# Patient Record
Sex: FEMALE | Race: BLACK OR AFRICAN AMERICAN | NOT HISPANIC OR LATINO | Employment: OTHER | URBAN - METROPOLITAN AREA
[De-identification: names, ages, dates, MRNs, and addresses within clinical notes are randomized per-mention and may not be internally consistent; named-entity substitution may affect disease eponyms.]

---

## 2017-02-17 ENCOUNTER — TRANSCRIBE ORDERS (OUTPATIENT)
Dept: ADMINISTRATIVE | Age: 77
End: 2017-02-17

## 2017-02-17 ENCOUNTER — LAB (OUTPATIENT)
Dept: LAB | Age: 77
End: 2017-02-17
Payer: MEDICARE

## 2017-02-17 DIAGNOSIS — K75.4 CHRONIC AGGRESSIVE HEPATITIS (HCC): Primary | ICD-10-CM

## 2017-02-17 DIAGNOSIS — K75.4 CHRONIC AGGRESSIVE HEPATITIS (HCC): ICD-10-CM

## 2017-02-17 LAB
ALBUMIN SERPL BCP-MCNC: 3.5 G/DL (ref 3.5–5)
ALP SERPL-CCNC: 47 U/L (ref 46–116)
ALT SERPL W P-5'-P-CCNC: 40 U/L (ref 12–78)
ANION GAP SERPL CALCULATED.3IONS-SCNC: 5 MMOL/L (ref 4–13)
AST SERPL W P-5'-P-CCNC: 27 U/L (ref 5–45)
BILIRUB SERPL-MCNC: 0.27 MG/DL (ref 0.2–1)
BUN SERPL-MCNC: 16 MG/DL (ref 5–25)
CALCIUM SERPL-MCNC: 9.5 MG/DL (ref 8.3–10.1)
CHLORIDE SERPL-SCNC: 106 MMOL/L (ref 100–108)
CO2 SERPL-SCNC: 31 MMOL/L (ref 21–32)
CREAT SERPL-MCNC: 0.81 MG/DL (ref 0.6–1.3)
GFR SERPL CREATININE-BSD FRML MDRD: >60 ML/MIN/1.73SQ M
GLUCOSE SERPL-MCNC: 95 MG/DL (ref 65–140)
POTASSIUM SERPL-SCNC: 3.6 MMOL/L (ref 3.5–5.3)
PROT SERPL-MCNC: 7.4 G/DL (ref 6.4–8.2)
SODIUM SERPL-SCNC: 142 MMOL/L (ref 136–145)

## 2017-02-17 PROCEDURE — 36415 COLL VENOUS BLD VENIPUNCTURE: CPT

## 2017-02-17 PROCEDURE — 80053 COMPREHEN METABOLIC PANEL: CPT

## 2017-06-30 ENCOUNTER — TRANSCRIBE ORDERS (OUTPATIENT)
Dept: ADMINISTRATIVE | Age: 77
End: 2017-06-30

## 2017-06-30 ENCOUNTER — LAB (OUTPATIENT)
Dept: LAB | Age: 77
End: 2017-06-30
Payer: MEDICARE

## 2017-06-30 DIAGNOSIS — K75.4 CHRONIC AGGRESSIVE HEPATITIS (HCC): ICD-10-CM

## 2017-06-30 DIAGNOSIS — K75.4 CHRONIC AGGRESSIVE HEPATITIS (HCC): Primary | ICD-10-CM

## 2017-06-30 LAB
ALBUMIN SERPL BCP-MCNC: 3.7 G/DL (ref 3.5–5)
ALP SERPL-CCNC: 54 U/L (ref 46–116)
ALT SERPL W P-5'-P-CCNC: 33 U/L (ref 12–78)
ANION GAP SERPL CALCULATED.3IONS-SCNC: 4 MMOL/L (ref 4–13)
AST SERPL W P-5'-P-CCNC: 26 U/L (ref 5–45)
BILIRUB SERPL-MCNC: 0.52 MG/DL (ref 0.2–1)
BUN SERPL-MCNC: 10 MG/DL (ref 5–25)
CALCIUM SERPL-MCNC: 9.3 MG/DL (ref 8.3–10.1)
CHLORIDE SERPL-SCNC: 103 MMOL/L (ref 100–108)
CO2 SERPL-SCNC: 30 MMOL/L (ref 21–32)
CREAT SERPL-MCNC: 0.71 MG/DL (ref 0.6–1.3)
GFR SERPL CREATININE-BSD FRML MDRD: >60 ML/MIN/1.73SQ M
GLUCOSE SERPL-MCNC: 98 MG/DL (ref 65–140)
POTASSIUM SERPL-SCNC: 4 MMOL/L (ref 3.5–5.3)
PROT SERPL-MCNC: 7.5 G/DL (ref 6.4–8.2)
SODIUM SERPL-SCNC: 137 MMOL/L (ref 136–145)

## 2017-06-30 PROCEDURE — 36415 COLL VENOUS BLD VENIPUNCTURE: CPT

## 2017-06-30 PROCEDURE — 80053 COMPREHEN METABOLIC PANEL: CPT

## 2017-07-14 ENCOUNTER — LAB (OUTPATIENT)
Dept: LAB | Age: 77
End: 2017-07-14
Attending: FAMILY MEDICINE
Payer: MEDICARE

## 2017-07-14 ENCOUNTER — TRANSCRIBE ORDERS (OUTPATIENT)
Dept: ADMINISTRATIVE | Age: 77
End: 2017-07-14

## 2017-07-14 DIAGNOSIS — I10 ESSENTIAL HYPERTENSION, MALIGNANT: ICD-10-CM

## 2017-07-14 DIAGNOSIS — I10 ESSENTIAL HYPERTENSION, MALIGNANT: Primary | ICD-10-CM

## 2017-07-14 LAB
25(OH)D3 SERPL-MCNC: 37.7 NG/ML (ref 30–100)
ANION GAP SERPL CALCULATED.3IONS-SCNC: 5 MMOL/L (ref 4–13)
BASOPHILS # BLD AUTO: 0.02 THOUSANDS/ΜL (ref 0–0.1)
BASOPHILS NFR BLD AUTO: 1 % (ref 0–1)
BUN SERPL-MCNC: 18 MG/DL (ref 5–25)
CALCIUM SERPL-MCNC: 9.5 MG/DL (ref 8.3–10.1)
CHLORIDE SERPL-SCNC: 105 MMOL/L (ref 100–108)
CO2 SERPL-SCNC: 29 MMOL/L (ref 21–32)
CREAT SERPL-MCNC: 1.08 MG/DL (ref 0.6–1.3)
EOSINOPHIL # BLD AUTO: 0.02 THOUSAND/ΜL (ref 0–0.61)
EOSINOPHIL NFR BLD AUTO: 1 % (ref 0–6)
ERYTHROCYTE [DISTWIDTH] IN BLOOD BY AUTOMATED COUNT: 13.7 % (ref 11.6–15.1)
GFR SERPL CREATININE-BSD FRML MDRD: 59.3 ML/MIN/1.73SQ M
GLUCOSE SERPL-MCNC: 84 MG/DL (ref 65–140)
HCT VFR BLD AUTO: 35.5 % (ref 34.8–46.1)
HGB BLD-MCNC: 12.2 G/DL (ref 11.5–15.4)
LYMPHOCYTES # BLD AUTO: 1.37 THOUSANDS/ΜL (ref 0.6–4.47)
LYMPHOCYTES NFR BLD AUTO: 48 % (ref 14–44)
MCH RBC QN AUTO: 30.6 PG (ref 26.8–34.3)
MCHC RBC AUTO-ENTMCNC: 34.4 G/DL (ref 31.4–37.4)
MCV RBC AUTO: 89 FL (ref 82–98)
MONOCYTES # BLD AUTO: 0.24 THOUSAND/ΜL (ref 0.17–1.22)
MONOCYTES NFR BLD AUTO: 9 % (ref 4–12)
NEUTROPHILS # BLD AUTO: 1.15 THOUSANDS/ΜL (ref 1.85–7.62)
NEUTS SEG NFR BLD AUTO: 41 % (ref 43–75)
NRBC BLD AUTO-RTO: 0 /100 WBCS
PLATELET # BLD AUTO: 86 THOUSANDS/UL (ref 149–390)
PMV BLD AUTO: 12.4 FL (ref 8.9–12.7)
POTASSIUM SERPL-SCNC: 3.8 MMOL/L (ref 3.5–5.3)
RBC # BLD AUTO: 3.99 MILLION/UL (ref 3.81–5.12)
SODIUM SERPL-SCNC: 139 MMOL/L (ref 136–145)
WBC # BLD AUTO: 2.81 THOUSAND/UL (ref 4.31–10.16)

## 2017-07-14 PROCEDURE — 85025 COMPLETE CBC W/AUTO DIFF WBC: CPT

## 2017-07-14 PROCEDURE — 80048 BASIC METABOLIC PNL TOTAL CA: CPT

## 2017-07-14 PROCEDURE — 36415 COLL VENOUS BLD VENIPUNCTURE: CPT

## 2017-07-14 PROCEDURE — 82306 VITAMIN D 25 HYDROXY: CPT

## 2017-08-10 ENCOUNTER — GENERIC CONVERSION - ENCOUNTER (OUTPATIENT)
Dept: OTHER | Facility: OTHER | Age: 77
End: 2017-08-10

## 2017-08-18 ENCOUNTER — APPOINTMENT (EMERGENCY)
Dept: RADIOLOGY | Facility: HOSPITAL | Age: 77
End: 2017-08-18
Payer: MEDICARE

## 2017-08-18 ENCOUNTER — HOSPITAL ENCOUNTER (EMERGENCY)
Facility: HOSPITAL | Age: 77
Discharge: HOME | End: 2017-08-18
Attending: EMERGENCY MEDICINE | Admitting: EMERGENCY MEDICINE
Payer: MEDICARE

## 2017-08-18 ENCOUNTER — APPOINTMENT (EMERGENCY)
Dept: ULTRASOUND IMAGING | Facility: HOSPITAL | Age: 77
End: 2017-08-18
Payer: MEDICARE

## 2017-08-18 VITALS
OXYGEN SATURATION: 98 % | TEMPERATURE: 98.2 F | HEART RATE: 84 BPM | WEIGHT: 130.3 LBS | DIASTOLIC BLOOD PRESSURE: 69 MMHG | HEIGHT: 64 IN | SYSTOLIC BLOOD PRESSURE: 137 MMHG | RESPIRATION RATE: 16 BRPM | BODY MASS INDEX: 22.24 KG/M2

## 2017-08-18 DIAGNOSIS — M76.62 ACHILLES TENDINITIS, LEFT LEG: Primary | ICD-10-CM

## 2017-08-18 PROCEDURE — 73630 X-RAY EXAM OF FOOT: CPT

## 2017-08-18 PROCEDURE — 99284 EMERGENCY DEPT VISIT MOD MDM: CPT

## 2017-08-18 PROCEDURE — 93971 EXTREMITY STUDY: CPT

## 2017-08-18 PROCEDURE — 73610 X-RAY EXAM OF ANKLE: CPT

## 2017-09-18 ENCOUNTER — TRANSCRIBE ORDERS (OUTPATIENT)
Dept: LAB | Facility: CLINIC | Age: 77
End: 2017-09-18

## 2017-09-18 ENCOUNTER — OFFICE VISIT (OUTPATIENT)
Dept: LAB | Facility: CLINIC | Age: 77
End: 2017-09-18
Payer: MEDICARE

## 2017-09-18 DIAGNOSIS — Z01.818 PREOP EXAMINATION: ICD-10-CM

## 2017-09-18 DIAGNOSIS — Z01.818 PREOP EXAMINATION: Primary | ICD-10-CM

## 2017-09-18 DIAGNOSIS — Z01.812 PRE-OPERATIVE LABORATORY EXAMINATION: ICD-10-CM

## 2017-09-18 PROCEDURE — 93005 ELECTROCARDIOGRAM TRACING: CPT

## 2017-09-19 LAB
ATRIAL RATE: 74 BPM
P AXIS: 72 DEGREES
PR INTERVAL: 170 MS
QRS AXIS: -18 DEGREES
QRSD INTERVAL: 156 MS
QT INTERVAL: 460 MS
QTC INTERVAL: 510 MS
T WAVE AXIS: 207 DEGREES
VENTRICULAR RATE: 74 BPM

## 2017-11-12 ENCOUNTER — TRANSCRIBE ORDERS (OUTPATIENT)
Dept: ADMINISTRATIVE | Age: 77
End: 2017-11-12

## 2017-11-12 ENCOUNTER — LAB (OUTPATIENT)
Dept: LAB | Age: 77
End: 2017-11-12
Payer: MEDICARE

## 2017-11-12 DIAGNOSIS — K75.4 CHRONIC AGGRESSIVE HEPATITIS (HCC): Primary | ICD-10-CM

## 2017-11-12 DIAGNOSIS — K75.4 CHRONIC AGGRESSIVE HEPATITIS (HCC): ICD-10-CM

## 2017-11-12 LAB
ALBUMIN SERPL BCP-MCNC: 3.5 G/DL (ref 3.5–5)
ALP SERPL-CCNC: 85 U/L (ref 46–116)
ALT SERPL W P-5'-P-CCNC: 101 U/L (ref 12–78)
ANION GAP SERPL CALCULATED.3IONS-SCNC: 4 MMOL/L (ref 4–13)
AST SERPL W P-5'-P-CCNC: 99 U/L (ref 5–45)
BILIRUB SERPL-MCNC: 0.64 MG/DL (ref 0.2–1)
BUN SERPL-MCNC: 11 MG/DL (ref 5–25)
CALCIUM SERPL-MCNC: 9 MG/DL (ref 8.3–10.1)
CHLORIDE SERPL-SCNC: 104 MMOL/L (ref 100–108)
CO2 SERPL-SCNC: 31 MMOL/L (ref 21–32)
CREAT SERPL-MCNC: 0.7 MG/DL (ref 0.6–1.3)
GFR SERPL CREATININE-BSD FRML MDRD: 95 ML/MIN/1.73SQ M
GLUCOSE SERPL-MCNC: 97 MG/DL (ref 65–140)
POTASSIUM SERPL-SCNC: 3.4 MMOL/L (ref 3.5–5.3)
PROT SERPL-MCNC: 8.1 G/DL (ref 6.4–8.2)
SODIUM SERPL-SCNC: 139 MMOL/L (ref 136–145)

## 2017-11-12 PROCEDURE — 80053 COMPREHEN METABOLIC PANEL: CPT

## 2017-11-12 PROCEDURE — 36415 COLL VENOUS BLD VENIPUNCTURE: CPT

## 2017-12-27 ENCOUNTER — LAB (OUTPATIENT)
Dept: LAB | Age: 77
End: 2017-12-27
Payer: MEDICARE

## 2017-12-27 ENCOUNTER — TRANSCRIBE ORDERS (OUTPATIENT)
Dept: ADMINISTRATIVE | Age: 77
End: 2017-12-27

## 2017-12-27 DIAGNOSIS — K75.4 CHRONIC AGGRESSIVE HEPATITIS (HCC): ICD-10-CM

## 2017-12-27 DIAGNOSIS — E55.9 AVITAMINOSIS D: Primary | ICD-10-CM

## 2017-12-27 DIAGNOSIS — E55.9 AVITAMINOSIS D: ICD-10-CM

## 2017-12-27 LAB
ALBUMIN SERPL BCP-MCNC: 3.2 G/DL (ref 3.5–5)
ALP SERPL-CCNC: 106 U/L (ref 46–116)
ALT SERPL W P-5'-P-CCNC: 436 U/L (ref 12–78)
ANION GAP SERPL CALCULATED.3IONS-SCNC: 5 MMOL/L (ref 4–13)
AST SERPL W P-5'-P-CCNC: 372 U/L (ref 5–45)
BILIRUB SERPL-MCNC: 0.65 MG/DL (ref 0.2–1)
BUN SERPL-MCNC: 11 MG/DL (ref 5–25)
CALCIUM SERPL-MCNC: 9 MG/DL (ref 8.3–10.1)
CHLORIDE SERPL-SCNC: 103 MMOL/L (ref 100–108)
CO2 SERPL-SCNC: 30 MMOL/L (ref 21–32)
CREAT SERPL-MCNC: 0.66 MG/DL (ref 0.6–1.3)
GFR SERPL CREATININE-BSD FRML MDRD: 97 ML/MIN/1.73SQ M
GLUCOSE P FAST SERPL-MCNC: 109 MG/DL (ref 65–99)
POTASSIUM SERPL-SCNC: 3.6 MMOL/L (ref 3.5–5.3)
PROT SERPL-MCNC: 8.6 G/DL (ref 6.4–8.2)
SODIUM SERPL-SCNC: 138 MMOL/L (ref 136–145)

## 2017-12-27 PROCEDURE — 82306 VITAMIN D 25 HYDROXY: CPT

## 2017-12-27 PROCEDURE — 81401 MOPATH PROCEDURE LEVEL 2: CPT

## 2017-12-27 PROCEDURE — 36415 COLL VENOUS BLD VENIPUNCTURE: CPT

## 2017-12-27 PROCEDURE — 80053 COMPREHEN METABOLIC PANEL: CPT

## 2017-12-28 LAB — 25(OH)D3 SERPL-MCNC: 36.8 NG/ML (ref 30–100)

## 2018-01-05 LAB
DIAGNOSTIC IMP SPEC-IMP: NORMAL
LABORATORY COMMENT REPORT: NORMAL
TPMT GENE MUT ANL BLD/T: NORMAL
TPMT GENE MUT ANL BLD/T: NORMAL

## 2018-01-10 ENCOUNTER — TRANSCRIBE ORDERS (OUTPATIENT)
Dept: ADMINISTRATIVE | Age: 78
End: 2018-01-10

## 2018-01-10 ENCOUNTER — LAB (OUTPATIENT)
Dept: LAB | Age: 78
End: 2018-01-10
Payer: MEDICARE

## 2018-01-10 DIAGNOSIS — R74.9 NONSPECIFIC ABNORMAL SERUM ENZYME LEVELS: Primary | ICD-10-CM

## 2018-01-10 DIAGNOSIS — R74.9 NONSPECIFIC ABNORMAL SERUM ENZYME LEVELS: ICD-10-CM

## 2018-01-10 LAB
ALBUMIN SERPL BCP-MCNC: 3.4 G/DL (ref 3.5–5)
ALP SERPL-CCNC: 89 U/L (ref 46–116)
ALT SERPL W P-5'-P-CCNC: 279 U/L (ref 12–78)
ANION GAP SERPL CALCULATED.3IONS-SCNC: 5 MMOL/L (ref 4–13)
AST SERPL W P-5'-P-CCNC: 216 U/L (ref 5–45)
BILIRUB SERPL-MCNC: 0.76 MG/DL (ref 0.2–1)
BUN SERPL-MCNC: 11 MG/DL (ref 5–25)
CALCIUM SERPL-MCNC: 9.3 MG/DL (ref 8.3–10.1)
CHLORIDE SERPL-SCNC: 102 MMOL/L (ref 100–108)
CO2 SERPL-SCNC: 29 MMOL/L (ref 21–32)
CREAT SERPL-MCNC: 0.67 MG/DL (ref 0.6–1.3)
GFR SERPL CREATININE-BSD FRML MDRD: 97 ML/MIN/1.73SQ M
GLUCOSE SERPL-MCNC: 90 MG/DL (ref 65–140)
POTASSIUM SERPL-SCNC: 3.7 MMOL/L (ref 3.5–5.3)
PROT SERPL-MCNC: 8.7 G/DL (ref 6.4–8.2)
SODIUM SERPL-SCNC: 136 MMOL/L (ref 136–145)

## 2018-01-10 PROCEDURE — 80053 COMPREHEN METABOLIC PANEL: CPT

## 2018-01-10 PROCEDURE — 36415 COLL VENOUS BLD VENIPUNCTURE: CPT

## 2018-01-21 ENCOUNTER — LAB (OUTPATIENT)
Dept: LAB | Age: 78
End: 2018-01-21
Attending: FAMILY MEDICINE
Payer: MEDICARE

## 2018-01-21 ENCOUNTER — TRANSCRIBE ORDERS (OUTPATIENT)
Dept: ADMINISTRATIVE | Age: 78
End: 2018-01-21

## 2018-01-21 DIAGNOSIS — K75.4 HEPATITIS, AUTOIMMUNE (HCC): Primary | ICD-10-CM

## 2018-01-21 DIAGNOSIS — K75.4 HEPATITIS, AUTOIMMUNE (HCC): ICD-10-CM

## 2018-01-21 LAB
25(OH)D3 SERPL-MCNC: 40.6 NG/ML (ref 30–100)
ALBUMIN SERPL BCP-MCNC: 3.4 G/DL (ref 3.5–5)
ALP SERPL-CCNC: 76 U/L (ref 46–116)
ALT SERPL W P-5'-P-CCNC: 216 U/L (ref 12–78)
ANION GAP SERPL CALCULATED.3IONS-SCNC: 4 MMOL/L (ref 4–13)
AST SERPL W P-5'-P-CCNC: 155 U/L (ref 5–45)
BILIRUB SERPL-MCNC: 0.84 MG/DL (ref 0.2–1)
BUN SERPL-MCNC: 11 MG/DL (ref 5–25)
CALCIUM SERPL-MCNC: 8.9 MG/DL (ref 8.3–10.1)
CHLORIDE SERPL-SCNC: 102 MMOL/L (ref 100–108)
CO2 SERPL-SCNC: 31 MMOL/L (ref 21–32)
CREAT SERPL-MCNC: 0.7 MG/DL (ref 0.6–1.3)
GFR SERPL CREATININE-BSD FRML MDRD: 95 ML/MIN/1.73SQ M
GLUCOSE SERPL-MCNC: 85 MG/DL (ref 65–140)
POTASSIUM SERPL-SCNC: 3.6 MMOL/L (ref 3.5–5.3)
PROT SERPL-MCNC: 8.7 G/DL (ref 6.4–8.2)
SODIUM SERPL-SCNC: 137 MMOL/L (ref 136–145)

## 2018-01-21 PROCEDURE — 80053 COMPREHEN METABOLIC PANEL: CPT

## 2018-01-21 PROCEDURE — 82627 DEHYDROEPIANDROSTERONE: CPT

## 2018-01-21 PROCEDURE — 36415 COLL VENOUS BLD VENIPUNCTURE: CPT

## 2018-01-21 PROCEDURE — 82306 VITAMIN D 25 HYDROXY: CPT

## 2018-01-22 LAB — DHEA-S SERPL-MCNC: 6.1 UG/DL (ref 13.9–142.8)

## 2018-01-25 ENCOUNTER — LAB (OUTPATIENT)
Dept: LAB | Age: 78
End: 2018-01-25
Payer: MEDICARE

## 2018-01-25 ENCOUNTER — TRANSCRIBE ORDERS (OUTPATIENT)
Dept: ADMINISTRATIVE | Facility: HOSPITAL | Age: 78
End: 2018-01-25

## 2018-01-25 DIAGNOSIS — R74.9 NONSPECIFIC ABNORMAL SERUM ENZYME LEVELS: Primary | ICD-10-CM

## 2018-01-25 DIAGNOSIS — R74.9 NONSPECIFIC ABNORMAL SERUM ENZYME LEVELS: ICD-10-CM

## 2018-01-25 LAB
ALBUMIN SERPL BCP-MCNC: 3.5 G/DL (ref 3.5–5)
ALP SERPL-CCNC: 68 U/L (ref 46–116)
ALT SERPL W P-5'-P-CCNC: 190 U/L (ref 12–78)
ANION GAP SERPL CALCULATED.3IONS-SCNC: 2 MMOL/L (ref 4–13)
AST SERPL W P-5'-P-CCNC: 130 U/L (ref 5–45)
BILIRUB SERPL-MCNC: 0.84 MG/DL (ref 0.2–1)
BUN SERPL-MCNC: 12 MG/DL (ref 5–25)
CALCIUM SERPL-MCNC: 9.1 MG/DL (ref 8.3–10.1)
CHLORIDE SERPL-SCNC: 104 MMOL/L (ref 100–108)
CO2 SERPL-SCNC: 33 MMOL/L (ref 21–32)
CREAT SERPL-MCNC: 0.73 MG/DL (ref 0.6–1.3)
GFR SERPL CREATININE-BSD FRML MDRD: 91 ML/MIN/1.73SQ M
GLUCOSE SERPL-MCNC: 81 MG/DL (ref 65–140)
POTASSIUM SERPL-SCNC: 3.6 MMOL/L (ref 3.5–5.3)
PROT SERPL-MCNC: 8.7 G/DL (ref 6.4–8.2)
SODIUM SERPL-SCNC: 139 MMOL/L (ref 136–145)

## 2018-01-25 PROCEDURE — 36415 COLL VENOUS BLD VENIPUNCTURE: CPT

## 2018-01-25 PROCEDURE — 80053 COMPREHEN METABOLIC PANEL: CPT

## 2018-02-14 ENCOUNTER — LAB (OUTPATIENT)
Dept: LAB | Age: 78
End: 2018-02-14
Payer: MEDICARE

## 2018-02-14 DIAGNOSIS — R74.9 NONSPECIFIC ABNORMAL SERUM ENZYME LEVELS: ICD-10-CM

## 2018-02-14 LAB
ALBUMIN SERPL BCP-MCNC: 3.4 G/DL (ref 3.5–5)
ALP SERPL-CCNC: 55 U/L (ref 46–116)
ALT SERPL W P-5'-P-CCNC: 123 U/L (ref 12–78)
ANION GAP SERPL CALCULATED.3IONS-SCNC: 6 MMOL/L (ref 4–13)
AST SERPL W P-5'-P-CCNC: 89 U/L (ref 5–45)
BILIRUB SERPL-MCNC: 0.69 MG/DL (ref 0.2–1)
BUN SERPL-MCNC: 12 MG/DL (ref 5–25)
CALCIUM SERPL-MCNC: 8.9 MG/DL (ref 8.3–10.1)
CHLORIDE SERPL-SCNC: 102 MMOL/L (ref 100–108)
CO2 SERPL-SCNC: 29 MMOL/L (ref 21–32)
CREAT SERPL-MCNC: 0.71 MG/DL (ref 0.6–1.3)
GFR SERPL CREATININE-BSD FRML MDRD: 94 ML/MIN/1.73SQ M
GLUCOSE SERPL-MCNC: 87 MG/DL (ref 65–140)
POTASSIUM SERPL-SCNC: 3.6 MMOL/L (ref 3.5–5.3)
PROT SERPL-MCNC: 8.2 G/DL (ref 6.4–8.2)
SODIUM SERPL-SCNC: 137 MMOL/L (ref 136–145)

## 2018-02-14 PROCEDURE — 80053 COMPREHEN METABOLIC PANEL: CPT

## 2018-02-14 PROCEDURE — 36415 COLL VENOUS BLD VENIPUNCTURE: CPT

## 2018-04-04 ENCOUNTER — LAB (OUTPATIENT)
Dept: LAB | Age: 78
End: 2018-04-04
Payer: MEDICARE

## 2018-04-04 ENCOUNTER — TRANSCRIBE ORDERS (OUTPATIENT)
Dept: ADMINISTRATIVE | Age: 78
End: 2018-04-04

## 2018-04-04 DIAGNOSIS — K75.4 CHRONIC AGGRESSIVE HEPATITIS (HCC): ICD-10-CM

## 2018-04-04 DIAGNOSIS — K75.4 CHRONIC AGGRESSIVE HEPATITIS (HCC): Primary | ICD-10-CM

## 2018-04-04 LAB
ALBUMIN SERPL BCP-MCNC: 3.3 G/DL (ref 3.5–5)
ALP SERPL-CCNC: 50 U/L (ref 46–116)
ALT SERPL W P-5'-P-CCNC: 48 U/L (ref 12–78)
ANION GAP SERPL CALCULATED.3IONS-SCNC: 4 MMOL/L (ref 4–13)
AST SERPL W P-5'-P-CCNC: 37 U/L (ref 5–45)
BILIRUB SERPL-MCNC: 0.69 MG/DL (ref 0.2–1)
BUN SERPL-MCNC: 11 MG/DL (ref 5–25)
CALCIUM SERPL-MCNC: 9.1 MG/DL (ref 8.3–10.1)
CHLORIDE SERPL-SCNC: 103 MMOL/L (ref 100–108)
CO2 SERPL-SCNC: 31 MMOL/L (ref 21–32)
CREAT SERPL-MCNC: 0.68 MG/DL (ref 0.6–1.3)
ERYTHROCYTE [DISTWIDTH] IN BLOOD BY AUTOMATED COUNT: 14 % (ref 11.6–15.1)
GFR SERPL CREATININE-BSD FRML MDRD: 96 ML/MIN/1.73SQ M
GLUCOSE SERPL-MCNC: 96 MG/DL (ref 65–140)
HCT VFR BLD AUTO: 36.6 % (ref 34.8–46.1)
HGB BLD-MCNC: 12.2 G/DL (ref 11.5–15.4)
MCH RBC QN AUTO: 30.1 PG (ref 26.8–34.3)
MCHC RBC AUTO-ENTMCNC: 33.3 G/DL (ref 31.4–37.4)
MCV RBC AUTO: 90 FL (ref 82–98)
PLATELET # BLD AUTO: 106 THOUSANDS/UL (ref 149–390)
PMV BLD AUTO: 12.3 FL (ref 8.9–12.7)
POTASSIUM SERPL-SCNC: 3.4 MMOL/L (ref 3.5–5.3)
PROT SERPL-MCNC: 7.9 G/DL (ref 6.4–8.2)
RBC # BLD AUTO: 4.05 MILLION/UL (ref 3.81–5.12)
SODIUM SERPL-SCNC: 138 MMOL/L (ref 136–145)
WBC # BLD AUTO: 3.06 THOUSAND/UL (ref 4.31–10.16)

## 2018-04-04 PROCEDURE — 36415 COLL VENOUS BLD VENIPUNCTURE: CPT

## 2018-04-04 PROCEDURE — 80053 COMPREHEN METABOLIC PANEL: CPT

## 2018-04-04 PROCEDURE — 85027 COMPLETE CBC AUTOMATED: CPT

## 2018-06-05 ENCOUNTER — TRANSCRIBE ORDERS (OUTPATIENT)
Dept: LAB | Facility: CLINIC | Age: 78
End: 2018-06-05

## 2018-06-10 ENCOUNTER — LAB (OUTPATIENT)
Dept: LAB | Age: 78
End: 2018-06-10
Payer: MEDICARE

## 2018-06-10 ENCOUNTER — TRANSCRIBE ORDERS (OUTPATIENT)
Dept: ADMINISTRATIVE | Age: 78
End: 2018-06-10

## 2018-06-10 DIAGNOSIS — K75.4 CHRONIC AGGRESSIVE HEPATITIS (HCC): ICD-10-CM

## 2018-06-10 DIAGNOSIS — K75.4 CHRONIC AGGRESSIVE HEPATITIS (HCC): Primary | ICD-10-CM

## 2018-06-10 LAB
ALBUMIN SERPL BCP-MCNC: 3.4 G/DL (ref 3.5–5)
ALP SERPL-CCNC: 42 U/L (ref 46–116)
ALT SERPL W P-5'-P-CCNC: 34 U/L (ref 12–78)
ANION GAP SERPL CALCULATED.3IONS-SCNC: 3 MMOL/L (ref 4–13)
AST SERPL W P-5'-P-CCNC: 29 U/L (ref 5–45)
BILIRUB SERPL-MCNC: 0.43 MG/DL (ref 0.2–1)
BUN SERPL-MCNC: 13 MG/DL (ref 5–25)
CALCIUM SERPL-MCNC: 8.9 MG/DL (ref 8.3–10.1)
CHLORIDE SERPL-SCNC: 104 MMOL/L (ref 100–108)
CO2 SERPL-SCNC: 32 MMOL/L (ref 21–32)
CREAT SERPL-MCNC: 0.74 MG/DL (ref 0.6–1.3)
GFR SERPL CREATININE-BSD FRML MDRD: 88 ML/MIN/1.73SQ M
GLUCOSE SERPL-MCNC: 76 MG/DL (ref 65–140)
POTASSIUM SERPL-SCNC: 3.4 MMOL/L (ref 3.5–5.3)
PROT SERPL-MCNC: 7.6 G/DL (ref 6.4–8.2)
SODIUM SERPL-SCNC: 139 MMOL/L (ref 136–145)

## 2018-06-10 PROCEDURE — 36415 COLL VENOUS BLD VENIPUNCTURE: CPT

## 2018-06-10 PROCEDURE — 80053 COMPREHEN METABOLIC PANEL: CPT

## 2018-07-20 ENCOUNTER — LAB (OUTPATIENT)
Dept: LAB | Age: 78
End: 2018-07-20
Attending: FAMILY MEDICINE
Payer: MEDICARE

## 2018-07-20 ENCOUNTER — TRANSCRIBE ORDERS (OUTPATIENT)
Dept: ADMINISTRATIVE | Age: 78
End: 2018-07-20

## 2018-07-20 DIAGNOSIS — D69.6 THROMBOCYTOPENIC (HCC): ICD-10-CM

## 2018-07-20 DIAGNOSIS — D69.6 THROMBOCYTOPENIC (HCC): Primary | ICD-10-CM

## 2018-07-20 LAB
BASOPHILS # BLD AUTO: 0.03 THOUSANDS/ΜL (ref 0–0.1)
BASOPHILS NFR BLD AUTO: 1 % (ref 0–1)
EOSINOPHIL # BLD AUTO: 0.01 THOUSAND/ΜL (ref 0–0.61)
EOSINOPHIL NFR BLD AUTO: 0 % (ref 0–6)
ERYTHROCYTE [DISTWIDTH] IN BLOOD BY AUTOMATED COUNT: 15.5 % (ref 11.6–15.1)
HCT VFR BLD AUTO: 36.4 % (ref 34.8–46.1)
HGB BLD-MCNC: 11 G/DL (ref 11.5–15.4)
IMM GRANULOCYTES # BLD AUTO: 0.01 THOUSAND/UL (ref 0–0.2)
IMM GRANULOCYTES NFR BLD AUTO: 0 % (ref 0–2)
LYMPHOCYTES # BLD AUTO: 0.89 THOUSANDS/ΜL (ref 0.6–4.47)
LYMPHOCYTES NFR BLD AUTO: 28 % (ref 14–44)
MCH RBC QN AUTO: 27.5 PG (ref 26.8–34.3)
MCHC RBC AUTO-ENTMCNC: 30.2 G/DL (ref 31.4–37.4)
MCV RBC AUTO: 91 FL (ref 82–98)
MONOCYTES # BLD AUTO: 0.35 THOUSAND/ΜL (ref 0.17–1.22)
MONOCYTES NFR BLD AUTO: 11 % (ref 4–12)
NEUTROPHILS # BLD AUTO: 1.86 THOUSANDS/ΜL (ref 1.85–7.62)
NEUTS SEG NFR BLD AUTO: 60 % (ref 43–75)
NRBC BLD AUTO-RTO: 0 /100 WBCS
PLATELET # BLD AUTO: 77 THOUSANDS/UL (ref 149–390)
PMV BLD AUTO: 13.6 FL (ref 8.9–12.7)
RBC # BLD AUTO: 4 MILLION/UL (ref 3.81–5.12)
WBC # BLD AUTO: 3.15 THOUSAND/UL (ref 4.31–10.16)

## 2018-07-20 PROCEDURE — 85025 COMPLETE CBC W/AUTO DIFF WBC: CPT

## 2018-07-20 PROCEDURE — 36415 COLL VENOUS BLD VENIPUNCTURE: CPT

## 2018-08-01 ENCOUNTER — APPOINTMENT (EMERGENCY)
Dept: RADIOLOGY | Facility: HOSPITAL | Age: 78
DRG: 286 | End: 2018-08-01
Payer: MEDICARE

## 2018-08-01 ENCOUNTER — HOSPITAL ENCOUNTER (INPATIENT)
Facility: HOSPITAL | Age: 78
LOS: 5 days | Discharge: HOME HEALTH | DRG: 286 | End: 2018-08-06
Attending: EMERGENCY MEDICINE | Admitting: INTERNAL MEDICINE
Payer: MEDICARE

## 2018-08-01 DIAGNOSIS — I50.21 ACUTE SYSTOLIC HEART FAILURE (HCC): ICD-10-CM

## 2018-08-01 DIAGNOSIS — I50.9 CONGESTIVE HEART FAILURE (CHF) (HCC): Primary | ICD-10-CM

## 2018-08-01 DIAGNOSIS — I10 HYPERTENSION: ICD-10-CM

## 2018-08-01 DIAGNOSIS — Z91.89 AT RISK FOR CARDIAC ARRHYTHMIA: ICD-10-CM

## 2018-08-01 DIAGNOSIS — J90 PLEURAL EFFUSION: ICD-10-CM

## 2018-08-01 PROBLEM — K75.4 AUTOIMMUNE HEPATITIS TREATED WITH STEROIDS (HCC): Status: ACTIVE | Noted: 2018-08-01

## 2018-08-01 PROBLEM — H40.89 OTHER SPECIFIED GLAUCOMA: Status: ACTIVE | Noted: 2018-08-01

## 2018-08-01 LAB
ALBUMIN SERPL BCP-MCNC: 3.3 G/DL (ref 3.5–5)
ALP SERPL-CCNC: 56 U/L (ref 46–116)
ALT SERPL W P-5'-P-CCNC: 85 U/L (ref 12–78)
ANION GAP SERPL CALCULATED.3IONS-SCNC: 8 MMOL/L (ref 4–13)
AST SERPL W P-5'-P-CCNC: 89 U/L (ref 5–45)
ATRIAL RATE: 100 BPM
BASOPHILS # BLD AUTO: 0.02 THOUSANDS/ΜL (ref 0–0.1)
BASOPHILS NFR BLD AUTO: 1 % (ref 0–1)
BILIRUB SERPL-MCNC: 0.73 MG/DL (ref 0.2–1)
BUN SERPL-MCNC: 16 MG/DL (ref 5–25)
CALCIUM SERPL-MCNC: 9.1 MG/DL (ref 8.3–10.1)
CHLORIDE SERPL-SCNC: 106 MMOL/L (ref 100–108)
CO2 SERPL-SCNC: 24 MMOL/L (ref 21–32)
CREAT SERPL-MCNC: 0.85 MG/DL (ref 0.6–1.3)
EOSINOPHIL # BLD AUTO: 0.01 THOUSAND/ΜL (ref 0–0.61)
EOSINOPHIL NFR BLD AUTO: 0 % (ref 0–6)
ERYTHROCYTE [DISTWIDTH] IN BLOOD BY AUTOMATED COUNT: 15.9 % (ref 11.6–15.1)
GFR SERPL CREATININE-BSD FRML MDRD: 75 ML/MIN/1.73SQ M
GLUCOSE SERPL-MCNC: 89 MG/DL (ref 65–140)
HCT VFR BLD AUTO: 34.8 % (ref 34.8–46.1)
HGB BLD-MCNC: 11 G/DL (ref 11.5–15.4)
IMM GRANULOCYTES # BLD AUTO: 0.01 THOUSAND/UL (ref 0–0.2)
IMM GRANULOCYTES NFR BLD AUTO: 0 % (ref 0–2)
LYMPHOCYTES # BLD AUTO: 0.89 THOUSANDS/ΜL (ref 0.6–4.47)
LYMPHOCYTES NFR BLD AUTO: 33 % (ref 14–44)
MCH RBC QN AUTO: 27.8 PG (ref 26.8–34.3)
MCHC RBC AUTO-ENTMCNC: 31.6 G/DL (ref 31.4–37.4)
MCV RBC AUTO: 88 FL (ref 82–98)
MONOCYTES # BLD AUTO: 0.29 THOUSAND/ΜL (ref 0.17–1.22)
MONOCYTES NFR BLD AUTO: 11 % (ref 4–12)
NEUTROPHILS # BLD AUTO: 1.46 THOUSANDS/ΜL (ref 1.85–7.62)
NEUTS SEG NFR BLD AUTO: 55 % (ref 43–75)
NRBC BLD AUTO-RTO: 0 /100 WBCS
NT-PROBNP SERPL-MCNC: ABNORMAL PG/ML
P AXIS: 81 DEGREES
PLATELET # BLD AUTO: 89 THOUSANDS/UL (ref 149–390)
PMV BLD AUTO: 12.3 FL (ref 8.9–12.7)
POTASSIUM SERPL-SCNC: 3.8 MMOL/L (ref 3.5–5.3)
PR INTERVAL: 158 MS
PROT SERPL-MCNC: 8.1 G/DL (ref 6.4–8.2)
QRS AXIS: -44 DEGREES
QRSD INTERVAL: 158 MS
QT INTERVAL: 402 MS
QTC INTERVAL: 518 MS
RBC # BLD AUTO: 3.96 MILLION/UL (ref 3.81–5.12)
SODIUM SERPL-SCNC: 138 MMOL/L (ref 136–145)
T WAVE AXIS: 94 DEGREES
TROPONIN I SERPL-MCNC: 0.03 NG/ML
TROPONIN I SERPL-MCNC: 0.04 NG/ML
VENTRICULAR RATE: 100 BPM
WBC # BLD AUTO: 2.68 THOUSAND/UL (ref 4.31–10.16)

## 2018-08-01 PROCEDURE — 80053 COMPREHEN METABOLIC PANEL: CPT | Performed by: EMERGENCY MEDICINE

## 2018-08-01 PROCEDURE — 84134 ASSAY OF PREALBUMIN: CPT | Performed by: INTERNAL MEDICINE

## 2018-08-01 PROCEDURE — 85025 COMPLETE CBC W/AUTO DIFF WBC: CPT | Performed by: EMERGENCY MEDICINE

## 2018-08-01 PROCEDURE — 99223 1ST HOSP IP/OBS HIGH 75: CPT | Performed by: INTERNAL MEDICINE

## 2018-08-01 PROCEDURE — 83735 ASSAY OF MAGNESIUM: CPT | Performed by: INTERNAL MEDICINE

## 2018-08-01 PROCEDURE — 96374 THER/PROPH/DIAG INJ IV PUSH: CPT

## 2018-08-01 PROCEDURE — 71046 X-RAY EXAM CHEST 2 VIEWS: CPT

## 2018-08-01 PROCEDURE — 80053 COMPREHEN METABOLIC PANEL: CPT | Performed by: INTERNAL MEDICINE

## 2018-08-01 PROCEDURE — 71260 CT THORAX DX C+: CPT

## 2018-08-01 PROCEDURE — 93005 ELECTROCARDIOGRAM TRACING: CPT

## 2018-08-01 PROCEDURE — 84484 ASSAY OF TROPONIN QUANT: CPT | Performed by: EMERGENCY MEDICINE

## 2018-08-01 PROCEDURE — 84484 ASSAY OF TROPONIN QUANT: CPT | Performed by: INTERNAL MEDICINE

## 2018-08-01 PROCEDURE — 36415 COLL VENOUS BLD VENIPUNCTURE: CPT

## 2018-08-01 PROCEDURE — 96375 TX/PRO/DX INJ NEW DRUG ADDON: CPT

## 2018-08-01 PROCEDURE — 93010 ELECTROCARDIOGRAM REPORT: CPT | Performed by: INTERNAL MEDICINE

## 2018-08-01 PROCEDURE — 83880 ASSAY OF NATRIURETIC PEPTIDE: CPT | Performed by: EMERGENCY MEDICINE

## 2018-08-01 PROCEDURE — 99285 EMERGENCY DEPT VISIT HI MDM: CPT

## 2018-08-01 RX ORDER — FUROSEMIDE 10 MG/ML
20 INJECTION INTRAMUSCULAR; INTRAVENOUS DAILY
Status: DISCONTINUED | OUTPATIENT
Start: 2018-08-02 | End: 2018-08-03

## 2018-08-01 RX ORDER — FUROSEMIDE 10 MG/ML
20 INJECTION INTRAMUSCULAR; INTRAVENOUS ONCE
Status: COMPLETED | OUTPATIENT
Start: 2018-08-01 | End: 2018-08-01

## 2018-08-01 RX ORDER — KETOROLAC TROMETHAMINE 30 MG/ML
15 INJECTION, SOLUTION INTRAMUSCULAR; INTRAVENOUS ONCE
Status: COMPLETED | OUTPATIENT
Start: 2018-08-01 | End: 2018-08-01

## 2018-08-01 RX ADMIN — KETOROLAC TROMETHAMINE 15 MG: 30 INJECTION, SOLUTION INTRAMUSCULAR at 17:36

## 2018-08-01 RX ADMIN — IOHEXOL 70 ML: 350 INJECTION, SOLUTION INTRAVENOUS at 18:39

## 2018-08-01 RX ADMIN — FUROSEMIDE 20 MG: 10 INJECTION, SOLUTION INTRAMUSCULAR; INTRAVENOUS at 19:43

## 2018-08-01 NOTE — ED ATTENDING ATTESTATION
Lyndsay Oglesby MD, saw and evaluated the patient  I have discussed the patient with the resident/non-physician practitioner and agree with the resident's/non-physician practitioner's findings, Plan of Care, and MDM as documented in the resident's/non-physician practitioner's note, except where noted  All available labs and Radiology studies were reviewed  At this point I agree with the current assessment done in the Emergency Department  I have conducted an independent evaluation of this patient a history and physical is as follows:      Critical Care Time  CritCare Time    Procedures     [de-identified] yo female with two weeks of chest tightness started after carrying laundry basket  No fever, no chills, no diaphoresis  Pt pain worse with exertion  Pt with hx of htn  No hx of this kind of pain  No abdominal pain  No n/v/d  Vss, afebrile, lungs cta, rrr, abdomen soft nontender, no pedal edema, no calf tenderness  Cardiac workup, low risk for pe, heart score 3   Pain meds, likely noncardiac

## 2018-08-01 NOTE — ED PROVIDER NOTES
History  Chief Complaint   Patient presents with    Chest Pain     intermittent chest pressure in mid-sternal area x 3 weeks  denies shortness of breath, denies dizziness, denies syncope  HPI     [de-identified]year old female with history of hypertension presenting with chief complaint of chest pain which began for the last couple of weeks  Patient states that she was carrying a laundry basket up the stairs when it began  She admits to central sternal chest tightness  Currently the pain is a 2/10  Patient tried Tylenol and this helped  It is made worse with walking up the stairs  Better with rest   At its worse is a 4/10  Patient states the pain does not radiate  Patient has had a stress test many years ago  Patient has not had history of ACS or any cardiac intervention  Patient denies diaphoresis nausea vomiting  Patient denies history of thromboembolism  Patient denies respiratory symptoms no shortness of breath cough hemoptysis  Patient admits to cough in the morning which clears up today  Patient denies fever chills rigors headache lightheadedness dizziness palpitations shortness of breath  pleurisy hemoptysis abdominal pain nausea vomiting diarrhea constipation urinary symptoms motor weakness numbness and tingling  Patient does have a history of, "bowel problems and treated with oral steroids  Prior to Admission Medications   Prescriptions Last Dose Informant Patient Reported? Taking? BUDESONIDE ER PO 8/1/2018 at Unknown time  Yes Yes   Sig: Take 3 mg by mouth daily      Facility-Administered Medications: None       Past Medical History:   Diagnosis Date    Hypertension        History reviewed  No pertinent surgical history  History reviewed  No pertinent family history  I have reviewed and agree with the history as documented      Social History   Substance Use Topics    Smoking status: Never Smoker    Smokeless tobacco: Not on file    Alcohol use No        Review of Systems Constitutional: Positive for fatigue  Negative for chills and diaphoresis  Respiratory: Positive for cough and chest tightness  Negative for shortness of breath  Cardiovascular: Positive for chest pain  Negative for palpitations and leg swelling  All other systems reviewed and are negative  Physical Exam  ED Triage Vitals   Temperature Pulse Respirations Blood Pressure SpO2   08/01/18 2159 08/01/18 1539 08/01/18 1539 08/01/18 1539 08/01/18 1700   97 7 °F (36 5 °C) 101 18 140/79 99 %      Temp Source Heart Rate Source Patient Position - Orthostatic VS BP Location FiO2 (%)   08/01/18 2159 08/01/18 1539 08/01/18 1539 08/01/18 1539 --   Tympanic Monitor Sitting Right arm       Pain Score       08/01/18 1539       6           Orthostatic Vital Signs  Vitals:    08/01/18 1905 08/01/18 2119 08/01/18 2159 08/01/18 2312   BP: 135/73 124/71 126/63 113/57   Pulse: 90 80 88 83   Patient Position - Orthostatic VS: Lying Lying Lying Lying       Physical Exam   Constitutional: She is oriented to person, place, and time  She appears well-developed and well-nourished  No distress  HENT:   Head: Normocephalic and atraumatic  Right Ear: External ear normal    Left Ear: External ear normal    Nose: Nose normal    Mouth/Throat: Oropharynx is clear and moist  No oropharyngeal exudate  Eyes: Conjunctivae and EOM are normal  Pupils are equal, round, and reactive to light  Right eye exhibits no discharge  Left eye exhibits no discharge  No scleral icterus  Neck: Normal range of motion  Neck supple  No JVD present  No tracheal deviation present  No thyromegaly present  Cardiovascular: Normal rate, regular rhythm, normal heart sounds and intact distal pulses  No murmur heard  Pulmonary/Chest: Effort normal and breath sounds normal  No stridor  No respiratory distress  She has no wheezes  Abdominal: Soft  Bowel sounds are normal  She exhibits no distension and no mass  There is no tenderness   There is no rebound and no guarding  No hernia  Musculoskeletal: Normal range of motion  She exhibits no edema, tenderness or deformity  Lymphadenopathy:     She has no cervical adenopathy  Neurological: She is alert and oriented to person, place, and time  She displays normal reflexes  No cranial nerve deficit  She exhibits normal muscle tone  Skin: Skin is warm and dry  No rash noted  She is not diaphoretic  No erythema  Psychiatric: She has a normal mood and affect  Her behavior is normal  Judgment and thought content normal    Nursing note and vitals reviewed        ED Medications  Medications   NON FORMULARY (not administered)   enoxaparin (LOVENOX) subcutaneous injection 40 mg (not administered)   furosemide (LASIX) injection 20 mg (not administered)   ketorolac (TORADOL) injection 15 mg (15 mg Intravenous Given 8/1/18 1736)   iohexol (OMNIPAQUE) 350 MG/ML injection (MULTI-DOSE) 70 mL (70 mL Intravenous Given 8/1/18 1839)   furosemide (LASIX) injection 20 mg (20 mg Intravenous Given 8/1/18 1943)       Diagnostic Studies  Results Reviewed     Procedure Component Value Units Date/Time    Troponin I [[id number]]  (Normal) Collected:  08/01/18 1734    Lab Status:  Final result Specimen:  Blood from Arm, Left Updated:  08/01/18 1823     Troponin I 0 04 ng/mL     Comprehensive metabolic panel [[id number]]  (Abnormal) Collected:  08/01/18 1543    Lab Status:  Final result Specimen:  Blood from Arm, Right Updated:  08/01/18 1635     Sodium 138 mmol/L      Potassium 3 8 mmol/L      Chloride 106 mmol/L      CO2 24 mmol/L      Anion Gap 8 mmol/L      BUN 16 mg/dL      Creatinine 0 85 mg/dL      Glucose 89 mg/dL      Calcium 9 1 mg/dL      AST 89 (H) U/L      ALT 85 (H) U/L      Alkaline Phosphatase 56 U/L      Total Protein 8 1 g/dL      Albumin 3 3 (L) g/dL      Total Bilirubin 0 73 mg/dL      eGFR 75 ml/min/1 73sq m     Narrative:         National Kidney Disease Education Program recommendations are as follows:  GFR calculation is accurate only with a steady state creatinine  Chronic Kidney disease less than 60 ml/min/1 73 sq  meters  Kidney failure less than 15 ml/min/1 73 sq  meters  B-type natriuretic peptide [[id number]]  (Abnormal) Collected:  08/01/18 1543    Lab Status:  Final result Specimen:  Blood from Arm, Right Updated:  08/01/18 1635     NT-proBNP 10,125 (H) pg/mL     Troponin I [[id number]]  (Normal) Collected:  08/01/18 1543    Lab Status:  Final result Specimen:  Blood from Arm, Right Updated:  08/01/18 1619     Troponin I 0 03 ng/mL     CBC and differential [[id number]]  (Abnormal) Collected:  08/01/18 1543    Lab Status:  Final result Specimen:  Blood from Arm, Right Updated:  08/01/18 1606     WBC 2 68 (L) Thousand/uL      RBC 3 96 Million/uL      Hemoglobin 11 0 (L) g/dL      Hematocrit 34 8 %      MCV 88 fL      MCH 27 8 pg      MCHC 31 6 g/dL      RDW 15 9 (H) %      MPV 12 3 fL      Platelets 89 (L) Thousands/uL      nRBC 0 /100 WBCs      Neutrophils Relative 55 %      Immat GRANS % 0 %      Lymphocytes Relative 33 %      Monocytes Relative 11 %      Eosinophils Relative 0 %      Basophils Relative 1 %      Neutrophils Absolute 1 46 (L) Thousands/µL      Immature Grans Absolute 0 01 Thousand/uL      Lymphocytes Absolute 0 89 Thousands/µL      Monocytes Absolute 0 29 Thousand/µL      Eosinophils Absolute 0 01 Thousand/µL      Basophils Absolute 0 02 Thousands/µL                  CT chest with contrast   Final Result by Annmarie Reid MD (08/01 1856)      Cardiomegaly with bilateral pleural effusions  Bibasilar compressive atelectasis  Workstation performed: [id number]         X-ray chest 2 views   Final Result by Annmarie Reid MD (08/01 1817)      Patchy bibasilar consolidation concerning for pneumonia  Follow-up radiographs recommended in 1 month after treatment to ensure complete resolution  The study was marked in [facility] for immediate notification              Workstation performed: [id number] Procedures  ECG 12 Lead Documentation  Date/Time: 8/1/2018 5:00 PM  Performed by: Isidra Hernandez by: Carlie Amaya     Indications / Diagnosis:  Chest pain  ECG reviewed by me, the ED Provider: yes    Previous ECG:     Previous ECG:  Compared to current    Similarity:  No change  Interpretation:     Interpretation: abnormal    Rate:     ECG rate:  100  Rhythm:     Rhythm: sinus rhythm    Ectopy:     Ectopy: none    QRS:     QRS axis:  Left    QRS intervals: Wide  Conduction:     Conduction: abnormal      Abnormal conduction: complete LBBB    ST segments:     ST segments:  Non-specific  T waves:     T waves: non-specific            Phone Consults  ED Phone Contact    ED Course  ED Course as of Aug 02 0000   Wed Aug 01, 2018   1833  Patient updated on care    1907 Cardiomegaly with bilateral pleural effusions  Bibasilar compressive atelectasis  [facility] paged                                MDM  Number of Diagnoses or Management Options  At risk for cardiac arrhythmia:   Congestive heart failure (CHF) ([facility] [address] ): Hypertension:   Pleural effusion:   Diagnosis management comments: 78-year-old female presenting with chest tightness and fatigue for the last 2 weeks patient admits to central sternal chest pain  Vital signs show mild tachycardia otherwise normal vital signs  No acute distress arm unremarkable physical exam   Differential diagnosis includes but not limited to ACS and STEMI, NSTEMI, pulmonary embolism, CHF GERD  Musculoskeletal chest pain as well  EKG chronic left bundle-branch block with negative troponin doubt ACS at this time  Chest x-ray shows infiltration versus effusion  CT chest with contrast ordered to delineate chest x-ray findings possible mass  CT chest with contrast shows bilateral pleural effusions with compressive atelectasis  Patient's pro BMP elevated suspect systolic CHF  Patient will be admitted for acute CHF which is a new diagnosis  Patient admitted to [facility]  Patient updated on care plan  Patient admitted on telemetry monitoring with serial troponin  Patient's chest pain did feel better after Toradol  CritCare Time    Disposition  Final diagnoses:   Congestive heart failure (CHF) (HCC)   Pleural effusion   Hypertension   At risk for cardiac arrhythmia     Time reflects when diagnosis was documented in both MDM as applicable and the Disposition within this note     Time User Action Codes Description Comment    8/1/2018  8:00 PM Katelin Lopez Add [I50 9] Congestive heart failure (CHF) ([address] )     8/1/2018  8:00 PM Katelin Lopez Add [J90] Pleural effusion     8/1/2018  8:00 PM Katelin Lopez Add [I10] Hypertension     8/1/2018  8:00 PM Gianni Merino [Z91 89] At risk for cardiac arrhythmia       ED Disposition     ED Disposition Condition Comment    Admit  Patient admitted to [facility] Internal Medicine for new onset congestive heart failure with telemetry monitoring  Follow-up Information    None         Current Discharge Medication List      CONTINUE these medications which have NOT CHANGED    Details   BUDESONIDE ER PO Take 3 mg by mouth daily           No discharge procedures on file  ED Provider  Attending physically available and evaluated Luis Armando Dominguez I managed the patient along with the ED Attending      Electronically Signed by         Lidia Bethea DO  08/02/18 0000

## 2018-08-01 NOTE — Clinical Note
Deandra Lane discharge to home/self care  Condition at discharge: Good    Return precautions were discussed with patient  Patient understands when to return to  Emergency department  Patient agrees to discharge plan and follow up care

## 2018-08-02 ENCOUNTER — APPOINTMENT (INPATIENT)
Dept: NON INVASIVE DIAGNOSTICS | Facility: HOSPITAL | Age: 78
DRG: 286 | End: 2018-08-02
Payer: MEDICARE

## 2018-08-02 LAB
ALBUMIN SERPL BCP-MCNC: 3 G/DL (ref 3.5–5)
ALP SERPL-CCNC: 49 U/L (ref 46–116)
ALT SERPL W P-5'-P-CCNC: 74 U/L (ref 12–78)
ANION GAP SERPL CALCULATED.3IONS-SCNC: 6 MMOL/L (ref 4–13)
AST SERPL W P-5'-P-CCNC: 78 U/L (ref 5–45)
ATRIAL RATE: 82 BPM
BASOPHILS # BLD AUTO: 0.02 THOUSANDS/ΜL (ref 0–0.1)
BASOPHILS NFR BLD AUTO: 1 % (ref 0–1)
BILIRUB SERPL-MCNC: 0.72 MG/DL (ref 0.2–1)
BUN SERPL-MCNC: 16 MG/DL (ref 5–25)
CALCIUM SERPL-MCNC: 8.7 MG/DL (ref 8.3–10.1)
CHLORIDE SERPL-SCNC: 108 MMOL/L (ref 100–108)
CHOLEST SERPL-MCNC: 108 MG/DL (ref 50–200)
CO2 SERPL-SCNC: 27 MMOL/L (ref 21–32)
CREAT SERPL-MCNC: 0.89 MG/DL (ref 0.6–1.3)
EOSINOPHIL # BLD AUTO: 0.02 THOUSAND/ΜL (ref 0–0.61)
EOSINOPHIL NFR BLD AUTO: 1 % (ref 0–6)
ERYTHROCYTE [DISTWIDTH] IN BLOOD BY AUTOMATED COUNT: 15.9 % (ref 11.6–15.1)
FERRITIN SERPL-MCNC: 26 NG/ML (ref 8–388)
GFR SERPL CREATININE-BSD FRML MDRD: 71 ML/MIN/1.73SQ M
GLUCOSE SERPL-MCNC: 100 MG/DL (ref 65–140)
HCT VFR BLD AUTO: 33.1 % (ref 34.8–46.1)
HDLC SERPL-MCNC: 41 MG/DL (ref 40–60)
HGB BLD-MCNC: 10.5 G/DL (ref 11.5–15.4)
IMM GRANULOCYTES # BLD AUTO: 0.01 THOUSAND/UL (ref 0–0.2)
IMM GRANULOCYTES NFR BLD AUTO: 1 % (ref 0–2)
IRON SATN MFR SERPL: 8 %
IRON SERPL-MCNC: 35 UG/DL (ref 50–170)
LDLC SERPL CALC-MCNC: 52 MG/DL (ref 0–100)
LYMPHOCYTES # BLD AUTO: 0.86 THOUSANDS/ΜL (ref 0.6–4.47)
LYMPHOCYTES NFR BLD AUTO: 39 % (ref 14–44)
MAGNESIUM SERPL-MCNC: 1.8 MG/DL (ref 1.6–2.6)
MCH RBC QN AUTO: 27.6 PG (ref 26.8–34.3)
MCHC RBC AUTO-ENTMCNC: 31.7 G/DL (ref 31.4–37.4)
MCV RBC AUTO: 87 FL (ref 82–98)
MONOCYTES # BLD AUTO: 0.29 THOUSAND/ΜL (ref 0.17–1.22)
MONOCYTES NFR BLD AUTO: 13 % (ref 4–12)
NEUTROPHILS # BLD AUTO: 1.01 THOUSANDS/ΜL (ref 1.85–7.62)
NEUTS SEG NFR BLD AUTO: 45 % (ref 43–75)
NRBC BLD AUTO-RTO: 0 /100 WBCS
P AXIS: 67 DEGREES
PLATELET # BLD AUTO: 79 THOUSANDS/UL (ref 149–390)
PMV BLD AUTO: 12.3 FL (ref 8.9–12.7)
POTASSIUM SERPL-SCNC: 3.7 MMOL/L (ref 3.5–5.3)
PR INTERVAL: 164 MS
PREALB SERPL-MCNC: 11.1 MG/DL (ref 18–40)
PROT SERPL-MCNC: 7.1 G/DL (ref 6.4–8.2)
QRS AXIS: 46 DEGREES
QRSD INTERVAL: 164 MS
QT INTERVAL: 466 MS
QTC INTERVAL: 544 MS
RBC # BLD AUTO: 3.8 MILLION/UL (ref 3.81–5.12)
SODIUM SERPL-SCNC: 141 MMOL/L (ref 136–145)
T WAVE AXIS: 234 DEGREES
TIBC SERPL-MCNC: 425 UG/DL (ref 250–450)
TRIGL SERPL-MCNC: 75 MG/DL
TROPONIN I SERPL-MCNC: 0.04 NG/ML
TROPONIN I SERPL-MCNC: 0.05 NG/ML
TSH SERPL DL<=0.05 MIU/L-ACNC: 4.51 UIU/ML (ref 0.36–3.74)
VENTRICULAR RATE: 82 BPM
WBC # BLD AUTO: 2.21 THOUSAND/UL (ref 4.31–10.16)

## 2018-08-02 PROCEDURE — 84165 PROTEIN E-PHORESIS SERUM: CPT | Performed by: PATHOLOGY

## 2018-08-02 PROCEDURE — 93010 ELECTROCARDIOGRAM REPORT: CPT | Performed by: INTERNAL MEDICINE

## 2018-08-02 PROCEDURE — 82728 ASSAY OF FERRITIN: CPT | Performed by: STUDENT IN AN ORGANIZED HEALTH CARE EDUCATION/TRAINING PROGRAM

## 2018-08-02 PROCEDURE — 80061 LIPID PANEL: CPT | Performed by: INTERNAL MEDICINE

## 2018-08-02 PROCEDURE — 86039 ANTINUCLEAR ANTIBODIES (ANA): CPT | Performed by: STUDENT IN AN ORGANIZED HEALTH CARE EDUCATION/TRAINING PROGRAM

## 2018-08-02 PROCEDURE — 84165 PROTEIN E-PHORESIS SERUM: CPT | Performed by: STUDENT IN AN ORGANIZED HEALTH CARE EDUCATION/TRAINING PROGRAM

## 2018-08-02 PROCEDURE — 86430 RHEUMATOID FACTOR TEST QUAL: CPT | Performed by: STUDENT IN AN ORGANIZED HEALTH CARE EDUCATION/TRAINING PROGRAM

## 2018-08-02 PROCEDURE — 84443 ASSAY THYROID STIM HORMONE: CPT | Performed by: INTERNAL MEDICINE

## 2018-08-02 PROCEDURE — 80074 ACUTE HEPATITIS PANEL: CPT | Performed by: STUDENT IN AN ORGANIZED HEALTH CARE EDUCATION/TRAINING PROGRAM

## 2018-08-02 PROCEDURE — 83540 ASSAY OF IRON: CPT | Performed by: STUDENT IN AN ORGANIZED HEALTH CARE EDUCATION/TRAINING PROGRAM

## 2018-08-02 PROCEDURE — 86038 ANTINUCLEAR ANTIBODIES: CPT | Performed by: STUDENT IN AN ORGANIZED HEALTH CARE EDUCATION/TRAINING PROGRAM

## 2018-08-02 PROCEDURE — 99222 1ST HOSP IP/OBS MODERATE 55: CPT | Performed by: INTERNAL MEDICINE

## 2018-08-02 PROCEDURE — 85025 COMPLETE CBC W/AUTO DIFF WBC: CPT | Performed by: INTERNAL MEDICINE

## 2018-08-02 PROCEDURE — 87389 HIV-1 AG W/HIV-1&-2 AB AG IA: CPT | Performed by: STUDENT IN AN ORGANIZED HEALTH CARE EDUCATION/TRAINING PROGRAM

## 2018-08-02 PROCEDURE — 84484 ASSAY OF TROPONIN QUANT: CPT | Performed by: INTERNAL MEDICINE

## 2018-08-02 PROCEDURE — 93306 TTE W/DOPPLER COMPLETE: CPT

## 2018-08-02 PROCEDURE — 83550 IRON BINDING TEST: CPT | Performed by: STUDENT IN AN ORGANIZED HEALTH CARE EDUCATION/TRAINING PROGRAM

## 2018-08-02 PROCEDURE — 93005 ELECTROCARDIOGRAM TRACING: CPT

## 2018-08-02 PROCEDURE — 93306 TTE W/DOPPLER COMPLETE: CPT | Performed by: INTERNAL MEDICINE

## 2018-08-02 RX ORDER — METOPROLOL SUCCINATE 25 MG/1
12.5 TABLET, EXTENDED RELEASE ORAL DAILY
Status: DISCONTINUED | OUTPATIENT
Start: 2018-08-02 | End: 2018-08-06

## 2018-08-02 RX ADMIN — SACUBITRIL AND VALSARTAN 1 TABLET: 24; 26 TABLET, FILM COATED ORAL at 17:16

## 2018-08-02 RX ADMIN — METOPROLOL SUCCINATE 12.5 MG: 25 TABLET, EXTENDED RELEASE ORAL at 14:42

## 2018-08-02 RX ADMIN — ENOXAPARIN SODIUM 40 MG: 40 INJECTION SUBCUTANEOUS at 10:38

## 2018-08-02 RX ADMIN — FUROSEMIDE 20 MG: 10 INJECTION, SOLUTION INTRAMUSCULAR; INTRAVENOUS at 10:38

## 2018-08-02 NOTE — ASSESSMENT & PLAN NOTE
Troponin trending due to chest pain  Cardiology consult  Lipid profile, TSH for tomorrow  Echocardiogram   Lasix 20 mg daily  Will also check telemetry  Inputs and outputs and daily weights

## 2018-08-02 NOTE — PROGRESS NOTES
Progress Note - Nena Pederson 1940, [de-identified] y o  female MRN: [id number]    Unit/Bed#: [facility] 404-01 Encounter: [id number]    Primary Care Provider: Nery Brito MD   Date and time admitted to hospital: 2018  4:20 PM        * Acute systolic heart failure [facility])   Assessment & Plan    Troponin trending due to chest pain  Cardiology consult  Lipid profile,   follow up on TSH  Echocardiogram     Lasix 20 mg intravenous daily  Monitor on telemetry   Inputs and outputs and daily weights  Autoimmune hepatitis treated with steroids [facility])   Assessment & Plan    Patient currently on budesonide 3 mg daily  Will continue  Other specified glaucoma   Assessment & Plan    Patient denies using eyedrops  Hypertension, essential   Assessment & Plan    Patient denies taking verapamil  Will monitor blood pressure and treat accordingly  VTE Pharmacologic Prophylaxis:   Pharmacologic: Enoxaparin (Lovenox)  Mechanical VTE Prophylaxis in Place: No    Patient Centered Rounds: I have performed bedside rounds with nursing staff today  Time Spent for Care: 15 minutes  More than 50% of total time spent on counseling and coordination of care as described above  Current Length of Stay: 1 day(s)    Current Patient Status: Inpatient     Code Status: Level 1 - Full Code      Subjective:   Patient comfortable    Objective:     Vitals:   Temp (24hrs), Av °F (36 7 °C), Min:97 4 °F (36 3 °C), Max:98 6 °F (37 °C)    HR:  [] 86  Resp:  [16-25] 18  BP: (113-140)/(57-79) 122/66  SpO2:  [94 %-99 %] 98 %  Body mass index is 20 46 kg/m²  Input and Output Summary (last 24 hours): Intake/Output Summary (Last 24 hours) at 18 0837  Last data filed at 18 0541   Gross per 24 hour   Intake              440 ml   Output              800 ml   Net             -360 ml       Physical Exam:     Physical Exam   Constitutional: She is oriented to person, place, and time     HENT: Head: Normocephalic and atraumatic  Eyes: Conjunctivae are normal  Pupils are equal, round, and reactive to light  Neck: Normal range of motion  Neck supple  Cardiovascular: Normal rate and regular rhythm  No murmur heard  Pulmonary/Chest: Effort normal and breath sounds normal    Abdominal: Soft  Bowel sounds are normal  She exhibits no distension  There is no tenderness  Musculoskeletal: Normal range of motion  She exhibits no edema  Neurological: She is alert and oriented to person, place, and time  No cranial nerve deficit  Skin: Skin is warm and dry  No erythema  Psychiatric: She has a normal mood and affect  Additional Data:     Labs:      Results from last 7 days  Lab Units 08/02/18  0435   WBC Thousand/uL 2 21*   HEMOGLOBIN g/dL 10 5*   HEMATOCRIT % 33 1*   PLATELETS Thousands/uL 79*   NEUTROS PCT % 45   LYMPHS PCT % 39   MONOS PCT % 13*   EOS PCT % 1       Results from last 7 days  Lab Units 08/01/18  2357   SODIUM mmol/L 141   POTASSIUM mmol/L 3 7   CHLORIDE mmol/L 108   CO2 mmol/L 27   BUN mg/dL 16   CREATININE mg/dL 0 89   CALCIUM mg/dL 8 7   TOTAL PROTEIN g/dL 7 1   BILIRUBIN TOTAL mg/dL 0 72   ALK PHOS U/L 49   ALT U/L 74   AST U/L 78*   GLUCOSE RANDOM mg/dL 100           * I Have Reviewed All Lab Data Listed Above  * Additional Pertinent Lab Tests Reviewed: All Labs Within Last 24 Hours Reviewed      Recent Cultures (last 7 days):           Last 24 Hours Medication List:     Current Facility-Administered Medications:  enoxaparin 40 mg Subcutaneous Daily Aneta Hernandez MD   furosemide 20 mg Intravenous Daily Aneta Hernandez MD   NON FORMULARY 3 mg Oral Daily Aneta Hernandez MD        Today, Patient Was Seen By: Zeina Holguin DO    ** Please Note: Dictation voice to text software may have been used in the creation of this document   **

## 2018-08-02 NOTE — MEDICAL STUDENT
MEDICAL STUDENT  Inpatient Progress Note for TRAINING ONLY  Not Part of Legal Medical Record     Consultation - Cardiology   Renetta Chambers [de-identified] y o  female MRN: [id number]  Unit/Bed#: [facility] 404-01 Encounter: [id number]    Assessment/Plan     Assessment:    Left-sided systolic heart failure secondary to cardiomyopathy  Hypertension    Plan:    1) Left-sided systolic heart failure secondary to cardiomyopathy: new onset, the patient has no history of prior known ischemic events  She had a very mild elevation of troponins in the hospital to 0 05 which has since resolved, so acute ischemic events are unlikely  -echo demonstrating left ventricular dilation with diffuse hypokinesis, mitral regurgitation, and left atrial enlargement   -will undergo cardiac catheterization tomorrow to evaluate for ischemic heart disease   -labs to evaluate for other causes of cardiomyopathy (LEA, RF, iron panel, HIV)   -patient has bilateral pleural effusions, but has no signs of systemic volume overload  Should continue low dose IV lasix and potassium   supplementation   -initiate therapy with Entresto   -initiate therapy with metoprolol succinate 12 5 mg qd   -prn sublingual nitroglycine for chest tightness   -recommend salt and fluid restriction    2) Hypertension: has previously been diagnosed  Patient is currently on an unknown antihypertensive agent    -initiate therapy as above   -d/c Verapamil    History of Present Illness   Physician Requesting Consult: Katiana Sher MD  Reason for Consult / Principal Problem: congestive heart failure  HPI: Renetta Chambers is a [de-identified]y o  year old female with history of hypertension who presents with several weeks of chest tightness made worse with exertional activities such as walking up or down flights of stairs  The chest tightness is associated with shortness of breath  She says that the tightness is present even at rest, but is less severe than when she is exerting herself   She has taken acetaminophen which alleviated the tightness  After speaking with her PCP, she was advised to report to the emergency department yesterday for further evaluation  She says that the issue has been ongoing for the past two weeks and she has noticed that at night she has needed to use two pillows when sleeping, whereas she normally does not need to use pillows  She has not had any swelling  She otherwise has no past history of cardiac issues, but is on verapamil for hypertension  Consults    Review of Systems   Constitutional: Negative  HENT: Negative  Eyes: Negative  Respiratory: Positive for shortness of breath (associated with chest tightness)  Cardiovascular: Positive for chest pain  Negative for palpitations and leg swelling  Gastrointestinal: Negative for constipation, diarrhea, nausea and vomiting  Endocrine: Negative  Genitourinary: Negative  Musculoskeletal: Negative  Skin: Negative  Neurological: Negative  Psychiatric/Behavioral: Negative  Historical Information   Past Medical History:   Diagnosis Date    Hypertension      History reviewed  No pertinent surgical history  History   Alcohol Use No     History   Drug Use No     History   Smoking Status    Never Smoker   Smokeless Tobacco    Not on file     Family History: non-contributory    Meds/Allergies   current meds:   Current Facility-Administered Medications   Medication Dose Route Frequency    enoxaparin (LOVENOX) subcutaneous injection 40 mg  40 mg Subcutaneous Daily    furosemide (LASIX) injection 20 mg  20 mg Intravenous Daily    NON FORMULARY  3 mg Oral Daily     Allergies   Allergen Reactions    Ambien [Zolpidem]        Objective   Vitals: Blood pressure 122/66, pulse 86, temperature (!) 97 4 °F (36 3 °C), temperature source Oral, resp  rate 18, height 5' 6" (1 676 m), weight 57 5 kg (126 lb 12 2 oz), SpO2 98 %    Orthostatic Blood Pressures      Most Recent Value   Blood Pressure  122/66 [Map 88] filed at 08/02/2018 6788   Patient Position - Orthostatic VS  Sitting filed at 08/02/2018 0748            Intake/Output Summary (Last 24 hours) at 08/02/18 0806  Last data filed at 08/02/18 6738   Gross per 24 hour   Intake              560 ml   Output              800 ml   Net             -240 ml       Invasive Devices     Peripheral Intravenous Line            Peripheral IV 08/01/18 Left Antecubital less than 1 day                Physical Exam   Constitutional: She is oriented to person, place, and time  She appears well-developed and well-nourished  No distress  HENT:   Head: Normocephalic and atraumatic  Neck: No JVD present  Cardiovascular: Normal rate, regular rhythm, normal heart sounds and intact distal pulses  No murmur heard  Pulmonary/Chest: Effort normal  No respiratory distress  She has no rales  Decreased lung sounds at bilateral bases   Abdominal: Soft  There is no tenderness  Musculoskeletal: She exhibits no edema  Neurological: She is alert and oriented to person, place, and time  Skin: Skin is warm and dry  She is not diaphoretic  Psychiatric: She has a normal mood and affect   Her behavior is normal        Lab Results:   CBC with diff:   Results from last 7 days  Lab Units 08/02/18  0435   WBC Thousand/uL 2 21*   RBC Million/uL 3 80*   HEMOGLOBIN g/dL 10 5*   HEMATOCRIT % 33 1*   MCV fL 87   MCH pg 27 6   MCHC g/dL 31 7   RDW % 15 9*   MPV fL 12 3   PLATELETS Thousands/uL 79*     CMP:   Results from last 7 days  Lab Units 08/01/18  2357   SODIUM mmol/L 141   POTASSIUM mmol/L 3 7   CHLORIDE mmol/L 108   CO2 mmol/L 27   ANION GAP mmol/L 6   BUN mg/dL 16   CREATININE mg/dL 0 89   GLUCOSE RANDOM mg/dL 100   CALCIUM mg/dL 8 7   AST U/L 78*   ALT U/L 74   ALK PHOS U/L 49   TOTAL PROTEIN g/dL 7 1   BILIRUBIN TOTAL mg/dL 0 72   EGFR ml/min/1 73sq m 71     Troponin:   0  Lab Value Date/Time   TROPONINI 0 04 08/02/2018 0359   TROPONINI 0 05 (H) 08/01/2018 2357   TROPONINI 0 04 08/01/2018 1734   TROPONINI 0 03 08/01/2018 1543     TSH:   Results from last 7 days  Lab Units 08/02/18  0445   TSH 3RD GENERATON uIU/mL 4 510*     Lipid Profile:   Results from last 7 days  Lab Units 08/02/18  0445   HDL mg/dL 41   CHOLESTEROL mg/dL 108   LDL CALC mg/dL 52   TRIGLYCERIDES mg/dL 75     Imaging: I have personally reviewed pertinent reports      EKG: Left axis deviation, possible left atrial enlargement, LBBB, previous T wave inversions in inferior leads no longer present  VTE Prophylaxis: Enoxaparin (Lovenox)    Code Status: Level 1 - Full Code  Advance Directive and Living Will:      Power of :    POLST:

## 2018-08-02 NOTE — PLAN OF CARE
CARDIOVASCULAR - ADULT     Maintains optimal cardiac output and hemodynamic stability Progressing     Absence of cardiac dysrhythmias or at baseline rhythm Progressing        DISCHARGE PLANNING     Discharge to home or other facility with appropriate resources Progressing        HEMATOLOGIC - ADULT     Maintains hematologic stability Progressing        Knowledge Deficit     Patient/family/caregiver demonstrates understanding of disease process, treatment plan, medications, and discharge instructions Progressing        METABOLIC, FLUID AND ELECTROLYTES - ADULT     Electrolytes maintained within normal limits Progressing     Fluid balance maintained Progressing     Glucose maintained within target range Progressing        PAIN - ADULT     Verbalizes/displays adequate comfort level or baseline comfort level Progressing        Potential for Falls     Patient will remain free of falls Progressing        SAFETY ADULT     Maintain or return to baseline ADL function Progressing     Maintain or return mobility status to optimal level Progressing

## 2018-08-02 NOTE — ASSESSMENT & PLAN NOTE
Troponin trending due to chest pain  Cardiology consult  Lipid profile,   follow up on TSH  Echocardiogram     Lasix 20 mg intravenous daily  Monitor on telemetry   Inputs and outputs and daily weights

## 2018-08-02 NOTE — CASE MANAGEMENT
Initial Clinical Review    Admission: Date/Time/Statement: INPT 8/1/18 @ 1959     Orders Placed This Encounter   Procedures    Inpatient Admission (expected length of stay for this patient is greater than two midnights)     Standing Status:   Standing     Number of Occurrences:   1     Order Specific Question:   Admitting Physician     Answer:   Bart Low     Order Specific Question:   Level of Care     Answer:   Med Surg [16]     Order Specific Question:   Estimated length of stay     Answer:   More than 2 Midnights     Order Specific Question:   Certification     Answer:   I certify that inpatient services are medically necessary for this patient for a duration of greater than two midnights  See H&P and MD Progress Notes for additional information about the patient's course of treatment  ED: Date/Time/Mode of Arrival:   ED Arrival Information     Expected Arrival Acuity Means of Arrival Escorted By Service Admission Type    - 8/1/2018 15:25 Emergent Walk-In Self Emergency Medicine Emergency    Arrival Complaint    chest pain          Chief Complaint:   Chief Complaint   Patient presents with    Chest Pain     intermittent chest pressure in mid-sternal area x 3 weeks  denies shortness of breath, denies dizziness, denies syncope  History of Illness: Luis Armando Dominguez is a [de-identified] y o  female who has significant past medical history for autoimmune hepatitis which she is taking budesonide 4  Likewise, the patient denies any history of hypertension or diabetes or hypercholesterolemia  However according to an out patient document from the [facility] [address]; I was able to see that the patient has a past history of hypertension and was on verapamil  She denies taking any verapamil  She also has glaucoma  However for the past 2 weeks the patient has been experiencing on and off chest discomfort which she cannot see the time preponderance    However she does recall that 1 instance when she was climbing up the stairs she then felt short of breath with noted chest discomfort described as a tightness  The patient is not able to quantify how long or the qualify the intensity of the chest pain  Currently she is more comfortable  However within the past week also she noted that she is more comfortable whenever she uses 2 pillows to normally she uses 1       ED Vital Signs:   ED Triage Vitals   Temperature Pulse Respirations Blood Pressure SpO2   08/01/18 2159 08/01/18 1539 08/01/18 1539 08/01/18 1539 08/01/18 1700   97 7 °F (36 5 °C) 101 18 140/79 99 %      Temp Source Heart Rate Source Patient Position - Orthostatic VS BP Location FiO2 (%)   08/01/18 2159 08/01/18 1539 08/01/18 1539 08/01/18 1539 --   Tympanic Monitor Sitting Right arm       Pain Score       08/01/18 1539       6        Wt Readings from Last 1 Encounters:   08/02/18 57 5 kg (126 lb 12 2 oz)       Vital Signs (abnormal):   08/01/18 2159  97 7 °F (36 5 °C)  88  18  126/63  88  96 %  None (Room air)  Lying   08/01/18 2119  --  80  20  124/71  --  97 %  None (Room air)  Lying   08/01/18 1905  --  90   25  135/73  --  97 %  None (Room air)  Lying   08/01/18 1700  --  90  16  132/79  --  99 %  None (Room air)  Lying   08/01/18 1539  --  101  18  140/79  --  --  --  Sitting         Abnormal Labs/Diagnostic Test Results:   Lab Units 08/01/18  1543   WBC Thousand/uL 2 68*   HEMOGLOBIN g/dL 11 0*   HEMATOCRIT % 34 8   PLATELETS Thousands/uL 89*   NEUTROS PCT % 55   LYMPHS PCT % 33   MONOS PCT % 11   EOS PCT % 0         Results from last 7 days  Lab Units 08/01/18  1543   SODIUM mmol/L 138   POTASSIUM mmol/L 3 8   CHLORIDE mmol/L 106   CO2 mmol/L 24   BUN mg/dL 16   CREATININE mg/dL 0 85   CALCIUM mg/dL 9 1   TOTAL PROTEIN g/dL 8 1   BILIRUBIN TOTAL mg/dL 0 73   ALK PHOS U/L 56   ALT U/L 85*   AST U/L 89*   GLUCOSE RANDOM mg/dL 89             EKG: Normal sinus rhythm  Possible Left atrial enlargement  Left axis deviation  Left bundle branch block  Abnormal ECG  When compared with ECG of 18-SEP-2017 15:06,  T wave inversion no longer evident in Inferior leads  Confirmed by [facility] ([id number]) on 8/1/2018 4:18:53 PM     Imaging: I have personally reviewed pertinent reports        X-ray Chest 2 Views     Result Date: 8/1/2018     Impression: Patchy bibasilar consolidation concerning for pneumonia  Follow-up radiographs recommended in 1 month after treatment to ensure complete resolution  The study was marked in [facility] for immediate notification  Workstation performed: [id number]      Ct Chest With Contrast     Result Date: 8/1/2018     Impression: Cardiomegaly with bilateral pleural effusions  Bibasilar compressive atelectasis  Workstation performed: [id number]       ECHO: PENDING    ED Treatment:   Medication Administration from 08/01/2018 1525 to 08/01/2018 2149       Date/Time Order Dose Route Action Action by Comments     08/01/2018 1736 ketorolac (TORADOL) injection 15 mg 15 mg Intravenous Given Juan Alberto Preciado, RN      08/01/2018 1839 iohexol (OMNIPAQUE) 350 MG/ML injection (MULTI-DOSE) 70 mL 70 mL Intravenous Given Sparkle Lundy      08/01/2018 1943 furosemide (LASIX) injection 20 mg 20 mg Intravenous Given Michael Cuba RN           Past Medical/Surgical History: Active Ambulatory Problems     Diagnosis Date Noted    No Active Ambulatory Problems     Resolved Ambulatory Problems     Diagnosis Date Noted    No Resolved Ambulatory Problems     Past Medical History:   Diagnosis Date    Hypertension        Admitting Diagnosis: Chest pain [R07 9]  Pleural effusion [J90]  Hypertension [I10]  Congestive heart failure (CHF) (HCC) [I50 9]  At risk for cardiac arrhythmia [Z91 89]    Age/Sex: [de-identified] y o  female    Assessment/Plan:   * Acute systolic heart failure (HCC)   Assessment & Plan     Troponin trending due to chest pain  Cardiology consult  Lipid profile, TSH for tomorrow  Echocardiogram   Lasix 20 mg daily    Will also check telemetry  Inputs and outputs and daily weights           Autoimmune hepatitis treated with steroids [facility])   Assessment & Plan     Patient currently on budesonide 3 mg daily  Will continue           Hypertension, essential   Assessment & Plan     Patient has a history of essential hypertension and according to outside records was on verapamil however she said that she is not taking this  Will continue to trend blood pressure  Echocardiogram   If needed, may need to place patient on antihypertensive           Other specified glaucoma   Assessment & Plan     Patient is status post surgery and was on eyedrops however I ask her about this and she denies the use of any eyedrops                       VTE Prophylaxis: Enoxaparin (Lovenox)  / sequential compression device   Code Status: Level 1 - Full Code as discussed with patient and family  POLST: There is no POLST form on file for this patient (pre-hospital)     Anticipated Length of Stay:  Patient will be admitted on an Inpatient basis with an anticipated length of stay of  greater than 2 midnights     Justification for Hospital Stay: Please see detailed plans noted above        Admission Orders:  INPT  TELE  CARDIAC DIET  SEQ COMP DEVICE  CONSULT CARDIOLOGY  DAILY WEIGHTS  OOB AS ALEXX    Scheduled Meds:   Current Facility-Administered Medications:  enoxaparin 40 mg Subcutaneous Daily Dariela Lewis MD   furosemide 20 mg Intravenous Daily Dariela Lewis MD   NON FORMULARY 3 mg Oral Daily Dariela Lewis MD     Continuous Infusions:    PRN Meds:

## 2018-08-02 NOTE — ASSESSMENT & PLAN NOTE
Patient has a history of essential hypertension and according to outside records was on verapamil however she said that she is not taking this  Will continue to trend blood pressure  Echocardiogram   If needed, may need to place patient on antihypertensive

## 2018-08-02 NOTE — SOCIAL WORK
[de-identified] female admitted with acute CHF  She is well known to staff as her  is an LVAD pt  She is alert and oriented, independent ADLS, retired, drives  She is caregiver for her  who is here visiting her  Son is also caregiver and will transport  Son is Lynn Hernandez at [phone]  They reside in [place] in  [address] [place] [address] with 3 JULIAN  She has never had HHC, DME or inpt rehab  She has no hx mental illness or D&A  She has no POA but does have 5 Wishes, copy requested  Discussed  CHF PI Project  And pt is willing to participate  She normally uses [facility]  For Rxs  Son will transport when ready  CHF Bundle letter given   VNA referral sent  CM reviewed d/c planning process including the following: identifying help at home, patient preference for d/c planning needs, Discharge Lounge, [facility] Meds to Bed program, availability of treatment team to discuss questions or concerns patient and/or family may have regarding understanding medications and recognizing signs and symptoms once discharged  CM also encouraged patient to follow up with all recommended appointments after discharge  Patient advised of importance for patient and family to participate in managing patients medical well being  Patient/caregiver received discharge checklist  Content reviewed  Patient/caregiver encouraged to participate in discharge plan of care prior to discharge home  OPCC requested

## 2018-08-02 NOTE — PHYSICIAN ADVISOR
Current patient class: Inpatient  The patient is currently on Hospital Day: 2 at [facility]      The patient was admitted to the hospital at [address] on 8/1/18 for the following diagnosis:  Chest pain [R07 9]  Pleural effusion [J90]  Hypertension [I10]  Congestive heart failure (CHF) ([facility] [address] ) [I50 9]  At risk for cardiac arrhythmia [Z91 89]       There is documentation in the medical record of an expected length of stay of at least 2 midnights  The patient is therefore expected to satisfy the 2 midnight benchmark and given the 2 midnight presumption is appropriate for INPATIENT ADMISSION  Given this expectation of a satisfying stay, CMS instructs us that the patient is most often appropriate for inpatient admission under part A provided medical necessity is documented in the chart  After review of the relevant documentation, labs, vital signs and test results, the patient is appropriate for INPATIENT ADMISSION  Admission to the hospital as an inpatient is a complex decision making process which requires the practitioner to consider the patients presenting complaint, history and physical examination and all relevant testing  With this in mind, in this case, the patient was deemed appropriate for INPATIENT ADMISSION  After review of the documentation and testing available at the time of the admission I concur with this clinical determination of medical necessity  Rationale is as follows: The patient is an 56-year-old female who was admitted to the hospital yesterday August 1, 2018 with new onset heart failure, symptomatic with shortness breath and orthopnea  CT showed bilateral pleural effusions and her pro BNP was significantly elevated  Per the note today, she appears clinically hypervolemic  She will continue with intravenous Lasix and undergo echocardiogram   Inpatient level care remains appropriate      The patients vitals on arrival were ED Triage Vitals Temperature Pulse Respirations Blood Pressure SpO2   08/01/18 2159 08/01/18 1539 08/01/18 1539 08/01/18 1539 08/01/18 1700   97 7 °F (36 5 °C) 101 18 140/79 99 %      Temp Source Heart Rate Source Patient Position - Orthostatic VS BP Location FiO2 (%)   08/01/18 2159 08/01/18 1539 08/01/18 1539 08/01/18 1539 --   Tympanic Monitor Sitting Right arm       Pain Score       08/01/18 1539       6           Past Medical History:   Diagnosis Date    Hypertension      History reviewed  No pertinent surgical history  Consults have been placed to:   IP CONSULT TO CARDIOLOGY    Vitals:    08/01/18 2312 08/02/18 0323 08/02/18 0600 08/02/18 0748   BP: 113/57 113/59  122/66   BP Location: Right arm Right arm  Right arm   Pulse: 83 81  86   Resp: 18 18  18   Temp: 98 1 °F (36 7 °C) 98 6 °F (37 °C)  (!) 97 4 °F (36 3 °C)   TempSrc: Oral Oral  Oral   SpO2: 95% 94%  98%   Weight:   57 5 kg (126 lb 12 2 oz)    Height:           Most recent labs:    Recent Labs      08/01/18   1543   08/01/18   2357  08/02/18   0359  08/02/18   0435   WBC  2 68*   --    --    --   2 21*   HGB  11 0*   --    --    --   10 5*   HCT  34 8   --    --    --   33 1*   PLT  89*   --    --    --   79*   K  3 8   --   3 7   --    --    NA  138   --   141   --    --    CALCIUM  9 1   --   8 7   --    --    BUN  16   --   16   --    --    CREATININE  0 85   --   0 89   --    --    TROPONINI  0 03   < >  0 05*  0 04   --    AST  89*   --   78*   --    --    ALT  85*   --   74   --    --    ALKPHOS  56   --   49   --    --    BILITOT  0 73   --   0 72   --    --     < > = values in this interval not displayed  Scheduled Meds:  Current Facility-Administered Medications:  enoxaparin 40 mg Subcutaneous Daily Jose G Fernandez MD   furosemide 20 mg Intravenous Daily Jose G Fernandez MD   NON FORMULARY 3 mg Oral Daily Jose G Fernandez MD     Continuous Infusions:   PRN Meds:      Surgical procedures (if appropriate):

## 2018-08-02 NOTE — H&P
H&P- Edwina Antunez 1940, [de-identified] y o  female MRN: [id number]    Unit/Bed#: [facility] 404-01 Encounter: [id number]    Primary Care Provider: Rishi Morel MD   Date and time admitted to hospital: 8/1/2018  4:20 PM        * Acute systolic heart failure [facility])   Assessment & Plan    Troponin trending due to chest pain  Cardiology consult  Lipid profile, TSH for tomorrow  Echocardiogram   Lasix 20 mg daily  Will also check telemetry  Inputs and outputs and daily weights  Autoimmune hepatitis treated with steroids [facility])   Assessment & Plan    Patient currently on budesonide 3 mg daily  Will continue  Hypertension, essential   Assessment & Plan    Patient has a history of essential hypertension and according to outside records was on verapamil however she said that she is not taking this  Will continue to trend blood pressure  Echocardiogram   If needed, may need to place patient on antihypertensive  Other specified glaucoma   Assessment & Plan    Patient is status post surgery and was on eyedrops however I ask her about this and she denies the use of any eyedrops  VTE Prophylaxis: Enoxaparin (Lovenox)  / sequential compression device   Code Status: Level 1 - Full Code as discussed with patient and family  POLST: There is no POLST form on file for this patient (pre-hospital)    Anticipated Length of Stay:  Patient will be admitted on an Inpatient basis with an anticipated length of stay of  greater than 2 midnights  Justification for Hospital Stay: Please see detailed plans noted above  Chief Complaint:     Increasing fatigue as well as chest discomfort  History of Present Illness:  Edwina Antunez is a [de-identified] y o  female who has significant past medical history for autoimmune hepatitis which she is taking budesonide 4  Likewise, the patient denies any history of hypertension or diabetes or hypercholesterolemia    However according to an out patient document from the [facility]; I was able to see that the patient has a past history of hypertension and was on verapamil  She denies taking any verapamil  She also has glaucoma  However for the past 2 weeks the patient has been experiencing on and off chest discomfort which she cannot see the time preponderance  However she does recall that 1 instance when she was climbing up the stairs she then felt short of breath with noted chest discomfort described as a tightness  The patient is not able to quantify how long or the qualify the intensity of the chest pain  Currently she is more comfortable  However within the past week also she noted that she is more comfortable whenever she uses 2 pillows to normally she uses 1  She denies any weight gain  Denies any cough or cold or fever  No one is sick in the house  She lives with her  who has multiple medical problems  A son however takes good care of their welfare  However the son does not live with them  Review of Systems:    Constitutional:  Denies fever or chills   Eyes:  Denies change in visual acuity   HENT:  Denies nasal congestion or sore throat   Respiratory:  Denies cough or shortness of breath   Cardiovascular:  Chest discomfort but no edema   GI:  Denies abdominal pain, nausea, vomiting, bloody stools or diarrhea   :  Denies dysuria   Musculoskeletal:  Denies back pain or joint pain   Integument:  Denies rash   Neurologic:  Denies headache, focal weakness or sensory changes   Endocrine:  Denies polyuria or polydipsia   Lymphatic:  Denies swollen glands   Psychiatric:  Denies depression or anxiety     Past Medical and Surgical History:   Past Medical History:   Diagnosis Date    Hypertension      History reviewed  No pertinent surgical history  Meds/Allergies:  Prescriptions Prior to Admission   Medication    BUDESONIDE ER PO       Allergies:    Allergies   Allergen Reactions    Ambien [Zolpidem]      History:  Marital Status: /Civil Union   Occupation:  Used to work in a back desk job  Patient Pre-hospital Living Situation:  Lives at home  Patient Pre-hospital Level of Mobility:  Ambulatory  Patient Pre-hospital Diet Restrictions:  Regular diet  Substance Use History:   History   Alcohol Use No     History   Smoking Status    Never Smoker   Smokeless Tobacco    Not on file     History   Drug Use No       Family History:  History reviewed  No pertinent family history  Physical Exam:     Vitals:   Blood Pressure: 126/63 (08/01/18 2159)  Pulse: 88 (08/01/18 2159)  Temperature: 97 7 °F (36 5 °C) (08/01/18 2159)  Temp Source: Tympanic (08/01/18 2159)  Respirations: 18 (08/01/18 2159)  Height: 5' 6" (167 6 cm) (08/01/18 2159)  Weight - Scale: 58 1 kg (128 lb 1 4 oz) (08/01/18 2159)  SpO2: 96 % (08/01/18 2159)    Constitutional:  Well developed, well nourished, no acute distress, non-toxic appearance and currently uses a pillow with the head rest elevated approximately 15°  Essentially, the patient is able to converse with no dyspnea  Eyes:  PERRL, conjunctiva normal   HENT:  Atraumatic, external ears normal, nose normal, oropharynx moist, no pharyngeal exudates  Neck- normal range of motion, no tenderness, supple   Respiratory:  No respiratory distress, normal breath sounds, no rales, no wheezing   Cardiovascular:  Normal rate, normal rhythm, no murmurs, no gallops, no rubs   GI:  Soft, nondistended, normal bowel sounds, nontender, no organomegaly, no mass, no rebound, no guarding   :  No costovertebral angle tenderness   Musculoskeletal:  No edema, no tenderness, no deformities  Back- no tenderness  Integument:  Well hydrated, no rash   Lymphatic:  No lymphadenopathy noted   Neurologic:  Alert &awake, communicative, CN 2-12 normal, normal motor function, normal sensory function, no focal deficits noted   Psychiatric:  Speech and behavior appropriate       Lab Results: I have personally reviewed pertinent reports  Results from last 7 days  Lab Units 08/01/18  1543   WBC Thousand/uL 2 68*   HEMOGLOBIN g/dL 11 0*   HEMATOCRIT % 34 8   PLATELETS Thousands/uL 89*   NEUTROS PCT % 55   LYMPHS PCT % 33   MONOS PCT % 11   EOS PCT % 0       Results from last 7 days  Lab Units 08/01/18  1543   SODIUM mmol/L 138   POTASSIUM mmol/L 3 8   CHLORIDE mmol/L 106   CO2 mmol/L 24   BUN mg/dL 16   CREATININE mg/dL 0 85   CALCIUM mg/dL 9 1   TOTAL PROTEIN g/dL 8 1   BILIRUBIN TOTAL mg/dL 0 73   ALK PHOS U/L 56   ALT U/L 85*   AST U/L 89*   GLUCOSE RANDOM mg/dL 89           EKG: Normal sinus rhythm  Possible Left atrial enlargement  Left axis deviation  Left bundle branch block  Abnormal ECG  When compared with ECG of 18-SEP-2017 15:06,  T wave inversion no longer evident in Inferior leads  Confirmed by Oli 37 ([id number]) on 8/1/2018 4:18:53 PM    Imaging: I have personally reviewed pertinent reports  X-ray Chest 2 Views    Result Date: 8/1/2018  Narrative: CHEST INDICATION:   Chest pain for 2 weeks  COMPARISON:  5/9/2012 EXAM PERFORMED/VIEWS:  XR CHEST PA & LATERAL FINDINGS: Cardiomediastinal silhouette has increased in size during the interval  Patchy bibasilar consolidation concerning for pneumonia  Jerryl Rm No pneumothorax or pleural effusion  Osseous structures appear within normal limits for patient age  Impression: Patchy bibasilar consolidation concerning for pneumonia  Follow-up radiographs recommended in 1 month after treatment to ensure complete resolution  The study was marked in [facility] for immediate notification  Workstation performed: [id number]     Ct Chest With Contrast    Result Date: 8/1/2018  Narrative: CT CHEST WITH IV CONTRAST INDICATION:   Dyspnea, cardiac origin suspected  COMPARISON:  CT scan 5/24/2013  TECHNIQUE: CT examination of the chest was performed  Axial, sagittal, and coronal 2D reformatted images were created from the source data and submitted for interpretation    Radiation dose length product (DLP) for this visit:  174 96 mGy-cm   This examination, like all CT scans performed in the [facility], was performed utilizing techniques to minimize radiation dose exposure, including the use of iterative  reconstruction and automated exposure control  IV Contrast:  70 mL of iohexol (OMNIPAQUE) FINDINGS: LUNGS:  Bibasilar compressive atelectasis  No endotracheal or endobronchial abnormality  PLEURA:  Moderate to large bilateral pleural effusions right greater than left  HEART/GREAT VESSELS:  Cardiomegaly  No aortic aneurysm  MEDIASTINUM AND SRIRAM:  Unremarkable  CHEST WALL AND LOWER NECK:   Unremarkable  VISUALIZED STRUCTURES IN THE UPPER ABDOMEN:  Unremarkable  OSSEOUS STRUCTURES:  No acute fracture or destructive osseous lesion  Impression: Cardiomegaly with bilateral pleural effusions  Bibasilar compressive atelectasis  Workstation performed: [id number]         ** Please Note: Dragon 360 Dictation voice to text software was used in the creation of this document   **

## 2018-08-02 NOTE — CONSULTS
Consultation - Cardiology   Loren Michel [de-identified] y o  female MRN: [id number]  Unit/Bed#: [facility] 404-01 Encounter: [id number]      Assessment:  Principal Problem:    Acute systolic heart failure ([facility] 75 )  Active Problems:    Hypertension, essential    Other specified glaucoma    Autoimmune hepatitis treated with steroids ([facility] )      Plan:  1  Shortness of breath- mostly new onset systolic heart failure  - Patient with worsening symptoms of shortness of breath with orthopnea, CT chest reviewed- dilated left ventricle, bilateral pleural effusions, [place] 16[address]   - clinically appears hypervolemic and slightly cold feet, labs with no evidence of end-organ hypoperfusion   - continue Lasix 20 mg IV daily  - will wait on echo to evaluate EF and regional wall motion abnormalities  - if she does have systolic heart failure, will need further workup with cardiac catheterization and blood work for etiology (HIV, LEA, SPEP/UPEP, hepatitis panel, iron panel)  - further medical therapy depending on echo findings  2  Hypertension    3  Normocytic anemia/pancytopenia  4  Hypothyroidism  - elevated TSH, check free T4, T3  5  Autoimmune hepatitis  - on budesonide  History of Present Illness   Physician Requesting Consult: Rosa Montoya MD  Reason for Consult / Principal Problem:  Shortness of breath  HPI: Loren Michel is a [de-identified]y o  year old female with past medical history significant for hypertension, autoimmune hepatitis presented to the ED yesterday with chief complaints of shortness of breath  The patient reports progressively worsening shortness of breath for the past 2 weeks with symptoms occurring now with minimal exertion  Associated with central chest pressure which is on and off also for the past 2 weeks worse with exertion  Associated 2 pillow orthopnea  No PND  No swelling of the lower extremities  No palpitations  No dizziness or lightheadedness    No prior cardiac history of heart failure, arrhythmias or MI  No smoking or alcohol or drug use  Worked as a banker, lives with  and is active at baseline  No family history of heart disease that the patient knows of  No recent change in medications, no recent viral illness  No history of thyroid problems  Reports being diagnosed with autoimmune hepatitis 4 years ago and has been on budesonide since then  Initial workup in the ED showed a [place] proBNP of 704[address], troponin of 0 03  Patient was admitted to the hospital for further evaluation  EKG-sinus tachycardia  Left atrial enlargement  Left bundle branch block  Telemetry-sinus rhythm  Consults    Review of Systems:  Review of Systems   Constitutional: Positive for appetite change, diaphoresis and fatigue  Negative for activity change, chills and fever  HENT: Negative for congestion  Respiratory: Positive for chest tightness and shortness of breath  Negative for cough and wheezing  Cardiovascular: Positive for chest pain  Negative for palpitations and leg swelling  Gastrointestinal: Negative for abdominal pain, diarrhea, nausea and vomiting  Genitourinary: Negative for difficulty urinating and dysuria  Musculoskeletal: Negative for back pain  Skin: Negative for color change and rash  Neurological: Negative for dizziness, syncope, facial asymmetry, weakness, light-headedness, numbness and headaches  Psychiatric/Behavioral: Negative for confusion  14 systems reviewed and negative with the exception of the above and the following    Historical Information   Past Medical History:   Diagnosis Date    Hypertension      History reviewed  No pertinent surgical history    History   Alcohol Use No     History   Drug Use No     History   Smoking Status    Never Smoker   Smokeless Tobacco    Not on file     Family History: non-contributory    Meds/Allergies   all current active meds have been reviewed  Allergies   Allergen Reactions    Ambien [Zolpidem]        Objective   Vitals: Blood pressure 122/66, pulse 86, temperature (!) 97 4 °F (36 3 °C), temperature source Oral, resp  rate 18, height 5' 6" (1 676 m), weight 57 5 kg (126 lb 12 2 oz), SpO2 98 %  , Body mass index is 20 46 kg/m² , Orthostatic Blood Pressures      Most Recent Value   Blood Pressure  122/66 [Map 88] filed at 08/02/2018 3375   Patient Position - Orthostatic VS  Sitting filed at 08/02/2018 0748            Intake/Output Summary (Last 24 hours) at 08/02/18 1028  Last data filed at 08/02/18 0000   Gross per 24 hour   Intake              560 ml   Output              800 ml   Net             -240 ml       Invasive Devices     Peripheral Intravenous Line            Peripheral IV 08/01/18 Left Antecubital less than 1 day                    Physical Exam:  Physical Exam   Constitutional: She is oriented to person, place, and time  No distress  Thin built  HENT:   Head: Normocephalic and atraumatic  Eyes: Conjunctivae are normal  No scleral icterus  Neck: Neck supple  JVD present  Cardiovascular: Normal rate, regular rhythm and intact distal pulses  Exam reveals gallop  Exam reveals no friction rub  No murmur heard  Pulmonary/Chest: Effort normal  She has no wheezes  She has no rales  Diminished at bilateral bases   Abdominal: Soft  Bowel sounds are normal  There is no tenderness  Musculoskeletal: Normal range of motion  She exhibits no edema  Neurological: She is alert and oriented to person, place, and time  Skin: Skin is dry  Slightly cold feet   Psychiatric: She has a normal mood and affect             Lab Results:     Lab Results   Component Value Date    TROPONINI 0 04 08/02/2018    TROPONINI 0 05 (H) 08/01/2018    TROPONINI 0 04 08/01/2018       Lab Results   Component Value Date    GLUCOSE 100 08/01/2018    CALCIUM 8 7 08/01/2018     08/01/2018    K 3 7 08/01/2018    CO2 27 08/01/2018     08/01/2018    BUN 16 08/01/2018    CREATININE 0 89 08/01/2018       Lab Results   Component Value Date WBC 2 21 (L) 08/02/2018    HGB 10 5 (L) 08/02/2018    HCT 33 1 (L) 08/02/2018    MCV 87 08/02/2018    PLT 79 (L) 08/02/2018       Lab Results   Component Value Date    CHOL 108 08/02/2018    CHOL 182 10/23/2015     Lab Results   Component Value Date    HDL 41 08/02/2018    HDL 76 10/23/2015     Lab Results   Component Value Date    LDLCALC 52 08/02/2018    LDLCALC 89 10/23/2015     Lab Results   Component Value Date    TRIG 75 08/02/2018    TRIG 87 10/23/2015       Lab Results   Component Value Date    ALT 74 08/01/2018    AST 78 (H) 08/01/2018             Cardiac testing:   No results found for this or any previous visit  No results found for this or any previous visit  No procedure found  No results found for this or any previous visit  Imaging: I have personally reviewed pertinent reports  X-ray Chest 2 Views    Result Date: 8/1/2018  Narrative: CHEST INDICATION:   Chest pain for 2 weeks  COMPARISON:  5/9/2012 EXAM PERFORMED/VIEWS:  XR CHEST PA & LATERAL FINDINGS: Cardiomediastinal silhouette has increased in size during the interval  Patchy bibasilar consolidation concerning for pneumonia  Boyd Spruce No pneumothorax or pleural effusion  Osseous structures appear within normal limits for patient age  Impression: Patchy bibasilar consolidation concerning for pneumonia  Follow-up radiographs recommended in 1 month after treatment to ensure complete resolution  The study was marked in [facility] for immediate notification  Workstation performed: [id number]     Ct Chest With Contrast    Result Date: 8/1/2018  Narrative: CT CHEST WITH IV CONTRAST INDICATION:   Dyspnea, cardiac origin suspected  COMPARISON:  CT scan 5/24/2013  TECHNIQUE: CT examination of the chest was performed  Axial, sagittal, and coronal 2D reformatted images were created from the source data and submitted for interpretation  Radiation dose length product (DLP) for this visit:  174 96 mGy-cm     This examination, like all CT scans performed in the [facility], was performed utilizing techniques to minimize radiation dose exposure, including the use of iterative  reconstruction and automated exposure control  IV Contrast:  70 mL of iohexol (OMNIPAQUE) FINDINGS: LUNGS:  Bibasilar compressive atelectasis  No endotracheal or endobronchial abnormality  PLEURA:  Moderate to large bilateral pleural effusions right greater than left  HEART/GREAT VESSELS:  Cardiomegaly  No aortic aneurysm  MEDIASTINUM AND SRIRAM:  Unremarkable  CHEST WALL AND LOWER NECK:   Unremarkable  VISUALIZED STRUCTURES IN THE UPPER ABDOMEN:  Unremarkable  OSSEOUS STRUCTURES:  No acute fracture or destructive osseous lesion  Impression: Cardiomegaly with bilateral pleural effusions  Bibasilar compressive atelectasis   Workstation performed: [id number]

## 2018-08-02 NOTE — ASSESSMENT & PLAN NOTE
Patient is status post surgery and was on eyedrops however I ask her about this and she denies the use of any eyedrops

## 2018-08-03 ENCOUNTER — APPOINTMENT (INPATIENT)
Dept: NON INVASIVE DIAGNOSTICS | Facility: HOSPITAL | Age: 78
DRG: 286 | End: 2018-08-03
Payer: MEDICARE

## 2018-08-03 PROBLEM — D69.6 THROMBOCYTOPENIA (HCC): Status: ACTIVE | Noted: 2018-08-03

## 2018-08-03 LAB
ALBUMIN SERPL ELPH-MCNC: 3.67 G/DL (ref 3.5–5)
ALBUMIN SERPL ELPH-MCNC: 48.3 % (ref 52–65)
ALPHA1 GLOB SERPL ELPH-MCNC: 0.3 G/DL (ref 0.1–0.4)
ALPHA1 GLOB SERPL ELPH-MCNC: 3.9 % (ref 2.5–5)
ALPHA2 GLOB SERPL ELPH-MCNC: 0.87 G/DL (ref 0.4–1.2)
ALPHA2 GLOB SERPL ELPH-MCNC: 11.5 % (ref 7–13)
ANA HOMOGEN SER QL IF: NORMAL
ANA HOMOGEN TITR SER: NORMAL {TITER}
BETA GLOB ABNORMAL SERPL ELPH-MCNC: 0.49 G/DL (ref 0.4–0.8)
BETA1 GLOB SERPL ELPH-MCNC: 6.5 % (ref 5–13)
BETA2 GLOB SERPL ELPH-MCNC: 5.6 % (ref 2–8)
BETA2+GAMMA GLOB SERPL ELPH-MCNC: 0.43 G/DL (ref 0.2–0.5)
GAMMA GLOB ABNORMAL SERPL ELPH-MCNC: 1.84 G/DL (ref 0.5–1.6)
GAMMA GLOB SERPL ELPH-MCNC: 24.2 % (ref 12–22)
HAV IGM SER QL: NORMAL
HBV CORE IGM SER QL: NORMAL
HBV SURFACE AG SER QL: NORMAL
HCV AB SER QL: NORMAL
HIV 1+2 AB+HIV1 P24 AG SERPL QL IA: NORMAL
IGG/ALB SER: 0.93 {RATIO} (ref 1.1–1.8)
PROT PATTERN SERPL ELPH-IMP: ABNORMAL
PROT SERPL-MCNC: 7.6 G/DL (ref 6.4–8.2)
RHEUMATOID FACT SER QL LA: NEGATIVE
RYE IGE QN: POSITIVE

## 2018-08-03 PROCEDURE — 93456 R HRT CORONARY ARTERY ANGIO: CPT | Performed by: INTERNAL MEDICINE

## 2018-08-03 PROCEDURE — B2111ZZ FLUOROSCOPY OF MULTIPLE CORONARY ARTERIES USING LOW OSMOLAR CONTRAST: ICD-10-PCS | Performed by: INTERNAL MEDICINE

## 2018-08-03 PROCEDURE — 4A023N6 MEASUREMENT OF CARDIAC SAMPLING AND PRESSURE, RIGHT HEART, PERCUTANEOUS APPROACH: ICD-10-PCS | Performed by: INTERNAL MEDICINE

## 2018-08-03 PROCEDURE — 99153 MOD SED SAME PHYS/QHP EA: CPT | Performed by: STUDENT IN AN ORGANIZED HEALTH CARE EDUCATION/TRAINING PROGRAM

## 2018-08-03 PROCEDURE — 99222 1ST HOSP IP/OBS MODERATE 55: CPT | Performed by: INTERNAL MEDICINE

## 2018-08-03 PROCEDURE — 99232 SBSQ HOSP IP/OBS MODERATE 35: CPT | Performed by: INTERNAL MEDICINE

## 2018-08-03 PROCEDURE — 99152 MOD SED SAME PHYS/QHP 5/>YRS: CPT | Performed by: INTERNAL MEDICINE

## 2018-08-03 PROCEDURE — 93460 R&L HRT ART/VENTRICLE ANGIO: CPT | Performed by: STUDENT IN AN ORGANIZED HEALTH CARE EDUCATION/TRAINING PROGRAM

## 2018-08-03 PROCEDURE — C1894 INTRO/SHEATH, NON-LASER: HCPCS | Performed by: STUDENT IN AN ORGANIZED HEALTH CARE EDUCATION/TRAINING PROGRAM

## 2018-08-03 PROCEDURE — 99152 MOD SED SAME PHYS/QHP 5/>YRS: CPT | Performed by: STUDENT IN AN ORGANIZED HEALTH CARE EDUCATION/TRAINING PROGRAM

## 2018-08-03 PROCEDURE — C1760 CLOSURE DEV, VASC: HCPCS | Performed by: STUDENT IN AN ORGANIZED HEALTH CARE EDUCATION/TRAINING PROGRAM

## 2018-08-03 PROCEDURE — C1769 GUIDE WIRE: HCPCS | Performed by: STUDENT IN AN ORGANIZED HEALTH CARE EDUCATION/TRAINING PROGRAM

## 2018-08-03 PROCEDURE — 82810 BLOOD GASES O2 SAT ONLY: CPT | Performed by: STUDENT IN AN ORGANIZED HEALTH CARE EDUCATION/TRAINING PROGRAM

## 2018-08-03 RX ORDER — FENTANYL CITRATE 50 UG/ML
INJECTION, SOLUTION INTRAMUSCULAR; INTRAVENOUS CODE/TRAUMA/SEDATION MEDICATION
Status: COMPLETED | OUTPATIENT
Start: 2018-08-03 | End: 2018-08-03

## 2018-08-03 RX ORDER — SODIUM CHLORIDE 9 MG/ML
75 INJECTION, SOLUTION INTRAVENOUS CONTINUOUS
Status: DISPENSED | OUTPATIENT
Start: 2018-08-03 | End: 2018-08-03

## 2018-08-03 RX ORDER — HYDRALAZINE HYDROCHLORIDE 10 MG/1
5 TABLET, FILM COATED ORAL EVERY 8 HOURS SCHEDULED
Status: DISCONTINUED | OUTPATIENT
Start: 2018-08-03 | End: 2018-08-04

## 2018-08-03 RX ORDER — ISOSORBIDE DINITRATE 5 MG/1
5 TABLET ORAL
Status: DISCONTINUED | OUTPATIENT
Start: 2018-08-03 | End: 2018-08-04

## 2018-08-03 RX ORDER — MIDAZOLAM HYDROCHLORIDE 1 MG/ML
INJECTION INTRAMUSCULAR; INTRAVENOUS CODE/TRAUMA/SEDATION MEDICATION
Status: COMPLETED | OUTPATIENT
Start: 2018-08-03 | End: 2018-08-03

## 2018-08-03 RX ORDER — ACETAMINOPHEN 325 MG/1
650 TABLET ORAL EVERY 6 HOURS PRN
Status: DISCONTINUED | OUTPATIENT
Start: 2018-08-03 | End: 2018-08-06 | Stop reason: HOSPADM

## 2018-08-03 RX ORDER — LIDOCAINE HYDROCHLORIDE 10 MG/ML
INJECTION, SOLUTION INFILTRATION; PERINEURAL CODE/TRAUMA/SEDATION MEDICATION
Status: COMPLETED | OUTPATIENT
Start: 2018-08-03 | End: 2018-08-03

## 2018-08-03 RX ORDER — NITROGLYCERIN 0.4 MG/1
0.4 TABLET SUBLINGUAL
Status: DISCONTINUED | OUTPATIENT
Start: 2018-08-03 | End: 2018-08-03

## 2018-08-03 RX ORDER — ONDANSETRON 2 MG/ML
4 INJECTION INTRAMUSCULAR; INTRAVENOUS EVERY 6 HOURS PRN
Status: DISCONTINUED | OUTPATIENT
Start: 2018-08-03 | End: 2018-08-06 | Stop reason: HOSPADM

## 2018-08-03 RX ORDER — TORSEMIDE 20 MG/1
10 TABLET ORAL DAILY
Status: DISCONTINUED | OUTPATIENT
Start: 2018-08-04 | End: 2018-08-06 | Stop reason: HOSPADM

## 2018-08-03 RX ADMIN — SACUBITRIL AND VALSARTAN 1 TABLET: 49; 51 TABLET, FILM COATED ORAL at 20:23

## 2018-08-03 RX ADMIN — FENTANYL CITRATE 25 MCG: 50 INJECTION, SOLUTION INTRAMUSCULAR; INTRAVENOUS at 13:45

## 2018-08-03 RX ADMIN — LIDOCAINE HYDROCHLORIDE 7 ML: 10 INJECTION, SOLUTION INFILTRATION; PERINEURAL at 13:34

## 2018-08-03 RX ADMIN — METOPROLOL SUCCINATE 12.5 MG: 25 TABLET, EXTENDED RELEASE ORAL at 08:32

## 2018-08-03 RX ADMIN — HYDRALAZINE HYDROCHLORIDE 5 MG: 10 TABLET, FILM COATED ORAL at 21:37

## 2018-08-03 RX ADMIN — FUROSEMIDE 20 MG: 10 INJECTION, SOLUTION INTRAMUSCULAR; INTRAVENOUS at 08:33

## 2018-08-03 RX ADMIN — HYDRALAZINE HYDROCHLORIDE 5 MG: 10 TABLET, FILM COATED ORAL at 17:32

## 2018-08-03 RX ADMIN — MIDAZOLAM 0.5 MG: 1 INJECTION INTRAMUSCULAR; INTRAVENOUS at 13:31

## 2018-08-03 RX ADMIN — MIDAZOLAM 0.5 MG: 1 INJECTION INTRAMUSCULAR; INTRAVENOUS at 13:45

## 2018-08-03 RX ADMIN — ENOXAPARIN SODIUM 40 MG: 40 INJECTION SUBCUTANEOUS at 08:32

## 2018-08-03 RX ADMIN — IOHEXOL 70 ML: 350 INJECTION, SOLUTION INTRAVENOUS at 14:02

## 2018-08-03 RX ADMIN — FENTANYL CITRATE 25 MCG: 50 INJECTION, SOLUTION INTRAMUSCULAR; INTRAVENOUS at 13:58

## 2018-08-03 RX ADMIN — SACUBITRIL AND VALSARTAN 1 TABLET: 24; 26 TABLET, FILM COATED ORAL at 08:35

## 2018-08-03 RX ADMIN — FENTANYL CITRATE 25 MCG: 50 INJECTION, SOLUTION INTRAMUSCULAR; INTRAVENOUS at 13:31

## 2018-08-03 RX ADMIN — SODIUM CHLORIDE 75 ML/HR: 0.9 INJECTION, SOLUTION INTRAVENOUS at 15:19

## 2018-08-03 RX ADMIN — ISOSORBIDE DINITRATE 5 MG: 5 TABLET ORAL at 17:34

## 2018-08-03 NOTE — CONSULTS
Heart Failure Consult Note - Jonathan Lowe [de-identified] y o  female MRN: [id number]    @ Encounter: [id number]      Assessment/Plan:    Patient Active Problem List    Diagnosis Date Noted    Acute systolic heart failure ([facility] ) 08/01/2018     Priority: Low    Hypertension, essential 08/01/2018     Priority: Low    Other specified glaucoma 08/01/2018     Priority: Low    Autoimmune hepatitis treated with steroids ([facility] ) 08/01/2018     Priority: Low       # Newly diagnosed BiV systolic HF, NIDCM, LVEF ~78%, LVIDd 5 9 cm, borderline reduced RVSF, NYHA III, ACC/AHA Stage D: Unclear etiology  Most likely ischemic (hx of HTN and chronic steroids) +/- HTN (normotensive on admission) +/- valvular (less likely, central MR likely functional) +/- myocarditis +/- stress CM +/- LBBB +/- other  Cool extremities with fatigue concerning for low output  Borderline RV dysfunction and pulmonary vascular changes likely from L sided disease (elevated filling pressures and MR)  Reduction in LVEF likely not acute given TTE  Review of TTE also c/w borderline to low output  No clear evidence of end organ hypoperfusion at this time  As below:  Diag:   --TTE 8/2/18: LVEF ~25%, mild RV dilation w/ borderline RVSF, ROBERTO, sev central MR, mod TR  RVOT: Abnormal AcT with late systolic notching concerning for pulmonary vascular changes  Borderline to low cardiac output based on noninvasive assessment  --Check HIV, SPEP/UPEP, LEA, hepatitis panel negative, ferritin ok  --R+LHC 8/3/18: shows normal coronaries  RA 3  RV 32/6  PA 32/11/20  PCWP 6  AO sat 89%  MvO2 56 5%  TD CO/CI: 2 3/1 4  Isacc CO/CI: 2 6/1 6  TPG 14  DPG 5  PVR 5 4 (Isacc); 6 1 (TD)  SVR 2070  PVR:SVR <0 25    The patient is cold and dry       To do:  --cMRI to assess for alternative causes of NICM    Therapeutic:  --2g sodium diet  --2000 ml fluid restriction  --If does not tolerate SVR reduction, may need to initiate inotropes for further optimization    Neurohormonal Blockade:  --Beta-Blocker: Continue metoprolol succinate 12 5 mg PO daily; uptitrate further once SVR reduced further  --ACEi, ARB or ARNi: Increase Entresto to 49-51 mg PO BID (also for SVR reduction)  --Vasodilators: Start iso 5/hydral 5 mg PO q8hrs for SVR reduction  --Aldosterone Receptor Blocker: Currently not on MRA  Will start once above meds @ max tolerated dose  --Diuretic: Stop IV lasix; start torsemide 10 PO daily starting tomorrow    Sudden Cardiac Death Risk Reduction:  --ICD: LifeVest on D/C as bridge to ICD vs recovery  Electrical Resynchronization:  --Candidacy for BiV device: Has LBBB, if LVEF does not recover, then would be potential candidate for BiV device  Advanced Therapies: Will continue to monitor  If does not recover, age precludes OHT  Possible LVAD candidate, but social situation will be complicated as  has LVAD w/ complications and son works in [place]  Other son is in [place]  Will monitor closely  # HTN  # Normocytic anemia  # Hypothyroidism  # Autoimmune hepatitis: Continue budesonide    HPI: Very pleasant [de-identified] y/o woman w/ PMHx of HTN, autoimmune hepatitis p/t ER w/ increasing SOB x 2 weeks with exertional central chest pressure  Upon further questioning this AM, she states that she has slowed down a bit, but unclear for how long  She does state that she has been more fatigued as well  She lives with her  who has a HMII LVAD  Denies EtOH or drug use  Denies any recent viral illness  She has been on budesonide for autoimmune hepatitis x 4 years  On admission she had TTE which showed LVEF ~25%, NT pro BNP 21266 and troponin of 0 03  She was seen by gen cards with plan for [facility] today  She feels slightly improved this AM, but did endorse CP in the AM      Past Medical History:   Diagnosis Date    Hypertension      Review of Systems - 12 point ROS was done and is negative, except as noted above       Allergies   Allergen Reactions    Ambien [Zolpidem]        Current Facility-Administered Medications:     budesonide (ENTOCORT EC) 24 hr capsule 3 mg, 3 mg, Oral, Daily, Bhavesh Gan MD, 3 mg at 08/03/18 0835    enoxaparin (LOVENOX) subcutaneous injection 40 mg, 40 mg, Subcutaneous, Daily, Bhavesh Gan MD, 40 mg at 08/03/18 6290    furosemide (LASIX) injection 20 mg, 20 mg, Intravenous, Daily, Bhavesh Gan MD, 20 mg at 08/03/18 6348    metoprolol succinate (TOPROL-XL) 24 hr tablet 12 5 mg, 12 5 mg, Oral, Daily, Ailyn Chin MD, 12 5 mg at 08/03/18 0237    sacubitril-valsartan (ENTRESTO) 24-26 MG per tablet 1 tablet, 1 tablet, Oral, BID, Ailyn Chin MD, 1 tablet at 08/03/18 0475    Social History     Social History    Marital status: /Civil Union     Spouse name: N/A    Number of children: N/A    Years of education: N/A     Occupational History    Not on file  Social History Main Topics    Smoking status: Never Smoker    Smokeless tobacco: Not on file    Alcohol use No    Drug use: No    Sexual activity: Not on file     Other Topics Concern    Not on file     Social History Narrative    No narrative on file       History reviewed  No pertinent family history  Physical Exam:    Vitals: Blood pressure 118/66, pulse 76, temperature 98 °F (36 7 °C), temperature source Oral, resp  rate 18, height 5' 6" (1 676 m), weight 56 7 kg (125 lb), SpO2 97 %  , Body mass index is 20 18 kg/m² ,   Wt Readings from Last 3 Encounters:   08/03/18 56 7 kg (125 lb)   08/18/17 59 1 kg (130 lb 4 8 oz)       Vitals:    08/03/18 0333 08/03/18 0600 08/03/18 0722 08/03/18 0915   BP: 125/61  118/66    BP Location: Left arm  Left arm    Pulse: 73  76    Resp: 17  18    Temp: 98 5 °F (36 9 °C)  98 °F (36 7 °C)    TempSrc: Oral  Oral    SpO2: 94%  97% 97%   Weight:  56 7 kg (125 lb)     Height:           GEN: Donald Flaherty appears well, alert and oriented x 3, pleasant and cooperative   HEENT: pupils equal, round, and reactive to light; extraocular muscles intact  NECK: supple, no carotid bruits   HEART: regular rhythm, normal S1 and S2, no murmurs, clicks, gallops or rubs, JVD w/ +HJR  LUNGS: clear to auscultation bilaterally; no wheezes, rales, or rhonchi   ABDOMEN: normal bowel sounds, soft, no tenderness, no distention  EXTREMITIES: peripheral pulses normal; no clubbing, cyanosis, or edema; cool extremities  NEURO: no focal findings   SKIN: normal without suspicious lesions on exposed skin    Labs & Results:  Lab Results   Component Value Date    WBC 2 21 (L) 08/02/2018    HGB 10 5 (L) 08/02/2018    HCT 33 1 (L) 08/02/2018    MCV 87 08/02/2018    PLT 79 (L) 08/02/2018     Lab Results   Component Value Date     08/01/2018    K 3 7 08/01/2018     08/01/2018    CO2 27 08/01/2018    ANIONGAP 6 08/01/2018    BUN 16 08/01/2018    CREATININE 0 89 08/01/2018    GLUCOSE 100 08/01/2018    GLUF 109 (H) 12/27/2017    CALCIUM 8 7 08/01/2018    AST 78 (H) 08/01/2018    ALT 74 08/01/2018    ALKPHOS 49 08/01/2018    PROT 7 1 08/01/2018    BILITOT 0 72 08/01/2018    EGFR 71 08/01/2018       EKG personally reviewed by Delos Buerger, MD      Counseling / Coordination of Care  Total floor / unit time spent today 40 minutes  Greater than 50% of total time was spent with the patient and / or family counseling and / or coordination of care  A description of the counseling / coordination of care: 25 min  Thank you for the opportunity to participate in the care of this patient      Delos Buerger MD, PhD   Cherylene Hakim

## 2018-08-03 NOTE — PROGRESS NOTES
Progress Note - Renetta Chambers 1940, [de-identified] y o  female MRN: [id number]    Unit/Bed#: [facility] 404-01 Encounter: [id number]    Primary Care Provider: Cesia Leavitt MD   Date and time admitted to hospital: 2018  4:20 PM        * Acute systolic heart failure [facility])   Assessment & Plan    Troponin trending due to chest pain  Cardiology consult  Lipid profile,   follow up on TSH  Echocardiogram     Lasix 20 mg intravenous daily  Monitor on telemetry   Inputs and outputs and daily weights  Autoimmune hepatitis treated with steroids [facility])   Assessment & Plan    Patient currently on budesonide 3 mg daily  Will continue  Other specified glaucoma   Assessment & Plan    Patient denies using eyedrops  Hypertension, essential   Assessment & Plan    Patient denies taking verapamil  Will monitor blood pressure and treat accordingly  VTE Pharmacologic Prophylaxis:   Pharmacologic: Enoxaparin (Lovenox)  Mechanical VTE Prophylaxis in Place: No    Patient Centered Rounds: I have performed bedside rounds with nursing staff today  Time Spent for Care: 15 minutes  More than 50% of total time spent on counseling and coordination of care as described above  Current Length of Stay: 2 day(s)    Current Patient Status: Inpatient   Certification Statement: The patient will continue to require additional inpatient hospital stay due to Patient comfortable  Discharge Plan: Will discuss with heart failure    Code Status: Level 1 - Full Code      Subjective:   No acute distress    Objective:     Vitals:   Temp (24hrs), Av 4 °F (36 9 °C), Min:98 °F (36 7 °C), Max:98 7 °F (37 1 °C)    HR:  [73-96] 76  Resp:  [16-18] 18  BP: (111-138)/(58-79) 118/66  SpO2:  [94 %-97 %] 97 %  Body mass index is 20 18 kg/m²  Input and Output Summary (last 24 hours):        Intake/Output Summary (Last 24 hours) at 18 1000  Last data filed at 18 0600   Gross per 24 hour   Intake 1805 ml   Output             2050 ml   Net             -245 ml       Physical Exam:     Physical Exam   Constitutional: She is oriented to person, place, and time  She appears well-developed and well-nourished  HENT:   Head: Normocephalic and atraumatic  Eyes: Conjunctivae are normal  Pupils are equal, round, and reactive to light  Cardiovascular: Normal rate and regular rhythm  Pulmonary/Chest: She has no wheezes  Abdominal: Soft  Bowel sounds are normal  She exhibits no distension  Neurological: She is alert and oriented to person, place, and time  Skin: Skin is warm and dry  No erythema  Psychiatric: She has a normal mood and affect  Additional Data:     Labs:      Results from last 7 days  Lab Units 08/02/18  0435   WBC Thousand/uL 2 21*   HEMOGLOBIN g/dL 10 5*   HEMATOCRIT % 33 1*   PLATELETS Thousands/uL 79*   NEUTROS PCT % 45   LYMPHS PCT % 39   MONOS PCT % 13*   EOS PCT % 1       Results from last 7 days  Lab Units 08/01/18  2357   SODIUM mmol/L 141   POTASSIUM mmol/L 3 7   CHLORIDE mmol/L 108   CO2 mmol/L 27   BUN mg/dL 16   CREATININE mg/dL 0 89   CALCIUM mg/dL 8 7   TOTAL PROTEIN g/dL 7 1   BILIRUBIN TOTAL mg/dL 0 72   ALK PHOS U/L 49   ALT U/L 74   AST U/L 78*   GLUCOSE RANDOM mg/dL 100           * I Have Reviewed All Lab Data Listed Above  * Additional Pertinent Lab Tests Reviewed:  All Labs Within Last 24 Hours Reviewed    Recent Cultures (last 7 days):           Last 24 Hours Medication List:     Current Facility-Administered Medications:  budesonide 3 mg Oral Daily Rene Lizarraga MD   enoxaparin 40 mg Subcutaneous Daily Rene Lizarraga MD   furosemide 20 mg Intravenous Daily Rene Lizarraga MD   metoprolol succinate 12 5 mg Oral Daily Valerie Rea MD   sacubitril-valsartan 1 tablet Oral BID Valerie Rea MD        Today, Patient Was Seen By: Ena Garcia DO    ** Please Note: Dictation voice to text software may have been used in the creation of this document   **

## 2018-08-03 NOTE — PROGRESS NOTES
Pt complaining of 7/10 chest pain, now 3/10  Vital signs stable  EKG order placed per order  Notified [employer] with [employer]  Will be up to see pt and look at EKG, will monitor

## 2018-08-03 NOTE — PROGRESS NOTES
Patient returned to unit from cath lab  RT groin puncture site assessed, no bleeding or  hematoma noted

## 2018-08-04 LAB
ANION GAP SERPL CALCULATED.3IONS-SCNC: 5 MMOL/L (ref 4–13)
BUN SERPL-MCNC: 18 MG/DL (ref 5–25)
CALCIUM SERPL-MCNC: 8.4 MG/DL (ref 8.3–10.1)
CHLORIDE SERPL-SCNC: 105 MMOL/L (ref 100–108)
CO2 SERPL-SCNC: 30 MMOL/L (ref 21–32)
CREAT SERPL-MCNC: 0.92 MG/DL (ref 0.6–1.3)
ERYTHROCYTE [DISTWIDTH] IN BLOOD BY AUTOMATED COUNT: 15.9 % (ref 11.6–15.1)
GFR SERPL CREATININE-BSD FRML MDRD: 68 ML/MIN/1.73SQ M
GLUCOSE SERPL-MCNC: 93 MG/DL (ref 65–140)
HCT VFR BLD AUTO: 36.5 % (ref 34.8–46.1)
HGB BLD-MCNC: 11.6 G/DL (ref 11.5–15.4)
MAGNESIUM SERPL-MCNC: 1.8 MG/DL (ref 1.6–2.6)
MCH RBC QN AUTO: 27.6 PG (ref 26.8–34.3)
MCHC RBC AUTO-ENTMCNC: 31.8 G/DL (ref 31.4–37.4)
MCV RBC AUTO: 87 FL (ref 82–98)
PLATELET # BLD AUTO: 106 THOUSANDS/UL (ref 149–390)
PMV BLD AUTO: 11.8 FL (ref 8.9–12.7)
POTASSIUM SERPL-SCNC: 4 MMOL/L (ref 3.5–5.3)
RBC # BLD AUTO: 4.2 MILLION/UL (ref 3.81–5.12)
SODIUM SERPL-SCNC: 140 MMOL/L (ref 136–145)
WBC # BLD AUTO: 2.79 THOUSAND/UL (ref 4.31–10.16)

## 2018-08-04 PROCEDURE — 85027 COMPLETE CBC AUTOMATED: CPT | Performed by: INTERNAL MEDICINE

## 2018-08-04 PROCEDURE — 80048 BASIC METABOLIC PNL TOTAL CA: CPT | Performed by: INTERNAL MEDICINE

## 2018-08-04 PROCEDURE — 99232 SBSQ HOSP IP/OBS MODERATE 35: CPT | Performed by: INTERNAL MEDICINE

## 2018-08-04 PROCEDURE — 83735 ASSAY OF MAGNESIUM: CPT | Performed by: INTERNAL MEDICINE

## 2018-08-04 RX ADMIN — SACUBITRIL AND VALSARTAN 1 TABLET: 49; 51 TABLET, FILM COATED ORAL at 12:57

## 2018-08-04 RX ADMIN — ENOXAPARIN SODIUM 40 MG: 40 INJECTION SUBCUTANEOUS at 08:51

## 2018-08-04 RX ADMIN — ISOSORBIDE DINITRATE 5 MG: 5 TABLET ORAL at 08:44

## 2018-08-04 RX ADMIN — ISOSORBIDE DINITRATE 5 MG: 5 TABLET ORAL at 12:28

## 2018-08-04 RX ADMIN — METOPROLOL SUCCINATE 12.5 MG: 25 TABLET, EXTENDED RELEASE ORAL at 08:39

## 2018-08-04 RX ADMIN — HYDRALAZINE HYDROCHLORIDE 5 MG: 10 TABLET, FILM COATED ORAL at 06:52

## 2018-08-04 RX ADMIN — SACUBITRIL AND VALSARTAN 1 TABLET: 49; 51 TABLET, FILM COATED ORAL at 23:13

## 2018-08-04 RX ADMIN — TORSEMIDE 10 MG: 20 TABLET ORAL at 08:41

## 2018-08-04 NOTE — PROGRESS NOTES
Cardiology Progress Note - Sue Hinojosa [de-identified] y o  female MRN: [id number]    Unit/Bed#: [facility] 404-01 Encounter: [id number]        Subjective:    No significant events overnight  Does feel lightheaded  Review of Systems   Cardiovascular: Negative for chest pain, leg swelling and palpitations  Respiratory: Negative for shortness of breath  Objective:   Vitals: Blood pressure 120/65, pulse 84, temperature 97 6 °F (36 4 °C), temperature source Oral, resp  rate 18, height 5' 6" (1 676 m), weight 57 4 kg (126 lb 8 7 oz), SpO2 99 %, not currently breastfeeding , Body mass index is 20 42 kg/m² , Orthostatic Blood Pressures      Most Recent Value   Blood Pressure  120/65 filed at 08/04/2018 0853   Patient Position - Orthostatic VS  Sitting filed at 08/04/2018 1102         Systolic (30TRA), OZZ:622 , Min:101 , KOP:122     Diastolic (24HVO), OBL:43, Min:56, Max:78      Intake/Output Summary (Last 24 hours) at 08/04/18 1212  Last data filed at 08/04/18 1157   Gross per 24 hour   Intake             1070 ml   Output             1000 ml   Net               70 ml     Weight (last 2 days)     Date/Time   Weight    08/04/18 0600  57 4 (126 54)    08/03/18 0600  56 7 (125)    08/02/18 0600  57 5 (126 76)              Telemetry Review:     Physical Exam   Cardiovascular: Normal rate, regular rhythm and normal heart sounds  Exam reveals no gallop and no friction rub  No murmur heard  Pulmonary/Chest: Breath sounds normal  She has no wheezes  She has no rales  Musculoskeletal: She exhibits no edema           Laboratory Results:    Results from last 7 days  Lab Units 08/02/18  0359 08/01/18  2357 08/01/18  1734   TROPONIN I ng/mL 0 04 0 05* 0 04       CBC with diff:   Results from last 7 days  Lab Units 08/04/18  0503 08/02/18  0435 08/01/18  1543   WBC Thousand/uL 2 79* 2 21* 2 68*   HEMOGLOBIN g/dL 11 6 10 5* 11 0*   HEMATOCRIT % 36 5 33 1* 34 8   MCV fL 87 87 88   PLATELETS Thousands/uL 106* 79* 89*   MCH pg 27 6 27 6 27 8   MCHC g/dL 31 8 31 7 31 6   RDW % 15 9* 15 9* 15 9*   MPV fL 11 8 12 3 12 3   NRBC AUTO /100 WBCs  --  0 0         CMP:  Results from last 7 days  Lab Units 18  0503 18  1451 18  2357 18  1543   SODIUM mmol/L 140  --  141 138   POTASSIUM mmol/L 4 0  --  3 7 3 8   CHLORIDE mmol/L 105  --  108 106   CO2 mmol/L 30  --  27 24   ANION GAP mmol/L 5  --  6 8   BUN mg/dL 18  --  16 16   CREATININE mg/dL 0 92  --  0 89 0 85   GLUCOSE RANDOM mg/dL 93  --  100 89   CALCIUM mg/dL 8 4  --  8 7 9 1   AST U/L  --   --  78* 89*   ALT U/L  --   --  74 85*   ALK PHOS U/L  --   --  49 56   TOTAL PROTEIN g/dL  --  7 6 7 1 8 1   BILIRUBIN TOTAL mg/dL  --   --  0 72 0 73   EGFR ml/min/1 73sq m 68  --  71 75         BMP:  Results from last 7 days  Lab Units 18  0503 18  2357 18  1543   SODIUM mmol/L 140 141 138   POTASSIUM mmol/L 4 0 3 7 3 8   CHLORIDE mmol/L 105 108 106   CO2 mmol/L 30 27 24   BUN mg/dL 18 16 16   CREATININE mg/dL 0 92 0 89 0 85   GLUCOSE RANDOM mg/dL 93 100 89   CALCIUM mg/dL 8 4 8 7 9 1     Magnesium:   Results from last 7 days  Lab Units 18  0503 18  2357   MAGNESIUM mg/dL 1 8 1 8     Lipid Profile:     Results from last 7 days  Lab Units 18  0445   CHOLESTEROL mg/dL 108   TRIGLYCERIDES mg/dL 75   HDL mg/dL 41           Cardiac testing:   Results for orders placed during the hospital encounter of 18   Echo complete with contrast if indicated    Narrative [address]  [facility] [place], [address] [place] [address]  [phone]    Transthoracic Echocardiogram  2D, M-mode, Doppler, and Color Doppler    Study date:  02-Aug-2018    Patient: Emanuel Joe  MR number: [id number]  Account number: [de-identified]  : 1940  Age: [de-identified] years  Gender: Female  Status: Inpatient  Location: Bedside  Height: 66 in  Weight: 125 8 lb  BP: 122/ 66 mmHg    Indications: Heart failure    Diagnoses: I50 9 - Heart failure, unspecified    Sonographer:  LILY Good  Primary Physician:  Vasu Beckett MD  Referring Physician:  Bolivar Mulligan MD  Group:  [facility]  Interpreting Physician:  Nilesh Luna MD    SUMMARY    LEFT VENTRICLE:  The ventricle was mildly to moderately dilated  Systolic function was severely reduced  Ejection fraction was estimated to be 25 %  There was severe diffuse hypokinesis with regional variations  Wall thickness was at the upper limits of normal   Doppler parameters were consistent with elevated ventricular end-diastolic filling pressure  VENTRICULAR SEPTUM:  There was moderate dyssynergic motion  These changes are consistent with LBBB  RIGHT VENTRICLE:  The ventricle was mildly dilated  Systolic function was low normal     LEFT ATRIUM:  The atrium was mildly to moderately dilated  RIGHT ATRIUM:  The atrium was mildly dilated  MITRAL VALVE:  There was moderate to severe regurgitation  AORTIC VALVE:  Transaortic velocity was decreased due to decreased transaortic flow (decreased stroke volume)  There was trace regurgitation  TRICUSPID VALVE:  There was moderate regurgitation  Pulmonary artery systolic pressure was moderately increased  Estimated peak PA pressure was 50 mmHg  PERICARDIUM:  There was a left pleural effusion  HISTORY: PRIOR HISTORY: Heart failure, hypertension    PROCEDURE: The procedure was performed at the bedside  This was a routine study  The transthoracic approach was used  The study included complete 2D imaging, M-mode, complete spectral Doppler, and color Doppler  The heart rate was 93 bpm,  at the start of the study  Images were obtained from the parasternal, apical, subcostal, and suprasternal notch acoustic windows  Image quality was adequate  LEFT VENTRICLE: The ventricle was mildly to moderately dilated  Systolic function was severely reduced  Ejection fraction was estimated to be 25 %   There was severe diffuse hypokinesis with regional variations  Wall thickness was at the  upper limits of normal  DOPPLER: Left ventricular diastolic function parameters were abnormal  Doppler parameters were consistent with elevated ventricular end-diastolic filling pressure  VENTRICULAR SEPTUM: There was moderate dyssynergic motion  These changes are consistent with LBBB  RIGHT VENTRICLE: The ventricle was mildly dilated  Systolic function was low normal  Wall thickness was normal     LEFT ATRIUM: The atrium was mildly to moderately dilated  RIGHT ATRIUM: The atrium was mildly dilated  MITRAL VALVE: Valve structure was normal  There was normal leaflet separation  DOPPLER: The transmitral velocity was within the normal range  There was no evidence for stenosis  There was moderate to severe regurgitation  AORTIC VALVE: The valve was trileaflet  Leaflets exhibited normal thickness, normal cuspal separation, and sclerosis  DOPPLER: Transaortic velocity was decreased due to decreased transaortic flow (decreased stroke volume)  There was no  evidence for stenosis  There was trace regurgitation  TRICUSPID VALVE: The valve structure was normal  There was normal leaflet separation  DOPPLER: The transtricuspid velocity was within the normal range  There was no evidence for stenosis  There was moderate regurgitation  Pulmonary artery  systolic pressure was moderately increased  Estimated peak PA pressure was 50 mmHg  PULMONIC VALVE: Leaflets exhibited normal thickness, no calcification, and normal cuspal separation  DOPPLER: The transpulmonic velocity was within the normal range  There was no significant regurgitation  PERICARDIUM: A trace pericardial effusion was identified  There was a left pleural effusion  The pericardium was normal in appearance  AORTA: The root exhibited normal size  SYSTEMIC VEINS: IVC: The inferior vena cava was mildly dilated      PULMONARY VEINS: DOPPLER: Doppler signals were not recordable in the pulmonary vein(s)  SYSTEM MEASUREMENT TABLES    2D  %FS: 5 04 %  Ao Diam: 2 5 cm  EDV(Teich): 170 18 ml  EF(Cube): 14 38 %  EF(Teich): 11 2 %  ESV(Cube): 171 83 ml  ESV(Teich): 151 12 ml  IVSd: 1 1 cm  LA Area: 19 63 cm2  LVEDV MOD A4C: 181 44 ml  LVEF MOD A4C: 14 74 %  LVESV MOD A4C: 154 7 ml  LVIDd: 5 85 cm  LVIDs: 5 56 cm  LVLd A4C: 8 19 cm  LVLs A4C: 7 54 cm  LVPWd: 1 31 cm  RA Area: 15 68 cm2  SI(Cube): 17 49 ml/m2  SI(Teich): 11 55 ml/m2  SV MOD A4C: 26 74 ml  SV(Cube): 28 85 ml  SV(Teich): 19 06 ml    CF  MR Als  Oseas: 0 63 m/s  MR Flow: 195 3 ml/s  MR Rad: 0 7 cm    CW  TR Vmax: 3 26 m/s  TR maxP 53 mmHg    MM  TAPSE: 1 36 cm    PW  AVC: 408 39 ms  E': 0 03 m/s  E/E': 26 61  MV A Oseas: 0 89 m/s  MV Dec [place]: 5 71 m/s2  MV DecT: 125 43 ms  MV E Oseas: 0 72 m/s  MV E/A Ratio: 0 8    [facility] Accredited Echocardiography Laboratory    Prepared and electronically signed by    Duyen Bella MD  Signed 02-Aug-2018 15:04:13         Meds/Allergies     Current Facility-Administered Medications:  acetaminophen 650 mg Oral Q6H PRN Valente Rabago MD   budesonide 3 mg Oral Daily Mera Lopez MD   enoxaparin 40 mg Subcutaneous Daily Mera Lopez MD   hydrALAZINE 5 mg Oral Q8H [address] Francy Owens MD   isosorbide dinitrate 5 mg Oral TID after meals Francy Owens MD   metoprolol succinate 12 5 mg Oral Daily Amol Johnson MD   ondansetron 4 mg Intravenous Q6H PRN Valente Rabago MD   sacubitril-valsartan 1 tablet Oral Q12H Francy Owens MD   torsemide 10 mg Oral Daily Francy Owens MD        Prescriptions Prior to Admission   Medication    BUDESONIDE ER PO       Assessment:  Principal Problem:    Acute systolic heart failure ([address] )  Active Problems:    Hypertension, essential    Other specified glaucoma    Autoimmune hepatitis treated with steroids (HCC)    Thrombocytopenia ([address] )      Impression:  1  New onset cardiomyopathy - non-ischemic      Recommendations:  1  Discontinue Hydralazine/isordil  2  Continue remainder of medications  3  Plan on cardiac MRI for evaluation

## 2018-08-04 NOTE — PROGRESS NOTES
Progress Note - Ashley Carmona 1940, [de-identified] y o  female MRN: [id number]    Unit/Bed#: [facility] 404-01 Encounter: [id number]    Primary Care Provider: Vasu Beckett MD   Date and time admitted to hospital: 2018  4:20 PM        * Acute systolic heart failure [facility])   Assessment & Plan    Discussed with Heart failure Service  Patient is status post cardiac catheterization with no evidence of coronary disease  Etiology of nonischemic cardiomyopathy in progress  Await MRI  Patient to have entresto  dosing increased  · Continue with isodoril  · entresto  · toresemide  · Metoprolol            Thrombocytopenia (HCC)   Assessment & Plan    Improving  Hypertension, essential   Assessment & Plan    Patient denies taking verapamil  Will monitor blood pressure and treat accordingly  Continue with metoprolol, isosorbide, hydralazine, torsemide and entresto therapy          VTE Pharmacologic Prophylaxis:   Pharmacologic: Enoxaparin (Lovenox)  Mechanical VTE Prophylaxis in Place: No    Patient Centered Rounds: I have performed bedside rounds with nursing staff today  Time Spent for Care: 15 minutes  More than 50% of total time spent on counseling and coordination of care as described above  Current Length of Stay: 3 day(s)    Current Patient Status: Inpatient   Certification Statement: The patient will continue to require additional inpatient hospital stay due to Need to monitor heart failure  Workup in progress  Discussed with Cardiology yesterday  Discharge Plan:  Not ready for discharge at  Await further workup regarding nonischemic cardiomyopathy including MRI imaging  Code Status: Level 1 - Full Code      Subjective:   No acute distress    Objective:     Vitals:   Temp (24hrs), Av 9 °F (36 6 °C), Min:97 3 °F (36 3 °C), Max:98 7 °F (37 1 °C)    HR:  [67-96] 84  Resp:  [18-20] 18  BP: (101-125)/(56-78) 120/65  SpO2:  [94 %-99 %] 99 %  Body mass index is 20 42 kg/m²       Input and Output Summary (last 24 hours): Intake/Output Summary (Last 24 hours) at 08/04/18 1108  Last data filed at 08/04/18 0800   Gross per 24 hour   Intake              830 ml   Output             1000 ml   Net             -170 ml       Physical Exam:     Physical Exam   Constitutional: She is oriented to person, place, and time  HENT:   Head: Normocephalic and atraumatic  Eyes: Conjunctivae are normal  Pupils are equal, round, and reactive to light  Neck: Normal range of motion  Neck supple  No JVD present  No tracheal deviation present  No thyromegaly present  Cardiovascular: Normal rate and regular rhythm  No murmur heard  Pulmonary/Chest: Effort normal and breath sounds normal  No respiratory distress  Abdominal: Soft  Bowel sounds are normal  She exhibits no distension  There is no tenderness  Musculoskeletal: Normal range of motion  She exhibits no edema  Neurological: She is alert and oriented to person, place, and time  Skin: Skin is warm and dry  No erythema  Psychiatric: She has a normal mood and affect  Additional Data:     Labs:      Results from last 7 days  Lab Units 08/04/18  0503 08/02/18  0435   WBC Thousand/uL 2 79* 2 21*   HEMOGLOBIN g/dL 11 6 10 5*   HEMATOCRIT % 36 5 33 1*   PLATELETS Thousands/uL 106* 79*   NEUTROS PCT %  --  45   LYMPHS PCT %  --  39   MONOS PCT %  --  13*   EOS PCT %  --  1       Results from last 7 days  Lab Units 08/04/18  0503 08/02/18  1451 08/01/18  2357   SODIUM mmol/L 140  --  141   POTASSIUM mmol/L 4 0  --  3 7   CHLORIDE mmol/L 105  --  108   CO2 mmol/L 30  --  27   BUN mg/dL 18  --  16   CREATININE mg/dL 0 92  --  0 89   CALCIUM mg/dL 8 4  --  8 7   TOTAL PROTEIN g/dL  --  7 6 7 1   BILIRUBIN TOTAL mg/dL  --   --  0 72   ALK PHOS U/L  --   --  49   ALT U/L  --   --  74   AST U/L  --   --  78*   GLUCOSE RANDOM mg/dL 93  --  100           * I Have Reviewed All Lab Data Listed Above  * Additional Pertinent Lab Tests Reviewed:  All Labs Within Last 24 Hours Reviewed      Recent Cultures (last 7 days):           Last 24 Hours Medication List:     Current Facility-Administered Medications:  acetaminophen 650 mg Oral Q6H PRN America Joshi MD   budesonide 3 mg Oral Daily Alejandro Schulz MD   enoxaparin 40 mg Subcutaneous Daily Alejandro Schulz MD   hydrALAZINE 5 mg Oral [facility] Marcie Callahan MD   isosorbide dinitrate 5 mg Oral TID after meals Marcie Callahan MD   metoprolol succinate 12 5 mg Oral Daily Gareth Nicole MD   ondansetron 4 mg Intravenous Q6H PRN America Joshi MD   sacubitril-valsartan 1 tablet Oral Q12H Marcie Callahan MD   torsemide 10 mg Oral Daily Marcie Callahan MD        Today, Patient Was Seen By: Soha Francis DO    ** Please Note: Dictation voice to text software may have been used in the creation of this document   **

## 2018-08-04 NOTE — ASSESSMENT & PLAN NOTE
Patient denies taking verapamil  Will monitor blood pressure and treat accordingly    Continue with metoprolol, isosorbide, hydralazine, torsemide and entresto therapy

## 2018-08-04 NOTE — ASSESSMENT & PLAN NOTE
Discussed with Heart failure Service  Patient is status post cardiac catheterization with no evidence of coronary disease  Etiology of nonischemic cardiomyopathy in progress  Await MRI  Patient to have entresto  dosing increased    · Continue with isodoril  · entresto  · toresemide  · Metoprolol

## 2018-08-05 PROCEDURE — 99232 SBSQ HOSP IP/OBS MODERATE 35: CPT | Performed by: INTERNAL MEDICINE

## 2018-08-05 RX ADMIN — METOPROLOL SUCCINATE 12.5 MG: 25 TABLET, EXTENDED RELEASE ORAL at 08:04

## 2018-08-05 RX ADMIN — TORSEMIDE 10 MG: 20 TABLET ORAL at 08:05

## 2018-08-05 RX ADMIN — SACUBITRIL AND VALSARTAN 1 TABLET: 49; 51 TABLET, FILM COATED ORAL at 20:49

## 2018-08-05 RX ADMIN — SACUBITRIL AND VALSARTAN 1 TABLET: 49; 51 TABLET, FILM COATED ORAL at 08:05

## 2018-08-05 RX ADMIN — ENOXAPARIN SODIUM 40 MG: 40 INJECTION SUBCUTANEOUS at 08:03

## 2018-08-05 NOTE — PROGRESS NOTES
Progress Note - Renetta Chambers 1940, [de-identified] y o  female MRN: [id number]    Unit/Bed#: [facility] 404-01 Encounter: [id number]    Primary Care Provider: Cesia Leavitt MD   Date and time admitted to hospital: 2018  4:20 PM        * Acute systolic heart failure [facility])   Assessment & Plan    Discussed with Heart failure Service  Patient is status post cardiac catheterization with no evidence of coronary disease  Etiology of nonischemic cardiomyopathy in progress  Await MRI  Patient to have entresto  dosing increased  · Continue with isodoril  · entresto  · toresemide  · Metoprolol            Thrombocytopenia (HCC)   Assessment & Plan    Improving  Autoimmune hepatitis treated with steroids [facility])   Assessment & Plan    Patient currently on budesonide 3 mg daily  Will continue  Other specified glaucoma   Assessment & Plan    Patient denies using eyedrops  Hypertension, essential   Assessment & Plan    Patient denies taking verapamil  Will monitor blood pressure and treat accordingly  Continue with metoprolol, isosorbide, hydralazine, torsemide and entresto therapy            VTE Pharmacologic Prophylaxis:   Pharmacologic: Enoxaparin (Lovenox)  Mechanical VTE Prophylaxis in Place: No    Patient Centered Rounds: I have performed bedside rounds with nursing staff today  Time Spent for Care: 15 minutes  More than 50% of total time spent on counseling and coordination of care as described above  Current Length of Stay: 4 day(s)    Current Patient Status: Inpatient   Certification Statement: The patient will continue to require additional inpatient hospital stay due to Need to monitor symptoms          Code Status: Level 1 - Full Code      Subjective:   Patient comfortable    Objective:     Vitals:   Temp (24hrs), Av 5 °F (36 9 °C), Min:98 1 °F (36 7 °C), Max:98 8 °F (37 1 °C)    HR:  [77-85] 80  Resp:  [18] 18  BP: (103-136)/(55-68) 122/58  SpO2:  [96 %-99 %] 99 %  Body mass index is 20 25 kg/m²  Input and Output Summary (last 24 hours): Intake/Output Summary (Last 24 hours) at 08/05/18 0835  Last data filed at 08/05/18 2755   Gross per 24 hour   Intake              920 ml   Output             2100 ml   Net            -1180 ml       Physical Exam:     Physical Exam   Constitutional: She is oriented to person, place, and time  She appears well-developed and well-nourished  HENT:   Head: Normocephalic and atraumatic  Eyes: Conjunctivae are normal  Pupils are equal, round, and reactive to light  Neck: Normal range of motion  Neck supple  No tracheal deviation present  No thyromegaly present  Cardiovascular: Normal rate and regular rhythm  Pulmonary/Chest: Effort normal and breath sounds normal    Abdominal: Soft  Bowel sounds are normal  She exhibits no distension  Musculoskeletal: She exhibits no edema  Neurological: She is alert and oriented to person, place, and time  No cranial nerve deficit  Skin: Skin is warm and dry  Additional Data:     Labs:      Results from last 7 days  Lab Units 08/04/18  0503 08/02/18  0435   WBC Thousand/uL 2 79* 2 21*   HEMOGLOBIN g/dL 11 6 10 5*   HEMATOCRIT % 36 5 33 1*   PLATELETS Thousands/uL 106* 79*   NEUTROS PCT %  --  45   LYMPHS PCT %  --  39   MONOS PCT %  --  13*   EOS PCT %  --  1       Results from last 7 days  Lab Units 08/04/18  0503 08/02/18  1451 08/01/18  2357   SODIUM mmol/L 140  --  141   POTASSIUM mmol/L 4 0  --  3 7   CHLORIDE mmol/L 105  --  108   CO2 mmol/L 30  --  27   BUN mg/dL 18  --  16   CREATININE mg/dL 0 92  --  0 89   CALCIUM mg/dL 8 4  --  8 7   TOTAL PROTEIN g/dL  --  7 6 7 1   BILIRUBIN TOTAL mg/dL  --   --  0 72   ALK PHOS U/L  --   --  49   ALT U/L  --   --  74   AST U/L  --   --  78*   GLUCOSE RANDOM mg/dL 93  --  100           * I Have Reviewed All Lab Data Listed Above  * Additional Pertinent Lab Tests Reviewed:  All Labs Within Last 24 Hours Reviewed        Recent Cultures (last 7 days): Last 24 Hours Medication List:     Current Facility-Administered Medications:  acetaminophen 650 mg Oral Q6H PRN Boni Stephenson MD   budesonide 3 mg Oral Daily Aidan Barraza MD   enoxaparin 40 mg Subcutaneous Daily Aidan Barraza MD   metoprolol succinate 12 5 mg Oral Daily Jessica Saleh MD   ondansetron 4 mg Intravenous Q6H PRN Boni Stephenson MD   sacubitril-valsartan 1 tablet Oral Q12H Lesa Velázquez MD   torsemide 10 mg Oral Daily Lesa Velázquez MD        Today, Patient Was Seen By: Macy Chery DO    ** Please Note: Dictation voice to text software may have been used in the creation of this document   **

## 2018-08-05 NOTE — PROGRESS NOTES
Cardiology Progress Note - Hilaria Pedroza [de-identified] y o  female MRN: [id number]    Unit/Bed#: [facility] 404-01 Encounter: [id number]        Subjective:    No significant events overnight  Did have a 4b run of NSVT    Review of Systems   Cardiovascular: Negative for chest pain, leg swelling and palpitations  Respiratory: Negative for shortness of breath  Objective:   Vitals: Blood pressure 122/58, pulse 80, temperature 98 7 °F (37 1 °C), temperature source Oral, resp  rate 18, height 5' 6" (1 676 m), weight 56 9 kg (125 lb 7 1 oz), SpO2 99 %, not currently breastfeeding , Body mass index is 20 25 kg/m² ,   Orthostatic Blood Pressures      Most Recent Value   Blood Pressure  122/58 [Map 83] filed at 08/05/2018 0703   Patient Position - Orthostatic VS  Lying filed at 08/05/2018 6006         Systolic (98GNL), NPV:238 , Min:103 , ZVE:277     Diastolic (28DWA), HPB:26, Min:55, Max:68      Intake/Output Summary (Last 24 hours) at 08/05/18 0828  Last data filed at 08/05/18 0601   Gross per 24 hour   Intake              740 ml   Output             2100 ml   Net            -1360 ml     Weight (last 2 days)     Date/Time   Weight    08/05/18 0600  56 9 (125 44)    08/04/18 0600  57 4 (126 54)    08/03/18 0600  56 7 (125)              Telemetry Review: NSR    Physical Exam   Cardiovascular: Normal rate, regular rhythm and normal heart sounds  Exam reveals no gallop and no friction rub  No murmur heard  Pulmonary/Chest: Breath sounds normal  She has no wheezes  She has no rales  Musculoskeletal: She exhibits no edema           Laboratory Results:    Results from last 7 days  Lab Units 08/02/18  0359 08/01/18  2357 08/01/18  1734   TROPONIN I ng/mL 0 04 0 05* 0 04       CBC with diff:     Results from last 7 days  Lab Units 08/04/18  0503 08/02/18  0435 08/01/18  1543   WBC Thousand/uL 2 79* 2 21* 2 68*   HEMOGLOBIN g/dL 11 6 10 5* 11 0*   HEMATOCRIT % 36 5 33 1* 34 8   MCV fL 87 87 88   PLATELETS Thousands/uL 106* 79* 89* MCH pg 27 6 27 6 27 8   MCHC g/dL 31 8 31 7 31 6   RDW % 15 9* 15 9* 15 9*   MPV fL 11 8 12 3 12 3   NRBC AUTO /100 WBCs  --  0 0         CMP:    Results from last 7 days  Lab Units 18  0503 18  1451 18  2357 18  1543   SODIUM mmol/L 140  --  141 138   POTASSIUM mmol/L 4 0  --  3 7 3 8   CHLORIDE mmol/L 105  --  108 106   CO2 mmol/L 30  --  27 24   ANION GAP mmol/L 5  --  6 8   BUN mg/dL 18  --  16 16   CREATININE mg/dL 0 92  --  0 89 0 85   GLUCOSE RANDOM mg/dL 93  --  100 89   CALCIUM mg/dL 8 4  --  8 7 9 1   AST U/L  --   --  78* 89*   ALT U/L  --   --  74 85*   ALK PHOS U/L  --   --  49 56   TOTAL PROTEIN g/dL  --  7 6 7 1 8 1   BILIRUBIN TOTAL mg/dL  --   --  0 72 0 73   EGFR ml/min/1 73sq m 68  --  71 75         BMP:    Results from last 7 days  Lab Units 18  0503 18  2357 18  1543   SODIUM mmol/L 140 141 138   POTASSIUM mmol/L 4 0 3 7 3 8   CHLORIDE mmol/L 105 108 106   CO2 mmol/L 30 27 24   BUN mg/dL 18 16 16   CREATININE mg/dL 0 92 0 89 0 85   GLUCOSE RANDOM mg/dL 93 100 89   CALCIUM mg/dL 8 4 8 7 9 1     Magnesium:     Results from last 7 days  Lab Units 18  0503 18  2357   MAGNESIUM mg/dL 1 8 1 8     Lipid Profile:     Results from last 7 days  Lab Units 18  0445   CHOLESTEROL mg/dL 108   TRIGLYCERIDES mg/dL 75   HDL mg/dL 41           Cardiac testing:   Results for orders placed during the hospital encounter of 18   Echo complete with contrast if indicated    Narrative [address]  [facility] [place], [address] [place] [address]  [phone]    Transthoracic Echocardiogram  2D, M-mode, Doppler, and Color Doppler    Study date:  02-Aug-2018    Patient: Cristina Cruz  MR number: [id number]  Account number: [de-identified]  : 1940  Age: [de-identified] years  Gender: Female  Status: Inpatient  Location: Bedside  Height: 66 in  Weight: 125 8 lb  BP: 122/ 66 mmHg    Indications: Heart failure    Diagnoses: I50 9 - Heart failure, unspecified    Sonographer:  LILY Richardson  Primary Physician:  Festus Neri MD  Referring Physician:  Aidan Barraza MD  Group:  [id number]  Interpreting Physician:  Allison Moore MD    SUMMARY    LEFT VENTRICLE:  The ventricle was mildly to moderately dilated  Systolic function was severely reduced  Ejection fraction was estimated to be 25 %  There was severe diffuse hypokinesis with regional variations  Wall thickness was at the upper limits of normal   Doppler parameters were consistent with elevated ventricular end-diastolic filling pressure  VENTRICULAR SEPTUM:  There was moderate dyssynergic motion  These changes are consistent with LBBB  RIGHT VENTRICLE:  The ventricle was mildly dilated  Systolic function was low normal     LEFT ATRIUM:  The atrium was mildly to moderately dilated  RIGHT ATRIUM:  The atrium was mildly dilated  MITRAL VALVE:  There was moderate to severe regurgitation  AORTIC VALVE:  Transaortic velocity was decreased due to decreased transaortic flow (decreased stroke volume)  There was trace regurgitation  TRICUSPID VALVE:  There was moderate regurgitation  Pulmonary artery systolic pressure was moderately increased  Estimated peak PA pressure was 50 mmHg  PERICARDIUM:  There was a left pleural effusion  HISTORY: PRIOR HISTORY: Heart failure, hypertension    PROCEDURE: The procedure was performed at the bedside  This was a routine study  The transthoracic approach was used  The study included complete 2D imaging, M-mode, complete spectral Doppler, and color Doppler  The heart rate was 93 bpm,  at the start of the study  Images were obtained from the parasternal, apical, subcostal, and suprasternal notch acoustic windows  Image quality was adequate  LEFT VENTRICLE: The ventricle was mildly to moderately dilated  Systolic function was severely reduced  Ejection fraction was estimated to be 25 %   There was severe diffuse hypokinesis with regional variations  Wall thickness was at the  upper limits of normal  DOPPLER: Left ventricular diastolic function parameters were abnormal  Doppler parameters were consistent with elevated ventricular end-diastolic filling pressure  VENTRICULAR SEPTUM: There was moderate dyssynergic motion  These changes are consistent with LBBB  RIGHT VENTRICLE: The ventricle was mildly dilated  Systolic function was low normal  Wall thickness was normal     LEFT ATRIUM: The atrium was mildly to moderately dilated  RIGHT ATRIUM: The atrium was mildly dilated  MITRAL VALVE: Valve structure was normal  There was normal leaflet separation  DOPPLER: The transmitral velocity was within the normal range  There was no evidence for stenosis  There was moderate to severe regurgitation  AORTIC VALVE: The valve was trileaflet  Leaflets exhibited normal thickness, normal cuspal separation, and sclerosis  DOPPLER: Transaortic velocity was decreased due to decreased transaortic flow (decreased stroke volume)  There was no  evidence for stenosis  There was trace regurgitation  TRICUSPID VALVE: The valve structure was normal  There was normal leaflet separation  DOPPLER: The transtricuspid velocity was within the normal range  There was no evidence for stenosis  There was moderate regurgitation  Pulmonary artery  systolic pressure was moderately increased  Estimated peak PA pressure was 50 mmHg  PULMONIC VALVE: Leaflets exhibited normal thickness, no calcification, and normal cuspal separation  DOPPLER: The transpulmonic velocity was within the normal range  There was no significant regurgitation  PERICARDIUM: A trace pericardial effusion was identified  There was a left pleural effusion  The pericardium was normal in appearance  AORTA: The root exhibited normal size  SYSTEMIC VEINS: IVC: The inferior vena cava was mildly dilated      PULMONARY VEINS: DOPPLER: Doppler signals were not recordable in the pulmonary vein(s)  SYSTEM MEASUREMENT TABLES    2D  %FS: 5 04 %  Ao Diam: 2 5 cm  EDV(Teich): 170 18 ml  EF(Cube): 14 38 %  EF(Teich): 11 2 %  ESV(Cube): 171 83 ml  ESV(Teich): 151 12 ml  IVSd: 1 1 cm  LA Area: 19 63 cm2  LVEDV MOD A4C: 181 44 ml  LVEF MOD A4C: 14 74 %  LVESV MOD A4C: 154 7 ml  LVIDd: 5 85 cm  LVIDs: 5 56 cm  LVLd A4C: 8 19 cm  LVLs A4C: 7 54 cm  LVPWd: 1 31 cm  RA Area: 15 68 cm2  SI(Cube): 17 49 ml/m2  SI(Teich): 11 55 ml/m2  SV MOD A4C: 26 74 ml  SV(Cube): 28 85 ml  SV(Teich): 19 06 ml    CF  MR Als  Oseas: 0 63 m/s  MR Flow: 195 3 ml/s  MR Rad: 0 7 cm    CW  TR Vmax: 3 26 m/s  TR maxP 53 mmHg    MM  TAPSE: 1 36 cm    PW  AVC: 408 39 ms  E': 0 03 m/s  E/E': 26 61  MV A Oseas: 0 89 m/s  MV Dec Cedar: 5 71 m/s2  MV DecT: 125 43 ms  MV E Oseas: 0 72 m/s  MV E/A Ratio: 0 8    [facility] Accredited Echocardi[facility] Laboratory    Prepared and electronically signed by    Mercy Cox MD  Signed 02-Aug-2018 15:04:13         Meds/Allergies     Current Facility-Administered Medications:  acetaminophen 650 mg Oral Q6H PRN Aki Jewell MD   budesonide 3 mg Oral Daily Jolanta Francis MD   enoxaparin 40 mg Subcutaneous Daily Jolanta Francis MD   metoprolol succinate 12 5 mg Oral Daily Baylee Cueto MD   ondansetron 4 mg Intravenous Q6H PRN Aki Jewell MD   sacubitril-valsartan 1 tablet Oral Q12H Alvaro Gant MD   torsemide 10 mg Oral Daily Alvaro Gant MD        Prescriptions Prior to Admission   Medication    BUDESONIDE ER PO       Assessment:  Principal Problem:    Acute systolic heart failure ([facility] )  Active Problems:    Hypertension, essential    Other specified glaucoma    Autoimmune hepatitis treated with steroids (HCC)    Thrombocytopenia ([address] )      Impression:  1  New onset cardiomyopathy - non-ischemic  Compensated  2  NSVT - given EF 25%, will require lifevest until LV function improves      Recommendations:  1   Continue current medications  2  Plan on lifevest   3  Cardiac MRI - can be done as outpatient  4  Likely D/C tomorrow

## 2018-08-06 ENCOUNTER — APPOINTMENT (INPATIENT)
Dept: RADIOLOGY | Facility: HOSPITAL | Age: 78
DRG: 286 | End: 2018-08-06
Payer: MEDICARE

## 2018-08-06 VITALS
TEMPERATURE: 97.9 F | SYSTOLIC BLOOD PRESSURE: 116 MMHG | HEART RATE: 87 BPM | DIASTOLIC BLOOD PRESSURE: 58 MMHG | BODY MASS INDEX: 20.23 KG/M2 | HEIGHT: 66 IN | WEIGHT: 125.88 LBS | OXYGEN SATURATION: 98 % | RESPIRATION RATE: 18 BRPM

## 2018-08-06 PROCEDURE — 99232 SBSQ HOSP IP/OBS MODERATE 35: CPT | Performed by: INTERNAL MEDICINE

## 2018-08-06 PROCEDURE — 75561 CARDIAC MRI FOR MORPH W/DYE: CPT

## 2018-08-06 PROCEDURE — A9585 GADOBUTROL INJECTION: HCPCS | Performed by: INTERNAL MEDICINE

## 2018-08-06 PROCEDURE — 99238 HOSP IP/OBS DSCHRG MGMT 30/<: CPT | Performed by: INTERNAL MEDICINE

## 2018-08-06 RX ORDER — METOPROLOL SUCCINATE 25 MG/1
25 TABLET, EXTENDED RELEASE ORAL DAILY
Qty: 30 TABLET | Refills: 0 | Status: SHIPPED | OUTPATIENT
Start: 2018-08-07 | End: 2018-08-09 | Stop reason: SDUPTHER

## 2018-08-06 RX ORDER — TORSEMIDE 10 MG/1
10 TABLET ORAL DAILY
Qty: 30 TABLET | Refills: 0 | Status: SHIPPED | OUTPATIENT
Start: 2018-08-07 | End: 2018-08-09 | Stop reason: SDUPTHER

## 2018-08-06 RX ORDER — METOPROLOL SUCCINATE 25 MG/1
25 TABLET, EXTENDED RELEASE ORAL DAILY
Status: DISCONTINUED | OUTPATIENT
Start: 2018-08-06 | End: 2018-08-06 | Stop reason: HOSPADM

## 2018-08-06 RX ADMIN — TORSEMIDE 10 MG: 20 TABLET ORAL at 08:34

## 2018-08-06 RX ADMIN — METOPROLOL SUCCINATE 25 MG: 25 TABLET, EXTENDED RELEASE ORAL at 08:35

## 2018-08-06 RX ADMIN — SACUBITRIL AND VALSARTAN 1 TABLET: 49; 51 TABLET, FILM COATED ORAL at 08:34

## 2018-08-06 RX ADMIN — GADOBUTROL 11 ML: 604.72 INJECTION INTRAVENOUS at 11:26

## 2018-08-06 RX ADMIN — ENOXAPARIN SODIUM 40 MG: 40 INJECTION SUBCUTANEOUS at 08:36

## 2018-08-06 NOTE — ASSESSMENT & PLAN NOTE
Discussed with Heart failure Service  Patient is status post cardiac catheterization with no evidence of coronary disease  Etiology of nonischemic cardiomyopathy in progress  Await MRI  Patient to have entresto  dosing increased  · Continue with isodoril  · entresto  · toresemide  · Metoprolol  · Discharge pending life Vest   Will need to coordinate with Cardiology

## 2018-08-06 NOTE — SOCIAL WORK
Pt to discharge home today with son transporting  She has accepted the new CHF PI project and it has begun  She will also have a new Life Vest today  Cardiac med Rx, including Entresto, sent to [facility] for project  Pt  And family aware of above  IMM signed  Outpt CC requested again  Informed today that no pharmacist is available to instruct pt  RN will go over medications with pt  Prior to discharge today

## 2018-08-06 NOTE — ASSESSMENT & PLAN NOTE
Discussed with Heart failure Service  Patient is status post cardiac catheterization with no evidence of coronary disease  Etiology of nonischemic cardiomyopathy in progress  Await MRI  Patient to have entresto  dosing increased  · Continue with isodoril  · entresto  · toresemide  · Metoprolol  Discharge pending life Vest   Will need to coordinate with Cardiology  Life Vest scheduled for 130 today    Will follow up on cost of entresto

## 2018-08-06 NOTE — CASE MANAGEMENT
Continued Stay Review    Date: 2018    Vital Signs:   Temp (24hrs), Av 5 °F (36 9 °C), Min:98 1 °F (36 7 °C), Max:98 8 °F (37 1 °C)     HR:  [77-85] 80  Resp:  [18] 18  BP: (103-136)/(55-68) 122/58  SpO2:  [96 %-99 %] 99 %  Body mass index is 20 25 kg/m²  Medications:   acetaminophen 650 mg Oral Q6H PRN Ana Anthony MD   budesonide 3 mg Oral Daily Jaylen Garcia MD   enoxaparin 40 mg Subcutaneous Daily Jaylen Garcia MD   metoprolol succinate 12 5 mg Oral Daily Davi Alexander MD   ondansetron 4 mg Intravenous Q6H PRN Ana Anthony MD   sacubitril-valsartan 1 tablet Oral Q12H Jacqueline Altman MD   torsemide 10 mg Oral Daily Jacqueline Altman MD        Abnormal Labs/Diagnostic Results:     Age/Sex: [de-identified] y o  female     Assessment/Plan:  * Acute systolic heart failure [facility])   Assessment & Plan     Discussed with Heart failure Service  Patient is status post cardiac catheterization with no evidence of coronary disease  Etiology of nonischemic cardiomyopathy in progress  Await MRI  Patient to have entresto  dosing increased  · Continue with isodoril  · entresto  · toresemide  · Metoprolol                Thrombocytopenia (HCC)   Assessment & Plan     Improving           Autoimmune hepatitis treated with steroids [facility])   Assessment & Plan     Patient currently on budesonide 3 mg daily  Will continue           Other specified glaucoma   Assessment & Plan     Patient denies using eyedrops           Hypertension, essential   Assessment & Plan     Patient denies taking verapamil  Will monitor blood pressure and treat accordingly  Continue with metoprolol, isosorbide, hydralazine, torsemide and entresto therapy                VTE Pharmacologic Prophylaxis:   Pharmacologic: Enoxaparin (Lovenox)  Mechanical VTE Prophylaxis in Place: No      Time Spent for Care: 15 minutes    More than 50% of total time spent on counseling and coordination of care as described above      Current Length of Stay: 4 day(s)     Current Patient Status: Inpatient   Certification Statement: The patient will continue to require additional inpatient hospital stay due to Need to monitor symptoms

## 2018-08-06 NOTE — PROGRESS NOTES
Progress Note - Luis Armando Learn 1940, [de-identified] y o  female MRN: [id number]    Unit/Bed#: [facility] 404-01 Encounter: [id number]    Primary Care Provider: Familia Andre MD   Date and time admitted to hospital: 8/1/2018  4:20 PM        * Acute systolic heart failure [facility])   Assessment & Plan    Discussed with Heart failure Service  Patient is status post cardiac catheterization with no evidence of coronary disease  Etiology of nonischemic cardiomyopathy in progress  Await MRI  Patient to have entresto  dosing increased  · Continue with isodoril  · entresto  · toresemide  · Metoprolol  · Discharge pending life Vest   Will need to coordinate with Cardiology  Thrombocytopenia ([facility] [address] )   Assessment & Plan    Improving  Autoimmune hepatitis treated with steroids [facility])   Assessment & Plan    Patient currently on budesonide 3 mg daily  Will continue  Other specified glaucoma   Assessment & Plan    Patient denies using eyedrops  Hypertension, essential   Assessment & Plan    Patient denies taking verapamil  Will monitor blood pressure and treat accordingly  Continue with metoprolol, isosorbide, hydralazine, torsemide and entresto therapy            VTE Pharmacologic Prophylaxis:   Pharmacologic: Enoxaparin (Lovenox)  Mechanical VTE Prophylaxis in Place: No    Patient Centered Rounds: I have performed bedside rounds with nursing staff today  Time Spent for Care: 15 minutes  More than 50% of total time spent on counseling and coordination of care as described above  Current Length of Stay: 5 day(s)    Current Patient Status: Inpatient   Certification Statement: The patient will continue to require additional inpatient hospital stay due to Needs availability of life Vest prior to discharge  Discharge Plan:  Await life Vest   Probable discharge in the next 24 hr if life Vest available      Code Status: Level 1 - Full Code      Subjective:   Patient comfortable    Objective: Vitals:   Temp (24hrs), Av 4 °F (36 9 °C), Min:97 9 °F (36 6 °C), Max:98 8 °F (37 1 °C)    HR:  [74-90] 87  Resp:  [18] 18  BP: ()/(50-61) 116/58  SpO2:  [97 %-99 %] 98 %  Body mass index is 20 32 kg/m²  Input and Output Summary (last 24 hours): Intake/Output Summary (Last 24 hours) at 18 0900  Last data filed at 18 0826   Gross per 24 hour   Intake             1160 ml   Output             2100 ml   Net             -940 ml       Physical Exam:     Physical Exam    Additional Data:     Labs:      Results from last 7 days  Lab Units 18  0503 18  0435   WBC Thousand/uL 2 79* 2 21*   HEMOGLOBIN g/dL 11 6 10 5*   HEMATOCRIT % 36 5 33 1*   PLATELETS Thousands/uL 106* 79*   NEUTROS PCT %  --  45   LYMPHS PCT %  --  39   MONOS PCT %  --  13*   EOS PCT %  --  1       Results from last 7 days  Lab Units 18  0503 18  1451 18  2357   SODIUM mmol/L 140  --  141   POTASSIUM mmol/L 4 0  --  3 7   CHLORIDE mmol/L 105  --  108   CO2 mmol/L 30  --  27   BUN mg/dL 18  --  16   CREATININE mg/dL 0 92  --  0 89   CALCIUM mg/dL 8 4  --  8 7   TOTAL PROTEIN g/dL  --  7 6 7 1   BILIRUBIN TOTAL mg/dL  --   --  0 72   ALK PHOS U/L  --   --  49   ALT U/L  --   --  74   AST U/L  --   --  78*   GLUCOSE RANDOM mg/dL 93  --  100           * I Have Reviewed All Lab Data Listed Above  * Additional Pertinent Lab Tests Reviewed:  All Labs Within Last 24 Hours Reviewed    Recent Cultures (last 7 days):           Last 24 Hours Medication List:     Current Facility-Administered Medications:  acetaminophen 650 mg Oral Q6H PRN Edmond Devi MD   budesonide 3 mg Oral Daily Gabriel Spence MD   enoxaparin 40 mg Subcutaneous Daily Gabriel Spence MD   metoprolol succinate 25 mg Oral Daily Gisselle Pierce DO   ondansetron 4 mg Intravenous Q6H PRN Edmnod Sers, MD   sacubitril-valsartan 1 tablet Oral Q12H Aiden Griffiths MD   torsemide 10 mg Oral Daily Aiden Griffiths MD        Today, Patient Was Seen By: Neftali Boggs DO    ** Please Note: Dictation voice to text software may have been used in the creation of this document   **

## 2018-08-06 NOTE — PROGRESS NOTES
Heart Failure Service Progress Note - Lindsey Ayala [de-identified] y o  female MRN: [id number]    Unit/Bed#: [facility] 404-01 Encounter: [id number]      Assessment:    Principal Problem:    Acute systolic [facility] failure ([facility] )  Active Problems:    Hypertension, essential    Other specified glaucoma    Autoimmune hepatitis treated with steroids (HCC)    Thrombocytopenia ([facility] )    # Acute Systolic CHF, new onset, Stage C- D, NYHA 3  Etiology: NICM  Labs: HIV negative, TSH 4 5, LEA positive- titer of 640 speckled pattern  Hepatitis panel negative, RF negative  NT proBNP 10,125 on 8/1/18    [facility] 8/3/19: normal coronaries  [facility] 8/3/18:  RA 3  RV 32/6  PA 32/11/20  PCWP 6  AO sat 89%  MvO2 56 5%  TD CO/CI: 2 3/1 4  Isacc CO/CI: 2 6/1 6  TPG 14  DPG 5  PVR 5 4 (Isacc); 6 1 (TD)  SVR 2070  PVR:SVR <0 25    Echocardiogram  LVEF: 25%  LVIDd: 5 9 cm  RV: mildly reduced RV systolic function  MR: severe functional (central) MR  PASP:  RVOT:   Other: moderate TR    Neurohormonal Blockade:  --Beta-Blocker: Toprol 12 5 mg daily  --ACEi, ARB or ARNi: Entresto 49/51 mg BID    (or SVR reduction)  --Aldosterone Receptor Blocker:  --Diuretic: Torsemide 10 mg daily    Sudden Cardiac Death Risk Reduction:  --ICD: LifeVest on discharge  Interrogation:     Electrical Resynchronization:  -- LBBB,  msec  Interrogation:     Advanced Therapies (If appropriate): --Inotrope:  --LVAD/Transplant Candidacy:  living with LVAD, socially this likely would not be feasible if needed  Will treat medically for now    # HTN- controlled on meds, SVR reduced  # Normocytic anemia  # Hypothyroidism  # Autoimmune hepatitis: Continue budesonide    Plan:  Increase Toprol to 25 mg daily  OK for discharge to home today  Follow up Appt  Dr Mika Stout Aug 13, 4:40 pm  I wrote for BMP and BNP Thursday so they come to us    Subjective:   Patient seen and examined  No significant events overnight  Objective:    Intake/ Output: -1800 (-4415)  Weight: 125 lbs  Tele: NSVT 4 [employer] (day, reason): Lindquist catheter (day, reason):    Vitals: Blood pressure 116/58, pulse 87, temperature 97 9 °F (36 6 °C), temperature source Oral, resp  rate 18, height 5' 6" (1 676 m), weight 57 1 kg (125 lb 14 1 oz), SpO2 98 %, not currently breastfeeding , Body mass index is 20 32 kg/m² , I/O last 3 completed shifts: In: 1300 [P O :1300]  Out: 3700 [Urine:3700]  No intake/output data recorded  Wt Readings from Last 3 Encounters:   08/06/18 57 1 kg (125 lb 14 1 oz)   08/03/18 56 7 kg (125 lb)   08/18/17 59 1 kg (130 lb 4 8 oz)       Intake/Output Summary (Last 24 hours) at 08/06/18 0816  Last data filed at 08/06/18 0600   Gross per 24 hour   Intake              800 ml   Output             2600 ml   Net            -1800 ml     I/O last 3 completed shifts: In: 1300 [P O :1300]  Out: 3700 [Urine:3700]    No significant arrhythmias seen on telemetry review         Physical Exam:  Vitals:    08/05/18 2318 08/06/18 0301 08/06/18 0600 08/06/18 0702   BP: 95/53 96/54  116/58   BP Location: Left arm Right arm  Right arm   Pulse: 78 74  87   Resp: 18 18  18   Temp: 98 8 °F (37 1 °C) 98 5 °F (36 9 °C)  97 9 °F (36 6 °C)   TempSrc: Oral Oral  Oral   SpO2: 99% 97%  98%   Weight:   57 1 kg (125 lb 14 1 oz)    Height:           GEN: Deandra Lane appears well, alert and oriented x 3, pleasant and cooperative   HEENT: pupils equal, round, and reactive to light; extraocular muscles intact  NECK: supple, no carotid bruits   HEART: regular rhythm, normal S1 and S2, no murmurs, clicks, gallops or rubs, JVP is    LUNGS: clear to auscultation bilaterally; no wheezes, rales, or rhonchi   ABDOMEN: normal bowel sounds, soft, no tenderness, no distention  EXTREMITIES: peripheral pulses normal; no clubbing, cyanosis, or edema  NEURO: no focal findings   SKIN: normal without suspicious lesions on exposed skin      Current Facility-Administered Medications:     acetaminophen (TYLENOL) tablet 650 mg, 650 mg, Oral, Q6H PRN, Renny Alexander MD    budesonide (ENTOCORT EC) 24 hr capsule 3 mg, 3 mg, Oral, Daily, Isha Adamson MD, 3 mg at 08/06/18 0625    enoxaparin (LOVENOX) subcutaneous injection 40 mg, 40 mg, Subcutaneous, Daily, Isha Adamson MD, 40 mg at 08/05/18 0803    metoprolol succinate (TOPROL-XL) 24 hr tablet 12 5 mg, 12 5 mg, Oral, Daily, Javy Peck MD, 12 5 mg at 08/05/18 0804    ondansetron (ZOFRAN) injection 4 mg, 4 mg, Intravenous, Q6H PRN, Renny Alexander MD    sacubitril-valsartan (ENTRESTO) 49-51 MG per tablet 1 tablet, 1 tablet, Oral, Q12H, Briseyda Jose MD, 1 tablet at 08/05/18 2049    torsemide (DEMADEX) tablet 10 mg, 10 mg, Oral, Daily, Briseyda Jose MD, 10 mg at 08/05/18 0805      Labs & Results:      Results from last 7 days  Lab Units 08/02/18  0359 08/01/18  2357 08/01/18  1734   TROPONIN I ng/mL 0 04 0 05* 0 04     Results from last 7 days  Lab Units 08/04/18  0503 08/02/18  0435 08/01/18  1543   WBC Thousand/uL 2 79* 2 21* 2 68*   HEMOGLOBIN g/dL 11 6 10 5* 11 0*   HEMATOCRIT % 36 5 33 1* 34 8   PLATELETS Thousands/uL 106* 79* 89*       Results from last 7 days  Lab Units 08/02/18  0445   CHOLESTEROL mg/dL 108       Results from last 7 days  Lab Units 08/04/18  0503 08/02/18  1451 08/01/18  2357 08/01/18  1543   SODIUM mmol/L 140  --  141 138   POTASSIUM mmol/L 4 0  --  3 7 3 8   CHLORIDE mmol/L 105  --  108 106   CO2 mmol/L 30  --  27 24   BUN mg/dL 18  --  16 16   CREATININE mg/dL 0 92  --  0 89 0 85   CALCIUM mg/dL 8 4  --  8 7 9 1   TOTAL PROTEIN g/dL  --  7 6 7 1 8 1   BILIRUBIN TOTAL mg/dL  --   --  0 72 0 73   ALK PHOS U/L  --   --  49 56   ALT U/L  --   --  74 85*   AST U/L  --   --  78* 89*   GLUCOSE RANDOM mg/dL 93  --  100 89         Counseling / Coordination of Care  Total floor / unit time spent today 25 minutes  Greater than 50% of total time was spent with the patient and / or family counseling and / or coordination of care    A description of the counseling / coordination of care: 15  Thank you for the opportunity to participate in the care of this patient  Rob GREER    Director Heart Failure/ Medical Director [address] [place] [address]

## 2018-08-06 NOTE — DISCHARGE SUMMARY
Discharge- Lindsey Ayala 1940, [de-identified] y o  female MRN: [id number]    Unit/Bed#: [facility] 404-01 Encounter: [id number]    Primary Care Provider: Abbey Kent MD   Date and time admitted to hospital: 8/1/2018  4:20 PM        * Acute systolic heart failure [facility])   Assessment & Plan    Discussed with Heart failure Service  Patient is status post cardiac catheterization with no evidence of coronary disease  Etiology of nonischemic cardiomyopathy in progress  Await MRI  Patient to have entresto  dosing increased  · Continue with isodoril  · entresto  · toresemide  · Metoprolol  Discharge pending life Vest   Will need to coordinate with Cardiology  Life Vest scheduled for 130 today  Will follow up on cost of entresto          Thrombocytopenia [facility])   Assessment & Plan    Improving  Hypertension, essential   Assessment & Plan    Patient denies taking verapamil  Will monitor blood pressure and treat accordingly  Continue with metoprolol, isosorbide, hydralazine, torsemide and entresto therapy                    Resolved Problems  Date Reviewed: 8/3/2018    None          Admission Date:   Admission Orders     Ordered        08/01/18 1959  Inpatient Admission (expected length of stay for this patient is greater than two midnights)  Once               Admitting Diagnosis: Chest pain [R07 9]  Pleural effusion [J90]  Hypertension [I10]  Congestive heart failure (CHF) ([address] ) [I50 9]  At risk for cardiac arrhythmia [Z91 89]      Procedures Performed:   Orders Placed This Encounter   Procedures    Cardiac catheterization    Cardiac rhc/lhc    ED ECG Documentation Only       Summary of Hospital Course: This is a 78-year-old female who presents the hospital with known history of autoimmune hepatitis who presents to the hospital with acute respiratory distress  She presents with a one-week history of 2 pillow orthopnea and presents with symptoms of I do not feel comfortable    She denied any weight gain on initial evaluation and presents to the hospital for further evaluation  She was admitted with acute systolic heart failure  She was followed by Cardiology and had her medications titrated accordingly  Of note she did have a cardiac MRI performed which she is to follow up with Cardiology for the results  She was recommended to have a life vest which will be arranged prior to discharge today  Will also have her on a new start entresto therapy as well  Condition at Discharge: fair         Discharge instructions/Information to patient and family:   See after visit summary for information provided to patient and family  Provisions for Follow-Up Care:  See after visit summary for information related to follow-up care and any pertinent home health orders  PCP: Ananya Hargrove MD    Disposition: Home    Planned Readmission: No    Discharge Statement   I spent 35 minutes discharging the patient  This time was spent on the day of discharge  I had direct contact with the patient on the day of discharge  Additional documentation is required if more than 30 minutes were spent on discharge  Discharge Medications:  See after visit summary for reconciled discharge medications provided to patient and family

## 2018-08-07 ENCOUNTER — TELEPHONE (OUTPATIENT)
Dept: CARDIOLOGY CLINIC | Facility: CLINIC | Age: 78
End: 2018-08-07

## 2018-08-07 NOTE — TELEPHONE ENCOUNTER
With Dr Tej Pelaez at hospitals, can you please advise? I "asssume" they are both to be done 9/6, but I need a doctor to confirm  Alvaro Mcneil called to say that she started home care on Frieda Owens, who has a Life Vest   She ordered a  to help with her   Frieda Owens was primary caretaker and can't do it by herself anymore  Alvaro Mcneil needs clarification on blood work orders  The BNP says expected date 9/6 but the BMP does not have a date  Are they both to be done at the same time?

## 2018-08-09 ENCOUNTER — OFFICE VISIT (OUTPATIENT)
Dept: CARDIOLOGY CLINIC | Facility: CLINIC | Age: 78
End: 2018-08-09
Payer: MEDICARE

## 2018-08-09 VITALS
SYSTOLIC BLOOD PRESSURE: 106 MMHG | OXYGEN SATURATION: 96 % | WEIGHT: 126 LBS | HEIGHT: 66 IN | BODY MASS INDEX: 20.25 KG/M2 | HEART RATE: 68 BPM | DIASTOLIC BLOOD PRESSURE: 58 MMHG

## 2018-08-09 DIAGNOSIS — I50.22 CHRONIC SYSTOLIC HEART FAILURE (HCC): Primary | ICD-10-CM

## 2018-08-09 DIAGNOSIS — I42.0 DILATED CARDIOMYOPATHY (HCC): ICD-10-CM

## 2018-08-09 DIAGNOSIS — K75.4 AUTOIMMUNE HEPATITIS (HCC): ICD-10-CM

## 2018-08-09 PROCEDURE — 99215 OFFICE O/P EST HI 40 MIN: CPT | Performed by: NURSE PRACTITIONER

## 2018-08-09 RX ORDER — METOPROLOL SUCCINATE 25 MG/1
25 TABLET, EXTENDED RELEASE ORAL DAILY
Qty: 90 TABLET | Refills: 3 | Status: SHIPPED | OUTPATIENT
Start: 2018-08-09 | End: 2018-09-04 | Stop reason: SDUPTHER

## 2018-08-09 RX ORDER — TORSEMIDE 10 MG/1
10 TABLET ORAL DAILY
Qty: 90 TABLET | Refills: 3 | Status: SHIPPED | OUTPATIENT
Start: 2018-08-09 | End: 2018-09-04 | Stop reason: SDUPTHER

## 2018-08-09 NOTE — PROGRESS NOTES
Heart Failure Office Visit   Dayana Juarez [de-identified] y o  female MRN: [id number]    [facility]  Ms Simone Head is an [de-identified]year old female with a known past medical history of  HTN, [place] palsy since 2014,  and autoimmune hepatitis  Ms Madison Rueda was recently admitted to [facility] on 8/01-8/06/18 with Acute systolic heart failure  She presented to the ER with progressive dyspnea with minimal exertion, 2 pillow orthopnea and exertional central chest pressure  Ms Madison Rueda  lives with her  who has a HMII LVAD  Denies EtOH or drug use  Denies any recent viral illness  She has been on budesonide for autoimmune hepatitis x 4 years  On admission EKG showed ST LBBB and LA enlargement  NT pro BNP 06592 and troponin of 0 03   8/02/18 TTE showed LV mildly to moderately dilated, LV systolic function severely reduced with an LVEF ~25% LVIDd 5 85cm, RV mod dilated systolic function low normal, LA mild to moderately dilated, RA mildly dilated, Mod to sever MR, Mod TR, peak PAP 50mmHg,RVOT: Abnormal AcT with late systolic notching concerning for pulmonary vascular changes  Borderline to low cardiac output based on noninvasive assessment  LH/RH  Cardiac catheterization done on 8/03/18 showed normal coronary arteries    RA 3  RV 32/6  PA 32/11/20  PCWP 6  AO sat 89%  MvO2 56 5%  TD CO/CI: 2 3/1 4  Isacc CO/CI: 2 6/1 6  TPG 14  DPG 5  PVR 5 4 (Isacc); 6 1 (TD)  SVR 2070  PVR:SVR <0 25     She was started on HF appropriate medications, Entresto 49/51mg BID, Isosorbide 5mg and Hydralazine 5mg Q8 hours started for SVR reduction  HIV negative, TSH 4 5, LEA positive titer of 640 speckled pattern, Hepatitis panel Negative, RF negative  Hydralazine and isosorbide was discontinued  She was discharged home wearing a Life Vest   Lab studies at discharge  BMP stable at discharge, WBC 2 79, plts 106, RDW 15 9  Cardiac MRI completed prior to discharge  Awaiting official reading  Today Ms Madison Rueda presents to our office for a recent hospitalization follow up visit  She is accompanied by her son and   Ms Campoverde See offers no complaints  She denies dyspnea with minimal or moderate exertion, CP, palpitations, lightheadedness or dizziness  Her Life Vest has not alarmed  She is abiding by a 2gm sodium diet 1500 cc fluid restriction  Lab studies ordered at discharge have not been completed  The son lives in [place]  He is requesting to see Dr Hermes Dela Cruz on Friday next week  I will refer them to the   Her weight in the office today is 126 pounds  Patient Active Problem List   Diagnosis    [facility] systolic [facility]    Hypertension, essential    Other specified glaucoma    Autoimmune hepatitis treated with steroids ([facility] )    [facility]    Dilated cardiomyopathy ([facility] )     Review of Systems   Respiratory: Negative  - negative       Objective:   Vitals: RUE sitting 110/50   Vitals:    08/09/18 1513 08/09/18 1515   BP: 108/58 106/58   BP Location: Right arm Right arm   Patient Position: Sitting Sitting   Cuff Size: Standard Standard   Pulse: 68 68   SpO2: 96% 96%   Weight: 57 2 kg (126 lb) 57 2 kg (126 lb)   Height: 5' 6" (1 676 m) 5' 6" (1 676 m)     Body mass index is 20 34 kg/m²      Wt Readings from Last 3 Encounters:   08/09/18 57 2 kg (126 lb)   08/06/18 57 1 kg (125 lb 14 1 oz)   08/03/18 56 7 kg (125 lb)         Physical Exam:  GEN: Tempie  appears well, alert and oriented x 3, pleasant and cooperative   HEENT: pupils equal, round, and reactive to light; extraocular muscles intact  NECK: supple, no carotid bruits   HEART: regular rhythm, normal S1 and S2, 2/6 Left apex murmur, clicks, gallops or rubs, JVP is flat   LUNGS: clear to auscultation bilaterally; no wheezes, rales, or rhonchi   ABDOMEN: normal bowel sounds, soft, no tenderness, no distention  EXTREMITIES: peripheral pulses normal; no clubbing, cyanosis, or edema  NEURO: Left facial droop   SKIN: normal without suspicious lesions on exposed skin      Current Outpatient Prescriptions:     BUDESONIDE ER PO, Take 3 mg by mouth daily, Disp: , Rfl:     metoprolol succinate (TOPROL-XL) 25 mg 24 hr tablet, Take 1 tablet (25 mg total) by mouth daily, Disp: 90 tablet, Rfl: 3    sacubitril-valsartan (ENTRESTO) 49-51 MG TABS, Take 1 tablet by mouth every 12 (twelve) hours, Disp: 120 tablet, Rfl: 3    torsemide (DEMADEX) 10 mg tablet, Take 1 tablet (10 mg total) by mouth daily, Disp: 90 tablet, Rfl: 3      Labs & Results:          Results from last 7 days  Lab Units 08/04/18  0503   WBC Thousand/uL 2 79*   HEMOGLOBIN g/dL 11 6   HEMATOCRIT % 36 5   PLATELETS Thousands/uL 106*           Results from last 7 days  Lab Units 08/04/18  0503   SODIUM mmol/L 140   POTASSIUM mmol/L 4 0   CHLORIDE mmol/L 105   CO2 mmol/L 30   BUN mg/dL 18   CREATININE mg/dL 0 92   CALCIUM mg/dL 8 4   GLUCOSE RANDOM mg/dL 93               Assessment/Plan:   1 Chronic systolic heart failure NYHA class III stage C- On PE eu volemic, weight 126 pounds,  BP and HR controlled  Continue on Entresto 49/51mg BID no room for up titration  Metoprolol succinate 25mg daily  Torsemide 10mg daily  Maintain a 2 gram daily sodium diet and 1500 ml daily fluid restriction  Check daily weights  If you gained 3 pounds in one day, 5 pounds in one week, or experience worsening shortness of breath or increasing lower leg swelling  Please call the heart failure office at [phone]  Please bring a  list of your current medications and daily weights to the office visit  BMP and NT pro BNP today   2  DCM LVEDF 25% LIVDd 5 9 cm- LBBB, wearing Life Vest, Cardiac MRI completed, official read pending  3  Autoimmune Hepatitis continue on Budesonide ER   Follow up with Dr Rossy Michel next week   4  Mod to severe Funtional ALONZO Dupree  8/10/2018  11:58 AM

## 2018-08-09 NOTE — PATIENT INSTRUCTIONS
Maintain a 2 gram daily sodium diet and 1500 ml daily fluid restriction  Check daily weights  If you gained 3 pounds in one day, 5 pounds in one week, or experience worsening shortness of breath or increasing lower leg swelling  Please call the heart failure office at [phone]    Please bring a  list of your current medications and daily weights to the office visit

## 2018-08-09 NOTE — TELEPHONE ENCOUNTER
Petar Santiago called to see status of b/w  Per Dr Elsa Bess last note inpt, he requested BMP & BNP for today  VNA going out to see Pt tomorrow  Will get b/w done tomorrow for o/v w/ Dr Kailee Varela on Monday

## 2018-08-13 ENCOUNTER — OFFICE VISIT (OUTPATIENT)
Dept: CARDIOLOGY CLINIC | Facility: CLINIC | Age: 78
End: 2018-08-13
Payer: MEDICARE

## 2018-08-13 ENCOUNTER — APPOINTMENT (OUTPATIENT)
Dept: LAB | Age: 78
End: 2018-08-13
Payer: MEDICARE

## 2018-08-13 VITALS
WEIGHT: 125 LBS | HEART RATE: 75 BPM | BODY MASS INDEX: 20.18 KG/M2 | DIASTOLIC BLOOD PRESSURE: 58 MMHG | SYSTOLIC BLOOD PRESSURE: 96 MMHG | OXYGEN SATURATION: 98 %

## 2018-08-13 DIAGNOSIS — I50.22 CHRONIC SYSTOLIC HEART FAILURE (HCC): ICD-10-CM

## 2018-08-13 DIAGNOSIS — I42.9 CARDIOMYOPATHY, UNSPECIFIED TYPE (HCC): Primary | ICD-10-CM

## 2018-08-13 LAB
ANION GAP SERPL CALCULATED.3IONS-SCNC: 6 MMOL/L (ref 4–13)
BUN SERPL-MCNC: 21 MG/DL (ref 5–25)
CALCIUM SERPL-MCNC: 9.3 MG/DL (ref 8.3–10.1)
CHLORIDE SERPL-SCNC: 103 MMOL/L (ref 100–108)
CO2 SERPL-SCNC: 32 MMOL/L (ref 21–32)
CREAT SERPL-MCNC: 1.08 MG/DL (ref 0.6–1.3)
GFR SERPL CREATININE-BSD FRML MDRD: 56 ML/MIN/1.73SQ M
GLUCOSE P FAST SERPL-MCNC: 92 MG/DL (ref 65–99)
MAGNESIUM SERPL-MCNC: 2 MG/DL (ref 1.6–2.6)
NT-PROBNP SERPL-MCNC: 8410 PG/ML
POTASSIUM SERPL-SCNC: 3.4 MMOL/L (ref 3.5–5.3)
SODIUM SERPL-SCNC: 141 MMOL/L (ref 136–145)

## 2018-08-13 PROCEDURE — 99214 OFFICE O/P EST MOD 30 MIN: CPT | Performed by: INTERNAL MEDICINE

## 2018-08-13 PROCEDURE — 36415 COLL VENOUS BLD VENIPUNCTURE: CPT

## 2018-08-13 PROCEDURE — 80048 BASIC METABOLIC PNL TOTAL CA: CPT

## 2018-08-13 PROCEDURE — 83735 ASSAY OF MAGNESIUM: CPT

## 2018-08-13 PROCEDURE — 83880 ASSAY OF NATRIURETIC PEPTIDE: CPT

## 2018-08-13 RX ORDER — POTASSIUM CHLORIDE 20 MEQ/1
20 TABLET, EXTENDED RELEASE ORAL DAILY
Qty: 90 TABLET | Refills: 3 | Status: SHIPPED | OUTPATIENT
Start: 2018-08-13 | End: 2019-08-26 | Stop reason: SDUPTHER

## 2018-08-13 NOTE — LETTER
August 13, 2018     MD Richard Maurer [address]  [address]  [address] [facility] [address]    Patient: Joann Rodgers   YOB: 1940   Date of Visit: 8/13/2018       Dear Dr Regina Desai: Thank you for referring Peter Zapien to me for evaluation  Below are my notes for this consultation  If you have questions, please do not hesitate to call me  I look forward to following your patient along with you  Sincerely,        Avi Goodson MD        CC: No Recipients  Avi Goodson MD  8/13/2018  5:33 PM  Sign at close encounter    Heart Failure Outpatient Progress Note - Joann Rodgers [de-identified] y o  female MRN: [id number]    @ Encounter: [id number]      Assessment/Plan:    Patient Active Problem List    Diagnosis Date Noted    Chronic systolic heart failure ([facility] ) 08/01/2018     Priority: Low    Hypertension, essential 08/01/2018     Priority: Low    Other specified glaucoma 08/01/2018     Priority: Low    Autoimmune hepatitis treated with steroids ([facility] ) 08/01/2018     Priority: Low    Dilated cardiomyopathy ([facility] ) 08/09/2018    Thrombocytopenia ([facility] ) 08/03/2018     # Chronic NIDCM, LVEF ~20%, LVIDd 7 cm (by cMRI), borderline reduced RVSF, NYHA III, ACC/AHA Stage D: Unclear etiology  HTN (normotensive on admission) +/- valvular (less likely, central MR likely functional) +/- myocarditis +/- stress CM +/- LBBB  Cool extremities with fatigue concerning for low output prior to [address]  Borderline RV dysfunction and pulmonary vascular changes likely from L sided disease (elevated filling pressures and MR)  Reduction in LVEF likely not acute given TTE  Review of TTE also c/w borderline to low output  No clear evidence of end organ hypoperfusion at this time  As below:  Diag:   --TTE 8/2/18: LVEF ~25%, mild RV dilation w/ borderline RVSF, ROBERTO, sev central MR, mod TR  RVOT: Abnormal AcT with late systolic notching concerning for pulmonary vascular changes   Borderline to low cardiac output based on noninvasive assessment    --HIV, SPEP/UPEP, LEA, hepatitis panel all negative, ferritin ok  --R+LHC 8/3/18: shows normal coronaries  RA 3  RV 32/6  PA 32/11/20  PCWP 6  AO sat 89%  MvO2 56 5%  TD CO/CI: 2 3/1 4  Isacc CO/CI: 2 6/1 6  TPG 14  DPG 5  PVR 5 4 (Isacc); 6 1 (TD)  SVR 2070  PVR:SVR <0 25     --cMRI 8/6/18: LVEF ~18%, LVIDd 7 cm, normal RV  There is a thin strip of intramyocardial hyperenhancement of the basal interventricular septum      Therapeutic:  --2g sodium diet  --2000 ml fluid restriction     Neurohormonal Blockade:  --Beta-Blocker: Continue metoprolol succinate 25 mg PO daily  --ACEi, ARB or ARNi: Continue Entresto to 49-51 mg PO BID (also for SVR reduction)  --Aldosterone Receptor Blocker: Currently not on MRA  Will start once above meds @ max tolerated dose  --Diuretic: Continue torsemide 10 PO daily     Sudden Cardiac Death Risk Reduction:  --ICD: Continue LifeVest as bridge to ICD vs recovery; repeat TTE in 3 months      Electrical Resynchronization:  --Candidacy for BiV device: Has LBBB, if LVEF does not recover, then would be potential candidate for BiV device       Advanced Therapies: Will continue to monitor  If does not recover, age precludes OHT  Possible LVAD candidate, but social situation will be complicated as  has LVAD w/ complications and son works in [place]  Other son is in [place]  Will monitor closely      # HTN  # Normocytic anemia  # Hypothyroidism  # Autoimmune hepatitis: Continue budesonide    RTC in 1 month    HPI: Very pleasant [de-identified] y/o woman w/ PMHx of HTN, autoimmune hepatitis p/t ER w/ increasing SOB x 2 weeks with exertional central chest pressure  Upon further questioning in the hospital, she states that she has slowed down a bit, but unclear for how long  She does state that she has been more fatigued as well  She lives with her  who has a HMII LVAD  Denies EtOH or drug use  Denies any recent viral illness   She has been on budesonide for autoimmune hepatitis x 4 years  On admission she had TTE which showed LVEF ~25%, NT pro BNP 14490 and troponin of 0 03  She was seen by gen cards with plan for R+LHC today  She was started on GDMT and SVR reduced as noted above  She was seen in f/u by Misty Sauceda, HF AP  She feels well  Walking around the home without difficulty  Denies SOB, palpitations, presyncope, or syncope  Denies orthopnea, PND, or LE edema  Past Medical History:   Diagnosis Date    Hypertension        Review of Systems - 12 point ROS was done and is negative, except as noted above  Allergies   Allergen Reactions    Ambien [Zolpidem]        Current Outpatient Prescriptions:     metoprolol succinate (TOPROL-XL) 25 mg 24 hr tablet, Take 1 tablet (25 mg total) by mouth daily, Disp: 90 tablet, Rfl: 3    sacubitril-valsartan (ENTRESTO) 49-51 MG TABS, Take 1 tablet by mouth every 12 (twelve) hours, Disp: 120 tablet, Rfl: 3    torsemide (DEMADEX) 10 mg tablet, Take 1 tablet (10 mg total) by mouth daily, Disp: 90 tablet, Rfl: 3    BUDESONIDE ER PO, Take 3 mg by mouth daily, Disp: , Rfl:     Social History     Social History    Marital status: /Civil Union     Spouse name: N/A    Number of children: N/A    Years of education: N/A     Occupational History    Not on file  Social History Main Topics    Smoking status: Never Smoker    Smokeless tobacco: Never Used    Alcohol use No    Drug use: No    Sexual activity: Not on file     Other Topics Concern    Not on file     Social History Narrative    No narrative on file       No family history on file      Physical Exam:    Vitals: Blood pressure 96/58, pulse 75, weight 56 7 kg (125 lb), SpO2 98 %, not currently breastfeeding , Body mass index is 20 18 kg/m² ,   Wt Readings from Last 3 Encounters:   08/13/18 56 7 kg (125 lb)   08/09/18 57 2 kg (126 lb)   08/06/18 57 1 kg (125 lb 14 1 oz)     Vitals:    08/13/18 1649   BP: 96/58   BP Location: Right arm   Patient Position: Sitting   Cuff Size: Standard   Pulse: 75   SpO2: 98%   Weight: 56 7 kg (125 lb)       GEN: Xander Cousin appears well, alert and oriented x 3, pleasant and cooperative   HEENT: pupils equal, round, and reactive to light; extraocular muscles intact  NECK: supple, no carotid bruits   HEART: regular rhythm, normal S1 and S2, no murmurs, clicks, gallops or rubs, JVD is flat  LUNGS: clear to auscultation bilaterally; no wheezes, rales, or rhonchi   ABDOMEN: normal bowel sounds, soft, no tenderness, no distention  EXTREMITIES: peripheral pulses normal; no clubbing, cyanosis, or edema  NEURO: no focal findings   SKIN: normal without suspicious lesions on exposed skin    Labs & Results:  Lab Results   Component Value Date    WBC 2 79 (L) 08/04/2018    HGB 11 6 08/04/2018    HCT 36 5 08/04/2018    MCV 87 08/04/2018     (L) 08/04/2018       Chemistry        Component Value Date/Time     08/13/2018 1246     12/04/2015 1526    K 3 4 (L) 08/13/2018 1246    K 3 6 12/04/2015 1526     08/13/2018 1246     12/04/2015 1526    CO2 32 08/13/2018 1246    CO2 30 12/04/2015 1526    BUN 21 08/13/2018 1246    BUN 12 12/04/2015 1526    CREATININE 1 08 08/13/2018 1246    CREATININE 0 77 12/04/2015 1526        Component Value Date/Time    CALCIUM 9 3 08/13/2018 1246    CALCIUM 8 7 12/04/2015 1526    ALKPHOS 49 08/01/2018 2357    ALKPHOS 52 12/04/2015 1526    AST 78 (H) 08/01/2018 2357    AST 32 12/04/2015 1526    ALT 74 08/01/2018 2357    ALT 61 12/04/2015 1526    BILITOT 0 72 08/01/2018 2357    BILITOT 0 48 12/04/2015 1526        EKG personally reviewed by Dalila Rollins MD      Counseling / Coordination of Care  Total floor / unit time spent today 40 minutes  Greater than 50% of total time was spent with the patient and / or family counseling and / or coordination of care  A description of the counseling / coordination of care: 20 min        Thank you for the opportunity to participate in the care of this patient      Margarita Domingo MD, PhD   [facility]

## 2018-08-13 NOTE — PROGRESS NOTES
Heart Failure Outpatient Progress Note - Aviva Cox [de-identified] y o  female MRN: [id number]    @ Encounter: [id number]      Assessment/Plan:    Patient Active Problem List    Diagnosis Date Noted    Chronic systolic heart failure ([place] [address] ) 08/01/2018     Priority: Low    Hypertension, essential 08/01/2018     Priority: Low    Other specified glaucoma 08/01/2018     Priority: Low    Autoimmune hepatitis treated with steroids ([place] [address] ) 08/01/2018     Priority: Low    Dilated cardiomyopathy ([place] [address] ) 08/09/2018    Thrombocytopenia ([place] [address] ) 08/03/2018     # Chronic NIDCM, LVEF ~20%, LVIDd 7 cm (by cMRI), borderline reduced RVSF, NYHA III, ACC/AHA Stage D: Unclear etiology  HTN (normotensive on admission) +/- valvular (less likely, central MR likely functional) +/- myocarditis +/- stress CM +/- LBBB  Cool extremities with fatigue concerning for low output prior to [address]  Borderline RV dysfunction and pulmonary vascular changes likely from L sided disease (elevated filling pressures and MR)  Reduction in LVEF likely not acute given TTE  Review of TTE also c/w borderline to low output  No clear evidence of end organ hypoperfusion at this time  As below:  Diag:   --TTE 8/2/18: LVEF ~25%, mild RV dilation w/ borderline RVSF, ROBERTO, sev central MR, mod TR  RVOT: Abnormal AcT with late systolic notching concerning for pulmonary vascular changes  Borderline to low cardiac output based on noninvasive assessment    --HIV, SPEP/UPEP, LEA, hepatitis panel all negative, ferritin ok  --R+LHC 8/3/18: shows normal coronaries  RA 3  RV 32/6  PA 32/11/20  PCWP 6  AO sat 89%  MvO2 56 5%  TD CO/CI: 2 3/1 4  Isacc CO/CI: 2 6/1 6  TPG 14  DPG 5  PVR 5 4 (Isacc); 6 1 (TD)  SVR 2070  PVR:SVR <0 25     --cMRI 8/6/18: LVEF ~18%, LVIDd 7 cm, normal RV   There is a thin strip of intramyocardial hyperenhancement of the basal interventricular septum      Therapeutic:  --2g sodium diet  --2000 ml fluid restriction     Neurohormonal Blockade:  --Beta-Blocker: Continue metoprolol succinate 25 mg PO daily  --ACEi, ARB or ARNi: Continue Entresto to 49-51 mg PO BID (also for SVR reduction)  --Aldosterone Receptor Blocker: Currently not on MRA  Will start once above meds @ max tolerated dose  --Diuretic: Continue torsemide 10 PO daily     Sudden Cardiac Death Risk Reduction:  --ICD: Continue LifeVest as bridge to ICD vs recovery; repeat TTE in 3 months      Electrical Resynchronization:  --Candidacy for BiV device: Has LBBB, if LVEF does not recover, then would be potential candidate for BiV device       Advanced Therapies: Will continue to monitor  If does not recover, age precludes OHT  Possible LVAD candidate, but social situation will be complicated as  has LVAD w/ complications and son works in [place]  Other son is in [place]  Will monitor closely      # HTN  # Normocytic anemia  # Hypothyroidism  # Autoimmune hepatitis: Continue budesonide    RTC in 1 month    HPI: Very pleasant [de-identified] y/o woman w/ PMHx of HTN, autoimmune hepatitis p/t ER w/ increasing SOB x 2 weeks with exertional central chest pressure  Upon further questioning in the hospital, she states that she has slowed down a bit, but unclear for how long  She does state that she has been more fatigued as well  She lives with her  who has a HMII LVAD  Denies EtOH or drug use  Denies any recent viral illness  She has been on budesonide for autoimmune hepatitis x 4 years  On admission she had TTE which showed LVEF ~25%, NT pro BNP 99252 and troponin of 0 03  She was seen by gen [facility] with plan for R+LHC today  She was started on GDMT and SVR reduced as noted above  She was seen in f/u by Jerson Pearson HF AP  She feels well  Walking around the home without difficulty  Denies SOB, palpitations, presyncope, or syncope  Denies orthopnea, PND, or LE edema  Past Medical History:   Diagnosis Date    Hypertension        Review of Systems - 12 point ROS was done and is negative, except as noted above  Allergies   Allergen Reactions    Ambien [Zolpidem]        Current Outpatient Prescriptions:     metoprolol succinate (TOPROL-XL) 25 mg 24 hr tablet, Take 1 tablet (25 mg total) by mouth daily, Disp: 90 tablet, Rfl: 3    sacubitril-valsartan (ENTRESTO) 49-51 MG TABS, Take 1 tablet by mouth every 12 (twelve) hours, Disp: 120 tablet, Rfl: 3    torsemide (DEMADEX) 10 mg tablet, Take 1 tablet (10 mg total) by mouth daily, Disp: 90 tablet, Rfl: 3    BUDESONIDE ER PO, Take 3 mg by mouth daily, Disp: , Rfl:     Social History     Social History    Marital status: /Civil Union     Spouse name: N/A    Number of children: N/A    Years of education: N/A     Occupational History    Not on file  Social History Main Topics    Smoking status: Never Smoker    Smokeless tobacco: Never Used    Alcohol use No    Drug use: No    Sexual activity: Not on file     Other Topics Concern    Not on file     Social History Narrative    No narrative on file       No family history on file      Physical Exam:    Vitals: Blood pressure 96/58, pulse 75, weight 56 7 kg (125 lb), SpO2 98 %, not currently breastfeeding , Body mass index is 20 18 kg/m² ,   Wt Readings from Last 3 Encounters:   08/13/18 56 7 kg (125 lb)   08/09/18 57 2 kg (126 lb)   08/06/18 57 1 kg (125 lb 14 1 oz)     Vitals:    08/13/18 1649   BP: 96/58   BP Location: Right arm   Patient Position: Sitting   Cuff Size: Standard   Pulse: 75   SpO2: 98%   Weight: 56 7 kg (125 lb)       GEN: Maritza Cardoso appears well, alert and oriented x 3, pleasant and cooperative   HEENT: pupils equal, round, and reactive to light; extraocular muscles intact  NECK: supple, no carotid bruits   HEART: regular rhythm, normal S1 and S2, no murmurs, clicks, gallops or rubs, JVD is flat  LUNGS: clear to auscultation bilaterally; no wheezes, rales, or rhonchi   ABDOMEN: normal bowel sounds, soft, no tenderness, no distention  EXTREMITIES: peripheral pulses normal; no clubbing, cyanosis, or edema  NEURO: no focal findings   SKIN: normal without suspicious lesions on exposed skin    Labs & Results:  Lab Results   Component Value Date    WBC 2 79 (L) 08/04/2018    HGB 11 6 08/04/2018    HCT 36 5 08/04/2018    MCV 87 08/04/2018     (L) 08/04/2018       Chemistry        Component Value Date/Time     08/13/2018 1246     12/04/2015 1526    K 3 4 (L) 08/13/2018 1246    K 3 6 12/04/2015 1526     08/13/2018 1246     12/04/2015 1526    CO2 32 08/13/2018 1246    CO2 30 12/04/2015 1526    BUN 21 08/13/2018 1246    BUN 12 12/04/2015 1526    CREATININE 1 08 08/13/2018 1246    CREATININE 0 77 12/04/2015 1526        Component Value Date/Time    CALCIUM 9 3 08/13/2018 1246    CALCIUM 8 7 12/04/2015 1526    ALKPHOS 49 08/01/2018 2357    ALKPHOS 52 12/04/2015 1526    AST 78 (H) 08/01/2018 2357    AST 32 12/04/2015 1526    ALT 74 08/01/2018 2357    ALT 61 12/04/2015 1526    BILITOT 0 72 08/01/2018 2357    BILITOT 0 48 12/04/2015 1526        EKG personally reviewed by Peewee Reyna MD      Counseling / Coordination of Care  Total floor / unit time spent today 40 minutes  Greater than 50% of total time was spent with the patient and / or family counseling and / or coordination of care  A description of the counseling / coordination of care: 20 min  Thank you for the opportunity to participate in the care of this patient      Peewee Reyna MD, PhD   [place]

## 2018-08-14 ENCOUNTER — TELEPHONE (OUTPATIENT)
Dept: CARDIOLOGY CLINIC | Facility: CLINIC | Age: 78
End: 2018-08-14

## 2018-08-14 NOTE — TELEPHONE ENCOUNTER
Yes I spoke to her at her visit yesterday about the labs and gave her instructions on how to proceed  Thanks!

## 2018-08-30 ENCOUNTER — TELEPHONE (OUTPATIENT)
Dept: CARDIOLOGY CLINIC | Facility: CLINIC | Age: 78
End: 2018-08-30

## 2018-08-30 NOTE — TELEPHONE ENCOUNTER
Received a call from Alexa Woodson, asking if safe for pt to drive since she is wearing a lifevest?  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

## 2018-09-04 DIAGNOSIS — K75.4 AUTOIMMUNE HEPATITIS (HCC): ICD-10-CM

## 2018-09-04 RX ORDER — TORSEMIDE 10 MG/1
10 TABLET ORAL DAILY
Qty: 90 TABLET | Refills: 3 | Status: SHIPPED | OUTPATIENT
Start: 2018-09-04 | End: 2020-12-23 | Stop reason: SDUPTHER

## 2018-09-04 RX ORDER — METOPROLOL SUCCINATE 25 MG/1
25 TABLET, EXTENDED RELEASE ORAL DAILY
Qty: 90 TABLET | Refills: 3 | Status: SHIPPED | OUTPATIENT
Start: 2018-09-04 | End: 2019-10-23 | Stop reason: SDUPTHER

## 2018-09-20 NOTE — PROGRESS NOTES
Heart Failure Outpatient Progress Note - Lisandro Mass [de-identified] y o  female MRN: [id number]    @ Encounter: [id number]  Assessment/Plan:    Patient Active Problem List    Diagnosis Date Noted    Chronic systolic heart failure ([place] [address] ) 08/01/2018     Priority: Low    Hypertension, essential 08/01/2018     Priority: Low    Other specified glaucoma 08/01/2018     Priority: Low    Autoimmune hepatitis treated with steroids ([place] [address] ) 08/01/2018     Priority: Low    Dilated cardiomyopathy ([place] [address] ) 08/09/2018    Thrombocytopenia ([place] [address] ) 08/03/2018     # Chronic NIDCM, LVEF ~20%, LVIDd 7 cm (by cMRI), borderline reduced RVSF, NYHA III, ACC/AHA Stage D: Unclear etiology  HTN (normotensive on admission) +/- valvular (less likely, central MR likely functional) +/- myocarditis +/- stress CM +/- LBBB  Cool extremities with fatigue concerning for low output prior to [address]  Borderline RV dysfunction and pulmonary vascular changes likely from L sided disease (elevated filling pressures and MR)  Reduction in LVEF likely not acute given TTE  Review of TTE also c/w borderline to low output  No clear evidence of end organ hypoperfusion at this time  Improving clinically  As below:  Diag:   --TTE 8/2/18: LVEF ~25%, mild RV dilation w/ borderline RVSF, ROBERTO, sev central MR, mod TR  RVOT: Abnormal AcT with late systolic notching concerning for pulmonary vascular changes  Borderline to low cardiac output based on noninvasive assessment    --HIV, SPEP/UPEP, LEA, hepatitis panel all negative, ferritin ok  --R+LHC 8/3/18: shows normal coronaries  RA 3  RV 32/6  PA 32/11/20  PCWP 6  AO sat 89%  MvO2 56 5%  TD CO/CI: 2 3/1 4  Isacc CO/CI: 2 6/1 6  TPG 14  DPG 5  PVR 5 4 (Isacc); 6 1 (TD)  SVR 2070  PVR:SVR <0 25     --cMRI 8/6/18: LVEF ~18%, LVIDd 7 cm, normal RV  There is a thin strip of intramyocardial hyperenhancement of the basal interventricular septum      To do:  --Repeat TTE 11/2018    Therapeutic:  --2g sodium diet  --2000 ml fluid restriction  --Increase activity as tolerated  --Cardiac rehab     Neurohormonal Blockade:  --Beta-Blocker: Continue metoprolol succinate 25 mg PO daily  --ACEi, ARB or ARNi: Continue Entresto to 49-51 mg PO BID (also for SVR reduction)  --Aldosterone Receptor Blocker: Currently not on MRA  Will start once above meds @ max tolerated dose  --Diuretic: Continue torsemide 10 PO daily     Sudden Cardiac Death Risk Reduction:  --ICD: Continue LifeVest as bridge to ICD vs recovery; repeat TTE in 3 months (11/2018)     Electrical Resynchronization:  --Candidacy for BiV device: Has LBBB, if LVEF does not recover, then would be potential candidate for BiV device       Advanced Therapies: Will continue to monitor  If does not recover, age precludes OHT  Possible LVAD candidate, but social situation will be complicated as  has LVAD w/ complications and son works in [place]  Other son is in [place]  Will monitor closely      # HTN  # Normocytic anemia  # Hypothyroidism  # Autoimmune hepatitis: Continue budesonide    RTC in 1 month    HPI: Very pleasant [de-identified] y/o woman w/ PMHx of HTN, autoimmune hepatitis p/t ER w/ increasing SOB x 2 weeks with exertional central chest pressure  Upon further questioning in the hospital, she states that she has slowed down a bit, but unclear for how long  She does state that she has been more fatigued as well  She lives with her  who has a HMII LVAD  Denies EtOH or drug use  Denies any recent viral illness  She has been on budesonide for autoimmune hepatitis x 4 years  On admission she had TTE which showed LVEF ~25%, NT pro BNP 96161 and troponin of 0 03  She was seen by gen adriana with plan for R+LHC today  She was started on GDMT and SVR reduced as noted above  She was seen in f/u by Elizabeth Sharp, HF AP  She feels well  Walking around the home without difficulty  Denies SOB, palpitations, presyncope, or syncope  Denies orthopnea, PND, or LE edema       Today 9/21/2018, she returns for f/u  Has more energy  Doing more around the home  Driving again  Past Medical History:   Diagnosis Date    Hypertension        Review of Systems - 12 point ROS was done and is negative, except as noted above  Allergies   Allergen Reactions    Ambien [Zolpidem]        Current Outpatient Prescriptions:     BUDESONIDE ER PO, Take 3 mg by mouth daily, Disp: , Rfl:     metoprolol succinate (TOPROL-XL) 25 mg 24 hr tablet, Take 1 tablet (25 mg total) by mouth daily, Disp: 90 tablet, Rfl: 3    potassium chloride (K-DUR,KLOR-CON) 20 mEq tablet, Take 1 tablet (20 mEq total) by mouth daily, Disp: 90 tablet, Rfl: 3    sacubitril-valsartan (ENTRESTO) 49-51 MG TABS, Take 1 tablet by mouth every 12 (twelve) hours, Disp: 180 tablet, Rfl: 3    torsemide (DEMADEX) 10 mg tablet, Take 1 tablet (10 mg total) by mouth daily, Disp: 90 tablet, Rfl: 3    Social History     Social History    Marital status: /Civil Union     Spouse name: N/A    Number of children: N/A    Years of education: N/A     Occupational History    Not on file  Social History Main Topics    Smoking status: Never Smoker    Smokeless tobacco: Never Used    Alcohol use No    Drug use: No    Sexual activity: Not on file     Other Topics Concern    Not on file     Social History Narrative    No narrative on file       No family history on file      Physical Exam:    Vitals: Blood pressure 110/50, pulse 72, weight 54 kg (119 lb), SpO2 99 %, not currently breastfeeding , Body mass index is 19 21 kg/m² ,   Wt Readings from Last 3 Encounters:   09/21/18 54 kg (119 lb)   08/13/18 56 7 kg (125 lb)   08/09/18 57 2 kg (126 lb)     Vitals:    09/21/18 1450   BP: 110/50   BP Location: Right arm   Patient Position: Sitting   Cuff Size: Standard   Pulse: 72   SpO2: 99%   Weight: 54 kg (119 lb)       GEN: Loren Michel appears well, alert and oriented x 3, pleasant and cooperative   HEENT: pupils equal, round, and reactive to light; extraocular muscles intact  NECK: supple, no carotid bruits   HEART: regular rhythm, normal S1 and S2, no murmurs, clicks, gallops or rubs, JVD is flat    LUNGS: clear to auscultation bilaterally; no wheezes, rales, or rhonchi   ABDOMEN: normal bowel sounds, soft, no tenderness, no distention  EXTREMITIES: peripheral pulses normal; no clubbing, cyanosis, or edema  NEURO: no focal findings   SKIN: normal without suspicious lesions on exposed skin    Labs & Results:  Lab Results   Component Value Date    WBC 2 79 (L) 08/04/2018    HGB 11 6 08/04/2018    HCT 36 5 08/04/2018    MCV 87 08/04/2018     (L) 08/04/2018       Chemistry        Component Value Date/Time     08/13/2018 1246     12/04/2015 1526    K 3 4 (L) 08/13/2018 1246    K 3 6 12/04/2015 1526     08/13/2018 1246     12/04/2015 1526    CO2 32 08/13/2018 1246    CO2 30 12/04/2015 1526    BUN 21 08/13/2018 1246    BUN 12 12/04/2015 1526    CREATININE 1 08 08/13/2018 1246    CREATININE 0 77 12/04/2015 1526        Component Value Date/Time    CALCIUM 9 3 08/13/2018 1246    CALCIUM 8 7 12/04/2015 1526    ALKPHOS 49 08/01/2018 2357    ALKPHOS 52 12/04/2015 1526    AST 78 (H) 08/01/2018 2357    AST 32 12/04/2015 1526    ALT 74 08/01/2018 2357    ALT 61 12/04/2015 1526    BILITOT 0 48 12/04/2015 1526        EKG personally reviewed by Nora Tom MD      Counseling / Coordination of Care  Total floor / unit time spent today 40 minutes  Greater than 50% of total time was spent with the patient and / or family counseling and / or coordination of care  A description of the counseling / coordination of care: 20 min  Thank you for the opportunity to participate in the care of this patient      Nora Tom MD, PhD   Ej Garcia

## 2018-09-21 ENCOUNTER — OFFICE VISIT (OUTPATIENT)
Dept: CARDIOLOGY CLINIC | Facility: CLINIC | Age: 78
End: 2018-09-21
Payer: MEDICARE

## 2018-09-21 VITALS
BODY MASS INDEX: 19.21 KG/M2 | DIASTOLIC BLOOD PRESSURE: 50 MMHG | SYSTOLIC BLOOD PRESSURE: 110 MMHG | HEART RATE: 72 BPM | OXYGEN SATURATION: 99 % | WEIGHT: 119 LBS

## 2018-09-21 DIAGNOSIS — I50.22 CHRONIC SYSTOLIC HEART FAILURE (HCC): Primary | ICD-10-CM

## 2018-09-21 PROCEDURE — 99214 OFFICE O/P EST MOD 30 MIN: CPT | Performed by: INTERNAL MEDICINE

## 2018-09-21 NOTE — LETTER
September 21, 2018     MD Richard [address]  [address]  [address] [place] [address]    Patient: Ashley Carmona   YOB: 1940   Date of Visit: 9/21/2018       Dear Dr Hernán Callahan: Thank you for referring Anastasia Esqueda to me for evaluation  Below are my notes for this consultation  If you have questions, please do not hesitate to call me  I look forward to following your patient along with you  Sincerely,        Nora Tom MD        CC: No Recipients  Nora Tom MD  9/21/2018  3:31 PM  Sign at close encounter  Heart Failure Outpatient Progress Note - Ashley Carmona [de-identified] y o  female MRN: [id number]    @ Encounter: [id number]  Assessment/Plan:    Patient Active Problem List    Diagnosis Date Noted    Chronic systolic heart failure ([facility] ) 08/01/2018     Priority: Low    Hypertension, essential 08/01/2018     Priority: Low    Other specified glaucoma 08/01/2018     Priority: Low    Autoimmune hepatitis treated with steroids ([facility] ) 08/01/2018     Priority: Low    Dilated cardiomyopathy ([facility] ) 08/09/2018    Thrombocytopenia ([place] [address] ) 08/03/2018     # Chronic NIDCM, LVEF ~20%, LVIDd 7 cm (by cMRI), borderline reduced RVSF, NYHA III, ACC/AHA Stage D: Unclear etiology  HTN (normotensive on admission) +/- valvular (less likely, central MR likely functional) +/- myocarditis +/- stress CM +/- LBBB  Cool extremities with fatigue concerning for low output prior to [address]  Borderline RV dysfunction and pulmonary vascular changes likely from L sided disease (elevated filling pressures and MR)  Reduction in LVEF likely not acute given TTE  Review of TTE also c/w borderline to low output  No clear evidence of end organ hypoperfusion at this time  Improving clinically  As below:  Diag:   --TTE 8/2/18: LVEF ~25%, mild RV dilation w/ borderline RVSF, ROBERTO, sev central MR, mod TR  RVOT: Abnormal AcT with late systolic notching concerning for pulmonary vascular changes   Borderline to low cardiac output based on noninvasive assessment    --HIV, SPEP/UPEP, LEA, hepatitis panel all negative, ferritin ok  --R+LHC 8/3/18: shows normal coronaries  RA 3  RV 32/6  PA 32/11/20  PCWP 6  AO sat 89%  MvO2 56 5%  TD CO/CI: 2 3/1 4  Isacc CO/CI: 2 6/1 6  TPG 14  DPG 5  PVR 5 4 (Isacc); 6 1 (TD)  SVR 2070  PVR:SVR <0 25     --cMRI 8/6/18: LVEF ~18%, LVIDd 7 cm, normal RV  There is a thin strip of intramyocardial hyperenhancement of the basal interventricular septum  To do:  --Repeat TTE 11/2018    Therapeutic:  --2g sodium diet  --2000 ml fluid restriction  --Increase activity as tolerated  --Cardiac rehab     Neurohormonal Blockade:  --Beta-Blocker: Continue metoprolol succinate 25 mg PO daily  --ACEi, ARB or ARNi: Continue Entresto to 49-51 mg PO BID (also for SVR reduction)  --Aldosterone Receptor Blocker: Currently not on MRA  Will start once above meds @ max tolerated dose  --Diuretic: Continue torsemide 10 PO daily     Sudden Cardiac Death Risk Reduction:  --ICD: Continue LifeVest as bridge to ICD vs recovery; repeat TTE in 3 months (11/2018)     Electrical Resynchronization:  --Candidacy for BiV device: Has LBBB, if LVEF does not recover, then would be potential candidate for BiV device       Advanced Therapies: Will continue to monitor  If does not recover, age precludes OHT  Possible LVAD candidate, but social situation will be complicated as  has LVAD w/ complications and son works in [place]  Other son is in [place]  Will monitor closely      # HTN  # Normocytic anemia  # Hypothyroidism  # Autoimmune hepatitis: Continue budesonide    RTC in 1 month    HPI: Very pleasant [de-identified] y/o woman w/ PMHx of HTN, autoimmune hepatitis p/t ER w/ increasing SOB x 2 weeks with exertional central chest pressure  Upon further questioning in the hospital, she states that she has slowed down a bit, but unclear for how long  She does state that she has been more fatigued as well  She lives with her  who has a HMII LVAD  Denies EtOH or drug use  Denies any recent viral illness  She has been on budesonide for autoimmune hepatitis x 4 years  On admission she had TTE which showed LVEF ~25%, NT pro BNP 39443 and troponin of 0 03  She was seen by gen cards with plan for R+LHC today  She was started on GDMT and SVR reduced as noted above  She was seen in f/u by Dee Dee Malone, HF AP  She feels well  Walking around the home without difficulty  Denies SOB, palpitations, presyncope, or syncope  Denies orthopnea, PND, or LE edema  Today 9/21/2018, she returns for f/u  Has more energy  Doing more around the home  Driving again  Past Medical History:   Diagnosis Date    Hypertension        Review of Systems - 12 point ROS was done and is negative, except as noted above  Allergies   Allergen Reactions    Ambien [Zolpidem]        Current Outpatient Prescriptions:     BUDESONIDE ER PO, Take 3 mg by mouth daily, Disp: , Rfl:     metoprolol succinate (TOPROL-XL) 25 mg 24 hr tablet, Take 1 tablet (25 mg total) by mouth daily, Disp: 90 tablet, Rfl: 3    potassium chloride (K-DUR,KLOR-CON) 20 mEq tablet, Take 1 tablet (20 mEq total) by mouth daily, Disp: 90 tablet, Rfl: 3    sacubitril-valsartan (ENTRESTO) 49-51 MG TABS, Take 1 tablet by mouth every 12 (twelve) hours, Disp: 180 tablet, Rfl: 3    torsemide (DEMADEX) 10 mg tablet, Take 1 tablet (10 mg total) by mouth daily, Disp: 90 tablet, Rfl: 3    Social History     Social History    Marital status: /Civil Union     Spouse name: N/A    Number of children: N/A    Years of education: N/A     Occupational History    Not on file  Social History Main Topics    Smoking status: Never Smoker    Smokeless tobacco: Never Used    Alcohol use No    Drug use: No    Sexual activity: Not on file     Other Topics Concern    Not on file     Social History Narrative    No narrative on file       No family history on file      Physical Exam:    Vitals: Blood pressure 110/50, pulse 72, weight 54 kg (119 lb), SpO2 99 %, not currently breastfeeding , Body mass index is 19 21 kg/m² ,   Wt Readings from Last 3 Encounters:   09/21/18 54 kg (119 lb)   08/13/18 56 7 kg (125 lb)   08/09/18 57 2 kg (126 lb)     Vitals:    09/21/18 1450   BP: 110/50   BP Location: Right arm   Patient Position: Sitting   Cuff Size: Standard   Pulse: 72   SpO2: 99%   Weight: 54 kg (119 lb)       GEN: Artur File appears well, alert and oriented x 3, pleasant and cooperative   HEENT: pupils equal, round, and reactive to light; extraocular muscles intact  NECK: supple, no carotid bruits   HEART: regular rhythm, normal S1 and S2, no murmurs, clicks, gallops or rubs, JVD is flat    LUNGS: clear to auscultation bilaterally; no wheezes, rales, or rhonchi   ABDOMEN: normal bowel sounds, soft, no tenderness, no distention  EXTREMITIES: peripheral pulses normal; no clubbing, cyanosis, or edema  NEURO: no focal findings   SKIN: normal without suspicious lesions on exposed skin    Labs & Results:  Lab Results   Component Value Date    WBC 2 79 (L) 08/04/2018    HGB 11 6 08/04/2018    HCT 36 5 08/04/2018    MCV 87 08/04/2018     (L) 08/04/2018       Chemistry        Component Value Date/Time     08/13/2018 1246     12/04/2015 1526    K 3 4 (L) 08/13/2018 1246    K 3 6 12/04/2015 1526     08/13/2018 1246     12/04/2015 1526    CO2 32 08/13/2018 1246    CO2 30 12/04/2015 1526    BUN 21 08/13/2018 1246    BUN 12 12/04/2015 1526    CREATININE 1 08 08/13/2018 1246    CREATININE 0 77 12/04/2015 1526        Component Value Date/Time    CALCIUM 9 3 08/13/2018 1246    CALCIUM 8 7 12/04/2015 1526    ALKPHOS 49 08/01/2018 2357    ALKPHOS 52 12/04/2015 1526    AST 78 (H) 08/01/2018 2357    AST 32 12/04/2015 1526    ALT 74 08/01/2018 2357    ALT 61 12/04/2015 1526    BILITOT 0 48 12/04/2015 1526        EKG personally reviewed by Randy Chambers MD      Counseling / Coordination of Care  Total floor / unit time spent today 40 minutes  Greater than 50% of total time was spent with the patient and / or family counseling and / or coordination of care  A description of the counseling / coordination of care: 20 min  Thank you for the opportunity to participate in the care of this patient      Angela López MD, PhD   Edwin Santiago

## 2018-09-21 NOTE — LETTER
September 21, 2018     MD Richard Layton [address]  [address]  [address] [place] [address]    Patient: Dennis Bey   YOB: 1940   Date of Visit: 9/21/2018       Dear Dr Brandy Henley: Thank you for referring Sara Vicente to me for evaluation  Below are my notes for this consultation  If you have questions, please do not hesitate to call me  I look forward to following your patient along with you  Sincerely,        Nuris Morton MD        CC: No Recipients  Nuris Morton MD  9/21/2018  3:31 PM  Sign at close encounter  Heart Failure Outpatient Progress Note - Dennis Bey [de-identified] y o  female MRN: [id number]    @ Encounter: [id number]  Assessment/Plan:    Patient Active Problem List    Diagnosis Date Noted    Chronic systolic heart failure ([facility] ) 08/01/2018     Priority: Low    Hypertension, essential 08/01/2018     Priority: Low    Other specified glaucoma 08/01/2018     Priority: Low    Autoimmune hepatitis treated with steroids ([facility] ) 08/01/2018     Priority: Low    Dilated cardiomyopathy ([facility] ) 08/09/2018    Thrombocytopenia ([facility] ) 08/03/2018     # Chronic NIDCM, LVEF ~20%, LVIDd 7 cm (by cMRI), borderline reduced RVSF, NYHA III, ACC/AHA Stage D: Unclear etiology  HTN (normotensive on admission) +/- valvular (less likely, central MR likely functional) +/- myocarditis +/- stress CM +/- LBBB  Cool extremities with fatigue concerning for low output prior to [address]  Borderline RV dysfunction and pulmonary vascular changes likely from L sided disease (elevated filling pressures and MR)  Reduction in LVEF likely not acute given TTE  Review of TTE also c/w borderline to low output  No clear evidence of end organ hypoperfusion at this time  Improving clinically  As below:  Diag:   --TTE 8/2/18: LVEF ~25%, mild RV dilation w/ borderline RVSF, ROBERTO, sev central MR, mod TR  RVOT: Abnormal AcT with late systolic notching concerning for pulmonary vascular changes   Borderline to low cardiac output based on noninvasive assessment    --HIV, SPEP/UPEP, LEA, hepatitis panel all negative, ferritin ok  --R+LHC 8/3/18: shows normal coronaries  RA 3  RV 32/6  PA 32/11/20  PCWP 6  AO sat 89%  MvO2 56 5%  TD CO/CI: 2 3/1 4  Isacc CO/CI: 2 6/1 6  TPG 14  DPG 5  PVR 5 4 (Isacc); 6 1 (TD)  SVR 2070  PVR:SVR <0 25     --cMRI 8/6/18: LVEF ~18%, LVIDd 7 cm, normal RV  There is a thin strip of intramyocardial hyperenhancement of the basal interventricular septum  To do:  --Repeat TTE 11/2018    Therapeutic:  --2g sodium diet  --2000 ml fluid restriction  --Increase activity as tolerated  --Cardiac rehab     Neurohormonal Blockade:  --Beta-Blocker: Continue metoprolol succinate 25 mg PO daily  --ACEi, ARB or ARNi: Continue Entresto to 49-51 mg PO BID (also for SVR reduction)  --Aldosterone Receptor Blocker: Currently not on MRA  Will start once above meds @ max tolerated dose  --Diuretic: Continue torsemide 10 PO daily     Sudden Cardiac Death Risk Reduction:  --ICD: Continue LifeVest as bridge to ICD vs recovery; repeat TTE in 3 months (11/2018)     Electrical Resynchronization:  --Candidacy for BiV device: Has LBBB, if LVEF does not recover, then would be potential candidate for BiV device       Advanced Therapies: Will continue to monitor  If does not recover, age precludes OHT  Possible LVAD candidate, but social situation will be complicated as  has LVAD w/ complications and son works in [place]  Other son is in [place]  Will monitor closely      # HTN  # Normocytic anemia  # Hypothyroidism  # Autoimmune hepatitis: Continue budesonide    RTC in 1 month    HPI: Very pleasant [de-identified] y/o woman w/ PMHx of HTN, autoimmune hepatitis p/t ER w/ increasing SOB x 2 weeks with exertional central chest pressure  Upon further questioning in the hospital, she states that she has slowed down a bit, but unclear for how long  She does state that she has been more fatigued as well  She lives with her  who has a HMII LVAD  Denies EtOH or drug use  Denies any recent viral illness  She has been on budesonide for autoimmune hepatitis x 4 years  On admission she had TTE which showed LVEF ~25%, NT pro BNP 00256 and troponin of 0 03  She was seen by gen cards with plan for R+LHC today  She was started on GDMT and SVR reduced as noted above  She was seen in f/u by Jerson Pearson HF AP  She feels well  Walking around the home without difficulty  Denies SOB, palpitations, presyncope, or syncope  Denies orthopnea, PND, or LE edema  Today 9/21/2018, she returns for f/u  Has more energy  Doing more around the home  Driving again  Past Medical History:   Diagnosis Date    Hypertension        Review of Systems - 12 point ROS was done and is negative, except as noted above  Allergies   Allergen Reactions    Ambien [Zolpidem]        Current Outpatient Prescriptions:     BUDESONIDE ER PO, Take 3 mg by mouth daily, Disp: , Rfl:     metoprolol succinate (TOPROL-XL) 25 mg 24 hr tablet, Take 1 tablet (25 mg total) by mouth daily, Disp: 90 tablet, Rfl: 3    potassium chloride (K-DUR,KLOR-CON) 20 mEq tablet, Take 1 tablet (20 mEq total) by mouth daily, Disp: 90 tablet, Rfl: 3    sacubitril-valsartan (ENTRESTO) 49-51 MG TABS, Take 1 tablet by mouth every 12 (twelve) hours, Disp: 180 tablet, Rfl: 3    torsemide (DEMADEX) 10 mg tablet, Take 1 tablet (10 mg total) by mouth daily, Disp: 90 tablet, Rfl: 3    Social History     Social History    Marital status: /Civil Union     Spouse name: N/A    Number of children: N/A    Years of education: N/A     Occupational History    Not on file  Social History Main Topics    Smoking status: Never Smoker    Smokeless tobacco: Never Used    Alcohol use No    Drug use: No    Sexual activity: Not on file     Other Topics Concern    Not on file     Social History Narrative    No narrative on file       No family history on file      Physical Exam:    Vitals: Blood pressure 110/50, pulse 72, weight 54 kg (119 lb), SpO2 99 %, not currently breastfeeding , Body mass index is 19 21 kg/m² ,   Wt Readings from Last 3 Encounters:   09/21/18 54 kg (119 lb)   08/13/18 56 7 kg (125 lb)   08/09/18 57 2 kg (126 lb)     Vitals:    09/21/18 1450   BP: 110/50   BP Location: Right arm   Patient Position: Sitting   Cuff Size: Standard   Pulse: 72   SpO2: 99%   Weight: 54 kg (119 lb)       GEN: Christopher Hairston appears well, alert and oriented x 3, pleasant and cooperative   HEENT: pupils equal, round, and reactive to light; extraocular muscles intact  NECK: supple, no carotid bruits   HEART: regular rhythm, normal S1 and S2, no murmurs, clicks, gallops or rubs, JVD is flat    LUNGS: clear to auscultation bilaterally; no wheezes, rales, or rhonchi   ABDOMEN: normal bowel sounds, soft, no tenderness, no distention  EXTREMITIES: peripheral pulses normal; no clubbing, cyanosis, or edema  NEURO: no focal findings   SKIN: normal without suspicious lesions on exposed skin    Labs & Results:  Lab Results   Component Value Date    WBC 2 79 (L) 08/04/2018    HGB 11 6 08/04/2018    HCT 36 5 08/04/2018    MCV 87 08/04/2018     (L) 08/04/2018       Chemistry        Component Value Date/Time     08/13/2018 1246     12/04/2015 1526    K 3 4 (L) 08/13/2018 1246    K 3 6 12/04/2015 1526     08/13/2018 1246     12/04/2015 1526    CO2 32 08/13/2018 1246    CO2 30 12/04/2015 1526    BUN 21 08/13/2018 1246    BUN 12 12/04/2015 1526    CREATININE 1 08 08/13/2018 1246    CREATININE 0 77 12/04/2015 1526        Component Value Date/Time    CALCIUM 9 3 08/13/2018 1246    CALCIUM 8 7 12/04/2015 1526    ALKPHOS 49 08/01/2018 2357    ALKPHOS 52 12/04/2015 1526    AST 78 (H) 08/01/2018 2357    AST 32 12/04/2015 1526    ALT 74 08/01/2018 2357    ALT 61 12/04/2015 1526    BILITOT 0 48 12/04/2015 1526        EKG personally reviewed by Sharon Mares MD      Counseling / Coordination of Care  Total floor / unit time spent today 40 minutes  Greater than 50% of total time was spent with the patient and / or family counseling and / or coordination of care  A description of the counseling / coordination of care: 20 min  Thank you for the opportunity to participate in the care of this patient      Briseyda Jose MD, PhD   Misty Romero

## 2018-09-25 ENCOUNTER — TRANSCRIBE ORDERS (OUTPATIENT)
Dept: ADMINISTRATIVE | Age: 78
End: 2018-09-25

## 2018-09-25 ENCOUNTER — APPOINTMENT (OUTPATIENT)
Dept: LAB | Age: 78
End: 2018-09-25
Payer: MEDICARE

## 2018-09-25 DIAGNOSIS — K73.2 CAH (CHRONIC ACTIVE HEPATITIS) (HCC): ICD-10-CM

## 2018-09-25 DIAGNOSIS — K73.2 CAH (CHRONIC ACTIVE HEPATITIS) (HCC): Primary | ICD-10-CM

## 2018-09-25 LAB
ALBUMIN SERPL BCP-MCNC: 3 G/DL (ref 3.5–5)
ALP SERPL-CCNC: 68 U/L (ref 46–116)
ALT SERPL W P-5'-P-CCNC: 254 U/L (ref 12–78)
ANION GAP SERPL CALCULATED.3IONS-SCNC: 2 MMOL/L (ref 4–13)
AST SERPL W P-5'-P-CCNC: 242 U/L (ref 5–45)
BILIRUB SERPL-MCNC: 0.9 MG/DL (ref 0.2–1)
BUN SERPL-MCNC: 22 MG/DL (ref 5–25)
CALCIUM SERPL-MCNC: 8.9 MG/DL (ref 8.3–10.1)
CHLORIDE SERPL-SCNC: 105 MMOL/L (ref 100–108)
CO2 SERPL-SCNC: 29 MMOL/L (ref 21–32)
CREAT SERPL-MCNC: 1.08 MG/DL (ref 0.6–1.3)
GFR SERPL CREATININE-BSD FRML MDRD: 56 ML/MIN/1.73SQ M
GLUCOSE SERPL-MCNC: 89 MG/DL (ref 65–140)
POTASSIUM SERPL-SCNC: 3.8 MMOL/L (ref 3.5–5.3)
PROT SERPL-MCNC: 8.2 G/DL (ref 6.4–8.2)
SODIUM SERPL-SCNC: 136 MMOL/L (ref 136–145)

## 2018-09-25 PROCEDURE — 80053 COMPREHEN METABOLIC PANEL: CPT

## 2018-09-25 PROCEDURE — 36415 COLL VENOUS BLD VENIPUNCTURE: CPT

## 2018-09-27 ENCOUNTER — CLINICAL SUPPORT (OUTPATIENT)
Dept: CARDIAC REHAB | Age: 78
End: 2018-09-27
Payer: MEDICARE

## 2018-09-27 VITALS — HEIGHT: 65 IN | BODY MASS INDEX: 20.06 KG/M2 | WEIGHT: 120.4 LBS

## 2018-09-27 DIAGNOSIS — I50.22 CHRONIC SYSTOLIC HEART FAILURE (HCC): ICD-10-CM

## 2018-09-27 PROCEDURE — 93797 PHYS/QHP OP CAR RHAB WO ECG: CPT

## 2018-09-27 NOTE — PROGRESS NOTES
Jonathan Lowe   1940     Risk: high     Pre Post % Change Goal   Date:   9/27/18      Physical       Sub Max ETT (mets)    10% increase   6MWT (feet) Total Distance (feet): 810 feet   10% increase   UCSD Dyspnea Score     5 pt decrease   Supplemental O2 use (L)        [facility] (est peak O2) Total Score: 20 7      Peak exercise CR/TN (mets)    40% increase   Emotional       PHQ9 (> 10 refer to MD) PHQ-9 Total Score: 1     4 pt decrease   [place] (lower score = improvement)       Total Total: 20   < 27   Feelings Feelings: Moderately   < 3   Physical Fitness Physical Fitness: Heavy   < 3   Social Support Social Support: Yes, some   < 3   Daily Activities Daily Activity: Some difficulty   < 3   Social Activities Social Activities: Not at all   < 3   Pain Pain: None   < 3   Overall Health Overall Health: Good   < 3   Quality of Life Quality of Life: Pretty good   < 3   Change in Health Change in Health: A little better   < 3   Dietary       Rate your plate     > 58   Measurements       Weight Weight - Scale: 54 6 kg (120 lb 6 4 oz)     2 5 - 5%   BMI 20 3    19 - 25   Waist Circ       < 40 M / < 35 F   % Body fat     < 25 M / < 33 F   BP left arm               (systolic) 429   < 015   (diastolic) 52   < 90   Smoking #/day  (if applicable) never   0   Lipids/Glucose (Date) 8/2/18      Total cholesterol 108   50 - 200   Triglycerides 75   < 150   HDL 41   40 - 60   LDL 52   < 100   A1C    4 0 - 5 6%   Fasting BG 89

## 2018-09-27 NOTE — PROGRESS NOTES
Cardiac Rehabilitation Plan of Care   Care Plan       Today's date: 2018   Visits: Initial Evaluation  Patient name: Christopher Hairston      : 1940  Age: [de-identified] y o  MRN: [id number]  Referring Physician: Daisy Walsh MD  Provider: Sharona Parra  Clinician: Radha Gómez MS, CCRP    Dx:   Encounter Diagnosis   Name Primary?  Chronic systolic heart failure [facility])      Date of onset: 8/3/18    COMMENTS:  Vinita Judd denies fatigue or lack of energy but states she notices an improvement in her breathing since her diagnosis and treatment with the life vest   She is able to perform light ADLs and as per instructions by home health nurse does not take on moderate ADLs  She is balancing the needs of her  and the limitations she now has d/t her health  I had a long discussion with her to identify resources which she could utilize to assist her with her ADLs and care of Elvin Macias  This would take some stress away from her and allow her to manage her own health  She plans to reach out to [employer] Whe[employer] and to the [employer] within [place] [address]  She completed 810ft (2 17 METs) in the 6 MWT, no cardiac s/s  Resting /52 increasing to 130/62  SaO2 96-97%  She will begin CR on Oct 1 to exercise 3 days/wk      Medication compliance: Yes   Comments:   Fall Risk: Low   Comments:     EXERCISE/ACTIVITY    Cardiovascular:   Min: 30-40   METS: 2 0-3 0   HR:    RPE: 11-13   O2 sat:     Modalities: Treadmill, UBE, NuStep and Recumbent bike  Strength trainin-3 days / week, 12-15 repitations , 1-2 sets per modality  and Will be added following 2-3 weeks of monitored exercise sessions   Modalities: Leg Press, Lateral Raise, Arm Extension, Arm Curl, Seated Row and Wall push-ups  EKG changes: none observed  Home activity: light ADLs, caring for her   Goals: 10% improvement in functional capacity, improved DASI score by 10%, Increase in peak CR METs by 40%, Improved 6MWT distance by 10%, Resume ADLs with increased strength and Exercise 5 days/wk, >150mins/wk  Education: signs and sxs and RPE scale   Plan:home exercise 30+ mins 2 days opposite CR and Education class: Risk Factors for Heart Disease  Readiness to change: Preparation    NUTRITION    Weight control:    Starting weight:    Current weight: Weight - Scale: 54 6 kg (120 lb 6 4 oz)   Waist circumference:    Starting:    Current:    Diabetes: N/A  Lipid management: Last lipid profile 18  Chol 108  TRG 75  HDL 41  LDL 52  Goals:continue eating heart healthy  Education:low sodium diet  hydration  diet and lipid management  Plan: Education class: Reading Food Labels, Education Class: Heart Healthy Eating, Reduce added sugars <25g/day, eliminate processed meats and swtich to low fat dairy  Readiness to change: Action    PSYCHOSOCIAL    Emotional: PHQ-9 Total Score: 1  Self-reported stress level: 8   Social support: Good   Goals:  Feelings in [place] Score < 3, Social Support in [place] Score < 3, Daily Activity in [facility] Score < 3 and Overall Health in [facility] Score < 3  Education: benefits of positive support system, coping mechanisms and depression and CAD  Plan: Class: Stress and Your Health and Class: Relaxation  Readiness to change: Preparation    OTHER CORE COMPONENTS     Tobacco:   History   Smoking Status    Never Smoker   Smokeless Tobacco    Never Used     Blood pressure:    Restin/52   Exercise: 130/62  Goals: reduced dietary sodium <2300mg and consistent exercise >150 mins/wk  Education:  understanding HTN and CAD and low sodium diet and HTN  Plan: Class: Understanding Heart Disease and Class: Common Heart Medications  Readiness to change: Action    OUTCOMES     Jonathan Lowe   1940      Risk: high       Pre Post % Change Goal   Date:   18         Physical           Sub Max ETT (mets)       10% increase   6MWT (feet) Total Distance (feet): 810 feet     10% increase   UCSD Dyspnea Score      5 pt decrease   Supplemental O2 use (L)          [facility] (est peak O2) Total Score: 20 7         Peak exercise CR/SC (mets)       40% increase   Emotional           PHQ9 (> 10 refer to MD) PHQ-9 Total Score: 1        4 pt decrease   [facility] (lower score = improvement)           Total Total: 20     < 27   Feelings Feelings:  Moderately     < 3   Physical Fitness Physical Fitness: Heavy     < 3   Social Support Social Support: Yes, some     < 3   Daily Activities Daily Activity: Some difficulty     < 3   Social Activities Social Activities: Not at all     < 3   Pain Pain: None     < 3   Overall Health Overall Health: Good     < 3   Quality of Life Quality of Life: Pretty good     < 3   Change in Health Change in Health: A little better     < 3   Dietary           Rate your plate      > 58   Measurements           Weight Weight - Scale: 54 6 kg (120 lb 6 4 oz)       2 5 - 5%   BMI 20 3      19 - 25   Waist Circ       < 40 M / < 35 F   % Body fat      < 25 M / < 33 F   BP left arm               (systolic) 460     < 617   (diastolic) 52     < 90   Smoking #/day  (if applicable) never     0   Lipids/Glucose (Date) 8/2/18         Total cholesterol 108     50 - 200   Triglycerides 75     < 150   HDL 41     40 - 60   LDL 52     < 100   A1C       4 0 - 5 6%   Fasting BG 89

## 2018-09-27 NOTE — PROGRESS NOTES
CARDIAC REHAB ASSESSMENT    Today's date: 2018  Patient name: Brian Peralta     : 1940       MRN: [id number]  PCP: Kem Mares MD  Referring Physician: Chio Dewey MD  Cardiologist: Chio Dewey MD  Surgeon:   Dx:   Encounter Diagnosis   Name Primary?  Chronic systolic heart failure ([facility] )        Date of onset: 18  Cultural needs: none    Height:    Wt Readings from Last 1 Encounters:   18 54 6 kg (120 lb 6 4 oz)      Weight:   Ht Readings from Last 1 Encounters:   18 5' 4 5" (1 638 m)     Medical History:   Past Medical History:   Diagnosis Date    Hypertension          Physical Limitations: none    Risk Factors   Cholesterol: No  Smoking: No  HTN: No  DM: No  Obesity: No   Inactivity: Yes  Family History:No family history on file  Allergies: Ambien [zolpidem]  ETOH:   History   Alcohol Use No         Current Medications:   Current Outpatient Prescriptions   Medication Sig Dispense Refill    BUDESONIDE ER PO Take 3 mg by mouth daily      metoprolol succinate (TOPROL-XL) 25 mg 24 hr tablet Take 1 tablet (25 mg total) by mouth daily 90 tablet 3    potassium chloride (K-DUR,KLOR-CON) 20 mEq tablet Take 1 tablet (20 mEq total) by mouth daily 90 tablet 3    sacubitril-valsartan (ENTRESTO) 49-51 MG TABS Take 1 tablet by mouth every 12 (twelve) hours 180 tablet 3    torsemide (DEMADEX) 10 mg tablet Take 1 tablet (10 mg total) by mouth daily 90 tablet 3     No current facility-administered medications for this visit              Functional Status Prior to Diagnosis for Treatment   Occupation: retired  Recreation: sedentary  ADLs: No limitations  [place]: No limitations  Exercise: none - keeping busy with ADLs and errands  Other: stairs to bedroom and to basement    Current Functional Status  Occupation: n/a  Recreation: sedentary  ADLs:Capable of performing light ADLs only limited by low functioning heart and restrictions put on by MD/home health nurse  [place]: Capable of performing light ADLs only able to perform self-care  Exercise: none  Other: reduced SOB with climbing stairs since Life Vest    Short Term Program Goals: increased strength improved energy/stamina with ADLs exercise 120-150 mins/wk    Long Term Goals: Improved functional capacity  Improved Quality of Life - [facility] score reduced    Ability to reach goals/rehabilitation potential:  Very Good     Projected return to function: 12 weeks  Objective tests: 6 MWT      Nutritional   Reviewed details of Rate your Plate  Discussed key elements of heart healthy eating  Reviewed patient goals for dietary modifications and their clinical implications  Reviewed most recent lipid profile       Goals for dietary modification: increase fish intake  more meatless meals  more nuts/seeds      Emotional/Social  Patient reports he/she is coping well with good social support and denies depression or anxiety    SOCIAL SUPPORT NETWORK    Marital status:     Rate 1-5:    Marriage: 2 -  unable to support d/t health   Family: 2 - no close family member   Financial: 5   Relationships: 5   Spirituality: 5   Intellectual: 5    Perceived Stress: 7/7   Stressors:primary caregiver for  with LVAD and post CVA - no close relations for help   Goals for Stress Management: go to bed earlier, reach out to [employer] for household chores 3 days/wk, meals on wheels,  - increase self sufficiency of , reach out to [employer] and [facility]    Domestic Violence Screening: No    Comments: mid sternal chest pain and fatigue with ADLs for a couple weeks, had a cough since the spring thought it was allergies - ED - CATH - normal arteries

## 2018-10-01 ENCOUNTER — TELEPHONE (OUTPATIENT)
Dept: CARDIOLOGY CLINIC | Facility: CLINIC | Age: 78
End: 2018-10-01

## 2018-10-01 ENCOUNTER — CLINICAL SUPPORT (OUTPATIENT)
Dept: CARDIAC REHAB | Age: 78
End: 2018-10-01
Payer: MEDICARE

## 2018-10-01 DIAGNOSIS — I50.22 CHRONIC SYSTOLIC HEART FAILURE (HCC): ICD-10-CM

## 2018-10-01 PROCEDURE — 93798 PHYS/QHP OP CAR RHAB W/ECG: CPT

## 2018-10-03 ENCOUNTER — CLINICAL SUPPORT (OUTPATIENT)
Dept: CARDIAC REHAB | Age: 78
End: 2018-10-03
Payer: MEDICARE

## 2018-10-03 DIAGNOSIS — I50.22 CHRONIC SYSTOLIC HEART FAILURE (HCC): ICD-10-CM

## 2018-10-03 PROCEDURE — 93798 PHYS/QHP OP CAR RHAB W/ECG: CPT

## 2018-10-05 ENCOUNTER — CLINICAL SUPPORT (OUTPATIENT)
Dept: CARDIAC REHAB | Age: 78
End: 2018-10-05
Payer: MEDICARE

## 2018-10-05 DIAGNOSIS — I50.22 CHRONIC SYSTOLIC HEART FAILURE (HCC): ICD-10-CM

## 2018-10-05 PROCEDURE — 93798 PHYS/QHP OP CAR RHAB W/ECG: CPT

## 2018-10-08 ENCOUNTER — CLINICAL SUPPORT (OUTPATIENT)
Dept: CARDIAC REHAB | Age: 78
End: 2018-10-08
Payer: MEDICARE

## 2018-10-08 DIAGNOSIS — I50.22 CHRONIC SYSTOLIC HEART FAILURE (HCC): ICD-10-CM

## 2018-10-08 PROCEDURE — 93798 PHYS/QHP OP CAR RHAB W/ECG: CPT

## 2018-10-11 ENCOUNTER — CLINICAL SUPPORT (OUTPATIENT)
Dept: CARDIAC REHAB | Age: 78
End: 2018-10-11
Payer: MEDICARE

## 2018-10-11 DIAGNOSIS — I50.22 CHRONIC SYSTOLIC HEART FAILURE (HCC): ICD-10-CM

## 2018-10-11 PROCEDURE — 93798 PHYS/QHP OP CAR RHAB W/ECG: CPT

## 2018-10-15 ENCOUNTER — CLINICAL SUPPORT (OUTPATIENT)
Dept: CARDIAC REHAB | Age: 78
End: 2018-10-15
Payer: MEDICARE

## 2018-10-15 DIAGNOSIS — I50.22 CHRONIC SYSTOLIC HEART FAILURE (HCC): ICD-10-CM

## 2018-10-15 PROCEDURE — 93798 PHYS/QHP OP CAR RHAB W/ECG: CPT

## 2018-10-17 ENCOUNTER — CLINICAL SUPPORT (OUTPATIENT)
Dept: CARDIAC REHAB | Age: 78
End: 2018-10-17
Payer: MEDICARE

## 2018-10-17 DIAGNOSIS — I50.22 CHRONIC SYSTOLIC HEART FAILURE (HCC): ICD-10-CM

## 2018-10-17 PROCEDURE — 93798 PHYS/QHP OP CAR RHAB W/ECG: CPT

## 2018-10-19 ENCOUNTER — CLINICAL SUPPORT (OUTPATIENT)
Dept: CARDIAC REHAB | Age: 78
End: 2018-10-19
Payer: MEDICARE

## 2018-10-19 DIAGNOSIS — I50.22 CHRONIC SYSTOLIC HEART FAILURE (HCC): ICD-10-CM

## 2018-10-19 PROCEDURE — 93798 PHYS/QHP OP CAR RHAB W/ECG: CPT

## 2018-10-22 ENCOUNTER — CLINICAL SUPPORT (OUTPATIENT)
Dept: CARDIAC REHAB | Age: 78
End: 2018-10-22
Payer: MEDICARE

## 2018-10-22 DIAGNOSIS — I50.22 CHRONIC SYSTOLIC HEART FAILURE (HCC): ICD-10-CM

## 2018-10-22 PROCEDURE — 93798 PHYS/QHP OP CAR RHAB W/ECG: CPT

## 2018-10-24 ENCOUNTER — CLINICAL SUPPORT (OUTPATIENT)
Dept: CARDIAC REHAB | Age: 78
End: 2018-10-24
Payer: MEDICARE

## 2018-10-24 DIAGNOSIS — I50.22 CHRONIC SYSTOLIC HEART FAILURE (HCC): ICD-10-CM

## 2018-10-24 PROCEDURE — 93798 PHYS/QHP OP CAR RHAB W/ECG: CPT

## 2018-10-25 ENCOUNTER — OFFICE VISIT (OUTPATIENT)
Dept: CARDIOLOGY CLINIC | Facility: CLINIC | Age: 78
End: 2018-10-25
Payer: MEDICARE

## 2018-10-25 VITALS
WEIGHT: 125.5 LBS | OXYGEN SATURATION: 99 % | DIASTOLIC BLOOD PRESSURE: 50 MMHG | BODY MASS INDEX: 21.21 KG/M2 | HEART RATE: 81 BPM | SYSTOLIC BLOOD PRESSURE: 110 MMHG

## 2018-10-25 DIAGNOSIS — I42.9 CARDIOMYOPATHY, UNSPECIFIED TYPE (HCC): Primary | ICD-10-CM

## 2018-10-25 PROCEDURE — 99214 OFFICE O/P EST MOD 30 MIN: CPT | Performed by: INTERNAL MEDICINE

## 2018-10-25 NOTE — PROGRESS NOTES
Heart Failure Outpatient Progress Note - Judi Alvarado [de-identified] y o  female MRN: [id number]    @ Encounter: [id number]  Assessment/Plan:    Patient Active Problem List    Diagnosis Date Noted    Dilated cardiomyopathy ([place] [id number] ) 08/09/2018     Priority: Low    Thrombocytopenia ([place] [address] ) 08/03/2018     Priority: Low    Chronic systolic heart failure ([place] [address] ) 08/01/2018     Priority: Low    Hypertension, essential 08/01/2018     Priority: Low    Other specified glaucoma 08/01/2018     Priority: Low    Autoimmune hepatitis treated with steroids ([place] [address] ) 08/01/2018     Priority: Low     # Chronic NIDCM, LVEF ~20%, LVIDd 7 cm (by cMRI), borderline reduced RVSF, NYHA III, ACC/AHA Stage D: Unclear etiology  HTN (normotensive on admission) +/- valvular (less likely, central MR likely functional) +/- myocarditis +/- stress CM +/- LBBB  Cool extremities with fatigue concerning for low output prior to [address]  Borderline RV dysfunction and pulmonary vascular changes likely from L sided disease (elevated filling pressures and MR)  Reduction in LVEF likely not acute given TTE  Review of TTE also c/w borderline to low output  No clear evidence of end organ hypoperfusion at this time  Improving clinically  As below:  Diag:   --TTE 8/2/18: LVEF ~25%, mild RV dilation w/ borderline RVSF, ROBERTO, sev central MR, mod TR  RVOT: Abnormal AcT with late systolic notching concerning for pulmonary vascular changes  Borderline to low cardiac output based on noninvasive assessment    --HIV, SPEP/UPEP, LEA, hepatitis panel all negative, ferritin ok  --R+LHC 8/3/18: shows normal coronaries  RA 3  RV 32/6  PA 32/11/20  PCWP 6  AO sat 89%  MvO2 56 5%  TD CO/CI: 2 3/1 4  Isacc CO/CI: 2 6/1 6  TPG 14  DPG 5  PVR 5 4 (Isacc); 6 1 (TD)  SVR 2070  PVR:SVR <0 25     --cMRI 8/6/18: LVEF ~18%, LVIDd 7 cm, normal RV  There is a thin strip of intramyocardial hyperenhancement of the basal interventricular septum      To do:  --Repeat TTE now    Therapeutic:  --2g sodium diet  --2000 ml fluid restriction  --Increase activity as tolerated  --Continue Cardiac rehab     Neurohormonal Blockade:  --Beta-Blocker: Continue metoprolol succinate 25 mg PO daily  --ACEi, ARB or ARNi: Continue Entresto to 49-51 mg PO BID (also for SVR reduction)  --Aldosterone Receptor Blocker: Currently not on MRA  Will start once above meds @ max tolerated dose  --Diuretic: Continue torsemide 10 PO daily     Sudden Cardiac Death Risk Reduction:  --ICD: Continue LifeVest as bridge to ICD vs recovery; repeat TTE in 3 months (11/2018)     Electrical Resynchronization:  --Candidacy for BiV device: Has LBBB, if LVEF does not recover, then would be potential candidate for BiV device       Advanced Therapies: Will continue to monitor  If does not recover, age precludes OHT  Possible LVAD candidate, but social situation will be complicated as  has LVAD w/ complications and son works in [place]  Other son is in [place]  Will monitor closely      # HTN  # Normocytic anemia  # Hypothyroidism  # Autoimmune hepatitis: Continue budesonide    RTC in 1 month    HPI: Very pleasant [de-identified] y/o woman w/ PMHx of HTN, autoimmune hepatitis p/t ER w/ increasing SOB x 2 weeks with exertional central chest pressure  Upon further questioning in the hospital, she states that she has slowed down a bit, but unclear for how long  She does state that she has been more fatigued as well  She lives with her  who has a HMII LVAD  Denies EtOH or drug use  Denies any recent viral illness  She has been on budesonide for autoimmune hepatitis x 4 years  On admission she had TTE which showed LVEF ~25%, NT pro BNP 96617 and troponin of 0 03  She was seen by gen [facility] with plan for R+LHC today  She was started on GDMT and SVR reduced as noted above  She was seen in f/u by Ayanna Griffin, HF AP  She feels well  Walking around the home without difficulty  Denies SOB, palpitations, presyncope, or syncope  Denies orthopnea, PND, or LE edema  Today 9/21/2018, she returns for f/u  Has more energy  Doing more around the home  Driving again  Today, 10/25/18, she returns for f/u  She says that when she takes her AM meds, she gets burning in the chest  She takes her budesonide first thing, then takes her entresto  At night she takes her metoprolol  Past Medical History:   Diagnosis Date    Hypertension        Review of Systems - 12 point ROS was done and is negative, except as noted above  Allergies   Allergen Reactions    Ambien [Zolpidem]        Current Outpatient Prescriptions:     BUDESONIDE ER PO, Take 3 mg by mouth daily, Disp: , Rfl:     metoprolol succinate (TOPROL-XL) 25 mg 24 hr tablet, Take 1 tablet (25 mg total) by mouth daily, Disp: 90 tablet, Rfl: 3    potassium chloride (K-DUR,KLOR-CON) 20 mEq tablet, Take 1 tablet (20 mEq total) by mouth daily, Disp: 90 tablet, Rfl: 3    sacubitril-valsartan (ENTRESTO) 49-51 MG TABS, Take 1 tablet by mouth every 12 (twelve) hours, Disp: 180 tablet, Rfl: 3    torsemide (DEMADEX) 10 mg tablet, Take 1 tablet (10 mg total) by mouth daily, Disp: 90 tablet, Rfl: 3    Social History     Social History    Marital status: /Civil Union     Spouse name: N/A    Number of children: N/A    Years of education: N/A     Occupational History    Not on file  Social History Main Topics    Smoking status: Never Smoker    Smokeless tobacco: Never Used    Alcohol use No    Drug use: No    Sexual activity: Not on file     Other Topics Concern    Not on file     Social History Narrative    No narrative on file       No family history on file      Physical Exam:    Vitals: Blood pressure 110/50, pulse 81, weight 56 9 kg (125 lb 8 oz), SpO2 99 %, not currently breastfeeding , Body mass index is 21 21 kg/m² ,   Wt Readings from Last 3 Encounters:   10/25/18 56 9 kg (125 lb 8 oz)   09/27/18 54 6 kg (120 lb 6 4 oz)   09/21/18 54 kg (119 lb)     Vitals:    10/25/18 1516 10/25/18 1551   BP: (!) 112/20 110/50   BP Location: Right arm    Patient Position: Sitting    Cuff Size: Standard    Pulse: 81    SpO2: 99%    Weight: 56 9 kg (125 lb 8 oz)    Repeat /50 mmHg    GEN: William Chavarria appears well, alert and oriented x 3, pleasant and cooperative   HEENT: pupils equal, round, and reactive to light; extraocular muscles intact  NECK: supple, no carotid bruits   HEART: regular rhythm, normal S1 and S2, no murmurs, clicks, gallops or rubs, JVP is flat  LUNGS: clear to auscultation bilaterally; no wheezes, rales, or rhonchi   ABDOMEN: normal bowel sounds, soft, no tenderness, no distention  EXTREMITIES: peripheral pulses normal; no clubbing, cyanosis, or edema  NEURO: no focal findings   SKIN: normal without suspicious lesions on exposed skin    Labs & Results:  Lab Results   Component Value Date    WBC 2 79 (L) 08/04/2018    HGB 11 6 08/04/2018    HCT 36 5 08/04/2018    MCV 87 08/04/2018     (L) 08/04/2018       Chemistry        Component Value Date/Time     09/25/2018 1357     12/04/2015 1526    K 3 8 09/25/2018 1357    K 3 6 12/04/2015 1526     09/25/2018 1357     12/04/2015 1526    CO2 29 09/25/2018 1357    CO2 30 12/04/2015 1526    BUN 22 09/25/2018 1357    BUN 12 12/04/2015 1526    CREATININE 1 08 09/25/2018 1357    CREATININE 0 77 12/04/2015 1526        Component Value Date/Time    CALCIUM 8 9 09/25/2018 1357    CALCIUM 8 7 12/04/2015 1526    ALKPHOS 68 09/25/2018 1357    ALKPHOS 52 12/04/2015 1526     (H) 09/25/2018 1357    AST 32 12/04/2015 1526     (H) 09/25/2018 1357    ALT 61 12/04/2015 1526    BILITOT 0 48 12/04/2015 1526        EKG personally reviewed by Samson Roger MD      Counseling / Coordination of Care  Total floor / unit time spent today 40 minutes  Greater than 50% of total time was spent with the patient and / or family counseling and / or coordination of care  A description of the counseling / coordination of care: 20 min        Thank you for the opportunity to participate in the care of this patient      Evelio Sims MD, PhD   Trish Cerrato

## 2018-10-26 ENCOUNTER — CLINICAL SUPPORT (OUTPATIENT)
Dept: CARDIAC REHAB | Age: 78
End: 2018-10-26
Payer: MEDICARE

## 2018-10-26 DIAGNOSIS — I50.22 CHRONIC SYSTOLIC HEART FAILURE (HCC): ICD-10-CM

## 2018-10-26 PROCEDURE — 93798 PHYS/QHP OP CAR RHAB W/ECG: CPT

## 2018-10-26 NOTE — PROGRESS NOTES
Cardiac Rehabilitation Plan of Care   30 day       Today's date: 10/26/2018   Visits: 12  Patient name: Marybel Cee      : 1940  Age: [de-identified] y o  MRN: [id number]  Referring Physician: Janny Kapadia MD  Provider: Reagan Maldonado  Clinician: Bessie Quiroz MS, CCRP    Dx:   Encounter Diagnosis   Name Primary?  Chronic systolic heart failure [facility])      Date of onset: 8/3/18    COMMENTS:  Rodo Maldonado has progressed to 40 mins and completes 2 6-3 1 METs symptom free  Exercise HR reach   Resting BP well controlled and increases appropriately with exercise  NSR w/ BBB, Occ PVCs  She has resumed most ADLs in moderation and as tolerated  She is following low sodium and fluid restriction  No additional walking or exercise outside of CR  Had f/u appt with Dr Milagro Baca yesterday and reported to him chest pressure/burning after taking am meds  She reported that she will try to spread out her medication to see if that helps the sxs  I encouraged her to take more time at the store to walk the isles while she's shopping for additional exercise         Medication compliance: Yes   Comments:   Fall Risk: Low   Comments:     EXERCISE/ACTIVITY    Cardiovascular:   Min: 40-45   METS: 2 0-3 5   HR:    RPE: 11-13   O2 sat:     Modalities: Treadmill, UBE, NuStep and Recumbent bike  Strength trainin-3 days / week, 12-15 repitations , 1-2 sets per modality  and Will be added following 2-3 weeks of monitored exercise sessions   Modalities: Leg Press, Lateral Raise, Arm Extension, Arm Curl, Seated Row and Wall push-ups  EKG changes: none observed  Home activity: light to moderate ADLs, caring for her   Goals: 10% improvement in functional capacity, improved DASI score by 10%, Increase in peak CR METs by 40%, Improved 6MWT distance by 10%, Resume ADLs with increased strength and Exercise 5 days/wk, >150mins/wk  Education: signs and sxs and RPE scale   Plan:home exercise 30+ mins 2 days opposite CR and Education class: Risk Factors for Heart Disease  Readiness to change: Preparation    NUTRITION    Weight control:    Starting weight: 120   Current weight:   124  Waist circumference:    Starting:    Current:    Diabetes: N/A  Lipid management: Last lipid profile 18  Chol 108  TRG 75  HDL 41  LDL 52  Goals:continue eating heart healthy  Education:low sodium diet  hydration  diet and lipid management  Plan: Education class: Reading Food Labels, Education Class: Heart Healthy Eating, Reduce added sugars <25g/day, eliminate processed meats and swtich to low fat dairy  Readiness to change: Action    PSYCHOSOCIAL    Emotional:   Patient reports he/she is coping well with good social support and denies depression or anxiety  Self-reported stress level: 8   Social support: Good   Goals:  Feelings in [place] Score < 3, Social Support in [place] Score < 3, Daily Activity in [place] Score < 3 and Overall Health in [place] Score < 3  Education: benefits of positive support system, coping mechanisms and depression and CAD  Plan: Class: Stress and Your Health and Class: Relaxation  Readiness to change: Preparation    OTHER CORE COMPONENTS     Tobacco:   History   Smoking Status    Never Smoker   Smokeless Tobacco    Never Used     Blood pressure:    Restin/52   Exercise: 138/60  Goals: reduced dietary sodium <2300mg and consistent exercise >150 mins/wk  Education:  understanding HTN and CAD and low sodium diet and HTN  Plan: Class: Understanding Heart Disease and Class: Common Heart Medications  Readiness to change: Action

## 2018-10-29 ENCOUNTER — CLINICAL SUPPORT (OUTPATIENT)
Dept: CARDIAC REHAB | Age: 78
End: 2018-10-29
Payer: MEDICARE

## 2018-10-29 DIAGNOSIS — I50.22 CHRONIC SYSTOLIC HEART FAILURE (HCC): ICD-10-CM

## 2018-10-29 PROCEDURE — 93798 PHYS/QHP OP CAR RHAB W/ECG: CPT

## 2018-10-31 ENCOUNTER — CLINICAL SUPPORT (OUTPATIENT)
Dept: CARDIAC REHAB | Age: 78
End: 2018-10-31
Payer: MEDICARE

## 2018-10-31 DIAGNOSIS — I50.22 CHRONIC SYSTOLIC HEART FAILURE (HCC): ICD-10-CM

## 2018-10-31 PROCEDURE — 93798 PHYS/QHP OP CAR RHAB W/ECG: CPT

## 2018-11-02 ENCOUNTER — CLINICAL SUPPORT (OUTPATIENT)
Dept: CARDIAC REHAB | Age: 78
End: 2018-11-02
Payer: MEDICARE

## 2018-11-02 DIAGNOSIS — I50.22 CHRONIC SYSTOLIC HEART FAILURE (HCC): ICD-10-CM

## 2018-11-02 PROCEDURE — 93798 PHYS/QHP OP CAR RHAB W/ECG: CPT

## 2018-11-05 ENCOUNTER — CLINICAL SUPPORT (OUTPATIENT)
Dept: CARDIAC REHAB | Age: 78
End: 2018-11-05
Payer: MEDICARE

## 2018-11-05 DIAGNOSIS — I50.22 CHRONIC SYSTOLIC HEART FAILURE (HCC): ICD-10-CM

## 2018-11-05 PROCEDURE — 93798 PHYS/QHP OP CAR RHAB W/ECG: CPT

## 2018-11-07 ENCOUNTER — APPOINTMENT (OUTPATIENT)
Dept: CARDIAC REHAB | Age: 78
End: 2018-11-07
Payer: MEDICARE

## 2018-11-09 ENCOUNTER — CLINICAL SUPPORT (OUTPATIENT)
Dept: CARDIAC REHAB | Age: 78
End: 2018-11-09
Payer: MEDICARE

## 2018-11-09 DIAGNOSIS — I50.22 CHRONIC SYSTOLIC HEART FAILURE (HCC): ICD-10-CM

## 2018-11-09 PROCEDURE — 93798 PHYS/QHP OP CAR RHAB W/ECG: CPT

## 2018-11-12 ENCOUNTER — CLINICAL SUPPORT (OUTPATIENT)
Dept: CARDIAC REHAB | Age: 78
End: 2018-11-12
Payer: MEDICARE

## 2018-11-12 DIAGNOSIS — I50.22 CHRONIC SYSTOLIC HEART FAILURE (HCC): ICD-10-CM

## 2018-11-12 PROCEDURE — 93798 PHYS/QHP OP CAR RHAB W/ECG: CPT

## 2018-11-14 ENCOUNTER — TRANSCRIBE ORDERS (OUTPATIENT)
Dept: ADMINISTRATIVE | Age: 78
End: 2018-11-14

## 2018-11-14 ENCOUNTER — LAB (OUTPATIENT)
Dept: LAB | Age: 78
End: 2018-11-14
Payer: MEDICARE

## 2018-11-14 ENCOUNTER — CLINICAL SUPPORT (OUTPATIENT)
Dept: CARDIAC REHAB | Age: 78
End: 2018-11-14
Payer: MEDICARE

## 2018-11-14 DIAGNOSIS — K75.4 CHRONIC AGGRESSIVE HEPATITIS (HCC): Primary | ICD-10-CM

## 2018-11-14 DIAGNOSIS — K75.4 CHRONIC AGGRESSIVE HEPATITIS (HCC): ICD-10-CM

## 2018-11-14 DIAGNOSIS — I50.22 CHRONIC SYSTOLIC HEART FAILURE (HCC): ICD-10-CM

## 2018-11-14 LAB
ALBUMIN SERPL BCP-MCNC: 3.2 G/DL (ref 3.5–5)
ALP SERPL-CCNC: 48 U/L (ref 46–116)
ALT SERPL W P-5'-P-CCNC: 61 U/L (ref 12–78)
ANION GAP SERPL CALCULATED.3IONS-SCNC: 4 MMOL/L (ref 4–13)
AST SERPL W P-5'-P-CCNC: 50 U/L (ref 5–45)
BILIRUB SERPL-MCNC: 0.4 MG/DL (ref 0.2–1)
BUN SERPL-MCNC: 12 MG/DL (ref 5–25)
CALCIUM SERPL-MCNC: 8.6 MG/DL (ref 8.3–10.1)
CHLORIDE SERPL-SCNC: 103 MMOL/L (ref 100–108)
CO2 SERPL-SCNC: 28 MMOL/L (ref 21–32)
CREAT SERPL-MCNC: 0.77 MG/DL (ref 0.6–1.3)
GFR SERPL CREATININE-BSD FRML MDRD: 84 ML/MIN/1.73SQ M
GLUCOSE SERPL-MCNC: 89 MG/DL (ref 65–140)
POTASSIUM SERPL-SCNC: 3.3 MMOL/L (ref 3.5–5.3)
PROT SERPL-MCNC: 8 G/DL (ref 6.4–8.2)
SODIUM SERPL-SCNC: 135 MMOL/L (ref 136–145)

## 2018-11-14 PROCEDURE — 36415 COLL VENOUS BLD VENIPUNCTURE: CPT

## 2018-11-14 PROCEDURE — 80053 COMPREHEN METABOLIC PANEL: CPT

## 2018-11-14 PROCEDURE — 93798 PHYS/QHP OP CAR RHAB W/ECG: CPT

## 2018-11-16 ENCOUNTER — APPOINTMENT (OUTPATIENT)
Dept: CARDIAC REHAB | Age: 78
End: 2018-11-16
Payer: MEDICARE

## 2018-11-19 ENCOUNTER — CLINICAL SUPPORT (OUTPATIENT)
Dept: CARDIAC REHAB | Age: 78
End: 2018-11-19
Payer: MEDICARE

## 2018-11-19 DIAGNOSIS — I50.22 CHRONIC SYSTOLIC HEART FAILURE (HCC): ICD-10-CM

## 2018-11-19 PROCEDURE — 93798 PHYS/QHP OP CAR RHAB W/ECG: CPT

## 2018-11-21 ENCOUNTER — TELEPHONE (OUTPATIENT)
Dept: CARDIOLOGY CLINIC | Facility: CLINIC | Age: 78
End: 2018-11-21

## 2018-11-21 ENCOUNTER — CLINICAL SUPPORT (OUTPATIENT)
Dept: CARDIAC REHAB | Age: 78
End: 2018-11-21
Payer: MEDICARE

## 2018-11-21 DIAGNOSIS — I50.22 CHRONIC SYSTOLIC HEART FAILURE (HCC): ICD-10-CM

## 2018-11-21 PROCEDURE — 93798 PHYS/QHP OP CAR RHAB W/ECG: CPT

## 2018-11-21 NOTE — TELEPHONE ENCOUNTER
Vianca Crowell from cardiac rehab called stated pt came for rehab today and stated she was not feeling well  Pt told Vianca Socrates that she has increased chest pressure with climbing stairs and carry groceries etc  Rated 5/10    Did have short easier rehab today and had mild chest pressure during it  Vianca Crowell did tell pt to give us a call  I tried calling pt, no answer  Pt has ECHO followed by appt with Dr Deb Villasenor on 12/6        Thank You

## 2018-11-21 NOTE — TELEPHONE ENCOUNTER
Talked with pt and she feels it is the metoprolol 25mg that gives her the chest tightness and she wakes at 0300 and can not go back to sleep  Denies any other symptoms and states wt has been constant  Wants to know if she can take 1/2 the dose        Please advise

## 2018-11-23 ENCOUNTER — CLINICAL SUPPORT (OUTPATIENT)
Dept: CARDIAC REHAB | Age: 78
End: 2018-11-23
Payer: MEDICARE

## 2018-11-23 DIAGNOSIS — I50.22 CHRONIC SYSTOLIC HEART FAILURE (HCC): ICD-10-CM

## 2018-11-23 PROCEDURE — 93798 PHYS/QHP OP CAR RHAB W/ECG: CPT

## 2018-11-23 NOTE — PROGRESS NOTES
Cardiac Rehabilitation Plan of Care   60 day      Today's date: 2018   Visits: 22  Patient name: Les Liao      : 1940  Age: [de-identified] y o  MRN: [id number]  Referring Physician: Lori Parsons MD  Provider: [facility]  Clinician: Regine Warner MS, CCRP    Dx:   Encounter Diagnosis   Name Primary?  Chronic systolic heart failure [facility])      Date of onset: 8/3/18    COMMENTS:   As instructed by Dr Conrado Michel cut her Metoprolol in half beginning with her evening dose last night  Today in her exercise session she reported minimal chest tightness  No other symptoms coinciding with the chest tightness  Her wt has remained is stable at 126 - 128  States she is not sleeping well  She is following her sodium and fluid intake restrictions  She completes moderate ADLs as tolerated by rests when needed and carrying lighter loads  Symone completes 40 mins at 2 5-3 3 METs  Exercise HR   Resting /70 increasing to 146/72  NSR on telemetry with occ PVCs and PACs  I encouraged her to keep a log of her symptoms at home        Medication compliance: Yes   Comments:   Fall Risk: Low   Comments:     EXERCISE/ACTIVITY    Cardiovascular:   Min: 40-45   METS: 2 0-3 5   HR:    RPE: 4-6   O2 sat:     Modalities: Treadmill, UBE, NuStep and Recumbent bike  Strength trainin-3 days / week, 12-15 repitations , 1-2 sets per modality  and Will be added following 2-3 weeks of monitored exercise sessions   Modalities: Leg Press, Lateral Raise, Arm Extension, Arm Curl, Seated Row and Wall push-ups  EKG changes: occ PVCs, PACs  Home activity: light to moderate ADLs, caring for her   Goals: 10% improvement in functional capacity, improved DASI score by 10%, Increase in peak CR METs by 40%, Improved 6MWT distance by 10%, Resume ADLs with increased strength and Exercise 5 days/wk, >150mins/wk  Education: signs and sxs and RPE scale   Plan:home exercise 30+ mins 2 days opposite CR and Education class: Risk Factors for Heart Disease  Readiness to change: Preparation    NUTRITION    Weight control:    Starting weight: 120   Current weight:   124  Waist circumference:    Starting:    Current:    Diabetes: N/A  Lipid management: Last lipid profile 18  Chol 108  TRG 75  HDL 41  LDL 52  Goals:continue eating heart healthy  Education:low sodium diet  hydration  diet and lipid management  Plan: Education class: Reading Food Labels, Education Class: Heart Healthy Eating, Reduce added sugars <25g/day, eliminate processed meats and swtich to low fat dairy  Readiness to change: Action    PSYCHOSOCIAL    Emotional:   Patient reports he/she is coping well with good social support and denies depression or anxiety  Self-reported stress level: 8   Social support: Good   Goals:  Feelings in [place] Score < 3, Social Support in [place] Score < 3, Daily Activity in [place] Score < 3 and Overall Health in [place] Score < 3  Education: benefits of positive support system, coping mechanisms and depression and CAD  Plan: Class: Stress and Your Health and Class: Relaxation  Readiness to change: Preparation    OTHER CORE COMPONENTS     Tobacco:   History   Smoking Status    Never Smoker   Smokeless Tobacco    Never Used     Blood pressure:    Restin/70   Exercise: 146/72  Goals: reduced dietary sodium <2300mg and consistent exercise >150 mins/wk  Education:  understanding HTN and CAD and low sodium diet and HTN  Plan: Class: Understanding Heart Disease and Class: Common Heart Medications  Readiness to change: Action

## 2018-11-26 ENCOUNTER — LAB (OUTPATIENT)
Dept: LAB | Age: 78
End: 2018-11-26
Payer: MEDICARE

## 2018-11-26 ENCOUNTER — CLINICAL SUPPORT (OUTPATIENT)
Dept: CARDIAC REHAB | Age: 78
End: 2018-11-26
Payer: MEDICARE

## 2018-11-26 DIAGNOSIS — K75.4 CHRONIC AGGRESSIVE HEPATITIS (HCC): ICD-10-CM

## 2018-11-26 DIAGNOSIS — I50.22 CHRONIC SYSTOLIC HEART FAILURE (HCC): ICD-10-CM

## 2018-11-26 LAB
ALBUMIN SERPL BCP-MCNC: 3.2 G/DL (ref 3.5–5)
ALP SERPL-CCNC: 43 U/L (ref 46–116)
ALT SERPL W P-5'-P-CCNC: 46 U/L (ref 12–78)
ANION GAP SERPL CALCULATED.3IONS-SCNC: 5 MMOL/L (ref 4–13)
AST SERPL W P-5'-P-CCNC: 44 U/L (ref 5–45)
BILIRUB SERPL-MCNC: 0.45 MG/DL (ref 0.2–1)
BUN SERPL-MCNC: 13 MG/DL (ref 5–25)
CALCIUM SERPL-MCNC: 9.4 MG/DL (ref 8.3–10.1)
CHLORIDE SERPL-SCNC: 105 MMOL/L (ref 100–108)
CO2 SERPL-SCNC: 27 MMOL/L (ref 21–32)
CREAT SERPL-MCNC: 0.7 MG/DL (ref 0.6–1.3)
GFR SERPL CREATININE-BSD FRML MDRD: 95 ML/MIN/1.73SQ M
GLUCOSE SERPL-MCNC: 89 MG/DL (ref 65–140)
POTASSIUM SERPL-SCNC: 3.4 MMOL/L (ref 3.5–5.3)
PROT SERPL-MCNC: 7.5 G/DL (ref 6.4–8.2)
SODIUM SERPL-SCNC: 137 MMOL/L (ref 136–145)

## 2018-11-26 PROCEDURE — 36415 COLL VENOUS BLD VENIPUNCTURE: CPT

## 2018-11-26 PROCEDURE — 93798 PHYS/QHP OP CAR RHAB W/ECG: CPT

## 2018-11-26 PROCEDURE — 80053 COMPREHEN METABOLIC PANEL: CPT

## 2018-11-28 ENCOUNTER — CLINICAL SUPPORT (OUTPATIENT)
Dept: CARDIAC REHAB | Age: 78
End: 2018-11-28
Payer: MEDICARE

## 2018-11-28 DIAGNOSIS — I50.22 CHRONIC SYSTOLIC HEART FAILURE (HCC): ICD-10-CM

## 2018-11-28 PROCEDURE — 93798 PHYS/QHP OP CAR RHAB W/ECG: CPT

## 2018-11-30 ENCOUNTER — CLINICAL SUPPORT (OUTPATIENT)
Dept: CARDIAC REHAB | Age: 78
End: 2018-11-30
Payer: MEDICARE

## 2018-11-30 DIAGNOSIS — I50.22 CHRONIC SYSTOLIC HEART FAILURE (HCC): ICD-10-CM

## 2018-11-30 PROCEDURE — 93798 PHYS/QHP OP CAR RHAB W/ECG: CPT

## 2018-12-03 ENCOUNTER — CLINICAL SUPPORT (OUTPATIENT)
Dept: CARDIAC REHAB | Age: 78
End: 2018-12-03
Payer: MEDICARE

## 2018-12-03 DIAGNOSIS — I50.22 CHRONIC SYSTOLIC HEART FAILURE (HCC): ICD-10-CM

## 2018-12-03 PROCEDURE — 93798 PHYS/QHP OP CAR RHAB W/ECG: CPT

## 2018-12-05 ENCOUNTER — CLINICAL SUPPORT (OUTPATIENT)
Dept: CARDIAC REHAB | Age: 78
End: 2018-12-05
Payer: MEDICARE

## 2018-12-05 DIAGNOSIS — I50.22 CHRONIC SYSTOLIC HEART FAILURE (HCC): ICD-10-CM

## 2018-12-05 PROCEDURE — 93798 PHYS/QHP OP CAR RHAB W/ECG: CPT

## 2018-12-06 ENCOUNTER — HOSPITAL ENCOUNTER (OUTPATIENT)
Dept: NON INVASIVE DIAGNOSTICS | Facility: CLINIC | Age: 78
Discharge: HOME | End: 2018-12-06
Payer: MEDICARE

## 2018-12-06 ENCOUNTER — OFFICE VISIT (OUTPATIENT)
Dept: CARDIOLOGY CLINIC | Facility: CLINIC | Age: 78
End: 2018-12-06
Payer: MEDICARE

## 2018-12-06 VITALS
HEART RATE: 88 BPM | BODY MASS INDEX: 21.12 KG/M2 | WEIGHT: 125 LBS | DIASTOLIC BLOOD PRESSURE: 64 MMHG | SYSTOLIC BLOOD PRESSURE: 114 MMHG

## 2018-12-06 DIAGNOSIS — I50.22 CHRONIC SYSTOLIC CHF (CONGESTIVE HEART FAILURE), NYHA CLASS 3 (HCC): ICD-10-CM

## 2018-12-06 DIAGNOSIS — I10 HTN (HYPERTENSION), BENIGN: ICD-10-CM

## 2018-12-06 DIAGNOSIS — I42.0 DILATED CARDIOMYOPATHY (HCC): Primary | ICD-10-CM

## 2018-12-06 DIAGNOSIS — I42.9 CARDIOMYOPATHY, UNSPECIFIED TYPE (HCC): ICD-10-CM

## 2018-12-06 PROCEDURE — 93306 TTE W/DOPPLER COMPLETE: CPT | Performed by: INTERNAL MEDICINE

## 2018-12-06 PROCEDURE — 99214 OFFICE O/P EST MOD 30 MIN: CPT | Performed by: INTERNAL MEDICINE

## 2018-12-06 PROCEDURE — 93306 TTE W/DOPPLER COMPLETE: CPT

## 2018-12-06 RX ORDER — SPIRONOLACTONE 25 MG/1
12.5 TABLET ORAL DAILY
Qty: 30 TABLET | Refills: 3 | Status: SHIPPED | OUTPATIENT
Start: 2018-12-06 | End: 2019-03-06 | Stop reason: SDUPTHER

## 2018-12-06 NOTE — PROGRESS NOTES
Heart Failure Outpatient Progress Note - Sergio Martinez [de-identified] y o  female MRN: [id number]    @ Encounter: [id number]      Assessment/Plan:    Patient Active Problem List    Diagnosis Date Noted    Thrombocytopenia ([place] [address] ) 08/03/2018     Priority: Low    Chronic systolic heart failure ([place] [address] ) 08/01/2018     Priority: Low    Hypertension, essential 08/01/2018     Priority: Low    Other specified glaucoma 08/01/2018     Priority: Low    Autoimmune hepatitis treated with steroids ([place] [address] ) 08/01/2018     Priority: Low    Dilated cardiomyopathy ([place] [address] ) 08/09/2018     # ChronicSystolic CHF, Stage D, NYHA 3 with mildly reduced RV systolic function  Unclear etiology  HTN (normotensive on admission) +/- valvular (less likely, central MR likely functional) +/- myocarditis +/- stress CM +/- LBBB    Weight: 125 lbs    --HIV, SPEP/UPEP, LEA, hepatitis panel all negative, ferritin ok  --R+LHC 8/3/18: shows normal coronaries  RA 3  RV 32/6  PA 32/11/20  PCWP 6  AO sat 89%  MvO2 56 5%  TD CO/CI: 2 3/1 4  Isacc CO/CI: 2 6/1 6  TPG 14  DPG 5  PVR 5 4 (Isacc); 6 1 (TD)  SVR 2070  PVR:SVR <0 25     --cMRI 8/6/18: LVEF ~18%, LVIDd 7 cm, normal RV  There is a thin strip of intramyocardial hyperenhancement of the basal interventricular septum  Echo today: LVEF: 20%, LVEDd 6 4 cm- no improvement  Good RV function  Small IVC    Neurohormonal Blockade:  --Beta-Blocker: Continue metoprolol succinate 25 mg PO daily  --ACEi, ARB or ARNi: Continue Entresto to 49-51 mg PO BID (also for SVR reduction)  --Aldosterone Receptor Blocker: Currently not on MRA  Will start once above meds @ max tolerated dose  --Diuretic: Continue torsemide 10 PO daily  K 3 4 on last labs 11/26     Sudden Cardiac Death Risk Reduction:  --ICD: Continue LifeVest as bridge to ICD vs recovery; repeat TTE in 3 months (11/2018)     Electrical Resynchronization:  --Candidacy for BiV device: LBBB 164 msec  Recommend BiV ICD     Advanced Therapies: Will continue to monitor   If does not recover, age precludes OHT  Possible LVAD candidate, but social situation will be complicated as  has LVAD w/ complications and son works in [address] [place] [address]  Other son is in [facility] 65  Will monitor closely      # HTN  # Normocytic anemia  # Hypothyroidism  # Autoimmune hepatitis: Continue budesonide    TODAY'S PLAN:  Start spironolactone 12 5 mg daily (K consistently low)  BiV ICD given EF < 35%, LBBB 164 msec, NICM- patient is not ready to commit   --2g sodium diet  - Daily weights    HPI:     [de-identified] woman with hx of HTN, autoimmune hepatitis wo follows now for NICM, EF: 25% with workup as above  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 has LVAD so working diagnosis of variant of stress myopathy if plausible  Interval History:  Echo today: LVEF: 20%, LVEDd 6 4 cm- no improvement  Good RV function    Past Medical History:   Diagnosis Date    Hypertension        Review of Systems - Negative except no new symptoms    Allergies   Allergen Reactions    Ambien [Zolpidem]        Current Outpatient Prescriptions:     BUDESONIDE ER PO, Take 3 mg by mouth 2 (two) times a day  , Disp: , Rfl:     metoprolol succinate (TOPROL-XL) 25 mg 24 hr tablet, Take 1 tablet (25 mg total) by mouth daily (Patient taking differently: Take 12 5 mg by mouth daily  ), Disp: 90 tablet, Rfl: 3    potassium chloride (K-DUR,KLOR-CON) 20 mEq tablet, Take 1 tablet (20 mEq total) by mouth daily, Disp: 90 tablet, Rfl: 3    sacubitril-valsartan (ENTRESTO) 49-51 MG TABS, Take 1 tablet by mouth every 12 (twelve) hours, Disp: 180 tablet, Rfl: 3    torsemide (DEMADEX) 10 mg tablet, Take 1 tablet (10 mg total) by mouth daily, Disp: 90 tablet, Rfl: 3    Social History     Social History    Marital status: /Civil Union     Spouse name: N/A    Number of children: N/A    Years of education: N/A     Occupational History    Not on file       Social History Main Topics    Smoking status: Never Smoker    Smokeless tobacco: Never Used    Alcohol use No    Drug use: No    Sexual activity: Not on file     Other Topics Concern    Not on file     Social History Narrative    No narrative on file       No family history on file  Physical Exam:    Vitals:   Vitals:    12/06/18 1507   BP: 114/64   Pulse: 88     Wt Readings from Last 3 Encounters:   12/06/18 56 7 kg (125 lb)   10/25/18 56 9 kg (125 lb 8 oz)   09/27/18 54 6 kg (120 lb 6 4 oz)     Physical Exam:    GEN: Les Liao appears well, alert and oriented x 3, pleasant and cooperative   HEENT: pupils equal, round, and reactive to light; extraocular muscles intact  NECK: supple, no carotid bruits   HEART: regular rhythm, normal S1 and S2 positive S3, no murmurs, clicks, gallops or rubs, JVP is down    LUNGS: clear to auscultation bilaterally; no wheezes, rales, or rhonchi   ABDOMEN: normal bowel sounds, soft, no tenderness, no distention  EXTREMITIES: peripheral pulses normal; no clubbing, cyanosis, or edema  NEURO: no focal findings   SKIN: normal without suspicious lesions on exposed skin    Labs & Results:    Lab Results   Component Value Date    GLUCOSE 94 12/04/2015    CALCIUM 9 4 11/26/2018     12/04/2015    K 3 4 (L) 11/26/2018    CO2 27 11/26/2018     11/26/2018    BUN 13 11/26/2018    CREATININE 0 70 11/26/2018     Lab Results   Component Value Date    WBC 2 79 (L) 08/04/2018    HGB 11 6 08/04/2018    HCT 36 5 08/04/2018    MCV 87 08/04/2018     (L) 08/04/2018     Lab Results   Component Value Date    NTBNP 8,410 (H) 08/13/2018      Lab Results   Component Value Date    CHOL 182 10/23/2015     Lab Results   Component Value Date    HDL 41 08/02/2018    HDL 76 10/23/2015     Lab Results   Component Value Date    LDLCALC 52 08/02/2018    LDLCALC 89 10/23/2015     Lab Results   Component Value Date    TRIG 75 08/02/2018    TRIG 87 10/23/2015     No results found for: CHOLHDL    EKG personally reviewed by Diallo Bunn DO       Counseling / Coordination of Care  Total floor / unit time spent today 25 minutes  Greater than 50% of total time was spent with the patient and / or family counseling and / or coordination of care  A description of the counseling / coordination of care: 15      Thank you for the opportunity to participate in the care of this patient    [address]

## 2018-12-07 ENCOUNTER — APPOINTMENT (OUTPATIENT)
Dept: CARDIAC REHAB | Age: 78
End: 2018-12-07
Payer: MEDICARE

## 2018-12-07 ENCOUNTER — APPOINTMENT (EMERGENCY)
Dept: RADIOLOGY | Facility: HOSPITAL | Age: 78
End: 2018-12-07
Payer: MEDICARE

## 2018-12-07 ENCOUNTER — HOSPITAL ENCOUNTER (EMERGENCY)
Facility: HOSPITAL | Age: 78
Discharge: HOME | End: 2018-12-07
Attending: EMERGENCY MEDICINE | Admitting: EMERGENCY MEDICINE
Payer: MEDICARE

## 2018-12-07 VITALS
TEMPERATURE: 97.9 F | WEIGHT: 125 LBS | HEART RATE: 89 BPM | OXYGEN SATURATION: 98 % | BODY MASS INDEX: 21.12 KG/M2 | SYSTOLIC BLOOD PRESSURE: 142 MMHG | DIASTOLIC BLOOD PRESSURE: 92 MMHG | RESPIRATION RATE: 18 BRPM

## 2018-12-07 DIAGNOSIS — M46.1 SACROILIITIS (HCC): Primary | ICD-10-CM

## 2018-12-07 LAB
ANION GAP SERPL CALCULATED.3IONS-SCNC: 7 MMOL/L (ref 4–13)
BUN SERPL-MCNC: 15 MG/DL (ref 5–25)
CALCIUM SERPL-MCNC: 8.6 MG/DL (ref 8.3–10.1)
CHLORIDE SERPL-SCNC: 103 MMOL/L (ref 100–108)
CO2 SERPL-SCNC: 27 MMOL/L (ref 21–32)
CREAT SERPL-MCNC: 0.69 MG/DL (ref 0.6–1.3)
GFR SERPL CREATININE-BSD FRML MDRD: 95 ML/MIN/1.73SQ M
GLUCOSE SERPL-MCNC: 98 MG/DL (ref 65–140)
POTASSIUM SERPL-SCNC: 3.7 MMOL/L (ref 3.5–5.3)
SODIUM SERPL-SCNC: 137 MMOL/L (ref 136–145)

## 2018-12-07 PROCEDURE — 80048 BASIC METABOLIC PNL TOTAL CA: CPT | Performed by: EMERGENCY MEDICINE

## 2018-12-07 PROCEDURE — 36415 COLL VENOUS BLD VENIPUNCTURE: CPT | Performed by: EMERGENCY MEDICINE

## 2018-12-07 PROCEDURE — 99283 EMERGENCY DEPT VISIT LOW MDM: CPT

## 2018-12-07 PROCEDURE — 73502 X-RAY EXAM HIP UNI 2-3 VIEWS: CPT

## 2018-12-07 RX ORDER — ACETAMINOPHEN 325 MG/1
650 TABLET ORAL ONCE
Status: COMPLETED | OUTPATIENT
Start: 2018-12-07 | End: 2018-12-07

## 2018-12-07 RX ADMIN — ACETAMINOPHEN 650 MG: 325 TABLET, FILM COATED ORAL at 21:45

## 2018-12-08 NOTE — ED ATTENDING ATTESTATION
Ivan Amos DO, saw and evaluated the patient  I have discussed the patient with the resident/non-physician practitioner and agree with the resident's/non-physician practitioner's findings, Plan of Care, and MDM as documented in the resident's/non-physician practitioner's note, except where noted  All available labs and Radiology studies were reviewed  At this point I agree with the current assessment done in the Emergency Department  I have conducted an independent evaluation of this patient including a focused history and a physical exam     ED Note - Kavita Waggoner [de-identified] y o  female MRN: [id number]  Unit/Bed#: ED 01 Encounter: [id number]    History of Present Illness   HPI  Kavita Waggoner is a [de-identified] y o  female who presents for evaluation of pain localized to the right SI joint and that is exacerbated by movement  Tylenol with some relief  Pain is only noted with movement and is otherwise completely resolved  Patient currently in cardiac rehab and is using the treadmill, stationary bicycle and elliptical   No focal neurological deficits  No weakness  No fever or chills  No flank pain or urinary symptoms  No chest pain, shortness of breath, nausea vomiting, dizziness  No ripping or tearing type pain  REVIEW OF SYSTEMS  See HPI for further details  12 systems reviewed and otherwise negative except as noted  Historical Information     PAST MEDICAL HISTORY  Past Medical History:   Diagnosis Date    Bell's palsy     Cardiac disease     Hypertension        FAMILY HISTORY  History reviewed  No pertinent family history      SOCIAL HISTORY  Social History     Social History    Marital status: /Civil Union     Spouse name: N/A    Number of children: N/A    Years of education: N/A     Social History Main Topics    Smoking status: Never Smoker    Smokeless tobacco: Never Used    Alcohol use No    Drug use: No    Sexual activity: Not Asked     Other Topics Concern    None Social History Narrative    None       SURGICAL HISTORY  History reviewed  No pertinent surgical history  Meds/Allergies     CURRENT MEDICATIONS    Current Facility-Administered Medications:     acetaminophen (TYLENOL) tablet 650 mg, 650 mg, Oral, Once, Geremias Pena MD    Current Outpatient Prescriptions:     BUDESONIDE ER PO, Take 3 mg by mouth 2 (two) times a day  , Disp: , Rfl:     metoprolol succinate (TOPROL-XL) 25 mg 24 hr tablet, Take 1 tablet (25 mg total) by mouth daily (Patient taking differently: Take 12 5 mg by mouth daily  ), Disp: 90 tablet, Rfl: 3    potassium chloride (K-DUR,KLOR-CON) 20 mEq tablet, Take 1 tablet (20 mEq total) by mouth daily, Disp: 90 tablet, Rfl: 3    sacubitril-valsartan (ENTRESTO) 49-51 MG TABS, Take 1 tablet by mouth every 12 (twelve) hours, Disp: 180 tablet, Rfl: 3    spironolactone (ALDACTONE) 25 mg tablet, Take 0 5 tablets (12 5 mg total) by mouth daily, Disp: 30 tablet, Rfl: 3    torsemide (DEMADEX) 10 mg tablet, Take 1 tablet (10 mg total) by mouth daily, Disp: 90 tablet, Rfl: 3    (Not in a hospital admission)    ALLERGIES  Allergies   Allergen Reactions    Ambien [Zolpidem]      Objective     PHYSICAL EXAM    VITAL SIGNS: Blood pressure 142/92, pulse 89, temperature 97 9 °F (36 6 °C), temperature source Oral, resp  rate 18, weight 56 7 kg (125 lb), SpO2 98 %, not currently breastfeeding      Constitutional:  Appears well developed and well nourished, no acute distress, non-toxic appearance   Eyes:  PERRL, EOMI, conjunctivae pink, sclerae non-icteric    HENT:  Normocephalic/Atraumatic, no rhinorrhea, mucous membranes moist   Neck: normal range of motion, no tenderness, supple   Respiratory:  No respiratory distress, normal breath sounds  Cardiovascular:  Normal rate, normal rhythm,  Neurovascular intact in all extremities   GI:  Soft, non-tender, non-distended  :  No CVAT, no flank ecchymosis  Musculoskeletal:  No tenderness on exam, focal area of muscle spasm over right lower back/ SI joint region  Full ROM of all exts  Pain elicited in right SI joint with flexion of right hip  No swelling or edema, no tenderness, no deformities  Integument:  Pink, warm, dry, Well hydrated, no rash, no erythema, no bullae   Lymphatic:  No cervical/ tonsillar/ submandibular lymphadenopathy noted   Neurologic:  Awake, Alert & oriented x 3, CN 2-12 intact, no focal neurological deficits, motor function intact  Psychiatric:  Speech and behavior appropriate     Bedside US: abdominal aorta wnl and no obvious free flap or aneurysm    ED COURSE and MDM:    Assessment/Plan   Assessment:  Marcella De Santiago is a [de-identified] y o  female presents for evaluation of pain in right SI region  Plan:  XR, bedside abdominal US, symptom management  CRITICAL CARE TIME: 0 minutes      Portions of the record may have been created with voice recognition software  Occasional wrong word or "sound a like" substitutions may have occurred due to the inherent limitations of voice recognition software       ED Provider  Electronically Signed by

## 2018-12-08 NOTE — DISCHARGE INSTRUCTIONS
Sacroiliitis   WHAT YOU NEED TO KNOW:   Sacroiliitis is a painful swelling of one or both of your sacroiliac joints that lasts at least 3 months  The sacroiliac joint connects your pelvis to the base of your spine  DISCHARGE INSTRUCTIONS:   Medicines:  Ask for more information about these and other medicines you may need to treat sacroiliitis:  · Pain medicine: You may be given medicine to take away or decrease pain  Do not wait until the pain is severe before you take your medicine  You may be given the medicine as a pill to swallow or as a lotion that you put on the painful area  · NSAIDs  help decrease swelling and pain or fever  This medicine is available with or without a doctor's order  NSAIDs can cause stomach bleeding or kidney problems in certain people  If you take blood thinner medicine, always ask your healthcare provider if NSAIDs are safe for you  Always read the medicine label and follow directions  · Muscle relaxers  help decrease pain and muscle spasms  · Take your medicine as directed  Contact your healthcare provider if you think your medicine is not helping or if you have side effects  Tell him of her if you are allergic to any medicine  Keep a list of the medicines, vitamins, and herbs you take  Include the amounts, and when and why you take them  Bring the list or the pill bottles to follow-up visits  Carry your medicine list with you in case of an emergency  Physical therapy:  Your healthcare provider may suggest physical therapy  Your physical therapist may teach you exercises to improve posture (the way you stand and sit), flexibility, and strength in your lower back  He may also teach you how to remain safely active and avoid further injury  Follow the exercise plan given to you by your physical therapist   Rest:  Follow your healthcare provider's instructions about how much rest you should get  Avoid activity that worsens your pain    Ice or heat packs:  Use ice or heat packs on the sore area of your body to decrease the pain and swelling  Put ice in a plastic bag covered with a towel on your low back  Cover heated items with a towel to avoid burns  Use ice and heat as directed  Follow up with your healthcare provider or spine specialist within 1 to 2 weeks:  Write down your questions so you remember to ask them during your visits  Contact your healthcare provider or spine specialist if:   · Your pain makes it hard for you to do your daily activities, such as work or school  · Your pain does not go away after treatment  · You feel depressed or anxious  · You have questions about your condition or care  Return to the emergency department if:   · You have a fever  · Your pain is worse than before  · Your pain prevents you from sleeping  © 2017 [facility] Information is for End User's use only and may not be sold, redistributed or otherwise used for commercial purposes  All illustrations and images included in CareNotes® are the copyrighted property of A D A M , Inc  or Shadi Tolentino  The above information is an  only  It is not intended as medical advice for individual conditions or treatments  Talk to your doctor, nurse or pharmacist before following any medical regimen to see if it is safe and effective for you

## 2018-12-08 NOTE — ED PROVIDER NOTES
History  Chief Complaint   Patient presents with    Leg Pain     c/o right leg pain starting 12/2/18  Pt states she was advised to come to ER by family doctor  Pain starts in right hip, travels to right knee  49-year-old female with heart failure with reduced ejection fraction of 18% presenting emergency department for evaluation of right low back and hip pain present for the last week  Pain has been unchanged over the last week but comes and goes  She says it happens with activity but is unable to state any particular movements that exacerbated the pain  She has been taking Tylenol with mild improvement of her symptoms  She denies any traumas or injuries but does acknowledge that she has been going to cardiac rehab and last week she used the exercise bike for the 1st time she is also using a treadmill at rehab  She denies any recent infectious symptoms including fevers or chills or rashes  She denies any weight change  She does not normally have trouble ambulating but does say that she has to sit down when the pain comes on  She denies any swelling in the extremities or around the joint  Review of systems otherwise negative  Prior to Admission Medications   Prescriptions Last Dose Informant Patient Reported? Taking?    BUDESONIDE ER PO   Yes No   Sig: Take 3 mg by mouth 2 (two) times a day     metoprolol succinate (TOPROL-XL) 25 mg 24 hr tablet   No No   Sig: Take 1 tablet (25 mg total) by mouth daily   Patient taking differently: Take 12 5 mg by mouth daily     potassium chloride (K-DUR,KLOR-CON) 20 mEq tablet   No No   Sig: Take 1 tablet (20 mEq total) by mouth daily   sacubitril-valsartan (ENTRESTO) 49-51 MG TABS   No No   Sig: Take 1 tablet by mouth every 12 (twelve) hours   spironolactone (ALDACTONE) 25 mg tablet   No No   Sig: Take 0 5 tablets (12 5 mg total) by mouth daily   torsemide (DEMADEX) 10 mg tablet   No No   Sig: Take 1 tablet (10 mg total) by mouth daily Facility-Administered Medications: None       Past Medical History:   Diagnosis Date    Bell's palsy     Cardiac disease     Hypertension        History reviewed  No pertinent surgical history  History reviewed  No pertinent family history  I have reviewed and agree with the history as documented  Social History   Substance Use Topics    Smoking status: Never Smoker    Smokeless tobacco: Never Used    Alcohol use No        Review of Systems   Constitutional: Negative for chills and fever  HENT: Negative for congestion and sore throat  Eyes: Negative for visual disturbance  Respiratory: Negative for cough and shortness of breath  Cardiovascular: Negative for chest pain and palpitations  Gastrointestinal: Negative for abdominal pain, diarrhea, nausea and vomiting  Genitourinary: Negative for difficulty urinating and dysuria  Musculoskeletal: Positive for arthralgias and back pain  Negative for gait problem, joint swelling, myalgias, neck pain and neck stiffness  Skin: Negative for rash  Neurological: Negative for weakness, light-headedness, numbness and headaches  Physical Exam  ED Triage Vitals [12/07/18 1825]   Temperature Pulse Respirations Blood Pressure SpO2   97 9 °F (36 6 °C) 88 16 126/58 100 %      Temp Source Heart Rate Source Patient Position - Orthostatic VS BP Location FiO2 (%)   Oral Monitor Sitting Right arm --      Pain Score       --           Orthostatic Vital Signs  Vitals:    12/07/18 1825 12/07/18 2110   BP: 126/58 142/92   Pulse: 88 89   Patient Position - Orthostatic VS: Sitting        Physical Exam   Constitutional: She is oriented to person, place, and time  She appears well-developed and well-nourished  No distress  HENT:   Head: Normocephalic and atraumatic  Mouth/Throat: Oropharynx is clear and moist    Eyes: Pupils are equal, round, and reactive to light  EOM are normal    Neck: Normal range of motion  Neck supple     Cardiovascular: Normal rate, regular rhythm and normal heart sounds  Pulmonary/Chest: Effort normal and breath sounds normal  No respiratory distress  Abdominal: Soft  Bowel sounds are normal  There is no tenderness  Musculoskeletal: Normal range of motion  She exhibits tenderness (Mild tenderness to palpation over the SI joint  )  She exhibits no edema or deformity  Normal gait  Patient is able to squat to approximately 100°  Strength in the lower extremities bilaterally  Normal sensation bilaterally  No pitting edema in the lower extremities  Negative Homans sign without any calf swelling  Neurological: She is alert and oriented to person, place, and time  She displays normal reflexes  No sensory deficit  She exhibits normal muscle tone  Coordination normal    Skin: Skin is warm and dry  Capillary refill takes less than 2 seconds  No rash noted  Psychiatric: She has a normal mood and affect  Nursing note and vitals reviewed  ED Medications  Medications   acetaminophen (TYLENOL) tablet 650 mg (650 mg Oral Given 12/7/18 2145)       Diagnostic Studies  Results Reviewed     Procedure Component Value Units Date/Time    Basic metabolic panel [[id number]] Collected:  12/07/18 2145    Lab Status:  Final result Specimen:  Blood from Arm, Right Updated:  12/07/18 2215     Sodium 137 mmol/L      Potassium 3 7 mmol/L      Chloride 103 mmol/L      CO2 27 mmol/L      ANION GAP 7 mmol/L      BUN 15 mg/dL      Creatinine 0 69 mg/dL      Glucose 98 mg/dL      Calcium 8 6 mg/dL      eGFR 95 ml/min/1 73sq m     Narrative:         National Kidney Disease Education Program recommendations are as follows:  GFR calculation is accurate only with a steady state creatinine  Chronic Kidney disease less than 60 ml/min/1 73 sq  meters  Kidney failure less than 15 ml/min/1 73 sq  meters                   XR hip/pelv 2-3 vws right   ED Interpretation by Leigh Steve MD (12/07 2235)   No acute fracture      Final Result by Megan Mercado MD (12/08 4443)      No acute osseous abnormality  Workstation performed: [id number]               Procedures  Procedures      Phone Consults  ED Phone Contact    ED Course                               MDM  Number of Diagnoses or Management Options  [facility], [facility] Campusoiliitis [facility]):   Diagnosis management comments: 49-year-old female presenting to the emergency department for evaluation of right low back and hip pain that occasionally radiates down the leg  Physical exam is most consistent with sacroiliitis  She is given Tylenol in the emergency department with improvement of her symptoms  Symptomatic management was discussed and she was encouraged to follow up with her physical therapist who she is seeing for cardiac rehab to discuss altering her therapy regimen while she is symptomatic  CritCare Time    Disposition  Final diagnoses:   Sacroiliitis ([address] )     Time reflects when diagnosis was documented in both MDM as applicable and the Disposition within this note     Time User Action Codes Description Comment    12/7/2018 10:36 PM Ady Abel Add [M46 1] Sacroiliitis [facility])       ED Disposition     ED Disposition Condition Comment    Discharge  Carri Blanton discharge to home/self care      Condition at discharge: Good        Follow-up Information     Follow up With Specialties Details Why Contact Info    Shannon Luna MD Family Medicine In 1 week For re-evaluation of symptoms [address] [facility] [address] [address]  [phone]            Discharge Medication List as of 12/7/2018 10:38 PM      CONTINUE these medications which have NOT CHANGED    Details   BUDESONIDE ER PO Take 3 mg by mouth 2 (two) times a day  , Historical Med      metoprolol succinate (TOPROL-XL) 25 mg 24 hr tablet Take 1 tablet (25 mg total) by mouth daily, Starting Tue 9/4/2018, Normal      potassium chloride (K-DUR,KLOR-CON) 20 mEq tablet Take 1 tablet (20 mEq total) by mouth daily, Starting Mon 8/13/2018, Normal sacubitril-valsartan (ENTRESTO) 49-51 MG TABS Take 1 tablet by mouth every 12 (twelve) hours, Starting Tue 9/4/2018, Normal      spironolactone (ALDACTONE) 25 mg tablet Take 0 5 tablets (12 5 mg total) by mouth daily, Starting u 12/6/2018, Normal      torsemide (DEMADEX) 10 mg tablet Take 1 tablet (10 mg total) by mouth daily, Starting Tue 9/4/2018, Normal           No discharge procedures on file  ED Provider  Attending physically available and evaluated Lacey Ybarra  I managed the patient along with the ED Attending      Electronically Signed by         Cori Queen MD  12/08/18 8156

## 2018-12-10 ENCOUNTER — APPOINTMENT (OUTPATIENT)
Dept: CARDIAC REHAB | Age: 78
End: 2018-12-10
Payer: MEDICARE

## 2018-12-12 ENCOUNTER — APPOINTMENT (OUTPATIENT)
Dept: CARDIAC REHAB | Age: 78
End: 2018-12-12
Payer: MEDICARE

## 2018-12-13 ENCOUNTER — TELEPHONE (OUTPATIENT)
Dept: CARDIOLOGY CLINIC | Facility: CLINIC | Age: 78
End: 2018-12-13

## 2018-12-13 NOTE — TELEPHONE ENCOUNTER
P/C needed clarification on meds  Advised to take torsemide in am with potassium and the take aldactone around noon  Dr Troy Bhagat ordered both meds  Also to have BMP done 1 week after starting aldactone

## 2018-12-14 ENCOUNTER — DOCUMENTATION (OUTPATIENT)
Dept: CARDIOLOGY CLINIC | Facility: CLINIC | Age: 78
End: 2018-12-14

## 2018-12-14 ENCOUNTER — APPOINTMENT (OUTPATIENT)
Dept: CARDIAC REHAB | Age: 78
End: 2018-12-14
Payer: MEDICARE

## 2018-12-14 ENCOUNTER — TELEPHONE (OUTPATIENT)
Dept: CARDIOLOGY CLINIC | Facility: CLINIC | Age: 78
End: 2018-12-14

## 2018-12-14 NOTE — PROGRESS NOTES
Luther Peer came into office confused about taking 2 diuretics  Discussed need for both  Advised of BMP x1 week  Has not started Spironolactone  I wrote a blank letter writing out Torsemide, Potassium and Spironolactone and when to take them and provided to Pt

## 2018-12-17 ENCOUNTER — CLINICAL SUPPORT (OUTPATIENT)
Dept: CARDIAC REHAB | Age: 78
End: 2018-12-17
Payer: MEDICARE

## 2018-12-17 DIAGNOSIS — I50.22 CHRONIC SYSTOLIC HEART FAILURE (HCC): ICD-10-CM

## 2018-12-17 PROCEDURE — 93798 PHYS/QHP OP CAR RHAB W/ECG: CPT

## 2018-12-19 ENCOUNTER — CLINICAL SUPPORT (OUTPATIENT)
Dept: CARDIAC REHAB | Age: 78
End: 2018-12-19
Payer: MEDICARE

## 2018-12-19 DIAGNOSIS — I50.22 CHRONIC SYSTOLIC HEART FAILURE (HCC): ICD-10-CM

## 2018-12-19 PROCEDURE — 93798 PHYS/QHP OP CAR RHAB W/ECG: CPT

## 2018-12-19 NOTE — PROGRESS NOTES
Cardiac Rehabilitation Plan of Care   90 day       Today's date: 2018   Visits: 29  Patient name: Corrinne Pates      : 1940  Age: [de-identified] y o  MRN: [id number]  Referring Physician: Nancy Anaya MD  Provider: [facility]  Clinician: Maria Victoria Palma MS, CCRP    Dx:   Encounter Diagnosis   Name Primary?  Chronic systolic heart failure [facility])      Date of onset: 8/3/18    COMMENTS:   Bala Pacheco returned to exercise this week following a almost 2 week absence d/t leg pain  [place] in [place] which may have contributed to her leg pain  I encouraged her to wear more supportive shoes  She now is wearing sneakers  She completes 40 mins at 2 2 - 3 5 METs  Rare to occ PVCs  Exercise HR   She reports she has remained free of chest tightness since her medications were adjusted last month  She finds she has slightly more energy for ADLs, no SOB with climbing stairs able to carry small loads up and down without difficulty  She takes extra time at the store 2x/wk to walk  We will continue to progress as tolerated as she completes her final exercise sessions        Medication compliance: Yes   Comments:   Fall Risk: Low   Comments:     EXERCISE/ACTIVITY    Cardiovascular:   Min: 40-45   METS: 2 0-3 5   HR:    RPE: 4-6   O2 sat:     Modalities: Treadmill, UBE, NuStep and Recumbent bike  Strength trainin-3 days / week, 12-15 repitations , 1-2 sets per modality  and Will be added following 2-3 weeks of monitored exercise sessions   Modalities: Leg Press, Lateral Raise, Arm Extension, Arm Curl, Seated Row and Wall push-ups  EKG changes: occ PVCs, PACs  Home activity: light to moderate ADLs, caring for her   Goals: 10% improvement in functional capacity, improved DASI score by 10%, Increase in peak CR METs by 40%, Improved 6MWT distance by 10%, Resume ADLs with increased strength and Exercise 5 days/wk, >150mins/wk  Education: signs and sxs and RPE scale   Plan:home exercise 30+ mins 2 days opposite CR and Education class: Risk Factors for Heart Disease  Readiness to change: Preparation    NUTRITION    Weight control:    Starting weight: 120   Current weight:   124  Waist circumference:    Starting:    Current:    Diabetes: N/A  Lipid management: Last lipid profile 18  Chol 108  TRG 75  HDL 41  LDL 52  Goals:continue eating heart healthy  Education:low sodium diet  hydration  diet and lipid management  Plan: Education class: Reading Food Labels, Education Class: Heart Healthy Eating, Reduce added sugars <25g/day, eliminate processed meats and swtich to low fat dairy  Readiness to change: Action    PSYCHOSOCIAL    Emotional:   Patient reports he/she is coping well with good social support and denies depression or anxiety  Self-reported stress level: 8   Social support: Good   Goals:  Feelings in [place] Score < 3, Social Support in [place] Score < 3, Daily Activity in [place] Score < 3 and Overall Health in [place] Score < 3  Education: benefits of positive support system, coping mechanisms and depression and CAD  Plan: Class: Stress and Your Health and Class: Relaxation  Readiness to change: Preparation    OTHER CORE COMPONENTS     Tobacco:   History   Smoking Status    Never Smoker   Smokeless Tobacco    Never Used     Blood pressure:    Restin/70   Exercise: 146/72  Goals: reduced dietary sodium <2300mg and consistent exercise >150 mins/wk  Education:  understanding HTN and CAD and low sodium diet and HTN  Plan: Class: Understanding Heart Disease and Class: Common Heart Medications  Readiness to change: Action

## 2018-12-21 ENCOUNTER — CLINICAL SUPPORT (OUTPATIENT)
Dept: CARDIAC REHAB | Age: 78
End: 2018-12-21
Payer: MEDICARE

## 2018-12-21 DIAGNOSIS — I50.22 CHRONIC SYSTOLIC HEART FAILURE (HCC): ICD-10-CM

## 2018-12-21 PROCEDURE — 93798 PHYS/QHP OP CAR RHAB W/ECG: CPT

## 2018-12-24 ENCOUNTER — APPOINTMENT (OUTPATIENT)
Dept: CARDIAC REHAB | Age: 78
End: 2018-12-24
Payer: MEDICARE

## 2018-12-26 ENCOUNTER — CLINICAL SUPPORT (OUTPATIENT)
Dept: CARDIAC REHAB | Age: 78
End: 2018-12-26
Payer: MEDICARE

## 2018-12-26 DIAGNOSIS — I50.22 CHRONIC SYSTOLIC HEART FAILURE (HCC): ICD-10-CM

## 2018-12-26 PROCEDURE — 93798 PHYS/QHP OP CAR RHAB W/ECG: CPT

## 2018-12-28 ENCOUNTER — CLINICAL SUPPORT (OUTPATIENT)
Dept: CARDIAC REHAB | Age: 78
End: 2018-12-28
Payer: MEDICARE

## 2018-12-28 ENCOUNTER — TELEPHONE (OUTPATIENT)
Dept: CARDIOLOGY CLINIC | Facility: CLINIC | Age: 78
End: 2018-12-28

## 2018-12-28 ENCOUNTER — APPOINTMENT (OUTPATIENT)
Dept: LAB | Age: 78
End: 2018-12-28
Payer: MEDICARE

## 2018-12-28 DIAGNOSIS — I50.22 CHRONIC SYSTOLIC HEART FAILURE (HCC): ICD-10-CM

## 2018-12-28 DIAGNOSIS — I42.0 DILATED CARDIOMYOPATHY (HCC): ICD-10-CM

## 2018-12-28 DIAGNOSIS — I50.22 CHRONIC SYSTOLIC CHF (CONGESTIVE HEART FAILURE), NYHA CLASS 3 (HCC): ICD-10-CM

## 2018-12-28 LAB
ANION GAP SERPL CALCULATED.3IONS-SCNC: 5 MMOL/L (ref 4–13)
BUN SERPL-MCNC: 19 MG/DL (ref 5–25)
CALCIUM SERPL-MCNC: 8.9 MG/DL (ref 8.3–10.1)
CHLORIDE SERPL-SCNC: 103 MMOL/L (ref 100–108)
CO2 SERPL-SCNC: 27 MMOL/L (ref 21–32)
CREAT SERPL-MCNC: 0.88 MG/DL (ref 0.6–1.3)
GFR SERPL CREATININE-BSD FRML MDRD: 72 ML/MIN/1.73SQ M
GLUCOSE SERPL-MCNC: 83 MG/DL (ref 65–140)
POTASSIUM SERPL-SCNC: 4 MMOL/L (ref 3.5–5.3)
SODIUM SERPL-SCNC: 135 MMOL/L (ref 136–145)

## 2018-12-28 PROCEDURE — 80048 BASIC METABOLIC PNL TOTAL CA: CPT

## 2018-12-28 PROCEDURE — 93798 PHYS/QHP OP CAR RHAB W/ECG: CPT

## 2018-12-28 PROCEDURE — 36415 COLL VENOUS BLD VENIPUNCTURE: CPT

## 2018-12-28 NOTE — TELEPHONE ENCOUNTER
Rosanna Roger called asking if blood work needed to be done  Pt made aware that BMP needed to be drawn one week after starting spironolactone  Pt found script and said she will have it drawn today

## 2018-12-31 ENCOUNTER — CLINICAL SUPPORT (OUTPATIENT)
Dept: CARDIAC REHAB | Age: 78
End: 2018-12-31
Payer: MEDICARE

## 2018-12-31 DIAGNOSIS — I50.22 CHRONIC SYSTOLIC HEART FAILURE (HCC): ICD-10-CM

## 2018-12-31 PROCEDURE — 93798 PHYS/QHP OP CAR RHAB W/ECG: CPT

## 2019-01-02 ENCOUNTER — CLINICAL SUPPORT (OUTPATIENT)
Dept: CARDIAC REHAB | Age: 79
End: 2019-01-02
Payer: MEDICARE

## 2019-01-02 DIAGNOSIS — I50.22 CHRONIC SYSTOLIC HEART FAILURE (HCC): ICD-10-CM

## 2019-01-02 PROCEDURE — 93798 PHYS/QHP OP CAR RHAB W/ECG: CPT

## 2019-01-03 ENCOUNTER — TELEPHONE (OUTPATIENT)
Dept: CARDIOLOGY CLINIC | Facility: CLINIC | Age: 79
End: 2019-01-03

## 2019-01-04 ENCOUNTER — CLINICAL SUPPORT (OUTPATIENT)
Dept: CARDIAC REHAB | Age: 79
End: 2019-01-04
Payer: MEDICARE

## 2019-01-04 DIAGNOSIS — I50.22 CHRONIC SYSTOLIC HEART FAILURE (HCC): ICD-10-CM

## 2019-01-04 PROCEDURE — 93798 PHYS/QHP OP CAR RHAB W/ECG: CPT

## 2019-01-07 ENCOUNTER — CLINICAL SUPPORT (OUTPATIENT)
Dept: CARDIAC REHAB | Age: 79
End: 2019-01-07
Payer: MEDICARE

## 2019-01-07 DIAGNOSIS — I50.22 CHRONIC SYSTOLIC HEART FAILURE (HCC): ICD-10-CM

## 2019-01-07 PROCEDURE — 93798 PHYS/QHP OP CAR RHAB W/ECG: CPT

## 2019-01-07 NOTE — PROGRESS NOTES
Cardiac Rehabilitation Plan of Care   DISCHARGE      Today's date: 2019   Visits: 36  Patient name: Deny Ellis      : 1940  Age: [de-identified] y o  MRN: [id number]  Referring Physician: Fernando Leyva MD  Provider: Sharona Parra  Clinician: Jaxon Gonzalez MS, CCRP    Dx:   Encounter Diagnosis   Name Primary?  Chronic systolic heart failure [facility])      Date of onset: 8/3/18    COMMENTS:  This is a final note for Juan C Ca  She completed 36 monitored sessions  She had a 34% improvement in her functional capacity increasing the distance in the 6MW by 59%  She walked 1290ft  Her max METs in CR increased by 71%  She completes 40 mins at 2 6-3 6 METs plus wt training and has remained free of cardiac s/s including SOB  Isolated to Occ PVCs on telemetry  Exercise HR response has improved now reaching max of 108  Resting /60 - 124 60 increasing appropriately  with exercise to 144/60  Body wt has remained stable  She limits sodium intake  She has learned how to pace herself with ADLs and has asked her sister-in-law to help with the moderate ADLs which are difficulty for Juan C Ca to perform on her own  She plans to get started at [address] in [facility]  I provided contact information for Mignon Gonzales so that she can make an appointment        Medication compliance: Yes   Comments:   Fall Risk: Low   Comments:     EXERCISE/ACTIVITY    Cardiovascular:   Min: 40-45   METS: 2 6-3 6   HR:    RPE: 4-6   O2 sat:     Modalities: Treadmill, UBE, NuStep and Recumbent bike  Strength trainin-3 days / week, 12-15 repitations  and 1-2 sets per modality    Modalities: Leg Press, Lateral Raise, Arm Extension, Arm Curl, Seated Row and Wall push-ups  EKG changes: rare to occ PVCs  Home activity: light to moderate ADLs, caring for her   Goals: Resume ADLs with increased strength and Exercise 5 days/wk, >150mins/wk  Education: signs and sxs and RPE scale ,  Education class: Risk Factors for Heart Disease  Plan: continue at [address]  [facility] 3-5 days/wk  Readiness to change: Action    NUTRITION    Weight control:    Starting weight: 120   Current weight:   117 2  Waist circumference:    Starting:    Current:    Diabetes: N/A  Lipid management: Last lipid profile 18  Chol 108  TRG 75  HDL 41  LDL 52  Goals:continue eating heart healthy  Education:low sodium diet  hydration  diet and lipid management  Education class: Reading Food Labels, Education Class: Heart Healthy Eating,  Plan:  Reduce added sugars <25g/day, eliminate processed meats and swtich to low fat dairy  Readiness to change: Action    PSYCHOSOCIAL    Emotional:   PHQ-9 = 2   (mild depression)  Patient reports he/she is coping well with good social support and denies depression or anxiety  Self-reported stress level: 4   Social support: Good   Goals:  improved concentration and increased energy  Education: benefits of positive support system, coping mechanisms and depression and CAD, Class: Stress and Your Health and Class: Relaxation  Plan: accept aid from family for ADLs  Readiness to change: Action    OTHER CORE COMPONENTS     Tobacco:   History   Smoking Status    Never Smoker   Smokeless Tobacco    Never Used     Blood pressure:    Restin/60 - 124/60   Exercise: 144/60  Goals: reduced dietary sodium <2300mg and consistent exercise >150 mins/wk  Education:  understanding HTN and CAD and low sodium diet and HTN, Class: Understanding Heart Disease and Class: Common Heart Medications  Plan: avoid processed foods, regular exercise, medication compliance  Readiness to change: Action

## 2019-01-23 ENCOUNTER — LAB (OUTPATIENT)
Dept: LAB | Age: 79
End: 2019-01-23
Payer: MEDICARE

## 2019-01-23 ENCOUNTER — TRANSCRIBE ORDERS (OUTPATIENT)
Dept: ADMINISTRATIVE | Age: 79
End: 2019-01-23

## 2019-01-23 DIAGNOSIS — K75.4 CHRONIC AGGRESSIVE HEPATITIS (HCC): ICD-10-CM

## 2019-01-23 DIAGNOSIS — K75.4 CHRONIC AGGRESSIVE HEPATITIS (HCC): Primary | ICD-10-CM

## 2019-01-23 LAB
ALBUMIN SERPL BCP-MCNC: 3.5 G/DL (ref 3.5–5)
ALP SERPL-CCNC: 43 U/L (ref 46–116)
ALT SERPL W P-5'-P-CCNC: 28 U/L (ref 12–78)
ANION GAP SERPL CALCULATED.3IONS-SCNC: 2 MMOL/L (ref 4–13)
AST SERPL W P-5'-P-CCNC: 27 U/L (ref 5–45)
BILIRUB SERPL-MCNC: 0.89 MG/DL (ref 0.2–1)
BUN SERPL-MCNC: 21 MG/DL (ref 5–25)
CALCIUM SERPL-MCNC: 9.4 MG/DL (ref 8.3–10.1)
CHLORIDE SERPL-SCNC: 102 MMOL/L (ref 100–108)
CO2 SERPL-SCNC: 28 MMOL/L (ref 21–32)
CREAT SERPL-MCNC: 0.88 MG/DL (ref 0.6–1.3)
GFR SERPL CREATININE-BSD FRML MDRD: 72 ML/MIN/1.73SQ M
GLUCOSE SERPL-MCNC: 90 MG/DL (ref 65–140)
POTASSIUM SERPL-SCNC: 4.3 MMOL/L (ref 3.5–5.3)
PROT SERPL-MCNC: 8.1 G/DL (ref 6.4–8.2)
SODIUM SERPL-SCNC: 132 MMOL/L (ref 136–145)

## 2019-01-23 PROCEDURE — 36415 COLL VENOUS BLD VENIPUNCTURE: CPT

## 2019-01-23 PROCEDURE — 80053 COMPREHEN METABOLIC PANEL: CPT

## 2019-03-06 DIAGNOSIS — I50.22 CHRONIC SYSTOLIC CHF (CONGESTIVE HEART FAILURE), NYHA CLASS 3 (HCC): ICD-10-CM

## 2019-03-06 DIAGNOSIS — I42.0 DILATED CARDIOMYOPATHY (HCC): ICD-10-CM

## 2019-03-06 RX ORDER — SPIRONOLACTONE 25 MG/1
12.5 TABLET ORAL DAILY
Qty: 30 TABLET | Refills: 2 | Status: SHIPPED | OUTPATIENT
Start: 2019-03-06 | End: 2020-12-23 | Stop reason: SDUPTHER

## 2019-03-11 ENCOUNTER — APPOINTMENT (OUTPATIENT)
Dept: LAB | Age: 79
End: 2019-03-11
Attending: FAMILY MEDICINE
Payer: MEDICARE

## 2019-03-11 ENCOUNTER — TRANSCRIBE ORDERS (OUTPATIENT)
Dept: ADMINISTRATIVE | Age: 79
End: 2019-03-11

## 2019-03-11 ENCOUNTER — OFFICE VISIT (OUTPATIENT)
Dept: CARDIOLOGY CLINIC | Facility: CLINIC | Age: 79
End: 2019-03-11
Payer: MEDICARE

## 2019-03-11 VITALS
OXYGEN SATURATION: 98 % | HEART RATE: 77 BPM | WEIGHT: 121 LBS | HEIGHT: 66 IN | BODY MASS INDEX: 19.44 KG/M2 | SYSTOLIC BLOOD PRESSURE: 124 MMHG | DIASTOLIC BLOOD PRESSURE: 52 MMHG

## 2019-03-11 DIAGNOSIS — I10 HTN (HYPERTENSION), BENIGN: ICD-10-CM

## 2019-03-11 DIAGNOSIS — I50.22 CHRONIC SYSTOLIC CHF (CONGESTIVE HEART FAILURE), NYHA CLASS 2 (HCC): Primary | ICD-10-CM

## 2019-03-11 DIAGNOSIS — I50.9 CONGESTIVE HEART FAILURE, UNSPECIFIED HF CHRONICITY, UNSPECIFIED HEART FAILURE TYPE (HCC): Primary | ICD-10-CM

## 2019-03-11 DIAGNOSIS — I50.9 CONGESTIVE HEART FAILURE, UNSPECIFIED HF CHRONICITY, UNSPECIFIED HEART FAILURE TYPE (HCC): ICD-10-CM

## 2019-03-11 DIAGNOSIS — I42.8 NICM (NONISCHEMIC CARDIOMYOPATHY) (HCC): ICD-10-CM

## 2019-03-11 LAB
ANION GAP SERPL CALCULATED.3IONS-SCNC: 6 MMOL/L (ref 4–13)
BUN SERPL-MCNC: 18 MG/DL (ref 5–25)
CALCIUM SERPL-MCNC: 9.1 MG/DL (ref 8.3–10.1)
CHLORIDE SERPL-SCNC: 102 MMOL/L (ref 100–108)
CO2 SERPL-SCNC: 32 MMOL/L (ref 21–32)
CREAT SERPL-MCNC: 0.98 MG/DL (ref 0.6–1.3)
GFR SERPL CREATININE-BSD FRML MDRD: 63 ML/MIN/1.73SQ M
GLUCOSE SERPL-MCNC: 83 MG/DL (ref 65–140)
NT-PROBNP SERPL-MCNC: 9154 PG/ML
POTASSIUM SERPL-SCNC: 3.1 MMOL/L (ref 3.5–5.3)
SODIUM SERPL-SCNC: 140 MMOL/L (ref 136–145)

## 2019-03-11 PROCEDURE — 36415 COLL VENOUS BLD VENIPUNCTURE: CPT

## 2019-03-11 PROCEDURE — 99214 OFFICE O/P EST MOD 30 MIN: CPT | Performed by: INTERNAL MEDICINE

## 2019-03-11 PROCEDURE — 80048 BASIC METABOLIC PNL TOTAL CA: CPT

## 2019-03-11 PROCEDURE — 83880 ASSAY OF NATRIURETIC PEPTIDE: CPT

## 2019-03-11 NOTE — PROGRESS NOTES
Heart Failure Outpatient Progress Note - Judi Alvarado 78 y o  female MRN: [id number]    @ Encounter: [id number]      Assessment/Plan:    Patient Active Problem List    Diagnosis Date Noted    Thrombocytopenia ([place] [address] ) 08/03/2018     Priority: Low    Chronic systolic heart failure ([place] [address] ) 08/01/2018     Priority: Low    Hypertension, essential 08/01/2018     Priority: Low    Other specified glaucoma 08/01/2018     Priority: Low    Autoimmune hepatitis treated with steroids ([place] [address] ) 08/01/2018     Priority: Low    Dilated cardiomyopathy ([place] [address] ) 08/09/2018     # ChronicSystolic CHF, Stage D, NYHA 3 with mildly reduced RV systolic function  Unclear etiology  HTN (normotensive on admission) +/- valvular (less likely, central MR likely functional) +/- myocarditis +/- stress CM +/- LBBB    Weight: 125 lbs    --HIV, SPEP/UPEP, LEA, hepatitis panel all negative, ferritin ok  --R+LHC 8/3/18: shows normal coronaries  RA 3  RV 32/6  PA 32/11/20  PCWP 6  AO sat 89%  MvO2 56 5%  TD CO/CI: 2 3/1 4  Isacc CO/CI: 2 6/1 6  TPG 14  DPG 5  PVR 5 4 (Isacc); 6 1 (TD)  SVR 2070  PVR:SVR <0 25     --cMRI 8/6/18: LVEF ~18%, LVIDd 7 cm, normal RV  There is a thin strip of intramyocardial hyperenhancement of the basal interventricular septum  Echo 12/6/18: LVEF: 20%, LVEDd 6 4 cm- no improvement  Good RV function  Small IVC    Neurohormonal Blockade:  --Beta-Blocker: Continue metoprolol succinate 25 mg PO daily  --ACEi, ARB or ARNi: Continue Entresto to 49-51 mg PO BID (also for SVR reduction)  --Aldosterone Receptor Blocker: Currently not on MRA  Will start once above meds @ max tolerated dose  --Diuretic: Continue torsemide 10 PO daily  K 3 4 on last labs 11/26     Sudden Cardiac Death Risk Reduction:  --ICD: recommended     Electrical Resynchronization:  --Candidacy for BiV device: LBBB 164 msec  Recommend BiV ICD     Advanced Therapies: Will continue to monitor  If does not recover, age precludes OHT   Possible LVAD candidate, but social situation will be complicated as  has LVAD w/ complications and son works in [place]  Other son is in [place]  Will monitor closely      # HTN  # Normocytic anemia  # Hypothyroidism  # Autoimmune hepatitis: Continue budesonide    TODAY'S PLAN:  Optimize HF specific medication  Increase Toprol to 25 mg daily (taking 12 5 mg now)  --2g sodium diet  - Daily weights    HPI:     78 woman with hx of HTN, autoimmune hepatitis wo follows now for NICM, EF: 25% with workup as above  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 has LVAD so working diagnosis of variant of stress myopathy if plausible  Follow up echo done 12/6/18 showed EF: 20% with LVEDd 6 4 cm, I referred for BiV ICD    Interval History:  Breathing ok, no edema, no chest pain, no lightheadedness  Still wearing LiveVest    Past Medical History:   Diagnosis Date    Bell's palsy     Cardiac disease     Hypertension        Review of Systems - breathing ok    Allergies   Allergen Reactions    Ambien [Zolpidem]            Current Outpatient Medications:     BUDESONIDE ER PO, Take 3 mg by mouth 2 (two) times a day  , Disp: , Rfl:     metoprolol succinate (TOPROL-XL) 25 mg 24 hr tablet, Take 1 tablet (25 mg total) by mouth daily (Patient taking differently: Take 12 5 mg by mouth daily  ), Disp: 90 tablet, Rfl: 3    sacubitril-valsartan (ENTRESTO) 49-51 MG TABS, Take 1 tablet by mouth every 12 (twelve) hours, Disp: 180 tablet, Rfl: 3    spironolactone (ALDACTONE) 25 mg tablet, TAKE 0 5 TABLETS (12 5 MG TOTAL) BY MOUTH DAILY, Disp: 30 tablet, Rfl: 2    torsemide (DEMADEX) 10 mg tablet, Take 1 tablet (10 mg total) by mouth daily, Disp: 90 tablet, Rfl: 3    potassium chloride (K-DUR,KLOR-CON) 20 mEq tablet, Take 1 tablet (20 mEq total) by mouth daily, Disp: 90 tablet, Rfl: 3    Social History     Socioeconomic History    Marital status: /Civil Union     Spouse name: Not on file    Number of children: Not on file    Years of education: Not on file    Highest education level: Not on file   Occupational History    Not on file   Social Needs    Financial resource strain: Not on file    Food insecurity:     Worry: Not on file     Inability: Not on file    Transportation needs:     Medical: Not on file     Non-medical: Not on file   Tobacco Use    Smoking status: Never Smoker    Smokeless tobacco: Never Used   Substance and Sexual Activity    Alcohol use: No    Drug use: No    Sexual activity: Not on file   Lifestyle    Physical activity:     Days per week: Not on file     Minutes per session: Not on file    Stress: Not on file   Relationships    Social connections:     Talks on phone: Not on file     Gets together: Not on file     Attends Pent[employer]al service: Not on file     Active member of club or organization: Not on file     Attends meetings of clubs or organizations: Not on file     Relationship status: Not on file    Intimate partner violence:     Fear of current or ex partner: Not on file     Emotionally abused: Not on file     Physically abused: Not on file     Forced sexual activity: Not on file   Other Topics Concern    Not on file   Social History Narrative    Not on file       No family history on file      Physical Exam:    Vitals:   Vitals:    03/11/19 1348   BP: 124/52   Pulse: 77   SpO2: 98%     Wt Readings from Last 3 Encounters:   03/11/19 54 9 kg (121 lb)   12/07/18 56 7 kg (125 lb)   12/06/18 56 7 kg (125 lb)     Physical Exam:    GEN: William Chavarria appears well, alert and oriented x 3, pleasant and cooperative   HEENT: pupils equal, round, and reactive to light; extraocular muscles intact  NECK: supple, no carotid bruits   HEART: regular rhythm, normal S1 and S2 positive S3, no murmurs, clicks, gallops or rubs, JVP is down    LUNGS: clear to auscultation bilaterally; no wheezes, rales, or rhonchi   ABDOMEN: normal bowel sounds, soft, no tenderness, no distention  EXTREMITIES: peripheral pulses normal; no clubbing, cyanosis, or edema  NEURO: no focal findings   SKIN: normal without suspicious lesions on exposed skin    Labs & Results:    Lab Results   Component Value Date    GLUCOSE 94 12/04/2015    CALCIUM 9 4 01/23/2019     12/04/2015    K 4 3 01/23/2019    CO2 28 01/23/2019     01/23/2019    BUN 21 01/23/2019    CREATININE 0 88 01/23/2019     Lab Results   Component Value Date    WBC 2 79 (L) 08/04/2018    HGB 11 6 08/04/2018    HCT 36 5 08/04/2018    MCV 87 08/04/2018     (L) 08/04/2018     Lab Results   Component Value Date    NTBNP 8,410 (H) 08/13/2018      Lab Results   Component Value Date    CHOL 182 10/23/2015     Lab Results   Component Value Date    HDL 41 08/02/2018    HDL 76 10/23/2015     Lab Results   Component Value Date    LDLCALC 52 08/02/2018    LDLCALC 89 10/23/2015     Lab Results   Component Value Date    TRIG 75 08/02/2018    TRIG 87 10/23/2015     No results found for: CHOLHDL    EKG personally reviewed by Shelia Saint, DO  Counseling / Coordination of Care  Total floor / unit time spent today 25 minutes  Greater than 50% of total time was spent with the patient and / or family counseling and / or coordination of care  A description of the counseling / coordination of care: 15      Thank you for the opportunity to participate in the care of this patient    [address]

## 2019-03-11 NOTE — PATIENT INSTRUCTIONS
I think you are taking metoprolol succinate 12 5 mg daily (this is 1/2 pill)- I would like you to try taking Toprol 25 mg daily    I am going to check a limited echo in one month to try to decide on BiV ICD

## 2019-03-25 ENCOUNTER — TRANSCRIBE ORDERS (OUTPATIENT)
Dept: ADMINISTRATIVE | Age: 79
End: 2019-03-25

## 2019-03-25 ENCOUNTER — LAB (OUTPATIENT)
Dept: LAB | Age: 79
End: 2019-03-25
Payer: MEDICARE

## 2019-03-25 DIAGNOSIS — K75.4 CHRONIC AGGRESSIVE HEPATITIS (HCC): Primary | ICD-10-CM

## 2019-03-25 DIAGNOSIS — K75.4 CHRONIC AGGRESSIVE HEPATITIS (HCC): ICD-10-CM

## 2019-03-25 LAB
ALBUMIN SERPL BCP-MCNC: 3.1 G/DL (ref 3.5–5)
ALP SERPL-CCNC: 48 U/L (ref 46–116)
ALT SERPL W P-5'-P-CCNC: 21 U/L (ref 12–78)
ANION GAP SERPL CALCULATED.3IONS-SCNC: 3 MMOL/L (ref 4–13)
AST SERPL W P-5'-P-CCNC: 22 U/L (ref 5–45)
BILIRUB SERPL-MCNC: 0.28 MG/DL (ref 0.2–1)
BUN SERPL-MCNC: 19 MG/DL (ref 5–25)
CALCIUM SERPL-MCNC: 8.8 MG/DL (ref 8.3–10.1)
CHLORIDE SERPL-SCNC: 106 MMOL/L (ref 100–108)
CO2 SERPL-SCNC: 30 MMOL/L (ref 21–32)
CREAT SERPL-MCNC: 0.85 MG/DL (ref 0.6–1.3)
GFR SERPL CREATININE-BSD FRML MDRD: 74 ML/MIN/1.73SQ M
GLUCOSE SERPL-MCNC: 86 MG/DL (ref 65–140)
POTASSIUM SERPL-SCNC: 3.4 MMOL/L (ref 3.5–5.3)
PROT SERPL-MCNC: 7.4 G/DL (ref 6.4–8.2)
SODIUM SERPL-SCNC: 139 MMOL/L (ref 136–145)

## 2019-03-25 PROCEDURE — 80053 COMPREHEN METABOLIC PANEL: CPT

## 2019-03-25 PROCEDURE — 36415 COLL VENOUS BLD VENIPUNCTURE: CPT

## 2019-04-16 ENCOUNTER — HOSPITAL ENCOUNTER (OUTPATIENT)
Dept: NON INVASIVE DIAGNOSTICS | Facility: CLINIC | Age: 79
Discharge: HOME | End: 2019-04-16
Payer: MEDICARE

## 2019-04-16 DIAGNOSIS — I42.8 NICM (NONISCHEMIC CARDIOMYOPATHY) (HCC): ICD-10-CM

## 2019-04-16 DIAGNOSIS — I50.22 CHRONIC SYSTOLIC CHF (CONGESTIVE HEART FAILURE), NYHA CLASS 2 (HCC): ICD-10-CM

## 2019-04-16 PROCEDURE — 93306 TTE W/DOPPLER COMPLETE: CPT | Performed by: INTERNAL MEDICINE

## 2019-04-16 PROCEDURE — 93306 TTE W/DOPPLER COMPLETE: CPT

## 2019-05-22 ENCOUNTER — LAB (OUTPATIENT)
Dept: LAB | Age: 79
End: 2019-05-22
Payer: MEDICARE

## 2019-05-22 ENCOUNTER — TRANSCRIBE ORDERS (OUTPATIENT)
Dept: ADMINISTRATIVE | Age: 79
End: 2019-05-22

## 2019-05-22 DIAGNOSIS — K75.4 CHRONIC AGGRESSIVE HEPATITIS (HCC): ICD-10-CM

## 2019-05-22 DIAGNOSIS — K75.4 CHRONIC AGGRESSIVE HEPATITIS (HCC): Primary | ICD-10-CM

## 2019-05-22 LAB
ALBUMIN SERPL BCP-MCNC: 3.3 G/DL (ref 3.5–5)
ALP SERPL-CCNC: 87 U/L (ref 46–116)
ALT SERPL W P-5'-P-CCNC: 117 U/L (ref 12–78)
ANION GAP SERPL CALCULATED.3IONS-SCNC: 6 MMOL/L (ref 4–13)
AST SERPL W P-5'-P-CCNC: 124 U/L (ref 5–45)
BILIRUB SERPL-MCNC: 0.35 MG/DL (ref 0.2–1)
BUN SERPL-MCNC: 20 MG/DL (ref 5–25)
CALCIUM SERPL-MCNC: 8.7 MG/DL (ref 8.3–10.1)
CHLORIDE SERPL-SCNC: 105 MMOL/L (ref 100–108)
CO2 SERPL-SCNC: 29 MMOL/L (ref 21–32)
CREAT SERPL-MCNC: 0.94 MG/DL (ref 0.6–1.3)
GFR SERPL CREATININE-BSD FRML MDRD: 66 ML/MIN/1.73SQ M
GLUCOSE P FAST SERPL-MCNC: 92 MG/DL (ref 65–99)
POTASSIUM SERPL-SCNC: 4 MMOL/L (ref 3.5–5.3)
PROT SERPL-MCNC: 8.2 G/DL (ref 6.4–8.2)
SODIUM SERPL-SCNC: 140 MMOL/L (ref 136–145)

## 2019-05-22 PROCEDURE — 36415 COLL VENOUS BLD VENIPUNCTURE: CPT

## 2019-05-22 PROCEDURE — 80053 COMPREHEN METABOLIC PANEL: CPT

## 2019-06-11 ENCOUNTER — OFFICE VISIT (OUTPATIENT)
Dept: CARDIOLOGY CLINIC | Facility: CLINIC | Age: 79
End: 2019-06-11
Payer: MEDICARE

## 2019-06-11 VITALS
SYSTOLIC BLOOD PRESSURE: 112 MMHG | DIASTOLIC BLOOD PRESSURE: 62 MMHG | BODY MASS INDEX: 18.96 KG/M2 | HEIGHT: 66 IN | HEART RATE: 68 BPM | OXYGEN SATURATION: 99 % | WEIGHT: 118 LBS

## 2019-06-11 DIAGNOSIS — I10 HTN (HYPERTENSION), BENIGN: ICD-10-CM

## 2019-06-11 DIAGNOSIS — I42.8 NICM (NONISCHEMIC CARDIOMYOPATHY) (HCC): ICD-10-CM

## 2019-06-11 DIAGNOSIS — I50.22 CHRONIC SYSTOLIC CHF (CONGESTIVE HEART FAILURE), NYHA CLASS 3 (HCC): Primary | ICD-10-CM

## 2019-06-11 PROCEDURE — 99214 OFFICE O/P EST MOD 30 MIN: CPT | Performed by: INTERNAL MEDICINE

## 2019-07-17 ENCOUNTER — TRANSCRIBE ORDERS (OUTPATIENT)
Dept: ADMINISTRATIVE | Age: 79
End: 2019-07-17

## 2019-07-17 ENCOUNTER — LAB (OUTPATIENT)
Dept: LAB | Age: 79
End: 2019-07-17
Payer: MEDICARE

## 2019-07-17 DIAGNOSIS — K73.2 CAH (CHRONIC ACTIVE HEPATITIS) (HCC): ICD-10-CM

## 2019-07-17 DIAGNOSIS — K73.2 CAH (CHRONIC ACTIVE HEPATITIS) (HCC): Primary | ICD-10-CM

## 2019-07-17 LAB
ALBUMIN SERPL BCP-MCNC: 3.3 G/DL (ref 3.5–5)
ALP SERPL-CCNC: 71 U/L (ref 46–116)
ALT SERPL W P-5'-P-CCNC: 97 U/L (ref 12–78)
ANION GAP SERPL CALCULATED.3IONS-SCNC: 5 MMOL/L (ref 4–13)
AST SERPL W P-5'-P-CCNC: 82 U/L (ref 5–45)
BILIRUB SERPL-MCNC: 0.49 MG/DL (ref 0.2–1)
BUN SERPL-MCNC: 10 MG/DL (ref 5–25)
CALCIUM SERPL-MCNC: 9.1 MG/DL (ref 8.3–10.1)
CHLORIDE SERPL-SCNC: 107 MMOL/L (ref 100–108)
CO2 SERPL-SCNC: 29 MMOL/L (ref 21–32)
CREAT SERPL-MCNC: 0.77 MG/DL (ref 0.6–1.3)
GFR SERPL CREATININE-BSD FRML MDRD: 84 ML/MIN/1.73SQ M
GLUCOSE SERPL-MCNC: 100 MG/DL (ref 65–140)
POTASSIUM SERPL-SCNC: 3.7 MMOL/L (ref 3.5–5.3)
PROT SERPL-MCNC: 7.8 G/DL (ref 6.4–8.2)
SODIUM SERPL-SCNC: 141 MMOL/L (ref 136–145)

## 2019-07-17 PROCEDURE — 80053 COMPREHEN METABOLIC PANEL: CPT

## 2019-07-17 PROCEDURE — 36415 COLL VENOUS BLD VENIPUNCTURE: CPT

## 2019-07-22 ENCOUNTER — OFFICE VISIT (OUTPATIENT)
Dept: CARDIOLOGY CLINIC | Facility: CLINIC | Age: 79
End: 2019-07-22
Payer: MEDICARE

## 2019-07-22 VITALS
SYSTOLIC BLOOD PRESSURE: 128 MMHG | HEIGHT: 66 IN | WEIGHT: 118 LBS | HEART RATE: 74 BPM | BODY MASS INDEX: 18.96 KG/M2 | DIASTOLIC BLOOD PRESSURE: 66 MMHG

## 2019-07-22 DIAGNOSIS — I10 HTN (HYPERTENSION), BENIGN: Primary | ICD-10-CM

## 2019-07-22 DIAGNOSIS — I10 HYPERTENSION, ESSENTIAL: ICD-10-CM

## 2019-07-22 DIAGNOSIS — I42.0 DILATED CARDIOMYOPATHY (HCC): ICD-10-CM

## 2019-07-22 DIAGNOSIS — I50.22 CHRONIC SYSTOLIC HEART FAILURE (HCC): ICD-10-CM

## 2019-07-22 PROCEDURE — 93000 ELECTROCARDIOGRAM COMPLETE: CPT | Performed by: INTERNAL MEDICINE

## 2019-07-22 PROCEDURE — 99214 OFFICE O/P EST MOD 30 MIN: CPT | Performed by: INTERNAL MEDICINE

## 2019-07-22 NOTE — PROGRESS NOTES
Electrophysiology Consultation  Heart & Vascular [facility]  [address] [place] [address], [place], [address]    Name: Darin Lin  : 1940  MRN: [id number]    Assessment/Plan     1  HTN (hypertension), benign  POCT ECG   2  Dilated cardiomyopathy ([address] )     3  Chronic systolic heart failure ([address] )     4  Hypertension, essential         ASSESSMENT:  1  Dilated cardiomyopathy  - EF of 20% per echo 2019  - on goal-directed medical therapy of Toprol-XL 12 5 mg twice daily daily and Entresto  - currently wearing life vest since 2018  2  Chronic systolic heart failure, stage D, NYHA 3  - diuretic regimen of torsemide 10 mg daily  3  Left bundle branch block at baseline  - wide QRS complex of 168 milliseconds  4  Moderate to severe mitral regurgitation  5  Essential hypertension  6  Thrombocytopenia   7  Hypothyroidism    DISCUSSION/SUMMARY:  1  Unimproved cardiomyopathy - Patient has been on goal-directed medical therapy of metoprolol and Entresto with no improvement in her ejection fraction  Given her baseline left bundle branch block as well, my recommendation at this time was for biventricular defibrillator implantation  We discussed this at length given the fact that patient does have a  at home that has a defibrillator and LVAD for which she cares for  Patient was agreeable to undergo this in 2 months time after she makes the appropriate arrangements to have her  care for during the time that she has to recuperate from this surgery  Patient does feel safe with her life vest in the interim  She will call our schedulers after discussing with her son, who I also spoke on the phone with during our visit, to figure out a good time with the family  When she is ready, she can undergo left-sided biventricular defibrillator implantation    She has had no prior surgeries or radiation to her upper chest   She has no allergies to penicillins or contrast dye     History of Present Illness     HPI/INTERVAL HISTORY: Nayeli Valenzuela is a 78 y o  female with a history of dilated cardiomyopathy with EF of 20%, maintenance of goal-directed medical therapy, left bundle-branch block, chronic systolic congestive heart failure, non acidic anemia, hypothyroidism, and hepatitis  She follows with Dr Aylin Cordero as an outpatient, last seen him 06/11/2019  At that point, he felt that patient needed to be evaluated for CRT D therapy given the fact that her EF had not improved from 20% and she is on goal-directed medical therapy  Patient states that she has felt well - no cardiac complaints his chest pain, shortness of breath, palpitations, lightheadedness, or dizziness  We discussed at length the procedure itself, the hospital stay involved  The patient's  was also present at the office visit  He has a defibrillator as well as an LVAD  She did state how she normally takes care of him and that that is her main focus  She has been wearing a life vest as she should  Review of Systems  ROS as noted above, otherwise 12 point review of systems was performed and is negative       Historical Information   Social History     Socioeconomic History    Marital status: /Civil Union     Spouse name: Not on file    Number of children: Not on file    Years of education: Not on file    Highest education level: Not on file   Occupational History    Not on file   Social Needs    Financial resource strain: Not on file    Food insecurity:     Worry: Not on file     Inability: Not on file    Transportation needs:     Medical: Not on file     Non-medical: Not on file   Tobacco Use    Smoking status: Never Smoker    Smokeless tobacco: Never Used   Substance and Sexual Activity    Alcohol use: No    Drug use: No    Sexual activity: Not on file   Lifestyle    Physical activity:     Days per week: Not on file     Minutes per session: Not on file    Stress: Not on file Relationships    Social connections:     Talks on phone: Not on file     Gets together: Not on file     Attends [employer] service: Not on file     Active member of club or organization: Not on file     Attends meetings of clubs or organizations: Not on file     Relationship status: Not on file    Intimate partner violence:     Fear of current or ex partner: Not on file     Emotionally abused: Not on file     Physically abused: Not on file     Forced sexual activity: Not on file   Other Topics Concern    Not on file   Social History Narrative    Not on file     Past Medical History:   Diagnosis Date    Bell's palsy     Cardiac disease     Hypertension      No past surgical history on file  Social History     Substance and Sexual Activity   Alcohol Use No     Social History     Substance and Sexual Activity   Drug Use No     Social History     Tobacco Use   Smoking Status Never Smoker   Smokeless Tobacco Never Used     No family history on file      Meds/Allergies       Current Outpatient Medications:     BUDESONIDE ER PO, Take 3 mg by mouth 2 (two) times a day  , Disp: , Rfl:     metoprolol succinate (TOPROL-XL) 25 mg 24 hr tablet, Take 1 tablet (25 mg total) by mouth daily (Patient taking differently: Take 12 5 mg by mouth 2 (two) times a day ), Disp: 90 tablet, Rfl: 3    potassium chloride (K-DUR,KLOR-CON) 20 mEq tablet, Take 1 tablet (20 mEq total) by mouth daily, Disp: 90 tablet, Rfl: 3    sacubitril-valsartan (ENTRESTO) 49-51 MG TABS, Take 1 tablet by mouth every 12 (twelve) hours, Disp: 180 tablet, Rfl: 3    spironolactone (ALDACTONE) 25 mg tablet, TAKE 0 5 TABLETS (12 5 MG TOTAL) BY MOUTH DAILY, Disp: 30 tablet, Rfl: 2    torsemide (DEMADEX) 10 mg tablet, Take 1 tablet (10 mg total) by mouth daily, Disp: 90 tablet, Rfl: 3    Allergies   Allergen Reactions    Ambien [Zolpidem]        Objective   Vitals: Blood pressure 128/66, pulse 74, height 5' 6" (1 676 m), weight 53 5 kg (118 lb), not currently breastfeeding  Physical Exam  GEN: NAD, alert and oriented, well appearing  SKIN: dry without significant lesions or rashes  HEENT: NCAT, PERRL, EOMs intact  NECK: No JVD appreciated  CARDIOVASCULAR: RRR, normal S1, S2 with murmur noted  LUNGS: Clear to auscultation bilaterally without wheezes, rhonchi, or rales  ABDOMEN: Soft, nontender, nondistended  EXTREMITIES/VASCULAR: perfused without clubbing, cyanosis, or edema b/l  PSYCH: Normal mood and affect  NEURO: CN ll-Xll grossly intact        Labs:  Appointment on 2019   Component Date Value    Sodium 2019 141     Potassium 2019 3 7     Chloride 2019 107     CO2 2019 29     ANION GAP 2019 5     BUN 2019 10     Creatinine 2019 0 77     Glucose 2019 100     Calcium 2019 9 1     AST 2019 82*    ALT 2019 97*    Alkaline Phosphatase 2019 71     Total Protein 2019 7 8     Albumin 2019 3 3*    Total Bilirubin 2019 0 49     eGFR 2019 84        Imaging: I have personally reviewed pertinent reports        ECHO:   Results for orders placed during the hospital encounter of 19   Echo complete with contrast if indicated    Narrative [facility] [address]  [address]  [phone]    Transthoracic Echocardiogram  2D, M-mode, Doppler, and Color Doppler    Study date:  2019    Patient: Cristiano Gooden  MR number: [id number]  Account number: [de-identified]  : 1940  Age: 78 years  Gender: Female  Status: Outpatient  Location: [address] [place] [address] [facility]  Height: 66 in  Weight: 121 lb  BP: 124/ 52 mmHg    Indications: Congestive heart failure    Diagnoses: M21 86 - Chronic systolic (congestive) heart failure    Sonographer:  LILY Diana  Primary Physician:  Jonathan Rodrigues MD  Referring Physician:  Prema Molina DO  Group:  [facility]  Interpreting Physician:  Radha Vasquez MD    SUMMARY    LEFT VENTRICLE:  The ventricle was moderately dilated  Systolic function was severely reduced  Ejection fraction was estimated to be 20 %  There were no regional wall motion abnormalities  There was severe diffuse hypokinesis  LEFT ATRIUM:  The atrium was mildly dilated  MITRAL VALVE:  There was mild annular calcification  There was moderate to severe regurgitation  TRICUSPID VALVE:  There was mild regurgitation  Pulmonary artery systolic pressure was mildly increased  PULMONIC VALVE:  There was trace regurgitation  COMPARISONS:  There has been no significant interval change  Comparison was made with the previous study of 06-Dec-2018  HISTORY: PRIOR HISTORY: Hypertension, cardiomyopathy, congestive heart failure, hypothyroidism    PROCEDURE: [facility]  This was a routine study  The transthoracic approach was used  The study included complete 2D imaging, M-mode, complete spectral Doppler, and color Doppler  Image  quality was adequate  LEFT VENTRICLE: The ventricle was moderately dilated  Systolic function was severely reduced  Ejection fraction was estimated to be 20 %  There were no regional wall motion abnormalities  There was severe diffuse hypokinesis  Wall  thickness was normal  No evidence of apical thrombus  There was a false tendon within the ventricle  DOPPLER: Left ventricular diastolic function parameters were normal     RIGHT VENTRICLE: The size was normal  Systolic function was normal  Wall thickness was normal     LEFT ATRIUM: The atrium was mildly dilated  RIGHT ATRIUM: Size was normal     MITRAL VALVE: There was mild annular calcification  Valve structure was normal  There was mild thickening  There was normal leaflet separation  DOPPLER: The transmitral velocity was within the normal range  There was no evidence for  stenosis   There was moderate to severe regurgitation  AORTIC VALVE: The valve was trileaflet  Leaflets exhibited normal thickness and normal cuspal separation  DOPPLER: Transaortic velocity was within the normal range  There was no evidence for stenosis  There was no significant  regurgitation  TRICUSPID VALVE: The valve structure was normal  There was normal leaflet separation  DOPPLER: The transtricuspid velocity was within the normal range  There was no evidence for stenosis  There was mild regurgitation  Pulmonary artery  systolic pressure was mildly increased  PULMONIC VALVE: Leaflets exhibited normal thickness, no calcification, and normal cuspal separation  DOPPLER: The transpulmonic velocity was within the normal range  There was trace regurgitation  PERICARDIUM: There was no pericardial effusion  The pericardium was normal in appearance  AORTA: The root exhibited normal size  SYSTEMIC VEINS: IVC: The inferior vena cava was normal in size  SYSTEM MEASUREMENT TABLES    2D  %FS: 10 12 %  Ao Diam: 3 22 cm  EDV(Teich): 139 96 ml  EF Biplane: 30 39 %  EF(Cube): 27 39 %  EF(Teich): 21 93 %  ESV(Cube): 112 91 ml  ESV(Teich): 109 27 ml  IVSd: 0 97 cm  LA Area: 11 47 cm2  LA Diam: 3 59 cm  LVEDV MOD A2C: 280 1 ml  LVEDV MOD A4C: 281 86 ml  LVEDV MOD BP: 280 68 ml  LVEF MOD A2C: 34 94 %  LVEF MOD A4C: 33 32 %  LVESV MOD A2C: 182 24 ml  LVESV MOD A4C: 187 94 ml  LVESV MOD BP: 195 38 ml  LVIDd: 5 38 cm  LVIDs: 4 83 cm  LVLd A2C: 8 92 cm  LVLd A4C: 9 02 cm  LVLs A2C: 7 76 cm  LVLs A4C: 8 67 cm  LVPWd: 1 04 cm  RA Area: 13 22 cm2  RV Diam : 2 91 cm  SV MOD A2C: 97 85 ml  SV MOD A4C: 93 92 ml  SV(Cube): 42 6 ml  SV(Teich): 30 69 ml    CW  TR Vmax: 2 79 m/s  TR maxP 14 mmHg    MM  TAPSE: 2 25 cm    [employer] Accredited Echocardiography Laboratory    Prepared and electronically signed by    Little Hurst MD  Signed 2019 13:51:24       EKG:  Sinus rhythm at 74 beats per minute    Left bundle branch block with QRS complex of 168 milliseconds

## 2019-08-13 ENCOUNTER — TELEPHONE (OUTPATIENT)
Dept: CARDIOLOGY CLINIC | Facility: CLINIC | Age: 79
End: 2019-08-13

## 2019-08-13 DIAGNOSIS — I50.22 CHRONIC SYSTOLIC CHF (CONGESTIVE HEART FAILURE), NYHA CLASS 3 (HCC): Primary | ICD-10-CM

## 2019-08-13 NOTE — TELEPHONE ENCOUNTER
P/C and is requesting order for Physical therapy  She is currently wearing the life vest  She takes her  3x's week and would like to exercise also when he is there        Please advise

## 2019-08-26 DIAGNOSIS — I42.9 CARDIOMYOPATHY, UNSPECIFIED TYPE (HCC): ICD-10-CM

## 2019-08-27 RX ORDER — POTASSIUM CHLORIDE 20 MEQ/1
20 TABLET, EXTENDED RELEASE ORAL DAILY
Qty: 90 TABLET | Refills: 3 | Status: SHIPPED | OUTPATIENT
Start: 2019-08-27 | End: 2019-09-04

## 2019-09-04 DIAGNOSIS — I42.9 CARDIOMYOPATHY, UNSPECIFIED TYPE (HCC): ICD-10-CM

## 2019-09-04 RX ORDER — POTASSIUM CHLORIDE 750 MG/1
20 TABLET, EXTENDED RELEASE ORAL DAILY
Qty: 180 TABLET | Refills: 4 | Status: SHIPPED | OUTPATIENT
Start: 2019-09-04 | End: 2021-03-09

## 2019-09-09 ENCOUNTER — TRANSCRIBE ORDERS (OUTPATIENT)
Dept: ADMINISTRATIVE | Age: 79
End: 2019-09-09

## 2019-09-09 ENCOUNTER — LAB (OUTPATIENT)
Dept: LAB | Age: 79
End: 2019-09-09
Attending: FAMILY MEDICINE
Payer: MEDICARE

## 2019-09-09 DIAGNOSIS — I50.9 HEART FAILURE, UNSPECIFIED HF CHRONICITY, UNSPECIFIED HEART FAILURE TYPE (HCC): ICD-10-CM

## 2019-09-09 DIAGNOSIS — I50.9 HEART FAILURE, UNSPECIFIED HF CHRONICITY, UNSPECIFIED HEART FAILURE TYPE (HCC): Primary | ICD-10-CM

## 2019-09-09 LAB
ALBUMIN SERPL BCP-MCNC: 3.4 G/DL (ref 3.5–5)
ALP SERPL-CCNC: 58 U/L (ref 46–116)
ALT SERPL W P-5'-P-CCNC: 50 U/L (ref 12–78)
ANION GAP SERPL CALCULATED.3IONS-SCNC: 4 MMOL/L (ref 4–13)
AST SERPL W P-5'-P-CCNC: 45 U/L (ref 5–45)
BASOPHILS # BLD AUTO: 0.02 THOUSANDS/ΜL (ref 0–0.1)
BASOPHILS NFR BLD AUTO: 1 % (ref 0–1)
BILIRUB SERPL-MCNC: 0.44 MG/DL (ref 0.2–1)
BUN SERPL-MCNC: 24 MG/DL (ref 5–25)
CALCIUM SERPL-MCNC: 9.3 MG/DL (ref 8.3–10.1)
CHLORIDE SERPL-SCNC: 100 MMOL/L (ref 100–108)
CO2 SERPL-SCNC: 32 MMOL/L (ref 21–32)
CREAT SERPL-MCNC: 0.93 MG/DL (ref 0.6–1.3)
EOSINOPHIL # BLD AUTO: 0.02 THOUSAND/ΜL (ref 0–0.61)
EOSINOPHIL NFR BLD AUTO: 1 % (ref 0–6)
ERYTHROCYTE [DISTWIDTH] IN BLOOD BY AUTOMATED COUNT: 15.1 % (ref 11.6–15.1)
GFR SERPL CREATININE-BSD FRML MDRD: 67 ML/MIN/1.73SQ M
GLUCOSE SERPL-MCNC: 84 MG/DL (ref 65–140)
HCT VFR BLD AUTO: 35.7 % (ref 34.8–46.1)
HGB BLD-MCNC: 11.3 G/DL (ref 11.5–15.4)
IMM GRANULOCYTES # BLD AUTO: 0.01 THOUSAND/UL (ref 0–0.2)
IMM GRANULOCYTES NFR BLD AUTO: 0 % (ref 0–2)
LYMPHOCYTES # BLD AUTO: 0.84 THOUSANDS/ΜL (ref 0.6–4.47)
LYMPHOCYTES NFR BLD AUTO: 31 % (ref 14–44)
MCH RBC QN AUTO: 29.4 PG (ref 26.8–34.3)
MCHC RBC AUTO-ENTMCNC: 31.7 G/DL (ref 31.4–37.4)
MCV RBC AUTO: 93 FL (ref 82–98)
MONOCYTES # BLD AUTO: 0.29 THOUSAND/ΜL (ref 0.17–1.22)
MONOCYTES NFR BLD AUTO: 11 % (ref 4–12)
NEUTROPHILS # BLD AUTO: 1.55 THOUSANDS/ΜL (ref 1.85–7.62)
NEUTS SEG NFR BLD AUTO: 56 % (ref 43–75)
NRBC BLD AUTO-RTO: 0 /100 WBCS
NT-PROBNP SERPL-MCNC: 3293 PG/ML
PLATELET # BLD AUTO: 57 THOUSANDS/UL (ref 149–390)
PMV BLD AUTO: 13.6 FL (ref 8.9–12.7)
POTASSIUM SERPL-SCNC: 3.5 MMOL/L (ref 3.5–5.3)
PROT SERPL-MCNC: 8 G/DL (ref 6.4–8.2)
RBC # BLD AUTO: 3.85 MILLION/UL (ref 3.81–5.12)
SODIUM SERPL-SCNC: 136 MMOL/L (ref 136–145)
WBC # BLD AUTO: 2.73 THOUSAND/UL (ref 4.31–10.16)

## 2019-09-09 PROCEDURE — 36415 COLL VENOUS BLD VENIPUNCTURE: CPT

## 2019-09-09 PROCEDURE — 80053 COMPREHEN METABOLIC PANEL: CPT

## 2019-09-09 PROCEDURE — 83880 ASSAY OF NATRIURETIC PEPTIDE: CPT

## 2019-09-09 PROCEDURE — 85025 COMPLETE CBC W/AUTO DIFF WBC: CPT

## 2019-09-09 NOTE — PROGRESS NOTES
Heart Failure Outpatient Progress Note - Lisandro Mass 78 y o  female MRN: [id number]    @ Encounter: [id number]      Assessment/Plan:    Patient Active Problem List    Diagnosis Date Noted    Dilated cardiomyopathy ([place] [address] ) 08/09/2018     Priority: Low    Thrombocytopenia ([place] [address] ) 08/03/2018     Priority: Low    Chronic systolic heart failure ([place] [address] ) 08/01/2018     Priority: Low    Hypertension, essential 08/01/2018     Priority: Low    Other specified glaucoma 08/01/2018     Priority: Low    Autoimmune hepatitis treated with steroids ([place] [address] ) 08/01/2018     Priority: Low     # ChronicSystolic CHF, Stage D, NYHA 3 with mildly reduced RV systolic function  Unclear etiology  HTN (normotensive on admission) +/- valvular (less likely, central MR likely functional) +/- myocarditis +/- stress CM +/- LBBB    Weight: 125 lbs    --HIV, SPEP/UPEP, LEA, hepatitis panel all negative, ferritin ok  --R+LHC 8/3/18: shows normal coronaries  RA 3  RV 32/6  PA 32/11/20  PCWP 6  AO sat 89%  MvO2 56 5%  TD CO/CI: 2 3/1 4  Isacc CO/CI: 2 6/1 6  TPG 14  DPG 5  PVR 5 4 (Isacc); 6 1 (TD)  SVR 2070  PVR:SVR <0 25    Echo 4/16/19: LVEF: 20%  LVEDd 5 4 cm  Moderate to severe MR     --cMRI 8/6/18: LVEF ~18%, LVIDd 7 cm, normal RV  There is a thin strip of intramyocardial hyperenhancement of the basal interventricular septum  Echo 12/6/18: LVEF: 20%, LVEDd 6 4 cm- no improvement  Good RV function  Small IVC    Neurohormonal Blockade:  --Beta-Blocker: Continue metoprolol succinate 25 mg PO daily  --ACEi, ARB or ARNi: Continue Entresto to 49-51 mg PO BID (also for SVR reduction)  --Aldosterone Receptor Blocker: spironolactone 12 5 mg daily  --Diuretic: Continue torsemide 10 PO daily  K 3 5     Sudden Cardiac Death Risk Reduction:  --ICD: recommended     Electrical Resynchronization:  --Candidacy for BiV device: LBBB 164 msec  Recommend BiV ICD     Advanced Therapies: Will continue to monitor  If does not recover, age precludes OHT   Possible LVAD candidate, but social situation will be complicated as  has LVAD w/ complications and son works in [place]  Other son is in [place]  Will monitor closely      # HTN  # Normocytic anemia  # Hypothyroidism  # Autoimmune hepatitis: Continue budesonide    TODAY'S PLAN:  Wants to follow up with Dr James Bhardwaj- still stalling on BiV - I explained benefit of CRT in her case of LBBB  I will order limited echo for EF and she will have follow up with James Bhardwaj  --2g sodium diet  - Daily weights    HPI:     78 woman with hx of HTN, autoimmune hepatitis wo follows now for NICM, EF: 25% with workup as above  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 has LVAD so working diagnosis of variant of stress myopathy if plausible  Follow up echo done 12/6/18 showed EF: 20% with LVEDd 6 4 cm, I referred for BiV ICD    Follow up echo 4/16/19 unfortunately showed EF: 20% with moderate to severe functional MR  Interval History: We discussed BiV ICD- I think she would benefit from CRT given her ARS near 170 msec and NICM  She has been wearing a LifeVest since Aug 2018- she is concerned as she takes care of her  with LVAD and focuses on him  Did see EP 7/22- with plan for BiV ICD      Past Medical History:   Diagnosis Date    Bell's palsy     Cardiac disease     Hypertension          Allergies   Allergen Reactions    Ambien [Zolpidem]            Current Outpatient Medications:     ascorbic acid (VITAMIN C) 500 mg tablet, Take 500 mg by mouth daily, Disp: , Rfl:     budesonide (ENTOCORT EC) 3 MG capsule, Take 9 mg by mouth daily, Disp: , Rfl: 5    BUDESONIDE ER PO, Take 3 mg by mouth 2 (two) times a day  , Disp: , Rfl:     metoprolol succinate (TOPROL-XL) 25 mg 24 hr tablet, Take 1 tablet (25 mg total) by mouth daily, Disp: 90 tablet, Rfl: 3    Omega-3 Fatty Acids (FISH OIL) 1,000 mg, Take 1,000 mg by mouth daily, Disp: , Rfl:     potassium chloride (K-DUR,KLOR-CON) 10 mEq tablet, Take 2 tablets (20 mEq total) by mouth daily, Disp: 180 tablet, Rfl: 4    sacubitril-valsartan (ENTRESTO) 49-51 MG TABS, Take 1 tablet by mouth every 12 (twelve) hours, Disp: 180 tablet, Rfl: 3    spironolactone (ALDACTONE) 25 mg tablet, TAKE 0 5 TABLETS (12 5 MG TOTAL) BY MOUTH DAILY, Disp: 30 tablet, Rfl: 2    torsemide (DEMADEX) 10 mg tablet, Take 1 tablet (10 mg total) by mouth daily, Disp: 90 tablet, Rfl: 3    Social History     Socioeconomic History    Marital status: /Civil Union     Spouse name: Not on file    Number of children: Not on file    Years of education: Not on file    Highest education level: Not on file   Occupational History    Not on file   Social Needs    Financial resource strain: Not on file    Food insecurity:     Worry: Not on file     Inability: Not on file    Transportation needs:     Medical: Not on file     Non-medical: Not on file   Tobacco Use    Smoking status: Never Smoker    Smokeless tobacco: Never Used   Substance and Sexual Activity    Alcohol use: No    Drug use: No    Sexual activity: Not on file   Lifestyle    Physical activity:     Days per week: Not on file     Minutes per session: Not on file    Stress: Not on file   Relationships    Social connections:     Talks on phone: Not on file     Gets together: Not on file     Attends Yazidi service: Not on file     Active member of club or organization: Not on file     Attends meetings of clubs or organizations: Not on file     Relationship status: Not on file    Intimate partner violence:     Fear of current or ex partner: Not on file     Emotionally abused: Not on file     Physically abused: Not on file     Forced sexual activity: Not on file   Other Topics Concern    Not on file   Social History Narrative    Not on file       No family history on file      Physical Exam:    Vitals:   Vitals:    09/12/19 1528   BP: 120/54   Pulse: 76   SpO2: 98%     Wt Readings from Last 3 Encounters:   09/12/19 54 4 kg (120 lb)   07/22/19 53 5 kg (118 lb)   06/11/19 53 5 kg (118 lb)     Physical Exam:    Physical Exam   Constitutional: She is oriented to person, place, and time  She appears well-developed and well-nourished  HENT:   Head: Normocephalic and atraumatic  Eyes: Pupils are equal, round, and reactive to light  EOM are normal    Neck: Normal range of motion  No JVD present  Cardiovascular: Normal rate, regular rhythm and normal heart sounds  No murmur heard  Pulmonary/Chest: Effort normal and breath sounds normal  She has no rales  Abdominal: Soft  Bowel sounds are normal  She exhibits no distension  Musculoskeletal: Normal range of motion  She exhibits no edema  Neurological: She is alert and oriented to person, place, and time  Skin: Skin is warm and dry  She is not diaphoretic  Psychiatric: She has a normal mood and affect  Labs & Results:    Lab Results   Component Value Date    GLUCOSE 94 12/04/2015    CALCIUM 9 3 09/09/2019     12/04/2015    K 3 5 09/09/2019    CO2 32 09/09/2019     09/09/2019    BUN 24 09/09/2019    CREATININE 0 93 09/09/2019     Lab Results   Component Value Date    WBC 2 73 (L) 09/09/2019    HGB 11 3 (L) 09/09/2019    HCT 35 7 09/09/2019    MCV 93 09/09/2019    PLT 57 (L) 09/09/2019     Lab Results   Component Value Date    NTBNP 3,293 (H) 09/09/2019      Lab Results   Component Value Date    CHOL 182 10/23/2015     Lab Results   Component Value Date    HDL 41 08/02/2018    HDL 76 10/23/2015     Lab Results   Component Value Date    LDLCALC 52 08/02/2018    LDLCALC 89 10/23/2015     Lab Results   Component Value Date    TRIG 75 08/02/2018    TRIG 87 10/23/2015     No results found for: CHOLHDL    EKG personally reviewed by Gabriel Mcgraw DO  Counseling / Coordination of Care  Total floor / unit time spent today 25 minutes  Greater than 50% of total time was spent with the patient and / or family counseling and / or coordination of care    A description of the counseling / coordination of care: 15 Thank you for the opportunity to participate in the care of this patient    P[address]PA [address]RA [address]

## 2019-09-12 ENCOUNTER — OFFICE VISIT (OUTPATIENT)
Dept: CARDIOLOGY CLINIC | Facility: CLINIC | Age: 79
End: 2019-09-12
Payer: MEDICARE

## 2019-09-12 VITALS
HEART RATE: 76 BPM | DIASTOLIC BLOOD PRESSURE: 54 MMHG | BODY MASS INDEX: 19.29 KG/M2 | HEIGHT: 66 IN | WEIGHT: 120 LBS | SYSTOLIC BLOOD PRESSURE: 120 MMHG | OXYGEN SATURATION: 98 %

## 2019-09-12 DIAGNOSIS — I10 HTN (HYPERTENSION), BENIGN: ICD-10-CM

## 2019-09-12 DIAGNOSIS — I42.8 NICM (NONISCHEMIC CARDIOMYOPATHY) (HCC): Primary | ICD-10-CM

## 2019-09-12 DIAGNOSIS — I50.22 CHRONIC SYSTOLIC CHF (CONGESTIVE HEART FAILURE), NYHA CLASS 3 (HCC): ICD-10-CM

## 2019-09-12 PROCEDURE — 99214 OFFICE O/P EST MOD 30 MIN: CPT | Performed by: INTERNAL MEDICINE

## 2019-09-12 RX ORDER — ASCORBIC ACID 500 MG
500 TABLET ORAL DAILY
COMMUNITY

## 2019-09-12 RX ORDER — BUDESONIDE 3 MG/1
9 CAPSULE, COATED PELLETS ORAL DAILY
Refills: 5 | COMMUNITY
Start: 2019-07-24 | End: 2019-10-22 | Stop reason: SDUPTHER

## 2019-09-12 RX ORDER — CHLORAL HYDRATE 500 MG
1000 CAPSULE ORAL DAILY
COMMUNITY
End: 2021-08-10

## 2019-09-17 ENCOUNTER — LAB (OUTPATIENT)
Dept: LAB | Age: 79
End: 2019-09-17
Payer: MEDICARE

## 2019-09-17 ENCOUNTER — TRANSCRIBE ORDERS (OUTPATIENT)
Dept: ADMINISTRATIVE | Age: 79
End: 2019-09-17

## 2019-09-17 DIAGNOSIS — K75.4 CHRONIC AGGRESSIVE HEPATITIS (HCC): ICD-10-CM

## 2019-09-17 DIAGNOSIS — K75.4 CHRONIC AGGRESSIVE HEPATITIS (HCC): Primary | ICD-10-CM

## 2019-09-17 LAB
ALBUMIN SERPL BCP-MCNC: 3.6 G/DL (ref 3.5–5)
ALP SERPL-CCNC: 53 U/L (ref 46–116)
ALT SERPL W P-5'-P-CCNC: 50 U/L (ref 12–78)
ANION GAP SERPL CALCULATED.3IONS-SCNC: 6 MMOL/L (ref 4–13)
AST SERPL W P-5'-P-CCNC: 42 U/L (ref 5–45)
BILIRUB SERPL-MCNC: 0.43 MG/DL (ref 0.2–1)
BUN SERPL-MCNC: 25 MG/DL (ref 5–25)
CALCIUM SERPL-MCNC: 9.7 MG/DL (ref 8.3–10.1)
CHLORIDE SERPL-SCNC: 105 MMOL/L (ref 100–108)
CO2 SERPL-SCNC: 30 MMOL/L (ref 21–32)
CREAT SERPL-MCNC: 0.94 MG/DL (ref 0.6–1.3)
GFR SERPL CREATININE-BSD FRML MDRD: 66 ML/MIN/1.73SQ M
GLUCOSE SERPL-MCNC: 96 MG/DL (ref 65–140)
POTASSIUM SERPL-SCNC: 3.4 MMOL/L (ref 3.5–5.3)
PROT SERPL-MCNC: 8.1 G/DL (ref 6.4–8.2)
SODIUM SERPL-SCNC: 141 MMOL/L (ref 136–145)

## 2019-09-17 PROCEDURE — 80053 COMPREHEN METABOLIC PANEL: CPT

## 2019-09-17 PROCEDURE — 36415 COLL VENOUS BLD VENIPUNCTURE: CPT

## 2019-10-14 ENCOUNTER — CLINICAL SUPPORT (OUTPATIENT)
Dept: CARDIAC REHAB | Facility: CLINIC | Age: 79
End: 2019-10-14
Payer: MEDICARE

## 2019-10-14 DIAGNOSIS — I50.22 CHRONIC SYSTOLIC CHF (CONGESTIVE HEART FAILURE), NYHA CLASS 3 (HCC): ICD-10-CM

## 2019-10-14 PROCEDURE — 93797 PHYS/QHP OP CAR RHAB WO ECG: CPT

## 2019-10-14 NOTE — PROGRESS NOTES
CARDIAC REHAB ASSESSMENT    Today's date: 2019  Patient name: Bailey Daley     : 1940       MRN: [id number]  PCP: Roberto Anthony MD  Referring Physician: Lesli Hawkins DO  Cardiologist: Lesli Hawkins DO   Surgeon: N/a  Dx:   Encounter Diagnosis   Name Primary?  Chronic systolic CHF (congestive heart failure), NYHA class 3 (HCC)        Date of onset: 2019  Cultural needs: None     Height:    Wt Readings from Last 1 Encounters:   19 54 4 kg (120 lb)      Weight:   Ht Readings from Last 1 Encounters:   19 5' 6" (1 676 m)     Medical History:   Past Medical History:   Diagnosis Date    Bell's palsy     Cardiac disease     Hypertension          Physical Limitations: None     Risk Factors   Cholesterol: No  Smoking: Never used  HTN: Yes  DM: No  Obesity: No   Inactivity: Yes  Stress:  perceived  stress: 5/10   Stressors:Husbands health, has an LVAD and she is primary caretaker, quit her job 10 years ago to take care of him  Goals for Stress Management: Relaxation time daily, stress management  Family History:No family history on file      Allergies: Ambien [zolpidem]  ETOH:   Social History     Substance and Sexual Activity   Alcohol Use No         Current Medications:   Current Outpatient Medications   Medication Sig Dispense Refill    ascorbic acid (VITAMIN C) 500 mg tablet Take 500 mg by mouth daily      budesonide (ENTOCORT EC) 3 MG capsule Take 9 mg by mouth daily  5    BUDESONIDE ER PO Take 3 mg by mouth 2 (two) times a day        metoprolol succinate (TOPROL-XL) 25 mg 24 hr tablet Take 1 tablet (25 mg total) by mouth daily 90 tablet 3    Omega-3 Fatty Acids (FISH OIL) 1,000 mg Take 1,000 mg by mouth daily      potassium chloride (K-DUR,KLOR-CON) 10 mEq tablet Take 2 tablets (20 mEq total) by mouth daily 180 tablet 4    sacubitril-valsartan (ENTRESTO) 49-51 MG TABS Take 1 tablet by mouth every 12 (twelve) hours 180 tablet 3    spironolactone (ALDACTONE) 25 mg tablet TAKE 0 5 TABLETS (12 5 MG TOTAL) BY MOUTH DAILY 30 tablet 2    torsemide (DEMADEX) 10 mg tablet Take 1 tablet (10 mg total) by mouth daily 90 tablet 3     No current facility-administered medications for this visit  Functional Status Prior to Diagnosis for Treatment   Occupation: retired  Recreation: None - pt devotes time to husbands care   ADLs: resumed all ADLs Capable of performing light ADLs only limited by Dyspnea  Weakness  [place]: able to perform self-care resumed driving  Exercise: None - was in CR at [facility], stopped exercising after rehab   Other: None     Current Functional Status  Occupation: retired  Recreation: None - pt devotes time to husbands care   ADLs:limited by Dyspnea  Weakness resumed all ADLs within sternal precautions  [place]: able to perform self-care resumed driving  Exercise: None - was in CR at [facility], stopped exercising after rehab   Other: None     Patient Specific Goals: Increase her EF, increase her strength, would like to get back into the shape she was at after CR the first time around  Short Term Program Goals: dietary modifications increased strength improved energy/stamina with ADLs exercise 120-150 mins/wk    Long Term Goals: increased maximal walking duration  increased intial training workload  Improved [facility] Activity Status score  Improved functional capacity  Improved Quality of Life - [facility] score reduced  Reduced stress    Ability to reach goals/rehabilitation potential:  Very Good     Projected return to function: 12 weeks  Objective tests: 6 MWT      Nutritional   Reviewed details of Rate your Plate  Discussed key elements of heart healthy eating  Reviewed patient goals for dietary modifications and their clinical implications  Reviewed most recent lipid profile       Goals for dietary modification: low fat dairy   reduced fat cheese  increase whole grains  increase fruits and vegetables  reduce sweets/frozen desserts      Emotional/Social  Patient reports he/she is coping well with good social support and denies depression or anxiety    SOCIAL SUPPORT NETWORK    Marital status:     Rate 1-5:    Marriage: 5   Family: 5   Financial: 5   Relationships: 5   Spirituality: 5   Intellectual: 5      Domestic Violence Screening: No    Comments: Pt has been in CR at [facility] for CHF in Sept 2018 - Jan 2019  Pt experiencing SOB and fatigue around the house at which she requested for more CR sessions from cardiologist  Pt EF = 20% and has had a life vest for 1 year  Next echo will determine ICD implant

## 2019-10-14 NOTE — PROGRESS NOTES
Cardiac Rehabilitation Plan of Care   Care Plan       Today's date: 10/14/2019   Visits: 1  Patient name: Elbert Raya      : 1940  Age: 78 y o  MRN: [id number]  Referring Physician: Joel Michaud DO  Cardiologist: Joel Michaud DO  Provider: [facility]  Clinician: Bj Barbosa MS, JD [facility], [facility]    Dx:   Encounter Diagnosis   Name Primary?  Chronic systolic CHF (congestive heart failure), NYHA class 3 ([facility] [address] )      Date of onset: 2019      SUMMARY OF PROGRESS:  This is Symone's initial eval for Cardiac rehab  She completed a 6MWT during her visit at which she walked 960 ft at 2 3 METs  She has normal hemodynamic response to exercise, no cardiac complications, and had some fatigue  On telemetry she was NSR with LBBB and occ PVCs  Pt EF is 20% and she has a goal to increase it with rehab as well as increase her strength and physical status  Alvaro Becerra is following a heart healthy diet which she changed since her first visit to CR  She will continue to make adjustments to her diet including decreasing sweets  Alvaro Becerra has good social support and denies any depression/anxiety  She does admit to 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, he has an LVAD and needs to be constantly monitored  Alvaro Becerra states she is handling it well but still gets frustrated with all the appointments she has to keep track of  Alvaro Becerra will start rehab on 10/17/2019         Medication compliance: Yes   Comments: none   Fall Risk: Low   Comments: none     EKG changes: NSR with LBBB, occ PVCs       EXERCISE ASSESSMENT and PLAN    Current Exercise Program in Rehab:       Frequency: 3 days/week        Minutes: 30-35         METS: 2-2 5            HR:    RPE: 4-6         Modalities: Treadmill, UBE, NuStep and Recumbent bike      Exercise Progression 30 Day Goals :    Frequency: 3 days/week   Minutes: 35-40   METS: 2 5-3 5   HR:     RPE: 4-6   Modalities: Treadmill, UBE, NuStep and Recumbent bike    Strength training: Will be added following 2-3 weeks of monitored exercise sessions   Modalities: Chest Press, Pull Downs, Seated Row and Sit to [place]    Progressing: In Progress    Home Exercise: None    Goals: 10% improvement in functional capacity, improved DASI score by 10%, Increase in peak CR METs by 40%, Improved 6MWT distance by 10%, Resume ADLs with increased strength and Exercise 5 days/wk, >150mins/wk  Education: Benefit of exercise for CAD risk factors and RPE scale   Plan:home exercise 30+ mins 2 days opposite CR  Readiness to change: Preparation      NUTRITION ASSESSMENT AND PLAN    Weight control:    Starting weight: 121 8 lbs    Current weight:     Waist circumference:    Startin in    Current:    Diabetes: N/A  Lipid management: Last lipid profile 2018  Chol 108  TRG 75  HDL 41  LDL 52  Goals:decreased body fat% <25%     Education: heart healthy eating  Progressing: In Progress  Plan: Education class: Reading Food Labels, Education Class: Heart Healthy Eating, Increase whole grains, increase fruits/vegs and Reduce added sugars <25g/day  Readiness to change: Preparation      PSYCHOSOCIAL ASSESSMENT AND PLAN    Emotional: Patient reports he/she is coping well with good social support and denies depression or anxiety   PHQ-9 = 1-4 = Minimal Depression  Self-reported stress level: 5   Social support: Very Good  Goals:  Physical Fitness in [place] Score < 3, Social Support in [place] Score < 3, Daily Activity in [facility] Score < 3, Social Activities in [facility] Score < 3, Pain in [place] Score < 3, Overall Health in [facility] Score < 3, Quality of Life in [facility] Score < 3  and Change in Health in [facility] Score < 3   Education: signs/sxs of depression and depression and CAD  Progressing: In Progress  Plan: Class: Stress and Your Health, Class: Relaxation and Practice relaxation techniques  Readiness to change: Preparation      OTHER CORE COMPONENTS     Tobacco:   Social History     Tobacco Use   Smoking Status Never Smoker   Smokeless Tobacco Never Used       Tobacco Use Intervention: Referral to tobacco expert:   Brief counseling by cardiac rehab professional  Date: 10/14/2019    Blood pressure:    Restin/50   Exercise: 130/42    Goals: reduced dietary sodium <2300mg, consistent exercise >150 mins/wk and medication compliance  Education:  understanding HTN and CAD and low sodium diet and HTN  Progressing: In Progress  Plan: Class: Understanding Heart Disease and Class: Common Heart Medications  Readiness to change: Preparation

## 2019-10-17 ENCOUNTER — HOSPITAL ENCOUNTER (OUTPATIENT)
Dept: NON INVASIVE DIAGNOSTICS | Facility: CLINIC | Age: 79
Discharge: HOME | End: 2019-10-17
Payer: MEDICARE

## 2019-10-17 ENCOUNTER — CLINICAL SUPPORT (OUTPATIENT)
Dept: CARDIAC REHAB | Facility: CLINIC | Age: 79
End: 2019-10-17
Payer: MEDICARE

## 2019-10-17 DIAGNOSIS — I50.9 HEART FAILURE, UNSPECIFIED HF CHRONICITY, UNSPECIFIED HEART FAILURE TYPE (HCC): ICD-10-CM

## 2019-10-17 DIAGNOSIS — I42.8 NICM (NONISCHEMIC CARDIOMYOPATHY) (HCC): ICD-10-CM

## 2019-10-17 PROCEDURE — 93308 TTE F-UP OR LMTD: CPT

## 2019-10-17 PROCEDURE — 93325 DOPPLER ECHO COLOR FLOW MAPG: CPT | Performed by: INTERNAL MEDICINE

## 2019-10-17 PROCEDURE — 93798 PHYS/QHP OP CAR RHAB W/ECG: CPT

## 2019-10-17 PROCEDURE — 93308 TTE F-UP OR LMTD: CPT | Performed by: INTERNAL MEDICINE

## 2019-10-17 PROCEDURE — 93321 DOPPLER ECHO F-UP/LMTD STD: CPT | Performed by: INTERNAL MEDICINE

## 2019-10-18 ENCOUNTER — CLINICAL SUPPORT (OUTPATIENT)
Dept: CARDIAC REHAB | Facility: CLINIC | Age: 79
End: 2019-10-18
Payer: MEDICARE

## 2019-10-18 DIAGNOSIS — K75.4 AUTOIMMUNE HEPATITIS (HCC): ICD-10-CM

## 2019-10-18 DIAGNOSIS — I50.9 CONGESTIVE HEART FAILURE, UNSPECIFIED HF CHRONICITY, UNSPECIFIED HEART FAILURE TYPE (HCC): ICD-10-CM

## 2019-10-18 PROCEDURE — 93798 PHYS/QHP OP CAR RHAB W/ECG: CPT

## 2019-10-18 RX ORDER — SACUBITRIL AND VALSARTAN 49; 51 MG/1; MG/1
TABLET, FILM COATED ORAL
Qty: 180 TABLET | Refills: 3 | Status: SHIPPED | OUTPATIENT
Start: 2019-10-18 | End: 2020-01-17 | Stop reason: DRUGHIGH

## 2019-10-22 ENCOUNTER — CLINICAL SUPPORT (OUTPATIENT)
Dept: CARDIAC REHAB | Facility: CLINIC | Age: 79
End: 2019-10-22
Payer: MEDICARE

## 2019-10-22 ENCOUNTER — OFFICE VISIT (OUTPATIENT)
Dept: CARDIOLOGY CLINIC | Facility: CLINIC | Age: 79
End: 2019-10-22
Payer: MEDICARE

## 2019-10-22 VITALS
BODY MASS INDEX: 19.29 KG/M2 | HEIGHT: 66 IN | WEIGHT: 120 LBS | SYSTOLIC BLOOD PRESSURE: 126 MMHG | HEART RATE: 84 BPM | DIASTOLIC BLOOD PRESSURE: 66 MMHG

## 2019-10-22 DIAGNOSIS — I50.22 CHRONIC SYSTOLIC HEART FAILURE (HCC): Primary | ICD-10-CM

## 2019-10-22 DIAGNOSIS — I50.9 CONGESTIVE HEART FAILURE, UNSPECIFIED HF CHRONICITY, UNSPECIFIED HEART FAILURE TYPE (HCC): ICD-10-CM

## 2019-10-22 DIAGNOSIS — I44.7 COMPLETE LEFT BUNDLE BRANCH BLOCK (LBBB): ICD-10-CM

## 2019-10-22 DIAGNOSIS — I42.0 DILATED CARDIOMYOPATHY (HCC): ICD-10-CM

## 2019-10-22 DIAGNOSIS — I10 HYPERTENSION, ESSENTIAL: ICD-10-CM

## 2019-10-22 PROCEDURE — 93798 PHYS/QHP OP CAR RHAB W/ECG: CPT

## 2019-10-22 PROCEDURE — 99214 OFFICE O/P EST MOD 30 MIN: CPT | Performed by: INTERNAL MEDICINE

## 2019-10-22 NOTE — PROGRESS NOTES
EPS Progress Note - Denzil Primrose 78 y o  female MRN: [id number]           ASSESSMENT:  1  Chronic systolic heart failure ([facility] [address] )     2  Hypertension, essential     3  Dilated cardiomyopathy ([facility] [address] )     4  Complete left bundle branch block (LBBB)             PLAN:  1  Dilated cardiomyopathy  Start cardiac rehab one week ago  LVEF 30% from echo in October 2019  Currently wearing life vest since August 2018  On beta blocker therapy and Entresto     This patient qualifies for a pacemaker  I explained to the patient the indications risks benefits and alternatives to pacemaker placement  I explained to the patient how the procedure is performed and that risks include but are not limited to bleeding bruising damage to blood vessels heart valves  Serious complications such as heart perforation or lung perforation are rare but can occur  Infection can occur with any foreign body implanted  Finally I explained to the patient that pacemaker leads may move particularly in the 1st one month and reoperation may be required  I explained this is not a complication  All questions were answered  Patient will call the office if she would like to proceed with a Left sided BiV ICD implantation     2  Hypertension  Well controlled     3  Chronic systolic heart failure  On Torsemide and potassium            HPI:   Denzil Primrose is a 78 y o  female who presents to the office today for 3 month follow-up  Patient has a history of chronic systolic heart failure, hypertension, and dilated cardiomyopathy  Patient reports no true cardiac complains  She denies chest pain, shortness of breath, palpitations, pre syncope, or syncope  She does some physical tolerance however she relates it to from an orthopedic perspective  ROS:   Negative for palpitations, shortness of breath, chest discomfort, presyncope or syncope, lower extremity swelling    All other 12 point ROS negative     Objective:     Vitals: Blood pressure 126/66, pulse 84, height 5' 6" (1 676 m), weight 54 4 kg (120 lb), not currently breastfeeding , Body mass index is 19 37 kg/m²  ,        Physical Exam:    GEN: Bony Matta appears well, alert and oriented x 3, pleasant and cooperative   HEENT: pupils equal, round, and reactive to light; extraocular muscles intact  NECK: supple, no carotid bruits   HEART: regular rhythm, normal S1 and S2, no murmurs, clicks, gallops or rubs   LUNGS: clear to auscultation bilaterally; no wheezes, rales, or rhonchi   ABDOMEN: normal bowel sounds, soft, no tenderness, no distention  EXTREMITIES: peripheral pulses normal; no clubbing, cyanosis, or edema  NEURO: no focal findings   SKIN: normal without suspicious lesions on exposed skin    Medications:      Current Outpatient Medications:     ascorbic acid (VITAMIN C) 500 mg tablet, Take 500 mg by mouth daily, Disp: , Rfl:     BUDESONIDE ER PO, Take 3 mg by mouth 2 (two) times a day  , Disp: , Rfl:     ENTRESTO 49-51 MG TABS, TAKE 1 TABLET BY MOUTH EVERY 12 HOURS, Disp: 180 tablet, Rfl: 3    metoprolol succinate (TOPROL-XL) 25 mg 24 hr tablet, Take 1 tablet (25 mg total) by mouth daily, Disp: 90 tablet, Rfl: 3    Omega-3 Fatty Acids (FISH OIL) 1,000 mg, Take 1,000 mg by mouth daily, Disp: , Rfl:     potassium chloride (K-DUR,KLOR-CON) 10 mEq tablet, Take 2 tablets (20 mEq total) by mouth daily, Disp: 180 tablet, Rfl: 4    spironolactone (ALDACTONE) 25 mg tablet, TAKE 0 5 TABLETS (12 5 MG TOTAL) BY MOUTH DAILY, Disp: 30 tablet, Rfl: 2    torsemide (DEMADEX) 10 mg tablet, Take 1 tablet (10 mg total) by mouth daily, Disp: 90 tablet, Rfl: 3     Family History:  Obesity  Social History     Socioeconomic History    Marital status: /Civil Union     Spouse name: Not on file    Number of children: Not on file    Years of education: Not on file    Highest education level: Not on file   Occupational History    Not on file   Social Needs    Financial resource strain: Not on file   [employer] Food insecurity:     Worry: Not on file     Inability: Not on file    Transportation needs:     Medical: Not on file     Non-medical: Not on file   Tobacco Use    Smoking status: Never Smoker    Smokeless tobacco: Never Used   Substance and Sexual Activity    Alcohol use: No    Drug use: No    Sexual activity: Not on file   Lifestyle    Physical activity:     Days per week: Not on file     Minutes per session: Not on file    Stress: Not on file   Relationships    Social connections:     Talks on phone: Not on file     Gets together: Not on file     Attends Church service: Not on file     Active member of club or organization: Not on file     Attends meetings of clubs or organizations: Not on file     Relationship status: Not on file    Intimate partner violence:     Fear of current or ex partner: Not on file     Emotionally abused: Not on file     Physically abused: Not on file     Forced sexual activity: Not on file   Other Topics Concern    Not on file   Social History Narrative    Not on file     Social History     Tobacco Use   Smoking Status Never Smoker   Smokeless Tobacco Never Used     Social History     Substance and Sexual Activity   Alcohol Use No       Labs & Results:  Below is the patient's most recent value for Albumin, ALT, AST, BUN, Calcium, Chloride, Cholesterol, CO2, Creatinine, GFR, Glucose, HDL, Hematocrit, Hemoglobin, Hemoglobin A1C, LDL, Magnesium, Phosphorus, Platelets, Potassium, PSA, Sodium, Triglycerides, and WBC     Lab Results   Component Value Date    ALT 50 09/17/2019    AST 42 09/17/2019    BUN 25 09/17/2019    CALCIUM 9 7 09/17/2019     09/17/2019    CHOL 182 10/23/2015    CO2 30 09/17/2019    CREATININE 0 94 09/17/2019    HDL 41 08/02/2018    HCT 35 7 09/09/2019    HGB 11 3 (L) 09/09/2019    MG 2 0 08/13/2018    PLT 57 (L) 09/09/2019    K 3 4 (L) 09/17/2019     12/04/2015    TRIG 75 08/02/2018    WBC 2 73 (L) 09/09/2019     Note: for a comprehensive list of the patient's lab results, access the Results Review activity  Cardiac testing:   Results for orders placed during the hospital encounter of 19   Echo complete with contrast if indicated    Narrative [address]  [facility] [place], [address] [facility]  [phone]    Transthoracic Echocardiogram  2D, M-mode, Doppler, and Color Doppler    Study date:  2019    Patient: Rolan Arreaga  MR number: [id number]  Account number: [de-identified]  : 1940  Age: 78 years  Gender: Female  Status: Outpatient  Location: [address] [place] [address]  Height: 66 in  Weight: 121 lb  BP: 124/ 52 mmHg    Indications: Congestive heart failure    Diagnoses: S89 17 - Chronic systolic (congestive) heart failure    Sonographer:  LILY Camp  Primary Physician:  Augusto Merino MD  Referring Physician:  Tanya Fernández DO  Group:  [id number]  Interpreting Physician:  Jessica Bautista MD    SUMMARY    LEFT VENTRICLE:  The ventricle was moderately dilated  Systolic function was severely reduced  Ejection fraction was estimated to be 20 %  There were no regional wall motion abnormalities  There was severe diffuse hypokinesis  LEFT ATRIUM:  The atrium was mildly dilated  MITRAL VALVE:  There was mild annular calcification  There was moderate to severe regurgitation  TRICUSPID VALVE:  There was mild regurgitation  Pulmonary artery systolic pressure was mildly increased  PULMONIC VALVE:  There was trace regurgitation  COMPARISONS:  There has been no significant interval change  Comparison was made with the previous study of 06-Dec-2018  HISTORY: PRIOR HISTORY: Hypertension, cardiomyopathy, congestive heart failure, hypothyroidism    PROCEDURE: The study was performed in the [address]  This was a routine study  The transthoracic approach was used   The study included complete 2D imaging, M-mode, complete spectral Doppler, and color Doppler  Image  quality was adequate  LEFT VENTRICLE: The ventricle was moderately dilated  Systolic function was severely reduced  Ejection fraction was estimated to be 20 %  There were no regional wall motion abnormalities  There was severe diffuse hypokinesis  Wall  thickness was normal  No evidence of apical thrombus  There was a false tendon within the ventricle  DOPPLER: Left ventricular diastolic function parameters were normal     RIGHT VENTRICLE: The size was normal  Systolic function was normal  Wall thickness was normal     LEFT ATRIUM: The atrium was mildly dilated  RIGHT ATRIUM: Size was normal     MITRAL VALVE: There was mild annular calcification  Valve structure was normal  There was mild thickening  There was normal leaflet separation  DOPPLER: The transmitral velocity was within the normal range  There was no evidence for  stenosis  There was moderate to severe regurgitation  AORTIC VALVE: The valve was trileaflet  Leaflets exhibited normal thickness and normal cuspal separation  DOPPLER: Transaortic velocity was within the normal range  There was no evidence for stenosis  There was no significant  regurgitation  TRICUSPID VALVE: The valve structure was normal  There was normal leaflet separation  DOPPLER: The transtricuspid velocity was within the normal range  There was no evidence for stenosis  There was mild regurgitation  Pulmonary artery  systolic pressure was mildly increased  PULMONIC VALVE: Leaflets exhibited normal thickness, no calcification, and normal cuspal separation  DOPPLER: The transpulmonic velocity was within the normal range  There was trace regurgitation  PERICARDIUM: There was no pericardial effusion  The pericardium was normal in appearance  AORTA: The root exhibited normal size  SYSTEMIC VEINS: IVC: The inferior vena cava was normal in size      SYSTEM MEASUREMENT TABLES    2D  %FS: 10 12 %  Ao Diam: 3 22 cm  EDV(Teich): 139 96 ml  EF Biplane: 30 39 %  EF(Cube): 27 39 %  EF(Teich): 21 93 %  ESV(Cube): 112 91 ml  ESV(Teich): 109 27 ml  IVSd: 0 97 cm  LA Area: 11 47 cm2  LA Diam: 3 59 cm  LVEDV MOD A2C: 280 1 ml  LVEDV MOD A4C: 281 86 ml  LVEDV MOD BP: 280 68 ml  LVEF MOD A2C: 34 94 %  LVEF MOD A4C: 33 32 %  LVESV MOD A2C: 182 24 ml  LVESV MOD A4C: 187 94 ml  LVESV MOD BP: 195 38 ml  LVIDd: 5 38 cm  LVIDs: 4 83 cm  LVLd A2C: 8 92 cm  LVLd A4C: 9 02 cm  LVLs A2C: 7 76 cm  LVLs A4C: 8 67 cm  LVPWd: 1 04 cm  RA Area: 13 22 cm2  RV Diam : 2 91 cm  SV MOD A2C: 97 85 ml  SV MOD A4C: 93 92 ml  SV(Cube): 42 6 ml  SV(Teich): 30 69 ml    CW  TR Vmax: 2 79 m/s  TR maxP 14 mmHg    MM  TAPSE: 2 25 cm    [facility]    Prepared and electronically signed by    Payton Yang MD  Signed 2019 13:51:24       No results found for this or any previous visit  No results found for this or any previous visit  No results found for this or any previous visit                Scribe Attestation    I,:   Romaine Clarke am acting as a scribe while in the presence of the attending physician :        I,:   Georgia Pineda DO personally performed the services described in this documentation    as scribed in my presence :

## 2019-10-23 ENCOUNTER — APPOINTMENT (OUTPATIENT)
Dept: CARDIAC REHAB | Facility: CLINIC | Age: 79
End: 2019-10-23
Payer: MEDICARE

## 2019-10-23 DIAGNOSIS — K75.4 AUTOIMMUNE HEPATITIS (HCC): ICD-10-CM

## 2019-10-23 RX ORDER — METOPROLOL SUCCINATE 25 MG/1
TABLET, EXTENDED RELEASE ORAL
Qty: 90 TABLET | Refills: 3 | Status: SHIPPED | OUTPATIENT
Start: 2019-10-23 | End: 2020-10-30

## 2019-10-24 ENCOUNTER — CLINICAL SUPPORT (OUTPATIENT)
Dept: CARDIAC REHAB | Facility: CLINIC | Age: 79
End: 2019-10-24
Payer: MEDICARE

## 2019-10-24 DIAGNOSIS — I50.9 CONGESTIVE HEART FAILURE, UNSPECIFIED HF CHRONICITY, UNSPECIFIED HEART FAILURE TYPE (HCC): ICD-10-CM

## 2019-10-24 PROCEDURE — 93798 PHYS/QHP OP CAR RHAB W/ECG: CPT

## 2019-10-25 ENCOUNTER — APPOINTMENT (OUTPATIENT)
Dept: CARDIAC REHAB | Facility: CLINIC | Age: 79
End: 2019-10-25
Payer: MEDICARE

## 2019-10-29 ENCOUNTER — CLINICAL SUPPORT (OUTPATIENT)
Dept: CARDIAC REHAB | Facility: CLINIC | Age: 79
End: 2019-10-29
Payer: MEDICARE

## 2019-10-29 DIAGNOSIS — I50.22 CHRONIC SYSTOLIC HEART FAILURE (HCC): ICD-10-CM

## 2019-10-29 PROCEDURE — 93798 PHYS/QHP OP CAR RHAB W/ECG: CPT

## 2019-10-31 ENCOUNTER — CLINICAL SUPPORT (OUTPATIENT)
Dept: CARDIAC REHAB | Facility: CLINIC | Age: 79
End: 2019-10-31
Payer: MEDICARE

## 2019-10-31 DIAGNOSIS — I50.22 CHRONIC SYSTOLIC HEART FAILURE (HCC): ICD-10-CM

## 2019-10-31 PROCEDURE — 93798 PHYS/QHP OP CAR RHAB W/ECG: CPT

## 2019-11-01 ENCOUNTER — CLINICAL SUPPORT (OUTPATIENT)
Dept: CARDIAC REHAB | Facility: CLINIC | Age: 79
End: 2019-11-01
Payer: MEDICARE

## 2019-11-01 DIAGNOSIS — I50.22 CHRONIC SYSTOLIC HEART FAILURE (HCC): ICD-10-CM

## 2019-11-01 PROCEDURE — 93798 PHYS/QHP OP CAR RHAB W/ECG: CPT

## 2019-11-05 ENCOUNTER — CLINICAL SUPPORT (OUTPATIENT)
Dept: CARDIAC REHAB | Facility: CLINIC | Age: 79
End: 2019-11-05
Payer: MEDICARE

## 2019-11-05 DIAGNOSIS — I50.22 CHRONIC SYSTOLIC HEART FAILURE (HCC): ICD-10-CM

## 2019-11-05 PROCEDURE — 93798 PHYS/QHP OP CAR RHAB W/ECG: CPT

## 2019-11-07 ENCOUNTER — CLINICAL SUPPORT (OUTPATIENT)
Dept: CARDIAC REHAB | Facility: CLINIC | Age: 79
End: 2019-11-07
Payer: MEDICARE

## 2019-11-07 DIAGNOSIS — I50.22 CHRONIC SYSTOLIC HEART FAILURE (HCC): ICD-10-CM

## 2019-11-07 PROCEDURE — 93798 PHYS/QHP OP CAR RHAB W/ECG: CPT

## 2019-11-08 ENCOUNTER — CLINICAL SUPPORT (OUTPATIENT)
Dept: CARDIAC REHAB | Facility: CLINIC | Age: 79
End: 2019-11-08
Payer: MEDICARE

## 2019-11-08 DIAGNOSIS — I50.22 CHRONIC SYSTOLIC HEART FAILURE (HCC): ICD-10-CM

## 2019-11-08 PROCEDURE — 93798 PHYS/QHP OP CAR RHAB W/ECG: CPT

## 2019-11-12 ENCOUNTER — CLINICAL SUPPORT (OUTPATIENT)
Dept: CARDIAC REHAB | Facility: CLINIC | Age: 79
End: 2019-11-12
Payer: MEDICARE

## 2019-11-12 DIAGNOSIS — I50.22 CHRONIC SYSTOLIC HEART FAILURE (HCC): ICD-10-CM

## 2019-11-12 PROCEDURE — 93798 PHYS/QHP OP CAR RHAB W/ECG: CPT

## 2019-11-12 NOTE — PROGRESS NOTES
Cardiac Rehabilitation Plan of Care   30 day       Today's date: 2019   Visits: 12  Patient name: Esmer Hamlin      : 1940  Age: 78 y o  MRN: [id number]  Referring Physician: Parish Horne DO  Cardiologist: Parish Horne DO  Provider: Cisco Vines  Clinician: Doc Abdullahi, [place], [facility] [place] [facility]    Dx:   Encounter Diagnosis   Name Primary?  Chronic systolic heart failure ([address] )      Date of onset: 2019      SUMMARY OF PROGRESS:  This is Symone's 30 day note for cardiac rehab, she has attended 12 sessions including her initial eval  Saritha Moore has progressed to 40 min of cardio at 1 7-3 2 METs  She will start weight lifting in her next session  Saritha Moore has reported no cardiac complications during exercise or at home and has normal hemodynamic response to exercise  Her most recent echo was done on 10/17 indicated her EF to be 30% which has increased from 20% on   She states she has to determine whether she will get an ICD implanted or not  Saritha Moore has been wearing a lifevest since Aug 2018 and states she is finally coming to terms with the fact that getting an ICD may be the better option  On telemetry Saritha Moore is NSR with LBBB  Her amplitude increases with exercise  Saritha Moore is not exercise at home however it was discussedto start adding some walking into her day for 10min that she can do with her  around the house or also on her own when she needs to relax  Saritha Moore is following a heart healthy diet which she changed since her first visit to [facility]  She will continue to make adjustments to her diet including decreasing sweets  Saritha Moore has good social support and denies any depression/anxiety  She does admit to 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, he has an LVAD and needs to be constantly monitored  Saritha Moore states she is handling it well but still gets frustrated with all the appointments she has to keep track of   It was discussed at length to make sure Saritha Moore also finds time to think about herself and take care of her own health as well  She mentioned possible looking into 53+ yo socialization sessions and she can attend with her  to keep them up and active  She believes some healthy interaction would be healthy for her and help her feel more lively  Symone denied any info from behavioral health and plans on also talking to a  for some info on socialization as well  Overall, Alvaro Becerra has been progressing well in rehab stating she has more energy and feels really good when she leaves  We will continue to progress as tolerable  Medication compliance: Yes   Comments: none   Fall Risk: Low   Comments: none     EKG changes: NSR with LBBB, occ PVCs       EXERCISE ASSESSMENT and PLAN    Current Exercise Program in Rehab:       Frequency: 3 days/week        Minutes: 40-45        METS: 1 7-3 2            HR:    RPE: 4-6         Modalities: Treadmill, UBE, NuStep and Recumbent bike      Exercise Progression 30 Day Goals :    Frequency: 3-4 days/week   Minutes: 40-45   METS: 2 5-3 5   HR:     RPE: 4-6   Modalities: Treadmill, Airdyne bike, UBE, NuStep and Recumbent bike    Strength training:   Will be added following 2-3 weeks of monitored exercise sessions   Modalities: Chest Press, Pull Downs, Seated Row and Sit to [place]    Progressing:  Yes - has increased intensity, added modality, and plans on exercising at home     Home Exercise: Plan: walk 10 min around the house daily with      Goals: 10% improvement in functional capacity, improved DASI score by 10%, Increase in peak CR METs by 40%, Improved 6MWT distance by 10%, Resume ADLs with increased strength and Exercise 5 days/wk, >150mins/wk  Education: Benefit of exercise for CAD risk factors and RPE scale   Plan:home exercise 30+ mins 2 days opposite CR  Readiness to change: Action      NUTRITION ASSESSMENT AND PLAN    Weight control:    Starting weight: 121 8 lbs    Current weight:   123 2 lbs   Waist circumference:    Startin in    Current:    Diabetes: N/A  Lipid management: Last lipid profile 2018  Chol 108  TRG 75  HDL 41  LDL 52  Goals:decreased body fat% <25%     Education:   Education class: Reading Food Labels,  Progressing: Yes - continues with the same heart healthy plan   Plan: Education Class: Heart Healthy Eating, Increase whole grains, increase fruits/vegs and Reduce added sugars <25g/day  Readiness to change: Action      PSYCHOSOCIAL ASSESSMENT AND PLAN    Emotional: Patient reports he/she is coping well with good social support and denies depression or anxiety   PHQ-9 = 1-4 = Minimal Depression  Self-reported stress level: 5   Social support: Very Good  Goals:  Physical Fitness in [place] Score < 3, Social Support in [place] Score < 3, Daily Activity in [place] Score < 3, Social Activities in [place] Score < 3, Pain in [place] Score < 3, Overall Health in [facility] Score < 3, [facility] of Life in [facility] Score < 3  and Change in Health in [facility] Score < 3   Education: signs/sxs of depression and depression and CAD  Progressing: No - needs some socialization and to help with added stress; discussed relaxation techniques and ways to manage being a caretaker   Plan: Class: Stress and Your Health, Class: Relaxation and Practice relaxation techniques  Readiness to change: Preparation      OTHER CORE COMPONENTS     Tobacco:   Social History     Tobacco Use   Smoking Status Never Smoker   Smokeless Tobacco Never Used       Tobacco Use Intervention: Referral to tobacco expert:   Brief counseling by cardiac rehab professional  Date: 10/14/2019    Blood pressure:    Restin/62   Exercise: 148/62    Goals: reduced dietary sodium <2300mg, consistent exercise >150 mins/wk and medication compliance  Education:  understanding HTN and CAD, low sodium diet and HTN, Education class:  Common Heart Medications and Education class: Understanding Heart Disease  Progressing: No - BP elevated at rest, will monitor at home   Plan: monitor BP at home   Readiness to change: Action

## 2019-11-14 ENCOUNTER — CLINICAL SUPPORT (OUTPATIENT)
Dept: CARDIAC REHAB | Facility: CLINIC | Age: 79
End: 2019-11-14
Payer: MEDICARE

## 2019-11-14 DIAGNOSIS — I50.22 CHRONIC SYSTOLIC HEART FAILURE (HCC): ICD-10-CM

## 2019-11-14 PROCEDURE — 93798 PHYS/QHP OP CAR RHAB W/ECG: CPT

## 2019-11-18 ENCOUNTER — TRANSCRIBE ORDERS (OUTPATIENT)
Dept: ADMINISTRATIVE | Age: 79
End: 2019-11-18

## 2019-11-18 ENCOUNTER — LAB (OUTPATIENT)
Dept: LAB | Age: 79
End: 2019-11-18
Payer: MEDICARE

## 2019-11-18 DIAGNOSIS — K75.4 CHRONIC AGGRESSIVE HEPATITIS (HCC): Primary | ICD-10-CM

## 2019-11-18 DIAGNOSIS — K75.4 CHRONIC AGGRESSIVE HEPATITIS (HCC): ICD-10-CM

## 2019-11-18 LAB
ALBUMIN SERPL BCP-MCNC: 3.5 G/DL (ref 3.5–5)
ALP SERPL-CCNC: 46 U/L (ref 46–116)
ALT SERPL W P-5'-P-CCNC: 29 U/L (ref 12–78)
ANION GAP SERPL CALCULATED.3IONS-SCNC: 8 MMOL/L (ref 4–13)
AST SERPL W P-5'-P-CCNC: 27 U/L (ref 5–45)
BILIRUB SERPL-MCNC: 0.29 MG/DL (ref 0.2–1)
BUN SERPL-MCNC: 16 MG/DL (ref 5–25)
CALCIUM SERPL-MCNC: 9 MG/DL (ref 8.3–10.1)
CHLORIDE SERPL-SCNC: 110 MMOL/L (ref 100–108)
CO2 SERPL-SCNC: 24 MMOL/L (ref 21–32)
CREAT SERPL-MCNC: 0.69 MG/DL (ref 0.6–1.3)
GFR SERPL CREATININE-BSD FRML MDRD: 94 ML/MIN/1.73SQ M
GLUCOSE SERPL-MCNC: 68 MG/DL (ref 65–140)
POTASSIUM SERPL-SCNC: 3.8 MMOL/L (ref 3.5–5.3)
PROT SERPL-MCNC: 7.6 G/DL (ref 6.4–8.2)
SODIUM SERPL-SCNC: 142 MMOL/L (ref 136–145)

## 2019-11-18 PROCEDURE — 80053 COMPREHEN METABOLIC PANEL: CPT

## 2019-11-18 PROCEDURE — 36415 COLL VENOUS BLD VENIPUNCTURE: CPT

## 2019-11-19 ENCOUNTER — CLINICAL SUPPORT (OUTPATIENT)
Dept: CARDIAC REHAB | Facility: CLINIC | Age: 79
End: 2019-11-19
Payer: MEDICARE

## 2019-11-19 DIAGNOSIS — I50.22 CHRONIC SYSTOLIC HEART FAILURE (HCC): ICD-10-CM

## 2019-11-19 PROCEDURE — 93798 PHYS/QHP OP CAR RHAB W/ECG: CPT

## 2019-11-21 ENCOUNTER — CLINICAL SUPPORT (OUTPATIENT)
Dept: CARDIAC REHAB | Facility: CLINIC | Age: 79
End: 2019-11-21
Payer: MEDICARE

## 2019-11-21 DIAGNOSIS — I50.22 CHRONIC SYSTOLIC HEART FAILURE (HCC): ICD-10-CM

## 2019-11-21 PROCEDURE — 93798 PHYS/QHP OP CAR RHAB W/ECG: CPT

## 2019-11-22 ENCOUNTER — CLINICAL SUPPORT (OUTPATIENT)
Dept: CARDIAC REHAB | Facility: CLINIC | Age: 79
End: 2019-11-22
Payer: MEDICARE

## 2019-11-22 DIAGNOSIS — I50.22 CHRONIC SYSTOLIC HEART FAILURE (HCC): ICD-10-CM

## 2019-11-22 PROCEDURE — 93798 PHYS/QHP OP CAR RHAB W/ECG: CPT

## 2019-11-25 ENCOUNTER — CLINICAL SUPPORT (OUTPATIENT)
Dept: CARDIAC REHAB | Facility: CLINIC | Age: 79
End: 2019-11-25
Payer: MEDICARE

## 2019-11-25 DIAGNOSIS — I50.22 CHRONIC SYSTOLIC HEART FAILURE (HCC): ICD-10-CM

## 2019-11-25 PROCEDURE — 93798 PHYS/QHP OP CAR RHAB W/ECG: CPT

## 2019-11-26 ENCOUNTER — CLINICAL SUPPORT (OUTPATIENT)
Dept: CARDIAC REHAB | Facility: CLINIC | Age: 79
End: 2019-11-26
Payer: MEDICARE

## 2019-11-26 DIAGNOSIS — I50.22 CHRONIC SYSTOLIC HEART FAILURE (HCC): ICD-10-CM

## 2019-11-26 PROCEDURE — 93798 PHYS/QHP OP CAR RHAB W/ECG: CPT

## 2019-11-29 ENCOUNTER — CLINICAL SUPPORT (OUTPATIENT)
Dept: CARDIAC REHAB | Facility: CLINIC | Age: 79
End: 2019-11-29
Payer: MEDICARE

## 2019-11-29 DIAGNOSIS — I50.9 CONGESTIVE HEART FAILURE, UNSPECIFIED HF CHRONICITY, UNSPECIFIED HEART FAILURE TYPE (HCC): ICD-10-CM

## 2019-11-29 DIAGNOSIS — I50.22 CHRONIC SYSTOLIC HEART FAILURE (HCC): ICD-10-CM

## 2019-11-29 PROCEDURE — 93798 PHYS/QHP OP CAR RHAB W/ECG: CPT

## 2019-12-03 ENCOUNTER — CLINICAL SUPPORT (OUTPATIENT)
Dept: CARDIAC REHAB | Facility: CLINIC | Age: 79
End: 2019-12-03
Payer: MEDICARE

## 2019-12-03 DIAGNOSIS — I50.22 CHRONIC SYSTOLIC HEART FAILURE (HCC): ICD-10-CM

## 2019-12-03 PROCEDURE — 93798 PHYS/QHP OP CAR RHAB W/ECG: CPT

## 2019-12-05 ENCOUNTER — CLINICAL SUPPORT (OUTPATIENT)
Dept: CARDIAC REHAB | Facility: CLINIC | Age: 79
End: 2019-12-05
Payer: MEDICARE

## 2019-12-05 DIAGNOSIS — I50.22 CHRONIC SYSTOLIC HEART FAILURE (HCC): ICD-10-CM

## 2019-12-05 PROCEDURE — 93798 PHYS/QHP OP CAR RHAB W/ECG: CPT

## 2019-12-06 ENCOUNTER — CLINICAL SUPPORT (OUTPATIENT)
Dept: CARDIAC REHAB | Facility: CLINIC | Age: 79
End: 2019-12-06
Payer: MEDICARE

## 2019-12-06 DIAGNOSIS — I50.22 CHRONIC SYSTOLIC HEART FAILURE (HCC): ICD-10-CM

## 2019-12-06 PROCEDURE — 93798 PHYS/QHP OP CAR RHAB W/ECG: CPT

## 2019-12-06 NOTE — PROGRESS NOTES
Cardiac Rehabilitation Plan of Care   60 day      Today's date: 2019   Visits:  22  Patient name: Chasity Starkey      : 1940  Age: 78 y o  MRN: [id number]  Referring Physician: Macario Weaver DO  Cardiologist: Macario Weaver DO  Provider: [facility] [address]  Clinician: Lesa Jimenez, MS, [facility] [place], [facility]    Dx:   Encounter Diagnosis   Name Primary?  Chronic systolic heart failure ([address] )      Date of onset: 2019      SUMMARY OF PROGRESS:  This is Symone's 60 day note for cardiac rehab, she has attended 22 sessions including her initial eval  Tuyet Mora has progressed to 40-45 min of cardio at 1 7-3 2 METs plus light weight lifting  Tuyet Mora has reported no cardiac complications during exercise or at home and has normal hemodynamic response to exercise  Her most recent echo was done on 10/17 indicated her EF to be 30% which has increased from 20% on   Tuyet Mora has been wearing a lifevest since Aug 2018 and states she is finally coming to terms with the fact that getting an ICD may be the better option  On telemetry Tuyet Mora is NSR with LBBB  Her amplitude increases with exercise  Tuyet Mora has started to add walking at home for 10min/day around her house  Recently her achilles tendonitis has returned and is irritated during walking  TM use has been stopped until her continue gets better again  Tuyet Mora is following a heart healthy diet which she changed since her first visit to [facility]  Tuyet Mora has good social support and denies any depression/anxiety  She does admit to 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, he has an LVAD and needs to be constantly monitored  Tuyet Mora states she is handling it well but still gets frustrated with all the appointments she has to keep track of  It was discussed at length to make sure Tuyet Mora also finds time to think about herself and take care of her own health as well   She would like to look into finding more socialization for herself and her  that she believes would be healthy for her  Symone denied any info from behavioral health and plans on also talking to a  for some info on socialization as well  Overall, Azar Cabezas has been progressing well in rehab stating she has more energy and feels really good when she leaves  We will continue to progress as tolerable  Medication compliance: Yes   Comments: none   Fall Risk: Low   Comments: none     EKG changes: NSR with LBBB, occ PVCs       EXERCISE ASSESSMENT and PLAN    Current Exercise Program in Rehab:       Frequency: 3 days/week        Minutes: 40-45        METS: 1 7-3 2            HR:    RPE: 4-6         Modalities: Treadmill, UBE, NuStep and Recumbent bike      Exercise Progression 30 Day Goals :    Frequency: 3-4 days/week   Minutes: 40-45   METS: 2 5-3 5   HR:     RPE: 4-6   Modalities: Treadmill, Airdyne bike, UBE, NuStep and Recumbent bike    Strength training:   Will be added following 2-3 weeks of monitored exercise sessions   Modalities: Chest Press, Pull Downs, Seated Row and Sit to [place]    Progressing:  Yes - started walking at home, limited intensity due to achilles tendonitis    Home Exercise: Walking 10 min around the house daily with      Goals: 10% improvement in functional capacity, improved DASI score by 10%, Increase in peak CR METs by 40%, Improved 6MWT distance by 10%, Resume ADLs with increased strength and Exercise 5 days/wk, >150mins/wk  Education: Benefit of exercise for CAD risk factors, RPE scale and class: Risk Factors for Heart Disease   Plan:home exercise 30+ mins 2 days opposite CR  Readiness to change: Action      NUTRITION ASSESSMENT AND PLAN    Weight control:    Starting weight: 121 8 lbs    Current weight:   120 6 lbs   Waist circumference:    Startin in    Current:    Diabetes: N/A  Lipid management: Last lipid profile 2018  Chol 108  TRG 75  HDL 41  LDL 52  Goals:decreased body fat% <25%     Education: Education class: Reading Food Labels,  Progressing: Yes - continues with the same heart healthy plan   Plan: Education Class: Heart Healthy Eating, Increase whole grains, increase fruits/vegs and Reduce added sugars <25g/day  Readiness to change: Maintenance      PSYCHOSOCIAL ASSESSMENT AND PLAN    Emotional: Patient reports he/she is coping well with good social support and denies depression or anxiety   PHQ-9 = 1-4 = Minimal Depression  Self-reported stress level: 5   Social support: Very Good  Goals:  Physical Fitness in [place] Score < 3, Social Support in [place] Score < 3, Daily Activity in [place] Score < 3, Social Activities in [place] Score < 3, Pain in [place] Score < 3, Overall Health in [place] Score < 3, Quality of Life in [place] Score < 3  and Change in Health in [place] Score < 3   Education: signs/sxs of depression, depression and CAD, class:  Stress and Your Health  and class:  Relaxation  Progressing: No - needs some socialization and to help with added stress; discussed relaxation techniques and ways to manage being a caretaker   Plan: Practice relaxation techniques  Readiness to change: Action      OTHER CORE COMPONENTS     Tobacco:   Social History     Tobacco Use   Smoking Status Never Smoker   Smokeless Tobacco Never Used       Tobacco Use Intervention: Referral to tobacco expert:   Brief counseling by cardiac rehab professional  Date: 10/14/2019    Blood pressure:    Restin/60   Exercise: 142/54    Goals: reduced dietary sodium <2300mg, consistent exercise >150 mins/wk and medication compliance  Education:  understanding HTN and CAD, low sodium diet and HTN, Education class:  Common Heart Medications and Education class: Understanding Heart Disease  Progressing: No - BP elevated at rest, will monitor at home   Plan: monitor BP at home   Readiness to change: Maintenance

## 2019-12-10 ENCOUNTER — CLINICAL SUPPORT (OUTPATIENT)
Dept: CARDIAC REHAB | Facility: CLINIC | Age: 79
End: 2019-12-10
Payer: MEDICARE

## 2019-12-10 DIAGNOSIS — I50.22 CHRONIC SYSTOLIC HEART FAILURE (HCC): ICD-10-CM

## 2019-12-10 PROCEDURE — 93798 PHYS/QHP OP CAR RHAB W/ECG: CPT

## 2019-12-12 ENCOUNTER — APPOINTMENT (OUTPATIENT)
Dept: CARDIAC REHAB | Facility: CLINIC | Age: 79
End: 2019-12-12
Payer: MEDICARE

## 2019-12-13 ENCOUNTER — APPOINTMENT (OUTPATIENT)
Dept: CARDIAC REHAB | Facility: CLINIC | Age: 79
End: 2019-12-13
Payer: MEDICARE

## 2019-12-17 ENCOUNTER — APPOINTMENT (OUTPATIENT)
Dept: CARDIAC REHAB | Facility: CLINIC | Age: 79
End: 2019-12-17
Payer: MEDICARE

## 2019-12-19 ENCOUNTER — APPOINTMENT (OUTPATIENT)
Dept: CARDIAC REHAB | Facility: CLINIC | Age: 79
End: 2019-12-19
Payer: MEDICARE

## 2019-12-20 ENCOUNTER — APPOINTMENT (OUTPATIENT)
Dept: CARDIAC REHAB | Facility: CLINIC | Age: 79
End: 2019-12-20
Payer: MEDICARE

## 2019-12-23 ENCOUNTER — TRANSCRIBE ORDERS (OUTPATIENT)
Dept: ADMINISTRATIVE | Age: 79
End: 2019-12-23

## 2019-12-23 ENCOUNTER — LAB (OUTPATIENT)
Dept: LAB | Age: 79
End: 2019-12-23
Payer: MEDICARE

## 2019-12-23 ENCOUNTER — CLINICAL SUPPORT (OUTPATIENT)
Dept: CARDIAC REHAB | Facility: CLINIC | Age: 79
End: 2019-12-23
Payer: MEDICARE

## 2019-12-23 DIAGNOSIS — I50.22 CHRONIC SYSTOLIC HEART FAILURE (HCC): ICD-10-CM

## 2019-12-23 DIAGNOSIS — K73.2 CAH (CHRONIC ACTIVE HEPATITIS) (HCC): ICD-10-CM

## 2019-12-23 DIAGNOSIS — K73.2 CAH (CHRONIC ACTIVE HEPATITIS) (HCC): Primary | ICD-10-CM

## 2019-12-23 DIAGNOSIS — D69.6 THROMBOCYTOPENIA, UNSPECIFIED (HCC): ICD-10-CM

## 2019-12-23 LAB
ALBUMIN SERPL BCP-MCNC: 3.4 G/DL (ref 3.5–5)
ALP SERPL-CCNC: 44 U/L (ref 46–116)
ALT SERPL W P-5'-P-CCNC: 25 U/L (ref 12–78)
ANION GAP SERPL CALCULATED.3IONS-SCNC: 4 MMOL/L (ref 4–13)
AST SERPL W P-5'-P-CCNC: 23 U/L (ref 5–45)
BILIRUB SERPL-MCNC: 0.37 MG/DL (ref 0.2–1)
BUN SERPL-MCNC: 17 MG/DL (ref 5–25)
CALCIUM SERPL-MCNC: 9 MG/DL (ref 8.3–10.1)
CHLORIDE SERPL-SCNC: 109 MMOL/L (ref 100–108)
CO2 SERPL-SCNC: 28 MMOL/L (ref 21–32)
CREAT SERPL-MCNC: 0.79 MG/DL (ref 0.6–1.3)
GFR SERPL CREATININE-BSD FRML MDRD: 81 ML/MIN/1.73SQ M
GLUCOSE SERPL-MCNC: 98 MG/DL (ref 65–140)
POTASSIUM SERPL-SCNC: 3.3 MMOL/L (ref 3.5–5.3)
PROT SERPL-MCNC: 7.3 G/DL (ref 6.4–8.2)
SODIUM SERPL-SCNC: 141 MMOL/L (ref 136–145)

## 2019-12-23 PROCEDURE — 36415 COLL VENOUS BLD VENIPUNCTURE: CPT

## 2019-12-23 PROCEDURE — 93798 PHYS/QHP OP CAR RHAB W/ECG: CPT

## 2019-12-23 PROCEDURE — 80053 COMPREHEN METABOLIC PANEL: CPT

## 2019-12-24 ENCOUNTER — APPOINTMENT (OUTPATIENT)
Dept: CARDIAC REHAB | Facility: CLINIC | Age: 79
End: 2019-12-24
Payer: MEDICARE

## 2019-12-26 ENCOUNTER — CLINICAL SUPPORT (OUTPATIENT)
Dept: CARDIAC REHAB | Facility: CLINIC | Age: 79
End: 2019-12-26
Payer: MEDICARE

## 2019-12-26 DIAGNOSIS — I50.22 CHRONIC SYSTOLIC HEART FAILURE (HCC): ICD-10-CM

## 2019-12-26 PROCEDURE — 93798 PHYS/QHP OP CAR RHAB W/ECG: CPT

## 2019-12-27 ENCOUNTER — CLINICAL SUPPORT (OUTPATIENT)
Dept: CARDIAC REHAB | Facility: CLINIC | Age: 79
End: 2019-12-27
Payer: MEDICARE

## 2019-12-27 DIAGNOSIS — I50.22 CHRONIC SYSTOLIC HEART FAILURE (HCC): ICD-10-CM

## 2019-12-27 PROCEDURE — 93798 PHYS/QHP OP CAR RHAB W/ECG: CPT

## 2019-12-31 ENCOUNTER — CLINICAL SUPPORT (OUTPATIENT)
Dept: CARDIAC REHAB | Facility: CLINIC | Age: 79
End: 2019-12-31
Payer: MEDICARE

## 2019-12-31 DIAGNOSIS — I50.22 CHRONIC SYSTOLIC HEART FAILURE (HCC): ICD-10-CM

## 2019-12-31 PROCEDURE — 93798 PHYS/QHP OP CAR RHAB W/ECG: CPT

## 2020-01-02 ENCOUNTER — CLINICAL SUPPORT (OUTPATIENT)
Dept: CARDIAC REHAB | Facility: CLINIC | Age: 80
End: 2020-01-02
Payer: MEDICARE

## 2020-01-02 DIAGNOSIS — I50.22 CHRONIC SYSTOLIC HEART FAILURE (HCC): ICD-10-CM

## 2020-01-02 PROCEDURE — 93798 PHYS/QHP OP CAR RHAB W/ECG: CPT

## 2020-01-03 ENCOUNTER — CLINICAL SUPPORT (OUTPATIENT)
Dept: CARDIAC REHAB | Facility: CLINIC | Age: 80
End: 2020-01-03
Payer: MEDICARE

## 2020-01-03 DIAGNOSIS — I50.22 CHRONIC SYSTOLIC HEART FAILURE (HCC): ICD-10-CM

## 2020-01-03 PROCEDURE — 93798 PHYS/QHP OP CAR RHAB W/ECG: CPT

## 2020-01-07 ENCOUNTER — CLINICAL SUPPORT (OUTPATIENT)
Dept: CARDIAC REHAB | Facility: CLINIC | Age: 80
End: 2020-01-07
Payer: MEDICARE

## 2020-01-07 DIAGNOSIS — I50.22 CHRONIC SYSTOLIC HEART FAILURE (HCC): ICD-10-CM

## 2020-01-07 PROCEDURE — 93798 PHYS/QHP OP CAR RHAB W/ECG: CPT

## 2020-01-07 NOTE — PROGRESS NOTES
Cardiac Rehabilitation Plan of Care   90 day       Today's date: 2020   Visits:  30  Patient name: Familia Lopez      : 1940  Age: 78 y o  MRN: [id number]  Referring Physician: Juan Layne DO  Cardiologist: Juan Layne DO  Provider: [facility] [address] [place] [address]  Clinician: Franky Bolanos, MS, CEP, [facility]    Dx:   Encounter Diagnosis   Name Primary?  Chronic systolic heart failure ([address] )      Date of onset: 2019      SUMMARY OF PROGRESS:  This is Symone's 90 day note for cardiac rehab, she has attended 30 sessions including her initial eval  Moy Greene has progressed to 40-45 min of cardio at 1 7-3 2 METs plus light weight lifting  Moy Greene has reported no cardiac complications during exercise or at home and has normal hemodynamic response to exercise  Her only complaint has been fatigue after rehab that may be attributed to her recent stressors  Moy Greene was out of rehab for a 1 5wks due to her  being in the hospital  Any added stress really takes a toll on Moy Greene 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   Her most recent echo was done on 10/17 indicated her EF to be 30% which has increased from 20% on   Moy Greene has been wearing a lifevest since Aug 2018 and states she is finally coming to terms with the fact that getting an ICD may be the better option  On telemetry Moy Greene is NSR with LBBB and sensitive to positioning  She has stopped exercise at home due to recent events but hopes to get back into the routine  Moy Greene is following a heart healthy diet which she changed since her first visit to [facility]  Moy Greene has good social support and denies any depression/anxiety  It was discussed, and reiterated, at length to make sure Moy Greene also finds time to think about herself and take care of her own health as well  She would like to look into finding more socialization for herself and her  that she believes would be healthy for her   Symone denied any info from behavioral health and plans on also talking to a  for some info on socialization as well  Overall, Ada Ugarte has been progressing well in rehab and hopes this fatigue she has been experiencing subsides with the decrease in stress  Medication compliance: Yes   Comments: none   Fall Risk: Low   Comments: none     EKG changes: NSR with LBBB, occ PVCs       EXERCISE ASSESSMENT and PLAN    Current Exercise Program in Rehab:       Frequency: 3 days/week        Minutes: 40-45        METS: 1 7-3 2            HR:    RPE: 4-6         Modalities: Treadmill, UBE, NuStep and Recumbent bike      Exercise Progression 30 Day Goals :    Frequency: 3-4 days/week   Minutes: 40-45   METS: 2 5-3 5   HR:     RPE: 4-6   Modalities: Treadmill, [place] bike, UBE, NuStep and Recumbent bike    Strength training:   Will be added following 2-3 weeks of monitored exercise sessions   Modalities: Chest Press, Pull Downs, Seated Row and Sit to [place]    Progressing:  Yes - getting back into routine with recent 1 5 wks off     Home Exercise: Walking 10 min around the house daily with      Goals: 10% improvement in functional capacity, improved DASI score by 10%, Increase in peak CR METs by 40%, Improved 6MWT distance by 10%, Resume ADLs with increased strength and Exercise 5 days/wk, >150mins/wk  Education: Benefit of exercise for CAD risk factors, RPE scale and class: Risk Factors for Heart Disease   Plan:home exercise 30+ mins 2 days opposite CR  Readiness to change: Action      NUTRITION ASSESSMENT AND PLAN    Weight control:    Starting weight: 121 8 lbs    Current weight:   120 6 lbs   Waist circumference:    Startin in    Current:    Diabetes: N/A  Lipid management: Last lipid profile 2018  Chol 108  TRG 75  HDL 41  LDL 52  Goals:decreased body fat% <25%     Education:   Education class: Reading Food Labels,  Progressing: Yes - continues with the same heart healthy plan   Plan: Education Class:  Heart Healthy Eating, Increase whole grains, increase fruits/vegs and Reduce added sugars <25g/day  Readiness to change: Maintenance      PSYCHOSOCIAL ASSESSMENT AND PLAN    Emotional: Patient reports he/she is coping well with good social support and denies depression or anxiety   PHQ-9 = 1-4 = Minimal Depression  Self-reported stress level: 5   Social support: Very Good  Goals:  Physical Fitness in [place] Score < 3, Social Support in [place] Score < 3, Daily Activity in [place] Score < 3, Social Activities in [place] Score < 3, Pain in [place] Score < 3, Overall Health in [place] Score < 3, [facility] of Life in [facility] Score < 3  and Change in Health in [place] Score < 3   Education: signs/sxs of depression, depression and CAD, class:  Stress and Your Health  and class:  Relaxation  Progressing: No - needs some socialization and to help with added stress; discussed relaxation techniques and ways to manage being a caretaker   Plan: Practice relaxation techniques  Readiness to change: Action      OTHER CORE COMPONENTS     Tobacco:   Social History     Tobacco Use   Smoking Status Never Smoker   Smokeless Tobacco Never Used       Tobacco Use Intervention: Referral to tobacco expert:   Brief counseling by cardiac rehab professional  Date: 10/14/2019    Blood pressure:    Restin/76   Exercise: 148/50    Goals: reduced dietary sodium <2300mg, consistent exercise >150 mins/wk and medication compliance  Education:  understanding HTN and CAD, low sodium diet and HTN, Education class:  Common Heart Medications and Education class: Understanding Heart Disease  Progressing: No - BP elevated at rest, will monitor at home   Plan: monitor BP at home   Readiness to change: Maintenance

## 2020-01-09 ENCOUNTER — CLINICAL SUPPORT (OUTPATIENT)
Dept: CARDIAC REHAB | Facility: CLINIC | Age: 80
End: 2020-01-09
Payer: MEDICARE

## 2020-01-09 DIAGNOSIS — I50.22 CHRONIC SYSTOLIC HEART FAILURE (HCC): ICD-10-CM

## 2020-01-09 PROCEDURE — 93798 PHYS/QHP OP CAR RHAB W/ECG: CPT

## 2020-01-10 ENCOUNTER — CLINICAL SUPPORT (OUTPATIENT)
Dept: CARDIAC REHAB | Facility: CLINIC | Age: 80
End: 2020-01-10
Payer: MEDICARE

## 2020-01-10 DIAGNOSIS — I50.22 CHRONIC SYSTOLIC HEART FAILURE (HCC): ICD-10-CM

## 2020-01-10 PROCEDURE — 93798 PHYS/QHP OP CAR RHAB W/ECG: CPT

## 2020-01-14 ENCOUNTER — CLINICAL SUPPORT (OUTPATIENT)
Dept: CARDIAC REHAB | Facility: CLINIC | Age: 80
End: 2020-01-14
Payer: MEDICARE

## 2020-01-14 DIAGNOSIS — I50.22 CHRONIC SYSTOLIC HEART FAILURE (HCC): ICD-10-CM

## 2020-01-14 PROCEDURE — 93798 PHYS/QHP OP CAR RHAB W/ECG: CPT

## 2020-01-16 ENCOUNTER — CLINICAL SUPPORT (OUTPATIENT)
Dept: CARDIAC REHAB | Facility: CLINIC | Age: 80
End: 2020-01-16
Payer: MEDICARE

## 2020-01-16 DIAGNOSIS — I50.22 CHRONIC SYSTOLIC HEART FAILURE (HCC): ICD-10-CM

## 2020-01-16 PROCEDURE — 93798 PHYS/QHP OP CAR RHAB W/ECG: CPT

## 2020-01-17 ENCOUNTER — CLINICAL SUPPORT (OUTPATIENT)
Dept: CARDIAC REHAB | Facility: CLINIC | Age: 80
End: 2020-01-17
Payer: MEDICARE

## 2020-01-17 ENCOUNTER — OFFICE VISIT (OUTPATIENT)
Dept: CARDIOLOGY CLINIC | Facility: CLINIC | Age: 80
End: 2020-01-17
Payer: MEDICARE

## 2020-01-17 VITALS
HEIGHT: 66 IN | WEIGHT: 124.7 LBS | DIASTOLIC BLOOD PRESSURE: 52 MMHG | SYSTOLIC BLOOD PRESSURE: 118 MMHG | BODY MASS INDEX: 20.04 KG/M2 | OXYGEN SATURATION: 99 % | HEART RATE: 80 BPM

## 2020-01-17 DIAGNOSIS — I50.22 CHRONIC SYSTOLIC HEART FAILURE (HCC): ICD-10-CM

## 2020-01-17 DIAGNOSIS — I50.22 CHRONIC SYSTOLIC CHF (CONGESTIVE HEART FAILURE), NYHA CLASS 3 (HCC): Primary | ICD-10-CM

## 2020-01-17 DIAGNOSIS — K75.4 AUTOIMMUNE HEPATITIS (HCC): ICD-10-CM

## 2020-01-17 PROCEDURE — 99214 OFFICE O/P EST MOD 30 MIN: CPT | Performed by: INTERNAL MEDICINE

## 2020-01-17 PROCEDURE — 93798 PHYS/QHP OP CAR RHAB W/ECG: CPT

## 2020-01-17 NOTE — PROGRESS NOTES
Heart Failure Outpatient Progress Note - Mu Platt 78 y o  female MRN: [id number]    @ Encounter: [id number]      Assessment/Plan:    Patient Active Problem List    Diagnosis Date Noted    Dilated cardiomyopathy ([place] [address] ) 08/09/2018     Priority: Low    Thrombocytopenia ([place] [address] ) 08/03/2018     Priority: Low    Chronic systolic heart failure ([place] [address] ) 08/01/2018     Priority: Low    Hypertension, essential 08/01/2018     Priority: Low    Other specified glaucoma 08/01/2018     Priority: Low    Autoimmune hepatitis treated with steroids ([place] [address] ) 08/01/2018     Priority: Low    Complete left bundle branch block (LBBB) 10/22/2019     # ChronicSystolic CHF, Stage D, NYHA 3 with mildly reduced RV systolic function  Unclear etiology  HTN (normotensive on admission) +/- valvular (less likely, central MR likely functional) +/- myocarditis +/- stress CM +/- LBBB    Weight: 125 lbs    --HIV, SPEP/UPEP, LEA, hepatitis panel all negative, ferritin ok    Echo 10/17/19:  LVEF: 30%, LVEDd: 5 cm  LVEDd:, grade 2 DD  RV: normal    --R+LHC 8/3/18: shows normal coronaries  RA 3  RV 32/6  PA 32/11/20  PCWP 6  AO sat 89%  MvO2 56 5%  TD CO/CI: 2 3/1 4  Isacc CO/CI: 2 6/1 6  TPG 14  DPG 5  PVR 5 4 (Isacc); 6 1 (TD)  SVR 2070  PVR:SVR <0 25    Echo 4/16/19: LVEF: 20%  LVEDd 5 4 cm  Moderate to severe MR     --cMRI 8/6/18: LVEF ~18%, LVIDd 7 cm, normal RV  There is a thin strip of intramyocardial hyperenhancement of the basal interventricular septum      Echo 12/6/18: LVEF: 20%, LVEDd 6 4 cm- no improvement  Good RV function  Small IVC    Neurohormonal Blockade:  --Beta-Blocker: Continue metoprolol succinate 25 mg PO daily  --ACEi, ARB or ARNi: Continue Entresto to 49-51 mg PO BID (also for SVR reduction)  --Aldosterone Receptor Blocker: spironolactone 12 5 mg daily- not taking it, she states it made her dizzy  --Diuretic: Continue torsemide 5- 10 PO daily with potassium K 20 meq daily  K 3 5     Sudden Cardiac Death Risk Reduction:  --ICD: recommended     Electrical Resynchronization:  --Candidacy for BiV device: LBBB 164 msec  Recommend BiV ICD     Advanced Therapies: Will continue to monitor  If does not recover, age precludes OHT  Possible LVAD candidate, but social situation will be complicated as  has LVAD w/ complications and son works in [place]  Other son is in [place]  Will monitor closely      # HTN  # Normocytic anemia  # Hypothyroidism  # Autoimmune hepatitis: Continue budesonide    TODAY'S PLAN:  Keeps deferring ICD  Double entresto to 97/103 mg BID  Decrease torsemide to 5 mg daily or may need to stop   --2g sodium diet  - Daily weights    HPI:     78 woman with hx of HTN, autoimmune hepatitis wo follows now for NICM, EF: 25% with workup as above  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 has LVAD so working diagnosis of variant of stress myopathy if plausible  Follow up echo done 12/6/18 showed EF: 20% with LVEDd 6 4 cm, I referred for BiV ICD    Follow up echo 4/16/19 unfortunately showed EF: 20% with moderate to severe functional MR  Interval History: We discussed BiV ICD- I think she would benefit from CRT given her ARS near 170 msec and NICM  She has been wearing a LifeVest since Aug 2018- she is concerned as she takes care of her  with LVAD and focuses on him  Did see EP 7/22- with plan for BiV ICD  Pt keeps deferring ICD    Going to cardiac rehab    K 3 3 in last labs      Past Medical History:   Diagnosis Date    Bell's palsy     Cardiac disease     Hypertension          Allergies   Allergen Reactions    Ambien [Zolpidem]            Current Outpatient Medications:     ascorbic acid (VITAMIN C) 500 mg tablet, Take 500 mg by mouth daily, Disp: , Rfl:     B Complex Vitamins (VITAMIN-B COMPLEX PO), Take by mouth, Disp: , Rfl:     BUDESONIDE ER PO, Take 3 mg by mouth daily , Disp: , Rfl:     ENTRESTO 49-51 MG TABS, TAKE 1 TABLET BY MOUTH EVERY 12 HOURS, Disp: 180 tablet, Rfl: 3    metoprolol succinate (TOPROL-XL) 25 mg 24 hr tablet, TAKE 1 TABLET BY MOUTH EVERY DAY, Disp: 90 tablet, Rfl: 3    Omega-3 Fatty Acids (FISH OIL) 1,000 mg, Take 1,000 mg by mouth daily, Disp: , Rfl:     potassium chloride (K-DUR,KLOR-CON) 10 mEq tablet, Take 2 tablets (20 mEq total) by mouth daily, Disp: 180 tablet, Rfl: 4    torsemide (DEMADEX) 10 mg tablet, Take 1 tablet (10 mg total) by mouth daily (Patient taking differently: Take 5 mg by mouth daily ), Disp: 90 tablet, Rfl: 3    spironolactone (ALDACTONE) 25 mg tablet, TAKE 0 5 TABLETS (12 5 MG TOTAL) BY MOUTH DAILY (Patient not taking: Reported on 1/17/2020), Disp: 30 tablet, Rfl: 2    Social History     Socioeconomic History    Marital status: /Civil Union     Spouse name: Not on file    Number of children: Not on file    Years of education: Not on file    Highest education level: Not on file   Occupational History    Not on file   Social Needs    Financial resource strain: Not on file    Food insecurity:     Worry: Not on file     Inability: Not on file    Transportation needs:     Medical: Not on file     Non-medical: Not on file   Tobacco Use    Smoking status: Never Smoker    Smokeless tobacco: Never Used   Substance and Sexual Activity    Alcohol use: No    Drug use: No    Sexual activity: Not on file   Lifestyle    Physical activity:     Days per week: Not on file     Minutes per session: Not on file    Stress: Not on file   Relationships    Social connections:     Talks on phone: Not on file     Gets together: Not on file     Attends Mandaen service: Not on file     Active member of club or organization: Not on file     Attends meetings of clubs or organizations: Not on file     Relationship status: Not on file    Intimate partner violence:     Fear of current or ex partner: Not on file     Emotionally abused: Not on file     Physically abused: Not on file     Forced sexual activity: Not on file   Other Topics Concern    Not on file   Social History Narrative  Not on file       No family history on file  Physical Exam:    Vitals:   Vitals:    01/17/20 1622   BP: 118/52   Pulse: 80   SpO2: 99%     Wt Readings from Last 3 Encounters:   01/17/20 56 6 kg (124 lb 11 2 oz)   10/22/19 54 4 kg (120 lb)   09/12/19 54 4 kg (120 lb)     Physical Exam:    Physical Exam   Constitutional: She is oriented to person, place, and time  She appears well-developed and well-nourished  HENT:   Head: Normocephalic and atraumatic  Eyes: Pupils are equal, round, and reactive to light  EOM are normal    Neck: Normal range of motion  No JVD present  Cardiovascular: Normal rate and regular rhythm  Murmur heard  Pulmonary/Chest: Effort normal and breath sounds normal  She has no rales  Abdominal: Soft  Bowel sounds are normal  She exhibits no distension  Musculoskeletal: Normal range of motion  She exhibits no edema  Neurological: She is alert and oriented to person, place, and time  Skin: Skin is warm and dry  She is not diaphoretic  Psychiatric: She has a normal mood and affect  Labs & Results:    Lab Results   Component Value Date    GLUCOSE 94 12/04/2015    CALCIUM 9 0 12/23/2019     12/04/2015    K 3 3 (L) 12/23/2019    CO2 28 12/23/2019     (H) 12/23/2019    BUN 17 12/23/2019    CREATININE 0 79 12/23/2019     Lab Results   Component Value Date    WBC 2 73 (L) 09/09/2019    HGB 11 3 (L) 09/09/2019    HCT 35 7 09/09/2019    MCV 93 09/09/2019    PLT 57 (L) 09/09/2019     Lab Results   Component Value Date    NTBNP 3,293 (H) 09/09/2019      Lab Results   Component Value Date    CHOL 182 10/23/2015     Lab Results   Component Value Date    HDL 41 08/02/2018    HDL 76 10/23/2015     Lab Results   Component Value Date    LDLCALC 52 08/02/2018    LDLCALC 89 10/23/2015     Lab Results   Component Value Date    TRIG 75 08/02/2018    TRIG 87 10/23/2015     No results found for: CHOLHDL    EKG personally reviewed by Oliver Rose DO       Counseling / Coordination of Care  Total floor / unit time spent today 25 minutes  Greater than 50% of total time was spent with the patient and / or family counseling and / or coordination of care  A description of the counseling / coordination of care: 15      Thank you for the opportunity to participate in the care of this patient    P[address]PA [address]

## 2020-01-17 NOTE — PATIENT INSTRUCTIONS
I am doubling entresto to 97/103 mg twice daily (you are currently taking 49/51 mg twice daily)    Try stopping torsemide and potassium, if you start retaining fluid or get short of breath, go back to taking 5 mg of torsemide with potassium    I ordered labs for one week from now

## 2020-01-21 ENCOUNTER — CLINICAL SUPPORT (OUTPATIENT)
Dept: CARDIAC REHAB | Facility: CLINIC | Age: 80
End: 2020-01-21
Payer: MEDICARE

## 2020-01-21 DIAGNOSIS — I50.22 CHRONIC SYSTOLIC HEART FAILURE (HCC): ICD-10-CM

## 2020-01-21 PROCEDURE — 93798 PHYS/QHP OP CAR RHAB W/ECG: CPT

## 2020-01-21 NOTE — PROGRESS NOTES
Cardiac Rehabilitation Plan of Care   Discharge       Today's date: 2020   Visits:  36  Patient name: Skinny Ramesh      : 1940  Age: 78 y o  MRN: [id number]  Referring Physician: Yasmani London DO  Cardiologist: Yasmani London DO  Provider: [facility] [address] [place] [address] Avenue  Clinician: Stacy Mathew, MS, CEP, [facility]    Dx:   Encounter Diagnosis   Name Primary?  Chronic systolic heart failure [facility])      Date of onset: 2019      SUMMARY OF PROGRESS:  This is Symone's discharge note for cardiac rehab, she attended 36 sessions including her initial eval  Kirsty Chen has progressed to 40-45 min of cardio at 1 7-3 2 METs plus light weight lifting  She completed a 6MWT today at which she increased her max METs by 13% from 2 3 to 2 6 METs  She has also increased her peak METs in CR by 88 2% and her exercise min/wk by 50%  Kirsty Chen has reported no cardiac complications during exercise or at home and has normal hemodynamic response to exercise  On telemetry she is LBBB and has rare PVCs  Her most recent echo was done on 10/17 indicated her EF to be 30% which has increased from 20% on   Kirsty Chen has been wearing a lifevest since Aug 2018 and states she is finally coming to terms with the fact that getting an ICD may be the better option  Kirsty Chen is following a heart healthy diet which she changed since her first visit to CR  Her most recent rate your plate increased by 0 9% from 63 to 66  Kirsty Chen has good social support and denies any depression/anxiety  It was discussed, and reiterated, at length to make sure Kirsty Chen also finds time to think about herself and take care of her own health as well  She would like to look into finding more socialization for herself and her  that she believes would be healthy for her  Symone denied any info from behavioral health and plans on also talking to a  for some info on socialization as well   Her PHQ-9 and PIYUSH -7 were reassessed and came out to 2 and 1 respectively indicating minimal depression and anxiety  Overall, Alba Sauer notices an increase in her energy since starting rehab and would like to continue to exercise with [facility] fitness center with her   She likes being able to physically feel better and also know her  and her are at easy access to medical services if need be  Alba Sauer will continue to work on getting 150min of cardio/wk  Medication compliance: Yes   Comments: none   Fall Risk: Low   Comments: none     EKG changes: NSR with LBBB, occ PVCs       EXERCISE ASSESSMENT and PLAN    Current Exercise Program in Rehab:       Frequency: 3 days/week        Minutes: 40-45        METS: 1 7-3 2            HR:    RPE: 4-6         Modalities: Treadmill, UBE, NuStep and Recumbent bike      Strength training:   Will be added following 2-3 weeks of monitored exercise sessions   Modalities: Chest Press, Pull Downs, Seated Row and Sit to [employer]    Progressing:  Yes - will continue with [facility] gym    Home Exercise: Gym 3x/wk     Goals: Exercise 5 days/wk, >150mins/wk  Education: Benefit of exercise for CAD risk factors, home exercise guidelines, signs and sxs, RPE scale and class: Risk Factors for Heart Disease   Plan:home exercise 30+ mins 2 days opposite CR  Readiness to change: Maintenance: (Maintaining the behavior change)      NUTRITION ASSESSMENT AND PLAN    Weight control:    Starting weight: 121 8 lbs    Current weight:   121 8 lbs   Waist circumference:    Startin in    Current:  28 in   Diabetes: N/A  Lipid management: Last lipid profile 2018  Chol 108  TRG 75  HDL 41  LDL 52  Goals:decreased body fat% <25%     Education:   Education class: Reading Food Labels,  Progressing: Yes - continues with the same heart healthy plan   Plan: Education Class: Heart Healthy Eating, Increase whole grains, increase fruits/vegs and Reduce added sugars <25g/day  Readiness to change: Maintenance: (Maintaining the behavior change)      PSYCHOSOCIAL ASSESSMENT AND PLAN    Emotional: Patient reports he/she is coping well with good social support and denies depression or anxiety   PHQ-9 = 1-4 = Minimal Depression  Self-reported stress level: 5   Social support: Very Good  Goals:  Feelings in [place] Score < 3, Physical Fitness in [place] Score < 3, Daily Activity in [place] Score < 3, Social Activities in [place] Score < 3, Overall Health in [place] Score < 3 and increased energy  Education: signs/sxs of depression, depression and CAD, class:  Stress and Your Health  and class:  Relaxation  Progressing: No - needs some socialization and to help with added stress; discussed relaxation techniques and ways to manage being a caretaker   Plan: Practice relaxation techniques  Readiness to change: Maintenance: (Maintaining the behavior change)      OTHER CORE COMPONENTS     Tobacco:   Social History     Tobacco Use   Smoking Status Never Smoker   Smokeless Tobacco Never Used       Tobacco Use Intervention: Referral to tobacco expert:   Brief counseling by cardiac rehab professional  Date: 10/14/2019    Blood pressure:    Restin/50   Exercise: 132/40    Goals: reduced dietary sodium <2300mg, consistent exercise >150 mins/wk and medication compliance  Education:  understanding HTN and CAD, low sodium diet and HTN, Education class:  Common Heart Medications and Education class: Understanding Heart Disease  Progressing: No - BP elevated at rest, will monitor at home   Plan: monitor BP at home   Readiness to change: Maintenance: (Maintaining the behavior change)

## 2020-01-22 DIAGNOSIS — I50.22 CHRONIC SYSTOLIC HEART FAILURE (HCC): Primary | ICD-10-CM

## 2020-01-22 NOTE — PROGRESS NOTES
Patient completed cardiac rehab and would like to continue outpatient physical therapy at [facility]    Referral was placed for PT

## 2020-02-21 ENCOUNTER — TELEPHONE (OUTPATIENT)
Dept: CARDIOLOGY CLINIC | Facility: CLINIC | Age: 80
End: 2020-02-21

## 2020-03-12 ENCOUNTER — TRANSCRIBE ORDERS (OUTPATIENT)
Dept: ADMINISTRATIVE | Age: 80
End: 2020-03-12

## 2020-03-12 ENCOUNTER — LAB (OUTPATIENT)
Dept: LAB | Age: 80
End: 2020-03-12
Payer: MEDICARE

## 2020-03-12 DIAGNOSIS — K75.9 RADIATION HEPATITIS: Primary | ICD-10-CM

## 2020-03-12 DIAGNOSIS — K75.9 RADIATION HEPATITIS: ICD-10-CM

## 2020-03-12 DIAGNOSIS — I50.22 CHRONIC SYSTOLIC CHF (CONGESTIVE HEART FAILURE), NYHA CLASS 3 (HCC): ICD-10-CM

## 2020-03-12 LAB
ALBUMIN SERPL BCP-MCNC: 3.4 G/DL (ref 3.5–5)
ALP SERPL-CCNC: 59 U/L (ref 46–116)
ALT SERPL W P-5'-P-CCNC: 26 U/L (ref 12–78)
ANION GAP SERPL CALCULATED.3IONS-SCNC: 4 MMOL/L (ref 4–13)
AST SERPL W P-5'-P-CCNC: 27 U/L (ref 5–45)
BILIRUB SERPL-MCNC: 0.26 MG/DL (ref 0.2–1)
BUN SERPL-MCNC: 18 MG/DL (ref 5–25)
CALCIUM SERPL-MCNC: 8.7 MG/DL (ref 8.3–10.1)
CHLORIDE SERPL-SCNC: 107 MMOL/L (ref 100–108)
CO2 SERPL-SCNC: 28 MMOL/L (ref 21–32)
CREAT SERPL-MCNC: 0.74 MG/DL (ref 0.6–1.3)
GFR SERPL CREATININE-BSD FRML MDRD: 87 ML/MIN/1.73SQ M
GLUCOSE SERPL-MCNC: 93 MG/DL (ref 65–140)
POTASSIUM SERPL-SCNC: 3.6 MMOL/L (ref 3.5–5.3)
PROT SERPL-MCNC: 7.5 G/DL (ref 6.4–8.2)
SODIUM SERPL-SCNC: 139 MMOL/L (ref 136–145)

## 2020-03-12 PROCEDURE — 80053 COMPREHEN METABOLIC PANEL: CPT

## 2020-03-12 PROCEDURE — 36415 COLL VENOUS BLD VENIPUNCTURE: CPT

## 2020-04-16 DIAGNOSIS — D50.0 IRON DEFICIENCY ANEMIA DUE TO CHRONIC BLOOD LOSS: Primary | ICD-10-CM

## 2020-04-17 DIAGNOSIS — D61.818 PANCYTOPENIA (HCC): Primary | ICD-10-CM

## 2020-04-17 PROBLEM — D50.0 IRON DEFICIENCY ANEMIA DUE TO CHRONIC BLOOD LOSS: Status: ACTIVE | Noted: 2020-04-17

## 2020-04-17 RX ORDER — FUROSEMIDE 10 MG/ML
20 INJECTION INTRAMUSCULAR; INTRAVENOUS ONCE
Status: CANCELLED | OUTPATIENT
Start: 2020-04-18

## 2020-04-17 RX ORDER — SODIUM CHLORIDE 9 MG/ML
20 INJECTION, SOLUTION INTRAVENOUS ONCE
Status: CANCELLED | OUTPATIENT
Start: 2020-04-18

## 2020-04-18 ENCOUNTER — HOSPITAL ENCOUNTER (OUTPATIENT)
Dept: INFUSION CENTER | Facility: HOSPITAL | Age: 80
Discharge: HOME | End: 2020-04-18
Payer: MEDICARE

## 2020-04-18 VITALS
HEART RATE: 80 BPM | TEMPERATURE: 98 F | BODY MASS INDEX: 21.3 KG/M2 | WEIGHT: 127.87 LBS | HEIGHT: 65 IN | RESPIRATION RATE: 18 BRPM | DIASTOLIC BLOOD PRESSURE: 65 MMHG | SYSTOLIC BLOOD PRESSURE: 135 MMHG

## 2020-04-18 DIAGNOSIS — I50.22 CHRONIC SYSTOLIC HEART FAILURE (HCC): Primary | ICD-10-CM

## 2020-04-18 DIAGNOSIS — D50.0 IRON DEFICIENCY ANEMIA DUE TO CHRONIC BLOOD LOSS: ICD-10-CM

## 2020-04-18 LAB
ABO GROUP BLD: NORMAL
ABO GROUP BLD: NORMAL
APTT PPP: 33 SECONDS (ref 23–37)
BASOPHILS # BLD AUTO: 0.03 THOUSANDS/ΜL (ref 0–0.1)
BASOPHILS NFR BLD AUTO: 1 % (ref 0–1)
BLD GP AB SCN SERPL QL: NEGATIVE
EOSINOPHIL # BLD AUTO: 0.01 THOUSAND/ΜL (ref 0–0.61)
EOSINOPHIL NFR BLD AUTO: 0 % (ref 0–6)
ERYTHROCYTE [DISTWIDTH] IN BLOOD BY AUTOMATED COUNT: 19.4 % (ref 11.6–15.1)
FIBRINOGEN PPP-MCNC: 254 MG/DL (ref 227–495)
HCT VFR BLD AUTO: 31.7 % (ref 34.8–46.1)
HGB BLD-MCNC: 9.2 G/DL (ref 11.5–15.4)
IMM GRANULOCYTES # BLD AUTO: 0.01 THOUSAND/UL (ref 0–0.2)
IMM GRANULOCYTES NFR BLD AUTO: 0 % (ref 0–2)
INR PPP: 1.16 (ref 0.84–1.19)
LYMPHOCYTES # BLD AUTO: 0.96 THOUSANDS/ΜL (ref 0.6–4.47)
LYMPHOCYTES NFR BLD AUTO: 32 % (ref 14–44)
MCH RBC QN AUTO: 22.7 PG (ref 26.8–34.3)
MCHC RBC AUTO-ENTMCNC: 29 G/DL (ref 31.4–37.4)
MCV RBC AUTO: 78 FL (ref 82–98)
MONOCYTES # BLD AUTO: 0.27 THOUSAND/ΜL (ref 0.17–1.22)
MONOCYTES NFR BLD AUTO: 9 % (ref 4–12)
NEUTROPHILS # BLD AUTO: 1.69 THOUSANDS/ΜL (ref 1.85–7.62)
NEUTS SEG NFR BLD AUTO: 58 % (ref 43–75)
NRBC BLD AUTO-RTO: 0 /100 WBCS
PLATELET # BLD AUTO: 74 THOUSANDS/UL (ref 149–390)
PROTHROMBIN TIME: 14.4 SECONDS (ref 11.6–14.5)
RBC # BLD AUTO: 4.06 MILLION/UL (ref 3.81–5.12)
RH BLD: POSITIVE
RH BLD: POSITIVE
SPECIMEN EXPIRATION DATE: NORMAL
WBC # BLD AUTO: 2.97 THOUSAND/UL (ref 4.31–10.16)

## 2020-04-18 PROCEDURE — 86901 BLOOD TYPING SEROLOGIC RH(D): CPT | Performed by: INTERNAL MEDICINE

## 2020-04-18 PROCEDURE — P9016 RBC LEUKOCYTES REDUCED: HCPCS

## 2020-04-18 PROCEDURE — 86900 BLOOD TYPING SEROLOGIC ABO: CPT | Performed by: INTERNAL MEDICINE

## 2020-04-18 PROCEDURE — 85384 FIBRINOGEN ACTIVITY: CPT | Performed by: INTERNAL MEDICINE

## 2020-04-18 PROCEDURE — 85610 PROTHROMBIN TIME: CPT | Performed by: INTERNAL MEDICINE

## 2020-04-18 PROCEDURE — 86850 RBC ANTIBODY SCREEN: CPT | Performed by: INTERNAL MEDICINE

## 2020-04-18 PROCEDURE — 36430 TRANSFUSION BLD/BLD COMPNT: CPT

## 2020-04-18 PROCEDURE — 85730 THROMBOPLASTIN TIME PARTIAL: CPT | Performed by: INTERNAL MEDICINE

## 2020-04-18 PROCEDURE — 86920 COMPATIBILITY TEST SPIN: CPT

## 2020-04-18 PROCEDURE — 85025 COMPLETE CBC W/AUTO DIFF WBC: CPT | Performed by: INTERNAL MEDICINE

## 2020-04-18 PROCEDURE — 96374 THER/PROPH/DIAG INJ IV PUSH: CPT

## 2020-04-18 RX ORDER — SODIUM CHLORIDE 9 MG/ML
20 INJECTION, SOLUTION INTRAVENOUS ONCE
Status: DISCONTINUED | OUTPATIENT
Start: 2020-04-18 | End: 2020-04-21 | Stop reason: HOSPADM

## 2020-04-18 RX ORDER — FUROSEMIDE 10 MG/ML
20 INJECTION INTRAMUSCULAR; INTRAVENOUS ONCE
Status: CANCELLED | OUTPATIENT
Start: 2020-04-18

## 2020-04-18 RX ORDER — FUROSEMIDE 10 MG/ML
20 INJECTION INTRAMUSCULAR; INTRAVENOUS ONCE
Status: COMPLETED | OUTPATIENT
Start: 2020-04-18 | End: 2020-04-18

## 2020-04-18 RX ORDER — SODIUM CHLORIDE 9 MG/ML
20 INJECTION, SOLUTION INTRAVENOUS ONCE
Status: CANCELLED | OUTPATIENT
Start: 2020-04-18

## 2020-04-18 RX ADMIN — FUROSEMIDE 20 MG: 10 INJECTION, SOLUTION INTRAMUSCULAR; INTRAVENOUS at 14:07

## 2020-04-19 LAB
ABO GROUP BLD BPU: NORMAL
ABO GROUP BLD BPU: NORMAL
BPU ID: NORMAL
BPU ID: NORMAL
CROSSMATCH: NORMAL
CROSSMATCH: NORMAL
UNIT DISPENSE STATUS: NORMAL
UNIT DISPENSE STATUS: NORMAL
UNIT PRODUCT CODE: NORMAL
UNIT PRODUCT CODE: NORMAL
UNIT RH: NORMAL
UNIT RH: NORMAL

## 2020-04-20 ENCOUNTER — TELEMEDICINE (OUTPATIENT)
Dept: CARDIOLOGY CLINIC | Facility: CLINIC | Age: 80
End: 2020-04-20
Payer: MEDICARE

## 2020-04-20 ENCOUNTER — TELEPHONE (OUTPATIENT)
Dept: SURGICAL ONCOLOGY | Facility: CLINIC | Age: 80
End: 2020-04-20

## 2020-04-20 VITALS — WEIGHT: 123.8 LBS | BODY MASS INDEX: 20.88 KG/M2

## 2020-04-20 DIAGNOSIS — D61.818 PANCYTOPENIA (HCC): ICD-10-CM

## 2020-04-20 DIAGNOSIS — I50.22 CHRONIC SYSTOLIC HEART FAILURE (HCC): ICD-10-CM

## 2020-04-20 DIAGNOSIS — I44.7 COMPLETE LEFT BUNDLE BRANCH BLOCK (LBBB): ICD-10-CM

## 2020-04-20 DIAGNOSIS — I42.0 DILATED CARDIOMYOPATHY (HCC): Primary | ICD-10-CM

## 2020-04-20 PROCEDURE — 99214 OFFICE O/P EST MOD 30 MIN: CPT | Performed by: INTERNAL MEDICINE

## 2020-04-20 RX ORDER — SACUBITRIL AND VALSARTAN 49; 51 MG/1; MG/1
1 TABLET, FILM COATED ORAL EVERY 12 HOURS
COMMUNITY
Start: 2020-03-22 | End: 2020-10-13 | Stop reason: SDUPTHER

## 2020-04-23 ENCOUNTER — TELEPHONE (OUTPATIENT)
Dept: CARDIOLOGY CLINIC | Facility: CLINIC | Age: 80
End: 2020-04-23

## 2020-05-27 ENCOUNTER — TELEPHONE (OUTPATIENT)
Dept: HEMATOLOGY ONCOLOGY | Facility: CLINIC | Age: 80
End: 2020-05-27

## 2020-05-29 ENCOUNTER — CONSULT (OUTPATIENT)
Dept: HEMATOLOGY ONCOLOGY | Facility: CLINIC | Age: 80
End: 2020-05-29
Payer: MEDICARE

## 2020-05-29 ENCOUNTER — LAB (OUTPATIENT)
Dept: LAB | Age: 80
End: 2020-05-29
Payer: MEDICARE

## 2020-05-29 VITALS
RESPIRATION RATE: 18 BRPM | SYSTOLIC BLOOD PRESSURE: 142 MMHG | OXYGEN SATURATION: 99 % | BODY MASS INDEX: 21.89 KG/M2 | DIASTOLIC BLOOD PRESSURE: 72 MMHG | TEMPERATURE: 98 F | WEIGHT: 128.2 LBS | HEIGHT: 64 IN | HEART RATE: 79 BPM

## 2020-05-29 DIAGNOSIS — D61.818 PANCYTOPENIA (HCC): Primary | ICD-10-CM

## 2020-05-29 DIAGNOSIS — D61.818 PANCYTOPENIA (HCC): ICD-10-CM

## 2020-05-29 LAB
ANION GAP SERPL CALCULATED.3IONS-SCNC: 6 MMOL/L (ref 4–13)
BASOPHILS # BLD AUTO: 0.02 THOUSANDS/ΜL (ref 0–0.1)
BASOPHILS NFR BLD AUTO: 1 % (ref 0–1)
BUN SERPL-MCNC: 11 MG/DL (ref 5–25)
CALCIUM SERPL-MCNC: 9.5 MG/DL (ref 8.3–10.1)
CHLORIDE SERPL-SCNC: 107 MMOL/L (ref 100–108)
CO2 SERPL-SCNC: 27 MMOL/L (ref 21–32)
CREAT SERPL-MCNC: 0.74 MG/DL (ref 0.6–1.3)
EOSINOPHIL # BLD AUTO: 0.01 THOUSAND/ΜL (ref 0–0.61)
EOSINOPHIL NFR BLD AUTO: 0 % (ref 0–6)
ERYTHROCYTE [DISTWIDTH] IN BLOOD BY AUTOMATED COUNT: 23 % (ref 11.6–15.1)
FERRITIN SERPL-MCNC: 12 NG/ML (ref 8–388)
FOLATE SERPL-MCNC: >20 NG/ML (ref 3.1–17.5)
GFR SERPL CREATININE-BSD FRML MDRD: 87 ML/MIN/1.73SQ M
GLUCOSE SERPL-MCNC: 79 MG/DL (ref 65–140)
HCT VFR BLD AUTO: 32.3 % (ref 34.8–46.1)
HGB BLD-MCNC: 9.5 G/DL (ref 11.5–15.4)
IMM GRANULOCYTES # BLD AUTO: 0.01 THOUSAND/UL (ref 0–0.2)
IMM GRANULOCYTES NFR BLD AUTO: 0 % (ref 0–2)
IRON SATN MFR SERPL: 19 %
IRON SERPL-MCNC: 94 UG/DL (ref 50–170)
LDH SERPL-CCNC: 265 U/L (ref 81–234)
LYMPHOCYTES # BLD AUTO: 0.68 THOUSANDS/ΜL (ref 0.6–4.47)
LYMPHOCYTES NFR BLD AUTO: 29 % (ref 14–44)
MCH RBC QN AUTO: 24.2 PG (ref 26.8–34.3)
MCHC RBC AUTO-ENTMCNC: 29.4 G/DL (ref 31.4–37.4)
MCV RBC AUTO: 82 FL (ref 82–98)
MONOCYTES # BLD AUTO: 0.29 THOUSAND/ΜL (ref 0.17–1.22)
MONOCYTES NFR BLD AUTO: 12 % (ref 4–12)
NEUTROPHILS # BLD AUTO: 1.33 THOUSANDS/ΜL (ref 1.85–7.62)
NEUTS SEG NFR BLD AUTO: 58 % (ref 43–75)
NRBC BLD AUTO-RTO: 0 /100 WBCS
NT-PROBNP SERPL-MCNC: 9483 PG/ML
PLATELET # BLD AUTO: 54 THOUSANDS/UL (ref 149–390)
POTASSIUM SERPL-SCNC: 3.7 MMOL/L (ref 3.5–5.3)
RBC # BLD AUTO: 3.92 MILLION/UL (ref 3.81–5.12)
RETICS # AUTO: NORMAL 10*3/UL (ref 14097–95744)
RETICS # CALC: 0.5 % (ref 0.37–1.87)
SODIUM SERPL-SCNC: 140 MMOL/L (ref 136–145)
T4 FREE SERPL-MCNC: 0.95 NG/DL (ref 0.76–1.46)
TIBC SERPL-MCNC: 485 UG/DL (ref 250–450)
TSH SERPL DL<=0.05 MIU/L-ACNC: 5.7 UIU/ML (ref 0.36–3.74)
VIT B12 SERPL-MCNC: 2158 PG/ML (ref 100–900)
WBC # BLD AUTO: 2.34 THOUSAND/UL (ref 4.31–10.16)

## 2020-05-29 PROCEDURE — 82607 VITAMIN B-12: CPT | Performed by: INTERNAL MEDICINE

## 2020-05-29 PROCEDURE — 80048 BASIC METABOLIC PNL TOTAL CA: CPT | Performed by: INTERNAL MEDICINE

## 2020-05-29 PROCEDURE — 83540 ASSAY OF IRON: CPT

## 2020-05-29 PROCEDURE — 36415 COLL VENOUS BLD VENIPUNCTURE: CPT | Performed by: INTERNAL MEDICINE

## 2020-05-29 PROCEDURE — 86038 ANTINUCLEAR ANTIBODIES: CPT | Performed by: INTERNAL MEDICINE

## 2020-05-29 PROCEDURE — 83615 LACTATE (LD) (LDH) ENZYME: CPT

## 2020-05-29 PROCEDURE — 82728 ASSAY OF FERRITIN: CPT

## 2020-05-29 PROCEDURE — 83550 IRON BINDING TEST: CPT

## 2020-05-29 PROCEDURE — 99205 OFFICE O/P NEW HI 60 MIN: CPT | Performed by: INTERNAL MEDICINE

## 2020-05-29 PROCEDURE — 83880 ASSAY OF NATRIURETIC PEPTIDE: CPT

## 2020-05-29 PROCEDURE — 85045 AUTOMATED RETICULOCYTE COUNT: CPT

## 2020-05-29 PROCEDURE — 84443 ASSAY THYROID STIM HORMONE: CPT

## 2020-05-29 PROCEDURE — 85025 COMPLETE CBC W/AUTO DIFF WBC: CPT | Performed by: INTERNAL MEDICINE

## 2020-05-29 PROCEDURE — 86039 ANTINUCLEAR ANTIBODIES (ANA): CPT | Performed by: INTERNAL MEDICINE

## 2020-05-29 PROCEDURE — 84439 ASSAY OF FREE THYROXINE: CPT

## 2020-05-29 PROCEDURE — 82746 ASSAY OF FOLIC ACID SERUM: CPT | Performed by: INTERNAL MEDICINE

## 2020-06-01 LAB
ANA HOMOGEN SER QL IF: NORMAL
ANA HOMOGEN TITR SER: NORMAL {TITER}
RYE IGE QN: POSITIVE

## 2020-06-08 ENCOUNTER — TELEPHONE (OUTPATIENT)
Dept: HEMATOLOGY ONCOLOGY | Facility: CLINIC | Age: 80
End: 2020-06-08

## 2020-06-10 ENCOUNTER — APPOINTMENT (OUTPATIENT)
Dept: LAB | Facility: CLINIC | Age: 80
End: 2020-06-10
Payer: MEDICARE

## 2020-06-10 ENCOUNTER — TELEPHONE (OUTPATIENT)
Dept: HEMATOLOGY ONCOLOGY | Facility: CLINIC | Age: 80
End: 2020-06-10

## 2020-06-10 ENCOUNTER — TRANSCRIBE ORDERS (OUTPATIENT)
Dept: LAB | Facility: CLINIC | Age: 80
End: 2020-06-10

## 2020-06-10 DIAGNOSIS — D61.818 ACQUIRED PANCYTOPENIA (HCC): ICD-10-CM

## 2020-06-10 DIAGNOSIS — D61.818 ACQUIRED PANCYTOPENIA (HCC): Primary | ICD-10-CM

## 2020-06-10 PROCEDURE — G0328 FECAL BLOOD SCRN IMMUNOASSAY: HCPCS

## 2020-06-11 LAB — HEMOCCULT STL QL IA: NEGATIVE

## 2020-06-12 ENCOUNTER — OFFICE VISIT (OUTPATIENT)
Dept: HEMATOLOGY ONCOLOGY | Facility: CLINIC | Age: 80
End: 2020-06-12
Payer: MEDICARE

## 2020-06-12 VITALS
HEIGHT: 64 IN | WEIGHT: 129.5 LBS | BODY MASS INDEX: 22.11 KG/M2 | RESPIRATION RATE: 18 BRPM | TEMPERATURE: 97.9 F | DIASTOLIC BLOOD PRESSURE: 76 MMHG | HEART RATE: 82 BPM | SYSTOLIC BLOOD PRESSURE: 130 MMHG | OXYGEN SATURATION: 98 %

## 2020-06-12 DIAGNOSIS — R76.8 ANA POSITIVE: Primary | ICD-10-CM

## 2020-06-12 PROCEDURE — 99215 OFFICE O/P EST HI 40 MIN: CPT | Performed by: INTERNAL MEDICINE

## 2020-06-12 RX ORDER — SODIUM CHLORIDE 9 MG/ML
20 INJECTION, SOLUTION INTRAVENOUS ONCE
Status: CANCELLED | OUTPATIENT
Start: 2020-06-24

## 2020-06-24 ENCOUNTER — HOSPITAL ENCOUNTER (OUTPATIENT)
Dept: INFUSION CENTER | Facility: CLINIC | Age: 80
Discharge: HOME | End: 2020-06-24
Payer: MEDICARE

## 2020-06-24 VITALS
SYSTOLIC BLOOD PRESSURE: 124 MMHG | DIASTOLIC BLOOD PRESSURE: 54 MMHG | RESPIRATION RATE: 18 BRPM | OXYGEN SATURATION: 98 % | HEART RATE: 80 BPM

## 2020-06-24 DIAGNOSIS — D50.0 IRON DEFICIENCY ANEMIA DUE TO CHRONIC BLOOD LOSS: Primary | ICD-10-CM

## 2020-06-24 DIAGNOSIS — R76.8 ANA POSITIVE: ICD-10-CM

## 2020-06-24 PROCEDURE — 96365 THER/PROPH/DIAG IV INF INIT: CPT

## 2020-06-24 RX ORDER — SODIUM CHLORIDE 9 MG/ML
20 INJECTION, SOLUTION INTRAVENOUS ONCE
Status: COMPLETED | OUTPATIENT
Start: 2020-06-24 | End: 2020-06-24

## 2020-06-24 RX ORDER — SODIUM CHLORIDE 9 MG/ML
20 INJECTION, SOLUTION INTRAVENOUS ONCE
Status: CANCELLED | OUTPATIENT
Start: 2020-07-01

## 2020-06-24 RX ADMIN — SODIUM CHLORIDE 20 ML/HR: 0.9 INJECTION, SOLUTION INTRAVENOUS at 15:35

## 2020-06-24 RX ADMIN — FERUMOXYTOL 510 MG: 510 INJECTION INTRAVENOUS at 15:39

## 2020-07-01 ENCOUNTER — HOSPITAL ENCOUNTER (OUTPATIENT)
Dept: INFUSION CENTER | Facility: CLINIC | Age: 80
Discharge: HOME | End: 2020-07-01
Payer: MEDICARE

## 2020-07-01 VITALS
HEART RATE: 88 BPM | SYSTOLIC BLOOD PRESSURE: 157 MMHG | TEMPERATURE: 96.8 F | OXYGEN SATURATION: 97 % | DIASTOLIC BLOOD PRESSURE: 65 MMHG

## 2020-07-01 DIAGNOSIS — R76.8 ANA POSITIVE: ICD-10-CM

## 2020-07-01 DIAGNOSIS — D50.0 IRON DEFICIENCY ANEMIA DUE TO CHRONIC BLOOD LOSS: Primary | ICD-10-CM

## 2020-07-01 PROCEDURE — 96365 THER/PROPH/DIAG IV INF INIT: CPT

## 2020-07-01 RX ORDER — SODIUM CHLORIDE 9 MG/ML
20 INJECTION, SOLUTION INTRAVENOUS ONCE
Status: COMPLETED | OUTPATIENT
Start: 2020-07-01 | End: 2020-07-01

## 2020-07-01 RX ORDER — SODIUM CHLORIDE 9 MG/ML
20 INJECTION, SOLUTION INTRAVENOUS ONCE
Status: CANCELLED | OUTPATIENT
Start: 2020-07-01

## 2020-07-01 RX ADMIN — SODIUM CHLORIDE 20 ML/HR: 0.9 INJECTION, SOLUTION INTRAVENOUS at 15:14

## 2020-07-01 RX ADMIN — FERUMOXYTOL 510 MG: 510 INJECTION INTRAVENOUS at 15:18

## 2020-07-01 NOTE — PLAN OF CARE
Problem: Potential for Falls  Goal: Patient will remain free of falls  Description  INTERVENTIONS:  - Assess patient frequently for physical needs  -  Identify cognitive and physical deficits and behaviors that affect risk of falls    -  [place] fall precautions as indicated by assessment   - Educate patient/family on patient safety including physical limitations  - Instruct patient to call for assistance with activity based on assessment  - Modify environment to reduce risk of injury  - Consider OT/PT consult to assist with strengthening/mobility  Outcome: Progressing

## 2020-07-29 ENCOUNTER — OFFICE VISIT (OUTPATIENT)
Dept: CARDIOLOGY CLINIC | Facility: CLINIC | Age: 80
End: 2020-07-29
Payer: MEDICARE

## 2020-07-29 ENCOUNTER — TELEPHONE (OUTPATIENT)
Dept: HEMATOLOGY ONCOLOGY | Facility: CLINIC | Age: 80
End: 2020-07-29

## 2020-07-29 VITALS
WEIGHT: 128 LBS | SYSTOLIC BLOOD PRESSURE: 134 MMHG | DIASTOLIC BLOOD PRESSURE: 70 MMHG | OXYGEN SATURATION: 98 % | BODY MASS INDEX: 21.85 KG/M2 | HEIGHT: 64 IN | HEART RATE: 83 BPM | TEMPERATURE: 98.6 F

## 2020-07-29 DIAGNOSIS — I42.0 DILATED CARDIOMYOPATHY (HCC): ICD-10-CM

## 2020-07-29 DIAGNOSIS — I50.22 CHRONIC SYSTOLIC HEART FAILURE (HCC): Primary | ICD-10-CM

## 2020-07-29 DIAGNOSIS — I44.7 COMPLETE LEFT BUNDLE BRANCH BLOCK (LBBB): ICD-10-CM

## 2020-07-29 PROCEDURE — 99214 OFFICE O/P EST MOD 30 MIN: CPT | Performed by: INTERNAL MEDICINE

## 2020-07-29 NOTE — PROGRESS NOTES
Heart Failure Outpatient Progress Note - Sandeep Damico 78 y o  female MRN: [id number]    @ Encounter: [id number]      Assessment/Plan:    Patient Active Problem List    Diagnosis Date Noted    Dilated cardiomyopathy ([place] [address] ) 08/09/2018     Priority: Low    Thrombocytopenia ([place] [address] ) 08/03/2018     Priority: Low    Chronic systolic heart failure ([place] [address] ) 08/01/2018     Priority: Low    Hypertension, essential 08/01/2018     Priority: Low    Other specified glaucoma 08/01/2018     Priority: Low    Autoimmune hepatitis treated with steroids ([place] [address] ) 08/01/2018     Priority: Low    LEA positive 06/12/2020    Iron deficiency anemia due to chronic blood loss 04/17/2020    Complete left bundle branch block (LBBB) 10/22/2019       # ChronicSystolic CHF, Stage D, NYHA 3 with mildly reduced RV systolic function  Unclear etiology  HTN (normotensive on admission) +/- valvular (less likely, central MR likely functional) +/- myocarditis +/- stress CM +/- LBBB     Weight: 128 lbs  NT pro BNP 5/29/20: 9483     --HIV, SPEP/UPEP, LEA, hepatitis panel all negative, ferritin ok     Echo 10/17/19:  LVEF: 30%, LVEDd: 5 cm  LVEDd:, grade 2 DD  RV: normal     --R+LHC 8/3/18: shows normal coronaries  RA 3  RV 32/6  PA 32/11/20  PCWP 6  AO sat 89%  MvO2 56 5%  TD CO/CI: 2 3/1 4  Isacc CO/CI: 2 6/1 6  TPG 14  DPG 5  PVR 5 4 (Isacc); 6 1 (TD)  SVR 2070  PVR:SVR <0 25     Echo 4/16/19: LVEF: 20%  LVEDd 5 4 cm  Moderate to severe MR     --cMRI 8/6/18: LVEF ~18%, LVIDd 7 cm, normal RV   There is a thin strip of intramyocardial hyperenhancement of the basal interventricular septum      Echo 12/6/18: LVEF: 20%, LVEDd 6 4 cm- no improvement  Good RV function  Small IVC     Neurohormonal Blockade:  --Beta-Blocker: Continue metoprolol succinate 25 mg PO daily  --ACEi, ARB or ARNi: Continue Entresto to 49-51 mg PO BID (also for SVR reduction)  --Aldosterone Receptor Blocker: spironolactone 12 5 mg daily- not taking it, she states it made her dizzy  --Diuretic: Continue torsemide 5- 10 PO daily with potassium K 20 meq daily  K 3 5     Sudden Cardiac Death Risk Reduction:  --ICD: recommended     Electrical Resynchronization:  --Candidacy for BiV device: LBBB 164 msec  Recommend BiV ICD     Advanced Therapies: Will continue to monitor  If does not recover, age precludes OHT  Possible LVAD candidate, but social situation will be complicated as  has LVAD w/ complications and son works in [place]  Other son is in [place]  Will monitor closely      # Pancytopenia with microcytic anemia-   Hematology following- felt to be autoimmune related  4/16: HgB 6 9 with MCV 70  WBC 1 9 and plts 52  4/18 transfused 2 units pRBCs  Referral to GI  Last HgB 7/23/20: 11     # HTN  # Hypothyroidism  # Autoimmune hepatitis: Continue budesonide   7/27/20 labs:   ,   # social- cares for her  who is debilitated with LVAD       TODAY'S PLAN:  Still pushing for BiV ICD but now she states she is afraid of COVID19  I stated this is best time to now get it done, she is still wearing a LifeVest  --2g sodium diet  - Daily weights     HPI:     80 woman with hx of HTN, autoimmune hepatitis wo follows now for NICM, EF: 25% with workup as above  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 has LVAD so working diagnosis of variant of stress myopathy if plausible  Follow up echo done 12/6/18 showed EF: 20% with LVEDd 6 4 cm, I referred for BiV ICD    Follow up echo 4/16/19 unfortunately showed EF: 20% with moderate to severe functional MR       Interval History:  I referred to Hematology regarding pancytopenia- 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 given positive LEA 1/640 speckled pattern  Referred to Rheumatology  Given IV iron  7/27/20 labs:   ,     Review of Systems    Past Medical History:   Diagnosis Date    Bell's palsy     Cardiac disease     Hypertension          Allergies   Allergen Reactions    Ambien [Zolpidem]            Current Outpatient Medications:    ascorbic acid (VITAMIN C) 500 mg tablet, Take 500 mg by mouth daily, Disp: , Rfl:     B Complex Vitamins (VITAMIN-B COMPLEX PO), Take by mouth, Disp: , Rfl:     BUDESONIDE ER PO, Take 3 mg by mouth daily , Disp: , Rfl:     ENTRESTO 49-51 MG TABS, Take 1 tablet by mouth every 12 (twelve) hours, Disp: , Rfl:     metoprolol succinate (TOPROL-XL) 25 mg 24 hr tablet, TAKE 1 TABLET BY MOUTH EVERY DAY, Disp: 90 tablet, Rfl: 3    Omega-3 Fatty Acids (FISH OIL) 1,000 mg, Take 1,000 mg by mouth daily, Disp: , Rfl:     potassium chloride (K-DUR,KLOR-CON) 10 mEq tablet, Take 2 tablets (20 mEq total) by mouth daily (Patient not taking: Reported on 4/20/2020), Disp: 180 tablet, Rfl: 4    sacubitril-valsartan (ENTRESTO)  MG TABS, Take 1 tablet by mouth 2 (two) times a day (Patient not taking: Reported on 4/20/2020), Disp: 60 tablet, Rfl: 4    spironolactone (ALDACTONE) 25 mg tablet, TAKE 0 5 TABLETS (12 5 MG TOTAL) BY MOUTH DAILY (Patient not taking: Reported on 4/20/2020), Disp: 30 tablet, Rfl: 2    torsemide (DEMADEX) 10 mg tablet, Take 1 tablet (10 mg total) by mouth daily (Patient not taking: Reported on 4/20/2020), Disp: 90 tablet, Rfl: 3    Social History     Socioeconomic History    Marital status: /Civil Union     Spouse name: Not on file    Number of children: Not on file    Years of education: Not on file    Highest education level: Not on file   Occupational History    Not on file   Social Needs    Financial resource strain: Not on file    Food insecurity:     Worry: Not on file     Inability: Not on file    Transportation needs:     Medical: Not on file     Non-medical: Not on file   Tobacco Use    Smoking status: Never Smoker    Smokeless tobacco: Never Used   Substance and Sexual Activity    Alcohol use: No    Drug use: No    Sexual activity: Not on file   Lifestyle    Physical activity:     Days per week: Not on file     Minutes per session: Not on file    Stress: Not on file Relationships    Social connections:     Talks on phone: Not on file     Gets together: Not on file     Attends Zoroastrianism service: Not on file     Active member of club or organization: Not on file     Attends meetings of clubs or organizations: Not on file     Relationship status: Not on file    Intimate partner violence:     Fear of current or ex partner: Not on file     Emotionally abused: Not on file     Physically abused: Not on file     Forced sexual activity: Not on file   Other Topics Concern    Not on file   Social History Narrative    Not on file       No family history on file  Physical Exam:    Vitals:     Physical Exam:    Physical Exam    Labs & Results:    Lab Results   Component Value Date    SODIUM 140 05/29/2020    K 3 7 05/29/2020     05/29/2020    CO2 27 05/29/2020    BUN 11 05/29/2020    CREATININE 0 74 05/29/2020    GLUC 79 05/29/2020    CALCIUM 9 5 05/29/2020     Lab Results   Component Value Date    WBC 2 34 (L) 05/29/2020    HGB 9 5 (L) 05/29/2020    HCT 32 3 (L) 05/29/2020    MCV 82 05/29/2020    PLT 54 (L) 05/29/2020     Lab Results   Component Value Date    NTBNP 9,483 (H) 05/29/2020      Lab Results   Component Value Date    CHOLESTEROL 108 08/02/2018     Lab Results   Component Value Date    HDL 41 08/02/2018    HDL 76 10/23/2015     Lab Results   Component Value Date    TRIG 75 08/02/2018    TRIG 87 10/23/2015     No results found for: Edith    EKG personally reviewed by Caitlyn Donahue DO  Counseling / Coordination of Care  Total floor / unit time spent today 25 minutes  Greater than 50% of total time was spent with the patient and / or family counseling and / or coordination of care  A description of the counseling / coordination of care: 15      Thank you for the opportunity to participate in the care of this patient    PIPPA [address]

## 2020-07-29 NOTE — TELEPHONE ENCOUNTER
LVM reminding Supriya Delgado of her appt on 7/31/2020 with Dr Adele Maldonado and have labs completed prior to appt

## 2020-07-31 ENCOUNTER — OFFICE VISIT (OUTPATIENT)
Dept: HEMATOLOGY ONCOLOGY | Facility: CLINIC | Age: 80
End: 2020-07-31
Payer: MEDICARE

## 2020-07-31 VITALS
RESPIRATION RATE: 18 BRPM | SYSTOLIC BLOOD PRESSURE: 120 MMHG | HEART RATE: 74 BPM | WEIGHT: 129.2 LBS | BODY MASS INDEX: 22.06 KG/M2 | DIASTOLIC BLOOD PRESSURE: 68 MMHG | TEMPERATURE: 98.5 F | HEIGHT: 64 IN | OXYGEN SATURATION: 98 %

## 2020-07-31 DIAGNOSIS — D70.9 NEUTROPENIA, UNSPECIFIED TYPE (HCC): ICD-10-CM

## 2020-07-31 DIAGNOSIS — R76.8 ANA POSITIVE: ICD-10-CM

## 2020-07-31 DIAGNOSIS — D69.6 THROMBOCYTOPENIA (HCC): ICD-10-CM

## 2020-07-31 DIAGNOSIS — D50.0 IRON DEFICIENCY ANEMIA DUE TO CHRONIC BLOOD LOSS: Primary | ICD-10-CM

## 2020-07-31 PROCEDURE — 99214 OFFICE O/P EST MOD 30 MIN: CPT | Performed by: INTERNAL MEDICINE

## 2020-07-31 NOTE — PROGRESS NOTES
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 especially with autoimmune hepatitis and elevated LEA 1/640 speckled pattern, she was treated with budesonide in the past    She is scheduled to be seen by Rheumatology, also I believe she needs to follow-up with GI    Iron deficiency anemia requiring transfusion of 2 units of packed RBC in April 2020 with hemoglobin of 6 9, she received Feraheme, current hemoglobin of 11 6    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    Follow-up in 3 months with CBC            HPI:  23-year-old [facility] American female with history of dilated cardiomyopathy with low ejection fraction, hypertension, autoimmune hepatitis treated with steroids, Bell's palsy, had been on treatment for congestive heart failure with Entresto, spironolactone, torsemide, metoprolol and budesonide     Was transfuse with packed RBC for abnormal CBC on 04/17/2020 with WBC of 1 9, hemoglobin 6 9, platelet 69589, MCV 70, RDW 19 7     3 a CBC on 09/09/2019 showed WBC of 2 7, hemoglobin 11 3, MCV 93, platelets 14796, normal differential     She denied any recurrent upper respiratory tract infections     On 07/2018 WBC 3 1, hemoglobin 11, MCV 91, platelets 70706     On 11/2014 WBC 3 4, hemoglobin 13 3, MCV 89, platelets [id number]     She had normal vitamin W02, folic acid, TSH, protein electrophoresis    LEA positive with speckled pattern 1/640, negative occult blood in the stool, normal thyroid function tests,    She received Feraheme 510 milligram IV x2 doses on June 2020, hemoglobin went up to 11 6 with significant symptomatic relief, she still have thrombocytopenia and neutropenia with normal differential    ,     Interval History:        Previous Treatment: Test Results:    Imaging: No results found  Labs:   Lab Results   Component Value Date    WBC 2 34 (L) 05/29/2020    HGB 9 5 (L) 05/29/2020    HCT 32 3 (L) 05/29/2020    MCV 82 05/29/2020    PLT 54 (L) 05/29/2020     Lab Results   Component Value Date     12/04/2015    K 3 7 05/29/2020     05/29/2020    CO2 27 05/29/2020    ANIONGAP 4 12/04/2015    BUN 11 05/29/2020    CREATININE 0 74 05/29/2020    GLUCOSE 94 12/04/2015    GLUF 92 05/22/2019    CALCIUM 9 5 05/29/2020    AST 27 03/12/2020    ALT 26 03/12/2020    ALKPHOS 59 03/12/2020    PROT 7 5 12/04/2015    BILITOT 0 48 12/04/2015    EGFR 87 05/29/2020       Lab Results   Component Value Date    IRON 94 05/29/2020    TIBC 485 (H) 05/29/2020    FERRITIN 12 05/29/2020       Lab Results   Component Value Date    BCKADBEU67 2,158 (H) 05/29/2020         ROS: Review of Systems   Constitutional: Negative for appetite change, chills, diaphoresis, fatigue and unexpected weight change  HENT:   Negative for mouth sores, nosebleeds, sore throat, trouble swallowing and voice change  Eyes: Negative for eye problems and icterus  Respiratory: Negative for chest tightness, cough, hemoptysis and wheezing  Cardiovascular: Negative for chest pain, leg swelling and palpitations  Gastrointestinal: Negative for abdominal distention, abdominal pain, blood in stool, constipation, diarrhea, nausea and vomiting  Endocrine: Negative for hot flashes  Genitourinary: Negative for bladder incontinence, difficulty urinating, dyspareunia, dysuria and frequency  Musculoskeletal: Positive for arthralgias  Negative for back pain, gait problem, neck pain and neck stiffness  Skin: Negative for itching and rash  Neurological: Negative for dizziness, gait problem, headaches, numbness, seizures and speech difficulty  Hematological: Negative for adenopathy  Does not bruise/bleed easily     Psychiatric/Behavioral: Negative for decreased concentration, depression, sleep disturbance and suicidal ideas  The patient is not nervous/anxious  Current Medications: Reviewed  Allergies: Reviewed  PMH/FH/SH:  Reviewed      Physical Exam:    Body surface area is 1 62 meters squared  Wt Readings from Last 3 Encounters:   20 58 6 kg (129 lb 3 2 oz)   20 58 1 kg (128 lb)   20 58 7 kg (129 lb 8 oz)        Temp Readings from Last 3 Encounters:   20 98 5 °F (36 9 °C) (Tympanic)   20 98 6 °F (37 °C)   20 (!) 96 8 °F (36 °C) (Temporal)        BP Readings from Last 3 Encounters:   20 120/68   20 134/70   20 157/65         Pulse Readings from Last 3 Encounters:   20 74   20 83   20 88        Physical Exam   Constitutional: She is oriented to person, place, and time  She appears well-developed and well-nourished  No distress  HENT:   Head: Normocephalic and atraumatic  Eyes: Conjunctivae are normal    Neck: Normal range of motion  Neck supple  No tracheal deviation present  Cardiovascular: Normal rate and regular rhythm  Exam reveals no gallop and no friction rub  Murmur heard  Pulmonary/Chest: Effort normal and breath sounds normal  No respiratory distress  She has no wheezes  She has no rales  She exhibits no tenderness  Abdominal: Soft  She exhibits no distension  There is no tenderness  Musculoskeletal: She exhibits no edema  Lymphadenopathy:     She has no cervical adenopathy  Neurological: She is alert and oriented to person, place, and time  Skin: Skin is warm and dry  She is not diaphoretic  No erythema  No pallor  Psychiatric: She has a normal mood and affect  Her behavior is normal  Judgment and thought content normal    Vitals reviewed  ECO  Goals and Barriers:  Current Goal: Minimize effects of disease  Barriers: None  Patient's Capacity to Self Care:  Patient is able to self care      Code Status: [facility]@

## 2020-09-12 ENCOUNTER — TRANSCRIBE ORDERS (OUTPATIENT)
Dept: ADMINISTRATIVE | Age: 80
End: 2020-09-12

## 2020-09-12 ENCOUNTER — LAB (OUTPATIENT)
Dept: LAB | Age: 80
End: 2020-09-12
Payer: MEDICARE

## 2020-09-12 DIAGNOSIS — M35.9 DIFFUSE DISEASE OF CONNECTIVE TISSUE (HCC): ICD-10-CM

## 2020-09-12 DIAGNOSIS — K75.4 CHRONIC AGGRESSIVE HEPATITIS (HCC): ICD-10-CM

## 2020-09-12 DIAGNOSIS — M35.9 DIFFUSE DISEASE OF CONNECTIVE TISSUE (HCC): Primary | ICD-10-CM

## 2020-09-12 LAB
C3 SERPL-MCNC: 102 MG/DL (ref 90–180)
C4 SERPL-MCNC: 18 MG/DL (ref 10–40)
CK SERPL-CCNC: 87 U/L (ref 26–192)
CRP SERPL QL: <3 MG/L
ERYTHROCYTE [SEDIMENTATION RATE] IN BLOOD: 31 MM/HOUR (ref 0–29)
IGA SERPL-MCNC: 505 MG/DL (ref 70–400)
IGG SERPL-MCNC: 2550 MG/DL (ref 700–1600)
IGM SERPL-MCNC: 176 MG/DL (ref 40–230)

## 2020-09-12 PROCEDURE — 36415 COLL VENOUS BLD VENIPUNCTURE: CPT

## 2020-09-12 PROCEDURE — 85652 RBC SED RATE AUTOMATED: CPT

## 2020-09-12 PROCEDURE — 85732 THROMBOPLASTIN TIME PARTIAL: CPT

## 2020-09-12 PROCEDURE — 85613 RUSSELL VIPER VENOM DILUTED: CPT

## 2020-09-12 PROCEDURE — 86146 BETA-2 GLYCOPROTEIN ANTIBODY: CPT

## 2020-09-12 PROCEDURE — 86225 DNA ANTIBODY NATIVE: CPT

## 2020-09-12 PROCEDURE — 86200 CCP ANTIBODY: CPT

## 2020-09-12 PROCEDURE — 86235 NUCLEAR ANTIGEN ANTIBODY: CPT

## 2020-09-12 PROCEDURE — 85705 THROMBOPLASTIN INHIBITION: CPT

## 2020-09-12 PROCEDURE — 86147 CARDIOLIPIN ANTIBODY EA IG: CPT

## 2020-09-12 PROCEDURE — 86160 COMPLEMENT ANTIGEN: CPT

## 2020-09-12 PROCEDURE — 82550 ASSAY OF CK (CPK): CPT

## 2020-09-12 PROCEDURE — 86430 RHEUMATOID FACTOR TEST QUAL: CPT

## 2020-09-12 PROCEDURE — 84165 PROTEIN E-PHORESIS SERUM: CPT

## 2020-09-12 PROCEDURE — 86256 FLUORESCENT ANTIBODY TITER: CPT

## 2020-09-12 PROCEDURE — 85670 THROMBIN TIME PLASMA: CPT

## 2020-09-12 PROCEDURE — 86140 C-REACTIVE PROTEIN: CPT

## 2020-09-12 PROCEDURE — 86334 IMMUNOFIX E-PHORESIS SERUM: CPT | Performed by: PATHOLOGY

## 2020-09-12 PROCEDURE — 86431 RHEUMATOID FACTOR QUANT: CPT

## 2020-09-12 PROCEDURE — 86162 COMPLEMENT TOTAL (CH50): CPT

## 2020-09-12 PROCEDURE — 86334 IMMUNOFIX E-PHORESIS SERUM: CPT

## 2020-09-12 PROCEDURE — 84165 PROTEIN E-PHORESIS SERUM: CPT | Performed by: PATHOLOGY

## 2020-09-12 PROCEDURE — 82784 ASSAY IGA/IGD/IGG/IGM EACH: CPT

## 2020-09-12 PROCEDURE — 82085 ASSAY OF ALDOLASE: CPT

## 2020-09-14 LAB
ACTIN IGG SERPL-ACNC: 95 UNITS (ref 0–19)
ALDOLASE SERPL-CCNC: 4.8 U/L (ref 3.3–10.3)
APTT SCREEN TO CONFIRM RATIO: 0.92 RATIO (ref 0–1.4)
CARDIOLIPIN IGA SER IA-ACNC: 42 APL U/ML (ref 0–11)
CARDIOLIPIN IGG SER IA-ACNC: <9 GPL U/ML (ref 0–14)
CARDIOLIPIN IGM SER IA-ACNC: 14 MPL U/ML (ref 0–12)
CENTROMERE B AB SER-ACNC: <0.2 AI (ref 0–0.9)
CH50 SERPL-ACNC: 60 U/ML
CONFIRM APTT/NORMAL: 50.8 SEC (ref 0–55)
CRYOGLOB RF SER-ACNC: ABNORMAL [IU]/ML
DSDNA AB SER-ACNC: <1 IU/ML (ref 0–9)
ENA RNP AB SER-ACNC: <0.2 AI (ref 0–0.9)
ENA SCL70 AB SER-ACNC: <0.2 AI (ref 0–0.9)
ENA SM AB SER-ACNC: <0.2 AI (ref 0–0.9)
ENA SS-A AB SER-ACNC: <0.2 AI (ref 0–0.9)
ENA SS-B AB SER-ACNC: <0.2 AI (ref 0–0.9)
LA PPP-IMP: ABNORMAL
MITOCHONDRIA M2 IGG SER-ACNC: <20 UNITS (ref 0–20)
RHEUMATOID FACT SER QL LA: POSITIVE
SCREEN APTT: 46.1 SEC (ref 0–51.9)
SCREEN DRVVT: 42.5 SEC (ref 0–47)
THROMBIN TIME: 26 SEC (ref 0–23)

## 2020-09-15 LAB
ALBUMIN SERPL ELPH-MCNC: 3.8 G/DL (ref 3.5–5)
ALBUMIN SERPL ELPH-MCNC: 42.2 % (ref 52–65)
ALPHA1 GLOB SERPL ELPH-MCNC: 0.27 G/DL (ref 0.1–0.4)
ALPHA1 GLOB SERPL ELPH-MCNC: 3 % (ref 2.5–5)
ALPHA2 GLOB SERPL ELPH-MCNC: 0.79 G/DL (ref 0.4–1.2)
ALPHA2 GLOB SERPL ELPH-MCNC: 8.8 % (ref 7–13)
B2 GLYCOPROT1 IGA SER-ACNC: <9 GPI IGA UNITS (ref 0–25)
B2 GLYCOPROT1 IGG SER-ACNC: <9 GPI IGG UNITS (ref 0–20)
B2 GLYCOPROT1 IGM SER-ACNC: <9 GPI IGM UNITS (ref 0–32)
BETA GLOB ABNORMAL SERPL ELPH-MCNC: 0.51 G/DL (ref 0.4–0.8)
BETA1 GLOB SERPL ELPH-MCNC: 5.7 % (ref 5–13)
BETA2 GLOB SERPL ELPH-MCNC: 6.1 % (ref 2–8)
BETA2+GAMMA GLOB SERPL ELPH-MCNC: 0.55 G/DL (ref 0.2–0.5)
CCP IGA+IGG SERPL IA-ACNC: 6 UNITS (ref 0–19)
GAMMA GLOB ABNORMAL SERPL ELPH-MCNC: 3.08 G/DL (ref 0.5–1.6)
GAMMA GLOB SERPL ELPH-MCNC: 34.2 % (ref 12–22)
HISTONE IGG SER IA-ACNC: 1.2 UNITS (ref 0–0.9)
IGG/ALB SER: 0.73 {RATIO} (ref 1.1–1.8)
INTERPRETATION UR IFE-IMP: NORMAL
PROT PATTERN SERPL ELPH-IMP: ABNORMAL
PROT SERPL-MCNC: 9 G/DL (ref 6.4–8.2)

## 2020-10-13 DIAGNOSIS — I50.22 CHRONIC SYSTOLIC HEART FAILURE (HCC): Primary | ICD-10-CM

## 2020-10-13 RX ORDER — SACUBITRIL AND VALSARTAN 49; 51 MG/1; MG/1
1 TABLET, FILM COATED ORAL EVERY 12 HOURS
Qty: 180 TABLET | Refills: 2 | Status: SHIPPED | OUTPATIENT
Start: 2020-10-13 | End: 2021-03-09

## 2020-10-30 DIAGNOSIS — K75.4 AUTOIMMUNE HEPATITIS (HCC): ICD-10-CM

## 2020-10-30 RX ORDER — METOPROLOL SUCCINATE 25 MG/1
TABLET, EXTENDED RELEASE ORAL
Qty: 90 TABLET | Refills: 3 | Status: SHIPPED | OUTPATIENT
Start: 2020-10-30 | End: 2021-11-04 | Stop reason: SDUPTHER

## 2020-11-03 ENCOUNTER — TELEPHONE (OUTPATIENT)
Dept: HEMATOLOGY ONCOLOGY | Facility: CLINIC | Age: 80
End: 2020-11-03

## 2020-11-06 ENCOUNTER — OFFICE VISIT (OUTPATIENT)
Dept: HEMATOLOGY ONCOLOGY | Facility: CLINIC | Age: 80
End: 2020-11-06
Payer: MEDICARE

## 2020-11-06 VITALS
RESPIRATION RATE: 18 BRPM | WEIGHT: 128.4 LBS | OXYGEN SATURATION: 99 % | HEIGHT: 64 IN | TEMPERATURE: 97.2 F | SYSTOLIC BLOOD PRESSURE: 116 MMHG | DIASTOLIC BLOOD PRESSURE: 68 MMHG | HEART RATE: 83 BPM | BODY MASS INDEX: 21.92 KG/M2

## 2020-11-06 DIAGNOSIS — D50.0 IRON DEFICIENCY ANEMIA DUE TO CHRONIC BLOOD LOSS: Primary | ICD-10-CM

## 2020-11-06 DIAGNOSIS — R76.8 ANA POSITIVE: ICD-10-CM

## 2020-11-06 DIAGNOSIS — D69.6 THROMBOCYTOPENIA (HCC): ICD-10-CM

## 2020-11-06 DIAGNOSIS — K75.4 AUTOIMMUNE HEPATITIS TREATED WITH STEROIDS (HCC): ICD-10-CM

## 2020-11-06 PROCEDURE — 99214 OFFICE O/P EST MOD 30 MIN: CPT | Performed by: INTERNAL MEDICINE

## 2020-12-18 ENCOUNTER — LAB (OUTPATIENT)
Dept: LAB | Age: 80
End: 2020-12-18
Payer: MEDICARE

## 2020-12-18 DIAGNOSIS — I50.22 CHRONIC SYSTOLIC CHF (CONGESTIVE HEART FAILURE), NYHA CLASS 3 (HCC): ICD-10-CM

## 2020-12-18 DIAGNOSIS — I50.22 CHRONIC SYSTOLIC HEART FAILURE (HCC): Primary | ICD-10-CM

## 2020-12-18 DIAGNOSIS — I42.0 DILATED CARDIOMYOPATHY (HCC): ICD-10-CM

## 2020-12-18 DIAGNOSIS — I10 HTN (HYPERTENSION), BENIGN: ICD-10-CM

## 2020-12-18 LAB
ALBUMIN SERPL BCP-MCNC: 3 G/DL (ref 3.5–5)
ALP SERPL-CCNC: 65 U/L (ref 46–116)
ALT SERPL W P-5'-P-CCNC: 180 U/L (ref 12–78)
ANION GAP SERPL CALCULATED.3IONS-SCNC: 2 MMOL/L (ref 4–13)
AST SERPL W P-5'-P-CCNC: 140 U/L (ref 5–45)
BILIRUB SERPL-MCNC: 0.7 MG/DL (ref 0.2–1)
BUN SERPL-MCNC: 17 MG/DL (ref 5–25)
CALCIUM ALBUM COR SERPL-MCNC: 10.1 MG/DL (ref 8.3–10.1)
CALCIUM SERPL-MCNC: 9.3 MG/DL (ref 8.3–10.1)
CHLORIDE SERPL-SCNC: 107 MMOL/L (ref 100–108)
CO2 SERPL-SCNC: 31 MMOL/L (ref 21–32)
CREAT SERPL-MCNC: 0.77 MG/DL (ref 0.6–1.3)
GFR SERPL CREATININE-BSD FRML MDRD: 83 ML/MIN/1.73SQ M
GLUCOSE SERPL-MCNC: 98 MG/DL (ref 65–140)
POTASSIUM SERPL-SCNC: 3.2 MMOL/L (ref 3.5–5.3)
PROT SERPL-MCNC: 8.3 G/DL (ref 6.4–8.2)
SODIUM SERPL-SCNC: 140 MMOL/L (ref 136–145)

## 2020-12-18 PROCEDURE — 36415 COLL VENOUS BLD VENIPUNCTURE: CPT | Performed by: INTERNAL MEDICINE

## 2020-12-18 PROCEDURE — 80053 COMPREHEN METABOLIC PANEL: CPT | Performed by: INTERNAL MEDICINE

## 2020-12-22 ENCOUNTER — OFFICE VISIT (OUTPATIENT)
Dept: CARDIOLOGY CLINIC | Facility: CLINIC | Age: 80
End: 2020-12-22
Payer: MEDICARE

## 2020-12-22 VITALS
HEART RATE: 86 BPM | OXYGEN SATURATION: 98 % | WEIGHT: 126.1 LBS | BODY MASS INDEX: 21.53 KG/M2 | HEIGHT: 64 IN | DIASTOLIC BLOOD PRESSURE: 78 MMHG | SYSTOLIC BLOOD PRESSURE: 122 MMHG

## 2020-12-22 DIAGNOSIS — I42.0 DILATED CARDIOMYOPATHY (HCC): ICD-10-CM

## 2020-12-22 DIAGNOSIS — I44.7 COMPLETE LEFT BUNDLE BRANCH BLOCK (LBBB): ICD-10-CM

## 2020-12-22 DIAGNOSIS — I50.22 CHRONIC SYSTOLIC HEART FAILURE (HCC): Primary | ICD-10-CM

## 2020-12-22 PROCEDURE — 99214 OFFICE O/P EST MOD 30 MIN: CPT | Performed by: INTERNAL MEDICINE

## 2020-12-23 DIAGNOSIS — I42.0 DILATED CARDIOMYOPATHY (HCC): ICD-10-CM

## 2020-12-23 DIAGNOSIS — I50.22 CHRONIC SYSTOLIC CHF (CONGESTIVE HEART FAILURE), NYHA CLASS 3 (HCC): ICD-10-CM

## 2020-12-23 DIAGNOSIS — K75.4 AUTOIMMUNE HEPATITIS (HCC): ICD-10-CM

## 2020-12-23 RX ORDER — SPIRONOLACTONE 25 MG/1
12.5 TABLET ORAL DAILY
Qty: 30 TABLET | Refills: 2 | Status: SHIPPED | OUTPATIENT
Start: 2020-12-23 | End: 2021-11-04 | Stop reason: SDUPTHER

## 2020-12-23 RX ORDER — TORSEMIDE 10 MG/1
10 TABLET ORAL DAILY
Qty: 90 TABLET | Refills: 3 | Status: SHIPPED | OUTPATIENT
Start: 2020-12-23 | End: 2021-11-04 | Stop reason: SDUPTHER

## 2021-01-11 ENCOUNTER — TRANSCRIBE ORDERS (OUTPATIENT)
Dept: ADMINISTRATIVE | Age: 81
End: 2021-01-11

## 2021-01-11 ENCOUNTER — LAB (OUTPATIENT)
Dept: LAB | Age: 81
End: 2021-01-11
Payer: MEDICARE

## 2021-01-11 DIAGNOSIS — R76.8 ANA POSITIVE: ICD-10-CM

## 2021-01-11 DIAGNOSIS — K75.4 CHRONIC AGGRESSIVE HEPATITIS (HCC): Primary | ICD-10-CM

## 2021-01-11 DIAGNOSIS — D69.6 THROMBOCYTOPENIA (HCC): ICD-10-CM

## 2021-01-11 DIAGNOSIS — D70.9 NEUTROPENIA, UNSPECIFIED TYPE (HCC): ICD-10-CM

## 2021-01-11 DIAGNOSIS — D50.0 IRON DEFICIENCY ANEMIA DUE TO CHRONIC BLOOD LOSS: ICD-10-CM

## 2021-01-11 DIAGNOSIS — K75.4 AUTOIMMUNE HEPATITIS TREATED WITH STEROIDS (HCC): ICD-10-CM

## 2021-01-11 DIAGNOSIS — K75.4 CHRONIC AGGRESSIVE HEPATITIS (HCC): ICD-10-CM

## 2021-01-11 LAB
ALBUMIN SERPL BCP-MCNC: 3 G/DL (ref 3.5–5)
ALP SERPL-CCNC: 64 U/L (ref 46–116)
ALT SERPL W P-5'-P-CCNC: 162 U/L (ref 12–78)
ANION GAP SERPL CALCULATED.3IONS-SCNC: 0 MMOL/L (ref 4–13)
AST SERPL W P-5'-P-CCNC: 117 U/L (ref 5–45)
BILIRUB SERPL-MCNC: 0.63 MG/DL (ref 0.2–1)
BUN SERPL-MCNC: 15 MG/DL (ref 5–25)
CALCIUM ALBUM COR SERPL-MCNC: 9.7 MG/DL (ref 8.3–10.1)
CALCIUM SERPL-MCNC: 8.9 MG/DL (ref 8.3–10.1)
CHLORIDE SERPL-SCNC: 107 MMOL/L (ref 100–108)
CO2 SERPL-SCNC: 32 MMOL/L (ref 21–32)
CREAT SERPL-MCNC: 0.71 MG/DL (ref 0.6–1.3)
FERRITIN SERPL-MCNC: 238 NG/ML (ref 8–388)
GFR SERPL CREATININE-BSD FRML MDRD: 92 ML/MIN/1.73SQ M
GLUCOSE SERPL-MCNC: 71 MG/DL (ref 65–140)
IRON SATN MFR SERPL: 41 %
IRON SERPL-MCNC: 133 UG/DL (ref 50–170)
POTASSIUM SERPL-SCNC: 3.5 MMOL/L (ref 3.5–5.3)
PROT SERPL-MCNC: 8.1 G/DL (ref 6.4–8.2)
SODIUM SERPL-SCNC: 139 MMOL/L (ref 136–145)
TIBC SERPL-MCNC: 327 UG/DL (ref 250–450)
VIT B12 SERPL-MCNC: 767 PG/ML (ref 100–900)

## 2021-01-11 PROCEDURE — 83516 IMMUNOASSAY NONANTIBODY: CPT

## 2021-01-11 PROCEDURE — 83540 ASSAY OF IRON: CPT

## 2021-01-11 PROCEDURE — 83550 IRON BINDING TEST: CPT

## 2021-01-11 PROCEDURE — 80053 COMPREHEN METABOLIC PANEL: CPT

## 2021-01-11 PROCEDURE — 82784 ASSAY IGA/IGD/IGG/IGM EACH: CPT

## 2021-01-11 PROCEDURE — 84165 PROTEIN E-PHORESIS SERUM: CPT

## 2021-01-11 PROCEDURE — 86255 FLUORESCENT ANTIBODY SCREEN: CPT

## 2021-01-11 PROCEDURE — 84165 PROTEIN E-PHORESIS SERUM: CPT | Performed by: PATHOLOGY

## 2021-01-11 PROCEDURE — 82607 VITAMIN B-12: CPT

## 2021-01-11 PROCEDURE — 36415 COLL VENOUS BLD VENIPUNCTURE: CPT

## 2021-01-11 PROCEDURE — 85025 COMPLETE CBC W/AUTO DIFF WBC: CPT

## 2021-01-11 PROCEDURE — 82728 ASSAY OF FERRITIN: CPT

## 2021-01-12 ENCOUNTER — TELEPHONE (OUTPATIENT)
Dept: HEMATOLOGY ONCOLOGY | Facility: CLINIC | Age: 81
End: 2021-01-12

## 2021-01-12 DIAGNOSIS — D69.6 THROMBOCYTOPENIA (HCC): Primary | ICD-10-CM

## 2021-01-12 LAB
ALBUMIN SERPL ELPH-MCNC: 3.5 G/DL (ref 3.5–5)
ALBUMIN SERPL ELPH-MCNC: 43.8 % (ref 52–65)
ALPHA1 GLOB SERPL ELPH-MCNC: 0.27 G/DL (ref 0.1–0.4)
ALPHA1 GLOB SERPL ELPH-MCNC: 3.4 % (ref 2.5–5)
ALPHA2 GLOB SERPL ELPH-MCNC: 0.75 G/DL (ref 0.4–1.2)
ALPHA2 GLOB SERPL ELPH-MCNC: 9.4 % (ref 7–13)
BETA GLOB ABNORMAL SERPL ELPH-MCNC: 0.54 G/DL (ref 0.4–0.8)
BETA1 GLOB SERPL ELPH-MCNC: 6.7 % (ref 5–13)
BETA2 GLOB SERPL ELPH-MCNC: 7 % (ref 2–8)
BETA2+GAMMA GLOB SERPL ELPH-MCNC: 0.56 G/DL (ref 0.2–0.5)
ERYTHROCYTE [DISTWIDTH] IN BLOOD BY AUTOMATED COUNT: 14.7 % (ref 11.6–15.1)
GAMMA GLOB ABNORMAL SERPL ELPH-MCNC: 2.38 G/DL (ref 0.5–1.6)
GAMMA GLOB SERPL ELPH-MCNC: 29.7 % (ref 12–22)
HCT VFR BLD AUTO: 38.8 % (ref 34.8–46.1)
HGB BLD-MCNC: 12.7 G/DL (ref 11.5–15.4)
IGG/ALB SER: 0.78 {RATIO} (ref 1.1–1.8)
MCH RBC QN AUTO: 32.4 PG (ref 26.8–34.3)
MCHC RBC AUTO-ENTMCNC: 32.7 G/DL (ref 31.4–37.4)
MCV RBC AUTO: 99 FL (ref 82–98)
PLATELET # BLD AUTO: 36 THOUSANDS/UL (ref 149–390)
PMV BLD AUTO: 14.3 FL (ref 8.9–12.7)
PROT PATTERN SERPL ELPH-IMP: ABNORMAL
PROT SERPL-MCNC: 8 G/DL (ref 6.4–8.2)
RBC # BLD AUTO: 3.92 MILLION/UL (ref 3.81–5.12)
WBC # BLD AUTO: 1.87 THOUSAND/UL (ref 4.31–10.16)

## 2021-01-12 NOTE — TELEPHONE ENCOUNTER
Call from patient to review the following note from RN:  Telephone Encounter           Called pt and reviewed CBC results  Explained Dr Neema Kennedy is recommending bone marrow biopsy in IR  Pt agreeable  IR to contact pt to schedule  I will then schedule follow up with Robert Wells  Pt verbalized understanding of the plan         Electronically signed by Porfirio Nicolas RN at 1/12/2021  9:38 AM        Patient verbalizes understanding

## 2021-01-12 NOTE — TELEPHONE ENCOUNTER
Called pt and reviewed CBC results  Explained Dr Kasandra Esparza is recommending bone marrow biopsy in IR  Pt agreeable  IR to contact pt to schedule  I will then schedule follow up with Mikayla Pickering  Pt verbalized understanding of the plan

## 2021-01-12 NOTE — TELEPHONE ENCOUNTER
Call received from: Rosa       Lab location:[facility]  Date of lab draw:1/11/21  Critical value:WBC 1 87, Platelets 36 thousand    Read back occurred:Yes  Tasked and Agatha Hall RN

## 2021-01-13 LAB
ENDOMYSIUM IGA SER QL: NEGATIVE
GLIADIN PEPTIDE IGA SER-ACNC: 6 UNITS (ref 0–19)
GLIADIN PEPTIDE IGG SER-ACNC: 2 UNITS (ref 0–19)
IGA SERPL-MCNC: 566 MG/DL (ref 64–422)
TTG IGA SER-ACNC: <2 U/ML (ref 0–3)
TTG IGG SER-ACNC: 3 U/ML (ref 0–5)

## 2021-01-14 ENCOUNTER — TELEPHONE (OUTPATIENT)
Dept: HEMATOLOGY ONCOLOGY | Facility: CLINIC | Age: 81
End: 2021-01-14

## 2021-01-14 NOTE — TELEPHONE ENCOUNTER
Called pt to review schedule for bone marrow bx and follow up appt  Pt asked to call me back, as she was in the middle of another call  Please transfer her to [phone] when she calls back   Thanks

## 2021-01-14 NOTE — TELEPHONE ENCOUNTER
Pt called back - provided follow up info  Pt requesting afternoon appt for bone marrow bx  I called IR scheduling and they said they don't do bone marrow bx in the afternoon   stated she would call pt directly

## 2021-01-19 NOTE — PROGRESS NOTES
Pre-procedure instructions reviewed  NPO status confirmed, COVID screening negative, arrival time and location confirmed, and call back number given and any further questions

## 2021-01-26 ENCOUNTER — TELEPHONE (OUTPATIENT)
Dept: HEMATOLOGY ONCOLOGY | Facility: MEDICAL CENTER | Age: 81
End: 2021-01-26

## 2021-01-26 NOTE — TELEPHONE ENCOUNTER
patient called in as she as scheduled for her bone marrow test and has questions and would like to know what to expect tomorrow - please call back [phone]

## 2021-01-27 ENCOUNTER — HOSPITAL ENCOUNTER (OUTPATIENT)
Dept: RADIOLOGY | Facility: HOSPITAL | Age: 81
Discharge: HOME | End: 2021-01-27
Attending: INTERNAL MEDICINE | Admitting: RADIOLOGY
Payer: MEDICARE

## 2021-01-27 VITALS
TEMPERATURE: 97.4 F | DIASTOLIC BLOOD PRESSURE: 55 MMHG | HEART RATE: 63 BPM | HEIGHT: 64 IN | BODY MASS INDEX: 21.34 KG/M2 | SYSTOLIC BLOOD PRESSURE: 113 MMHG | OXYGEN SATURATION: 98 % | WEIGHT: 125 LBS | RESPIRATION RATE: 16 BRPM

## 2021-01-27 DIAGNOSIS — D69.6 THROMBOCYTOPENIA, UNSPECIFIED (HCC): ICD-10-CM

## 2021-01-27 PROCEDURE — 88342 IMHCHEM/IMCYTCHM 1ST ANTB: CPT | Performed by: PATHOLOGY

## 2021-01-27 PROCEDURE — 38222 DX BONE MARROW BX & ASPIR: CPT | Performed by: RADIOLOGY

## 2021-01-27 PROCEDURE — 99152 MOD SED SAME PHYS/QHP 5/>YRS: CPT

## 2021-01-27 PROCEDURE — 85097 BONE MARROW INTERPRETATION: CPT | Performed by: PATHOLOGY

## 2021-01-27 PROCEDURE — 88313 SPECIAL STAINS GROUP 2: CPT | Performed by: PATHOLOGY

## 2021-01-27 PROCEDURE — 88341 IMHCHEM/IMCYTCHM EA ADD ANTB: CPT | Performed by: PATHOLOGY

## 2021-01-27 PROCEDURE — 38222 DX BONE MARROW BX & ASPIR: CPT

## 2021-01-27 PROCEDURE — 77012 CT SCAN FOR NEEDLE BIOPSY: CPT | Performed by: RADIOLOGY

## 2021-01-27 PROCEDURE — 77012 CT SCAN FOR NEEDLE BIOPSY: CPT

## 2021-01-27 PROCEDURE — 88305 TISSUE EXAM BY PATHOLOGIST: CPT | Performed by: PATHOLOGY

## 2021-01-27 PROCEDURE — 88311 DECALCIFY TISSUE: CPT | Performed by: PATHOLOGY

## 2021-01-27 PROCEDURE — 99152 MOD SED SAME PHYS/QHP 5/>YRS: CPT | Performed by: RADIOLOGY

## 2021-01-27 RX ORDER — SODIUM CHLORIDE 9 MG/ML
75 INJECTION, SOLUTION INTRAVENOUS CONTINUOUS
Status: DISCONTINUED | OUTPATIENT
Start: 2021-01-27 | End: 2021-01-28 | Stop reason: HOSPADM

## 2021-01-27 RX ORDER — FENTANYL CITRATE 50 UG/ML
INJECTION, SOLUTION INTRAMUSCULAR; INTRAVENOUS CODE/TRAUMA/SEDATION MEDICATION
Status: COMPLETED | OUTPATIENT
Start: 2021-01-27 | End: 2021-01-27

## 2021-01-27 RX ORDER — MIDAZOLAM HYDROCHLORIDE 2 MG/2ML
INJECTION, SOLUTION INTRAMUSCULAR; INTRAVENOUS CODE/TRAUMA/SEDATION MEDICATION
Status: COMPLETED | OUTPATIENT
Start: 2021-01-27 | End: 2021-01-27

## 2021-01-27 RX ORDER — ACETAMINOPHEN 325 MG/1
650 TABLET ORAL EVERY 4 HOURS PRN
Status: DISCONTINUED | OUTPATIENT
Start: 2021-01-27 | End: 2021-01-28 | Stop reason: HOSPADM

## 2021-01-27 RX ADMIN — MIDAZOLAM 1 MG: 1 INJECTION INTRAMUSCULAR; INTRAVENOUS at 13:27

## 2021-01-27 RX ADMIN — SODIUM CHLORIDE 75 ML/HR: 0.9 INJECTION, SOLUTION INTRAVENOUS at 11:30

## 2021-01-27 RX ADMIN — FENTANYL CITRATE 50 MCG: 50 INJECTION INTRAMUSCULAR; INTRAVENOUS at 13:27

## 2021-01-27 RX ADMIN — FENTANYL CITRATE 50 MCG: 50 INJECTION INTRAMUSCULAR; INTRAVENOUS at 13:35

## 2021-01-27 NOTE — H&P
Interventional Radiology  History and Physical 1/27/2021     Bony Matta   1940   [id number]    Assessment/Plan:  78year old female with autoimmune thrombocytopenia presents for bone marrow biopsy  Problem List Items Addressed This Visit     None      Visit Diagnoses     [facility], [facility]        Relevant Orders    IR biopsy bone marrow             Subjective:     Patient ID: Bony Matta is a 78 y o  female  History of Present Illness  Patient with autoimmune thrombocytopenia presents for bone marrow biopsy    Review of Systems   Constitutional: Negative for fever  Respiratory: Negative for shortness of breath  Past Medical History:   Diagnosis Date    Bell's palsy     Cardiac disease     Hypertension         History reviewed  No pertinent surgical history       Social History     Tobacco Use   Smoking Status Never Smoker   Smokeless Tobacco Never Used        Social History     Substance and Sexual Activity   Alcohol Use No        Social History     Substance and Sexual Activity   Drug Use No        Allergies   Allergen Reactions    Ambien [Zolpidem] Other (See Comments)     Did not allow sleep per pt       Current Outpatient Medications   Medication Sig Dispense Refill    ascorbic acid (VITAMIN C) 500 mg tablet Take 500 mg by mouth daily      B Complex Vitamins (VITAMIN-B COMPLEX PO) Take by mouth      BUDESONIDE ER PO Take 3 mg by mouth daily       metoprolol succinate (TOPROL-XL) 25 mg 24 hr tablet TAKE 1 TABLET BY MOUTH EVERY DAY 90 tablet 3    potassium chloride (K-DUR,KLOR-CON) 10 mEq tablet Take 2 tablets (20 mEq total) by mouth daily 180 tablet 4    sacubitril-valsartan (Entresto) 49-51 MG TABS Take 1 tablet by mouth every 12 (twelve) hours 180 tablet 2    spironolactone (ALDACTONE) 25 mg tablet Take 0 5 tablets (12 5 mg total) by mouth daily 30 tablet 2    torsemide (DEMADEX) 10 mg tablet Take 1 tablet (10 mg total) by mouth daily 90 tablet 3    Omega-3 Fatty Acids (FISH OIL) 1,000 mg Take 1,000 mg by mouth daily On hold      sacubitril-valsartan (ENTRESTO)  MG TABS Take 1 tablet by mouth 2 (two) times a day 60 tablet 4     Current Facility-Administered Medications   Medication Dose Route Frequency Provider Last Rate Last Admin    sodium chloride 0 9 % infusion  75 mL/hr Intravenous Continuous Luis Ann MD 75 mL/hr at 01/27/21 1130 75 mL/hr at 01/27/21 1130          Objective:    Vitals:    01/27/21 1105   BP: 132/81   Pulse: 85   Resp: 18   Temp: 98 7 °F (37 1 °C)   TempSrc: Temporal   SpO2: 100%   Weight: 56 7 kg (125 lb)   Height: 5' 4" (1 626 m)        Physical Exam  Constitutional:       Appearance: Normal appearance  Pulmonary:      Effort: Pulmonary effort is normal    Skin:     General: Skin is dry  No results found for: BNP   Lab Results   Component Value Date    WBC 1 87 (LL) 01/11/2021    HGB 12 7 01/11/2021    HCT 38 8 01/11/2021    MCV 99 (H) 01/11/2021    PLT 36 (LL) 01/11/2021     Lab Results   Component Value Date    INR 1 16 04/18/2020    PROTIME 14 4 04/18/2020     Lab Results   Component Value Date    PTT 33 04/18/2020         I have personally reviewed pertinent imaging and laboratory results  Code Status: Prior  Advance Directive and Living Will:      Power of :    POLST:      This text is generated with voice recognition software  There may be translation, syntax,  or grammatical errors  If you have any questions, please contact the dictating provider

## 2021-01-27 NOTE — SEDATION DOCUMENTATION
Bone marrow biopsy from right iliac crest by Dr Kaitlynn Monte  Pt tolerated well  VSS   IR Procedure Bedrest Start Time is 1345 x1 hour

## 2021-01-27 NOTE — BRIEF OP NOTE (RAD/CATH)
INTERVENTIONAL RADIOLOGY PROCEDURE NOTE    Date: 1/27/2021    Procedure: IR BIOPSY BONE MARROW    Preoperative diagnosis:   1  Thrombocytopenia, unspecified ([facility] [address] )         Postoperative diagnosis: Same  Surgeon: Sven Nails MD     Assistant: None  No qualified resident was available  Blood loss: 2 mL    Specimens: 7 mL bone marrow aspirate and 1 core needle sample     Findings: Successful right posterior iliac spine bone marrow biopsy    Complications: None immediate      Anesthesia: conscious sedation and local

## 2021-01-27 NOTE — DISCHARGE INSTRUCTIONS
Bone Marrow Biopsy     WHAT YOU NEED TO KNOW:   A bone marrow biopsy is a procedure to remove a small amount of bone marrow from your bone  Bone marrow is the soft tissue inside your bone that helps to make blood cells  The sample is tested for disease or infection  DISCHARGE INSTRUCTIONS:     1  Limit your activities day of biopsy as directed by your doctor  2  Use medication as ordered  3  Return to your normal diet  Small sips of flat soda will help with nausea  4  Remove band-aid or dressing 24 hours after procedure  Contact Interventional Radiology at [phone] [place] PATIENTS: Contact Interventional Radiology at [phone]) Bernice Rodgers PATIENTS: Contact Interventional Radiology at [phone]) if:    1  Difficulty breathing, nausea or vomiting  2  Chills or fever above 101 F     3  Pain at biopsy site not relieved by medication  4  Develop any redness, swelling, heat, unusual drainage, heavy bruising or bleeding from biopsy site

## 2021-01-27 NOTE — TELEPHONE ENCOUNTER
Patient called and asked if she would need to wear a hospital gown  I advised that the IR nurse will give her a gown to change into  Patient verbalized understanding of above

## 2021-01-28 LAB — MISCELLANEOUS LAB TEST RESULT: NORMAL

## 2021-02-10 ENCOUNTER — OFFICE VISIT (OUTPATIENT)
Dept: HEMATOLOGY ONCOLOGY | Facility: CLINIC | Age: 81
End: 2021-02-10
Payer: MEDICARE

## 2021-02-10 VITALS
RESPIRATION RATE: 16 BRPM | SYSTOLIC BLOOD PRESSURE: 124 MMHG | WEIGHT: 124.6 LBS | HEIGHT: 64 IN | BODY MASS INDEX: 21.27 KG/M2 | TEMPERATURE: 97.6 F | HEART RATE: 76 BPM | OXYGEN SATURATION: 99 % | DIASTOLIC BLOOD PRESSURE: 70 MMHG

## 2021-02-10 DIAGNOSIS — D69.3 AUTOIMMUNE THROMBOCYTOPENIA (HCC): Primary | ICD-10-CM

## 2021-02-10 PROCEDURE — 99215 OFFICE O/P EST HI 40 MIN: CPT | Performed by: PHYSICIAN ASSISTANT

## 2021-02-10 RX ORDER — METHYLPREDNISOLONE 4 MG/1
TABLET ORAL
Qty: 1 EACH | Refills: 0 | Status: SHIPPED | OUTPATIENT
Start: 2021-02-10 | End: 2021-03-09

## 2021-02-10 NOTE — PROGRESS NOTES
Hematology/Oncology Outpatient Follow- up Note  Parish Rizvi 78 y o  female MRN: @ Encounter: [id number]        Date:  2/10/2021      Assessment / Plan:    1  Leukopenia for many years documented since at least 2014, worsened 1/11/21     2   Thrombocytopenia  Platelet count 50- low 100,000 range since at least 2015 but decreased to 40,000 11/2020 and 30,000 1/2021  Due to the decrease in her platelet count as well as decrease in her white count, bone marrow aspiration and biopsy was performed 1/27/21:  Normocellular for age (25-30% cell) marrow with somewhat increased, left-shifted megakaryocytes, lacking dysplastic morphologicfeatures, myeloblasts ~ 1 5%, all consistent with autoimmune thrombocytopenia      3  History of iron deficiency anemia  Iron saturation 8% and Ferritin 26 8/2018  Hemoglobin 6 9g/dL 4/2020  She received transfusion 2 units PRBC and iron saturation 19%, Ferritin 12 5/29/20 and she received Feraheme weekly x 2 6/2020  with improvement of hemoglobin to 11 8 with MCV 96 11/2020  Iron saturation 25% 11/2020 1/11/21:  Iron saturation 41%; Ferritin 238      4   Autoimmune hepatitis since at least 2014 with leukopenia, thrombocytopenia, positive LEA 1/640 speckled pattern, elevated anti smooth muscle antibodies  She was treated with budesonide in the past   She saw Dr Benjamin Lott and more recently Dr Nilsa Swain  Her transaminases have been more elevated  She will be meeting with Dr Charisse Rivera later this month  5  +LEA  Met with Dr Crista Fowler 8/2020  Additional labs 9/2020     6   Dilated cardiomyopathy with low ejection fraction on Entresto, spironolactone, torsemide, metoprolol and budesonide  She has a life vest     Thrombocytopenia is listed as a SE of Entresto as per [employer], not listed with prominence on PI, she has been on this since at least 2018  Given BM findings, elevated LFTs, chronicity of Entresto, doubt related to this medication  Reviewed with patient and son, Deisy Hyatt that this is likely autoimmune  Medrol Dosepak prescribed  She is asked to have CBC and CMP reassessment in approximately 2 weeks and a follow-up thereafter  We did discuss that if this were to become a chronic issue, Nplate or Promacta could be utilized to increase her platelet count to reduce risk of bleeding secondary to thrombocytopenia  History of Presenting Illness:  Misty Marcos is an 26-year-old [facility] American female who was seen initially 5/2020 regarding pancytopenia  She has a history of dilated cardiomyopathy with low ejection fraction on Entresto, spironolactone, torsemide, metoprolol and budesonide  She has history of hypertension, autoimmune hepatitis treated with steroids, 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  She had outpatient labs 4/17/20 at [facility]  hemoglobin 6 9, MCV 70, RDW 19 7, WBC 1 9, 59% neutrophils, 30% lymphocytes, platelet 91331,   She was transfused with 2 units packed RBC ordered by her cardiologist, Dr Shannen Bah and received Feraheme weekly x 2 doses end of June 2020  CBC on 09/09/2019 showed hemoglobin 11 3, MCV 93, WBC of 2 7, normal differential platelets 08486  August 2018 iron saturation 8% ferritin 26  On 07/2018 hemoglobin 11, MCV 91, WBC 3 1, platelets 91218  71/76/2953:  Hemoglobin 12 9, MCV of 90, white blood cell count 2 95, platelets 382  On 98/5717 hemoglobin 13 3, MCV 89, WBC 3 4, platelets 964262     She had normal vitamin T67, folic acid, TSH, protein electrophoresis     She denies any melena, hematochezia, she was not taking any anticoagulations, she has been taking care of her ill   She had cold intolerance, denies any headache, diplopia, odynophagia, angina dysuria, hematuria, melena, hematochezia  She denied any recurrent upper respiratory tract infections  She does not smoke or drink      Interval History:   Here with her son, Deisy Hyatt   Platelet count decreased to 98[address] 1/11/21  No monoclonal band on SPEP  Iron saturation 41%, ferritin 238      BMBx performed 1/27/21: Normocellular for age (25-30% cell) marrow with somewhat increased, left-shifted megakaryocytes, lacking dysplastic morphologic  features, myeloblasts ~ 1 5%, all consistent with autoimmune thrombocytopenia  Stainable macrophage storage iron is present    She will be following with Dr Kika Desouza later this month  She denies any bleeding  She denies any GI sx  Test Results:        Labs:   Lab Results   Component Value Date    HGB 12 7 01/11/2021    HCT 38 8 01/11/2021    MCV 99 (H) 01/11/2021    PLT 36 (LL) 01/11/2021    WBC 1 87 (LL) 01/11/2021    NRBC 0 05/29/2020     Lab Results   Component Value Date     12/04/2015    K 3 5 01/11/2021     01/11/2021    CO2 32 01/11/2021    ANIONGAP 4 12/04/2015    BUN 15 01/11/2021    CREATININE 0 71 01/11/2021    GLUCOSE 94 12/04/2015    GLUF 92 05/22/2019    CALCIUM 8 9 01/11/2021    CORRECTEDCA 9 7 01/11/2021     (H) 01/11/2021     (H) 01/11/2021    ALKPHOS 64 01/11/2021    PROT 7 5 12/04/2015    BILITOT 0 48 12/04/2015    EGFR 92 01/11/2021       Imaging: Ct Bone Marrow Biopsy And Aspiration    Result Date: 1/27/2021  Narrative: PROCEDURE: CT-guided bone marrow biopsy Procedural Personnel Attending physician(s): Dr Brook Lockwood Pre-procedure diagnosis: Thrombocytopenia Post-procedure diagnosis: Same Indication: Autoimmune pancytopenia Previous biopsy of same target (QCDR): No Complications: No immediate complications  Impression: CT-guided biopsy of right posterior iliac spine bone marrow  Plan: Specimen(s) sent for evaluation   _______________________________________________________________ PROCEDURE SUMMARY: - Percutaneous CT-guided right posterior iliac spine bone marrow biopsy PROCEDURE DETAILS: Pre-procedure Consent: Informed consent for the procedure including risks, benefits and alternatives was obtained and time-out was performed prior to the procedure  Preparation: The site was prepared and draped using maximal sterile barrier technique including cutaneous antisepsis  Anesthesia/sedation Level of anesthesia/sedation: Moderate sedation (conscious sedation) Anesthesia/sedation administered by: IR nurse under attending supervision with continuous monitoring of the patient's level of consciousness and physiologic status Total intra-service sedation time (minutes): 20 Imaging prior to biopsy The patient was positioned prone  Initial imaging was performed using noncontrast CT  Biopsy target: - Location: Right posterior iliac spine bone marrow Biopsy Local anesthesia was administered  Under CT guidance, the biopsy needle was advanced to the target and biopsy was performed  Core needle biopsy device: WhatsApp Core needle size: 11-gauge Specimens: 7 mL bone marrow aspirate and 1 core needle sample On-site biopsy touch preparation: Yes  Additional sampling recommendations: None Preliminary assessment of sample adequacy: Adequate Needle removal The biopsy needle was removed and a sterile dressing was applied  Tract embolization: None Radiation Dose CT dose length product (mGy-cm): 224 69 Additional Details Equipment details: WhatsApp  bone marrow biopsy kit Specimens removed: Biopsy samples as detailed above Estimated blood loss (mL): 2 Standardized report: [email or web address] Attestation Signer name: [facility] I attest that I was present for the entire procedure  I reviewed the stored images and agree with the report as written  Workstation performed: [id number]           ROS:  As mentioned in HPI & Interval History otherwise 14 point ROS negative  Allergies:    Allergies   Allergen Reactions    Ambien [Zolpidem] Other (See Comments)     Did not allow sleep per pt     Current Medications: Reviewed  PMH/FH/SH:  Reviewed      Physical Exam:    1 6 meters squared    Ht Readings from Last 3 Encounters:   02/10/21 5' 4" (1 626 m)   01/27/21 5' 4" (1 626 m)   12/22/20 5' 4" (1 626 m)        Wt Readings from Last 3 Encounters:   02/10/21 56 5 kg (124 lb 9 6 oz)   01/27/21 56 7 kg (125 lb)   12/22/20 57 2 kg (126 lb 1 6 oz)        Temp Readings from Last 3 Encounters:   02/10/21 97 6 °F (36 4 °C) (Tympanic)   01/27/21 (!) 97 4 °F (36 3 °C)   11/06/20 (!) 97 2 °F (36 2 °C) (Tympanic)        BP Readings from Last 3 Encounters:   02/10/21 124/70   01/27/21 113/55   12/22/20 122/78           Physical Exam  Vitals signs reviewed  Constitutional:       General: She is not in acute distress  Appearance: She is well-developed  She is not diaphoretic  HENT:      Head: Normocephalic and atraumatic  Eyes:      Conjunctiva/sclera: Conjunctivae normal    Neck:      Musculoskeletal: Normal range of motion and neck supple  Trachea: No tracheal deviation  Cardiovascular:      Rate and Rhythm: Normal rate and regular rhythm  Heart sounds: Murmur present  No friction rub  No gallop  Pulmonary:      Effort: Pulmonary effort is normal  No respiratory distress  Breath sounds: Normal breath sounds  No wheezing or rales  Chest:      Chest wall: No tenderness  Abdominal:      General: There is no distension  Palpations: Abdomen is soft  Tenderness: There is no abdominal tenderness  Lymphadenopathy:      Cervical: No cervical adenopathy  Skin:     General: Skin is warm and dry  Coloration: Skin is not pale  Findings: No erythema  Neurological:      Mental Status: She is alert and oriented to person, place, and time  Psychiatric:         Behavior: Behavior normal          Thought Content:  Thought content normal          Judgment: Judgment normal            Emergency Contacts:    [address] [place] [address] Rd, [phone],

## 2021-02-24 ENCOUNTER — LAB (OUTPATIENT)
Dept: LAB | Age: 81
End: 2021-02-24
Payer: MEDICARE

## 2021-02-24 ENCOUNTER — TELEPHONE (OUTPATIENT)
Dept: HEMATOLOGY ONCOLOGY | Facility: CLINIC | Age: 81
End: 2021-02-24

## 2021-02-24 DIAGNOSIS — D69.3 AUTOIMMUNE THROMBOCYTOPENIA (HCC): ICD-10-CM

## 2021-02-24 LAB
ALBUMIN SERPL BCP-MCNC: 3.1 G/DL (ref 3.5–5)
ALP SERPL-CCNC: 58 U/L (ref 46–116)
ALT SERPL W P-5'-P-CCNC: 130 U/L (ref 12–78)
ANION GAP SERPL CALCULATED.3IONS-SCNC: 4 MMOL/L (ref 4–13)
AST SERPL W P-5'-P-CCNC: 108 U/L (ref 5–45)
BASOPHILS # BLD AUTO: 0.02 THOUSANDS/ΜL (ref 0–0.1)
BASOPHILS NFR BLD AUTO: 1 % (ref 0–1)
BILIRUB SERPL-MCNC: 0.72 MG/DL (ref 0.2–1)
BUN SERPL-MCNC: 18 MG/DL (ref 5–25)
CALCIUM ALBUM COR SERPL-MCNC: 10 MG/DL (ref 8.3–10.1)
CALCIUM SERPL-MCNC: 9.3 MG/DL (ref 8.3–10.1)
CHLORIDE SERPL-SCNC: 108 MMOL/L (ref 100–108)
CO2 SERPL-SCNC: 32 MMOL/L (ref 21–32)
CREAT SERPL-MCNC: 0.86 MG/DL (ref 0.6–1.3)
EOSINOPHIL # BLD AUTO: 0 THOUSAND/ΜL (ref 0–0.61)
EOSINOPHIL NFR BLD AUTO: 0 % (ref 0–6)
ERYTHROCYTE [DISTWIDTH] IN BLOOD BY AUTOMATED COUNT: 14.7 % (ref 11.6–15.1)
GFR SERPL CREATININE-BSD FRML MDRD: 73 ML/MIN/1.73SQ M
GLUCOSE P FAST SERPL-MCNC: 91 MG/DL (ref 65–99)
HCT VFR BLD AUTO: 37.6 % (ref 34.8–46.1)
HGB BLD-MCNC: 12 G/DL (ref 11.5–15.4)
IMM GRANULOCYTES # BLD AUTO: 0.01 THOUSAND/UL (ref 0–0.2)
IMM GRANULOCYTES NFR BLD AUTO: 0 % (ref 0–2)
LYMPHOCYTES # BLD AUTO: 0.55 THOUSANDS/ΜL (ref 0.6–4.47)
LYMPHOCYTES NFR BLD AUTO: 24 % (ref 14–44)
MCH RBC QN AUTO: 32.1 PG (ref 26.8–34.3)
MCHC RBC AUTO-ENTMCNC: 31.9 G/DL (ref 31.4–37.4)
MCV RBC AUTO: 101 FL (ref 82–98)
MONOCYTES # BLD AUTO: 0.29 THOUSAND/ΜL (ref 0.17–1.22)
MONOCYTES NFR BLD AUTO: 13 % (ref 4–12)
NEUTROPHILS # BLD AUTO: 1.38 THOUSANDS/ΜL (ref 1.85–7.62)
NEUTS SEG NFR BLD AUTO: 62 % (ref 43–75)
NRBC BLD AUTO-RTO: 0 /100 WBCS
PLATELET # BLD AUTO: 41 THOUSANDS/UL (ref 149–390)
PMV BLD AUTO: 13.9 FL (ref 8.9–12.7)
POTASSIUM SERPL-SCNC: 3.4 MMOL/L (ref 3.5–5.3)
PROT SERPL-MCNC: 7.8 G/DL (ref 6.4–8.2)
RBC # BLD AUTO: 3.74 MILLION/UL (ref 3.81–5.12)
SODIUM SERPL-SCNC: 144 MMOL/L (ref 136–145)
WBC # BLD AUTO: 2.25 THOUSAND/UL (ref 4.31–10.16)

## 2021-02-24 PROCEDURE — 80053 COMPREHEN METABOLIC PANEL: CPT

## 2021-02-24 PROCEDURE — 85025 COMPLETE CBC W/AUTO DIFF WBC: CPT

## 2021-02-24 PROCEDURE — 36415 COLL VENOUS BLD VENIPUNCTURE: CPT

## 2021-03-09 ENCOUNTER — OFFICE VISIT (OUTPATIENT)
Dept: HEMATOLOGY ONCOLOGY | Facility: CLINIC | Age: 81
End: 2021-03-09
Payer: MEDICARE

## 2021-03-09 ENCOUNTER — TELEPHONE (OUTPATIENT)
Dept: HEMATOLOGY ONCOLOGY | Facility: CLINIC | Age: 81
End: 2021-03-09

## 2021-03-09 VITALS
TEMPERATURE: 97.2 F | WEIGHT: 128.4 LBS | OXYGEN SATURATION: 98 % | HEIGHT: 64 IN | RESPIRATION RATE: 16 BRPM | HEART RATE: 78 BPM | SYSTOLIC BLOOD PRESSURE: 110 MMHG | BODY MASS INDEX: 21.92 KG/M2 | DIASTOLIC BLOOD PRESSURE: 70 MMHG

## 2021-03-09 DIAGNOSIS — D69.6 THROMBOCYTOPENIA (HCC): Primary | ICD-10-CM

## 2021-03-09 PROCEDURE — 99214 OFFICE O/P EST MOD 30 MIN: CPT | Performed by: INTERNAL MEDICINE

## 2021-03-09 RX ORDER — PREDNISONE 10 MG/1
10 TABLET ORAL
COMMUNITY
End: 2021-05-14

## 2021-03-09 NOTE — PROGRESS NOTES
Hematology Outpatient Follow - Up Note  Vance Cain 78 y o  female MRN: @ Encounter: [id number]        Date:  3/9/2021        Assessment/ Plan:    1  Leukopenia for many years documented since at least 2014, worsened 1/11/21      2  Thrombocytopenia  Platelet count 50- low 100,000 range since at least 2015 but decreased to 40,000 11/2020 and 30,000 1/2021  Autoimmune workup was negative except for positive LEA as well as anti smooth muscle antibodies at 80    A bone marrow biopsy in January 2021 reported normal and showed no evidence of MDS, leukemia, lymphoma, vasculitis, granulomata, normal cytogenetics     differential diagnosis include autoimmune leukopenia and thrombocytopenia, she was told to start on steroids however she did not, she will follow-up with GI service regarding positive anti smooth muscle antibodies or the other option is drug-induced thrombocytopenia and leukopenia such as Entresto or torsemide    Follow-up in 3 months with CBC and differential        Labs and imaging studies are reviewed by ordering provider once results are available  If there are findings that need immediate attention, you will be contacted when results available  Discussing results and the implication on your healthcare is best discussed in person at your follow-up visit  HPI:    Mame Quevedo is an 78-year-old [facility] American female who was seen initially 5/2020 regarding pancytopenia  She has a history of dilated cardiomyopathy with low ejection fraction on Entresto, spironolactone, torsemide, metoprolol and budesonide  She has history of hypertension, autoimmune hepatitis treated with steroids, 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         She had outpatient labs 4/17/20 at [facility]    hemoglobin 6 9, MCV 70, RDW 19 7, WBC 1 9, 59% neutrophils, 30% lymphocytes, platelet 49273,   She was transfused with 2 units packed RBC ordered by her cardiologist, Dr Haroldo Pineda and received Feraheme weekly x 2 doses end of June 2020      [address] on 09/09/2019 showed hemoglobin 11 3, MCV 93, WBC of 2 7, normal differential platelets 25124  August 2018 iron saturation 8% ferritin 26  On 07/2018 hemoglobin 11, MCV 91, WBC 3 1, platelets 15080  75/24/3432:  Hemoglobin 12 9, MCV of 90, white blood cell count 2 95, platelets 953  On 44/2450 hemoglobin 13 3, MCV 89, WBC 3 4, platelets 076105     She had normal vitamin W46, folic acid, TSH, protein electrophoresis     Autoimmune workup by Rheumatology was negative except for anti smooth muscle antibodies of 93    Because of the leukopenia, thrombocytopenia we had to do a bone marrow biopsy on January 2021 showed normal bone marrow cellularity for the age, no evidence of dysplastic features, normal plasma cells and lymphocytes, no evidence of vasculitis, necrosis, fibrosis adequate iron stains         She had cold intolerance, denies any headache, diplopia, odynophagia, angina dysuria, hematuria, melena, hematochezia  She denied any recurrent upper respiratory tract infections  She does not smoke or drink  Interval History:        Previous Treatment:         Test Results:    Imaging: No results found      Labs:   Lab Results   Component Value Date    WBC 2 25 (L) 02/24/2021    HGB 12 0 02/24/2021    HCT 37 6 02/24/2021     (H) 02/24/2021    PLT 41 (LL) 02/24/2021     Lab Results   Component Value Date     12/04/2015    K 3 4 (L) 02/24/2021     02/24/2021    CO2 32 02/24/2021    ANIONGAP 4 12/04/2015    BUN 18 02/24/2021    CREATININE 0 86 02/24/2021    GLUCOSE 94 12/04/2015    GLUF 91 02/24/2021    CALCIUM 9 3 02/24/2021    CORRECTEDCA 10 0 02/24/2021     (H) 02/24/2021     (H) 02/24/2021    ALKPHOS 58 02/24/2021    PROT 7 5 12/04/2015    BILITOT 0 48 12/04/2015    EGFR 73 02/24/2021       Lab Results   Component Value Date    IRON 133 01/11/2021    TIBC 327 01/11/2021    FERRITIN 238 01/11/2021       Lab Results   Component Value Date    [id number] 975 58 731 01/11/2021         ROS: Review of Systems   Constitutional: Negative for appetite change, chills, diaphoresis, fatigue and unexpected weight change  HENT:   Negative for mouth sores, nosebleeds, sore throat, trouble swallowing and voice change  Eyes: Negative for eye problems and icterus  Respiratory: Positive for shortness of breath  Negative for chest tightness, cough, hemoptysis and wheezing  Cardiovascular: Negative for chest pain, leg swelling and palpitations  Gastrointestinal: Negative for abdominal distention, abdominal pain, blood in stool, constipation, diarrhea, nausea and vomiting  Endocrine: Negative for hot flashes  Genitourinary: Negative for bladder incontinence, difficulty urinating, dyspareunia, dysuria and frequency  Musculoskeletal: Negative for arthralgias, back pain, gait problem, neck pain and neck stiffness  Skin: Negative for itching and rash  Neurological: Negative for dizziness, gait problem, headaches, numbness, seizures and speech difficulty  Hematological: Negative for adenopathy  Does not bruise/bleed easily  Psychiatric/Behavioral: Negative for decreased concentration, depression, sleep disturbance and suicidal ideas  The patient is not nervous/anxious  Current Medications: Reviewed  Allergies: Reviewed  PMH/FH/SH:  Reviewed      Physical Exam:    Body surface area is 1 62 meters squared  Wt Readings from Last 3 Encounters:   03/09/21 58 2 kg (128 lb 6 4 oz)   02/10/21 56 5 kg (124 lb 9 6 oz)   01/27/21 56 7 kg (125 lb)        Temp Readings from Last 3 Encounters:   03/09/21 (!) 97 2 °F (36 2 °C) (Tympanic)   02/10/21 97 6 °F (36 4 °C) (Tympanic)   01/27/21 (!) 97 4 °F (36 3 °C)        BP Readings from Last 3 Encounters:   03/09/21 110/70   02/10/21 124/70   01/27/21 113/55         Pulse Readings from Last 3 Encounters:   03/09/21 78   02/10/21 76   01/27/21 63        Physical Exam  Vitals signs reviewed     Constitutional:       General: She is not in acute distress  Appearance: She is well-developed  She is not diaphoretic  HENT:      Head: Normocephalic and atraumatic  Eyes:      General:         Left eye: Discharge ( with atrophy) present  Conjunctiva/sclera: Conjunctivae normal    Neck:      Musculoskeletal: Normal range of motion and neck supple  Trachea: No tracheal deviation  Cardiovascular:      Rate and Rhythm: Normal rate and regular rhythm  Heart sounds: Murmur present  No friction rub  Gallop present  Pulmonary:      Effort: Pulmonary effort is normal  No respiratory distress  Breath sounds: Normal breath sounds  No wheezing or rales  Chest:      Chest wall: No tenderness  Abdominal:      General: There is no distension  Palpations: Abdomen is soft  Tenderness: There is no abdominal tenderness  Musculoskeletal:      Right lower leg: Edema present  Left lower leg: Edema present  Lymphadenopathy:      Cervical: No cervical adenopathy  Skin:     General: Skin is warm and dry  Coloration: Skin is not pale  Findings: No erythema  Neurological:      Mental Status: She is alert and oriented to person, place, and time  Psychiatric:         Behavior: Behavior normal          Thought Content: Thought content normal          Judgment: Judgment normal          ECO    Goals and Barriers:  Current Goal: Minimize effects of disease  Barriers: None  Patient's Capacity to Self Care:  Patient is able to self care      Code Status: [unfilled]

## 2021-03-10 ENCOUNTER — TELEPHONE (OUTPATIENT)
Dept: CARDIOLOGY CLINIC | Facility: CLINIC | Age: 81
End: 2021-03-10

## 2021-03-10 ENCOUNTER — TELEPHONE (OUTPATIENT)
Dept: HEMATOLOGY ONCOLOGY | Facility: CLINIC | Age: 81
End: 2021-03-10

## 2021-03-10 DIAGNOSIS — I50.22 CHRONIC SYSTOLIC CHF (CONGESTIVE HEART FAILURE), NYHA CLASS 3 (HCC): Primary | ICD-10-CM

## 2021-03-10 RX ORDER — SACUBITRIL AND VALSARTAN 49; 51 MG/1; MG/1
1 TABLET, FILM COATED ORAL 2 TIMES DAILY
Qty: 60 TABLET | Refills: 4 | Status: SHIPPED | OUTPATIENT
Start: 2021-03-10 | End: 2021-06-03

## 2021-03-10 NOTE — TELEPHONE ENCOUNTER
Patient called in - she has never started Cleotis Bharti 97/103  She has always taken 49-51  made correction in medication list

## 2021-03-10 NOTE — TELEPHONE ENCOUNTER
Patient's son Mclean Aidan called  Patient was seen by Dr Mimi Curling yesterday  Dr De Caldwell was reviewing entresto as a possible cause of leukopenia    Acc'd to patient's son, the AVS states the entresto dose was changed  Patient attempted to refill med at pharmacy and the dose was incorrect  Instructed patient's son to call ordering MD for entresto to rectify dosage   Son verbalizes understanding

## 2021-04-23 ENCOUNTER — HOSPITAL ENCOUNTER (OUTPATIENT)
Dept: NON INVASIVE DIAGNOSTICS | Facility: CLINIC | Age: 81
Discharge: HOME | End: 2021-04-23
Payer: MEDICARE

## 2021-04-23 DIAGNOSIS — I50.22 CHRONIC SYSTOLIC HEART FAILURE (HCC): ICD-10-CM

## 2021-04-23 DIAGNOSIS — I42.0 DILATED CARDIOMYOPATHY (HCC): ICD-10-CM

## 2021-04-23 PROCEDURE — 93306 TTE W/DOPPLER COMPLETE: CPT | Performed by: INTERNAL MEDICINE

## 2021-04-23 PROCEDURE — 93306 TTE W/DOPPLER COMPLETE: CPT

## 2021-05-12 ENCOUNTER — IMMUNIZATIONS (OUTPATIENT)
Dept: FAMILY MEDICINE CLINIC | Facility: HOSPITAL | Age: 81
End: 2021-05-12

## 2021-05-12 DIAGNOSIS — Z23 ENCOUNTER FOR IMMUNIZATION: Primary | ICD-10-CM

## 2021-05-12 PROCEDURE — 0001A SARS-COV-2 / COVID-19 MRNA VACCINE (PFIZER-BIONTECH) 30 MCG: CPT

## 2021-05-12 PROCEDURE — 91300 SARS-COV-2 / COVID-19 MRNA VACCINE (PFIZER-BIONTECH) 30 MCG: CPT

## 2021-05-13 ENCOUNTER — TELEPHONE (OUTPATIENT)
Dept: HEMATOLOGY ONCOLOGY | Facility: CLINIC | Age: 81
End: 2021-05-13

## 2021-05-13 NOTE — TELEPHONE ENCOUNTER
Reviewed with Petra Apgar PA-C, patient does not need labs prior to appt  Made patient aware of the above, [place] Simmons voiced understanding

## 2021-05-14 ENCOUNTER — OFFICE VISIT (OUTPATIENT)
Dept: HEMATOLOGY ONCOLOGY | Facility: CLINIC | Age: 81
End: 2021-05-14
Payer: MEDICARE

## 2021-05-14 ENCOUNTER — TELEPHONE (OUTPATIENT)
Dept: HEMATOLOGY ONCOLOGY | Facility: CLINIC | Age: 81
End: 2021-05-14

## 2021-05-14 VITALS
SYSTOLIC BLOOD PRESSURE: 120 MMHG | HEART RATE: 78 BPM | OXYGEN SATURATION: 98 % | WEIGHT: 125 LBS | HEIGHT: 64 IN | DIASTOLIC BLOOD PRESSURE: 70 MMHG | TEMPERATURE: 98.1 F | RESPIRATION RATE: 16 BRPM | BODY MASS INDEX: 21.34 KG/M2

## 2021-05-14 DIAGNOSIS — D69.6 THROMBOCYTOPENIA (HCC): Primary | ICD-10-CM

## 2021-05-14 DIAGNOSIS — D50.0 IRON DEFICIENCY ANEMIA DUE TO CHRONIC BLOOD LOSS: ICD-10-CM

## 2021-05-14 DIAGNOSIS — R76.8 ANA POSITIVE: ICD-10-CM

## 2021-05-14 PROCEDURE — 99213 OFFICE O/P EST LOW 20 MIN: CPT | Performed by: PHYSICIAN ASSISTANT

## 2021-05-14 RX ORDER — PREDNISONE 10 MG/1
TABLET ORAL
COMMUNITY
Start: 2021-03-03 | End: 2021-08-10 | Stop reason: SDUPTHER

## 2021-05-14 NOTE — PROGRESS NOTES
Hematology/Oncology Outpatient Follow- up Note  Jd Rocha 78 y o  female MRN: @ Encounter: [id number]        Date:  5/14/2021      Assessment / Plan:    1   Leukopenia for many years documented since at least 2014, worsened 1/11/21      2   Thrombocytopenia   Platelet count 50- low 100,000 range since at least 2015 but decreased to 40,000 11/2020 and 30,000 1/2021      Autoimmune workup was negative except for positive LEA as well as anti smooth muscle antibodies at 93     A bone marrow biopsy in January 2021 was reported normal and showed no evidence of MDS, leukemia, lymphoma, vasculitis, granulomata, normal cytogenetics      Differential diagnosis include autoimmune leukopenia and thrombocytopenia  Medrol dosepak rx 2/2021 but she did not take it  She reports she is still taking oral prednisone rx by GI  DDX also includes drug-induced thrombocytopenia and leukopenia  Follow up with GI service regarding positive anti smooth muscle antibodies  CBCD requested prior to this f/u, not drawn    She is asked to have this drawn and follow-up in 3 months with CBC and differential     We reviewed thrombocytopenic precautions            HPI:    Lacey Song is an 78-year-old [place] American female who was seen initially 5/2020 regarding pancytopenia     She has a history of dilated cardiomyopathy with low ejection fraction on Entresto, spironolactone, torsemide, metoprolol and budesonide  Mone Grossman has history of hypertension, autoimmune hepatitis treated with steroids, 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         She had outpatient labs 4/17/20 at [facility]   hemoglobin 6 9, MCV 70, RDW 19 7, WBC 1 9, 59% neutrophils, 30% lymphocytes, platelet 24314,   She was transfused with 2 units packed RBC ordered by her cardiologist, Dr Troy Bhagat and received Feraheme weekly x 2 doses end of June 2020      [facility] on 09/09/2019 showed hemoglobin 11 3, MCV 93, WBC of 2 7, normal differential platelets 44705  August 2018 iron saturation 8% ferritin 26  On 07/2018 hemoglobin 11, MCV 91, WBC 3 1, platelets 15814  47/21/0055:  Hemoglobin 12 9, MCV of 90, white blood cell count 2 95, platelets 182  On 67/7967 hemoglobin 13 3, MCV 89, WBC 3 4, platelets 461336     She had normal vitamin Q63, folic acid, TSH, protein electrophoresis     Autoimmune workup by Rheumatology was negative except for anti smooth muscle antibodies of 93     Because of the leukopenia, thrombocytopenia,  Bone marrow biopsy on January 2021 performed which showed normal bone marrow cellularity for the age  No evidence of dysplastic features, normal plasma cells and lymphocytes, no evidence of vasculitis, necrosis, fibrosis adequate iron stains          Interval History:        Test Results:        Labs:   Lab Results   Component Value Date    HGB 12 0 02/24/2021    HCT 37 6 02/24/2021     (H) 02/24/2021    PLT 41 (LL) 02/24/2021    WBC 2 25 (L) 02/24/2021    NRBC 0 02/24/2021     Lab Results   Component Value Date     12/04/2015    K 3 4 (L) 02/24/2021     02/24/2021    CO2 32 02/24/2021    ANIONGAP 4 12/04/2015    BUN 18 02/24/2021    CREATININE 0 86 02/24/2021    GLUCOSE 94 12/04/2015    GLUF 91 02/24/2021    CALCIUM 9 3 02/24/2021    CORRECTEDCA 10 0 02/24/2021     (H) 02/24/2021     (H) 02/24/2021    ALKPHOS 58 02/24/2021    PROT 7 5 12/04/2015    BILITOT 0 48 12/04/2015    EGFR 73 02/24/2021       Imaging: No results found  ROS:  As mentioned in HPI & Interval History otherwise 14 point ROS negative  Allergies:    Allergies   Allergen Reactions    Ambien [Zolpidem] Other (See Comments)     Did not allow sleep per pt     Current Medications: Reviewed  PMH/FH/SH:  Reviewed      Physical Exam:    1 6 meters squared    Ht Readings from Last 3 Encounters:   05/14/21 5' 4" (1 626 m)   03/09/21 5' 4" (1 626 m)   02/10/21 5' 4" (1 626 m)        Wt Readings from Last 3 Encounters:   05/14/21 56 7 kg (125 lb)   03/09/21 58 2 kg (128 lb 6 4 oz)   02/10/21 56 5 kg (124 lb 9 6 oz)        Temp Readings from Last 3 Encounters:   05/14/21 98 1 °F (36 7 °C)   03/09/21 (!) 97 2 °F (36 2 °C) (Tympanic)   02/10/21 97 6 °F (36 4 °C) (Tympanic)        BP Readings from Last 3 Encounters:   05/14/21 120/70   03/09/21 110/70   02/10/21 124/70           Physical Exam  Constitutional:       Appearance: She is well-developed  HENT:      Head: Normocephalic and atraumatic  Cardiovascular:      Rate and Rhythm: Normal rate  Pulmonary:      Effort: Pulmonary effort is normal  No respiratory distress  Skin:     General: Skin is warm and dry  Neurological:      Mental Status: She is alert     Psychiatric:         Behavior: Behavior normal            Emergency Contacts:    [address] [place] [address] Rd, [phone],

## 2021-05-21 NOTE — PROGRESS NOTES
Heart Failure Outpatient Progress Note - Marybel Cee 78 y o  female MRN: [id number]    @ Encounter: [id number]      Assessment/Plan:    Patient Active Problem List    Diagnosis Date Noted    LEA positive 06/12/2020     Priority: Low    Dilated cardiomyopathy ([facility] ) 08/09/2018     Priority: Low    Thrombocytopenia ([facility] ) 08/03/2018     Priority: Low    Chronic systolic heart failure ([facility] ) 08/01/2018     Priority: Low    Hypertension, essential 08/01/2018     Priority: Low    Other specified glaucoma 08/01/2018     Priority: Low    Autoimmune hepatitis treated with steroids ([facility] ) 08/01/2018     Priority: Low    Iron deficiency anemia due to chronic blood loss 04/17/2020    Complete left bundle branch block (LBBB) 10/22/2019       # ChronicSystolic CHF, Stage D, NYHA 3 with mildly reduced RV systolic function  Unclear etiology  HTN (normotensive on admission) +/- valvular (less likely, central MR likely functional) +/- myocarditis +/- stress CM +/- LBBB     Weight: 116 lbs  NT pro BNP 5/29/20: 9483     --HIV, SPEP/UPEP, LEA, hepatitis panel all negative, ferritin ok     Studies- personally reviewed by me  Echo 4/23/21  LVEF: 25-30%, grade 2 DD  LVEDd: 6 9 cm  RV: normal  Mild to moderate MR    Echo 10/17/19:  LVEF: 30%, LVEDd: 5 cm  LVEDd:, grade 2 DD  RV: normal     --R+LHC 8/3/18: shows normal coronaries  RA 3  RV 32/6  PA 32/11/20  PCWP 6  AO sat 89%  MvO2 56 5%  TD CO/CI: 2 3/1 4  Isacc CO/CI: 2 6/1 6  TPG 14  DPG 5  PVR 5 4 (Isacc); 6 1 (TD)  SVR 2070  PVR:SVR <0 25     Echo 4/16/19: LVEF: 20%  LVEDd 5 4 cm  Moderate to severe MR     --cMRI 8/6/18: LVEF ~18%, LVIDd 7 cm, normal RV   There is a thin strip of intramyocardial hyperenhancement of the basal interventricular septum      Echo 12/6/18: LVEF: 20%, LVEDd 6 4 cm- no improvement  Good RV function  Small IVC     Neurohormonal Blockade:  --Beta-Blocker: Continue metoprolol succinate 25 mg PO daily  --ACEi, ARB or ARNi: Continue Entresto to 49-51 mg PO BID (also for SVR reduction)  --Aldosterone Receptor Blocker: spironolactone 12 5 mg daily- not taking it, she states it made her dizzy  --Diuretic: Continue torsemide 5- 10 PO daily with potassium K 20 meq daily  K 3 5     Sudden Cardiac Death Risk Reduction:  --ICD: recommended     Electrical Resynchronization:  --Candidacy for BiV device: LBBB 164 msec  Recommend BiV ICD     Advanced Therapies: Will continue to monitor  If does not recover, age precludes OHT  Possible LVAD candidate, but social situation will be complicated as  has LVAD w/ complications and son works in [address] [place] [address] [place]  Other son is in [place]  Will monitor closely      # Pancytopenia with microcytic anemia-   Hematology following- felt to be autoimmune related  4/16: HgB 6 9 with MCV 70  WBC 1 9 and plts 52  4/18 transfused 2 units pRBCs  Referral to GI  Last HgB 2/24/21: 12     # HTN  # Hypothyroidism  # Autoimmune hepatitis: Continue budesonide   7/27/20 labs:   ,   # social- cares for her  who is debilitated with LVAD       TODAY'S PLAN:  Still pushing for BiV ICD - explained CRT benefits in LBBB, mortality benefit from ICD  Continue metoprolol, entresto and spironolactone for HFrEF  --2g sodium diet  - Daily weights     HPI:   [id number] woman with hx of HTN, autoimmune hepatitis wo follows now for NICM, EF: 25% with workup as above  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 has LVAD so working diagnosis of variant of stress myopathy if plausible  Follow up echo done 12/6/18 showed EF: 20% with LVEDd 6 4 cm, I referred for BiV ICD    Follow up echo 4/16/19 unfortunately showed EF: 20% with moderate to severe functional MR       Interval History:   Yet to get BiV ICD- still wearing LifeVest  She is still hoping her heart will get better, but no sign improvement in EF  Echo 4/23/21  LVEF: 25-30%, grade 2 DD  LVEDd: 6 9 cm  RV: normal  Mild to moderate MR      Review of Systems   Constitutional: Negative for activity change, appetite change, fatigue and unexpected weight change  HENT: Negative for congestion and nosebleeds  Eyes: Negative  Respiratory: Negative for cough, chest tightness and shortness of breath  Cardiovascular: Negative for chest pain, palpitations and leg swelling  Gastrointestinal: Negative for abdominal distention  Endocrine: Negative  Genitourinary: Negative  Musculoskeletal: Negative  Skin: Negative  Neurological: Negative for dizziness, syncope and weakness  Hematological: Negative  Psychiatric/Behavioral: Negative  Past Medical History:   Diagnosis Date    Bell's palsy     Cardiac disease     Hypertension          Allergies   Allergen Reactions    Ambien [Zolpidem] Other (See Comments)     Did not allow sleep per pt           Current Outpatient Medications:     ascorbic acid (VITAMIN C) 500 mg tablet, Take 500 mg by mouth daily, Disp: , Rfl:     B Complex Vitamins (VITAMIN-B COMPLEX PO), Take by mouth, Disp: , Rfl:     BUDESONIDE ER PO, Take 3 mg by mouth daily , Disp: , Rfl:     metoprolol succinate (TOPROL-XL) 25 mg 24 hr tablet, TAKE 1 TABLET BY MOUTH EVERY DAY, Disp: 90 tablet, Rfl: 3    Omega-3 Fatty Acids (FISH OIL) 1,000 mg, Take 1,000 mg by mouth daily On hold, Disp: , Rfl:     predniSONE 10 mg tablet, TAKE TWO TABLETS BY MOUTH DAILY FOR TWO WEEKS, AND THEN ONE TABLET BY MOUTH DAILY, Disp: , Rfl:     sacubitril-valsartan (Entresto) 49-51 MG TABS, Take 1 tablet by mouth 2 (two) times a day, Disp: 60 tablet, Rfl: 4    spironolactone (ALDACTONE) 25 mg tablet, Take 0 5 tablets (12 5 mg total) by mouth daily, Disp: 30 tablet, Rfl: 2    torsemide (DEMADEX) 10 mg tablet, Take 1 tablet (10 mg total) by mouth daily, Disp: 90 tablet, Rfl: 3    Social History     Socioeconomic History    Marital status: /Civil Union     Spouse name: Not on file    Number of children: Not on file    Years of education: Not on file    Highest education level: Not on file   Occupational History    Not on file   Social Needs    Financial resource strain: Not on file    Food insecurity     Worry: Not on file     Inability: Not on file    Transportation needs     Medical: Not on file     Non-medical: Not on file   Tobacco Use    Smoking status: Never Smoker    Smokeless tobacco: Never Used   Substance and Sexual Activity    Alcohol use: No    Drug use: No    Sexual activity: Not on file   Lifestyle    Physical activity     Days per week: Not on file     Minutes per session: Not on file    Stress: Not on file   Relationships    Social connections     Talks on phone: Not on file     Gets together: Not on file     Attends Spiritism service: Not on file     Active member of club or organization: Not on file     Attends meetings of clubs or organizations: Not on file     Relationship status: Not on file    Intimate partner violence     Fear of current or ex partner: Not on file     Emotionally abused: Not on file     Physically abused: Not on file     Forced sexual activity: Not on file   Other Topics Concern    Not on file   Social History Narrative    Not on file       No family history on file  Physical Exam:    Vitals: There were no vitals filed for this visit  Physical Exam  Constitutional:       Appearance: She is well-developed  HENT:      Head: Normocephalic and atraumatic  Eyes:      Pupils: Pupils are equal, round, and reactive to light  Neck:      Musculoskeletal: Normal range of motion  Vascular: No JVD  Cardiovascular:      Rate and Rhythm: Normal rate and regular rhythm  Heart sounds: No murmur  Pulmonary:      Effort: Pulmonary effort is normal  No respiratory distress  Breath sounds: Normal breath sounds  Abdominal:      General: There is no distension  Palpations: Abdomen is soft  Tenderness: There is no abdominal tenderness  Musculoskeletal: Normal range of motion  Skin:     General: Skin is warm and dry        Findings: No rash    Neurological:      Mental Status: She is alert and oriented to person, place, and time  Labs & Results:    Lab Results   Component Value Date    SODIUM 144 02/24/2021    K 3 4 (L) 02/24/2021     02/24/2021    CO2 32 02/24/2021    BUN 18 02/24/2021    CREATININE 0 86 02/24/2021    GLUC 71 01/11/2021    CALCIUM 9 3 02/24/2021     Lab Results   Component Value Date    WBC 2 25 (L) 02/24/2021    HGB 12 0 02/24/2021    HCT 37 6 02/24/2021     (H) 02/24/2021    PLT 41 (LL) 02/24/2021     Lab Results   Component Value Date    NTBNP 9,483 (H) 05/29/2020      Lab Results   Component Value Date    CHOLESTEROL 108 08/02/2018     Lab Results   Component Value Date    HDL 41 08/02/2018    HDL 76 10/23/2015     Lab Results   Component Value Date    TRIG 75 08/02/2018    TRIG 87 10/23/2015     No results found for: Edith    EKG personally reviewed by Jone Forde DO  Counseling / Coordination of Care  Time spent today 25 minutes  Greater than 50% of total time was spent with the patient and / or family counseling and / or coordination of care  We discussed diagnoses, most recent studies, tests and any changes in treatment plan    Thank you for the opportunity to participate in the care of this patient      [address] [facility] PULMONARY HYPERTENSION  MEDICAL DIRECTOR OF [facility] Daphne High

## 2021-05-25 ENCOUNTER — OFFICE VISIT (OUTPATIENT)
Dept: CARDIOLOGY CLINIC | Facility: CLINIC | Age: 81
End: 2021-05-25
Payer: MEDICARE

## 2021-05-25 VITALS
HEIGHT: 64 IN | DIASTOLIC BLOOD PRESSURE: 60 MMHG | SYSTOLIC BLOOD PRESSURE: 124 MMHG | BODY MASS INDEX: 19.89 KG/M2 | OXYGEN SATURATION: 97 % | WEIGHT: 116.5 LBS | HEART RATE: 88 BPM

## 2021-05-25 DIAGNOSIS — I10 HTN (HYPERTENSION), BENIGN: ICD-10-CM

## 2021-05-25 DIAGNOSIS — I50.22 CHRONIC SYSTOLIC HEART FAILURE (HCC): Primary | ICD-10-CM

## 2021-05-25 DIAGNOSIS — I42.0 DILATED CARDIOMYOPATHY (HCC): ICD-10-CM

## 2021-05-25 DIAGNOSIS — I44.7 COMPLETE LEFT BUNDLE BRANCH BLOCK (LBBB): ICD-10-CM

## 2021-05-25 PROCEDURE — 99214 OFFICE O/P EST MOD 30 MIN: CPT | Performed by: INTERNAL MEDICINE

## 2021-05-25 RX ORDER — POTASSIUM CHLORIDE 750 MG/1
10 CAPSULE, EXTENDED RELEASE ORAL DAILY
COMMUNITY
End: 2021-11-04 | Stop reason: SDUPTHER

## 2021-05-25 NOTE — PATIENT INSTRUCTIONS
Again we recommend BiV ICD    I explained to the patient that prophylactic ICD is recommended per ACC/AHA/HRS guidelines given EF is < 35% for greater than 3 months from time of initial diagnosis despite optimized medical therapy  ICD implantation is performed for the primary goal or reducing arrhythmogenic and total mortality as evidenced in multiple trials  When applicable, per guideline recommendations for CRT therapy, a BIV device will be implanted to provide ICD therapy as well as resynchronization to optimize LV myocardial synchrony and cardiac output  In this circumstance the patient was explained that not all patients will achieve desired response (improved functional capacity, NYHA class, decreased LVESd on echo)  'Non responder rate can approach 40% based on patients particular type of cardiomyopathy, level and location of scar and amount of electrical dyssynchrony     Consent for the procedure including procedural risks will be obtained by implanting electrophysiologist

## 2021-06-03 DIAGNOSIS — I50.22 CHRONIC SYSTOLIC CHF (CONGESTIVE HEART FAILURE), NYHA CLASS 3 (HCC): ICD-10-CM

## 2021-06-03 RX ORDER — SACUBITRIL AND VALSARTAN 49; 51 MG/1; MG/1
TABLET, FILM COATED ORAL
Qty: 180 TABLET | Refills: 3 | Status: SHIPPED | OUTPATIENT
Start: 2021-06-03 | End: 2021-11-04 | Stop reason: SDUPTHER

## 2021-06-05 ENCOUNTER — IMMUNIZATIONS (OUTPATIENT)
Dept: FAMILY MEDICINE CLINIC | Facility: HOSPITAL | Age: 81
End: 2021-06-05

## 2021-06-05 DIAGNOSIS — Z23 ENCOUNTER FOR IMMUNIZATION: Primary | ICD-10-CM

## 2021-06-05 PROCEDURE — 0002A SARS-COV-2 / COVID-19 MRNA VACCINE (PFIZER-BIONTECH) 30 MCG: CPT

## 2021-06-05 PROCEDURE — 91300 SARS-COV-2 / COVID-19 MRNA VACCINE (PFIZER-BIONTECH) 30 MCG: CPT

## 2021-06-22 ENCOUNTER — TELEPHONE (OUTPATIENT)
Dept: HEMATOLOGY ONCOLOGY | Facility: CLINIC | Age: 81
End: 2021-06-22

## 2021-06-22 NOTE — TELEPHONE ENCOUNTER
I called the patient and left her a message stating that starting 7/02 Dr Mckenna Morris will be seeing patients @ [facility] on Fridays  I stated that patient's appointment on 8/20 in [place] had to be rescheduled to 8/13 @ 3 pm @ the [facility]  I then voiced if for some reason the appointment does not work, to call the office back to reschedule

## 2021-06-23 ENCOUNTER — APPOINTMENT (OUTPATIENT)
Dept: LAB | Age: 81
End: 2021-06-23
Payer: MEDICARE

## 2021-06-23 DIAGNOSIS — K73.9 CHRONIC HEPATITIS (HCC): ICD-10-CM

## 2021-06-23 LAB
ALBUMIN SERPL BCP-MCNC: 3.2 G/DL (ref 3.5–5)
ALP SERPL-CCNC: 50 U/L (ref 46–116)
ALT SERPL W P-5'-P-CCNC: 91 U/L (ref 12–78)
ANION GAP SERPL CALCULATED.3IONS-SCNC: 3 MMOL/L (ref 4–13)
AST SERPL W P-5'-P-CCNC: 75 U/L (ref 5–45)
BILIRUB SERPL-MCNC: 0.64 MG/DL (ref 0.2–1)
BUN SERPL-MCNC: 17 MG/DL (ref 5–25)
CALCIUM ALBUM COR SERPL-MCNC: 9.6 MG/DL (ref 8.3–10.1)
CALCIUM SERPL-MCNC: 9 MG/DL (ref 8.3–10.1)
CHLORIDE SERPL-SCNC: 104 MMOL/L (ref 100–108)
CO2 SERPL-SCNC: 31 MMOL/L (ref 21–32)
CREAT SERPL-MCNC: 0.79 MG/DL (ref 0.6–1.3)
GFR SERPL CREATININE-BSD FRML MDRD: 80 ML/MIN/1.73SQ M
GLUCOSE SERPL-MCNC: 75 MG/DL (ref 65–140)
POTASSIUM SERPL-SCNC: 3.4 MMOL/L (ref 3.5–5.3)
PROT SERPL-MCNC: 7.3 G/DL (ref 6.4–8.2)
SODIUM SERPL-SCNC: 138 MMOL/L (ref 136–145)

## 2021-06-23 PROCEDURE — 36415 COLL VENOUS BLD VENIPUNCTURE: CPT

## 2021-06-23 PROCEDURE — 80053 COMPREHEN METABOLIC PANEL: CPT

## 2021-07-21 ENCOUNTER — TELEPHONE (OUTPATIENT)
Dept: OTHER | Facility: OTHER | Age: 81
End: 2021-07-21

## 2021-07-21 ENCOUNTER — LAB REQUISITION (OUTPATIENT)
Dept: LAB | Facility: HOSPITAL | Age: 81
End: 2021-07-21
Payer: MEDICARE

## 2021-07-21 DIAGNOSIS — C90.01 MULTIPLE MYELOMA IN REMISSION (HCC): ICD-10-CM

## 2021-07-21 DIAGNOSIS — C85.90 NON-HODGKIN LYMPHOMA, UNSPECIFIED, UNSPECIFIED SITE (HCC): ICD-10-CM

## 2021-07-21 DIAGNOSIS — E85.9 AMYLOIDOSIS, UNSPECIFIED (HCC): ICD-10-CM

## 2021-07-21 DIAGNOSIS — C90.00 MULTIPLE MYELOMA NOT HAVING ACHIEVED REMISSION (HCC): ICD-10-CM

## 2021-07-21 DIAGNOSIS — D68.69 OTHER THROMBOPHILIA (HCC): ICD-10-CM

## 2021-07-21 DIAGNOSIS — D47.2 MONOCLONAL GAMMOPATHY: ICD-10-CM

## 2021-07-21 DIAGNOSIS — K75.4 AUTOIMMUNE HEPATITIS (HCC): ICD-10-CM

## 2021-07-21 LAB
ALBUMIN SERPL BCP-MCNC: 3.3 G/DL (ref 3.5–5)
ALP SERPL-CCNC: 50 U/L (ref 46–116)
ALT SERPL W P-5'-P-CCNC: 97 U/L (ref 12–78)
ANION GAP SERPL CALCULATED.3IONS-SCNC: 5 MMOL/L (ref 4–13)
AST SERPL W P-5'-P-CCNC: 76 U/L (ref 5–45)
BASOPHILS # BLD AUTO: 0.01 THOUSANDS/ΜL (ref 0–0.1)
BASOPHILS NFR BLD AUTO: 1 % (ref 0–1)
BILIRUB SERPL-MCNC: 0.64 MG/DL (ref 0.2–1)
BUN SERPL-MCNC: 14 MG/DL (ref 5–25)
CALCIUM ALBUM COR SERPL-MCNC: 10.1 MG/DL (ref 8.3–10.1)
CALCIUM SERPL-MCNC: 9.5 MG/DL (ref 8.3–10.1)
CHLORIDE SERPL-SCNC: 104 MMOL/L (ref 100–108)
CO2 SERPL-SCNC: 30 MMOL/L (ref 21–32)
CREAT SERPL-MCNC: 0.79 MG/DL (ref 0.6–1.3)
EOSINOPHIL # BLD AUTO: 0 THOUSAND/ΜL (ref 0–0.61)
EOSINOPHIL NFR BLD AUTO: 0 % (ref 0–6)
ERYTHROCYTE [DISTWIDTH] IN BLOOD BY AUTOMATED COUNT: 14.4 % (ref 11.6–15.1)
GFR SERPL CREATININE-BSD FRML MDRD: 80 ML/MIN/1.73SQ M
GLUCOSE SERPL-MCNC: 88 MG/DL (ref 65–140)
HCT VFR BLD AUTO: 35.9 % (ref 34.8–46.1)
HGB BLD-MCNC: 11.7 G/DL (ref 11.5–15.4)
IMM GRANULOCYTES # BLD AUTO: 0.01 THOUSAND/UL (ref 0–0.2)
IMM GRANULOCYTES NFR BLD AUTO: 1 % (ref 0–2)
LYMPHOCYTES # BLD AUTO: 0.42 THOUSANDS/ΜL (ref 0.6–4.47)
LYMPHOCYTES NFR BLD AUTO: 23 % (ref 14–44)
MCH RBC QN AUTO: 32.1 PG (ref 26.8–34.3)
MCHC RBC AUTO-ENTMCNC: 32.6 G/DL (ref 31.4–37.4)
MCV RBC AUTO: 98 FL (ref 82–98)
MONOCYTES # BLD AUTO: 0.27 THOUSAND/ΜL (ref 0.17–1.22)
MONOCYTES NFR BLD AUTO: 15 % (ref 4–12)
NEUTROPHILS # BLD AUTO: 1.11 THOUSANDS/ΜL (ref 1.85–7.62)
NEUTS SEG NFR BLD AUTO: 60 % (ref 43–75)
NRBC BLD AUTO-RTO: 0 /100 WBCS
PLATELET # BLD AUTO: 36 THOUSANDS/UL (ref 149–390)
PMV BLD AUTO: 13.8 FL (ref 8.9–12.7)
POTASSIUM SERPL-SCNC: 3.7 MMOL/L (ref 3.5–5.3)
PROT SERPL-MCNC: 7.3 G/DL (ref 6.4–8.2)
RBC # BLD AUTO: 3.65 MILLION/UL (ref 3.81–5.12)
SODIUM SERPL-SCNC: 139 MMOL/L (ref 136–145)
WBC # BLD AUTO: 1.82 THOUSAND/UL (ref 4.31–10.16)

## 2021-07-21 PROCEDURE — 85025 COMPLETE CBC W/AUTO DIFF WBC: CPT | Performed by: INTERNAL MEDICINE

## 2021-07-21 PROCEDURE — 80053 COMPREHEN METABOLIC PANEL: CPT | Performed by: INTERNAL MEDICINE

## 2021-07-21 NOTE — TELEPHONE ENCOUNTER
Elizabeth from [employer] called requesting call back from on call provider, in regards of patient's lab results  Info has been sent to provider [facility]          Lab Result: Wbc count: 1 82   Date/Time Drawn: 07/21/2021 1546   Ordering Provider: Dr Cheryl Tanner Name: Natasha Arellano       The following critical/stat result was read back to the lab as stated above and [employer] to the on-call provider  I have reviewed the recommendation(s) and accept the change(s) suggested  Lab Result: platelete count: 36   Date/Time Drawn: [id number]  [id number]   Ordering Provider: Dr Cheryl Tanner Name: Natasha Arellano       The following critical/stat result was read back to the lab as stated above and [employer] to the on-call provider

## 2021-08-10 ENCOUNTER — OFFICE VISIT (OUTPATIENT)
Dept: FAMILY MEDICINE CLINIC | Facility: CLINIC | Age: 81
End: 2021-08-10
Payer: MEDICARE

## 2021-08-10 VITALS
TEMPERATURE: 97.2 F | SYSTOLIC BLOOD PRESSURE: 118 MMHG | RESPIRATION RATE: 12 BRPM | HEIGHT: 64 IN | OXYGEN SATURATION: 98 % | WEIGHT: 120 LBS | DIASTOLIC BLOOD PRESSURE: 70 MMHG | HEART RATE: 74 BPM | BODY MASS INDEX: 20.49 KG/M2

## 2021-08-10 DIAGNOSIS — D70.9 NEUTROPENIA, UNSPECIFIED TYPE (HCC): ICD-10-CM

## 2021-08-10 DIAGNOSIS — Z00.00 MEDICARE ANNUAL WELLNESS VISIT, SUBSEQUENT: Primary | ICD-10-CM

## 2021-08-10 DIAGNOSIS — I42.0 DILATED CARDIOMYOPATHY (HCC): ICD-10-CM

## 2021-08-10 DIAGNOSIS — Z13.820 OSTEOPOROSIS SCREENING: ICD-10-CM

## 2021-08-10 DIAGNOSIS — Z23 ENCOUNTER FOR ADMINISTRATION OF VACCINE: ICD-10-CM

## 2021-08-10 DIAGNOSIS — M46.1 SACROILIITIS (HCC): ICD-10-CM

## 2021-08-10 DIAGNOSIS — E44.1 MILD PROTEIN-CALORIE MALNUTRITION (HCC): ICD-10-CM

## 2021-08-10 DIAGNOSIS — I10 HYPERTENSION, ESSENTIAL: ICD-10-CM

## 2021-08-10 DIAGNOSIS — K75.4 AUTOIMMUNE HEPATITIS TREATED WITH STEROIDS (HCC): ICD-10-CM

## 2021-08-10 DIAGNOSIS — I50.9 CONGESTIVE HEART FAILURE, UNSPECIFIED HF CHRONICITY, UNSPECIFIED HEART FAILURE TYPE (HCC): ICD-10-CM

## 2021-08-10 DIAGNOSIS — M81.0 AGE-RELATED OSTEOPOROSIS WITHOUT CURRENT PATHOLOGICAL FRACTURE: ICD-10-CM

## 2021-08-10 PROBLEM — F32.A DEPRESSIVE DISORDER: Status: ACTIVE | Noted: 2017-07-19

## 2021-08-10 PROBLEM — G51.0 LEFT-SIDED BELL'S PALSY: Status: ACTIVE | Noted: 2021-08-10

## 2021-08-10 PROCEDURE — G0009 ADMIN PNEUMOCOCCAL VACCINE: HCPCS

## 2021-08-10 PROCEDURE — 90670 PCV13 VACCINE IM: CPT

## 2021-08-10 PROCEDURE — G0439 PPPS, SUBSEQ VISIT: HCPCS | Performed by: FAMILY MEDICINE

## 2021-08-10 RX ORDER — PREDNISONE 10 MG/1
10 TABLET ORAL DAILY
Start: 2021-08-10 | End: 2022-06-22

## 2021-08-10 NOTE — PATIENT INSTRUCTIONS
Medicare Preventive Visit Patient Instructions  Thank you for completing your Welcome to Medicare Visit or Medicare Annual Wellness Visit today  Your next wellness visit will be due in one year (8/11/2022)  The screening/preventive services that you may require over the next 5-10 years are detailed below  Some tests may not apply to you based off risk factors and/or age  Screening tests ordered at today's visit but not completed yet may show as past due  Also, please note that scanned in results may not display below  Preventive Screenings:  Service Recommendations Previous Testing/Comments   Colorectal Cancer Screening  * Colonoscopy    * Fecal Occult Blood Test (FOBT)/Fecal Immunochemical Test (FIT)  * Fecal DNA/Cologuard Test  * Flexible Sigmoidoscopy Age: 54-65 years old   Colonoscopy: every 10 years (may be performed more frequently if at higher risk)  OR  FOBT/FIT: every 1 year  OR  Cologuard: every 3 years  OR  Sigmoidoscopy: every 5 years  Screening may be recommended earlier than age 48 if at higher risk for colorectal cancer  Also, an individualized decision between you and your healthcare provider will decide whether screening between the ages of 74-80 would be appropriate  Colonoscopy: Not on file  FOBT/FIT: 06/10/2020  Cologuard: Not on file  Sigmoidoscopy: Not on file          Breast Cancer Screening Age: 36 years old  Frequency: every 1-2 years  Not required if history of left and right mastectomy Mammogram: Not on file        Cervical Cancer Screening Between the ages of 21-29, pap smear recommended once every 3 years  Between the ages of 33-67, can perform pap smear with HPV co-testing every 5 years     Recommendations may differ for women with a history of total hysterectomy, cervical cancer, or abnormal pap smears in past  Pap Smear: Not on file    Screening Not Indicated   Hepatitis C Screening Once for adults born between [facility]  More frequently in patients at high risk for Hepatitis C Hep C Antibody: 08/02/2018    Screening Current   Diabetes Screening 1-2 times per year if you're at risk for diabetes or have pre-diabetes Fasting glucose: 91 mg/dL   A1C: No results in last 5 years    Screening Current   Cholesterol Screening Once every 5 years if you don't have a lipid disorder  May order more often based on risk factors  Lipid panel: 08/02/2018    Screening Current     Other Preventive Screenings Covered by Medicare:  1  Abdominal Aortic Aneurysm (AAA) Screening: covered once if your at risk  You're considered to be at risk if you have a family history of AAA  2  Lung Cancer Screening: covers low dose CT scan once per year if you meet all of the following conditions: (1) Age 50-69; (2) No signs or symptoms of lung cancer; (3) Current smoker or have quit smoking within the last 15 years; (4) You have a tobacco smoking history of at least 30 pack years (packs per day multiplied by number of years you smoked); (5) You get a written order from a healthcare provider  3  Glaucoma Screening: covered annually if you're considered high risk: (1) You have diabetes OR (2) Family history of glaucoma OR (3)  aged 48 and older OR (3)  American aged 72 and older  3  Osteoporosis Screening: covered every 2 years if you meet one of the following conditions: (1) You're estrogen deficient and at risk for osteoporosis based off medical history and other findings; (2) Have a vertebral abnormality; (3) On glucocorticoid therapy for more than 3 months; (4) Have primary hyperparathyroidism; (5) On osteoporosis medications and need to assess response to drug therapy  · Last bone density test (DXA Scan): Not on file  5  HIV Screening: covered annually if you're between the age of 12-76  Also covered annually if you are younger than 13 and older than 72 with risk factors for HIV infection   For pregnant patients, it is covered up to 3 times per pregnancy  Immunizations:  Immunization Recommendations   Influenza Vaccine Annual influenza vaccination during flu season is recommended for all persons aged >= 6 months who do not have contraindications   Pneumococcal Vaccine (Prevnar and Pneumovax)  * Prevnar = PCV13  * Pneumovax = PPSV23   Adults 25-60 years old: 1-3 doses may be recommended based on certain risk factors  Adults 72 years old: Prevnar (PCV13) vaccine recommended followed by Pneumovax (PPSV23) vaccine  If already received PPSV23 since turning 65, then PCV13 recommended at least one year after PPSV23 dose  Hepatitis B Vaccine 3 dose series if at intermediate or high risk (ex: diabetes, end stage renal disease, liver disease)   Tetanus (Td) Vaccine - COST NOT COVERED BY MEDICARE PART B Following completion of primary series, a booster dose should be given every 10 years to maintain immunity against tetanus  Td may also be given as tetanus wound prophylaxis  Tdap Vaccine - COST NOT COVERED BY MEDICARE PART B Recommended at least once for all adults  For pregnant patients, recommended with each pregnancy  Shingles Vaccine (Shingrix) - COST NOT COVERED BY MEDICARE PART B  2 shot series recommended in those aged 48 and above     Health Maintenance Due:      Topic Date Due    Hepatitis C Screening  Completed     Immunizations Due:      Topic Date Due    Hepatitis A Vaccine (1 of 2 - Risk 2-dose series) Never done    Pneumococcal Vaccine: 65+ Years (1 of 4 - PCV13) Never done    DTaP,Tdap,and Td Vaccines (1 - Tdap) Never done    Hepatitis B Vaccine (2 of 2 - CpG risk 2-dose series) 08/05/2016    Influenza Vaccine (1) 09/01/2021     Advance Directives   What are advance directives? Advance directives are legal documents that state your wishes and plans for medical care  These plans are made ahead of time in case you lose your ability to make decisions for yourself   Advance directives can apply to any medical decision, such as the treatments you want, and if you want to donate organs  What are the types of advance directives? There are many types of advance directives, and each state has rules about how to use them  You may choose a combination of any of the following:  · Living will: This is a written record of the treatment you want  You can also choose which treatments you do not want, which to limit, and which to stop at a certain time  This includes surgery, medicine, IV fluid, and tube feedings  · Durable power of  for healthcare [facility]): This is a written record that states who you want to make healthcare choices for you when you are unable to make them for yourself  This person, called a proxy, is usually a family member or a friend  You may choose more than 1 proxy  · Do not resuscitate (DNR) order:  A DNR order is used in case your heart stops beating or you stop breathing  It is a request not to have certain forms of treatment, such as CPR  A DNR order may be included in other types of advance directives  · Medical directive: This covers the care that you want if you are in a coma, near death, or unable to make decisions for yourself  You can list the treatments you want for each condition  Treatment may include pain medicine, surgery, blood transfusions, dialysis, IV or tube feedings, and a ventilator (breathing machine)  · Values history: This document has questions about your views, beliefs, and how you feel and think about life  This information can help others choose the care that you would choose  Why are advance directives important? An advance directive helps you control your care  Although spoken wishes may be used, it is better to have your wishes written down  Spoken wishes can be misunderstood, or not followed  Treatments may be given even if you do not want them  An advance directive may make it easier for your family to make difficult choices about your care        © Copyright [employer] 2018 Information is for End User's use only and may not be sold, redistributed or otherwise used for commercial purposes   All illustrations and images included in CareNotes® are the copyrighted property of A D A M , Inc  or [facility] [employer]

## 2021-08-10 NOTE — PROGRESS NOTES
Assessment and Plan:     Problem List Items Addressed This Visit        Digestive    Autoimmune hepatitis treated with steroids ([facility] )     Care per Dr Amadou Mercado   Prednisone 10 mg daily   Was on Budesonide prior         Relevant Medications    predniSONE 10 mg tablet       Cardiovascular and Mediastinum    Hypertension, essential    Dilated cardiomyopathy (HCC)    Congestive heart failure (HCC)       Musculoskeletal and Integument    Sacroiliitis ([facility] )       Other    Neutropenia, unspecified type ([facility] )     Care per hematology         Mild protein-calorie malnutrition ([facility] )      Other Visit Diagnoses     Medicare annual wellness visit, subsequent    -  Primary    Osteoporosis screening        Relevant Orders    DXA bone density spine hip and pelvis    Age-related osteoporosis without current pathological fracture         Relevant Orders    DXA bone density spine hip and pelvis    Encounter for administration of vaccine        Relevant Orders    PNEUMOCOCCAL CONJUGATE VACCINE 13-VALENT GREATER THAN 6 MONTHS           Preventive health issues were discussed with patient, and age appropriate screening tests were ordered as noted in patient's After Visit Summary  Personalized health advice and appropriate referrals for health education or preventive services given if needed, as noted in patient's After Visit Summary       History of Present Illness:     Patient presents for Medicare Annual Wellness visit  New patient   Used to be dr Petey Jones in the past   Lives with her    Still does ADLs herself      Patient Care Team:  Klaus Campoverde MD as PCP - General     Problem List:     Patient Active Problem List   Diagnosis    Chronic systolic heart failure ([facility] )    Hypertension, essential    Other specified glaucoma    Autoimmune hepatitis treated with steroids ([facility] )    Thrombocytopenia ([facility] )    Dilated cardiomyopathy ([facility] )    Complete left bundle branch block (LBBB)    Iron deficiency anemia due to chronic blood loss    LEA positive    Congestive heart failure (HCC)    Depressive disorder    Neutropenia, unspecified type ([facility] )    Mild protein-calorie malnutrition (HCC)    Sacroiliitis (HCC)    Left-sided Bell's palsy      Past Medical and Surgical History:     Past Medical History:   Diagnosis Date    Bell's palsy     Cardiac disease     Hypertension      Past Surgical History:   Procedure Laterality Date    CT BONE MARROW BIOPSY AND ASPIRATION  1/27/2021      Family History:     No family history on file  Social History:     Social History     Socioeconomic History    Marital status: /Civil Union     Spouse name: None    Number of children: None    Years of education: None    Highest education level: None   Occupational History    None   Tobacco Use    Smoking status: Never Smoker    Smokeless tobacco: Never Used   Substance and Sexual Activity    Alcohol use: No    Drug use: No    Sexual activity: Not Currently     Partners: Male   Other Topics Concern    None   Social History Narrative    None     Social Determinants of Health     Financial Resource Strain:     Difficulty of Paying Living Expenses:    Food Insecurity:     Worried About Running Out of Food in the Last Year:     Ran Out of Food in the Last Year:    Transportation Needs:     Lack of Transportation (Medical):      Lack of Transportation (Non-Medical):    Physical Activity:     Days of Exercise per Week:     Minutes of Exercise per Session:    Stress:     Feeling of Stress :    Social Connections:     Frequency of Communication with Friends and Family:     Frequency of Social Gatherings with Friends and Family:     Attends Baptism Services:     Active Member of Clubs or Organizations:     Attends Club or Organization Meetings:     Marital Status:    Intimate Partner Violence:     Fear of Current or Ex-Partner:     Emotionally Abused:     Physically Abused:     Sexually Abused:       Medications and Allergies:     Current Outpatient Medications   Medication Sig Dispense Refill    ascorbic acid (VITAMIN C) 500 mg tablet Take 500 mg by mouth daily      B Complex Vitamins (VITAMIN-B COMPLEX PO) Take by mouth      Entresto 49-51 MG TABS TAKE 1 TABLET BY MOUTH TWICE A  tablet 3    metoprolol succinate (TOPROL-XL) 25 mg 24 hr tablet TAKE 1 TABLET BY MOUTH EVERY DAY 90 tablet 3    potassium chloride (MICRO-K) 10 MEQ CR capsule Take 10 mEq by mouth daily      predniSONE 10 mg tablet Take 1 tablet (10 mg total) by mouth daily      spironolactone (ALDACTONE) 25 mg tablet Take 0 5 tablets (12 5 mg total) by mouth daily 30 tablet 2    torsemide (DEMADEX) 10 mg tablet Take 1 tablet (10 mg total) by mouth daily 90 tablet 3     No current facility-administered medications for this visit  Allergies   Allergen Reactions    Ambien [Zolpidem] Other (See Comments)     Did not allow sleep per pt      Immunizations:     Immunization History   Administered Date(s) Administered    SARS-CoV-2 / COVID-19 mRNA IM (Pfizer-BioNTech) 05/12/2021, 06/05/2021      Health Maintenance:         Topic Date Due    Hepatitis C Screening  Completed         Topic Date Due    Hepatitis A Vaccine (1 of 2 - Risk 2-dose series) Never done    Pneumococcal Vaccine: 65+ Years (1 of 4 - PCV13) Never done    DTaP,Tdap,and Td Vaccines (1 - Tdap) Never done    Hepatitis B Vaccine (2 of 2 - CpG risk 2-dose series) 08/05/2016    Influenza Vaccine (1) 09/01/2021      Medicare Health Risk Assessment:     /70 (BP Location: Left arm, Patient Position: Sitting, Cuff Size: Adult)   Pulse 74   Temp (!) 97 2 °F (36 2 °C) (Tympanic)   Resp 12   Ht 5' 3 9" (1 623 m)   Wt 54 4 kg (120 lb)   SpO2 98%   BMI 20 66 kg/m²      Simran Segal is here for her Subsequent Wellness visit  Last Medicare Wellness visit information reviewed, patient interviewed and updates made to the record today        Health Risk Assessment:   Patient rates overall health as good  Patient feels that their physical health rating is same  Patient is satisfied with their life  Eyesight was rated as slightly worse  Hearing was rated as much worse  Patients states they are sometimes angry  Patient states they are often unusually tired/fatigued  Pain experienced in the last 7 days has been none  Patient states that she has experienced no weight loss or gain in last 6 months  Depression Screening:   PHQ-2 Score: 0  PHQ-9 Score: 0      Fall Risk Screening: In the past year, patient has experienced: no history of falling in past year      Urinary Incontinence Screening:   Patient has not leaked urine accidently in the last six months  Home Safety:  Patient does not have trouble with stairs inside or outside of their home  Patient has working smoke alarms and has working carbon monoxide detector  Home safety hazards include: none  Nutrition:   Current diet is Regular  Medications:   Patient is currently taking over-the-counter supplements  OTC medications include: vitamin c  Patient is able to manage medications  Activities of Daily Living (ADLs)/Instrumental Activities of Daily Living (IADLs):   Walk and transfer into and out of bed and chair?: Yes  Dress and groom yourself?: Yes    Bathe or shower yourself?: Yes    Feed yourself?  Yes  Do your laundry/housekeeping?: Yes  Manage your money, pay your bills and track your expenses?: Yes  Make your own meals?: Yes    Do your own shopping?: No    Previous Hospitalizations:   Any hospitalizations or ED visits within the last 12 months?: No      PREVENTIVE SCREENINGS      Cardiovascular Screening:    General: Screening Current      Diabetes Screening:     General: Screening Current      Colorectal Cancer Screening:     General: Screening Current      Breast Cancer Screening:     General: Screening Not Indicated      Cervical Cancer Screening:    General: Screening Not Indicated      Osteoporosis Screening:    General: Screening Not Indicated and History Osteoporosis    Due for: DXA Axial      Abdominal Aortic Aneurysm (AAA) Screening:        General: Screening Not Indicated      Lung Cancer Screening:     General: Screening Not Indicated      Hepatitis C Screening:    General: Screening Current    Screening, Brief Intervention, and Referral to Treatment (SBIRT)    Screening      Single Item Drug Screening:  How often have you used an illegal drug (including marijuana) or a prescription medication for non-medical reasons in the past year? never    Single Item Drug Screen Score: 0  Interpretation: Negative screen for possible drug use disorder      Juan José Mayorga MD

## 2021-08-24 ENCOUNTER — DOCUMENTATION (OUTPATIENT)
Dept: HEMATOLOGY ONCOLOGY | Facility: CLINIC | Age: 81
End: 2021-08-24

## 2021-08-24 ENCOUNTER — OFFICE VISIT (OUTPATIENT)
Dept: HEMATOLOGY ONCOLOGY | Facility: CLINIC | Age: 81
End: 2021-08-24
Payer: MEDICARE

## 2021-08-24 VITALS
TEMPERATURE: 97.5 F | OXYGEN SATURATION: 97 % | DIASTOLIC BLOOD PRESSURE: 60 MMHG | HEIGHT: 63 IN | RESPIRATION RATE: 16 BRPM | BODY MASS INDEX: 20.98 KG/M2 | SYSTOLIC BLOOD PRESSURE: 100 MMHG | HEART RATE: 80 BPM | WEIGHT: 118.4 LBS

## 2021-08-24 DIAGNOSIS — R76.8 ANA POSITIVE: Primary | ICD-10-CM

## 2021-08-24 DIAGNOSIS — D69.3 CHRONIC ITP (IDIOPATHIC THROMBOCYTOPENIA) (HCC): ICD-10-CM

## 2021-08-24 PROCEDURE — 99215 OFFICE O/P EST HI 40 MIN: CPT | Performed by: INTERNAL MEDICINE

## 2021-08-24 NOTE — PROGRESS NOTES
Hematology Outpatient Follow - Up Note  Edwina Antunez 78 y o  female MRN: @ Encounter: [id number]        Date:  8/24/2021        Assessment/ Plan:      Leukopenia for many years documented since at least 2014, worsening on January 2021, autoimmune workup was negative except for positive LEA, anti smooth muscle antibodies at 80, a bone marrow biopsy in January 2021 showed normal cellularity no evidence of MDS, leukemia, lymphoma, vasculitis, granulomata and normal female cytogenetics,  2  Chronic ITP progressivly getting worse, she had been on prednisone currently on 10 mg p o  daily no good response  3  We talked about possible rituximab 375 milligram/meter squared or 500 mg flat dose weekly x4 doses versus Promacta 50 mg p o  daily    We checked drug drug interaction, she is safe to received Promacta on empty stomach     We have samples for 14 days it was given, will order Promacta 50 mg p o  daily will monitor CBC, CMP every other week for the next 8 weeks    We inforce taking Promacta on empty stomach             Labs and imaging studies are reviewed by ordering provider once results are available  If there are findings that need immediate attention, you will be contacted when results available  Discussing results and the implication on your healthcare is best discussed in person at your follow-up visit         HPI:    57[facility] who was seen initially 5/2020 regarding pancytopenia     She has a history of dilated cardiomyopathy with low ejection fraction on Entresto, spironolactone, torsemide, metoprolol and budesonide  Marlyn Mancini has history of hypertension, autoimmune hepatitis treated with steroids, 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         She had outpatient labs 4/17/20 at [facility]   hemoglobin 6 9, MCV 70, RDW 19 7, WBC 1 9, 59% neutrophils, 30% lymphocytes, platelet 63925,   She was transfused with 2 units packed RBC ordered by her cardiologist, Dr Ifeanyi Alvarez and received Feraheme weekly x 2 doses end of June 2020      [facility] on 09/09/2019 showed hemoglobin 11 3, MCV 93, WBC of 2 7, normal differential platelets 70544  August 2018 iron saturation 8% ferritin 26  On 07/2018 hemoglobin 11, MCV 91, WBC 3 1, platelets 12867  87/18/9064:  Hemoglobin 12 9, MCV of 90, white blood cell count 2 95, platelets 750  On 59/3579 hemoglobin 13 3, MCV 89, WBC 3 4, platelets 091129     She had normal vitamin R94, folic acid, TSH, protein electrophoresis     Autoimmune workup by Rheumatology was negative except for anti smooth muscle antibodies of 93     Because of the leukopenia, thrombocytopenia we had to do a bone marrow biopsy on January 2021 showed normal bone marrow cellularity for the age, no evidence of dysplastic features, normal plasma cells and lymphocytes, no evidence of vasculitis, necrosis, fibrosis adequate iron stains    Back on prednisone 10 mg p o  daily because of elevation of ALT, AST, however 4th persistent thrombocytopenia and progressive with platelet count of 54572       Interval History:        Previous Treatment:         Test Results:    Imaging: No results found      Labs:   Lab Results   Component Value Date    WBC 1 82 (LL) 07/21/2021    HGB 11 7 07/21/2021    HCT 35 9 07/21/2021    MCV 98 07/21/2021    PLT 36 (LL) 07/21/2021     Lab Results   Component Value Date     12/04/2015    K 3 7 07/21/2021     07/21/2021    CO2 30 07/21/2021    ANIONGAP 4 12/04/2015    BUN 14 07/21/2021    CREATININE 0 79 07/21/2021    GLUCOSE 94 12/04/2015    GLUF 91 02/24/2021    CALCIUM 9 5 07/21/2021    CORRECTEDCA 10 1 07/21/2021    AST 76 (H) 07/21/2021    ALT 97 (H) 07/21/2021    ALKPHOS 50 07/21/2021    PROT 7 5 12/04/2015    BILITOT 0 48 12/04/2015    EGFR 80 07/21/2021       Lab Results   Component Value Date    IRON 133 01/11/2021    TIBC 327 01/11/2021    FERRITIN 238 01/11/2021       Lab Results   Component Value Date    PKHKZEYC18 401 01/11/2021         ROS: Review of Systems   Constitutional: Positive for appetite change  Negative for chills, diaphoresis, fatigue and unexpected weight change  HENT:   Positive for hearing loss  Negative for mouth sores, nosebleeds, sore throat, trouble swallowing and voice change  Eyes: Negative for eye problems and icterus  Respiratory: Negative for chest tightness, cough, hemoptysis and wheezing  Cardiovascular: Negative for chest pain, leg swelling and palpitations  Gastrointestinal: Negative for abdominal distention, abdominal pain, blood in stool, constipation, diarrhea, nausea and vomiting  Endocrine: Negative for hot flashes  Genitourinary: Negative for bladder incontinence, difficulty urinating, dyspareunia, dysuria and frequency  Musculoskeletal: Negative for arthralgias, back pain, gait problem, neck pain and neck stiffness  Skin: Negative for itching and rash  Neurological: Negative for dizziness, gait problem, headaches, numbness, seizures and speech difficulty  Hematological: Negative for adenopathy  Does not bruise/bleed easily  Psychiatric/Behavioral: Negative for decreased concentration, depression, sleep disturbance and suicidal ideas  The patient is not nervous/anxious  Current Medications: Reviewed  Allergies: Reviewed  PMH/FH/SH:  Reviewed      Physical Exam:    Body surface area is 1 55 meters squared  Wt Readings from Last 3 Encounters:   08/24/21 53 7 kg (118 lb 6 4 oz)   08/10/21 54 4 kg (120 lb)   08/10/21 54 4 kg (120 lb)        Temp Readings from Last 3 Encounters:   08/24/21 97 5 °F (36 4 °C) (Tympanic)   08/10/21 (!) 97 2 °F (36 2 °C) (Tympanic)   05/14/21 98 1 °F (36 7 °C)        BP Readings from Last 3 Encounters:   08/24/21 100/60   08/10/21 118/70   05/25/21 124/60         Pulse Readings from Last 3 Encounters:   08/24/21 80   08/10/21 74   05/25/21 88        Physical Exam  Vitals reviewed  Constitutional:       General: She is not in acute distress       Appearance: She is well-developed  She is ill-appearing  She is not diaphoretic  HENT:      Head: Normocephalic and atraumatic  Eyes:      Conjunctiva/sclera: Conjunctivae normal    Neck:      Trachea: No tracheal deviation  Cardiovascular:      Rate and Rhythm: Normal rate and regular rhythm  Heart sounds: Murmur heard  No friction rub  No gallop  Pulmonary:      Effort: Pulmonary effort is normal  No respiratory distress  Breath sounds: Normal breath sounds  No wheezing or rales  Chest:      Chest wall: No tenderness  Abdominal:      General: There is no distension  Palpations: Abdomen is soft  Tenderness: There is no abdominal tenderness  Musculoskeletal:      Cervical back: Normal range of motion and neck supple  Lymphadenopathy:      Cervical: No cervical adenopathy  Skin:     General: Skin is warm and dry  Coloration: Skin is not pale  Findings: No erythema  Neurological:      Mental Status: She is alert and oriented to person, place, and time  Psychiatric:         Behavior: Behavior normal          Thought Content: Thought content normal          Judgment: Judgment normal          ECO  Goals and Barriers:  Current Goal: Minimize effects of disease  Barriers: None  Patient's Capacity to Self Care:  Patient is able to self care      Code Status: @[facility]@

## 2021-08-24 NOTE — PROGRESS NOTES
8-24-21  Rcvd new oral chemo start -PROMACTA // Imer Levine is required    Patient Simone Head [birth year]  Patient has been enrolled with [facility]#  [id number]  CARD#  [id number]  PCN#  [id number]  BIN#  [id number]  GRP#  [id number]  AMT $ 10,000  EFF: 7-25-21  Thru: 7-24-22    Epic noted, Email to team

## 2021-08-25 ENCOUNTER — DOCUMENTATION (OUTPATIENT)
Dept: HEMATOLOGY ONCOLOGY | Facility: CLINIC | Age: 81
End: 2021-08-25

## 2021-08-25 NOTE — PROGRESS NOTES
Received request from clinical for patient to start on Promacta 50 mg daily  Auth is submitted and pending via cover my meds Key: Aravind [id number]  ID- [id number]  BIN: [id number] / PCN: [id number]  Group- [id number]    UPDATE:  This has been approved via cover my meds  Clinical, Pharmacy aware  Funding put in place as well   [facility] is filling pharmacy            Patient Sara Counter [birth year]  Patient has been enrolled with [facility]#  [id number]  CARD#  [id number]  PCN#  [id number]  BIN#  [id number]  [facility]#  [id number]  AMT $ 10,000  EFF: 7-25-21  Thru: 7-24-22

## 2021-08-26 DIAGNOSIS — D69.3 CHRONIC ITP (IDIOPATHIC THROMBOCYTOPENIA) (HCC): Primary | ICD-10-CM

## 2021-08-27 ENCOUNTER — TELEPHONE (OUTPATIENT)
Dept: HEMATOLOGY ONCOLOGY | Facility: CLINIC | Age: 81
End: 2021-08-27

## 2021-08-27 NOTE — TELEPHONE ENCOUNTER
Patient calling with questions about standing lab orders  Confirmed that for the moment CBCD and CMP will be every 2 weeks  Frequency may change depending on trends of lab work    Patient was worried that this will go on for an entire year as the expiration date on labs is for next year  She will do Q 2 week labs until instructed otherwise  Patient verbalized understanding

## 2021-09-01 ENCOUNTER — TELEPHONE (OUTPATIENT)
Dept: HEMATOLOGY ONCOLOGY | Facility: CLINIC | Age: 81
End: 2021-09-01

## 2021-09-01 NOTE — TELEPHONE ENCOUNTER
Patient calling to update that she received a letter in the mail regarding [address]    Patient aware that this needs to come from [facility]  I explained to patient that Rx was sent to [facility] on 8/26/21  Reviewed delivery will be arranged before medication is delivered  Patient will reach out to [facility] today to follow up on Rx  Number provided to patient    Sending to RN as Jayde Abreu

## 2021-10-07 ENCOUNTER — TELEPHONE (OUTPATIENT)
Dept: HEMATOLOGY ONCOLOGY | Facility: CLINIC | Age: 81
End: 2021-10-07

## 2021-11-04 ENCOUNTER — OFFICE VISIT (OUTPATIENT)
Dept: CARDIOLOGY CLINIC | Facility: CLINIC | Age: 81
End: 2021-11-04
Payer: MEDICARE

## 2021-11-04 VITALS
WEIGHT: 120.7 LBS | HEIGHT: 63 IN | SYSTOLIC BLOOD PRESSURE: 114 MMHG | BODY MASS INDEX: 21.39 KG/M2 | OXYGEN SATURATION: 99 % | HEART RATE: 78 BPM | DIASTOLIC BLOOD PRESSURE: 56 MMHG

## 2021-11-04 DIAGNOSIS — I10 HTN (HYPERTENSION), BENIGN: ICD-10-CM

## 2021-11-04 DIAGNOSIS — I44.7 COMPLETE LEFT BUNDLE BRANCH BLOCK (LBBB): ICD-10-CM

## 2021-11-04 DIAGNOSIS — K75.4 AUTOIMMUNE HEPATITIS (HCC): ICD-10-CM

## 2021-11-04 DIAGNOSIS — I50.22 CHRONIC SYSTOLIC CHF (CONGESTIVE HEART FAILURE), NYHA CLASS 3 (HCC): ICD-10-CM

## 2021-11-04 DIAGNOSIS — I42.0 DILATED CARDIOMYOPATHY (HCC): ICD-10-CM

## 2021-11-04 DIAGNOSIS — I50.22 CHRONIC SYSTOLIC HEART FAILURE (HCC): Primary | ICD-10-CM

## 2021-11-04 PROCEDURE — 99214 OFFICE O/P EST MOD 30 MIN: CPT | Performed by: INTERNAL MEDICINE

## 2021-11-04 RX ORDER — SACUBITRIL AND VALSARTAN 49; 51 MG/1; MG/1
1 TABLET, FILM COATED ORAL 2 TIMES DAILY
Qty: 180 TABLET | Refills: 3 | Status: SHIPPED | OUTPATIENT
Start: 2021-11-04 | End: 2022-07-05

## 2021-11-04 RX ORDER — POTASSIUM CHLORIDE 750 MG/1
10 CAPSULE, EXTENDED RELEASE ORAL DAILY
Qty: 90 CAPSULE | Refills: 3 | Status: SHIPPED | OUTPATIENT
Start: 2021-11-04

## 2021-11-04 RX ORDER — SPIRONOLACTONE 25 MG/1
12.5 TABLET ORAL DAILY
Qty: 90 TABLET | Refills: 3 | Status: SHIPPED | OUTPATIENT
Start: 2021-11-04

## 2021-11-04 RX ORDER — TORSEMIDE 10 MG/1
10 TABLET ORAL DAILY
Qty: 90 TABLET | Refills: 3 | Status: SHIPPED | OUTPATIENT
Start: 2021-11-04

## 2021-11-04 RX ORDER — METOPROLOL SUCCINATE 25 MG/1
25 TABLET, EXTENDED RELEASE ORAL DAILY
Qty: 90 TABLET | Refills: 3 | Status: SHIPPED | OUTPATIENT
Start: 2021-11-04

## 2021-11-10 ENCOUNTER — OFFICE VISIT (OUTPATIENT)
Dept: HEMATOLOGY ONCOLOGY | Facility: CLINIC | Age: 81
End: 2021-11-10
Payer: MEDICARE

## 2021-11-10 VITALS
HEIGHT: 63 IN | WEIGHT: 120 LBS | OXYGEN SATURATION: 98 % | HEART RATE: 80 BPM | RESPIRATION RATE: 16 BRPM | SYSTOLIC BLOOD PRESSURE: 114 MMHG | DIASTOLIC BLOOD PRESSURE: 60 MMHG | BODY MASS INDEX: 21.26 KG/M2 | TEMPERATURE: 97.1 F

## 2021-11-10 DIAGNOSIS — D69.3 CHRONIC ITP (IDIOPATHIC THROMBOCYTOPENIA) (HCC): Primary | ICD-10-CM

## 2021-11-10 PROCEDURE — 99214 OFFICE O/P EST MOD 30 MIN: CPT | Performed by: INTERNAL MEDICINE

## 2021-12-06 ENCOUNTER — RA CDI HCC (OUTPATIENT)
Dept: OTHER | Facility: HOSPITAL | Age: 81
End: 2021-12-06

## 2021-12-07 PROBLEM — I11.0 HYPERTENSIVE HEART DISEASE WITH CONGESTIVE HEART FAILURE (HCC): Status: ACTIVE | Noted: 2021-12-07

## 2021-12-08 NOTE — PATIENT INSTRUCTIONS
Starting spironolactone 12 5 mg daily (1/2 a pill a day)  Check labs in one week    I explained to the patient that prophylactic ICD is recommended per ACC/AHA/HRS guidelines given EF is < 35% for greater than 3 months from time of initial diagnosis despite optimized medical therapy  ICD implantation is performed for the primary goal or reducing arrhythmogenic and total mortality as evidenced in multiple trials  When applicable, per guideline recommendations for CRT therapy, a BIV device will be implanted to provide ICD therapy as well as resynchronization to optimize LV myocardial synchrony and cardiac output  In this circumstance the patient was explained that not all patients will achieve desired response (improved functional capacity, NYHA class, decreased LVESd on echo)  'Non responder rate can approach 40% based on patients particular type of cardiomyopathy, level and location of scar and amount of electrical dyssynchrony     Consent for the procedure including procedural risks will be obtained by implanting electrophysiologist  [Reviewed Radiology Report(s)] : Radiology reports were reviewed. [Discuss Alternatives/Risks/Benefits w/Patient] : All alternatives, risks, and benefits were discussed with the patient/family and all questions were answered.  Patient expressed good understanding and appreciates the importance of follow up as recommended.

## 2021-12-10 ENCOUNTER — OFFICE VISIT (OUTPATIENT)
Dept: FAMILY MEDICINE CLINIC | Facility: CLINIC | Age: 81
End: 2021-12-10
Payer: MEDICARE

## 2021-12-10 VITALS
BODY MASS INDEX: 22.01 KG/M2 | RESPIRATION RATE: 16 BRPM | HEART RATE: 78 BPM | HEIGHT: 63 IN | WEIGHT: 124.2 LBS | TEMPERATURE: 96.4 F | SYSTOLIC BLOOD PRESSURE: 126 MMHG | OXYGEN SATURATION: 97 % | DIASTOLIC BLOOD PRESSURE: 70 MMHG

## 2021-12-10 DIAGNOSIS — I50.22 CHRONIC SYSTOLIC HEART FAILURE (HCC): ICD-10-CM

## 2021-12-10 DIAGNOSIS — D69.6 THROMBOCYTOPENIA (HCC): ICD-10-CM

## 2021-12-10 DIAGNOSIS — R79.9 ABNORMAL FINDING OF BLOOD CHEMISTRY, UNSPECIFIED: ICD-10-CM

## 2021-12-10 DIAGNOSIS — I10 HYPERTENSION, ESSENTIAL: ICD-10-CM

## 2021-12-10 DIAGNOSIS — G51.0 LEFT-SIDED BELL'S PALSY: ICD-10-CM

## 2021-12-10 DIAGNOSIS — K75.4 AUTOIMMUNE HEPATITIS TREATED WITH STEROIDS (HCC): Primary | ICD-10-CM

## 2021-12-10 DIAGNOSIS — I50.9 CONGESTIVE HEART FAILURE, UNSPECIFIED HF CHRONICITY, UNSPECIFIED HEART FAILURE TYPE (HCC): ICD-10-CM

## 2021-12-10 DIAGNOSIS — I42.0 DILATED CARDIOMYOPATHY (HCC): ICD-10-CM

## 2021-12-10 DIAGNOSIS — E44.1 MILD PROTEIN-CALORIE MALNUTRITION (HCC): ICD-10-CM

## 2021-12-10 DIAGNOSIS — D70.9 NEUTROPENIA, UNSPECIFIED TYPE (HCC): ICD-10-CM

## 2021-12-10 PROBLEM — D50.0 IRON DEFICIENCY ANEMIA DUE TO CHRONIC BLOOD LOSS: Status: RESOLVED | Noted: 2020-04-17 | Resolved: 2021-12-10

## 2021-12-10 PROCEDURE — 99214 OFFICE O/P EST MOD 30 MIN: CPT | Performed by: FAMILY MEDICINE

## 2021-12-10 RX ORDER — BUDESONIDE 3 MG/1
9 CAPSULE, COATED PELLETS ORAL DAILY
COMMUNITY
Start: 2021-12-01

## 2022-01-18 ENCOUNTER — TELEMEDICINE (OUTPATIENT)
Dept: HEMATOLOGY ONCOLOGY | Facility: CLINIC | Age: 82
End: 2022-01-18
Payer: MEDICARE

## 2022-01-18 DIAGNOSIS — R76.8 ANA POSITIVE: ICD-10-CM

## 2022-01-18 DIAGNOSIS — D69.3 CHRONIC ITP (IDIOPATHIC THROMBOCYTOPENIA) (HCC): Primary | ICD-10-CM

## 2022-01-18 PROCEDURE — 99442 PR PHYS/QHP TELEPHONE EVALUATION 11-20 MIN: CPT | Performed by: INTERNAL MEDICINE

## 2022-01-18 NOTE — PROGRESS NOTES
Virtual Brief Visit  24-year-old  female who was seen initially 5/2020 regarding pancytopenia     She has a history of dilated cardiomyopathy with low ejection fraction on Entresto, spironolactone, torsemide, metoprolol and budesonide  Radames Maldonado has history of hypertension, autoimmune hepatitis treated with steroids, 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         She had outpatient labs 4/17/20 at [facility]   hemoglobin 6 9, MCV 70, RDW 19 7, WBC 1 9, 59% neutrophils, 30% lymphocytes, platelet 78806,   She was transfused with 2 units packed RBC ordered by her cardiologist, Dr Flakito Morton and received Feraheme weekly x 2 doses end of June 2020      [facility] on 09/09/2019 showed hemoglobin 11 3, MCV 93, WBC of 2 7, normal differential platelets 24958  August 2018 iron saturation 8% ferritin 26  On 07/2018 hemoglobin 11, MCV 91, WBC 3 1, platelets 26301  97/75/5717:  Hemoglobin 12 9, MCV of 90, white blood cell count 2 95, platelets 289  On 75/7929 hemoglobin 13 3, MCV 89, WBC 3 4, platelets 404495     She had normal vitamin D27, folic acid, TSH, protein electrophoresis     Autoimmune workup by Rheumatology was negative except for anti smooth muscle antibodies of 93     Because of the leukopenia, thrombocytopenia we had to do a bone marrow biopsy on January 2021 showed normal bone marrow cellularity for the age, no evidence of dysplastic features, normal plasma cells and lymphocytes, no evidence of vasculitis, necrosis, fibrosis adequate iron stains     Back on prednisone 10 mg p o  daily because of elevation of ALT, AST, with persistent thrombocytopenia of 38,000, initiated on Promacta 50 mg at the beginning of September 2021, platelet count 763789 in November 2021, WBC 4, and decrease in the AST, ALT  Patient is located in the following state in which I hold an active license [place]      Assessment/Plan:  Chronic immune thrombocytopenic purpura, continue Promacta 50 mg p o  daily, normalization of platelet count in the range of 213,000     2  Elevation of AST, ALT with positive LEA most likely autoimmune, back on prednisone 10 mg p o  daily with normalization of AST, ALT     Anti smooth muscle antibodies of 93     Normal vitamin C37, folic acid, TSH and serum protein electrophoresis    Leukopenia most likely autoimmune however state  Problem List Items Addressed This Visit        Musculoskeletal and Integument    Chronic ITP (idiopathic thrombocytopenia) (HCC) - Primary    Relevant Orders    CBC and differential    Comprehensive metabolic panel       Other    LEA positive    Relevant Orders    CBC and differential    Comprehensive metabolic panel          Recent Visits  No visits were found meeting these conditions  Showing recent visits within past 7 days and meeting all other requirements  Today's Visits  Date Type Provider Dept   01/18/22 Telemedicine Ruthann Rodriguez 90 today's visits and meeting all other requirements  Future Appointments  No visits were found meeting these conditions    Showing future appointments within next 150 days and meeting all other requirements         I spent 14 minutes directly with the patient during this visit

## 2022-03-29 ENCOUNTER — NURSE TRIAGE (OUTPATIENT)
Dept: OTHER | Facility: OTHER | Age: 82
End: 2022-03-29

## 2022-03-29 NOTE — TELEPHONE ENCOUNTER
Regarding: Hit head on nightstand headaches  ----- Message from Nerissa Ceron sent at 3/29/2022 10:06 AM EDT -----  "A week ago today I fell and hit my head on the nightstand  Since then I've been having headaches off and on   What should I do?"

## 2022-03-29 NOTE — TELEPHONE ENCOUNTER
Patient fell and hit her head on her night stand a week ago and would like to be seen in the office for re-occurring dull headaches  She prefers her PCP  Appointment made for tomorrow at [phone], but patient would like a call if something sooner becomes available  Reason for Disposition   After 3 days and headache persists    Answer Assessment - Initial Assessment Questions  1  MECHANISM: "How did the injury happen?" For falls, ask: "What height did you fall from?" and "What surface did you fall against?"       Fell and hit head on nightstand   2  ONSET: "When did the injury happen?" (Minutes or hours ago)       1 week ago   3  NEUROLOGIC SYMPTOMS: "Was there any loss of consciousness?" "Are there any other neurological symptoms?"       Denies  4  MENTAL STATUS: "Does the person know who he is, who you are, and where he is?"       Yes  5  LOCATION: "What part of the head was hit?"       Unaware   6  SCALP APPEARANCE: "What does the scalp look like? Is it bleeding now?" If Yes, ask: "Is it difficult to stop?"       Denies   7  SIZE: For cuts, bruises, or swelling, ask: "How large is it?" (e g , inches or centimeters)       Denies   8  PAIN: "Is there any pain?" If Yes, ask: "How bad is it?"  (e g , Scale 1-10; or mild, moderate, severe)      Dull ache, mild-moderate   9  TETANUS: For any breaks in the skin, ask: "When was the last tetanus booster?"      N/A  10  OTHER SYMPTOMS: "Do you have any other symptoms?" (e g , neck pain, vomiting)        Denies  11   PREGNANCY: "Is there any chance you are pregnant?" "When was your last menstrual period?"        N/A    Protocols used: HEAD INJURY-ADULT-OH

## 2022-03-30 ENCOUNTER — HOSPITAL ENCOUNTER (INPATIENT)
Facility: HOSPITAL | Age: 82
LOS: 2 days | Discharge: HOME HEALTH | DRG: 085 | End: 2022-04-01
Attending: EMERGENCY MEDICINE | Admitting: STUDENT IN AN ORGANIZED HEALTH CARE EDUCATION/TRAINING PROGRAM
Payer: MEDICARE

## 2022-03-30 ENCOUNTER — TELEPHONE (OUTPATIENT)
Dept: OTHER | Facility: OTHER | Age: 82
End: 2022-03-30

## 2022-03-30 ENCOUNTER — HOSPITAL ENCOUNTER (OUTPATIENT)
Dept: RADIOLOGY | Age: 82
Discharge: HOME | DRG: 085 | End: 2022-03-30
Payer: MEDICARE

## 2022-03-30 ENCOUNTER — OFFICE VISIT (OUTPATIENT)
Dept: FAMILY MEDICINE CLINIC | Facility: CLINIC | Age: 82
End: 2022-03-30
Payer: MEDICARE

## 2022-03-30 ENCOUNTER — TELEPHONE (OUTPATIENT)
Dept: FAMILY MEDICINE CLINIC | Facility: CLINIC | Age: 82
End: 2022-03-30

## 2022-03-30 VITALS
BODY MASS INDEX: 21.44 KG/M2 | TEMPERATURE: 96.6 F | DIASTOLIC BLOOD PRESSURE: 72 MMHG | RESPIRATION RATE: 16 BRPM | SYSTOLIC BLOOD PRESSURE: 140 MMHG | OXYGEN SATURATION: 100 % | HEIGHT: 63 IN | WEIGHT: 121 LBS | HEART RATE: 80 BPM

## 2022-03-30 DIAGNOSIS — I42.0 DILATED CARDIOMYOPATHY (HCC): ICD-10-CM

## 2022-03-30 DIAGNOSIS — D69.3 CHRONIC ITP (IDIOPATHIC THROMBOCYTOPENIA) (HCC): ICD-10-CM

## 2022-03-30 DIAGNOSIS — G44.209 ACUTE NON INTRACTABLE TENSION-TYPE HEADACHE: Primary | ICD-10-CM

## 2022-03-30 DIAGNOSIS — S09.90XD INJURY OF HEAD, SUBSEQUENT ENCOUNTER: ICD-10-CM

## 2022-03-30 DIAGNOSIS — I10 HYPERTENSION, ESSENTIAL: ICD-10-CM

## 2022-03-30 DIAGNOSIS — I62.00 SUBDURAL HEMORRHAGE (HCC): ICD-10-CM

## 2022-03-30 DIAGNOSIS — S06.5X9A SDH (SUBDURAL HEMATOMA) (HCC): Primary | ICD-10-CM

## 2022-03-30 DIAGNOSIS — M46.1 SACROILIITIS (HCC): ICD-10-CM

## 2022-03-30 DIAGNOSIS — G44.209 ACUTE NON INTRACTABLE TENSION-TYPE HEADACHE: ICD-10-CM

## 2022-03-30 DIAGNOSIS — R09.81 NASAL CONGESTION: ICD-10-CM

## 2022-03-30 DIAGNOSIS — E44.1 MILD PROTEIN-CALORIE MALNUTRITION (HCC): ICD-10-CM

## 2022-03-30 DIAGNOSIS — K75.4 AUTOIMMUNE HEPATITIS TREATED WITH STEROIDS (HCC): ICD-10-CM

## 2022-03-30 LAB
2HR DELTA HS TROPONIN: -8 NG/L
4HR DELTA HS TROPONIN: -14 NG/L
ALBUMIN SERPL BCP-MCNC: 3.4 G/DL (ref 3.5–5)
ALP SERPL-CCNC: 47 U/L (ref 46–116)
ALT SERPL W P-5'-P-CCNC: 73 U/L (ref 12–78)
ANION GAP SERPL CALCULATED.3IONS-SCNC: 7 MMOL/L (ref 4–13)
APTT PPP: 32 SECONDS (ref 23–37)
AST SERPL W P-5'-P-CCNC: 51 U/L (ref 5–45)
BASOPHILS # BLD AUTO: 0.02 THOUSANDS/ΜL (ref 0–0.1)
BASOPHILS NFR BLD AUTO: 1 % (ref 0–1)
BILIRUB SERPL-MCNC: 0.63 MG/DL (ref 0.2–1)
BUN SERPL-MCNC: 19 MG/DL (ref 5–25)
CALCIUM ALBUM COR SERPL-MCNC: 10.2 MG/DL (ref 8.3–10.1)
CALCIUM SERPL-MCNC: 9.7 MG/DL (ref 8.3–10.1)
CARDIAC TROPONIN I PNL SERPL HS: 62 NG/L
CARDIAC TROPONIN I PNL SERPL HS: 68 NG/L
CARDIAC TROPONIN I PNL SERPL HS: 76 NG/L
CHLORIDE SERPL-SCNC: 105 MMOL/L (ref 100–108)
CO2 SERPL-SCNC: 28 MMOL/L (ref 21–32)
CREAT SERPL-MCNC: 0.75 MG/DL (ref 0.6–1.3)
EOSINOPHIL # BLD AUTO: 0.01 THOUSAND/ΜL (ref 0–0.61)
EOSINOPHIL NFR BLD AUTO: 0 % (ref 0–6)
ERYTHROCYTE [DISTWIDTH] IN BLOOD BY AUTOMATED COUNT: 14.5 % (ref 11.6–15.1)
GFR SERPL CREATININE-BSD FRML MDRD: 73 ML/MIN/1.73SQ M
GLUCOSE SERPL-MCNC: 89 MG/DL (ref 65–140)
HCT VFR BLD AUTO: 38.8 % (ref 34.8–46.1)
HGB BLD-MCNC: 12.8 G/DL (ref 11.5–15.4)
IMM GRANULOCYTES # BLD AUTO: 0.02 THOUSAND/UL (ref 0–0.2)
IMM GRANULOCYTES NFR BLD AUTO: 1 % (ref 0–2)
INR PPP: 1.2 (ref 0.84–1.19)
LYMPHOCYTES # BLD AUTO: 1.12 THOUSANDS/ΜL (ref 0.6–4.47)
LYMPHOCYTES NFR BLD AUTO: 30 % (ref 14–44)
MCH RBC QN AUTO: 31.5 PG (ref 26.8–34.3)
MCHC RBC AUTO-ENTMCNC: 33 G/DL (ref 31.4–37.4)
MCV RBC AUTO: 96 FL (ref 82–98)
MONOCYTES # BLD AUTO: 0.42 THOUSAND/ΜL (ref 0.17–1.22)
MONOCYTES NFR BLD AUTO: 11 % (ref 4–12)
NEUTROPHILS # BLD AUTO: 2.21 THOUSANDS/ΜL (ref 1.85–7.62)
NEUTS SEG NFR BLD AUTO: 57 % (ref 43–75)
NRBC BLD AUTO-RTO: 0 /100 WBCS
PLATELET # BLD AUTO: 71 THOUSANDS/UL (ref 149–390)
PMV BLD AUTO: 12.6 FL (ref 8.9–12.7)
POTASSIUM SERPL-SCNC: 3.7 MMOL/L (ref 3.5–5.3)
PROT SERPL-MCNC: 7.7 G/DL (ref 6.4–8.2)
PROTHROMBIN TIME: 14.7 SECONDS (ref 11.6–14.5)
RBC # BLD AUTO: 4.06 MILLION/UL (ref 3.81–5.12)
SODIUM SERPL-SCNC: 140 MMOL/L (ref 136–145)
WBC # BLD AUTO: 3.8 THOUSAND/UL (ref 4.31–10.16)

## 2022-03-30 PROCEDURE — 99223 1ST HOSP IP/OBS HIGH 75: CPT | Performed by: STUDENT IN AN ORGANIZED HEALTH CARE EDUCATION/TRAINING PROGRAM

## 2022-03-30 PROCEDURE — 36415 COLL VENOUS BLD VENIPUNCTURE: CPT | Performed by: EMERGENCY MEDICINE

## 2022-03-30 PROCEDURE — 99214 OFFICE O/P EST MOD 30 MIN: CPT | Performed by: FAMILY MEDICINE

## 2022-03-30 PROCEDURE — 84484 ASSAY OF TROPONIN QUANT: CPT | Performed by: EMERGENCY MEDICINE

## 2022-03-30 PROCEDURE — 99285 EMERGENCY DEPT VISIT HI MDM: CPT

## 2022-03-30 PROCEDURE — 70450 CT HEAD/BRAIN W/O DYE: CPT

## 2022-03-30 PROCEDURE — 85610 PROTHROMBIN TIME: CPT | Performed by: EMERGENCY MEDICINE

## 2022-03-30 PROCEDURE — 85730 THROMBOPLASTIN TIME PARTIAL: CPT | Performed by: EMERGENCY MEDICINE

## 2022-03-30 PROCEDURE — G1004 CDSM NDSC: HCPCS

## 2022-03-30 PROCEDURE — 93005 ELECTROCARDIOGRAM TRACING: CPT

## 2022-03-30 PROCEDURE — 99285 EMERGENCY DEPT VISIT HI MDM: CPT | Performed by: EMERGENCY MEDICINE

## 2022-03-30 PROCEDURE — 1124F ACP DISCUSS-NO DSCNMKR DOCD: CPT | Performed by: EMERGENCY MEDICINE

## 2022-03-30 PROCEDURE — 80053 COMPREHEN METABOLIC PANEL: CPT | Performed by: EMERGENCY MEDICINE

## 2022-03-30 PROCEDURE — 85025 COMPLETE CBC W/AUTO DIFF WBC: CPT | Performed by: EMERGENCY MEDICINE

## 2022-03-30 RX ORDER — POTASSIUM CHLORIDE 750 MG/1
10 TABLET, EXTENDED RELEASE ORAL DAILY
Status: DISCONTINUED | OUTPATIENT
Start: 2022-03-31 | End: 2022-04-01 | Stop reason: HOSPADM

## 2022-03-30 RX ORDER — ACETAMINOPHEN 325 MG/1
975 TABLET ORAL EVERY 8 HOURS SCHEDULED
Status: DISCONTINUED | OUTPATIENT
Start: 2022-03-30 | End: 2022-04-01 | Stop reason: HOSPADM

## 2022-03-30 RX ORDER — OXYCODONE HYDROCHLORIDE 5 MG/1
2.5 TABLET ORAL EVERY 4 HOURS PRN
Status: DISCONTINUED | OUTPATIENT
Start: 2022-03-30 | End: 2022-04-01 | Stop reason: HOSPADM

## 2022-03-30 RX ORDER — ASCORBIC ACID 500 MG
500 TABLET ORAL DAILY
Status: DISCONTINUED | OUTPATIENT
Start: 2022-03-31 | End: 2022-04-01 | Stop reason: HOSPADM

## 2022-03-30 RX ORDER — FLUTICASONE PROPIONATE 50 MCG
2 SPRAY, SUSPENSION (ML) NASAL DAILY
Status: DISCONTINUED | OUTPATIENT
Start: 2022-03-31 | End: 2022-04-01 | Stop reason: HOSPADM

## 2022-03-30 RX ORDER — METOPROLOL SUCCINATE 25 MG/1
25 TABLET, EXTENDED RELEASE ORAL DAILY
Status: DISCONTINUED | OUTPATIENT
Start: 2022-03-31 | End: 2022-04-01 | Stop reason: HOSPADM

## 2022-03-30 RX ORDER — FLUTICASONE PROPIONATE 50 MCG
2 SPRAY, SUSPENSION (ML) NASAL DAILY
Qty: 16 G | Refills: 0 | Status: SHIPPED | OUTPATIENT
Start: 2022-03-30

## 2022-03-30 RX ORDER — OXYCODONE HYDROCHLORIDE 5 MG/1
5 TABLET ORAL EVERY 4 HOURS PRN
Status: DISCONTINUED | OUTPATIENT
Start: 2022-03-30 | End: 2022-04-01 | Stop reason: HOSPADM

## 2022-03-30 RX ORDER — SENNOSIDES 8.6 MG
2 TABLET ORAL DAILY
Status: DISCONTINUED | OUTPATIENT
Start: 2022-03-31 | End: 2022-04-01 | Stop reason: HOSPADM

## 2022-03-30 RX ORDER — PREDNISONE 10 MG/1
10 TABLET ORAL DAILY
Status: DISCONTINUED | OUTPATIENT
Start: 2022-03-31 | End: 2022-04-01 | Stop reason: HOSPADM

## 2022-03-30 RX ORDER — LABETALOL 20 MG/4 ML (5 MG/ML) INTRAVENOUS SYRINGE
10 EVERY 4 HOURS PRN
Status: DISCONTINUED | OUTPATIENT
Start: 2022-03-30 | End: 2022-04-01 | Stop reason: HOSPADM

## 2022-03-30 RX ORDER — TORSEMIDE 20 MG/1
10 TABLET ORAL DAILY
Status: DISCONTINUED | OUTPATIENT
Start: 2022-03-31 | End: 2022-04-01 | Stop reason: HOSPADM

## 2022-03-30 RX ORDER — HYDROMORPHONE HCL IN WATER/PF 6 MG/30 ML
0.2 PATIENT CONTROLLED ANALGESIA SYRINGE INTRAVENOUS EVERY 4 HOURS PRN
Status: DISCONTINUED | OUTPATIENT
Start: 2022-03-30 | End: 2022-04-01 | Stop reason: HOSPADM

## 2022-03-30 RX ORDER — ONDANSETRON 2 MG/ML
4 INJECTION INTRAMUSCULAR; INTRAVENOUS EVERY 6 HOURS PRN
Status: DISCONTINUED | OUTPATIENT
Start: 2022-03-30 | End: 2022-04-01 | Stop reason: HOSPADM

## 2022-03-30 RX ORDER — SPIRONOLACTONE 25 MG/1
12.5 TABLET ORAL DAILY
Status: DISCONTINUED | OUTPATIENT
Start: 2022-03-31 | End: 2022-04-01 | Stop reason: HOSPADM

## 2022-03-30 RX ADMIN — SACUBITRIL AND VALSARTAN 1 TABLET: 49; 51 TABLET, FILM COATED ORAL at 19:18

## 2022-03-30 RX ADMIN — ACETAMINOPHEN 975 MG: 325 TABLET, FILM COATED ORAL at 21:11

## 2022-03-30 RX ADMIN — LEVETIRACETAM 500 MG: 100 INJECTION INTRAVENOUS at 19:17

## 2022-03-30 NOTE — H&P
H&P - Trauma   Brendan Porter 78 y o  female MRN: [id number]  Unit/Bed#: ED 24 Encounter: [id number]    Trauma Alert: Evaluation; trauma team notified at 5:30pm via text   Model of Arrival: Ambulance    Trauma Team: Attending Elpidio Hester and Residents Check  Consultants:     Neurosurgery: routine consult; Epic consult order placed; Assessment/Plan   Active Problems / Assessment:   Subdural hemorrhage  Fall     Plan:     Subdural hemorrhage:  Admit to Trauma Service  Hot protocol  Frequent neuro checks  Keppra for seizure prophylaxis  Neurosurgery consult, appreciate recommendations  Maintain SBP less than 160  Repeat CT head in the morning  Stat CT head if GCS decreases by 2  PT/OT    History of Present Illness     Chief Complaint: Subdural hemorrhage   Mechanism:Fall     HPI:    Brendan Porter is a 78 y o  female who presented to the emergency department after an outpatient CT scan demonstrated subdural hemorrhage  Patient had a mechanical fall approximately 1 week ago and since has been having intermittent headaches which prompted the CT scan  She does not take any blood thinners or anti-platelet medications  She is currently asymptomatic  She denies any headache, fever, chills, neck or back pain  No numbness or tingling  No chest pain, cough, or shortness of breath  No abdominal pain, nausea or vomiting  Review of Systems   Constitutional: Negative for chills and fever  HENT: Negative for congestion and facial swelling  Eyes: Negative for photophobia, redness and visual disturbance  Respiratory: Negative for cough and shortness of breath  Cardiovascular: Negative for chest pain and palpitations  Gastrointestinal: Negative for abdominal pain, nausea and vomiting  Genitourinary: Negative for dysuria, enuresis and flank pain  Musculoskeletal: Negative for neck pain and neck stiffness  Skin: Negative for rash and wound  Neurological: Positive for headaches   Negative for seizures, syncope, light-headedness and numbness  Hematological: Negative  Psychiatric/Behavioral: Negative  All other systems reviewed and are negative  12-point, complete review of systems was reviewed and negative except as stated above  Historical Information     Past Medical History:   Diagnosis Date    Bell's palsy     Cardiac disease     Ear problems     HL (hearing loss)     Hypertension     Tinnitus      Past Surgical History:   Procedure Laterality Date    CT BONE MARROW BIOPSY AND ASPIRATION  1/27/2021        Social History     Tobacco Use    Smoking status: Never Smoker    Smokeless tobacco: Never Used   Substance Use Topics    Alcohol use: No    Drug use: No     Immunization History   Administered Date(s) Administered    COVID-19 PFIZER VACCINE 0 3 ML IM 05/12/2021, 06/05/2021    Pneumococcal Conjugate 13-Valent 08/10/2021     Last Tetanus:  Unknown  Family History: Non-contributory    1  Before the illness or injury that brought you to the Emergency, did you need someone to help you on a regular basis? 0=No   2  Since the illness or injury that brought you to the Emergency, have you needed more help than usual to take care of yourself? 0=No   3  Have you been hospitalized for one or more nights during the past 6 months (excluding a stay in the Emergency Department)? 0=No   4  In general, do you see well? 0=Yes   5  In general, do you have serious problems with your memory? 0=No   6  Do you take more than three different medications everyday?  1=Yes   TOTAL   1     Did you order a geriatric consult if the score was 2 or greater?: n/a     Meds/Allergies   all current active meds have been reviewed     Allergies   Allergen Reactions    Ambien [Zolpidem] Other (See Comments)     Did not allow sleep per pt       Objective   Initial Vitals:   Temperature: 98 9 °F (37 2 °C) (03/30/22 1608)  Pulse: 70 (03/30/22 1608)  Respirations: 20 (03/30/22 1608)  Blood Pressure: 143/73 (03/30/22 1604)    Primary Survey:   Airway:        Status: patent;        Pre-hospital Interventions: none        Hospital Interventions: none  Breathing:        Pre-hospital Interventions: none       Effort: normal       Right breath sounds: normal       Left breath sounds: normal  Circulation:        Rhythm: regular       Rate: regular   Right Pulses Left Pulses    R radial: 2+  R femoral: 2+  R pedal: 2+     L radial: 2+  L femoral: 2+  L pedal: 2+       Disability:        GCS: Eye: 4; Verbal: 5 Motor: 6 Total: 15       Right Pupil: 3 mm;  round;  reactive         Left Pupil:  3 mm;  round;  reactive      R Motor Strength L Motor Strength    R : 5/5  R dorsiflex: 5/5  R plantarflex: 5/5 L : 5/5  L dorsiflex: 5/5  L plantarflex: 5/5        Sensory:  No sensory deficit  Exposure:       Completed: Yes      Secondary Survey:  Physical Exam  Vitals and nursing note reviewed  Constitutional:       General: She is not in acute distress  HENT:      Head: Normocephalic and atraumatic  Right Ear: External ear normal       Left Ear: External ear normal       Nose: Nose normal       Mouth/Throat:      Mouth: Mucous membranes are moist    Eyes:      Extraocular Movements: Extraocular movements intact  Conjunctiva/sclera: Conjunctivae normal       Pupils: Pupils are equal, round, and reactive to light  Cardiovascular:      Rate and Rhythm: Normal rate and regular rhythm  Pulses: Normal pulses  Heart sounds: Normal heart sounds  No murmur heard  Pulmonary:      Effort: Pulmonary effort is normal  No respiratory distress  Breath sounds: Normal breath sounds  Abdominal:      General: Abdomen is flat  Bowel sounds are normal       Tenderness: There is no abdominal tenderness  There is no guarding or rebound  Musculoskeletal:         General: No tenderness or deformity  Normal range of motion  Cervical back: Normal range of motion and neck supple  No tenderness        Comments: No midline neck or back tenderness, no step-offs or deformities, pelvis is stable   Skin:     General: Skin is warm and dry  Capillary Refill: Capillary refill takes less than 2 seconds  Neurological:      General: No focal deficit present  Mental Status: She is alert and oriented to person, place, and time  Comments: Left-sided facial droop from Bell's palsy, this is not new   Psychiatric:         Mood and Affect: Mood normal          Behavior: Behavior normal          Invasive Devices  Report    Peripheral Intravenous Line            Peripheral IV 03/30/22 Right Antecubital <1 day    Peripheral IV 03/30/22 Right Hand <1 day              Lab Results:   Results: I have personally reviewed all pertinent laboratory/tests results, BMP/CMP:   Lab Results   Component Value Date    SODIUM 140 03/30/2022    K 3 7 03/30/2022     03/30/2022    CO2 28 03/30/2022    BUN 19 03/30/2022    CREATININE 0 75 03/30/2022    CALCIUM 9 7 03/30/2022    AST 51 (H) 03/30/2022    ALT 73 03/30/2022    ALKPHOS 47 03/30/2022    EGFR 73 03/30/2022    and CBC:   Lab Results   Component Value Date    WBC 3 80 (L) 03/30/2022    HGB 12 8 03/30/2022    HCT 38 8 03/30/2022    MCV 96 03/30/2022    PLT 71 (L) 03/30/2022    MCH 31 5 03/30/2022    MCHC 33 0 03/30/2022    RDW 14 5 03/30/2022    MPV 12 6 03/30/2022    NRBC 0 03/30/2022       Imaging Results: I have personally reviewed pertinent reports  Chest Xray(s): N/A   FAST exam(s): N/A   CT Scan(s): positive for acute findings: Subdural hematoma   Additional Xray(s): N/A         Code Status: Prior  Advance Directive and Living Will:      Power of :    POLST:    I have spent 20 minutes with Patient  today in which greater than 50% of this time was spent in counseling/coordination of care regarding Diagnostic results, Prognosis, Patient and family education, Risk factor reductions and Impressions

## 2022-03-30 NOTE — PROGRESS NOTES
Assessment/Plan:    Hypertension, essential  Stable on current meds     Chronic ITP (idiopathic thrombocytopenia) (HCC)  Patient is not taking her Promacta as she thought she was supposed to stop it   Will check with her hematology as we are not sure what she should be taking       Dilated cardiomyopathy ([facility] )  Stable   Care per cardiology        Diagnoses and all orders for this visit:    Acute non intractable tension-type headache  -     Cancel: CT head wo contrast; Future  -     CT head wo contrast; Future    Hypertension, essential    Chronic ITP (idiopathic thrombocytopenia) (HCC)    Dilated cardiomyopathy ([facility] )    Injury of head, subsequent encounter  -     Cancel: CT head wo contrast; Future  -     CT head wo contrast; Future    Mild protein-calorie malnutrition (HCC)    Sacroiliitis (HCC)    Autoimmune hepatitis treated with steroids (HCC)    Nasal congestion  -     fluticasone (FLONASE) 50 mcg/act nasal spray; 2 sprays into each nostril daily          Subjective:      Patient ID: Lesly Wahl is a 78 y o  female  HPI  Here for follow up   Dull headaches on and off for 1 week  Located at whole entire head   + runny nose for a couple of weeks   Fall at home last week where she fell  And hit her head into the night stand into the bed - that's when the headache started  Denies Loss of consciousness       The following portions of the patient's history were reviewed and updated as appropriate: allergies, current medications, past family history, past medical history, past social history, past surgical history and problem list     Review of Systems   Constitutional: Negative  HENT: Negative  Respiratory: Negative  Cardiovascular: Negative  Gastrointestinal: Negative  Neurological: Positive for headaches  Hematological: Negative  Psychiatric/Behavioral: Negative            Objective:      /72 (BP Location: Left arm, Patient Position: Sitting, Cuff Size: Adult)   Pulse 80   Temp (!) 96 6 °F (35 9 °C) (Skin)   Resp 16   Ht 5' 3" (1 6 m)   Wt 54 9 kg (121 lb)   SpO2 100%   BMI 21 43 kg/m²          Physical Exam  Constitutional:       Appearance: Normal appearance  HENT:      Right Ear: Tympanic membrane normal       Left Ear: Tympanic membrane normal       Nose: Congestion and rhinorrhea present  Cardiovascular:      Rate and Rhythm: Normal rate and regular rhythm  Pulmonary:      Effort: Pulmonary effort is normal       Breath sounds: Normal breath sounds  Skin:     Capillary Refill: Capillary refill takes less than 2 seconds  Neurological:      General: No focal deficit present  Mental Status: She is alert and oriented to person, place, and time  Cranial Nerves: No cranial nerve deficit  Sensory: No sensory deficit     Psychiatric:         Mood and Affect: Mood normal

## 2022-03-30 NOTE — ASSESSMENT & PLAN NOTE
Acute left hemispheric subdural hematoma, 6 mm in greatest thickness with mass effect resulting in sulcal effacement and up to 4 mm of left to right midline shift  - repeat CTH 3/31 stable  -Keppra for seizure prophylaxis  -maintain SBP less than 160  -stat CT head if GCS decreases by 2  -Q 4 neuro checks  -appreciate Neurosurgery recommendations  -PT/OT

## 2022-03-30 NOTE — ASSESSMENT & PLAN NOTE
Patient is not taking her Promacta as she thought she was supposed to stop it   Will check with her hematology as we are not sure what she should be taking

## 2022-03-30 NOTE — ED PROVIDER NOTES
History  Chief Complaint   Patient presents with    Evaluation of Abnormal Diagnostic Test     patient fell last tuesday and hit her head on night stand  denies LOC and has been having headaches  PCP ordered CT scan for today which showed left subdural hematoma with a 4mm left to right midline shift  patient c/o of a dull headache  denies blood thinners     78year old female not on thinners, history of thrombocytopenia, significant cardiac history with life vest in place presenting for evaluation of outpatient CT head with left-sided subdural hematoma measuring 6 mm in largest diameter with 4 mm of shift  Patient had a CT scan performed after she had a fall with positive head strike 8 days ago  She reports that she had a mechanical fall  No lightheadedness, dizziness, chest pain, abdominal pain prior to falling  She had no falls after this  She went to the PCP for evaluation because she has been having some slight headaches and has also been feeling little bit more off balance than usual   No other new focal deficits  She has a history of Bell's palsy with baseline left-sided facial droop  No new numbness, tingling, weakness anywhere  She is not sure what her last platelet count was  History provided by:  Patient   used: No        Prior to Admission Medications   Prescriptions Last Dose Informant Patient Reported? Taking?   ascorbic acid (VITAMIN C) 500 mg tablet  Self Yes No   Sig: Take 500 mg by mouth daily   budesonide (ENTOCORT EC) 3 MG capsule  Self Yes No   Sig: Take 9 mg by mouth daily Takes 3 capsules once daily      fluticasone (FLONASE) 50 mcg/act nasal spray   No No   Si sprays into each nostril daily   metoprolol succinate (TOPROL-XL) 25 mg 24 hr tablet  Self No No   Sig: Take 1 tablet (25 mg total) by mouth daily   potassium chloride (MICRO-K) 10 MEQ CR capsule  Self No No   Sig: Take 1 capsule (10 mEq total) by mouth daily   predniSONE 10 mg tablet  Self No No Sig: Take 1 tablet (10 mg total) by mouth daily   sacubitril-valsartan (Entresto) 49-51 MG TABS  Self No No   Sig: Take 1 tablet by mouth 2 (two) times a day   spironolactone (ALDACTONE) 25 mg tablet  Self No No   Sig: Take 0 5 tablets (12 5 mg total) by mouth daily   torsemide (DEMADEX) 10 mg tablet  Self No No   Sig: Take 1 tablet (10 mg total) by mouth daily      Facility-Administered Medications: None       Past Medical History:   Diagnosis Date    Bell's palsy     Cardiac disease     Ear problems     HL (hearing loss)     Hypertension     Tinnitus        Past Surgical History:   Procedure Laterality Date    CT BONE MARROW BIOPSY AND ASPIRATION  1/27/2021       Family History   Problem Relation Age of Onset    Autoimmune disease Neg Hx      I have reviewed and agree with the history as documented  E-Cigarette/Vaping     E-Cigarette/Vaping Substances     Social History     Tobacco Use    Smoking status: Never Smoker    Smokeless tobacco: Never Used   Substance Use Topics    Alcohol use: No     Alcohol/week: 0 0 standard drinks    Drug use: No        Review of Systems   Constitutional: Negative for chills and fever  HENT: Negative for congestion and sore throat  Eyes: Negative for visual disturbance  Respiratory: Negative for cough and shortness of breath  Cardiovascular: Negative for chest pain and palpitations  Gastrointestinal: Negative for abdominal pain, diarrhea, nausea and vomiting  Genitourinary: Negative for dysuria and hematuria  Musculoskeletal: Negative for arthralgias and back pain  Skin: Negative for color change and rash  Neurological: Positive for headaches  Negative for syncope, weakness and light-headedness  Psychiatric/Behavioral: Negative for confusion  The patient is not nervous/anxious  All other systems reviewed and are negative        Physical Exam  ED Triage Vitals [03/30/22 1608]   Temperature Pulse Respirations Blood Pressure SpO2   98 9 °F (37 2 °C) 70 20 143/73 100 %      Temp Source Heart Rate Source Patient Position - Orthostatic VS BP Location FiO2 (%)   Oral Monitor Lying Left arm --      Pain Score       2             Orthostatic Vital Signs  Vitals:    03/31/22 0346 03/31/22 0749 03/31/22 1202 03/31/22 1511   BP: 105/52 137/77 114/61 111/63   Pulse: 62 64  70   Patient Position - Orthostatic VS:           Physical Exam  Vitals and nursing note reviewed  Constitutional:       General: She is not in acute distress  Appearance: She is well-developed  She is not diaphoretic  HENT:      Head: Normocephalic and atraumatic  Right Ear: External ear normal       Left Ear: External ear normal       Nose: Nose normal       Mouth/Throat:      Mouth: Mucous membranes are moist       Pharynx: Oropharynx is clear  Eyes:      Extraocular Movements: Extraocular movements intact  Conjunctiva/sclera: Conjunctivae normal       Pupils: Pupils are equal, round, and reactive to light  Cardiovascular:      Rate and Rhythm: Normal rate and regular rhythm  Heart sounds: No murmur heard  Pulmonary:      Effort: Pulmonary effort is normal  No respiratory distress  Breath sounds: Normal breath sounds  No wheezing  Abdominal:      Palpations: Abdomen is soft  Tenderness: There is no abdominal tenderness  Musculoskeletal:         General: Normal range of motion  Cervical back: Normal range of motion and neck supple  Skin:     General: Skin is warm and dry  Neurological:      General: No focal deficit present  Mental Status: She is alert  Comments: Left-sided facial droop involving V1 through V3 distributions  Normal sensation on both sides  No pronator drift  No dysmetria     Psychiatric:         Mood and Affect: Mood normal          ED Medications  Medications   ondansetron (ZOFRAN) injection 4 mg (has no administration in time range)   senna (SENOKOT) tablet 17 2 mg (17 2 mg Oral Refused 3/31/22 0816) levETIRAcetam (KEPPRA) 500 mg in sodium chloride 0 9 % 100 mL IVPB (0 mg Intravenous Stopped 3/31/22 0900)   acetaminophen (TYLENOL) tablet 975 mg (975 mg Oral Given 3/31/22 1312)   oxyCODONE (ROXICODONE) IR tablet 2 5 mg (has no administration in time range)   oxyCODONE (ROXICODONE) IR tablet 5 mg (has no administration in time range)   HYDROmorphone HCl (DILAUDID) injection 0 2 mg (has no administration in time range)   naloxone (NARCAN) 0 04 mg/mL syringe 0 04 mg (has no administration in time range)   ascorbic acid (VITAMIN C) tablet 500 mg (500 mg Oral Given 3/31/22 0849)   fluticasone (FLONASE) 50 mcg/act nasal spray 2 spray (2 sprays Nasal Given 3/31/22 1207)   metoprolol succinate (TOPROL-XL) 24 hr tablet 25 mg (25 mg Oral Given 3/31/22 0848)   potassium chloride (K-DUR,KLOR-CON) CR tablet 10 mEq (10 mEq Oral Given 3/31/22 0849)   predniSONE tablet 10 mg (10 mg Oral Given 3/31/22 0848)   sacubitril-valsartan (ENTRESTO) 49-51 MG per tablet 1 tablet (1 tablet Oral Given 3/31/22 0848)   spironolactone (ALDACTONE) tablet 12 5 mg (12 5 mg Oral Given 3/31/22 0848)   torsemide (DEMADEX) tablet 10 mg (10 mg Oral Given 3/31/22 0849)   Labetalol HCl (NORMODYNE) injection 10 mg (has no administration in time range)   heparin (porcine) subcutaneous injection 5,000 Units (has no administration in time range)       Diagnostic Studies  Results Reviewed     Procedure Component Value Units Date/Time    CBC (With Platelets) [[id number]]  (Abnormal) Collected: 03/31/22 0623    Lab Status: Final result Specimen: Blood from Arm, Left Updated: 03/31/22 1001     WBC 2 26 Thousand/uL      RBC 3 53 Million/uL      Hemoglobin 11 2 g/dL      Hematocrit 34 0 %      MCV 96 fL      MCH 31 7 pg      MCHC 32 9 g/dL      RDW 14 6 %      Platelets 52 Thousands/uL      MPV 12 7 fL     Comprehensive metabolic panel [[id number]]  (Abnormal) Collected: 03/31/22 0623    Lab Status: Final result Specimen: Blood from Arm, Left Updated: 03/31/22 5131     Sodium 142 mmol/L      Potassium 3 9 mmol/L      Chloride 110 mmol/L      CO2 28 mmol/L      ANION GAP 4 mmol/L      BUN 14 mg/dL      Creatinine 0 76 mg/dL      Glucose 80 mg/dL      Calcium 8 7 mg/dL      Corrected Calcium 9 7 mg/dL      AST 43 U/L      ALT 59 U/L      Alkaline Phosphatase 38 U/L      Total Protein 6 1 g/dL      Albumin 2 7 g/dL      Total Bilirubin 0 56 mg/dL      eGFR 72 ml/min/1 73sq m     Narrative:      [place] guidelines for Chronic Kidney Disease (CKD):     Stage 1 with normal or high GFR (GFR > 90 mL/min/1 73 square meters)    Stage 2 Mild CKD (GFR = 60-89 mL/min/1 73 square meters)    Stage 3A Moderate CKD (GFR = 45-59 mL/min/1 73 square meters)    Stage 3B Moderate CKD (GFR = 30-44 mL/min/1 73 square meters)    Stage 4 Severe CKD (GFR = 15-29 mL/min/1 73 square meters)    Stage 5 End Stage CKD (GFR <15 mL/min/1 73 square meters)  Note: GFR calculation is accurate only with a steady state creatinine    HS Troponin I 4hr [[id number]]  (Abnormal) Collected: 03/30/22 2112    Lab Status: Final result Specimen: Blood from Arm, Left Updated: 03/30/22 2148     hs TnI 4hr 62 ng/L      Delta 4hr hsTnI -14 ng/L     HS Troponin I 2hr [[id number]]  (Abnormal) Collected: 03/30/22 1907    Lab Status: Final result Specimen: Blood from Hand, Right Updated: 03/30/22 1943     hs TnI 2hr 68 ng/L      Delta 2hr hsTnI -8 ng/L     Protime-INR [[id number]]  (Abnormal) Collected: 03/30/22 1646    Lab Status: Final result Specimen: Blood from Arm, Right Updated: 03/30/22 1734     Protime 14 7 seconds      INR 1 20    APTT [[id number]]  (Normal) Collected: 03/30/22 1646    Lab Status: Final result Specimen: Blood from Arm, Right Updated: 03/30/22 1734     PTT 32 seconds     HS Troponin 0hr (reflex protocol) [[id number]]  (Abnormal) Collected: 03/30/22 1648    Lab Status: Final result Specimen: Blood from Arm, Right Updated: 03/30/22 1722     hs TnI 0hr 76 ng/L     CBC and differential [[id number]]  (Abnormal) Collected: 03/30/22 1646    Lab Status: Final result Specimen: Blood from Arm, Right Updated: 03/30/22 1722     WBC 3 80 Thousand/uL      RBC 4 06 Million/uL      Hemoglobin 12 8 g/dL      Hematocrit 38 8 %      MCV 96 fL      MCH 31 5 pg      MCHC 33 0 g/dL      RDW 14 5 %      MPV 12 6 fL      Platelets 71 Thousands/uL      nRBC 0 /100 WBCs      Neutrophils Relative 57 %      Immat GRANS % 1 %      Lymphocytes Relative 30 %      Monocytes Relative 11 %      Eosinophils Relative 0 %      Basophils Relative 1 %      Neutrophils Absolute 2 21 Thousands/µL      Immature Grans Absolute 0 02 Thousand/uL      Lymphocytes Absolute 1 12 Thousands/µL      Monocytes Absolute 0 42 Thousand/µL      Eosinophils Absolute 0 01 Thousand/µL      Basophils Absolute 0 02 Thousands/µL     Comprehensive metabolic panel [[id number]]  (Abnormal) Collected: 03/30/22 1646    Lab Status: Final result Specimen: Blood from Arm, Right Updated: 03/30/22 1716     Sodium 140 mmol/L      Potassium 3 7 mmol/L      Chloride 105 mmol/L      CO2 28 mmol/L      ANION GAP 7 mmol/L      BUN 19 mg/dL      Creatinine 0 75 mg/dL      Glucose 89 mg/dL      Calcium 9 7 mg/dL      Corrected Calcium 10 2 mg/dL      AST 51 U/L      ALT 73 U/L      Alkaline Phosphatase 47 U/L      Total Protein 7 7 g/dL      Albumin 3 4 g/dL      Total Bilirubin 0 63 mg/dL      eGFR 73 ml/min/1 73sq m     Narrative:      [place] guidelines for Chronic Kidney Disease (CKD):     Stage 1 with normal or high GFR (GFR > 90 mL/min/1 73 square meters)    Stage 2 Mild CKD (GFR = 60-89 mL/min/1 73 square meters)    Stage 3A Moderate CKD (GFR = 45-59 mL/min/1 73 square meters)    Stage 3B Moderate CKD (GFR = 30-44 mL/min/1 73 square meters)    Stage 4 Severe CKD (GFR = 15-29 mL/min/1 73 square meters)    Stage 5 End Stage CKD (GFR <15 mL/min/1 73 square meters)  Note: GFR calculation is accurate only with a steady state creatinine                 CT head wo contrast   Final Result by Jonh Cain MD (03/31 0840)      Stable left hemispheric subdural extends along the tentorium with maximal thickness of 6 mm  Stable mild mass effect with 3 mm of left-to-right shift  Workstation performed: [id number]               Procedures  Procedures      ED Course  ED Course as of 03/31/22 1546   Wed Mar 30, 2022   1711 Trauma aware                             SBIRT 22yo+      Most Recent Value   SBIRT (23 yo +)    In order to provide better care to our patients, we are screening all of our patients for alcohol and drug use  Would it be okay to ask you these screening questions? No Filed at: 03/30/2022 1646                [facility]  Number of Diagnoses or Management Options  SDH (subdural hematoma) (HCC)  Diagnosis management comments: 67-year-old female presenting for evaluation of abnormal CT scan today  Patient found to have acute left-sided subdural hematoma after a fall approximately 8 days ago  Neuro wise is at her baseline  Does have a history of Bell's palsy affecting left side of her face but has no other new findings  She reports headache and intermittently having difficulties with her balance which may be related to her symptoms  Is not on any thinners or antiplatelets  Does have a history of thrombocytopenia  Will check labs including CBC, coags  Will be admitted to trauma service for further evaluation and likely repeat CT head as well as neuro surgical evaluation        Disposition  Final diagnoses:   SD[facility] (subdural hematoma) ([facility] )     Time reflects when diagnosis was documented in both MDM as applicable and the Disposition within this note     Time User Action Codes Description Comment    3/30/2022  5:41 PM Trinity Townsend Add [S06 [id number]] SDH (subdural hematoma) ([facility] )     3/30/2022  8:02 PM Abril Jacome Add [I62 [id number]] Subdural hemorrhage [facility])       ED Disposition     ED Disposition Condition Date/Time Comment Admit Stable Wed Mar 30, 2022 11:48 PM Case was discussed with Dr Anna Samuels and the patient's admission status was agreed to be Admission Status: inpatient status to the service of Dr Anna Samuels   Follow-up Information     Follow up With Specialties Details Why Contact Sindy    Ruthann Landinprospery[facility]  Follow up PT/OT/SN [address] [facility]  [phone]            Current Discharge Medication List      CONTINUE these medications which have NOT CHANGED    Details   ascorbic acid (VITAMIN C) 500 mg tablet Take 500 mg by mouth daily      budesonide (ENTOCORT EC) 3 MG capsule Take 9 mg by mouth daily Takes 3 capsules once daily  Associated Diagnoses: Autoimmune hepatitis treated with steroids (HCC)      fluticasone (FLONASE) 50 mcg/act nasal spray 2 sprays into each nostril daily  Qty: 16 g, Refills: 0    Associated Diagnoses: Nasal congestion      metoprolol succinate (TOPROL-XL) 25 mg 24 hr tablet Take 1 tablet (25 mg total) by mouth daily  Qty: 90 tablet, Refills: 3    Associated Diagnoses: Autoimmune hepatitis (HCC)      potassium chloride (MICRO-K) 10 MEQ CR capsule Take 1 capsule (10 mEq total) by mouth daily  Qty: 90 capsule, Refills: 3    Associated Diagnoses: Chronic systolic CHF (congestive heart failure), NYHA class 3 (HCC)      predniSONE 10 mg tablet Take 1 tablet (10 mg total) by mouth daily    Associated Diagnoses: Autoimmune hepatitis treated with steroids (HCC)      sacubitril-valsartan (Entresto) 49-51 MG TABS Take 1 tablet by mouth 2 (two) times a day  Qty: 180 tablet, Refills: 3    Associated Diagnoses: Chronic systolic CHF (congestive heart failure), NYHA class 3 (HCC)      spironolactone (ALDACTONE) 25 mg tablet Take 0 5 tablets (12 5 mg total) by mouth daily  Qty: 90 tablet, Refills: 3    Associated Diagnoses: Dilated cardiomyopathy ([address] );  Chronic systolic CHF (congestive heart failure), NYHA class 3 (HCC)      torsemide (DEMADEX) 10 mg tablet Take 1 tablet (10 mg total) by mouth daily  Qty: 90 tablet, Refills: 3    Associated Diagnoses: Autoimmune hepatitis ([facility] )           No discharge procedures on file  PDMP Review     None           ED Provider  Attending physically available and evaluated Sol Norwood  CARMINA managed the patient along with the ED Attending      Electronically Signed by         Zohra Brewer MD  03/31/22 0633

## 2022-03-31 ENCOUNTER — APPOINTMENT (INPATIENT)
Dept: RADIOLOGY | Facility: HOSPITAL | Age: 82
DRG: 085 | End: 2022-03-31
Payer: MEDICARE
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
## 2022-03-31 LAB
ALBUMIN SERPL BCP-MCNC: 2.7 G/DL (ref 3.5–5)
ALP SERPL-CCNC: 38 U/L (ref 46–116)
ALT SERPL W P-5'-P-CCNC: 59 U/L (ref 12–78)
ANION GAP SERPL CALCULATED.3IONS-SCNC: 4 MMOL/L (ref 4–13)
AST SERPL W P-5'-P-CCNC: 43 U/L (ref 5–45)
BILIRUB SERPL-MCNC: 0.56 MG/DL (ref 0.2–1)
BUN SERPL-MCNC: 14 MG/DL (ref 5–25)
CALCIUM ALBUM COR SERPL-MCNC: 9.7 MG/DL (ref 8.3–10.1)
CALCIUM SERPL-MCNC: 8.7 MG/DL (ref 8.3–10.1)
CHLORIDE SERPL-SCNC: 110 MMOL/L (ref 100–108)
CO2 SERPL-SCNC: 28 MMOL/L (ref 21–32)
CREAT SERPL-MCNC: 0.76 MG/DL (ref 0.6–1.3)
ERYTHROCYTE [DISTWIDTH] IN BLOOD BY AUTOMATED COUNT: 14.6 % (ref 11.6–15.1)
GFR SERPL CREATININE-BSD FRML MDRD: 72 ML/MIN/1.73SQ M
GLUCOSE SERPL-MCNC: 80 MG/DL (ref 65–140)
HCT VFR BLD AUTO: 34 % (ref 34.8–46.1)
HGB BLD-MCNC: 11.2 G/DL (ref 11.5–15.4)
MCH RBC QN AUTO: 31.7 PG (ref 26.8–34.3)
MCHC RBC AUTO-ENTMCNC: 32.9 G/DL (ref 31.4–37.4)
MCV RBC AUTO: 96 FL (ref 82–98)
PLATELET # BLD AUTO: 52 THOUSANDS/UL (ref 149–390)
PMV BLD AUTO: 12.7 FL (ref 8.9–12.7)
POTASSIUM SERPL-SCNC: 3.9 MMOL/L (ref 3.5–5.3)
PROT SERPL-MCNC: 6.1 G/DL (ref 6.4–8.2)
RBC # BLD AUTO: 3.53 MILLION/UL (ref 3.81–5.12)
SODIUM SERPL-SCNC: 142 MMOL/L (ref 136–145)
WBC # BLD AUTO: 2.26 THOUSAND/UL (ref 4.31–10.16)

## 2022-03-31 PROCEDURE — 85027 COMPLETE CBC AUTOMATED: CPT | Performed by: EMERGENCY MEDICINE

## 2022-03-31 PROCEDURE — 99223 1ST HOSP IP/OBS HIGH 75: CPT | Performed by: INTERNAL MEDICINE

## 2022-03-31 PROCEDURE — 97167 OT EVAL HIGH COMPLEX 60 MIN: CPT

## 2022-03-31 PROCEDURE — 99232 SBSQ HOSP IP/OBS MODERATE 35: CPT | Performed by: SURGERY

## 2022-03-31 PROCEDURE — 80053 COMPREHEN METABOLIC PANEL: CPT | Performed by: EMERGENCY MEDICINE

## 2022-03-31 PROCEDURE — 97163 PT EVAL HIGH COMPLEX 45 MIN: CPT

## 2022-03-31 PROCEDURE — 70450 CT HEAD/BRAIN W/O DYE: CPT

## 2022-03-31 PROCEDURE — G1004 CDSM NDSC: HCPCS

## 2022-03-31 RX ORDER — HEPARIN SODIUM 5000 [USP'U]/ML
5000 INJECTION, SOLUTION INTRAVENOUS; SUBCUTANEOUS EVERY 8 HOURS SCHEDULED
Status: DISCONTINUED | OUTPATIENT
Start: 2022-03-31 | End: 2022-04-01 | Stop reason: HOSPADM

## 2022-03-31 RX ADMIN — METOPROLOL SUCCINATE 25 MG: 25 TABLET, FILM COATED, EXTENDED RELEASE ORAL at 08:48

## 2022-03-31 RX ADMIN — POTASSIUM CHLORIDE 10 MEQ: 750 TABLET, EXTENDED RELEASE ORAL at 08:49

## 2022-03-31 RX ADMIN — SACUBITRIL AND VALSARTAN 1 TABLET: 49; 51 TABLET, FILM COATED ORAL at 08:48

## 2022-03-31 RX ADMIN — FLUTICASONE PROPIONATE 2 SPRAY: 50 SPRAY, METERED NASAL at 12:07

## 2022-03-31 RX ADMIN — PREDNISONE 10 MG: 10 TABLET ORAL at 08:48

## 2022-03-31 RX ADMIN — SPIRONOLACTONE 12.5 MG: 25 TABLET ORAL at 08:48

## 2022-03-31 RX ADMIN — HEPARIN SODIUM 5000 UNITS: 5000 INJECTION INTRAVENOUS; SUBCUTANEOUS at 17:21

## 2022-03-31 RX ADMIN — LEVETIRACETAM 500 MG: 100 INJECTION INTRAVENOUS at 17:21

## 2022-03-31 RX ADMIN — ACETAMINOPHEN 975 MG: 325 TABLET, FILM COATED ORAL at 06:28

## 2022-03-31 RX ADMIN — LEVETIRACETAM 500 MG: 100 INJECTION INTRAVENOUS at 08:45

## 2022-03-31 RX ADMIN — ACETAMINOPHEN 975 MG: 325 TABLET, FILM COATED ORAL at 13:12

## 2022-03-31 RX ADMIN — OXYCODONE HYDROCHLORIDE AND ACETAMINOPHEN 500 MG: 500 TABLET ORAL at 08:49

## 2022-03-31 RX ADMIN — TORSEMIDE 10 MG: 20 TABLET ORAL at 08:49

## 2022-03-31 RX ADMIN — SACUBITRIL AND VALSARTAN 1 TABLET: 49; 51 TABLET, FILM COATED ORAL at 17:21

## 2022-03-31 NOTE — PHYSICAL THERAPY NOTE
PHYSICAL THERAPY EVALUATION          Patient Name: Miguel MCGOVERN'S Date: 3/31/2022        03/31/22 1201   PT Last Visit   PT Visit Date 03/31/22   Note Type   Note type Evaluation   Pain Assessment   Pain Assessment Tool 0-10   Pain Score No Pain   Restrictions/Precautions   Weight Bearing Precautions Per Order No   Other Precautions Seizure; Fall Risk;Telemetry  (LifeVest pt)   Home Living   Type of [address] One level;Stairs to enter with rails  ([address])   Bathroom Shower/Tub Tub/shower unit   Bathroom Toilet Standard   Bathroom Equipment Grab bars in [facility] Other (Comment)  (none)   Additional Comments No DME use PTA   Prior Function   Level of [place] Independent with ADLs and functional mobility   Lives With Spouse  (Spouse is LVAD pt and she is his primary caregiver)   Receives Help From Family  (Son visits often)   ADL Assistance Independent   IADLs Independent   Falls in the last 6 months 0   Vocational Retired   Comments Questionable help from son will confirm with case management   General   Family/Caregiver Present No   Cognition   Overall Cognitive Status WFL   Arousal/Participation Alert   Orientation Level Oriented X4   Memory Within functional limits   Following Commands Follows one step commands with increased time or repetition   Subjective   Subjective pt in bed upon arrival agreeable to tx   RUE Assessment   RUE Assessment WFL   LUE Assessment   LUE Assessment WFL   RLE Assessment   RLE Assessment WFL   Strength RLE   RLE Overall Strength 4-/5   LLE Assessment   LLE Assessment WFL   Strength LLE   LLE Overall Strength 4-/5   Bed Mobility   Sit to Supine 5  Supervision   Transfers   Sit to Stand 5  Supervision   Stand to Sit 5  Supervision   Ambulation/Elevation   Gait pattern Improper Weight shift;Decreased foot clearance;Shuffling; Short stride; Excessively slow   Gait Assistance 4  Minimal assist   Additional items Assist x 1   Assistive Device None   Distance 80'x2   Balance   Static Sitting Good   Static Standing Fair   Ambulatory Fair -   Endurance Deficit   Endurance Deficit Yes   Endurance Deficit Description limited due to fatigue    Activity Tolerance   Activity Tolerance Patient limited by fatigue   Medical Staff Made Aware Michael So DPT, OT present due to complexity of pt   Nurse Made Aware RN cleared to tx   Assessment   Prognosis Fair   Problem List Decreased strength;Decreased endurance; Impaired balance;Decreased mobility   Assessment Pt presented to [facility] on 3/31/22 for initial PT eval  PT reported to [facility] ED on 3/30/22 with a complaint of a dull headache due to a fall on 3/22/22  The pts primary diagnosis is SDH  Pts PMH reveals bells palsy, cardiac disease, ear problems, HL, HTN, and tinnitus  Pt is considered to be high complexity due to Ongoing medical management for primary dx, Decreased activity tolerance compared to baseline, Increased assistance needed from caregiver at current time, Ongoing telemetry monitoring, Continuous pulse oximetry monitoring   Pt presented to initial PT eval with the following limitations; decreased strength, decreased balance, decreased endurance and gait deviations  The pt needed MinAx1 for amb and had 2 corrective LOB  Pt was left OOB with all needs within reach and no further questions or concerns  Pt will continue to benefit from skilled PT during hospital stay in order to address the above limitations  PT recommends d/c to rehab  Barriers to Discharge Decreased caregiver support   Goals   Patient Goals to go home   STG Expiration Date 04/10/22   Short Term Goal #1 1) Pt will perform bed mobility with [place] in order to decrease caregiver assistance  2) Pt will perform all transfers with [place] in order to increase functional independence  3) Pt will amb 150' with LRAD and [place] in order to return to PLOF   4) Pt will negotiate 13 stairs with [place] in order to enter and exit home safely  5) Pt will improve balance by a grade in order to decrease fall risk  6) Pt will increase strength by half a grade in order to improve functional mobility  Plan   Treatment/Interventions Functional transfer training;LE strengthening/ROM; Therapeutic exercise; Endurance training;Bed mobility;Gait training;Spoke to nursing;OT;Equipment eval/education;Patient/family training   PT Frequency 3-5x/wk   Recommendation   PT Discharge Recommendation Post acute rehabilitation services   AM-PAC Basic Mobility Inpatient   Turning in Bed Without Bedrails 4   Lying on Back to Sitting on Edge of Flat Bed 4   Moving Bed to Chair 3   Standing Up From Chair 3   Walk in Room 3   Climb 3-5 Stairs 2   Basic Mobility Inpatient Raw Score 19   Basic Mobility Standardized Score 42 48   Highest Level Of Mobility   [facility] Goal 6: Walk 10 steps or more   Modified [place] Scale   Modified [place] Scale 3   Barthel Index   Feeding 10   Bathing 5   Grooming Score 5   Dressing Score 5   Bladder Score 10   Bowels Score 10   Toilet Use Score 10   Transfers (Bed/Chair) Score 15   Mobility (Level Surface) Score 15   Stairs Score 5   Barthel Index Score 90   Viridiana Llanos, spt

## 2022-03-31 NOTE — PLAN OF CARE
Problem: Potential for Falls  Goal: Patient will remain free of falls  Description: INTERVENTIONS:  - Educate patient/family on patient safety including physical limitations  - Instruct patient to call for assistance with activity   - Consult OT/PT to assist with strengthening/mobility   - Keep Call bell within reach  - Keep bed low and locked with side rails adjusted as appropriate  - Keep care items and personal belongings within reach  - Initiate and maintain comfort rounds  - Make Fall Risk Sign visible to staff  - Offer Toileting every    Hours, in advance of need  - Initiate/Maintain   alarm  - Obtain necessary fall risk management equipment:     - Apply yellow socks and bracelet for high fall risk patients  - Consider moving patient to room near nurses station  Outcome: Progressing     Problem: INFECTION - ADULT  Goal: Absence or prevention of progression during hospitalization  Description: INTERVENTIONS:  - Assess and monitor for signs and symptoms of infection  - Monitor lab/diagnostic results  - Monitor all insertion sites, i e  indwelling lines, tubes, and drains  - Monitor endotracheal if appropriate and nasal secretions for changes in amount and color  - [place] appropriate cooling/warming therapies per order  - Administer medications as ordered  - Instruct and encourage patient and family to use good hand hygiene technique  - Identify and instruct in appropriate isolation precautions for identified infection/condition  Outcome: Progressing     Problem: SAFETY ADULT  Goal: Patient will remain free of falls  Description: INTERVENTIONS:  - Educate patient/family on patient safety including physical limitations  - Instruct patient to call for assistance with activity   - Consult OT/PT to assist with strengthening/mobility   - Keep Call bell within reach  - Keep bed low and locked with side rails adjusted as appropriate  - Keep care items and personal belongings within reach  - Initiate and maintain comfort rounds  - Make Fall Risk Sign visible to staff  - Offer Toileting every    Hours, in advance of need  - Initiate/Maintain   alarm  - Obtain necessary fall risk management equipment:     - Apply yellow socks and bracelet for high fall risk patients  - Consider moving patient to room near nurses station  Outcome: Progressing  Goal: Maintain or return to baseline ADL function  Description: INTERVENTIONS:  -  Assess patient's ability to carry out ADLs; assess patient's baseline for ADL function and identify physical deficits which impact ability to perform ADLs (bathing, care of mouth/teeth, toileting, grooming, dressing, etc )  - Assess/evaluate cause of self-care deficits   - Assess range of motion  - Assess patient's mobility; develop plan if impaired  - Assess patient's need for assistive devices and provide as appropriate  - Encourage maximum independence but intervene and supervise when necessary  - Involve family in performance of ADLs  - Assess for home care needs following discharge   - Consider OT consult to assist with ADL evaluation and planning for discharge  - Provide patient education as appropriate  Outcome: Progressing  Goal: Maintains/Returns to pre admission functional level  Description: INTERVENTIONS:  - Perform BMAT or MOVE assessment daily    - Set and communicate daily mobility goal to care team and patient/family/caregiver  - Collaborate with rehabilitation services on mobility goals if consulted  - Perform Range of Motion    times a day  - Reposition patient every    hours    - Dangle patient    times a day  - Stand patient    times a day  - Ambulate patient    times a day  - Out of bed to chair    times a day   - Out of bed for meals    times a day  - Out of bed for toileting  - Record patient progress and toleration of activity level   Outcome: Progressing     Problem: DISCHARGE PLANNING  Goal: Discharge to home or other facility with appropriate resources  Description: INTERVENTIONS:  - Identify barriers to discharge w/patient and caregiver  - Arrange for needed discharge resources and transportation as appropriate  - Identify discharge learning needs (meds, wound care, etc )  - Arrange for interpretive services to assist at discharge as needed  - Refer to Case Management Department for coordinating discharge planning if the patient needs post-hospital services based on physician/advanced practitioner order or complex needs related to functional status, cognitive ability, or social support system  Outcome: Progressing     Problem: Knowledge Deficit  Goal: Patient/family/caregiver demonstrates understanding of disease process, treatment plan, medications, and discharge instructions  Description: Complete learning assessment and assess knowledge base    Interventions:  - Provide teaching at level of understanding  - Provide teaching via preferred learning methods  Outcome: Progressing

## 2022-03-31 NOTE — PLAN OF CARE
Problem: PHYSICAL THERAPY ADULT  Goal: Performs mobility at highest level of function for planned discharge setting  See evaluation for individualized goals  Description: Treatment/Interventions: Functional transfer training,LE strengthening/ROM,Therapeutic exercise,Endurance training,Bed mobility,Gait training,Spoke to nursing,OT,Equipment eval/education,Patient/family training          See flowsheet documentation for full assessment, interventions and recommendations  Note: Prognosis: Fair  Problem List: Decreased strength,Decreased endurance,Impaired balance,Decreased mobility  Assessment: Pt presented to [facility]\"" on 3/31/22 for initial PT eval  PT reported to \[facility]\"" ED on 3/30/22 with a complaint of a dull headache due to a fall on 3/22/22  The pts primary diagnosis is SDH  Pts PMH reveals bells palsy, cardiac disease, ear problems, HL, HTN, and tinnitus  Pt is considered to be high complexity due to Ongoing medical management for primary dx, Decreased activity tolerance compared to baseline, Increased assistance needed from caregiver at current time, Ongoing telemetry monitoring, Continuous pulse oximetry monitoring   Pt presented to initial PT eval with the following limitations; decreased strength, decreased balance, decreased endurance and gait deviations  The pt needed MinAx1 for amb and had 2 corrective LOB  Pt was left OOB with all needs within reach and no further questions or concerns  Pt will continue to benefit from skilled PT during hospital stay in order to address the above limitations  PT recommends d/c to rehab  Barriers to Discharge: Decreased caregiver support        PT Discharge Recommendation: Post acute rehabilitation services          See flowsheet documentation for full assessment

## 2022-03-31 NOTE — PHYSICAL THERAPY NOTE
PHYSICAL THERAPY EVALUATION          Patient Name: Randy Rogel  BDESO'O Date: 3/31/2022        03/31/22 1201   PT Last Visit   PT Visit Date 03/31/22   Note Type   Note type Evaluation   Pain Assessment   Pain Assessment Tool 0-10   Pain Score No Pain   Restrictions/Precautions   Weight Bearing Precautions Per Order No   Other Precautions Seizure   Home Living   Type of [address] One level;Stairs to enter with rails  ([address])   Bathroom Shower/Tub Tub/shower unit   Bathroom Toilet Standard   Bathroom Equipment Grab bars in [facility] Other (Comment)  (none)   Additional Comments No DME use PTA   Prior Function   Level of [place] Independent with ADLs and functional mobility   Lives With Spouse  (Spouse is LVAD pt and she is his primary caregiver)   Receives Help From Family  (Son visits often)   ADL Assistance Independent   IADLs Independent   Falls in the last 6 months 0   Vocational Retired   Comments Questionable help from son will confirm with case management   General   Family/Caregiver Present No   Cognition   Overall Cognitive Status WFL   Arousal/Participation Alert   Orientation Level Oriented X4   Memory Within functional limits   Following Commands Follows one step commands with increased time or repetition   Subjective   Subjective pt in bed upon arrival agreeable to tx   RUE Assessment   RUE Assessment WFL   LUE Assessment   LUE Assessment WFL   RLE Assessment   RLE Assessment WFL   Strength RLE   RLE Overall Strength 4-/5   LLE Assessment   LLE Assessment WFL   Strength LLE   LLE Overall Strength 4-/5   Bed Mobility   Sit to Supine 5  Supervision   Transfers   Sit to Stand 5  Supervision   Stand to Sit 5  Supervision   Ambulation/Elevation   Gait pattern Improper Weight shift;Decreased foot clearance;Shuffling; Short stride; Excessively slow   Gait Assistance 4  Minimal assist Additional items Assist x 1   Assistive Device None   Distance 80'x2   Balance   Static Sitting Good   Static Standing Fair   Ambulatory Fair -   Endurance Deficit   Endurance Deficit Yes   Endurance Deficit Description limited due to fatigue    Activity Tolerance   Activity Tolerance Patient limited by fatigue   Medical Staff Made Aware Taran Platt DPT, OT present due to complexity of pt   Nurse Made Aware RN cleared to tx   Assessment   Prognosis Fair   Problem List Decreased strength;Decreased endurance; Impaired balance;Decreased mobility   Assessment Pt presented to [facility] on 3/31/22 for initial PT eval  PT reported to [facility] ED on 3/30/22 with a complaint of a dull headache due to a fall on 3/22/22  The pts primary diagnosis is SDH  Pts PMH reveals bells palsy, cardiac disease, ear problems, HL, HTN, and tinnitus  Pt is considered to be high complexity due to Ongoing medical management for primary dx, Decreased activity tolerance compared to baseline, Increased assistance needed from caregiver at current time, Ongoing telemetry monitoring, Continuous pulse oximetry monitoring   Pt presented to initial PT eval with the following limitations; decreased strength, decreased balance, decreased endurance and gait deviations  The pt needed MinAx1 for amb and had 2 corrective LOB  Pt was left OOB with all needs within reach and no further questions or concerns  Pt will continue to benefit from skilled PT during hospital stay in order to address the above limitations  PT recommends d/c to rehab  Barriers to Discharge Decreased caregiver support   Goals   Patient Goals to go home   STG Expiration Date 04/10/22   Short Term Goal #1 1) Pt will perform bed mobility with [place] in order to decrease caregiver assistance  2) Pt will perform all transfers with [place] in order to increase functional independence  3) Pt will amb 150' with LRAD and [place] in order to return to PLOF   4) Pt will negotiate 13 stairs with [place] in order to enter and exit home safely  5) Pt will improve balance by a grade in order to decrease fall risk  6) Pt will increase strength by half a grade in order to improve functional mobility  Plan   Treatment/Interventions Functional transfer training;LE strengthening/ROM; Therapeutic exercise; Endurance training;Bed mobility;Gait training;Spoke to nursing;OT;Equipment eval/education;Patient/family training   PT Frequency 3-5x/wk   Recommendation   PT Discharge Recommendation Post acute rehabilitation services   AM-PAC Basic Mobility Inpatient   Turning in Bed Without Bedrails 4   Lying on Back to Sitting on Edge of Flat Bed 4   Moving Bed to Chair 3   Standing Up From Chair 3   Walk in Room 3   Climb 3-5 Stairs 2   Basic Mobility Inpatient Raw Score 19   Basic Mobility Standardized Score 42 48   Highest Level Of Mobility   [facility] Goal 6: Walk 10 steps or more   Modified [place] Scale   Modified Kelly Scale 3   Barthel Index   Feeding 10   Bathing 5   Grooming Score 5   Dressing Score 5   Bladder Score 10   Bowels Score 10   Toilet Use Score 10   Transfers (Bed/Chair) Score 15   Mobility (Level Surface) Score 15   Stairs Score 5   Barthel Index Score 90   Viridiana Llanos, spt

## 2022-03-31 NOTE — OCCUPATIONAL THERAPY NOTE
Occupational Therapy Evaluation     Patient Name: Allison PHAN Date: 3/31/2022  Problem List  Principal Problem:    Subdural hemorrhage ([address] )  Active Problems:    Hypertension, essential    Fall    Past Medical History  Past Medical History:   Diagnosis Date    Bell's palsy     Cardiac disease     Ear problems     HL (hearing loss)     Hypertension     Tinnitus      Past Surgical History  Past Surgical History:   Procedure Laterality Date    CT BONE MARROW BIOPSY AND ASPIRATION  1/27/2021 03/31/22 1200   OT Last Visit   OT Visit Date 03/31/22   Note Type   Note type Evaluation   Restrictions/Precautions   Weight Bearing Precautions Per Order No   Other Precautions Chair Alarm; Bed Alarm; Fall Risk;Seizure   Pain Assessment   Pain Assessment Tool 0-10   Pain Score No Pain   Home Living   Type of [address] [facility] [address] One level;Stairs to enter with rails  (FF to enter )   Bathroom Shower/Tub Tub/shower unit   Bathroom Toilet Standard   Bathroom Equipment Grab bars in shower   Prior Function   Level of [place] Independent with ADLs and functional mobility   Lives With FraireAshok Help From Family   ADL Assistance Independent   IADLs Independent   Falls in the last 6 months 1 to 4   Vocational Retired   Comments 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    Lifestyle   Autonomy I adls and mobility- denies additional falls - i iadls   Reciprocal Relationships supportive family - son assists prn   Service to Others 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   Intrinsic Gratification reports she is busy taking care of spouse and home    Subjective   Subjective offers no c/o    ADL   Eating Assistance 7  Independent   Grooming Assistance 5  Supervision/Setup   UB Bathing Assistance 5  Supervision/Setup   LB Bathing Assistance 4  Minimal Assistance    [address] 5  Supervision/Setup   LB Dressing Assistance 4  Minimal Assistance Toileting Assistance  4  Minimal Assistance   Bed Mobility   Supine to Sit 5  Supervision   Transfers   Sit to Stand 4  Minimal assistance   Stand to Sit 4  Minimal assistance   Stand pivot 4  Minimal assistance   Functional Mobility   Functional Mobility 4  Minimal assistance   Additional items Rolling walker   Balance   Static Sitting Good   Dynamic Sitting Fair +   Static Standing Fair   Dynamic Standing Fair -   Ambulatory Fair -   Activity Tolerance   Activity Tolerance Patient limited by fatigue;Treatment limited secondary to medical complications (Comment)   Medical Staff Made Aware JOEL Tobias, Viridiana, CIELO - coeval 2* medical complexity/comorbidities and limited overall tolerance to activity    RUE Assessment   RUE Assessment WFL   LUE Assessment   LUE Assessment WFL   Cognition   Arousal/Participation Alert; Cooperative   Attention Within functional limits   Orientation Level Oriented X4   Memory Decreased recall of precautions   Following Commands Follows one step commands without difficulty   Assessment   Limitation Decreased ADL status; Decreased endurance;Decreased self-care trans;Decreased high-level ADLs   Prognosis Good   Assessment Pt is a 78 y o  female who was admitted to [facility] on 3/30/2022 with Subdural hemorrhage (HCC) s/p recent fall - c/o persistent h/a since fall  Pt's problem list also includes PMH of HTN, underlying neurological disorder and chronic ITP, malnutrition, sacroilitis, L sided Bell's palsy, LEA positive,  cardiomyopathy, CHF, thrombocytopenia, glaucoma, autoimmune hapatitis, depressive disorder  At baseline pt was completing adls and mobility independently - I iadls - denies additional falls   Pt lives with spouse in 2nd floor apt with FF to enter  Currently pt requires min assist for overall ADLS and min assist for functional mobility/transfers   Pt currently presents with impairments in the following categories -steps to enter environment, limited home support, difficulty performing ADLS, difficulty performing IADLS , health management  and environment activity tolerance, endurance and standing balance/tolerance  These impairments, as well as pt's fatigue, decreased caregiver support, risk for falls and home environment  limit pt's ability to safely engage in all baseline areas of occupation, includingbathing, dressing, toileting, functional mobility/transfers, community mobility, laundry , house maintenance, meal prep, cleaning, social participation  and leisure activities  From OT standpoint, recommend inpt rehab upon D/C  OT will continue to follow to address the below stated goals  Goals   Patient Goals go home    LTG Time Frame 10-14   Long Term Goal #1 refer to established goals below    Plan   Treatment Interventions ADL retraining;Functional transfer training; Endurance training;Patient/family training;Equipment evaluation/education; Compensatory technique education; Activityengagement   Goal Expiration Date 04/14/22   OT Frequency 3-5x/wk   Recommendation   OT Discharge Recommendation Post acute rehabilitation services   AM-PAC Daily Activity Inpatient   Lower Body Dressing 2   Bathing 2   Toileting 2   Upper Body Dressing 3   Grooming 4   Eating 4   Daily Activity Raw Score 17   Daily Activity Standardized Score (Calc for Raw Score >=11) 37 26   AM-PAC Applied Cognition Inpatient   Following a Speech/Presentation 3   Understanding Ordinary Conversation 4   Taking Medications 3   Remembering Where Things Are Placed or Put Away 3   Remembering List of 4-5 Errands 3   Taking Care of Complicated Tasks 3   Applied Cognition Raw Score 19   Applied Cognition Standardized Score 39 77         OCCUPATIONAL THERAPY GOALS:    *Mod I adls after setup with use of AE PRN  *Mod I toileting and clothing management   *Mod I functional mobility and transfers to/from all surfaces with good dynamic balance and safety for participation in dynamic adls and iadl tasks   *Demonstrate good carryover with safe use of RW during functional tasks   *Assess DME needs   *Increase activity tolerance to 35-40 minutes for participation in adls and enjoyable activities  *Pt to participate in ongoing functional cognitive assessment with good attention/participation to assist with safe d/c recommendations    The patient's raw score on the AM-PAC Daily Activity inpatient short form is 17, standardized score is 37 26, less than 39 4  Patients at this level are likely to benefit from discharge to post-acute rehabilitation services  Please refer to the recommendation of the Occupational Therapist for safe discharge planning      Jennifer Jensen

## 2022-03-31 NOTE — ASSESSMENT & PLAN NOTE
Left tSDH   s/p fall several days ago   Denies anti-platelet anticoagulation medications at baseline    Imaging:    CT head wo 3/31/2022:  Stable left hemispheric subdural extending along the tentorium with maximal thickness of 6 mm  Stable mild mass effect with 3 mm left-to-right shift   CT head without 3/30/2022:  Acute left hemispheric subdural hematoma 6 mm in greatest thickness with mass effect resulting in sulcal effacement and midline shift up to 4 mm  Plan:    Continue monitor neurological exam   Stat CT head with any neurological decline including drop GCS of two points within 1 hour   CT results reviewed in detail with patient   Hold all anti-platelet and anticoagulation medications   Seizure prophylaxis per Trauma team   Currently on Keppra 500 mg b i d  Anticipated for one week   Pain control as needed  Patient with minimal headache at this time   Mobilize with physical occupational therapy   DVT prophylaxis:  Bilateral SCDs  Okay for pharmacological DVT prophylaxis from neurosurgical standpoint given stable CT head   No neurosurgical intervention anticipated at this juncture  [facility] will sign off with plan for outpatient follow-up in two weeks with repeat CT head prior to visit   Call if any questions or concerns   Patient educated on signs symptoms of expanding subdural hematoma for which he should call the office or present to the emergency room for sooner imaging

## 2022-03-31 NOTE — CASE MANAGEMENT
Case Management Assessment & Discharge Planning Note    Patient name Randy Rogel  Location [facility] 912-50 MRN [id number]  : 1940 Date 3/31/2022       Current Admission Date: 3/30/2022  Current Admission Diagnosis:Subdural hemorrhage [facility])   Patient Active Problem List    Diagnosis Date Noted    Subdural hemorrhage ([facility] [address] ) 2022    Hypertensive heart disease with congestive heart failure ([address] ) 2021    Chronic ITP (idiopathic thrombocytopenia) ([facility] [address] ) 11/10/2021    Neutropenia, unspecified type ([facility] [address] ) 08/10/2021    Mild protein-calorie malnutrition ([address] ) 08/10/2021    Sacroiliitis ([facility] [address] ) 08/10/2021    Left-sided Bell's palsy 08/10/2021    LEA positive 2020    Complete left bundle branch block (LBBB) 10/22/2019    Dilated cardiomyopathy ([facility] [address] ) 2018    Congestive heart failure ([facility] [address] ) 2018    Thrombocytopenia ([facility] [address] ) 2018    Chronic systolic heart failure ([facility] [address] ) 2018    Hypertension, essential 2018    Other specified glaucoma 2018    Autoimmune hepatitis treated with steroids ([facility] [address] ) 2018    Depressive disorder 2017      LOS (days): 1  Geometric Mean LOS (GMLOS) (days): 3 30  Days to GMLOS:2 5     OBJECTIVE:    Risk of Unplanned Readmission Score: 11         Current admission status: Inpatient       Preferred Pharmacy:   [facility], [address] [place] [id number]  Phone: [phone] Fax: [phone]    [id number] [address]  [address] [place] [address]  Phone: [phone] Fax: [phone]    Primary Care Provider: Miky Duron MD    Primary Insurance: MEDICARE  Secondary Insurance: Luis Aguero 20:  Active Health Care Proxies    There are no active Health Care Proxies on file                   Readmission Root Cause  30 Day Readmission: No    Patient Information  Admitted from[de-identified] Home  Mental Status: Alert  During Assessment patient was accompanied by: Spouse,Son  Assessment information provided by[de-identified] Patient  Primary Caregiver: Self  Support Systems: Self,Spouse/significant other,Son  [place] of Residence: [address] [place] [address] do you live in?: [facility] entry access options   Select all that apply : Stairs  Number of steps to enter home : One Flight  Do the steps have railings?: Yes  Type of Current Residence: [place]  In the last 12 months, was there a time when you were not able to pay the mortgage or rent on time?: No  In the last 12 months, how many places have you lived?: 1  In the last 12 months, was there a time when you did not have a steady place to sleep or slept in a shelter (including now)?: No  Homeless/housing insecurity resource given?: N/A  Living Arrangements: Lives w/ Spouse/significant other  Is patient a ?: No    Activities of Daily Living Prior to Admission  Functional Status: Independent  Completes ADLs independently?: Yes  Ambulates independently?: Yes  Does patient use assisted devices?: No  Does patient currently own DME?: Yes  What DME does the patient currently own?: Carolyn Civil  Does patient have a history of Outpatient Therapy (PT/OT)?: Yes  Does the patient have a history of Short-Term Rehab?: No  Does patient have a history of HHC?: No  [facility]: No         Patient Information Continued  Income Source: Pension/[facility]  Does patient have prescription coverage?: Yes  Within the past 12 months, you worried that your food would run out before you got the money to buy more : Never true  Within the past 12 months, the food you bought just didnt last and you didnt have money to get more : Never true  Food insecurity resource given?: N/A  Does patient receive dialysis treatments?: No  Does patient have a history of substance abuse?: No  Does patient have a history of Mental Health Diagnosis?: No         Means of Transportation  Means of Transport to Appts[de-identified] Family transport  In the past 12 months, has lack of transportation kept you from medical appointments or from getting medications?: No  In the past 12 months, has lack of transportation kept you from meetings, work, or from getting things needed for daily living?: No  Was application for public transport provided?: N/A        DISCHARGE DETAILS:    Discharge planning discussed with[de-identified] pt,  and son  Freedom of Choice: Yes     CM contacted family/caregiver?: Yes  Were Treatment Team discharge recommendations reviewed with patient/caregiver?: Yes  Did patient/caregiver verbalize understanding of patient care needs?: Yes  Were patient/caregiver advised of the risks associated with not following Treatment Team discharge recommendations?: Yes    Contacts  Patient Contacts: Simi Roman (Son) [phone]  Relationship to Patient[de-identified] Family  Contact Method: Phone  Phone Number: [phone]  Reason/Outcome: Continuity of [address]         Is the patient interested in [facility] at discharge?: Yes  Via Krishna Liu requested[de-identified] Physical [facility] Name[de-identified] [facility] Provider[de-identified] PCP  Home Health Services Needed[de-identified] Evaluate Functional Status and Safety,Gait/ADL Training,Strengthening/Theraputic Exercises to Improve Function  Homebound Criteria Met[de-identified] Requires the Assistance of Another Person for Safe Ambulation or to Leave the Home,Uses an Assist Device (i e  cane, walker, etc)  Supporting Clincal Findings[de-identified] Limited Endurance,Fatigues Easliy in Short Distances    DME Referral Provided  Referral made for DME?: No      Treatment Team Recommendation: Short Term Rehab  Discharge Destination Plan[de-identified] Home with [address] met with pt, her  and her son, to discuss the role of CM as well as d/c planning     Pt was seen by OT/PT and recommended for IP rehab  Pt lives with her , who has an LVAD  Cm educated family on SNF rehab but pt and family prefer a home d/c with VNA  Family has no preference on VNA agency  Cm placed referral  Pt has access to a walker, rollator, and BSC at home  Pt's son will transport home when stable  CM reviewed d/c planning process including the following: identifying help at home, patient preference for d/c planning needs, Discharge Lounge, [facility] Meds to Bed program, availability of treatment team to discuss questions or concerns patient and/or family may have regarding understanding medications and recognizing signs and symptoms once discharged  CM also encouraged patient to follow up with all recommended appointments after discharge  Patient advised of importance for patient and family to participate in managing patients medical well being

## 2022-03-31 NOTE — CONSULTS
Consultation - Geriatric Medicine   Bony Matta 78 y o  female MRN: [id number]  Unit/Bed#: [facility] 633-01 Encounter: [id number]      Assessment/Plan     Ambulatory dysfunction with fall  -reportedly mechanical approximately 1 week ago  -(+) head strike (-) loss of consciousness  -injuries as outlined below  -Does not require use of assist device for ambulation at baseline  --no prior hx recurrent falls  -high risk future falls due to age, hx fall, deconditioning/debility and unfamiliar environment   -encourage good body mechanics and assist with all transfers  -keep personal items and call bell close to prevent reaching  -maintain environment free of fall hazards  -encourage appropriate footwear and adequate lighting at all times when out of bed  -consider home fall risk assessment and personal fall alert system on returning home  -PT and OT pending    Subdural hematoma  -s/p fall as outlined above  -[facility] 3/30/22 reports acute left hemisphere subdural hematoma 6 mm in greatest thickness with mass effect and sulcal effacement with up to 4 mm left-to-right midline shift  -in setting of chronic thrombocytopenia   -AC/AP on hold  -neuro checks per protocol  -Nsx consult pending    Chronic ITP  -initially diagnosed approximately 2014  -follows with Dr Hola Tavarez (Heme-Onc) last seen 1/18/22  -maintained on Promacta and prednisone 10 mg daily due to elevated LFTs and persistent thrombocytopenia  -platelets 71 - at elevated risk of bleeding with even mild trauma  -continue close outpatient follow-up with Heme onc    Autoimmune hepatitis  -immunosuppressed with chronic daily steroid use - continue appropriate immunosuppression precautions  -plan as above    Chronic daily steroid use  -as managed by Hematology/Oncology in treatment of chronic ITP and autoimmune hepatitis  -do not abruptly discontinue    Cognitive screening  -alert oriented, denies memory concerns  -reports independence with ADLs and IADLs  -no previous cognitive testing on record for review  -CTH obtained day of admission personally reviewed, in addition to acute findings mild chronic micro path changes noted  -consider checking TSH with FT4, B12 adequate at 767  -encourage use of sensory assist devices such as corrective lenses all appropriate times to reduce risk sensory impairment contributing to isolation, confusion, encephalopathy more precipitous cognitive decline  -patient enjoys gardening and is very active, encourage patient remain physically, socially, and cognitively active engaged to maintain cognitive acuity     Impaired Vision  -recommend use of corrective lenses at all appropriate times  -encourage adequate lighting and encourage use of assistance with ambulation  -keep personal belongings close to person to avoid reaching  -encourage appropriate footwear at all times  -recommend large font for printed materials provided to patient    Impaired Hearing  -mild to moderately impaired, does not use hearing assist device at baseline however due to significant impact sensory deficits can have on cognition strongly encouraged to consider use of hearing amplifier vs outpatient audiology evaluation for hearing aids  -encourage providers and caregivers to speak slowly and clearly directly to patient  -minimize background noise to encourage patient engagement  -encourage use of teach back method to ensure clear communication    Impaired mastication  -Requires use of dentures - encourage use at all appropriate times  -ensure dentures fit appropriately  -ensure meal consistency appropriate for abilities  -continue aspiration precautions    Deconditioning/debility/frailty  -clinical frailty scale stage 5, mildly frail  -multifactorial including age, chronic ITP, autoimmune hepatitis and multiple additional chronic medical comorbidities now with fall and acute subdural hematoma superimposed  -albumin slightly low at 3 4, encourage well-balanced nutrition  -continue optimization chronic medical conditions and address acute metabolic derangements as arise  -monitor for and treat any anxiety/mood/depression symptoms if present as may impact patient's response to therapies as well as overall sense of well-being and quality of life    Delirium precautions  -Patient is high risk of delirium due to age, fall, traumatic injuries, acute pain, hospitalization, intracerebral hemorrhage  -Initiate delirium precautions  -maintain normal sleep/wake cycle  -minimize overnight interruptions, group overnight vitals/labs/nursing checks as possible  -dim lights, close blinds and turn off tv to minimize stimulation and encourage sleep environment in evenings  -ensure that pain is well controlled   -monitor for fecal and urinary retention which may precipitate delirium  -encourage early mobilization and ambulation - with assistance once cleared by Trauma and Neurosurgery to appropriate do so  -provide frequent reorientation and redirection as indicated appropriate  -encourage family and friends at the bedside to help help calm patient if anxious - for familiarity  -avoid medications which may precipitate or worsen delirium such as tramadol, benzodiazepine, anticholinergics, and benadryl as possible  -encourage hydration and nutrition   -redirect unwanted behaviors as first line    Home medication review  Will need to confirm with [phone] when open during business hours as not open at time of initial consultation      [facility] - [facility]:    Entresto 49-51 mg BID  Prednisone 5 mg daily  Toprol 25 mg daily  Spironolactone 12 5 mg daily  Budesonide 9 mg daily  Flonase 1 spray each nostril daily    Torsemide - at pharmacy but never picked up    Care coordination:  Rounded with Jihan Davis (RN)    History of Present Illness   Physician Requesting Consult: Ramakrishna Membreno DO  Reason for Consult / Principal Problem:  Fall  Hx and PE limited by: N/A  Additional history obtained from: Chart review and patient evaluation    HPI: Chasity Starkey is a 78y o  year old female with autoimmune hepatitis, chronic ITP, chronic systolic heart failure, complete left bundle-branch block, depression, dilated cardiomyopathy, hypertension, left-sided Bell's palsy, mild protein calorie malnutrition, impaired vision, glaucoma, and ambulatory dysfunction who is admitted to the trauma service with ambulatory dysfunction fall found to have subdural hematoma, she is being seen in consultation by Geriatrics for high risk of developing delirium during hospitalization  She is seen and examined at bedside where she is lying resting comfortably, she explains that about a week ago she had a fall at which time she struck her head and right side of her body on her nightstand, she denies loss of consciousness  She did not seek immediate medical attention however as she started having intractable headache PCP who recommended CT of her head which revealed subdural hematoma  She reports that since admission her headache has markedly improved and she feels otherwise well  Prior to admission she was residing home with her  of approximately 62 years, she reports independence with ADLs and IADLs and denies memory concerns  She does not use any assist device for ambulation at baseline and denies additional falls within the past year  She requires use of corrective for reading, wears dentures and is mildly hard of hearing but does not use hearing assist device  Inpatient consult to Gerontology  Consult performed by: Ryan Ray DO  Consult ordered by: Gómez Billingsley DO        Review of Systems   Constitutional: Negative for chills and fever  HENT: Positive for dental problem (Wears dentures) and hearing loss ( does not use hearing aid)  Eyes: Positive for visual disturbance ( wears glasses)  Respiratory: Negative  Negative for shortness of breath      Cardiovascular: Negative  Negative for chest pain  Gastrointestinal: Negative  Endocrine: Negative  Genitourinary: Negative  Musculoskeletal: Negative  Negative for gait problem  Skin: Negative  Neurological: Positive for headaches ( now resolved)  Negative for dizziness, light-headedness and numbness  Hematological: Negative  Psychiatric/Behavioral: Negative  All other systems reviewed and are negative  Historical Information   Past Medical History:   Diagnosis Date    Bell's palsy     Cardiac disease     Ear problems     HL (hearing loss)     Hypertension     Tinnitus      Past Surgical History:   Procedure Laterality Date    CT BONE MARROW BIOPSY AND ASPIRATION  1/27/2021     Social History   Social History     Substance and Sexual Activity   Alcohol Use No    Alcohol/week: 0 0 standard drinks     Social History     Substance and Sexual Activity   Drug Use No     Social History     Tobacco Use   Smoking Status Never Smoker   Smokeless Tobacco Never Used     Family History:   Family History   Problem Relation Age of Onset    Autoimmune disease Neg Hx      Meds/Allergies   all current active meds have been reviewed    Allergies   Allergen Reactions    Ambien [Zolpidem] Other (See Comments)     Did not allow sleep per pt       Objective   No intake or output data in the 24 hours ending 03/31/22 0615  Invasive Devices  Report    Peripheral Intravenous Line            Peripheral IV 03/30/22 Right Antecubital <1 day    Peripheral IV 03/30/22 Right Hand <1 day              Physical Exam  Vitals and nursing note reviewed  Constitutional:       General: She is not in acute distress  Appearance: Normal appearance  She is not ill-appearing  HENT:      Head: Normocephalic  Nose: Nose normal       Mouth/Throat:      Mouth: Mucous membranes are dry  Eyes:      General:         Right eye: No discharge  Left eye: No discharge        Comments: Left corneal clouding   Neck: Comments: Trachea midline, phonation normal  Cardiovascular:      Rate and Rhythm: Normal rate  Pulses: Normal pulses  Heart sounds: No murmur heard  Pulmonary:      Effort: Pulmonary effort is normal  No respiratory distress  Breath sounds: No wheezing  Abdominal:      General: Bowel sounds are normal  There is no distension  Palpations: Abdomen is soft  Tenderness: There is no abdominal tenderness  Musculoskeletal:      Cervical back: Neck supple  Right lower leg: No edema  Left lower leg: No edema  Comments: Mildly reduced overall muscle mass   Skin:     General: Skin is warm and dry  Neurological:      Mental Status: She is alert  Mental status is at baseline  Comments: Awake alert oriented    Answers questions appropriately    Speech clear and fluent   Psychiatric:         Mood and Affect: Mood normal          Behavior: Behavior normal        Lab Results:     I have personally reviewed pertinent lab results      I have personally reviewed the following imaging study reports in PACS:    3/30/22 - CT head without contrast    Therapies:   PT:  Pending  OT:  Pending    VTE Prophylaxis: Reason for no pharmacologic prophylaxis Acute subdural hematoma in setting of ITP in significantly increased risk of bleeding    Code Status: Level 1 - Full Code  Advance Directive and Living Will:      Power of :    POLST:      Family and Social Support:   Living Arrangements: Lives w/ Spouse/significant other  Support Systems: Self; Son  Assistance Needed: none  Type of Current Residence: Private residence  Current Home Care Services: No    Goals of Care: Get sleep

## 2022-03-31 NOTE — PLAN OF CARE
Problem: OCCUPATIONAL THERAPY ADULT  Goal: Performs self-care activities at highest level of function for planned discharge setting  See evaluation for individualized goals  Description: Treatment Interventions: ADL retraining,Functional transfer training,Endurance training,Patient/family training,Equipment evaluation/education,Compensatory technique education,Activityengagement          See flowsheet documentation for full assessment, interventions and recommendations  Note: Limitation: Decreased ADL status,Decreased endurance,Decreased self-care trans,Decreased high-level ADLs  Prognosis: Good  Assessment: Pt is a 78 y o  female who was [facility] to [facility] on 3/30/2022 with Subdural hemorrhage ([facility] [address] ) s/p recent fall - c/o persistent h/a since fall  Pt's problem list also includes PMH of HTN, underlying neurological disorder and chronic ITP, malnutrition, sacroilitis, L sided Bell's palsy, LEA positive,  cardiomyopathy, CHF, thrombocytopenia, glaucoma, autoimmune hapatitis, depressive disorder  At baseline pt was completing adls and mobility independently - I iadls - denies additional falls   Pt lives with spouse in 2nd floor apt with FF to enter  Currently pt requires min assist for overall ADLS and min assist for functional mobility/transfers  Pt currently presents with impairments in the following categories -steps to enter environment, limited home support, difficulty performing ADLS, difficulty performing IADLS , health management  and environment activity tolerance, endurance and standing balance/tolerance   These impairments, as well as pt's fatigue, decreased caregiver support, risk for falls and home environment  limit pt's ability to safely engage in all baseline areas of occupation, includingbathing, dressing, toileting, functional mobility/transfers, community mobility, laundry , house maintenance, meal prep, cleaning, social participation  and leisure activities  From OT standpoint, recommend inpt rehab upon D/C  OT will continue to follow to address the below stated goals        OT Discharge Recommendation: Post acute rehabilitation services

## 2022-03-31 NOTE — PROGRESS NOTES
[address] [facility]  Progress Note - Bailey Spine 1940, 78 y o  female MRN: [id number]  Unit/Bed#: [facility] 633-01 Encounter: [id number]  Primary Care Provider: Sania Teran MD   Date and time admitted to hospital: 3/30/2022  4:35 PM    Fall  Assessment & Plan  Mechanical fall 1 week prior to admission  -PT/OT  -geriatrics following    Hypertension, essential  Assessment & Plan  -  continue home antihypertensive medicines    * Subdural hemorrhage (HCC)  Assessment & Plan  Acute left hemispheric subdural hematoma, 6 mm in greatest thickness with mass effect resulting in sulcal effacement and up to 4 mm of left to right midline shift  - repeat CTH 3/31 stable  -Keppra for seizure prophylaxis  -maintain SBP less than 160  -stat CT head if GCS decreases by 2  -Q 4 neuro checks  -appreciate Neurosurgery recommendations  -PT/OT        TERTIARY TRAUMA SURVEY NOTE    Prophylaxis: Heparin    Disposition:  Med surg    Code status:  Level 1 - Full Code    Consultants:  Geriatrics, neuro surgery      SUBJECTIVE:     Mechanism of Injury:Fall    Chief Complaint:  Fall    HPI/Last 24 hour events: 78 yr old female presenting as a trauma consult for a SDH  Patient fell ~1 week ago striking her head  She presented to her PCP with headaches prompting a CT scan demonstrating a SDH  Patient has a history of Bell's Palsy affecting her left eye and left corner of her mouth and chronic ITP she was on Promacta but has not been taking  No blood thinners  She is GCS 15 and has no neurodeficits    Since admission, Patient feels good this morning she states that the head pain that she had when she was admitted to the hospital has resolved  She has no other complaints  Pending neurosurgery evaluation      Active medications:           Current Facility-Administered Medications:     acetaminophen (TYLENOL) tablet 975 mg, 975 mg, Oral, Q8H MONA, 975 mg at 03/31/22 1312    ascorbic acid (VITAMIN C) tablet 500 mg, 500 mg, Oral, Daily, 500 mg at 03/31/22 0849    fluticasone (FLONASE) 50 mcg/act nasal spray 2 spray, 2 spray, Nasal, Daily, 2 spray at 03/31/22 1207    heparin (porcine) subcutaneous injection 5,000 Units, 5,000 Units, Subcutaneous, Q8H [facility]    HYDROmorphone HCl (DILAUDID) injection 0 2 mg, 0 2 mg, Intravenous, Q4H PRN    Labetalol HCl (NORMODYNE) injection 10 mg, 10 mg, Intravenous, Q4H PRN    levETIRAcetam (KEPPRA) 500 mg in sodium chloride 0 9 % 100 mL IVPB, 500 mg, Intravenous, BID, Stopped at 03/31/22 0900    metoprolol succinate (TOPROL-XL) 24 hr tablet 25 mg, 25 mg, Oral, Daily, 25 mg at 03/31/22 0848    naloxone (NARCAN) 0 04 mg/mL syringe 0 04 mg, 0 04 mg, Intravenous, Q1MIN PRN    ondansetron (ZOFRAN) injection 4 mg, 4 mg, Intravenous, Q6H PRN    oxyCODONE (ROXICODONE) IR tablet 2 5 mg, 2 5 mg, Oral, Q4H PRN    oxyCODONE (ROXICODONE) IR tablet 5 mg, 5 mg, Oral, Q4H PRN    potassium chloride (K-DUR,KLOR-CON) CR tablet 10 mEq, 10 mEq, Oral, Daily, 10 mEq at 03/31/22 0849    predniSONE tablet 10 mg, 10 mg, Oral, Daily, 10 mg at 03/31/22 0848    sacubitril-valsartan (ENTRESTO) 49-51 MG per tablet 1 tablet, 1 tablet, Oral, BID, 1 tablet at 03/31/22 0848    senna (SENOKOT) tablet 17 2 mg, 2 tablet, Oral, Daily    spironolactone (ALDACTONE) tablet 12 5 mg, 12 5 mg, Oral, Daily, 12 5 mg at 03/31/22 0848    torsemide (DEMADEX) tablet 10 mg, 10 mg, Oral, Daily, 10 mg at 03/31/22 0849      OBJECTIVE:     Vitals:   Vitals:    03/31/22 1202   BP: 114/61   Pulse:    Resp: 16   Temp: 97 7 °F (36 5 °C)   SpO2:        Physical Exam:   Gen: NAD, Comfortable  Neuro: A&O, No focal deficits  Head: Normal Cephalic, Atraumatic  Eye: EOMI, PERRLA, No scleral icterus  Neck: Supple, No JVD, Midline trachea  CV: RRR, Cap refill <2 sec  Pulm: Normal work of breathing, no respiratory distress  Abd: Soft, Non-Distended, Non-Tender   Ext: No edema, Non-tender  Skin: warm, dry, intact        1   Before the illness or injury that brought you to the Emergency, did you need someone to help you on a regular basis? 1=Yes   2  Since the illness or injury that brought you to the Emergency, have you needed more help than usual to take care of yourself? 0=No   3  Have you been hospitalized for one or more nights during the past 6 months (excluding a stay in the Emergency Department)? 0=No   4  In general, do you see well? 0=Yes   5  In general, do you have serious problems with your memory? 0=No   6  Do you take more than three different medications everyday? 1=Yes   TOTAL   2     Did you order a geriatric consult if the score was 2 or greater?: yes                I/O:   I/O       03/29 0701 03/30 0700 03/30 0701 03/31 0700 03/31 0701 04/01 0700    P  O    240    IV Piggyback   100    Total Intake   340    Net   +340                 Invasive Devices: Invasive Devices  Report    Peripheral Intravenous Line            Peripheral IV 03/30/22 Right Antecubital <1 day                  Imaging:   CT head wo contrast    Result Date: 3/31/2022  Impression: Stable left hemispheric subdural extends along the tentorium with maximal thickness of 6 mm  Stable mild mass effect with 3 mm of left-to-right shift  Workstation performed: [id number]     CT head wo contrast    Addendum Date: 3/30/2022    Please note the following important correction:  There is a voice recognition software related error in the IMPRESSION section of the report    The original impression reads " Acute left hemispheric subdural hematoma, 6 cm (CENTIMETERS) in greatest thickness with mass effect resulting in sulcal effacement and up to 4 mm of left to right midline shift  " BUT SHOULD READ, " Acute left hemispheric subdural hematoma, 6 mm (MILLIMETERS) in greatest thickness with mass effect resulting in sulcal effacement and up to 4 mm of left to right midline shift  "       Result Date: 3/30/2022  Impression: Acute left hemispheric subdural hematoma, 6 cm in greatest thickness with mass effect resulting in sulcal effacement and up to 4 mm of left to right midline shift   I reported these results to [facility] [place] via HIPAA compliant secure electronic messaging on 3/30/2022 at 3:28 PM   Workstation performed: [id number]       Labs:   CBC:   Lab Results   Component Value Date    WBC 2 26 (L) 03/31/2022    HGB 11 2 (L) 03/31/2022    HCT 34 0 (L) 03/31/2022    MCV 96 03/31/2022    PLT 52 (L) 03/31/2022    MCH 31 7 03/31/2022    MCHC 32 9 03/31/2022    RDW 14 6 03/31/2022    MPV 12 7 03/31/2022    NRBC 0 03/30/2022     CMP:   Lab Results   Component Value Date     (H) 03/31/2022    CO2 28 03/31/2022    BUN 14 03/31/2022    CREATININE 0 76 03/31/2022    CALCIUM 8 7 03/31/2022    AST 43 03/31/2022    ALT 59 03/31/2022    ALKPHOS 38 (L) 03/31/2022    EGFR 72 03/31/2022     Phosphorus: No results found for: PHOS  Magnesium: No results found for: MG  Coagulation:   Lab Results   Component Value Date    INR 1 20 (H) 03/30/2022

## 2022-03-31 NOTE — CASE MANAGEMENT
Case Management Progress Note    Patient name Randy Rogel  Location [address] [place] [address] 633/PPHP [id number] MRN [id number]  : 1940 Date 3/31/2022       LOS (days): 1  Geometric Mean LOS (GMLOS) (days): 3 30  Days to GMLOS:2 5        OBJECTIVE:        Current admission status: Inpatient  Preferred Pharmacy:   [address] [address]  Phone: [phone] Fax: [phone]    [id number] - [place], [place] - [address] [address]  Phone: [phone] Fax: [phone]    Primary Care Provider: Miky Duron MD    Primary Insurance: MEDICARE  Secondary Insurance: [address] [place],[facility]    PROGRESS NOTE:    Pt accepted by [facility]

## 2022-04-01 ENCOUNTER — TELEPHONE (OUTPATIENT)
Dept: NEUROSURGERY | Facility: CLINIC | Age: 82
End: 2022-04-01

## 2022-04-01 VITALS
OXYGEN SATURATION: 99 % | TEMPERATURE: 97.9 F | HEART RATE: 71 BPM | RESPIRATION RATE: 17 BRPM | DIASTOLIC BLOOD PRESSURE: 51 MMHG | SYSTOLIC BLOOD PRESSURE: 110 MMHG

## 2022-04-01 LAB
ATRIAL RATE: 64 BPM
P AXIS: 73 DEGREES
PR INTERVAL: 170 MS
QRS AXIS: -24 DEGREES
QRSD INTERVAL: 174 MS
QT INTERVAL: 476 MS
QTC INTERVAL: 491 MS
T WAVE AXIS: 148 DEGREES
VENTRICULAR RATE: 64 BPM

## 2022-04-01 PROCEDURE — 93010 ELECTROCARDIOGRAM REPORT: CPT | Performed by: INTERNAL MEDICINE

## 2022-04-01 PROCEDURE — NC001 PR NO CHARGE: Performed by: SURGERY

## 2022-04-01 PROCEDURE — 99238 HOSP IP/OBS DSCHRG MGMT 30/<: CPT | Performed by: SURGERY

## 2022-04-01 RX ORDER — LEVETIRACETAM 500 MG/1
500 TABLET ORAL EVERY 12 HOURS SCHEDULED
Status: DISCONTINUED | OUTPATIENT
Start: 2022-04-01 | End: 2022-04-01

## 2022-04-01 RX ORDER — LEVETIRACETAM 500 MG/1
500 TABLET ORAL EVERY 12 HOURS SCHEDULED
Qty: 10 TABLET | Refills: 0 | Status: SHIPPED | OUTPATIENT
Start: 2022-04-01 | End: 2022-04-19 | Stop reason: ALTCHOICE

## 2022-04-01 RX ORDER — ACETAMINOPHEN 325 MG/1
975 TABLET ORAL EVERY 8 HOURS SCHEDULED
Refills: 0
Start: 2022-04-01 | End: 2022-07-08 | Stop reason: ALTCHOICE

## 2022-04-01 RX ORDER — LEVETIRACETAM 500 MG/1
500 TABLET ORAL EVERY 12 HOURS SCHEDULED
Status: DISCONTINUED | OUTPATIENT
Start: 2022-04-01 | End: 2022-04-01 | Stop reason: HOSPADM

## 2022-04-01 RX ORDER — LEVETIRACETAM 500 MG/1
500 TABLET ORAL EVERY 12 HOURS SCHEDULED
Qty: 10 TABLET | Refills: 0 | Status: CANCELLED | OUTPATIENT
Start: 2022-04-01 | End: 2022-04-06

## 2022-04-01 RX ADMIN — METOPROLOL SUCCINATE 25 MG: 25 TABLET, FILM COATED, EXTENDED RELEASE ORAL at 08:45

## 2022-04-01 RX ADMIN — HEPARIN SODIUM 5000 UNITS: 5000 INJECTION INTRAVENOUS; SUBCUTANEOUS at 01:03

## 2022-04-01 RX ADMIN — SPIRONOLACTONE 12.5 MG: 25 TABLET ORAL at 08:44

## 2022-04-01 RX ADMIN — OXYCODONE HYDROCHLORIDE AND ACETAMINOPHEN 500 MG: 500 TABLET ORAL at 08:46

## 2022-04-01 RX ADMIN — SACUBITRIL AND VALSARTAN 1 TABLET: 49; 51 TABLET, FILM COATED ORAL at 08:44

## 2022-04-01 RX ADMIN — TORSEMIDE 10 MG: 20 TABLET ORAL at 08:46

## 2022-04-01 RX ADMIN — FLUTICASONE PROPIONATE 2 SPRAY: 50 SPRAY, METERED NASAL at 08:46

## 2022-04-01 RX ADMIN — HEPARIN SODIUM 5000 UNITS: 5000 INJECTION INTRAVENOUS; SUBCUTANEOUS at 08:52

## 2022-04-01 RX ADMIN — LEVETIRACETAM 500 MG: 500 TABLET, FILM COATED ORAL at 11:31

## 2022-04-01 RX ADMIN — PREDNISONE 10 MG: 10 TABLET ORAL at 08:46

## 2022-04-01 RX ADMIN — POTASSIUM CHLORIDE 10 MEQ: 750 TABLET, EXTENDED RELEASE ORAL at 08:45

## 2022-04-01 RX ADMIN — ACETAMINOPHEN 975 MG: 325 TABLET, FILM COATED ORAL at 11:37

## 2022-04-01 NOTE — ED ATTENDING ATTESTATION
3/30/2022  I saw and evaluated the patient  I have discussed the patient with the resident physician and agree with the resident's findings, assessment and plan as documented in the resident physician's note, unless otherwise documented below  All available laboratory and imaging studies were reviewed by myself  I was present for key portions of any procedure(s) performed by the resident and I was immediately available to provide assistance  I agree with the current assessment done in the Emergency Department  I have conducted an independent evaluation of this patient  Emergency Department Note- Chasity Starkey 78 y o  female MRN: [id number]    [facility] Encounter: [id number]    Chief Complaint   Patient presents with    Evaluation of Abnormal Diagnostic Test     patient fell last tuesday and hit her head on night stand  denies LOC and has been having headaches  PCP ordered CT scan for today which showed left subdural hematoma with a 4mm left to right midline shift  patient c/o of a dull headache  denies blood thinners       Chasity Starkey is a 78 y o  female with past medical history of Bell's palsy, thrombocytopenia, CHF with low EF and LifeVest in place, presenting with abnormal CT of the head  Patient fell last Tuesday after tripping and struck her head  She has been having slight headaches and has been feeling off balance more than usual and her primary care physician ordered a CT of the head  CT of the head revealed a left-sided subdural hematoma measuring 6 mm in largest diameter and associated with a 4 mm midline shift  Given this, patient was referred to the emergency department  Patient has not had any additional falls since the fall last Tuesday  She has not had any vision changes  She has not had any difficulty with speech or numbness or weakness  She has a history of Bell's palsy with baseline left-sided facial droop    She is not anticoagulated and is not on any aspirin  REVIEW OF SYSTEMS    Constitutional: denies fevers  Visual/Eyes: no changes in vision, no double vision  HENT: no rhinorrhea  Cardiac: no chest pain  Respiratory: no shortness of breath, no cough  GI: no abdominal pain, nausea, or vomiting  : no burning with urination  Heme/Onc:  History of thrombocytopenia  Endocrine: no diabetes  Neuro:  As above    Ten systems reviewed and negative unless otherwise noted in HPI and above    PAST MEDICAL HISTORY  Past Medical History:   Diagnosis Date    Bell's palsy     Cardiac disease     Ear problems     HL (hearing loss)     Hypertension     Tinnitus        SURGICAL HISTORY  Past Surgical History:   Procedure Laterality Date    CT BONE MARROW BIOPSY AND ASPIRATION  1/27/2021       FAMILY HISTORY  Family History   Problem Relation Age of Onset    Autoimmune disease Neg Hx         CURRENT MEDICATIONS  No current facility-administered medications on file prior to encounter  Current Outpatient Medications on File Prior to Encounter   Medication Sig    ascorbic acid (VITAMIN C) 500 mg tablet Take 500 mg by mouth daily    budesonide (ENTOCORT EC) 3 MG capsule Take 9 mg by mouth daily Takes 3 capsules once daily       fluticasone (FLONASE) 50 mcg/act nasal spray 2 sprays into each nostril daily    metoprolol succinate (TOPROL-XL) 25 mg 24 hr tablet Take 1 tablet (25 mg total) by mouth daily    potassium chloride (MICRO-K) 10 MEQ CR capsule Take 1 capsule (10 mEq total) by mouth daily    predniSONE 10 mg tablet Take 1 tablet (10 mg total) by mouth daily    sacubitril-valsartan (Entresto) 49-51 MG TABS Take 1 tablet by mouth 2 (two) times a day    spironolactone (ALDACTONE) 25 mg tablet Take 0 5 tablets (12 5 mg total) by mouth daily    torsemide (DEMADEX) 10 mg tablet Take 1 tablet (10 mg total) by mouth daily    [DISCONTINUED] eltrombopag (PROMACTA) 50 MG tablet Take 1 tablet (50 mg total) by mouth daily Administer on an empty stomach, 1 hour before or 2 hours after a meal  (Patient not taking: Reported on 3/30/2022 )       ALLERGIES  Allergies   Allergen Reactions    Ambien [Zolpidem] Other (See Comments)     Did not allow sleep per pt       SOCIAL HISTORY  Social History     Socioeconomic History    Marital status: /Civil Union     Spouse name: None    Number of children: None    Years of education: None    Highest education level: None   Occupational History    None   Tobacco Use    Smoking status: Never Smoker    Smokeless tobacco: Never Used   Substance and Sexual Activity    Alcohol use: No     Alcohol/week: 0 0 standard drinks    Drug use: No    Sexual activity: Not Currently     Partners: Male   Other Topics Concern    None   Social History Narrative    None     Social Determinants of Health     Financial Resource Strain: Not on file   Food Insecurity: No Food Insecurity    Worried About Running Out of Food in the Last Year: Never true    Lety of Food in the Last Year: Never true   Transportation Needs: No Transportation Needs    Lack of Transportation (Medical): No    Lack of Transportation (Non-Medical): No   Physical Activity: Not on file   Stress: Not on file   Social Connections: Not on file   Intimate Partner Violence: Not on file   Housing Stability: Low Risk     Unable to Pay for Housing in the Last Year: No    Number of Places Lived in the Last Year: 1    Unstable Housing in the Last Year: No       PHYSICAL EXAM  /64   Pulse 70   Temp 97 7 °F (36 5 °C)   Resp 16   SpO2 98%   Vital signs and nursing notes reviewed    CONSTITUTIONAL: female appearing stated age resting in bed, in no acute distress  HEENT: atraumatic, normocephalic  Sclera anicteric, conjunctiva are not injected  Moist oral mucosa  CARDIOVASCULAR/CHEST: RRR, no M/R/G  2+ radial pulses  PULMONARY: Breathing comfortably on RA  Breath sounds are equal and clear to auscultation  ABDOMEN: non-distended  BS present, normoactive   Non-tender  MSK: moves all extremities, no deformities, no peripheral edema, no calf asymmetry  NEURO: Awake, alert, and oriented x 3  Pupils 3 mm and reactive, extraocular movements are intact, Left-sided facial droop is present, consistent with history of Bell's palsy  Moves all extremities spontaneously  5/5 strength in bilateral upper and lower extremities    No focal neurologic deficits  SKIN: Warm, appears well-perfused  MENTAL STATUS: Normal affect        DIAGNOSTIC STUDIES  Results Reviewed     Procedure Component Value Units Date/Time    Protime-INR [[id number]]  (Abnormal) Collected: 03/30/22 1646    Lab Status: Final result Specimen: Blood from Arm, Right Updated: 03/30/22 1734     Protime 14 7 seconds      INR 1 20    APTT [[id number]]  (Normal) Collected: 03/30/22 1646    Lab Status: Final result Specimen: Blood from Arm, Right Updated: 03/30/22 1734     PTT 32 seconds     HS Troponin 0hr (reflex protocol) [[id number]]  (Abnormal) Collected: 03/30/22 1648    Lab Status: Final result Specimen: Blood from Arm, Right Updated: 03/30/22 1722     hs TnI 0hr 76 ng/L     CBC and differential [[id number]]  (Abnormal) Collected: 03/30/22 1646    Lab Status: Final result Specimen: Blood from Arm, Right Updated: 03/30/22 1722     WBC 3 80 Thousand/uL      RBC 4 06 Million/uL      Hemoglobin 12 8 g/dL      Hematocrit 38 8 %      MCV 96 fL      MCH 31 5 pg      MCHC 33 0 g/dL      RDW 14 5 %      MPV 12 6 fL      Platelets 71 Thousands/uL      nRBC 0 /100 WBCs      Neutrophils Relative 57 %      Immat GRANS % 1 %      Lymphocytes Relative 30 %      Monocytes Relative 11 %      Eosinophils Relative 0 %      Basophils Relative 1 %      Neutrophils Absolute 2 21 Thousands/µL      Immature Grans Absolute 0 02 Thousand/uL      Lymphocytes Absolute 1 12 Thousands/µL      Monocytes Absolute 0 42 Thousand/µL      Eosinophils Absolute 0 01 Thousand/µL      Basophils Absolute 0 02 Thousands/µL     Comprehensive metabolic panel [[id number]] (Abnormal) Collected: 03/30/22 1646    Lab Status: Final result Specimen: Blood from Arm, Right Updated: 03/30/22 1716     Sodium 140 mmol/L      Potassium 3 7 mmol/L      Chloride 105 mmol/L      CO2 28 mmol/L      ANION GAP 7 mmol/L      BUN 19 mg/dL      Creatinine 0 75 mg/dL      Glucose 89 mg/dL      Calcium 9 7 mg/dL      Corrected Calcium 10 2 mg/dL      AST 51 U/L      ALT 73 U/L      Alkaline Phosphatase 47 U/L      Total Protein 7 7 g/dL      Albumin 3 4 g/dL      Total Bilirubin 0 63 mg/dL      eGFR 73 ml/min/1 73sq m     Narrative:      [place] guidelines for Chronic Kidney Disease (CKD):     Stage 1 with normal or high GFR (GFR > 90 mL/min/1 73 square meters)    Stage 2 Mild CKD (GFR = 60-89 mL/min/1 73 square meters)    Stage 3A Moderate CKD (GFR = 45-59 mL/min/1 73 square meters)    Stage 3B Moderate CKD (GFR = 30-44 mL/min/1 73 square meters)    Stage 4 Severe CKD (GFR = 15-29 mL/min/1 73 square meters)    Stage 5 End Stage CKD (GFR <15 mL/min/1 73 square meters)  Note: GFR calculation is accurate only with a steady state creatinine          CT head dated 03/30/2022, read reviewed, noting " Acute left hemispheric subdural hematoma, 6 mm (MILLIMETERS) in greatest thickness with mass effect resulting in sulcal effacement and up to 4 mm of left to right midline shift  "     PROCEDURES  ECG 12 Lead Documentation Only    Date/Time: 3/30/2022 4:55 PM  Performed by: Clifford Moyer MD  Authorized by: Clifford Moyer MD     Comments:      Normal sinus rhythm, ventricular rate 64, IL interval 170,  consistent with left bundle branch block, , leftward axis, no significant change from prior EKG dated 08/02/2018  ED COURSE    33-year-old female presenting with abnormal head CT, after being found to have a left-sided subdural hematoma after sustaining a fall  Vital signs reviewed, afebrile, not tachycardic, not hypotensive, not hypoxic  GCS is 15   Case discussed with Trauma surgery and patient will be admitted to Trauma surgery for further evaluation and monitoring  Labs reveal platelet count of 71, improved from 36 in July of 2021  Troponin is elevated at 76  Patient has no chest pain        CLINICAL IMPRESSION  Final diagnoses:   SDH (subdural hematoma) (HCC)

## 2022-04-01 NOTE — PLAN OF CARE
Problem: Potential for Falls  Goal: Patient will remain free of falls  Description: INTERVENTIONS:  - Educate patient/family on patient safety including physical limitations  - Instruct patient to call for assistance with activity   - Consult OT/PT to assist with strengthening/mobility   - Keep Call bell within reach  - Keep bed low and locked with side rails adjusted as appropriate  - Keep care items and personal belongings within reach  - Initiate and maintain comfort rounds  - Make Fall Risk Sign visible to staff  - Offer Toileting every 2 Hours, in advance of need  - Initiate/Maintain alarm  - Obtain necessary fall risk management equipment:   - Apply yellow socks and bracelet for high fall risk patients  - Consider moving patient to room near nurses station  Outcome: Progressing     Problem: INFECTION - ADULT  Goal: Absence or prevention of progression during hospitalization  Description: INTERVENTIONS:  - Assess and monitor for signs and symptoms of infection  - Monitor lab/diagnostic results  - Monitor all insertion sites, i e  indwelling lines, tubes, and drains  - Monitor endotracheal if appropriate and nasal secretions for changes in amount and color  - Hiram appropriate cooling/warming therapies per order  - Administer medications as ordered  - Instruct and encourage patient and family to use good hand hygiene technique  - Identify and instruct in appropriate isolation precautions for identified infection/condition  Outcome: Progressing     Problem: SAFETY ADULT  Goal: Patient will remain free of falls  Description: INTERVENTIONS:  - Educate patient/family on patient safety including physical limitations  - Instruct patient to call for assistance with activity   - Consult OT/PT to assist with strengthening/mobility   - Keep Call bell within reach  - Keep bed low and locked with side rails adjusted as appropriate  - Keep care items and personal belongings within reach  - Initiate and maintain comfort rounds  - Make Fall Risk Sign visible to staff  - Offer Toileting every 2 Hours, in advance of need  - Initiate/Maintain alarm  - Obtain necessary fall risk management equipment:   - Apply yellow socks and bracelet for high fall risk patients  - Consider moving patient to room near nurses station  Outcome: Progressing  Goal: Maintain or return to baseline ADL function  Description: INTERVENTIONS:  -  Assess patient's ability to carry out ADLs; assess patient's baseline for ADL function and identify physical deficits which impact ability to perform ADLs (bathing, care of mouth/teeth, toileting, grooming, dressing, etc )  - Assess/evaluate cause of self-care deficits   - Assess range of motion  - Assess patient's mobility; develop plan if impaired  - Assess patient's need for assistive devices and provide as appropriate  - Encourage maximum independence but intervene and supervise when necessary  - Involve family in performance of ADLs  - Assess for home care needs following discharge   - Consider OT consult to assist with ADL evaluation and planning for discharge  - Provide patient education as appropriate  Outcome: Progressing  Goal: Maintains/Returns to pre admission functional level  Description: INTERVENTIONS:  - Perform BMAT or MOVE assessment daily    - Set and communicate daily mobility goal to care team and patient/family/caregiver  - Collaborate with rehabilitation services on mobility goals if consulted  - Perform Range of Motion 3 times a day  - Reposition patient every 2 hours    - Dangle patient 3 times a day  - Stand patient 3 times a day  - Ambulate patient 3 times a day  - Out of bed to chair 3 times a day   - Out of bed for meals 3 times a day  - Out of bed for toileting  - Record patient progress and toleration of activity level   Outcome: Progressing     Problem: DISCHARGE PLANNING  Goal: Discharge to home or other facility with appropriate resources  Description: INTERVENTIONS:  - Identify barriers to discharge w/patient and caregiver  - Arrange for needed discharge resources and transportation as appropriate  - Identify discharge learning needs (meds, wound care, etc )  - Arrange for interpretive services to assist at discharge as needed  - Refer to Case Management Department for coordinating discharge planning if the patient needs post-hospital services based on physician/advanced practitioner order or complex needs related to functional status, cognitive ability, or social support system  Outcome: Progressing     Problem: Knowledge Deficit  Goal: Patient/family/caregiver demonstrates understanding of disease process, treatment plan, medications, and discharge instructions  Description: Complete learning assessment and assess knowledge base    Interventions:  - Provide teaching at level of understanding  - Provide teaching via preferred learning methods  Outcome: Progressing

## 2022-04-01 NOTE — DISCHARGE SUMMARY
[address]  Discharge- Larissa Cardenas 1940, 78 y o  female MRN: [id number]  Unit/Bed#: [facility] 633-01 Encounter: [id number]  Primary Care Provider: Alec Gonzalez MD   Date and time admitted to hospital: 3/30/2022  4:35 PM    * Subdural hemorrhage [facility])  Assessment & Plan  Acute left hemispheric subdural hematoma, 6 mm in greatest thickness with mass effect resulting in sulcal effacement and up to 4 mm of left to right midline shift  - repeat CTH 3/31 stable  -Keppra for seizure prophylaxis for 5 more days  -maintain SBP less than 160  -Neurosurgery will follow-up as outpatient in 2 weeks with repeat [facility]  -PT/OT recommends inpatient rehab, however family desires home with VNS    Fall  Assessment & Plan  Mechanical fall 1 week prior to admission  -will likely discharge today once patient decides on rehab versus home with venous  -geriatrics following    Hypertension, essential  Assessment & Plan  -  continue home antihypertensive medicines              Medical Problems             Resolved Problems  Date Reviewed: 4/1/2022    None                Admission Date:   Admission Orders (From admission, onward)     Ordered        03/30/22 1812  Inpatient Admission  Once                        Admitting Diagnosis: Subdural hemorrhage ([address] ) [I62 00]  Weakness [R53 1]  SDH (subdural hematoma) ([facility] [address] ) [S06 5X9A]    HPI: Larissa Cardenas is a 78 y o  female who presented to the emergency department after an outpatient CT scan demonstrated subdural hemorrhage  Patient had a mechanical fall approximately 1 week ago and since has been having intermittent headaches which prompted the CT scan  She does not take any blood thinners or anti-platelet medications  She is currently asymptomatic  She denies any headache, fever, chills, neck or back pain  No numbness or tingling  No chest pain, cough, or shortness of breath  No abdominal pain, nausea or vomiting      Procedures Performed:   Orders Placed This Encounter   Procedures    ED ECG Documentation Only       Summary of Hospital Course: Patient admitted under trauma service, initial [address] showing Subdural hematoma  NSGY, placed on AEDs  CTH stable  Patient to follow up in 2 weeks as outpatient with NSGY with repeat CTH  To discharge home with VNA    Significant Findings, Care, Treatment and Services Provided: Acute left hemispheric subdural hematoma, 6 cm in greatest thickness with mass effect resulting in sulcal effacement and up to 4 mm of left to right midline shift  Complications: N/a    Condition at Discharge: good         Discharge instructions/Information to patient and family:   See after visit summary for information provided to patient and family  Provisions for Follow-Up Care:  See after visit summary for information related to follow-up care and any pertinent home health orders  PCP: Nathaly Michaels MD    Disposition: Home with VNA    Planned Readmission: No    Discharge Statement   I spent 30 minutes discharging the patient  This time was spent on the day of discharge  I had direct contact with the patient on the day of discharge  Additional documentation is required if more than 30 minutes were spent on discharge  Discharge Medications:  See after visit summary for reconciled discharge medications provided to patient and family

## 2022-04-01 NOTE — DISCHARGE INSTRUCTIONS
Intracranial Hematoma   WHAT YOU NEED TO KNOW:   An intracranial hematoma is a collection of blood inside your skull  The blood leaks from a tear or rupture in a vein or artery, such as after a hemorrhagic stroke  The collected blood puts pressure on the brain  Serious medical problems can develop, such as seizures or a coma  An intracranial hematoma is a life-threatening emergency that needs immediate medical care  DISCHARGE INSTRUCTIONS:   Call your local emergency number (911 in the [address] [facility] [facility]) or have someone else call if:   · You have any of the following signs of a stroke:      ? Numbness or drooping on one side of your face     ? Weakness in an arm or leg    ? Confusion or difficulty speaking    ? Dizziness, a severe headache, or vision loss       · You have a seizure  · You have new problems with balance or movement  Seek care immediately if:   · You are sleepier or are harder to wake than usual     · You have problems thinking  · Your behavior or personality has changed  · You have repeated or forceful vomiting or you cannot keep liquids down  Call your doctor or neurologist if:   · You have nausea or are vomiting  · Your symptoms are getting worse  · You have questions or concerns about your condition or care  Medicines:   · Anticonvulsants  may be given to prevent and control seizures  · Take your medicine as directed  Contact your healthcare provider if you think your medicine is not helping or if you have side effects  Tell him or her if you are allergic to any medicine  Keep a list of the medicines, vitamins, and herbs you take  Include the amounts, and when and why you take them  Bring the list or the pill bottles to follow-up visits  Carry your medicine list with you in case of an emergency  Warning signs of a stroke:   The words BE FAST can help you remember and recognize warning signs of a stroke:  · B = Balance:  Sudden loss of balance    · E = Eyes:  Loss of vision in one or both eyes    · F = Face:  Face droops on one side    · A = Arms:  Arm drops when both arms are raised    · S = Speech:  Speech is slurred or sounds different    · T = Time:  Time to get help immediately     Manage or prevent another intracranial hematoma:  Healthcare providers will help you create goals for your recovery  The following lifestyle changes can help lower your risk for a stroke that can lead to another hematoma:  · Manage health conditions  A condition such as diabetes can increase your risk for a stroke  Control your blood sugar level if you have hyperglycemia or diabetes  Take your prescribed medicines and check your blood sugar level as directed  · Check your blood pressure as directed  High blood pressure can increase your risk for a stroke  Follow your healthcare provider's directions for controlling your blood pressure  · Limit alcohol  Large amounts of alcohol can lead to an intracerebral hematoma or a stroke  Alcohol may also raise your blood pressure or thin your blood  Blood thinning can cause a hemorrhagic stroke  · Do not use nicotine products or illegal drugs  Nicotine and other chemicals in cigarettes and cigars can cause blood vessel damage  Nicotine and illegal drugs both increase your risk for a stroke  Ask your healthcare provider for information if you currently smoke or use drugs and need help to quit  E-cigarettes or smokeless tobacco still contain nicotine  Talk to your healthcare provider before you use these products  · Take blood thinners exactly as directed  Blood thinners increase your risk for an intracerebral hematoma  Your healthcare provider may make changes to your blood thinner if it caused your hematoma  Always follow directions so you do not take too much of this medicine  · Eat a variety of healthy foods  Healthy foods include whole-grain breads, low-fat dairy products, beans, lean meats, and fish   Eat at least 5 servings of fruits and vegetables each day  Choose foods that are low in fat, cholesterol, salt, and sugar  Eat foods that are high in potassium, such as potatoes and bananas  A nutritionist can help you create healthy meal plans  · Maintain a healthy weight  Ask your healthcare provider how much you should weigh  Ask him or her to help you create a weight loss plan if you are overweight  He or she can help you create small goals if you have a lot of weight to lose  · Exercise as directed  Exercise can lower your blood pressure, cholesterol, weight, and blood sugar levels  Healthcare providers will help you create exercise goals  They can also help you make a plan to reach your goals  For example, you can break exercise into 10 minute periods, 3 times in a day  Find an exercise that you enjoy  This will make it easier for you to reach your exercise goals  · Manage stress  Stress can raise your blood pressure  Find new ways to relax, such as deep breathing or listening to music  Follow up with your doctor or neurologist within 2 days:  Write down your questions so you remember to ask them during your visits  For support and more information:   · National Stroke Association  [address] , [place] [address]  Phone: 6- 539 - [phone]  Web Address: [employer] [address] [address] Information is for End User's use only and may not be sold, redistributed or otherwise used for commercial purposes  All illustrations and images included in CareNotes® are the copyrighted property of [employer]  or [facility] [employer]   The above information is an  only  It is not intended as medical advice for individual conditions or treatments  Talk to your doctor, nurse or pharmacist before following any medical regimen to see if it is safe and effective for you

## 2022-04-01 NOTE — ASSESSMENT & PLAN NOTE
Acute left hemispheric subdural hematoma, 6 mm in greatest thickness with mass effect resulting in sulcal effacement and up to 4 mm of left to right midline shift  - repeat CTH 3/31 stable  -Keppra for seizure prophylaxis for 5 more days  -maintain SBP less than 160  -Neurosurgery will follow-up as outpatient in 2 weeks with repeat 14 [facility]  -PT/OT recommends inpatient rehab, however family desires home with VNS

## 2022-04-01 NOTE — TELEPHONE ENCOUNTER
4/11/22- LMOM X3  SENT 'UNABLE TO REACH' LETTER    4/7/22- LMOM TO SCHEDULE CT HEAD 2-3 DAYS PRIOR TO APT    4/5/22- PT DISCHARGED HOME  SPOKE TO SON AND CONFIRMED 4/19/22 F/U AND CT 2-3 DAYS PRIOR  OFFERED TO SCHEDULE, SON DECLINED--HE WILL CALL   PROVIDED CENTRAL SCHEDULING'S NUMBER      4/1/22- [address] F/U SCHEDULED 4/19/22  PT WILL NEED CT HEAD    3/31/22- (HOANG) S/O, F/U 2WK CT HEAD

## 2022-04-01 NOTE — PROGRESS NOTES
[facility]  Progress Note - Randy Rogel 1940, 78 y o  female MRN: [id number]  Unit/Bed#: [facility] 633-01 Encounter: [id number]  Primary Care Provider: Miky Duron MD   Date and time admitted to hospital: 3/30/2022  4:35 PM    Fall  Assessment & Plan  Mechanical fall 1 week prior to admission  -will likely discharge today once patient decides on rehab versus home with venous  -geriatrics following    Hypertension, essential  Assessment & Plan  -  continue home antihypertensive medicines    * Subdural hemorrhage (HCC)  Assessment & Plan  Acute left hemispheric subdural hematoma, 6 mm in greatest thickness with mass effect resulting in sulcal effacement and up to 4 mm of left to right midline shift  - repeat CTH 3/31 stable  -Keppra for seizure prophylaxis for 5 more days  -maintain SBP less than 160  -Neurosurgery will follow-up as outpatient in 2 weeks with repeat [facility]  -PT/OT recommends inpatient rehab, however family desires home with VNS        Disposition:  Home with VNA versus rehab    SUBJECTIVE:  Chief Complaint:  Fall    Subjective:  Patient doing well this morning with no complaints    OBJECTIVE:   Vitals:   Temp:  [97 5 °F (36 4 °C)-97 9 °F (36 6 °C)] 97 9 °F (36 6 °C)  HR:  [64-71] 71  Resp:  [16-17] 17  BP: ()/(51-74) 125/54    Intake/Output:  I/O       03/30 0701  03/31 0700 03/31 0701  04/01 0700    P  O   540    I V   30    IV Piggyback  200    Total Intake  770    Net  +770          Unmeasured Urine Occurrence  2 x         Nutrition: Diet Regular; Regular House  GI Proph/Bowel Reg:  Ordered  VTE Prophylaxis:Heparin     Physical Exam:   Gen: NAD, Comfortable  Neuro: A&O, No focal deficits  Head: Normal Cephalic, Atraumatic  Eye: EOMI, PERRLA, No scleral icterus  Neck: Supple, No JVD, Midline trachea  CV: RRR, Cap refill <2 sec  Pulm: Normal work of breathing, no respiratory distress  Abd: Soft, Non-Distended, Non-Tender   Ext: No edema, Non-tender  Skin: warm, dry, intact      Invasive Devices  Report    Peripheral Intravenous Line            Peripheral IV 03/30/22 Right Antecubital 1 day                      Lab Results: Results: I have personally reviewed all pertinent laboratory/tests results  Imaging/EKG Studies: I have personally reviewed pertinent reports         Jennifer Christianson MD  4/1/2022  7:54 AM

## 2022-04-01 NOTE — PLAN OF CARE
Problem: Potential for Falls  Goal: Patient will remain free of falls  Description: INTERVENTIONS:  - Educate patient/family on patient safety including physical limitations  - Instruct patient to call for assistance with activity   - Consult OT/PT to assist with strengthening/mobility   - Keep Call bell within reach  - Keep bed low and locked with side rails adjusted as appropriate  - Keep care items and personal belongings within reach  - Initiate and maintain comfort rounds  - Make Fall Risk Sign visible to staff  - Offer Toileting every 2 Hours, in advance of need  - Initiate/Maintain alarm  - Obtain necessary fall risk management equipment:   - Apply yellow socks and bracelet for high fall risk patients  - Consider moving patient to room near nurses station  4/1/2022 1049 by Donald Davis RN  Outcome: Completed  4/1/2022 0750 by Donald Davis RN  Outcome: Progressing     Problem: INFECTION - ADULT  Goal: Absence or prevention of progression during hospitalization  Description: INTERVENTIONS:  - Assess and monitor for signs and symptoms of infection  - Monitor lab/diagnostic results  - Monitor all insertion sites, i e  indwelling lines, tubes, and drains  - Monitor endotracheal if appropriate and nasal secretions for changes in amount and color  - Elizabeth appropriate cooling/warming therapies per order  - Administer medications as ordered  - Instruct and encourage patient and family to use good hand hygiene technique  - Identify and instruct in appropriate isolation precautions for identified infection/condition  4/1/2022 1049 by Donald Davis RN  Outcome: Completed  4/1/2022 0750 by Donald Davis RN  Outcome: Progressing     Problem: SAFETY ADULT  Goal: Patient will remain free of falls  Description: INTERVENTIONS:  - Educate patient/family on patient safety including physical limitations  - Instruct patient to call for assistance with activity   - Consult OT/PT to assist with strengthening/mobility - Keep Call bell within reach  - Keep bed low and locked with side rails adjusted as appropriate  - Keep care items and personal belongings within reach  - Initiate and maintain comfort rounds  - Make Fall Risk Sign visible to staff  - Offer Toileting every 2 Hours, in advance of need  - Initiate/Maintain alarm  - Obtain necessary fall risk management equipment:   - Apply yellow socks and bracelet for high fall risk patients  - Consider moving patient to room near nurses station  4/1/2022 1049 by Sincere Campoverde RN  Outcome: Completed  4/1/2022 0750 by Sincere Campoverde RN  Outcome: Progressing  Goal: Maintain or return to baseline ADL function  Description: INTERVENTIONS:  -  Assess patient's ability to carry out ADLs; assess patient's baseline for ADL function and identify physical deficits which impact ability to perform ADLs (bathing, care of mouth/teeth, toileting, grooming, dressing, etc )  - Assess/evaluate cause of self-care deficits   - Assess range of motion  - Assess patient's mobility; develop plan if impaired  - Assess patient's need for assistive devices and provide as appropriate  - Encourage maximum independence but intervene and supervise when necessary  - Involve family in performance of ADLs  - Assess for home care needs following discharge   - Consider OT consult to assist with ADL evaluation and planning for discharge  - Provide patient education as appropriate  4/1/2022 1049 by Sincere Campoverde RN  Outcome: Completed  4/1/2022 0750 by Sincere Campoverde RN  Outcome: Progressing  Goal: Maintains/Returns to pre admission functional level  Description: INTERVENTIONS:  - Perform BMAT or MOVE assessment daily    - Set and communicate daily mobility goal to care team and patient/family/caregiver  - Collaborate with rehabilitation services on mobility goals if consulted  - Perform Range of Motion 3 times a day  - Reposition patient every 2 hours    - Dangle patient 3 times a day  - Stand patient 3 times a day  - Ambulate patient 3 times a day  - Out of bed to chair 3 times a day   - Out of bed for meals 3 times a day  - Out of bed for toileting  - Record patient progress and toleration of activity level   4/1/2022 1049 by Stacie Oreilly RN  Outcome: Completed  4/1/2022 0750 by Stacie Oreilly RN  Outcome: Progressing     Problem: DISCHARGE PLANNING  Goal: Discharge to home or other facility with appropriate resources  Description: INTERVENTIONS:  - Identify barriers to discharge w/patient and caregiver  - Arrange for needed discharge resources and transportation as appropriate  - Identify discharge learning needs (meds, wound care, etc )  - Arrange for interpretive services to assist at discharge as needed  - Refer to Case Management Department for coordinating discharge planning if the patient needs post-hospital services based on physician/advanced practitioner order or complex needs related to functional status, cognitive ability, or social support system  4/1/2022 1049 by Stacie Oreilly RN  Outcome: Completed  4/1/2022 0750 by Stacie Oreilly RN  Outcome: Progressing     Problem: Knowledge Deficit  Goal: Patient/family/caregiver demonstrates understanding of disease process, treatment plan, medications, and discharge instructions  Description: Complete learning assessment and assess knowledge base    Interventions:  - Provide teaching at level of understanding  - Provide teaching via preferred learning methods  4/1/2022 1049 by Stacie Oreilly RN  Outcome: Completed  4/1/2022 0750 by Stacie Oreilly RN  Outcome: Progressing

## 2022-04-01 NOTE — ASSESSMENT & PLAN NOTE
Mechanical fall 1 week prior to admission  -will likely discharge today once patient decides on rehab versus home with venous  -geriatrics following

## 2022-04-01 NOTE — ASSESSMENT & PLAN NOTE
Acute left hemispheric subdural hematoma, 6 mm in greatest thickness with mass effect resulting in sulcal effacement and up to 4 mm of left to right midline shift  - repeat CTH 3/31 stable  -Keppra for seizure prophylaxis for 5 more days  -maintain SBP less than 160  -Neurosurgery will follow-up as outpatient in 2 weeks with repeat [facility]  -PT/OT recommends inpatient rehab, however family desires home with VNS

## 2022-04-04 ENCOUNTER — TRANSITIONAL CARE MANAGEMENT (OUTPATIENT)
Dept: FAMILY MEDICINE CLINIC | Facility: CLINIC | Age: 82
End: 2022-04-04

## 2022-04-05 ENCOUNTER — TELEPHONE (OUTPATIENT)
Dept: NEUROSURGERY | Facility: CLINIC | Age: 82
End: 2022-04-05

## 2022-04-05 NOTE — TELEPHONE ENCOUNTER
Received a call from [facility] requesting clarification on acetaminophen regimen  Ollie Mcnair was there with him,she was headachy while admitted  At this time has improvement in her headaches  Suggested they transition to acetaminophen PRN rather than scheduled if she wishes  Also advised ok for her to decrease to 1 or 2 tabs if she wishes  Confirmed aware not to exceed 3000mg of acetaminophen daily  They stated an understanding and were appreciative

## 2022-04-06 ENCOUNTER — OFFICE VISIT (OUTPATIENT)
Dept: FAMILY MEDICINE CLINIC | Facility: CLINIC | Age: 82
End: 2022-04-06
Payer: MEDICARE

## 2022-04-06 VITALS
RESPIRATION RATE: 16 BRPM | HEIGHT: 63 IN | BODY MASS INDEX: 21.44 KG/M2 | DIASTOLIC BLOOD PRESSURE: 68 MMHG | SYSTOLIC BLOOD PRESSURE: 114 MMHG | HEART RATE: 66 BPM | WEIGHT: 121 LBS | TEMPERATURE: 97.2 F | OXYGEN SATURATION: 95 %

## 2022-04-06 DIAGNOSIS — I50.42 HYPERTENSIVE HEART DISEASE WITH CHRONIC COMBINED SYSTOLIC AND DIASTOLIC CONGESTIVE HEART FAILURE (HCC): ICD-10-CM

## 2022-04-06 DIAGNOSIS — Z09 HOSPITAL DISCHARGE FOLLOW-UP: Primary | ICD-10-CM

## 2022-04-06 DIAGNOSIS — D70.9 NEUTROPENIA, UNSPECIFIED TYPE (HCC): ICD-10-CM

## 2022-04-06 DIAGNOSIS — I11.0 HYPERTENSIVE HEART DISEASE WITH CHRONIC COMBINED SYSTOLIC AND DIASTOLIC CONGESTIVE HEART FAILURE (HCC): ICD-10-CM

## 2022-04-06 DIAGNOSIS — D69.3 CHRONIC ITP (IDIOPATHIC THROMBOCYTOPENIA) (HCC): ICD-10-CM
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
## 2022-04-06 DIAGNOSIS — Z23 ENCOUNTER FOR ADMINISTRATION OF VACCINE: ICD-10-CM

## 2022-04-06 DIAGNOSIS — I10 HYPERTENSION, ESSENTIAL: ICD-10-CM

## 2022-04-06 DIAGNOSIS — I50.22 CHRONIC SYSTOLIC HEART FAILURE (HCC): ICD-10-CM

## 2022-04-06 DIAGNOSIS — I62.00 SUBDURAL HEMORRHAGE (HCC): ICD-10-CM

## 2022-04-06 DIAGNOSIS — K75.4 AUTOIMMUNE HEPATITIS TREATED WITH STEROIDS (HCC): ICD-10-CM

## 2022-04-06 PROCEDURE — 90732 PPSV23 VACC 2 YRS+ SUBQ/IM: CPT

## 2022-04-06 PROCEDURE — 99496 TRANSJ CARE MGMT HIGH F2F 7D: CPT | Performed by: FAMILY MEDICINE

## 2022-04-06 PROCEDURE — G0009 ADMIN PNEUMOCOCCAL VACCINE: HCPCS

## 2022-04-06 NOTE — ASSESSMENT & PLAN NOTE
Repeat CT head on 3/31/22 was stable   Seeing neurosurgery in 2 weeks   To repeat CT scan prior to the visit

## 2022-04-06 NOTE — ASSESSMENT & PLAN NOTE
Wt Readings from Last 3 Encounters:   04/06/22 54 9 kg (121 lb)   03/30/22 54 9 kg (121 lb)   12/10/21 56 3 kg (124 lb 3 2 oz)     Stable  Care per cardiology

## 2022-04-06 NOTE — PROGRESS NOTES
Assessment/Plan:     Chronic ITP (idiopathic thrombocytopenia) (HCC)  Platelet 52 on 7/98/7202  Care per GI And Hematology    Autoimmune hepatitis treated with steroids (HCC)  Continue Prednisone as directed   Seeing Dr Betzy Kinney today    Congestive heart failure ([facility] )  Wt Readings from Last 3 Encounters:   04/06/22 54 9 kg (121 lb)   03/30/22 54 9 kg (121 lb)   12/10/21 56 3 kg (124 lb 3 2 oz)   stable on Ernasto   Care per cardiology    Hypertension, essential  Doing well  Continue to monitor    Hypertensive heart disease with congestive heart failure ([facility] )  Wt Readings from Last 3 Encounters:   04/06/22 54 9 kg (121 lb)   03/30/22 54 9 kg (121 lb)   12/10/21 56 3 kg (124 lb 3 2 oz)     Stable  Care per cardiology        Subdural hemorrhage ([facility] )  Repeat CT head on 3/31/22 was stable   Seeing neurosurgery in 2 weeks   To repeat CT scan prior to the visit     Fall  Doing home PT/OT at this time to improve gait    Falls Plan of Care: referral to physical therapy and balance, strength, and gait training instructions were provided  Medications that increase falls were reviewed  Vitamin D supplementation was recommended  Home safety evaluation by OT recommended  Diagnoses and all orders for this visit:    Hospital discharge follow-up    Subdural hemorrhage (HCC)    Chronic ITP (idiopathic thrombocytopenia) ([facility] )    Autoimmune hepatitis treated with steroids ([facility] )    Hypertension, essential    Hypertensive heart disease with chronic combined systolic and diastolic congestive heart failure ([facility] )    Chronic systolic heart failure ([facility] )    Encounter for administration of vaccine  -     PNEUMOCOCCAL POLYSACCHARIDE VACCINE 23-VALENT =>3YO SQ IM    Neutropenia, unspecified type ([facility] )    Fall, subsequent encounter         Subjective:     Patient ID: Bailey Daley is a 78 y o  female    Here for follow up  Admitted on 3/30/2022 for subdural hematoma after fall 1 week prior   Was treated with Keppra for 5 days to prevent seizure    Repeat CT scan was stable   Last headache was yesterday   No headache since then       Headache   This is a new problem  The problem has been gradually improving  The patient is experiencing no pain  Pertinent negatives include no abdominal pain, abnormal behavior, anorexia, back pain, blurred vision, coughing, dizziness, drainage, ear pain, eye pain, eye redness, numbness, scalp tenderness, seizures, sinus pressure, sore throat, swollen glands, tingling, tinnitus, vomiting, weakness or weight loss  The treatment provided significant relief  Review of Systems   Constitutional: Negative for weight loss  HENT: Negative for ear pain, sinus pressure, sore throat and tinnitus  Eyes: Negative for blurred vision, pain and redness  Respiratory: Negative for cough  Gastrointestinal: Negative for abdominal pain, anorexia and vomiting  Musculoskeletal: Negative for back pain  Neurological: Positive for headaches  Negative for dizziness, tingling, seizures, weakness and numbness  Objective:     Physical Exam  Constitutional:       Appearance: Normal appearance  HENT:      Right Ear: Tympanic membrane normal       Left Ear: Tympanic membrane normal       Nose: Nose normal  No congestion or rhinorrhea  Eyes:      Pupils: Pupils are equal, round, and reactive to light  Cardiovascular:      Rate and Rhythm: Normal rate and regular rhythm  Pulses: Normal pulses  Heart sounds: Normal heart sounds  Pulmonary:      Effort: Pulmonary effort is normal  No respiratory distress  Breath sounds: Normal breath sounds  Neurological:      General: No focal deficit present  Mental Status: She is alert and oriented to person, place, and time     Psychiatric:         Mood and Affect: Mood normal          Behavior: Behavior normal            Vitals:    04/06/22 1139   BP: 114/68   BP Location: Left arm   Patient Position: Sitting   Cuff Size: Adult   Pulse: 66   Resp: 16   Temp: Pablito Vazquez ) 97 2 °F (36 2 °C)   TempSrc: Skin   SpO2: 95%   Weight: 54 9 kg (121 lb)   Height: 5' 3" (1 6 m)       Transitional Care Management Review:  Carole James is a 78 y o  female here for TCM follow up  During the TCM phone call patient stated:    TCM Call (since 3/6/2022)     Date and time call was made  4/4/2022 11:29 AM    Hospital care reviewed  Records not available        Patient was hospitialized at  [facility]        Date of Admission  03/30/22    Date of discharge  04/01/22    Diagnosis  Subdural hemorrhage    Disposition  Home    Were the patients medications reviewed and updated  No    Current Symptoms  None      TCM Call (since 3/6/2022)     Post hospital issues  None    Should patient be enrolled in anticoag monitoring? No    Scheduled for follow up?   Yes    Did you obtain your prescribed medications  Yes    Do you need help managing your prescriptions or medications  No    Is transportation to your appointment needed  No    I have advised the patient to call PCP with any new or worsening symptoms  Zeke Cruz  None    Are you recieving any outpatient services  Yes    What type of services  Physical therapy and Occupational therapy    Are you recieving home care services  Yes    Types of home care services  Nurse visit    Are you using any community resources  No    Current waiver services  No    Have you fallen in the last 12 months  Yes    How many times  1    Interperter language line needed  No          Boogie Pisano MD

## 2022-04-06 NOTE — ASSESSMENT & PLAN NOTE
Wt Readings from Last 3 Encounters:   04/06/22 54 9 kg (121 lb)   03/30/22 54 9 kg (121 lb)   12/10/21 56 3 kg (124 lb 3 2 oz)   stable on Ernasto   Care per cardiology

## 2022-04-08 ENCOUNTER — HOSPITAL ENCOUNTER (OUTPATIENT)
Dept: NON INVASIVE DIAGNOSTICS | Facility: CLINIC | Age: 82
Discharge: HOME | End: 2022-04-08
Payer: MEDICARE

## 2022-04-08 VITALS
SYSTOLIC BLOOD PRESSURE: 114 MMHG | HEART RATE: 70 BPM | BODY MASS INDEX: 21.44 KG/M2 | WEIGHT: 121 LBS | DIASTOLIC BLOOD PRESSURE: 68 MMHG | HEIGHT: 63 IN

## 2022-04-08 DIAGNOSIS — I50.22 CHRONIC SYSTOLIC HEART FAILURE (HCC): ICD-10-CM

## 2022-04-08 LAB
AORTIC ROOT: 3.3 CM
APICAL FOUR CHAMBER EJECTION FRACTION: 26 %
ASCENDING AORTA: 3.1 CM (ref 1.79–2.68)
E WAVE DECELERATION TIME: 65 MS
FRACTIONAL SHORTENING: 9 % (ref 28–44)
INTERVENTRICULAR SEPTUM IN DIASTOLE (PARASTERNAL SHORT AXIS VIEW): 1.3 CM
INTERVENTRICULAR SEPTUM: 1.3 CM (ref 0.48–0.9)
LAAS-AP4: 16.7 CM2
LEFT ATRIUM SIZE: 3.3 CM
LEFT INTERNAL DIMENSION IN SYSTOLE: 5.3 CM (ref 2.27–3.43)
LEFT VENTRICLE DIASTOLIC VOLUME (MOD BIPLANE): 217 ML (ref 67.99–153.12)
LEFT VENTRICLE SYSTOLIC VOLUME (MOD BIPLANE): 166 ML
LEFT VENTRICULAR INTERNAL DIMENSION IN DIASTOLE: 5.8 CM (ref 3.69–5.49)
LEFT VENTRICULAR POSTERIOR WALL IN END DIASTOLE: 1 CM (ref 0.47–0.89)
LEFT VENTRICULAR STROKE VOLUME: 34 ML
LV EF: 23 %
LVSV (TEICH): 34 ML
MV E'TISSUE VEL-SEP: 3 CM/S
MV PEAK A VEL: 0.01 M/S
MV PEAK E VEL: 144 CM/S
MV STENOSIS PRESSURE HALF TIME: 19 MS
MV VALVE AREA P 1/2 METHOD: 11.58 CM2
RIGHT ATRIUM AREA SYSTOLE A4C: 14.2 CM2
RIGHT VENTRICLE ID DIMENSION: 3.8 CM
SL CV LV DIAS VOL ENDO Z SCORE: 5
SL CV LV EF: 20
SL CV PED ECHO LEFT VENTRICLE DIASTOLIC VOLUME (MOD BIPLANE) 2D: 168 ML
SL CV PED ECHO LEFT VENTRICLE SYSTOLIC VOLUME (MOD BIPLANE) 2D: 134 ML
TR MAX PG: 28 MMHG
TR PEAK VELOCITY: 2.7 M/S
TRICUSPID VALVE PEAK REGURGITATION VELOCITY: 2.66 M/S
Z-SCORE OF ASCENDING AORTA: 3.84
Z-SCORE OF INTERVENTRICULAR SEPTUM IN END DIASTOLE: 5.65
Z-SCORE OF LEFT VENTRICULAR DIMENSION IN END DIASTOLE: 2.54
Z-SCORE OF LEFT VENTRICULAR DIMENSION IN END SYSTOLE: 5.07
Z-SCORE OF LEFT VENTRICULAR POSTERIOR WALL IN END DIASTOLE: 2.97

## 2022-04-08 PROCEDURE — 93306 TTE W/DOPPLER COMPLETE: CPT

## 2022-04-08 PROCEDURE — 93306 TTE W/DOPPLER COMPLETE: CPT | Performed by: INTERNAL MEDICINE

## 2022-04-11 ENCOUNTER — TELEPHONE (OUTPATIENT)
Dept: OTHER | Facility: OTHER | Age: 82
End: 2022-04-11

## 2022-04-11 NOTE — TELEPHONE ENCOUNTER
Physical therapist Cande Whitley from [address] [place] [address] called in to notify she will meet with PT twice a week for the next four weeks for balance ambulation and strengthening

## 2022-04-14 ENCOUNTER — HOSPITAL ENCOUNTER (OUTPATIENT)
Dept: RADIOLOGY | Facility: HOSPITAL | Age: 82
Discharge: HOME | End: 2022-04-14
Payer: MEDICARE

## 2022-04-14 DIAGNOSIS — S06.5X9A SDH (SUBDURAL HEMATOMA) (HCC): ICD-10-CM

## 2022-04-14 PROCEDURE — G1004 CDSM NDSC: HCPCS

## 2022-04-14 PROCEDURE — 70450 CT HEAD/BRAIN W/O DYE: CPT

## 2022-04-19 ENCOUNTER — OFFICE VISIT (OUTPATIENT)
Dept: NEUROSURGERY | Facility: CLINIC | Age: 82
End: 2022-04-19
Payer: MEDICARE

## 2022-04-19 VITALS
WEIGHT: 121 LBS | TEMPERATURE: 98.1 F | DIASTOLIC BLOOD PRESSURE: 74 MMHG | HEIGHT: 63 IN | RESPIRATION RATE: 18 BRPM | HEART RATE: 78 BPM | BODY MASS INDEX: 21.44 KG/M2 | SYSTOLIC BLOOD PRESSURE: 126 MMHG

## 2022-04-19 DIAGNOSIS — S06.5X9A SDH (SUBDURAL HEMATOMA) (HCC): Primary | ICD-10-CM

## 2022-04-19 PROCEDURE — 99214 OFFICE O/P EST MOD 30 MIN: CPT | Performed by: NURSE PRACTITIONER

## 2022-04-19 NOTE — PATIENT INSTRUCTIONS
Follow up in 1 month as scheduled  Please obtain CT head 1-2 days prior to appointment  Return sooner with any worsening headaches or other concerns

## 2022-04-19 NOTE — PROGRESS NOTES
Neurosurgery Office Note  Familia Lopez 78 y o  female MRN: [id number]      Assessment/Plan     Subdural hemorrhage [facility])  Presents for 2-week follow up for left tSDH   Presented on 3/30/2022 s/p fall several days prior   Denies anti-platelet anticoagulation medications at baseline   Chronic ITP, last platelets 3/73 52  Maintained on Promacta (hasn't been taking) and prednisone   Continues to endorse occasional mild headache for which she takes Tylenol   On exam, GCS 15 and AOx3, chronic left side facial palsy 2/2 Bell's Palsy  Imaging:   · CT head wo, 4/14/2022: No new hemorrhage  Expected evolution of left tentorial/ hemispheric subdural, diminishing in size and density with commensurate reduction in mild mass effect  Plan:    Reviewed imaging extensively with patient and son, Disha Estrella   Discussed that would continue to monitor headache frequency and intensity and return sooner if these worsen   Continue Tylenol OTC as needed for headache   Recommend continued surveillance imaging in 1 month  Repeat CT head ordered   Patient and son verbalized understanding and are in agreement with plan  Diagnoses and all orders for this visit:    SDH (subdural hematoma) ([facility] )  -     CT head wo contrast; Future            CHIEF COMPLAINT    Chief Complaint   Patient presents with    Follow-up       HISTORY    History of Present Illness     78y o  year old female with past medical history of ITP, LEA positive, Bell's palsy, dilated cardiomyopathy with low ejection fraction, hypertension, autoimmune hepatitis, who presented to the hospital on 03/31/2022 few days after sustaining a fall at home hitting her head against the bedside table  She is not on any blood thinning medication  She began to develop a progressively worsening headache and thus presented to her PCP for evaluation    She was sent for an outpatient CT head and was noted on imaging with subdural hematoma and referred to the emergency department for further management  She was hospitalized until 04/01/2022  She then was discharged home  She has had subsequent follow-up with her primary care provider  She continues to endorse occasional headaches for which she takes Tylenol but states they have become much less frequent since when she was hospitalized  She denies any visual disturbance  She denies any numbness or weakness  She has chronic left-sided facial weakness secondary to Bell's palsy in 2007  She lives at home with her family  HPI    See Discussion    REVIEW OF SYSTEMS    Review of Systems   Constitutional: Negative  HENT: Positive for hearing loss  Eyes: Negative  Respiratory: Negative  Cardiovascular: Negative  H/o HTN   Gastrointestinal: Positive for nausea (occassional)  Endocrine: Negative  Genitourinary: Negative  Musculoskeletal: Negative  Skin: Negative  Allergic/Immunologic: Positive for environmental allergies  On Flonase   Neurological: Positive for headaches (on and off 3x wk  Takes Tylenol PRN- Good relief)  SDH- S/p fall 1wk priorn to Ed visit on 3/30/22    H/o Onyx Palsy   Hematological: Negative  Psychiatric/Behavioral: Negative  All other systems reviewed and are negative  ROS reviewed and edited as needed  Meds/Allergies     Current Outpatient Medications   Medication Sig Dispense Refill    acetaminophen (TYLENOL) 325 mg tablet Take 3 tablets (975 mg total) by mouth every 8 (eight) hours (Patient taking differently: Take 975 mg by mouth every 6 (six) hours as needed for mild pain  )  0    ascorbic acid (VITAMIN C) 500 mg tablet Take 500 mg by mouth daily      budesonide (ENTOCORT EC) 3 MG capsule Take 9 mg by mouth daily Takes 3 capsules once daily         fluticasone (FLONASE) 50 mcg/act nasal spray 2 sprays into each nostril daily 16 g 0    metoprolol succinate (TOPROL-XL) 25 mg 24 hr tablet Take 1 tablet (25 mg total) by mouth daily 90 tablet 3    potassium chloride (MICRO-K) 10 MEQ CR capsule Take 1 capsule (10 mEq total) by mouth daily 90 capsule 3    predniSONE 10 mg tablet Take 1 tablet (10 mg total) by mouth daily      sacubitril-valsartan (Entresto) 49-51 MG TABS Take 1 tablet by mouth 2 (two) times a day 180 tablet 3    spironolactone (ALDACTONE) 25 mg tablet Take 0 5 tablets (12 5 mg total) by mouth daily 90 tablet 3    torsemide (DEMADEX) 10 mg tablet Take 1 tablet (10 mg total) by mouth daily 90 tablet 3     No current facility-administered medications for this visit  Allergies   Allergen Reactions    Ambien [Zolpidem] Other (See Comments)     Did not allow sleep per pt       PAST HISTORY    Past Medical History:   Diagnosis Date    Bell's palsy     Cardiac disease     Ear problems     HL (hearing loss)     Hypertension     Tinnitus        Past Surgical History:   Procedure Laterality Date    CT BONE MARROW BIOPSY AND ASPIRATION  1/27/2021       Social History     Tobacco Use    Smoking status: Never Smoker    Smokeless tobacco: Never Used   Substance Use Topics    Alcohol use: No     Alcohol/week: 0 0 standard drinks    Drug use: No       Family History   Problem Relation Age of Onset    Autoimmune disease Neg Hx          Above history personally reviewed  EXAM    Vitals:Blood pressure 126/74, pulse 78, temperature 98 1 °F (36 7 °C), temperature source Temporal, resp  rate 18, height 5' 3" (1 6 m), weight 54 9 kg (121 lb), not currently breastfeeding  ,Body mass index is 21 43 kg/m²  Physical Exam  Constitutional:       Appearance: She is well-developed  HENT:      Head: Normocephalic and atraumatic  Eyes:      Extraocular Movements: EOM normal       Pupils: Pupils are equal, round, and reactive to light  Pulmonary:      Effort: Pulmonary effort is normal    Abdominal:      Palpations: Abdomen is soft  Musculoskeletal:         General: Normal range of motion        Cervical back: Normal range of motion and neck supple  Skin:     General: Skin is warm and dry  Neurological:      Mental Status: She is alert and oriented to person, place, and time  Mental status is at baseline  Cranial Nerves: Cranial nerve deficit present  Sensory: No sensory deficit  Motor: No weakness  Coordination: Coordination normal  Finger-Nose-Finger Test normal    Psychiatric:         Speech: Speech normal          Neurologic Exam     Mental Status   Oriented to person, place, and time  Oriented to person  Oriented to place  Oriented to time  Oriented to year, month and date  Registration: recalls 3 of 3 objects  Attention: normal  Concentration: normal    Speech: speech is normal   Level of consciousness: alert  Knowledge: good and consistent with education  Able to perform simple calculations  Able to name object  Cranial Nerves     CN III, IV, VI   Pupils are equal, round, and reactive to light  Extraocular motions are normal    Right pupil: Size: 3 mm  Shape: regular  Reactivity: brisk  Consensual response: intact  Accommodation: intact  Left pupil: Size: 3 mm  Shape: regular  Reactivity: brisk  Consensual response: intact  Accommodation: intact     Nystagmus: none   Diplopia: none  Conjugate gaze: present    CN V   Right facial sensation deficit: none  Left facial sensation deficit: none    CN VII   Left facial weakness: peripheral    CN VIII   Hearing: intact    CN IX, X   Palate: symmetric    CN XI   Right sternocleidomastoid strength: normal  Left sternocleidomastoid strength: normal  Right trapezius strength: normal  Left trapezius strength: normal    CN XII   Tongue: not atrophic  Fasciculations: absent  Tongue deviation: none    Motor Exam   Muscle bulk: normal  Overall muscle tone: normal  Right arm pronator drift: absent  Left arm pronator drift: absent    Strength   Right deltoid: 5/5  Left deltoid: 5/5  Right biceps: 5/5  Left biceps: 5/5  Right triceps: 5/5  Left triceps: 5/5  Right quadriceps: 5/5  Left quadriceps: 5/5  Right hamstrin/5  Left hamstrin/5  Right anterior tibial: 5/5  Left anterior tibial: 5/5  Right posterior tibial: 5/5  Left posterior tibial: 5/5  Right peroneal: 5/5  Left peroneal: 5/5  Right gastroc: 5/5  Left gastroc: 5/5    Sensory Exam   Light touch normal    Proprioception normal      Gait, Coordination, and Reflexes     Coordination   Finger to nose coordination: normal    Tremor   Resting tremor: absent  Intention tremor: absent  Action tremor: absent        MEDICAL DECISION MAKING    Imaging Studies:     CT head wo contrast    Result Date: 2022  Narrative: CT BRAIN - WITHOUT CONTRAST INDICATION:   S06  5X9A: Traumatic subdural hemorrhage with loss of consciousness of unspecified duration, initial encounter  COMPARISON:  CT 3/31/2022 TECHNIQUE:  CT examination of the brain was performed  In addition to axial images, sagittal and coronal 2D reformatted images were created and submitted for interpretation  Radiation dose length product (DLP) for this visit:  807 81 mGy-cm   This examination, like all CT scans performed in the [facility], was performed utilizing techniques to minimize radiation dose exposure, including the use of iterative  reconstruction and automated exposure control  IMAGE QUALITY:  Diagnostic  FINDINGS: PARENCHYMA:  No new hemorrhage  The left hemispheric subdural, extending along the left tentorium is diminished slightly in density in the volume  Maximum width in the temporal frontal region less than 5 mm  Ipsilateral sulci are returning to normal size  VENTRICLES AND EXTRA-AXIAL SPACES:  Normal for the patient's age  VISUALIZED ORBITS AND PARANASAL SINUSES:  Bilateral lens implants  CALVARIUM AND EXTRACRANIAL SOFT TISSUES:  Normal      Impression: No new hemorrhage  Expected evolution of left tentorial/ hemispheric subdural, diminishing in size and density with commensurate reduction in mild mass effect  Workstation performed: [id number]     CT head wo contrast    Result Date: 3/31/2022  Narrative: CT BRAIN - WITHOUT CONTRAST INDICATION:   Subdural hemorrhage  COMPARISON:  Head CT from earlier the same date  TECHNIQUE:  CT examination of the brain was performed  In addition to axial images, sagittal and coronal 2D reformatted images were created and submitted for interpretation  Radiation dose length product (DLP) for this visit:  802 27 mGy-cm   This examination, like all CT scans performed in the [facility], was performed utilizing techniques to minimize radiation dose exposure, including the use of iterative  reconstruction and automated exposure control  IMAGE QUALITY:  Diagnostic  FINDINGS: PARENCHYMA:  Stable acute left hemispheric subdural hematoma that extends along the left tentorium with maximal thickness of 6 mm over the lateral temporal convexity  Stable mild sulcal effacement and 3 mm of left-to-right shift  No new hemorrhage  No  acute infarct  Mild chronic microangiopathic change  VENTRICLES AND EXTRA-AXIAL SPACES:  Normal for the patient's age  VISUALIZED ORBITS AND PARANASAL SINUSES:  Bilateral lens replacement  CALVARIUM AND EXTRACRANIAL SOFT TISSUES:  Normal      Impression: Stable left hemispheric subdural extends along the tentorium with maximal thickness of 6 mm  Stable mild mass effect with 3 mm of left-to-right shift  Workstation performed: [id number]     CT head wo contrast    Addendum Date: 3/30/2022 Addendum:   Please note the following important correction:  There is a voice recognition software related error in the IMPRESSION section of the report    The original impression reads " Acute left hemispheric subdural hematoma, 6 cm (CENTIMETERS) in greatest thickness with mass effect resulting in sulcal effacement and up to 4 mm of left to right midline shift  " BUT SHOULD READ, " Acute left hemispheric subdural hematoma, 6 mm (MILLIMETERS) in greatest thickness with mass effect resulting in sulcal effacement and up to 4 mm of left to right midline shift  "       Result Date: 3/30/2022  Narrative: CT BRAIN - WITHOUT CONTRAST INDICATION:   G44 209: Tension-type headache, unspecified, not intractable S09  90XD: Unspecified injury of head, subsequent encounter  Fall  COMPARISON:  May 9, 2012 TECHNIQUE:  CT examination of the brain was performed  In addition to axial images, sagittal and coronal 2D reformatted images were created and submitted for interpretation  Radiation dose length product (DLP) for this visit:  781 mGy-cm   This examination, like all CT scans performed in the [facility], was performed utilizing techniques to minimize radiation dose exposure, including the use of iterative reconstruction and automated exposure control  IMAGE QUALITY:  Diagnostic  FINDINGS: PARENCHYMA:  No parenchymal brain mass  No CT signs of acute infarction  No acute parenchymal hemorrhage  VENTRICLES AND EXTRA-AXIAL SPACES:  There is acute left hemispheric subdural hematoma measuring approximately 6 mm in thickness along the left frontotemporal convexity, and extending along the left manuelito-tentorium where subdural measures up to 3 mm in thickness  Mass effect in the left cerebral hemisphere results in mild sulcal effacement and there is left to right midline shift of approximately 4 mm at the level of the frontal horns  No downward transtentorial herniation  No intraventricular or subarachnoid hemorrhage  VISUALIZED ORBITS AND PARANASAL SINUSES:  Unremarkable  CALVARIUM AND EXTRACRANIAL SOFT TISSUES:  Normal      Impression: Acute left hemispheric subdural hematoma, 6 cm in greatest thickness with mass effect resulting in sulcal effacement and up to 4 mm of left to right midline shift   I reported these results to [facility] via HIPAA compliant secure electronic messaging on 3/30/2022 at 3:28 PM   Workstation performed: [id number]     Echo complete w/ contrast if indicated    Result Date: 4/8/2022  Narrative: [facility]  Left Ventricle: Left ventricular cavity size is moderately dilated  Wall thickness is normal  The left ventricular ejection fraction is 20%  Systolic function is severely reduced  There is severe global hypokinesis with dyskinesis of the interventricular septum  Diastolic function is abnormal    Right Ventricle: Right ventricular cavity size is normal  Systolic function is normal    Mitral Valve: There is mild to moderate regurgitation  I have personally reviewed pertinent reports     and I have personally reviewed pertinent films in PACS

## 2022-04-19 NOTE — ASSESSMENT & PLAN NOTE
Presents for 2-week follow up for left tSDH   Presented on 3/30/2022 s/p fall several days prior   Denies anti-platelet anticoagulation medications at baseline   Chronic ITP, last platelets 0/63 52  Maintained on Promacta (hasn't been taking) and prednisone   Continues to endorse occasional mild headache for which she takes Tylenol   On exam, GCS 15 and AOx3, chronic left side facial palsy 2/2 Bell's Palsy  Imaging:   · CT head wo, 4/14/2022: No new hemorrhage  Expected evolution of left tentorial/ hemispheric subdural, diminishing in size and density with commensurate reduction in mild mass effect  Plan:    Reviewed imaging extensively with patient and son, Ana Jose   Discussed that would continue to monitor headache frequency and intensity and return sooner if these worsen   Continue Tylenol OTC as needed for headache   Recommend continued surveillance imaging in 1 month  Repeat CT head ordered   Patient and son verbalized understanding and are in agreement with plan

## 2022-04-20 NOTE — TELEPHONE ENCOUNTER
[facility] called to inform that Patient has been discharged from the A  No call back necessary

## 2022-04-21 ENCOUNTER — TELEPHONE (OUTPATIENT)
Dept: HEMATOLOGY ONCOLOGY | Facility: CLINIC | Age: 82
End: 2022-04-21

## 2022-04-21 NOTE — TELEPHONE ENCOUNTER
Patient called to confirm the labs she had done on 3/31 were going to be the ones used for her follow up visit on 4/25 with Dr Matt Chavira  I informed patient the follow up visit is to go over those results and did not see new ones ordered

## 2022-04-25 ENCOUNTER — OFFICE VISIT (OUTPATIENT)
Dept: HEMATOLOGY ONCOLOGY | Facility: CLINIC | Age: 82
End: 2022-04-25
Payer: MEDICARE

## 2022-04-25 ENCOUNTER — APPOINTMENT (OUTPATIENT)
Dept: LAB | Facility: CLINIC | Age: 82
End: 2022-04-25
Payer: MEDICARE

## 2022-04-25 VITALS
RESPIRATION RATE: 18 BRPM | OXYGEN SATURATION: 96 % | DIASTOLIC BLOOD PRESSURE: 78 MMHG | BODY MASS INDEX: 21.44 KG/M2 | TEMPERATURE: 97 F | SYSTOLIC BLOOD PRESSURE: 132 MMHG | HEART RATE: 79 BPM | WEIGHT: 121 LBS | HEIGHT: 63 IN

## 2022-04-25 DIAGNOSIS — D69.3 CHRONIC ITP (IDIOPATHIC THROMBOCYTOPENIA) (HCC): Primary | ICD-10-CM

## 2022-04-25 PROBLEM — E44.1 MILD PROTEIN-CALORIE MALNUTRITION (HCC): Status: RESOLVED | Noted: 2021-08-10 | Resolved: 2022-04-25
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
## 2022-04-25 LAB
BASOPHILS # BLD AUTO: 0.02 THOUSANDS/ΜL (ref 0–0.1)
BASOPHILS NFR BLD AUTO: 1 % (ref 0–1)
EOSINOPHIL # BLD AUTO: 0.01 THOUSAND/ΜL (ref 0–0.61)
EOSINOPHIL NFR BLD AUTO: 0 % (ref 0–6)
ERYTHROCYTE [DISTWIDTH] IN BLOOD BY AUTOMATED COUNT: 14.6 % (ref 11.6–15.1)
HCT VFR BLD AUTO: 35.3 % (ref 34.8–46.1)
HGB BLD-MCNC: 11.8 G/DL (ref 11.5–15.4)
IMM GRANULOCYTES # BLD AUTO: 0.01 THOUSAND/UL (ref 0–0.2)
IMM GRANULOCYTES NFR BLD AUTO: 0 % (ref 0–2)
LYMPHOCYTES # BLD AUTO: 0.57 THOUSANDS/ΜL (ref 0.6–4.47)
LYMPHOCYTES NFR BLD AUTO: 17 % (ref 14–44)
MCH RBC QN AUTO: 31.6 PG (ref 26.8–34.3)
MCHC RBC AUTO-ENTMCNC: 33.4 G/DL (ref 31.4–37.4)
MCV RBC AUTO: 95 FL (ref 82–98)
MONOCYTES # BLD AUTO: 0.32 THOUSAND/ΜL (ref 0.17–1.22)
MONOCYTES NFR BLD AUTO: 9 % (ref 4–12)
NEUTROPHILS # BLD AUTO: 2.47 THOUSANDS/ΜL (ref 1.85–7.62)
NEUTS SEG NFR BLD AUTO: 73 % (ref 43–75)
NRBC BLD AUTO-RTO: 0 /100 WBCS
PLATELET # BLD AUTO: 64 THOUSANDS/UL (ref 149–390)
PMV BLD AUTO: 12.8 FL (ref 8.9–12.7)
RBC # BLD AUTO: 3.73 MILLION/UL (ref 3.81–5.12)
WBC # BLD AUTO: 3.4 THOUSAND/UL (ref 4.31–10.16)

## 2022-04-25 PROCEDURE — 85025 COMPLETE CBC W/AUTO DIFF WBC: CPT | Performed by: INTERNAL MEDICINE

## 2022-04-25 PROCEDURE — 36415 COLL VENOUS BLD VENIPUNCTURE: CPT | Performed by: INTERNAL MEDICINE

## 2022-04-25 PROCEDURE — 99214 OFFICE O/P EST MOD 30 MIN: CPT | Performed by: INTERNAL MEDICINE

## 2022-04-25 NOTE — PROGRESS NOTES
Hematology Outpatient Follow - Up Note  Larissa Cardenas 78 y o  female MRN: @ Encounter: [id number]        Date:  4/25/2022        Assessment/ Plan:      Chronic immune thrombocytopenic purpura, continue Promacta 50 mg p o  daily, normalization of platelet count in the range of 213,000      2  Elevation of AST, ALT with positive LEA most likely autoimmune, back on prednisone 10 mg p o  daily with normalization of AST, ALT      Anti smooth muscle antibodies of 93      Normal vitamin T19, folic acid, TSH and serum protein electrophoresis    Will do CBC today and every month as standing order    Will follow-up in 3-4 months    Leukopenia autoimmune      Labs and imaging studies are reviewed by ordering provider once results are available  If there are findings that need immediate attention, you will be contacted when results available  Discussing results and the implication on your healthcare is best discussed in person at your follow-up visit         HPI:    25-year-old [place] American female who was seen initially 5/2020 regarding pancytopenia     She has a history of dilated cardiomyopathy with low ejection fraction on Entresto, spironolactone, torsemide, metoprolol and budesonide  Zafarwilliams Mercado has history of hypertension, autoimmune hepatitis treated with steroids, 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 on 09/09/2019 showed hemoglobin 11 3, MCV 93, WBC of 2 7, normal differential platelets 65365  August 2018 iron saturation 8% ferritin 26  On 07/2018 hemoglobin 11, MCV 91, WBC 3 1, platelets 86404  89/75/9919:  Hemoglobin 12 9, MCV of 90, white blood cell count 2 95, platelets 230  On 68/0679 hemoglobin 13 3, MCV 89, WBC 3 4, platelets 691969     She had normal vitamin S60, folic acid, TSH, protein electrophoresis     Autoimmune workup by Rheumatology was negative except for anti smooth muscle antibodies of 93     Because of the leukopenia, thrombocytopenia we had to do a bone marrow biopsy on January 2021 showed normal bone marrow cellularity for the age, no evidence of dysplastic features, normal plasma cells and lymphocytes, no evidence of vasculitis, necrosis, fibrosis adequate iron stains     Back on prednisone 10 mg p o  daily because of elevation of ALT, AST, with persistent thrombocytopenia of 38,000, initiated on Promacta 50 mg at the beginning of September 2021, platelet count [id number] in November 2021, WBC 4, and decrease in the AST, ALT  Interval History:        Previous Treatment:         Test Results:    Imaging: CT head wo contrast    Result Date: 4/19/2022  Narrative: CT BRAIN - WITHOUT CONTRAST INDICATION:   S06  5X9A: Traumatic subdural hemorrhage with loss of consciousness of unspecified duration, initial encounter  COMPARISON:  CT 3/31/2022 TECHNIQUE:  CT examination of the brain was performed  In addition to axial images, sagittal and coronal 2D reformatted images were created and submitted for interpretation  Radiation dose length product (DLP) for this visit:  807 81 mGy-cm   This examination, like all CT scans performed in the [facility], was performed utilizing techniques to minimize radiation dose exposure, including the use of iterative  reconstruction and automated exposure control  IMAGE QUALITY:  Diagnostic  FINDINGS: PARENCHYMA:  No new hemorrhage  The left hemispheric subdural, extending along the left tentorium is diminished slightly in density in the volume  Maximum width in the temporal frontal region less than 5 mm  Ipsilateral sulci are returning to normal size  VENTRICLES AND EXTRA-AXIAL SPACES:  Normal for the patient's age   VISUALIZED ORBITS AND PARANASAL SINUSES: Bilateral lens implants  CALVARIUM AND EXTRACRANIAL SOFT TISSUES:  Normal      Impression: No new hemorrhage  Expected evolution of left tentorial/ hemispheric subdural, diminishing in size and density with commensurate reduction in mild mass effect  Workstation performed: [id number]     CT head wo contrast    Result Date: 3/31/2022  Narrative: CT BRAIN - WITHOUT CONTRAST INDICATION:   Subdural hemorrhage  COMPARISON:  Head CT from earlier the same date  TECHNIQUE:  CT examination of the brain was performed  In addition to axial images, sagittal and coronal 2D reformatted images were created and submitted for interpretation  Radiation dose length product (DLP) for this visit:  802 27 mGy-cm   This examination, like all CT scans performed in the [facility], was performed utilizing techniques to minimize radiation dose exposure, including the use of iterative  reconstruction and automated exposure control  IMAGE QUALITY:  Diagnostic  FINDINGS: PARENCHYMA:  Stable acute left hemispheric subdural hematoma that extends along the left tentorium with maximal thickness of 6 mm over the lateral temporal convexity  Stable mild sulcal effacement and 3 mm of left-to-right shift  No new hemorrhage  No  acute infarct  Mild chronic microangiopathic change  VENTRICLES AND EXTRA-AXIAL SPACES:  Normal for the patient's age  VISUALIZED ORBITS AND PARANASAL SINUSES:  Bilateral lens replacement  CALVARIUM AND EXTRACRANIAL SOFT TISSUES:  Normal      Impression: Stable left hemispheric subdural extends along the tentorium with maximal thickness of 6 mm  Stable mild mass effect with 3 mm of left-to-right shift  Workstation performed: [id number]     CT head wo contrast    Addendum Date: 3/30/2022 Addendum:   Please note the following important correction:  There is a voice recognition software related error in the IMPRESSION section of the report    The original impression reads " Acute left hemispheric subdural hematoma, 6 cm (CENTIMETERS) in greatest thickness with mass effect resulting in sulcal effacement and up to 4 mm of left to right midline shift  " BUT SHOULD READ, " Acute left hemispheric subdural hematoma, 6 mm (MILLIMETERS) in greatest thickness with mass effect resulting in sulcal effacement and up to 4 mm of left to right midline shift  "       Result Date: 3/30/2022  Narrative: CT BRAIN - WITHOUT CONTRAST INDICATION:   G44 209: Tension-type headache, unspecified, not intractable S09  90XD: Unspecified injury of head, subsequent encounter  Fall  COMPARISON:  May 9, 2012 TECHNIQUE:  CT examination of the brain was performed  In addition to axial images, sagittal and coronal 2D reformatted images were created and submitted for interpretation  Radiation dose length product (DLP) for this visit:  781 mGy-cm   This examination, like all CT scans performed in the [facility], was performed utilizing techniques to minimize radiation dose exposure, including the use of iterative reconstruction and automated exposure control  IMAGE QUALITY:  Diagnostic  FINDINGS: PARENCHYMA:  No parenchymal brain mass  No CT signs of acute infarction  No acute parenchymal hemorrhage  VENTRICLES AND EXTRA-AXIAL SPACES:  There is acute left hemispheric subdural hematoma measuring approximately 6 mm in thickness along the left frontotemporal convexity, and extending along the left manuelito-tentorium where subdural measures up to 3 mm in thickness  Mass effect in the left cerebral hemisphere results in mild sulcal effacement and there is left to right midline shift of approximately 4 mm at the level of the frontal horns  No downward transtentorial herniation  No intraventricular or subarachnoid hemorrhage  VISUALIZED ORBITS AND PARANASAL SINUSES:  Unremarkable   CALVARIUM AND EXTRACRANIAL SOFT TISSUES:  Normal      Impression: Acute left hemispheric subdural hematoma, 6 cm in greatest thickness with mass effect resulting in sulcal effacement and up to 4 mm of left to right midline shift  I reported these results to [facility] via HIPAA compliant secure electronic messaging on 3/30/2022 at 3:28 PM   Workstation performed: [id number]     Echo complete w/ contrast if indicated    Result Date: 4/8/2022  Narrative: [facility]  Left Ventricle: Left ventricular cavity size is moderately dilated  Wall thickness is normal  The left ventricular ejection fraction is 20%  Systolic function is severely reduced  There is severe global hypokinesis with dyskinesis of the interventricular septum  Diastolic function is abnormal    Right Ventricle: Right ventricular cavity size is normal  Systolic function is normal    Mitral Valve: There is mild to moderate regurgitation  Labs:   Lab Results   Component Value Date    WBC 2 26 (L) 03/31/2022    HGB 11 2 (L) 03/31/2022    HCT 34 0 (L) 03/31/2022    MCV 96 03/31/2022    PLT 52 (L) 03/31/2022     Lab Results   Component Value Date     12/04/2015    K 3 9 03/31/2022     (H) 03/31/2022    CO2 28 03/31/2022    ANIONGAP 4 12/04/2015    BUN 14 03/31/2022    CREATININE 0 76 03/31/2022    GLUCOSE 94 12/04/2015    GLUF 91 02/24/2021    CALCIUM 8 7 03/31/2022    CORRECTEDCA 9 7 03/31/2022    AST 43 03/31/2022    ALT 59 03/31/2022    ALKPHOS 38 (L) 03/31/2022    PROT 7 5 12/04/2015    BILITOT 0 48 12/04/2015    EGFR 72 03/31/2022       Lab Results   Component Value Date    IRON 133 01/11/2021    TIBC 327 01/11/2021    FERRITIN 238 01/11/2021       Lab Results   Component Value Date    LYMOILDA62 302 01/11/2021         ROS: Review of Systems   Constitutional: Negative for appetite change, chills, diaphoresis, fatigue and unexpected weight change  HENT:   Negative for mouth sores, nosebleeds, sore throat, trouble swallowing and voice change  Eyes: Negative for eye problems and icterus  Respiratory: Negative for chest tightness, cough, hemoptysis and wheezing      Cardiovascular: Positive for leg swelling and palpitations  Negative for chest pain  Gastrointestinal: Negative for abdominal distention, abdominal pain, blood in stool, constipation, diarrhea, nausea and vomiting  Endocrine: Negative for hot flashes  Genitourinary: Positive for bladder incontinence  Negative for difficulty urinating, dyspareunia, dysuria and frequency  Musculoskeletal: Negative for arthralgias, back pain, gait problem, neck pain and neck stiffness  Skin: Negative for itching and rash  Neurological: Negative for dizziness, gait problem, headaches, numbness, seizures and speech difficulty  Hematological: Negative for adenopathy  Does not bruise/bleed easily  Psychiatric/Behavioral: Negative for decreased concentration, depression, sleep disturbance and suicidal ideas  The patient is not nervous/anxious  Current Medications: Reviewed  Allergies: Reviewed  PMH/FH/SH:  Reviewed      Physical Exam:    Body surface area is 1 56 meters squared  Wt Readings from Last 3 Encounters:   04/25/22 54 9 kg (121 lb)   04/19/22 54 9 kg (121 lb)   04/08/22 54 9 kg (121 lb)        Temp Readings from Last 3 Encounters:   04/25/22 (!) 97 °F (36 1 °C)   04/19/22 98 1 °F (36 7 °C) (Temporal)   04/06/22 (!) 97 2 °F (36 2 °C) (Skin)        BP Readings from Last 3 Encounters:   04/25/22 132/78   04/19/22 126/74   04/08/22 114/68         Pulse Readings from Last 3 Encounters:   04/25/22 79   04/19/22 78   04/08/22 70        Physical Exam  Vitals reviewed  Constitutional:       General: She is not in acute distress  Appearance: She is well-developed  She is not diaphoretic  HENT:      Head: Normocephalic and atraumatic  Eyes:      Conjunctiva/sclera: Conjunctivae normal    Neck:      Trachea: No tracheal deviation  Cardiovascular:      Rate and Rhythm: Normal rate and regular rhythm  Heart sounds: Murmur heard  No friction rub  No gallop      Pulmonary:      Effort: Pulmonary effort is normal  No respiratory distress  Breath sounds: Normal breath sounds  No wheezing or rales  Chest:      Chest wall: No tenderness  Abdominal:      General: There is no distension  Palpations: Abdomen is soft  Tenderness: There is no abdominal tenderness  Musculoskeletal:      Cervical back: Normal range of motion and neck supple  Right lower leg: Edema present  Left lower leg: Edema present  Lymphadenopathy:      Cervical: No cervical adenopathy  Skin:     General: Skin is warm and dry  Coloration: Skin is not pale  Findings: No erythema  Neurological:      Mental Status: She is alert and oriented to person, place, and time  Psychiatric:         Behavior: Behavior normal          Thought Content: Thought content normal          Judgment: Judgment normal          ECO    Goals and Barriers:  Current Goal: Minimize effects of disease  Barriers: None  Patient's Capacity to Self Care:  Patient is able to self care      Code Status: @[facility]@

## 2022-04-26 ENCOUNTER — TELEPHONE (OUTPATIENT)
Dept: HEMATOLOGY ONCOLOGY | Facility: CLINIC | Age: 82
End: 2022-04-26

## 2022-04-26 DIAGNOSIS — D69.3 CHRONIC ITP (IDIOPATHIC THROMBOCYTOPENIA) (HCC): Primary | ICD-10-CM

## 2022-04-26 NOTE — TELEPHONE ENCOUNTER
CALL RETURN FORM   Reason for patient call? Patient is calling back because she is still waiting to speak with Dr Chris Vences about restarting a medication and was not sure the name  She wants to know if he is going to get her started on it again and when    Patient's primary oncologist? Dr Chrsi Vences    Name of person the patient was calling for? Dr Chris Vences    Any additional information to add, if applicable? [phone]   Informed patient that the message will be forwarded to the team and someone will get back to them as soon as possible    Did you relay this information to the patient?  yes

## 2022-04-26 NOTE — TELEPHONE ENCOUNTER
Spoke with pt and confirmed Dr Sudhakar Vega note indicates she should continue Promacta 50mg daily  Pt states she thought she had some at home but does not  Script pended for signature

## 2022-04-26 NOTE — TELEPHONE ENCOUNTER
CALL RETURN FORM   Reason for patient call? Patient is calling because she spoke to Dr Elyn Kanner about restarting a medication and was not sure the name  She wants to know if he is going to get her started on it again and when  Patient's primary oncologist? Dr Elyn Kanner    Name of person the patient was calling for? Georgina   Any additional information to add, if applicable? Best call back number is [phone]   Informed patient that the message will be forwarded to the team and someone will get back to them as soon as possible    Did you relay this information to the patient?  yes

## 2022-05-05 ENCOUNTER — TELEPHONE (OUTPATIENT)
Dept: OTHER | Facility: OTHER | Age: 82
End: 2022-05-05

## 2022-05-05 NOTE — TELEPHONE ENCOUNTER
[email or web address] is requesting  a prescription for Out patient therapy  She can be reached at [phone]  Can the office also call the patient and let her know about the outpatient therapy

## 2022-05-06 ENCOUNTER — TELEPHONE (OUTPATIENT)
Dept: FAMILY MEDICINE CLINIC | Facility: CLINIC | Age: 82
End: 2022-05-06

## 2022-05-06 NOTE — TELEPHONE ENCOUNTER
Pebbles an OT with [facility] called and wanted to let you know that they are D/C her form Home care as of today as her all her goals are met

## 2022-05-09 ENCOUNTER — HOSPITAL ENCOUNTER (OUTPATIENT)
Dept: RADIOLOGY | Age: 82
Discharge: HOME | End: 2022-05-09
Payer: MEDICARE

## 2022-05-09 DIAGNOSIS — S06.5X9A SDH (SUBDURAL HEMATOMA) (HCC): ICD-10-CM

## 2022-05-09 PROCEDURE — 70450 CT HEAD/BRAIN W/O DYE: CPT

## 2022-05-09 PROCEDURE — G1004 CDSM NDSC: HCPCS

## 2022-05-18 ENCOUNTER — OFFICE VISIT (OUTPATIENT)
Dept: NEUROSURGERY | Facility: CLINIC | Age: 82
End: 2022-05-18
Payer: MEDICARE

## 2022-05-18 ENCOUNTER — EVALUATION (OUTPATIENT)
Dept: PHYSICAL THERAPY | Facility: CLINIC | Age: 82
End: 2022-05-18
Payer: MEDICARE

## 2022-05-18 VITALS
HEART RATE: 70 BPM | TEMPERATURE: 98 F | BODY MASS INDEX: 17.32 KG/M2 | WEIGHT: 121 LBS | RESPIRATION RATE: 18 BRPM | HEIGHT: 70 IN | SYSTOLIC BLOOD PRESSURE: 112 MMHG | DIASTOLIC BLOOD PRESSURE: 64 MMHG

## 2022-05-18 DIAGNOSIS — R26.9 GAIT ABNORMALITY: ICD-10-CM

## 2022-05-18 DIAGNOSIS — S06.5X9A SDH (SUBDURAL HEMATOMA) (HCC): Primary | ICD-10-CM

## 2022-05-18 DIAGNOSIS — R26.9 NEUROLOGIC GAIT DYSFUNCTION: ICD-10-CM

## 2022-05-18 DIAGNOSIS — R26.89 BALANCE DISORDER: Primary | ICD-10-CM

## 2022-05-18 DIAGNOSIS — S06.5X9A SUBDURAL HEMATOMA (HCC): ICD-10-CM

## 2022-05-18 PROCEDURE — 97162 PT EVAL MOD COMPLEX 30 MIN: CPT | Performed by: PHYSICAL THERAPIST

## 2022-05-18 PROCEDURE — 99214 OFFICE O/P EST MOD 30 MIN: CPT | Performed by: NURSE PRACTITIONER

## 2022-05-18 PROCEDURE — 97112 NEUROMUSCULAR REEDUCATION: CPT | Performed by: PHYSICAL THERAPIST

## 2022-05-18 NOTE — PROGRESS NOTES
Neurosurgery Office Note  Dominic Bustamante 78 y o  female MRN: [id number]      Assessment/Plan     Subdural hemorrhage [facility])  Presents for 6-week follow up for left tSDH   Presented on 3/30/2022 s/p fall several days prior   Denies anti-platelet anticoagulation medications at baseline   Chronic ITP, last platelets 1/34 64  Maintained on Promacta (hasn't been taking) and prednisone   Headaches have resolved   On exam, GCS 15 and AOx3, chronic left side facial palsy 2/2 Bell's Palsy  Imaging:   · CT head wo, 5/9/2022: Slightly further decreased size of left cerebral convexity subdural hematoma  Interval decreased density of the hematoma compatible with further evolution to a more subacute subdural hematoma  Plan:    Reviewed imaging extensively with patient and son, Rachel Mendoza   CT scan resolution of SDH   Discussed continued follow up with hematologist for thrombocytopenia   No further neurosurgical follow up   Patient and son verbalized understanding and are in agreement with plan  Diagnoses and all orders for this visit:    SDH (subdural hematoma) ([facility] [address] )          I spent 20 minutes with the patient today in which >50% of the time was spent counseling/coordination of care regarding diagnosis, imaging review, symptoms and treatment plan  CHIEF COMPLAINT    Chief Complaint   Patient presents with    Follow-up       HISTORY    History of Present Illness     78y o  year old female with past medical history of ITP, LEA positive, Bell's palsy, dilated cardiomyopathy with low ejection fraction, hypertension, autoimmune hepatitis, who presented to the hospital on 03/31/2022 a few days after sustaining a fall at home hitting her head against the bedside table  She is not on any blood thinning medication  She began to develop a progressively worsening headache and thus presented to her PCP for evaluation    She was sent for an outpatient CT head and was noted on imaging with subdural hematoma and referred to the emergency department for further management      She was hospitalized until 04/01/2022  She then was discharged home  She has had subsequent follow-up with her primary care provider and with Dr Hallie Stephenson (hem/onc)  She states her headaches have completely resolved  She states she has been feeling well but today notes feeling a little "woozy" and like she is having trouble balancing when walking  She has chronic left-sided facial weakness secondary to Bell's palsy in 2007  She lives at home with her family        HPI    See Discussion    REVIEW OF SYSTEMS    Review of Systems   Constitutional: Negative  HENT: Negative  Negative for hearing loss  Eyes: Negative  Respiratory: Negative  Cardiovascular: Negative  H/o HTN   Gastrointestinal: Negative  Negative for nausea  Endocrine: Negative  Genitourinary: Negative  Musculoskeletal: Negative  Skin: Negative  Allergic/Immunologic: Positive for environmental allergies  On Flonase   Neurological: Negative  Negative for headaches  SDH- S/p fall 1wk priorn to Ed visit on 3/30/22    H/o [place] Palsy   Hematological: Negative  Psychiatric/Behavioral: Negative  All other systems reviewed and are negative  ROS reviewed and edited as needed  Meds/Allergies     Current Outpatient Medications   Medication Sig Dispense Refill    acetaminophen (TYLENOL) 325 mg tablet Take 3 tablets (975 mg total) by mouth every 8 (eight) hours (Patient taking differently: Take 975 mg by mouth every 6 (six) hours as needed for mild pain)  0    ascorbic acid (VITAMIN C) 500 mg tablet Take 500 mg by mouth daily      budesonide (ENTOCORT EC) 3 MG capsule Take 9 mg by mouth daily Takes 3 capsules once daily         fluticasone (FLONASE) 50 mcg/act nasal spray 2 sprays into each nostril daily 16 g 0    metoprolol succinate (TOPROL-XL) 25 mg 24 hr tablet Take 1 tablet (25 mg total) by mouth daily 90 tablet 3    potassium chloride (MICRO-K) 10 MEQ CR capsule Take 1 capsule (10 mEq total) by mouth daily 90 capsule 3    sacubitril-valsartan (Entresto) 49-51 MG TABS Take 1 tablet by mouth 2 (two) times a day 180 tablet 3    spironolactone (ALDACTONE) 25 mg tablet Take 0 5 tablets (12 5 mg total) by mouth daily 90 tablet 3    torsemide (DEMADEX) 10 mg tablet Take 1 tablet (10 mg total) by mouth daily 90 tablet 3    predniSONE 10 mg tablet Take 1 tablet (10 mg total) by mouth daily (Patient not taking: Reported on 5/18/2022)       No current facility-administered medications for this visit  Allergies   Allergen Reactions    Ambien [Zolpidem] Other (See Comments)     Did not allow sleep per pt       PAST HISTORY    Past Medical History:   Diagnosis Date    Bell's palsy     Cardiac disease     Ear problems     HL (hearing loss)     Hypertension     Tinnitus        Past Surgical History:   Procedure Laterality Date    CT BONE MARROW BIOPSY AND ASPIRATION  1/27/2021       Social History     Tobacco Use    Smoking status: Never Smoker    Smokeless tobacco: Never Used   Substance Use Topics    Alcohol use: No     Alcohol/week: 0 0 standard drinks    Drug use: No       Family History   Problem Relation Age of Onset    Autoimmune disease Neg Hx          Above history personally reviewed  EXAM    Vitals:Blood pressure 112/64, pulse 70, temperature 98 °F (36 7 °C), temperature source Temporal, resp  rate 18, height 5' 10" (1 778 m), weight 54 9 kg (121 lb), not currently breastfeeding  ,Body mass index is 17 36 kg/m²  Physical Exam  Constitutional:       Appearance: She is well-developed  HENT:      Head: Normocephalic and atraumatic  Eyes:      Extraocular Movements: EOM normal       Pupils: Pupils are equal, round, and reactive to light  Pulmonary:      Effort: Pulmonary effort is normal    Abdominal:      Palpations: Abdomen is soft  Musculoskeletal:         General: Normal range of motion  Cervical back: Normal range of motion and neck supple  Skin:     General: Skin is warm and dry  Neurological:      General: No focal deficit present  Mental Status: She is alert and oriented to person, place, and time  Mental status is at baseline  Cranial Nerves: No cranial nerve deficit  Sensory: No sensory deficit  Motor: No weakness  Coordination: Coordination normal  Finger-Nose-Finger Test normal    Psychiatric:         Speech: Speech normal          Neurologic Exam     Mental Status   Oriented to person, place, and time  Oriented to person  Oriented to place  Oriented to time  Oriented to year, month and date  Registration: recalls 3 of 3 objects  Attention: normal  Concentration: normal    Speech: speech is normal   Level of consciousness: alert  Knowledge: good and consistent with education  Able to name object  Cranial Nerves     CN III, IV, VI   Pupils are equal, round, and reactive to light  Extraocular motions are normal    Right pupil: Size: 3 mm  Shape: regular  Reactivity: brisk  Consensual response: intact  Accommodation: intact  Left pupil: Size: 3 mm  Shape: regular  Reactivity: brisk  Consensual response: intact  Accommodation: intact  Nystagmus: none   Diplopia: none  Conjugate gaze: present    CN V   Right facial sensation deficit: none  Left facial sensation deficit: none    CN VII   Facial expression full, symmetric       CN VIII   Hearing: intact    CN IX, X   Palate: symmetric    CN XI   Right sternocleidomastoid strength: normal  Left sternocleidomastoid strength: normal  Right trapezius strength: normal  Left trapezius strength: normal    CN XII   Tongue: not atrophic  Fasciculations: absent  Tongue deviation: none    Motor Exam   Muscle bulk: normal  Overall muscle tone: normal  Right arm pronator drift: absent  Left arm pronator drift: absent    Strength   Right deltoid: 5/5  Left deltoid: 5/5  Right biceps: 5/5  Left biceps: 5/5  Right triceps: 5/5  Left triceps: 5/5  Right quadriceps: 5/5  Left quadriceps: 5/5  Right hamstrin/5  Left hamstrin/5  Right anterior tibial: 5/5  Left anterior tibial: 5/5  Right posterior tibial: 5/5  Left posterior tibial: 5/5  Right peroneal: 5/5  Left peroneal: 5/5  Right gastroc: 5/5  Left gastroc: 5/5    Sensory Exam   Light touch normal    Proprioception normal      Gait, Coordination, and Reflexes     Coordination   Finger to nose coordination: normal    Tremor   Resting tremor: absent  Intention tremor: absent  Action tremor: absent        MEDICAL DECISION MAKING    Imaging Studies:     CT head wo contrast    Result Date: 2022  Narrative: CT BRAIN - WITHOUT CONTRAST INDICATION:   S06  5X9A: Traumatic subdural hemorrhage with loss of consciousness of unspecified duration, initial encounter  COMPARISON:  2022  TECHNIQUE:  CT examination of the brain was performed  In addition to axial images, sagittal and coronal 2D reformatted images were created and submitted for interpretation  Radiation dose length product (DLP) for this visit:  764 mGy-cm   This examination, like all CT scans performed in the [facility], was performed utilizing techniques to minimize radiation dose exposure, including the use of iterative reconstruction and automated exposure control  IMAGE QUALITY:  Diagnostic  FINDINGS: PARENCHYMA:  No intracranial mass, mass effect or midline shift  No CT signs of acute infarction  No acute parenchymal hemorrhage  VENTRICLES AND EXTRA-AXIAL SPACES:  There is a left cerebral convexity subdural hematoma which has further decreased in size particularly superiorly  More inferiorly near the region of the temporal fossa, this measures up to 6 mm in maximal depth and is  grossly stable in overall size but the overall density has decreased compatible with further evolution to a more subacute subdural hematoma    The remaining ventricles and subarachnoid spaces are mildly dilated compatible with mild atrophy  VISUALIZED ORBITS AND PARANASAL SINUSES:  Unremarkable  CALVARIUM AND EXTRACRANIAL SOFT TISSUES:  Normal      Impression: Slightly further decreased size of left cerebral convexity subdural hematoma  Interval decreased density of the hematoma compatible with further evolution to a more subacute subdural hematoma  Workstation performed: [id number]       I have personally reviewed pertinent reports     and I have personally reviewed pertinent films in PACS

## 2022-05-18 NOTE — PROGRESS NOTES
PT Evaluation     Today's date: 2022  Patient name: Brendan Porter  : 1940  MRN: [id number]  Referring provider: Nallely Kent MD  Dx:   Encounter Diagnosis     ICD-10-CM    1  Neurologic gait dysfunction  R26 9 Ambulatory Referral to Physical Therapy                  Assessment  Assessment details: Patient reports to PT after experiencing fall at her home, hitting her head causing a subdural hematoma  Patient had home therapy and was referred to OP PT for further improvements in strength, balance and endurance  Per testing patient limited in B/L LE strength, decreased static balance, decreased endurance with walking and is a fall risk  Educated patient and son on deficits noted during today's examination compared to norms  Patient with tendency to demonstrate increased imbalance when cognitively distracted, advised patient and family to be aware of this and provide cues for safety as needed, son verbalized understanding  Patient will benefit from skilled therapy to improve the impairments noted above as well as maximize safety with functional mobility in the community and her home     Impairments: abnormal gait, activity intolerance, impaired balance, impaired physical strength, lacks appropriate home exercise program and safety issue  Barriers to therapy: Fall risk   requires transportation  Other medical complications  External defibrillator   Understanding of Dx/Px/POC: good   Prognosis: good    Goals  STG:  Patient will improve 6MWT by 200ft demonstrating significant improvements in endurance  w/in 4 weeks  Patient will demonstrated improved LE strength and endurance by improved 5xSTS to 15s or less within 4 weeks  Patient will improve TUG to 12s or less indicating they are no longer at risk for increased falls 4 weeks  Patient will be independent with HEP within 4 weeks  LTG:  Patient will improve 6MWT by 400ft demonstrating significant improvements in endurance  w/in 8 weeks  Patient will demonstrates significant improvements in dynamic balance by improving FGA score by 6 secs or more within 8 weeks  Patient will be able to perform all conditions of mCTSIB for 30 seconds demonstrating improved confidence and use of balance systems wthin 8 weeks   Patient will be independent with progress HEP within 8 weeks         Plan  Patient would benefit from: skilled physical therapy  Planned therapy interventions: neuromuscular re-education, patient education, balance, coordination, strengthening, therapeutic activities, therapeutic exercise, transfer training, home exercise program, graded activity and gait training  Frequency: 2x week  Duration in weeks: 8  Plan of Care beginning date: 5/18/2022  Plan of Care expiration date: 7/13/2022  Treatment plan discussed with: family and patient        Subjective Evaluation    History of Present Illness  Mechanism of injury: Didn't go to hospital right away was feeling okay  After 5-6 days her headache was still present  Callled PCP she sent her to [facility] to get CAtscan   Some blood on the brain  Was sent to ER was there for about 3 days  Believe she was discharged 04/30/2022  Yesterday was a good day, but not today  Has good and bad balance comes and go  sometimes with Headache, was told to take tylenol  Feels more unsteady   Was not advised to use cane or walker upon discharge from the hospital, but believes one of her doctors did recommend  Saw neurologist today for results of tests (another Malinda Wolfe) she noted the blood was absorbed noting there was no need for further follow up  Notes some dizziness, but not a whole lot  Can dress and bathe independently  Wearing a defibrillator for about 2 years  Pain  No pain reported    Social Support  Stairs in house: yes (13 stairs to second floor   with handrail  with reciprocal pattern )   Lives in: multiple-level home  Lives with: spouse    Exercise history: therapy from home care       Diagnostic Tests  CT scan: normal (last week )  Treatments  Previous treatment: physical therapy  Discharged from (in last 30 days): inpatient hospitalization and home health care  Patient Goals  Patient goal: walk without being off balance  Objective  PT/OT Neuro Exam  Neurologic Exam   Max Heart Rate 220 - 84=  136 BPM  Balance Test         6 Minute Walk Test (ft): 760ft  HR 80 BPM  02 98%        2 Minute Walk Test (ft):         Gait Speed (ft/s): 20ft/8 3s = 2 40ft/s  0 73m/s        5x Sit To Stand (s): 16 0 no UE        TUG 15 35"  Co 47"        FGA   TBA NV             MCTSIB Number of Seconds   Feet Together, Eyes Open 30s    Feet Together, Eyes Closed 30s   Feet Together, Eyes Open Foam declined   Feet Together, Eyes Closed Foam declined       Coordination Left Right   Heel To Carbone WNL WNL   Finger To Nose     Rapid Alternating Movement     UE     LE WNL WNL       Manual Muscle Testing - Hip Left Right   Flexion 3+ 3+   Extension     Abduction     External Rotation       Manual Muscle Testing - Knee Left Right   Flexion 4 4   Extension 4 4     Manual Muscle Testing - Ankle Left Right   Doriflexion 5 5   Plantarflexion          Transfers    Sit To Stand Independent   W/C To Bed Independent   Sit To Supine Independent   Roll Independent         Gait Assessment: decreased step height and length with two instances of LOB requires min a x 1 from PT during turning and when distracted  Decreased B/L arms swing            Short Term Goal Expiration Date:(4 weeks)  Long Term Goal Expiration Date: (8 weks)  POC Expiration Date: (8 weks)       Precautions fall risk        Manuals                                        Neuro Re-Ed                                                                 Ther Ex                                                                        Ther Activity                        Gait Training                        Modalities

## 2022-05-18 NOTE — ASSESSMENT & PLAN NOTE
Presents for 6-week follow up for left tSDH   Presented on 3/30/2022 s/p fall several days prior   Denies anti-platelet anticoagulation medications at baseline   Chronic ITP, last platelets 0/36 64  Maintained on Promacta (hasn't been taking) and prednisone   Headaches have resolved   On exam, GCS 15 and AOx3, chronic left side facial palsy 2/2 Bell's Palsy  Imaging:   · CT head wo, 5/9/2022: Slightly further decreased size of left cerebral convexity subdural hematoma  Interval decreased density of the hematoma compatible with further evolution to a more subacute subdural hematoma  Plan:    Reviewed imaging extensively with patient and son, Rachel Mendoza   CT scan resolution of SDH   Discussed continued follow up with hematologist for thrombocytopenia   No further neurosurgical follow up   Patient and son verbalized understanding and are in agreement with plan

## 2022-05-23 ENCOUNTER — OFFICE VISIT (OUTPATIENT)
Dept: PHYSICAL THERAPY | Facility: CLINIC | Age: 82
End: 2022-05-23
Payer: MEDICARE

## 2022-05-23 DIAGNOSIS — S06.5X9A SUBDURAL HEMATOMA (HCC): ICD-10-CM

## 2022-05-23 DIAGNOSIS — R26.9 NEUROLOGIC GAIT DYSFUNCTION: Primary | ICD-10-CM

## 2022-05-23 DIAGNOSIS — R26.89 BALANCE DISORDER: ICD-10-CM

## 2022-05-23 DIAGNOSIS — R26.9 GAIT ABNORMALITY: ICD-10-CM

## 2022-05-23 PROCEDURE — 97150 GROUP THERAPEUTIC PROCEDURES: CPT | Performed by: PHYSICAL THERAPIST

## 2022-05-23 PROCEDURE — 97112 NEUROMUSCULAR REEDUCATION: CPT | Performed by: PHYSICAL THERAPIST

## 2022-05-23 NOTE — PROGRESS NOTES
Daily Note     Today's date: 2022  Patient name: Bailey Daley  : 1940  MRN: [id number]  Referring provider: Mamta Quarles MD  Dx:   Encounter Diagnosis     ICD-10-CM    1  Neurologic gait dysfunction  R26 9    2  Balance disorder  R26 89    3  Gait abnormality  R26 9    4  Subdural hematoma (HCC)  S06 5X9A                   Subjective: Arrived 10 min late notes her son couldn't take her to therapy today so she had to drive  Needed to take care of her  prior to session       Objective: See treatment diary below  11:40-12:30 = 50 min   11:40-12:10: 1:1 w/PT = 30 min  - 2 units  12:10 -12:30 = 20 min of group - 1 unit       Assessment: Pt tolerated treatment well  With ambulation on the treadmill, she required vc for increased stride length  With dynamic balance activities, she required unilateral support due to poor balance  She had difficulty with dual tasking, needing to pause dynamic balance activity in order to complete cognitive task  She required seated rest breaks following exercises due to fatigue  Her biggest barriers include fear of falling, decreased strength, decreased endurance, and impaired balance  She would benefit from continued skilled PT to address strength, endurance, balance, in order to improve safety, mobility, and ADLs  Plan: In next session, continue to focus on strength, endurance, balance, gait quality, and dual tasking        Short Term Goal Expiration Date:(4 weeks)  Long Term Goal Expiration Date: (8 weks)  POC Expiration Date: (8 weks)       Precautions fall risk     70-80% max HR:   BPM    Manuals                                        Neuro Re-Ed  Side stepping with BUE at rail  x3       Backwards walking   Backwards walking with unilateral support at rail x2       Hurdels Forward stepping over hurdles at rails x3    Side stepping over hurdles at rail x3       Step ups  Forward step-ups at rail x15    Lateral step-ups at rail x15 Ther Ex        Treadmill  Solo step  B/L UE  1 4mph  @5 min    BPM  x10 min  Post 95 BPM       STS STS w/o UE support x15                                                       Ther Activity                        Gait Training                        Modalities

## 2022-05-26 ENCOUNTER — OFFICE VISIT (OUTPATIENT)
Dept: PHYSICAL THERAPY | Facility: CLINIC | Age: 82
End: 2022-05-26
Payer: MEDICARE

## 2022-05-26 DIAGNOSIS — R26.9 NEUROLOGIC GAIT DYSFUNCTION: Primary | ICD-10-CM

## 2022-05-26 DIAGNOSIS — S06.5X9A SUBDURAL HEMATOMA (HCC): ICD-10-CM

## 2022-05-26 DIAGNOSIS — R26.89 BALANCE DISORDER: ICD-10-CM

## 2022-05-26 DIAGNOSIS — R26.9 GAIT ABNORMALITY: ICD-10-CM

## 2022-05-26 PROCEDURE — 97110 THERAPEUTIC EXERCISES: CPT | Performed by: PHYSICAL THERAPIST

## 2022-05-26 PROCEDURE — 97112 NEUROMUSCULAR REEDUCATION: CPT | Performed by: PHYSICAL THERAPIST

## 2022-05-26 NOTE — PROGRESS NOTES
Daily Note     Today's date: 2022  Patient name: Elbert Raya  : 1940  MRN: [id number]  Referring provider: Pascale Dejesus MD  Dx:   Encounter Diagnosis     ICD-10-CM    1  Neurologic gait dysfunction  R26 9    2  Balance disorder  R26 89    3  Gait abnormality  R26 9    4  Subdural hematoma (HCC)  S06 5X9A                   Subjective: Pt is motivated to improve strength, endurance, balance  She reports not being able perform lawn work and gardening at home due to poor balance and fear of falling  Objective: See treatment diary below  Time in:   Time out:      Assessment: Tolerated treatment well  She progressed to increased speed and incline on treadmill for endurance  3# AW on BLE throughout neuromuscular activities in Solo Step  With ambulating on treadmill and overground, she requires vc for longer stride lengths with good carryover  When performing hurdles, step-ups, and uneven surfaces, she occasionally requires external support of SPT due to poor balance reactions and fear of falling  Pt educated on purpose of high intensity gait training to address gait quality, balance, and strength  Solo step used throughout session to maximize speed  In order to induce error and increase efficiency of gait without fear of falling or exterior hand placement needed but patient requires for safety and maximize motor learning  She would benefit from continued skilled PT to address endurance, static and dynamic balance, and gait quality for safe mobility and iADLs  Plan: Continue per plan of care        Short Term Goal Expiration Date:(4 weeks)  Long Term Goal Expiration Date: (8 weks)  POC Expiration Date: (8 weks)       Precautions fall risk     70-80% max HR:   BPM    Manuals                                       Neuro Re-Ed  Side stepping with BUE at rail  x3 Solo step  Side stepping 2 laps       Backwards walking   Backwards walking with unilateral support at rail x2 Solo step  Backward walking 2 laps  HR: 94bpm        Hurdles Forward stepping over hurdles at rails x3    Side stepping over hurdles at rail x3 Solo step  Forward stepping x3    Lateral stepping x3      Step ups  Forward step-ups at rail x15    Lateral step-ups at rail x15 Solo Step    Forward step-ups  x15    Lateral step-ups  x15  HR: 96bpm      Foam  Solo step  Forward walking over airex pads x3  HR: 95bpm  RPE: 7/10      Marching  Solo steps  With and without bolster 2 laps each       STS  Without UE  2x15  HR: 99bpm  RPE: 8/10      Tandem   Solo step  Overground  x3      Ther Ex        Treadmill  Solo step  B/L UE  1 4mph  @5 min    BPM  x10 min  Post 95 BPM B/L UE  1 6mph  2 0 incline  12mins  HR 96bpm        STS STS w/o UE support x15                                                       Ther Activity                        Gait Training                        Modalities

## 2022-05-31 ENCOUNTER — OFFICE VISIT (OUTPATIENT)
Dept: PHYSICAL THERAPY | Facility: CLINIC | Age: 82
End: 2022-05-31
Payer: MEDICARE

## 2022-05-31 DIAGNOSIS — R26.9 NEUROLOGIC GAIT DYSFUNCTION: Primary | ICD-10-CM

## 2022-05-31 DIAGNOSIS — R26.89 BALANCE DISORDER: ICD-10-CM

## 2022-05-31 DIAGNOSIS — R26.9 GAIT ABNORMALITY: ICD-10-CM

## 2022-05-31 PROCEDURE — 97110 THERAPEUTIC EXERCISES: CPT | Performed by: PHYSICAL THERAPIST

## 2022-05-31 PROCEDURE — 97112 NEUROMUSCULAR REEDUCATION: CPT | Performed by: PHYSICAL THERAPIST

## 2022-05-31 NOTE — PROGRESS NOTES
Daily Note     Today's date: 2022  Patient name: Janna Park  : 1940  MRN: [id number]  Referring provider: Temitope David MD  Dx:   Encounter Diagnosis     ICD-10-CM    1  Neurologic gait dysfunction  R26 9    2  Balance disorder  R26 89    3  Gait abnormality  R26 9        Start Time: 1415  Stop Time: 1515  Total time in clinic (min): 60 minutes    Subjective: "I'm still very careful when I get out of bed in the morning because that's how I got hurt in the first place " Reports she is still having moments where she is unsteady  Objective: See treatment diary below  Session started by PT KT and then completed by SPT Giancarlo Chester with direct supervision from PT KT    Assessment: Tolerated treatment well  Pt has difficulty with uneven surfaces, requiring occasional handheld assistance and external support due to poor balance reactions  When descending off steps, she demonstrates retrograde balance, benefiting from vc for anterior translation of weight  Patient demonstrated fatigue post treatment and would benefit from continued PT to address balance, strength, endurance, and gait quality to improve safety with ADLs  Plan: Continue per plan of care  Utilizing HIGT for balance, coordination, and strength        Short Term Goal Expiration Date:(4 weeks)  Long Term Goal Expiration Date: (8 weks)  POC Expiration Date: (8 weks)       Precautions fall risk     70-80% max HR:   BPM    Manuals                                      Neuro Re-Ed  Side stepping with BUE at rail  x3 Solo step  Side stepping 2 laps  Solo step  3# BL    Side stepping 40ft x 2 laps      Backwards walking   Backwards walking with unilateral support at rail x2 Solo step  Backward walking 2 laps  HR: 94bpm        Hurdles Forward stepping over hurdles at rails x3    Side stepping over hurdles at rail x3 Solo step  Forward stepping x3    Lateral stepping x3 Solo step  3# BL    Forward stepping x   22ft x3 laps     Lateral stepping 22ft x3 laps    Airex pads between hurdles walking laterally 22ft x3 laps    HR: 90bpm     Step ups  Forward step-ups at rail x15    Lateral step-ups at rail x15 Solo Step    Forward step-ups  x15    Lateral step-ups  x15  HR: 96bpm Solo Step  3# BL  6inch    Forward step-ups  x15    Lateral step-ups  x15  HR: 95bpm     Foam  Solo step  Forward walking over airex pads x3  HR: 95bpm  RPE: 7/10 Solo step  3# BL    Forward walking over airex pads 15ft x4 laps  HR: 102 bpm       Marching  Solo steps  With and without bolster 2 laps each       STS  Without UE  2x15  HR: 99bpm  RPE: 8/10      Obstacle Course   Solo step  3# BL    6in and 4in steps, airex pads, foam steps  22ft x3 laps    HR: 102bpm     Tandem   Solo step  Overground  x3      Ther Ex        Treadmill  Solo step  B/L UE  1 4mph  @5 min    BPM  x10 min  Post 95 BPM B/L UE  1 6mph  2 0 incline  12mins  HR 96bpm   Solo step   B/L UE  1 6mph  3 0% incline for 8mins  4 0% incline for 4 mins  Total: 12mins           STS STS w/o UE support x15                                                       Ther Activity                        Gait Training                        Modalities

## 2022-06-02 ENCOUNTER — OFFICE VISIT (OUTPATIENT)
Dept: PHYSICAL THERAPY | Facility: CLINIC | Age: 82
End: 2022-06-02
Payer: MEDICARE

## 2022-06-02 DIAGNOSIS — R26.89 BALANCE DISORDER: ICD-10-CM

## 2022-06-02 DIAGNOSIS — R26.9 GAIT ABNORMALITY: ICD-10-CM

## 2022-06-02 DIAGNOSIS — S06.5X9A SUBDURAL HEMATOMA (HCC): ICD-10-CM

## 2022-06-02 DIAGNOSIS — R26.9 NEUROLOGIC GAIT DYSFUNCTION: Primary | ICD-10-CM

## 2022-06-02 PROCEDURE — 97112 NEUROMUSCULAR REEDUCATION: CPT | Performed by: PHYSICAL THERAPIST

## 2022-06-02 PROCEDURE — 97110 THERAPEUTIC EXERCISES: CPT | Performed by: PHYSICAL THERAPIST

## 2022-06-02 NOTE — PROGRESS NOTES
Daily Note     Today's date: 2022  Patient name: Dominic Bustamante  : 1940  MRN: [id number]  Referring provider: Jose Sow MD  Dx:   Encounter Diagnosis     ICD-10-CM    1  Neurologic gait dysfunction  R26 9    2  Balance disorder  R26 89    3  Gait abnormality  R26 9    4  Subdural hematoma ([address] )  S06 5X9A                   Subjective: Pt reports she does not experience any visual problems  She states L eye drooping a result of Bell's palsy, and she's had it for 10years  Objective: See treatment diary below  Oculomotor exam:  Smooth pursuit   Horizontal WNL  Vertical WNL    Saccades  Horizontal WNL  Vertical WNL    Convergence: normal     Assessment: Visual screen performed, saccades, smooth pursuit , and convergence intact  Tolerated treatment well  Pt presents with shuffling gait pattern, corrected with occasional vc for increased knee flexion  Pt received education regarding purpose of eccentric step-downs to improve quad strength and balance with elevated surfaces  With uneven surfaces, she shows better use of ankle and hip balance strategies, not requiring any UE support or external support  Pt demonstrates decreased retropulsion with descent from from steps  Solo step used throughout session to maximize speed  In order to induce error and increase efficiency of gait without fear of falling or exterior hand placement needed but patient requires for safety and maximize motor learningPatient would benefit from continued PT to address balance, coordination, strength, and endurance to improve safety with gait and ADLs  Plan: Continue per plan of care        Short Term Goal Expiration Date:(4 weeks)  Long Term Goal Expiration Date: (8 weks)  POC Expiration Date: (8 weks)       Precautions fall risk     70-80% max HR:   BPM    Manuals                                     Neuro Re-Ed  Side stepping with BUE at rail  x3 Solo step  Side stepping 2 laps  Solo step  3# BL    Side stepping 40ft x 2 laps      Backwards walking   Backwards walking with unilateral support at rail x2 Solo step  Backward walking 2 laps  HR: 94bpm        Hurdles Forward stepping over hurdles at rails x3    Side stepping over hurdles at rail x3 Solo step  Forward stepping x3    Lateral stepping x3 Solo step  3# BL    Forward stepping x   22ft x3 laps     Lateral stepping 22ft x3 laps    Airex pads between hurdles walking laterally 22ft x3 laps    HR: 90bpm Solo step  3# BL    Forward stepping x   22ft x4 laps   - 2 laps reciprocal, 2 laps step-to-step    Lateral stepping 22ft x4 laps    RPE: 8/10    Step ups  Forward step-ups at rail x15    Lateral step-ups at rail x15 Solo Step    Forward step-ups  x15    Lateral step-ups  x15  HR: 96bpm Solo Step  3# BL  6inch    Forward step-ups  x15    Lateral step-ups  x15  HR: 95bpm Solo Step  3# BL  6inch    Forward step-ups  x15    Lateral step-ups  x15  HR: 107bpm    Foam  Solo step  Forward walking over airex pads x3  HR: 95bpm  RPE: 7/10 Solo step  3# BL    Forward walking over airex pads 15ft x4 laps  HR: 102 bpm   Solo step  3# BL    Forward walking over airex pads 15ft x4 laps    HR: 110bpm    Marching  Solo steps  With and without bolster 2 laps each       STS  Without UE  2x15  HR: 99bpm  RPE: 8/10      Obstacle Course   Solo step  3# BL    6in and 4in steps, airex pads, foam steps  22ft x3 laps    HR: 102bpm     Tandem   Solo step  Overground  x3      Ther Ex        Treadmill  Solo step  B/L UE  1 4mph  @5 min    BPM  x10 min  Post 95 BPM B/L UE  1 6mph  2 0 incline  12mins  HR 96bpm   Solo step   B/L UE  1 6mph  3 0% incline for 8mins  4 0% incline for 4 mins  Total: 12mins       Solo step   3# AW  Unilateral UE   1 6mph  3 0% incline for 10mins    @3mins HR: 108  @7mins HR: 110  RPE: 7/10      STS STS w/o UE support x15   W/o UE  15x      Eccentric step Downs    With UE support    Fwd 15x  Lat 15x    HR: 105bpm Ther Activity                        Gait Training                        Modalities

## 2022-06-09 ENCOUNTER — OFFICE VISIT (OUTPATIENT)
Dept: PHYSICAL THERAPY | Facility: CLINIC | Age: 82
End: 2022-06-09
Payer: MEDICARE

## 2022-06-09 DIAGNOSIS — R26.9 GAIT ABNORMALITY: ICD-10-CM

## 2022-06-09 DIAGNOSIS — S06.5X9A SUBDURAL HEMATOMA (HCC): ICD-10-CM

## 2022-06-09 DIAGNOSIS — R26.89 BALANCE DISORDER: ICD-10-CM

## 2022-06-09 DIAGNOSIS — R26.9 NEUROLOGIC GAIT DYSFUNCTION: Primary | ICD-10-CM

## 2022-06-09 PROCEDURE — 97112 NEUROMUSCULAR REEDUCATION: CPT

## 2022-06-09 NOTE — PROGRESS NOTES
Daily Note     Today's date: 2022  Patient name: Miguel Christopher  : 1940  MRN: [id number]  Referring provider: Pat Mancera MD  Dx:   Encounter Diagnosis     ICD-10-CM    1  Neurologic gait dysfunction  R26 9    2  Balance disorder  R26 89    3  Gait abnormality  R26 9    4  Subdural hematoma ([address] )  S06 5X9A                   Subjective: Patient noted no new complaints  Objective: See treatment diary below      Assessment: Tolerated treatment fair  Patient was able  to perform listed exercises without complaints  VC for patient to correct form with side stepping over hurdles  Not all exercises performed due to time resume nv  Patient would benefit from continued PT      Plan: Continue per plan of care        Short Term Goal Expiration Date:(4 weeks)  Long Term Goal Expiration Date: (8 weks)  POC Expiration Date: (8 weks)       Precautions fall risk     70-80% max HR:   BPM    Manuals                                    Neuro Re-Ed  Side stepping with BUE at rail  x3 Solo step  Side stepping 2 laps  Solo step  3# BL    Side stepping 40ft x 2 laps      Backwards walking   Backwards walking with unilateral support at rail x2 Solo step  Backward walking 2 laps  HR: 94bpm        Hurdles Forward stepping over hurdles at rails x3    Side stepping over hurdles at rail x3 Solo step  Forward stepping x3    Lateral stepping x3 Solo step  3# BL    Forward stepping x   22ft x3 laps     Lateral stepping 22ft x3 laps    Airex pads between hurdles walking laterally 22ft x3 laps    HR: 90bpm Solo step  3# BL    Forward stepping x   22ft x4 laps   - 2 laps reciprocal, 2 laps step-to-step    Lateral stepping 22ft x4 laps    RPE: 8/10 Solo step  3# BL    Forward stepping x   22ft x4 laps   - 2 laps reciprocal, 2 laps step-to-step    Lateral stepping 22ft x4 laps    RPE: 8/10   Step ups  Forward step-ups at rail x15    Lateral step-ups at rail [employer] Step    Forward step-ups x15    Lateral step-ups  x15  HR: 96bpm Solo Step  3# BL  6inch    Forward step-ups  x15    Lateral step-ups  x15  HR: 95bpm Solo Step  3# BL  6inch    Forward step-ups  x15    Lateral step-ups  x15  HR: 107bpm Solo Step  3# BL  6inch    Forward step-ups  x15    Lateral step-ups  x15  HR: 107bpm   Foam  Solo step  Forward walking over airex pads x3  HR: 95bpm  RPE: 7/10 Solo step  3# BL    Forward walking over airex pads 15ft x4 laps  HR: 102 bpm   Solo step  3# BL    Forward walking over airex pads 15ft x4 laps    HR: 110bpm Solo step  3# BL    Forward walking over airex pads 15ft x4 laps  Post HR 82bpm   Marching  Solo steps  With and without bolster 2 laps each       STS  Without UE  2x15  HR: 99bpm  RPE: 8/10      Obstacle Course   Solo step  3# BL    6in and 4in steps, airex pads, foam steps  22ft x3 laps    HR: 102bpm     Tandem   Solo step  Overground  x3      Ther Ex        Treadmill  Solo step  B/L UE  1 4mph  @5 min    BPM  x10 min  Post 95 BPM B/L UE  1 6mph  2 0 incline  12mins  HR 96bpm   Solo step   B/L UE  1 6mph  3 0% incline for 8mins  4 0% incline for 4 mins  Total: 12mins       Solo step   3# AW  Unilateral UE   1 6mph  3 0% incline for 10mins    @3mins HR: 108  @7mins HR: 110  RPE: 7/10   Solo step   3# AW   1  6mph  3 0% incline for 7 min    @4 mins HR: 105  @7mins HR: 110   STS STS w/o UE support x15   W/o UE  15x   W/o UE   HR 95 bpm   Eccentric step Downs    With UE support    Fwd 15x  Lat 15x    HR: 105bpm np due to time                                           Ther Activity                        Gait Training                        Modalities                                3:00-3:20 pm patient performed exercises not billed   3:20-4:00pm treated 1:1 PTA AF

## 2022-06-13 NOTE — PROGRESS NOTES
Heart Failure Outpatient Progress Note - Larissa Cardenas 78 y o  female MRN: [id number]    @ Encounter: [id number]      Assessment/Plan:    Patient Active Problem List    Diagnosis Date Noted    LEA positive 06/12/2020    Dilated cardiomyopathy ([place] [address] ) 08/09/2018    Thrombocytopenia ([place] [address] ) 08/03/2018    Chronic systolic heart failure ([place] [address] ) 08/01/2018    Hypertension, essential 08/01/2018    Other specified glaucoma 08/01/2018    Autoimmune hepatitis treated with steroids ([place] [address] ) 08/01/2018    Subdural hemorrhage ([place] [address] ) 03/30/2022    Hypertensive heart disease with congestive heart failure ([place] [address] ) 12/07/2021    Chronic ITP (idiopathic thrombocytopenia) ([place] [address] ) 11/10/2021    Neutropenia, unspecified type ([place] [address] ) 08/10/2021    Sacroiliitis ([place] [address] ) 08/10/2021    Left-sided Bell's palsy 08/10/2021    Complete left bundle branch block (LBBB) 10/22/2019    Congestive heart failure ([place] [address] ) 08/08/2018    Depressive disorder 07/19/2017       # ChronicSystolic CHF, Stage D, NYHA 3 with mildly reduced RV systolic function  Unclear etiology  HTN (normotensive on admission) +/- valvular (less likely, central MR likely functional) +/- myocarditis +/- stress CM +/- LBBB     Weight: 118 lbs  NT pro BNP 5/29/20: 9483     --HIV, SPEP/UPEP, LEA, hepatitis panel all negative, ferritin ok     Studies- personally reviewed by me  Echo 4/8/22:  LVEF: 20%  LVEDd: 5 8 cm  RV: normal    Echo 4/23/21  LVEF: 25-30%, grade 2 DD  LVEDd: 6 9 cm  RV: normal  Mild to moderate MR    Echo 10/17/19:  LVEF: 30%, LVEDd: 5 cm  LVEDd:, grade 2 DD  RV: normal     --R+LHC 8/3/18: shows normal coronaries  RA 3  RV 32/6  PA 32/11/20  PCWP 6  AO sat 89%  MvO2 56 5%  TD CO/CI: 2 3/1 4  Isacc CO/CI: 2 6/1 6  TPG 14  DPG 5  PVR 5 4 (Isacc); 6 1 (TD)  SVR 2070  PVR:SVR <0 25     Echo 4/16/19: LVEF: 20%  LVEDd 5 4 cm  Moderate to severe MR     --cMRI 8/6/18: LVEF ~18%, LVIDd 7 cm, normal RV   There is a thin strip of intramyocardial hyperenhancement of the basal interventricular septum      Echo 12/6/18: LVEF: 20%, LVEDd 6 4 cm- no improvement  Good RV function  Small IVC     Neurohormonal Blockade:  --Beta-Blocker: Continue metoprolol succinate 25 mg PO daily  --ACEi, ARB or ARNi: Continue Entresto to 49-51 mg PO BID (also for SVR reduction)  --Aldosterone Receptor Blocker: spironolactone 12 5 mg daily- not taking it, she states it made her dizzy  --Diuretic: Continue torsemide 5- 10 PO daily- not taking every day with potassium K 20 meq daily  K 4 4 on 10/21     Sudden Cardiac Death Risk Reduction:  --ICD: recommended     Electrical Resynchronization:  --Candidacy for BiV device: LBBB 164 msec  Recommend BiV ICD     Advanced Therapies: Will continue to monitor  If does not recover, age precludes OHT  Possible LVAD candidate, but social situation will be complicated as  has LVAD w/ complications and son works in [address] [place] [address]  Other son is in [place]  Will monitor closely      # Pancytopenia with microcytic anemia- Chronic ITP  Hematology following- felt to be autoimmune related  Hx of transfusions  10/7/21: HgB 11 8     # HTN- now runs low with CM  # Hypothyroidism  # Autoimmune hepatitis: Continue budesonide   7/27/20 labs:   ,   # social- cares for her  who is debilitated with LVAD       TODAY'S PLAN:  I am Still pushing for BiV ICD - explained CRT benefits in LBBB, mortality benefit from ICD  Continue metoprolol, entresto and spironolactone for HFrEF  BP at goal  --2g sodium diet  - Daily weights     HPI:     82 woman with hx of HTN, autoimmune hepatitis wo follows now for NICM, EF: 25% with workup as above  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 has LVAD so working diagnosis of variant of stress myopathy if plausible  Follow up echo done 12/6/18 showed EF: 20% with LVEDd 6 4 cm, I referred for BiV ICD    Follow up echo 4/16/19 unfortunately showed EF: 20% with moderate to severe functional MR        She continues to wear LifeVest for very long period of time, repeated echos have shown persistent reduction in LVEF  Fortunately, no LifeVest treatments      Interval History:  Still deferring ICD    Echo 4/8/22:  LVEF: 20%  LVEDd: 5 8 cm  RV: normal    CT Head 5/9/22: IMPRESSION:Slightly further decreased size of left cerebral convexity subdural hematoma  Interval decreased density of the hematoma compatible with further evolution to a more subacute subdural hematoma  Going to therapy for neurologically gait dysfunction      Review of Systems   Constitutional: Negative for activity change, appetite change, fatigue and unexpected weight change  HENT: Negative for congestion and nosebleeds  Eyes: Negative  Respiratory: Negative for cough, chest tightness and shortness of breath  Cardiovascular: Negative for chest pain, palpitations and leg swelling  Gastrointestinal: Negative for abdominal distention  Endocrine: Negative  Genitourinary: Negative  Musculoskeletal: Negative  Skin: Negative  Neurological: Negative for dizziness, syncope and weakness  Hematological: Negative  Psychiatric/Behavioral: Negative  Past Medical History:   Diagnosis Date    Bell's palsy     Cardiac disease     Ear problems     HL (hearing loss)     Hypertension     Tinnitus          Allergies   Allergen Reactions    Ambien [Zolpidem] Other (See Comments)     Did not allow sleep per pt       Current Outpatient Medications:     acetaminophen (TYLENOL) 325 mg tablet, Take 3 tablets (975 mg total) by mouth every 8 (eight) hours (Patient taking differently: Take 975 mg by mouth every 6 (six) hours as needed for mild pain), Disp: , Rfl: 0    ascorbic acid (VITAMIN C) 500 mg tablet, Take 500 mg by mouth daily, Disp: , Rfl:     budesonide (ENTOCORT EC) 3 MG capsule, Take 9 mg by mouth daily Takes 3 capsules once daily   , Disp: , Rfl:     fluticasone (FLONASE) 50 mcg/act nasal spray, 2 sprays into each nostril daily, Disp: 16 g, Rfl: 0    metoprolol succinate (TOPROL-XL) 25 mg 24 hr tablet, Take 1 tablet (25 mg total) by mouth daily, Disp: 90 tablet, Rfl: 3    potassium chloride (MICRO-K) 10 MEQ CR capsule, Take 1 capsule (10 mEq total) by mouth daily, Disp: 90 capsule, Rfl: 3    predniSONE 10 mg tablet, Take 1 tablet (10 mg total) by mouth daily (Patient not taking: Reported on 5/18/2022), Disp: , Rfl:     sacubitril-valsartan (Entresto) 49-51 MG TABS, Take 1 tablet by mouth 2 (two) times a day, Disp: 180 tablet, Rfl: 3    spironolactone (ALDACTONE) 25 mg tablet, Take 0 5 tablets (12 5 mg total) by mouth daily, Disp: 90 tablet, Rfl: 3    torsemide (DEMADEX) 10 mg tablet, Take 1 tablet (10 mg total) by mouth daily, Disp: 90 tablet, Rfl: 3    Social History     Socioeconomic History    Marital status: /Civil Union     Spouse name: Not on file    Number of children: Not on file    Years of education: Not on file    Highest education level: Not on file   Occupational History    Not on file   Tobacco Use    Smoking status: Never Smoker    Smokeless tobacco: Never Used   Substance and Sexual Activity    Alcohol use: No     Alcohol/week: 0 0 standard drinks    Drug use: No    Sexual activity: Not Currently     Partners: Male   Other Topics Concern    Not on file   Social History Narrative    Not on file     Social Determinants of Health     Financial Resource Strain: Not on file   Food Insecurity: No Food Insecurity    Worried About Running Out of Food in the Last Year: Never true    Lety of Food in the Last Year: Never true   Transportation Needs: No Transportation Needs    Lack of Transportation (Medical): No    Lack of Transportation (Non-Medical):  No   Physical Activity: Not on file   Stress: Not on file   Social Connections: Not on file   Intimate Partner Violence: Not on file   Housing Stability: Low Risk     Unable to Pay for Housing in the Last Year: No    Number of Places Lived in the Last Year: 1    Unstable Housing in the Last Year: No       Family History   Problem Relation Age of Onset    Autoimmune disease Neg Hx        Physical Exam:    Vitals: There were no vitals filed for this visit  Physical Exam  Constitutional:       Appearance: She is well-developed  HENT:      Head: Normocephalic and atraumatic  Eyes:      Pupils: Pupils are equal, round, and reactive to light  Neck:      Vascular: No JVD  Cardiovascular:      Rate and Rhythm: Normal rate and regular rhythm  Heart sounds: No murmur heard  Pulmonary:      Effort: Pulmonary effort is normal  No respiratory distress  Breath sounds: Normal breath sounds  Abdominal:      General: There is no distension  Palpations: Abdomen is soft  Tenderness: There is no abdominal tenderness  Musculoskeletal:         General: Normal range of motion  Cervical back: Normal range of motion  Skin:     General: Skin is warm and dry  Findings: No rash  Neurological:      Mental Status: She is alert and oriented to person, place, and time  Labs & Results:    Lab Results   Component Value Date    SODIUM 142 03/31/2022    K 3 9 03/31/2022     (H) 03/31/2022    CO2 28 03/31/2022    BUN 14 03/31/2022    CREATININE 0 76 03/31/2022    GLUC 80 03/31/2022    CALCIUM 8 7 03/31/2022     Lab Results   Component Value Date    WBC 3 40 (L) 04/25/2022    HGB 11 8 04/25/2022    HCT 35 3 04/25/2022    MCV 95 04/25/2022    PLT 64 (L) 04/25/2022     Lab Results   Component Value Date    NTBNP 9,483 (H) 05/29/2020      Lab Results   Component Value Date    CHOLESTEROL 108 08/02/2018     Lab Results   Component Value Date    HDL 41 08/02/2018    HDL 76 10/23/2015     Lab Results   Component Value Date    TRIG 75 08/02/2018    TRIG 87 10/23/2015     No results found for: Edith    EKG personally reviewed by Jyoti Plunkett DO  Counseling / Coordination of Care  Time spent today 25 minutes    Greater than 50% of total time was spent with the patient and / or family counseling and / or coordination of care  We discussed diagnoses, most recent studies, tests and any changes in treatment plan    Thank you for the opportunity to participate in the care of this patient      [address] [facility] PULMONARY [facility]YPERTENSION  MEDICAL DIRECTOR OF [place]

## 2022-06-14 ENCOUNTER — OFFICE VISIT (OUTPATIENT)
Dept: CARDIOLOGY CLINIC | Facility: CLINIC | Age: 82
End: 2022-06-14
Payer: MEDICARE

## 2022-06-14 ENCOUNTER — OFFICE VISIT (OUTPATIENT)
Dept: PHYSICAL THERAPY | Facility: CLINIC | Age: 82
End: 2022-06-14
Payer: MEDICARE

## 2022-06-14 VITALS
DIASTOLIC BLOOD PRESSURE: 62 MMHG | BODY MASS INDEX: 16.98 KG/M2 | WEIGHT: 118.6 LBS | HEART RATE: 57 BPM | HEIGHT: 70 IN | SYSTOLIC BLOOD PRESSURE: 98 MMHG | OXYGEN SATURATION: 97 %

## 2022-06-14 DIAGNOSIS — I11.0 HYPERTENSIVE HEART DISEASE WITH CONGESTIVE HEART FAILURE, UNSPECIFIED HEART FAILURE TYPE (HCC): ICD-10-CM

## 2022-06-14 DIAGNOSIS — S06.5XAA SUBDURAL HEMATOMA: ICD-10-CM

## 2022-06-14 DIAGNOSIS — I42.0 DILATED CARDIOMYOPATHY (HCC): ICD-10-CM

## 2022-06-14 DIAGNOSIS — R26.9 GAIT ABNORMALITY: ICD-10-CM

## 2022-06-14 DIAGNOSIS — R26.89 BALANCE DISORDER: ICD-10-CM

## 2022-06-14 DIAGNOSIS — I44.7 COMPLETE LEFT BUNDLE BRANCH BLOCK (LBBB): ICD-10-CM

## 2022-06-14 DIAGNOSIS — I50.22 CHRONIC SYSTOLIC HEART FAILURE (HCC): Primary | ICD-10-CM

## 2022-06-14 DIAGNOSIS — R26.9 NEUROLOGIC GAIT DYSFUNCTION: Primary | ICD-10-CM

## 2022-06-14 PROCEDURE — 99214 OFFICE O/P EST MOD 30 MIN: CPT | Performed by: INTERNAL MEDICINE

## 2022-06-14 PROCEDURE — 97110 THERAPEUTIC EXERCISES: CPT | Performed by: PHYSICAL THERAPIST

## 2022-06-14 PROCEDURE — 97112 NEUROMUSCULAR REEDUCATION: CPT | Performed by: PHYSICAL THERAPIST

## 2022-06-14 NOTE — PROGRESS NOTES
Progress Update    Today's date: 2022  Patient name: Kavita Waggoner  : 1940  MRN: [id number]  Referring provider: Clarisa Navas MD  Dx:   Encounter Diagnosis     ICD-10-CM    1  Neurologic gait dysfunction  R26 9    2  Balance disorder  R26 89    3  Gait abnormality  R26 9    4  Subdural hematoma ([address] )  S06 5X9A        Start Time: 1330  Stop Time: 1430  Total time in clinic (min): 60 minutes      Assessment: Pt has made improvements in her strength, endurance, balance, and gait speed, as indicated by her outcome measures  She has met 2/4 STG and is progressing towards her other goals  While she has made improvements in some areas, her FGA score indicates that she is still a fall risk  She demonstrates  lateral instability when given visual challenges, turning or pivoting, narrowing DARRIN, and changing surface  When ambulating, she has tendency to lean and lose her balance to the L  She does not demonstrate good balance reaction strategies, instead relying on stationary objects to regain balance  While saccades, smooth pursuits, and convergence are intact, this is potentially due to visual occlusion from L dropping eyelid from Bell's Palsy  She also has difficulty in command-following, needing repeated verbal cueing and demonstrations in order to perform tasks  She would benefit from OT Eval for further vision and cognition screening  The above listed impairments limit her abilities to care for her ,  perform iADLs, gardening and performing chores around house  Pt would continue to benefit from skilled PT to address static and dynamic balance, strength, endurance, and gait quality, to decrease fall risk, improve functional mobility, and optimize safety      Goals  STG:  Patient will improve 6MWT by 200ft demonstrating significant improvements in endurance  w/in 4 weeks - MET  Patient will demonstrated improved LE strength and endurance by improved 5xSTS to 15s or less within 4 weeks - MET  Patient will improve TUG to 12s or less indicating they are no longer at risk for increased falls 4 weeks - in progress   Patient will be independent with HEP within 4 weeks - in progress     LTG:  Patient will improve 6MWT by 400ft demonstrating significant improvements in endurance  w/in 8 weeks  Patient will demonstrates significant improvements in dynamic balance by improving FGA score by 6 secs or more within 8 weeks  Patient will be able to perform all conditions of mCTSIB for 30 seconds demonstrating improved confidence and use of balance systems wthin 8 weeks - in progress   Patient will be independent with progress HEP within 8 weeks     Plan: Continue per plan of care  Subjective: Bari Leal states that she notices a difference in her balance and walking  She still notices that she "wobbles back and forth sometime", but not always  It sometimes occurs more in the morning, but it has "definitely lessened since coming here"  She is motivated to continue to work hard in PT and achieve her goals  Objective: See treatment diary below        Max Heart Rate 220 - 84=  136 BPM  Balance Test        6 Minute Walk Test (ft): 760ft  HR 80 BPM  02 98% 975ft   HR: 102bpm  O2: 99%       2 Minute Walk Test (ft):         Gait Speed (ft/s): 20ft/8 3s = 2 40ft/s  0 73m/s 20ft/6 1s= 3 28 ft/s  1 0m/s       5x Sit To Stand (s): 16 0 no UE 14 6s no UE       TUG 15 35"  Co 47" 13  6"       FGA   TBA NV             MCTSIB Number of Seconds (Eval) 2022   Feet Together, Eyes Open 30s  30s   Feet Together, Eyes Closed 30s 30s   Feet Together, Eyes Open Foam declined 30s   Feet Together, Eyes Closed Foam declined 8 6s          Short Term Goal Expiration Date:(4 weeks)  Long Term Goal Expiration Date: (8 weks)  POC Expiration Date: (8 weks)       Precautions fall risk     70-80% max HR:   BPM    Manuals                                    Neuro Re-Ed   Solo step  Side stepping 2 laps  Solo step  3# BL    Side stepping 40ft x 2 laps      Backwards walking Solo   Backward with HT  15ft x 2 laps    Forward with HT  15ft x 2laps     HR: 98bpm Solo step  Backward walking 2 laps  HR: 94bpm        Hurdles  Solo step  Forward stepping x3    Lateral stepping x3 Solo step  3# BL    Forward stepping x   22ft x3 laps     Lateral stepping 22ft x3 laps    Airex pads between hurdles walking laterally 22ft x3 laps    HR: 90bpm Solo step  3# BL    Forward stepping x   22ft x4 laps   - 2 laps reciprocal, 2 laps step-to-step    Lateral stepping 22ft x4 laps    RPE: 8/10 Solo step  3# BL    Forward stepping x   22ft x4 laps   - 2 laps reciprocal, 2 laps step-to-step    Lateral stepping 22ft x4 laps    RPE: 8/10   Step ups   Solo Step    Forward step-ups  x15    Lateral step-ups  x15  HR: 96bpm Solo Step  3# BL  6inch    Forward step-ups  x15    Lateral step-ups  x15  HR: 95bpm Solo Step  3# BL  6inch    Forward step-ups  x15    Lateral step-ups  x15  HR: 107bpm Solo Step  3# BL  6inch    Forward step-ups  x15    Lateral step-ups  x15  HR: 107bpm   Foam  Solo step  Forward walking over airex pads x3  HR: 95bpm  RPE: 7/10 Solo step  3# BL    Forward walking over airex pads 15ft x4 laps  HR: 102 bpm   Solo step  3# BL    Forward walking over airex pads 15ft x4 laps    HR: 110bpm Solo step  3# BL    Forward walking over airex pads 15ft x4 laps  Post HR 82bpm   Marching  Solo steps  With and without bolster 2 laps each       STS  Without UE  2x15  HR: 99bpm  RPE: 8/10      Obstacle Course   Solo step  3# BL    6in and 4in steps, airex pads, foam steps  22ft x3 laps    HR: 102bpm     Tandem   Solo step  Overground  x3      Ther Ex        Treadmill  Solo step  B/L UE  1 6mph  3 0 incline  8mins B/L UE  1 6mph  2 0 incline  12mins  HR 96bpm   Solo step   B/L UE  1 6mph  3 0% incline for 8mins  4 0% incline for 4 mins  Total: 12mins       Solo step   3# AW  Unilateral UE   1 6mph  3 0% incline for 10mins    @3mins HR: 108  @7mins HR: 110  RPE: 7/10   Solo step   3# AW   1  6mph  3 0% incline for 7 min    @4 mins HR: 105  @7mins HR: 110   STS    W/o UE  15x   W/o UE   HR 95 bpm   Eccentric step Downs    With UE support    Fwd 15x  Lat 15x    HR: 105bpm np due to time                                           Ther Activity                        Gait Training                        Modalities

## 2022-06-16 ENCOUNTER — OFFICE VISIT (OUTPATIENT)
Dept: PHYSICAL THERAPY | Facility: CLINIC | Age: 82
End: 2022-06-16
Payer: MEDICARE

## 2022-06-16 ENCOUNTER — APPOINTMENT (OUTPATIENT)
Dept: PHYSICAL THERAPY | Facility: CLINIC | Age: 82
End: 2022-06-16
Payer: MEDICARE

## 2022-06-16 DIAGNOSIS — S06.5X9A SUBDURAL HEMATOMA (HCC): ICD-10-CM

## 2022-06-16 DIAGNOSIS — R26.9 GAIT ABNORMALITY: ICD-10-CM

## 2022-06-16 DIAGNOSIS — R26.9 NEUROLOGIC GAIT DYSFUNCTION: Primary | ICD-10-CM

## 2022-06-16 DIAGNOSIS — R26.89 BALANCE DISORDER: ICD-10-CM

## 2022-06-16 PROCEDURE — 97112 NEUROMUSCULAR REEDUCATION: CPT | Performed by: PHYSICAL THERAPIST

## 2022-06-16 PROCEDURE — 97110 THERAPEUTIC EXERCISES: CPT | Performed by: PHYSICAL THERAPIST

## 2022-06-16 PROCEDURE — 97112 NEUROMUSCULAR REEDUCATION: CPT | Performed by: PHYSICAL THERAPY ASSISTANT

## 2022-06-16 NOTE — PROGRESS NOTES
Daily Note     Today's date: 2022  Patient name: Larissa Cardenas  : 1940  MRN: [id number]  Referring provider: Delma Mena MD  Dx:   Encounter Diagnosis     ICD-10-CM    1  Neurologic gait dysfunction  R26 9    2  Balance disorder  R26 89    3  Gait abnormality  R26 9    4  Subdural hematoma ([address] )  S06 5X9A        Start Time: 1630  Stop Time: 1720  Total time in clinic (min): 50 minutes    Subjective: Pt states that "today has been a rough day with all that rushing", referring to having to bring her  to his doctor's appointment  She expresses fatigue and stress with being a caregiver for her   Objective: See treatment diary below      Assessment: Tolerated treatment fair  While ambulating on treadmill, she benefits from vc for increased step length and height, but has poor carryover after several gait cycles  She continues to demonstrate deficits with lateral stability when ambulating over uneven and elevated surfaces  As a result she has retropulsion and relies on stepping strategy to maintain balance  Pt very fatigued this session and required frequent seated rest breaks between TE  Plan: Continue per plan of care        Short Term Goal Expiration Date:(4 weeks)  Long Term Goal Expiration Date: (8 weks)  POC Expiration Date: (8 weks)       Precautions fall risk     70-80% max HR:   BPM    Manuals                                    Neuro Re-Ed    Solo step  3# BL    Side stepping 40ft x 2 laps      Backwards walking Solo   Backward with HT  15ft x 2 laps    Forward with HT  15ft x 2laps     HR: 98bpm SOLO  bkwd with HT  20'x2    Fwd with HT  20'x2          Hurdles   Solo step  3# BL    Forward stepping x   22ft x3 laps     Lateral stepping 22ft x3 laps    Airex pads between hurdles walking laterally 22ft x3 laps    HR: 90bpm Solo step  3# BL    Forward stepping x   22ft x4 laps   - 2 laps reciprocal, 2 laps step-to-step    Lateral stepping 22ft x4 laps    RPE: 8/10 Solo step  3# BL    Forward stepping x   22ft x4 laps   - 2 laps reciprocal, 2 laps step-to-step    Lateral stepping 22ft x4 laps    RPE: 8/10   Step ups   Solo step  3# b/l 6"    Fwd/lat x20 ea     Solo Step  3# BL  6inch    Forward step-ups  x15    Lateral step-ups  x15  HR: 95bpm Solo Step  3# BL  6inch    Forward step-ups  x15    Lateral step-ups  x15  HR: 107bpm Solo Step  3# BL  6inch    Forward step-ups  x15    Lateral step-ups  x15  HR: 107bpm   Foam   Solo step  3# BL    Forward walking over airex pads 15ft x4 laps  HR: 102 bpm   Solo step  3# BL    Forward walking over airex pads 15ft x4 laps    HR: 110bpm Solo step  3# BL    Forward walking over airex pads 15ft x4 laps  Post HR 82bpm   Marching        STS        Obstacle Course  Solo   3# AW BL  4inch and 6inch steps, airex, and low hurdles     20ft x 3laps    Solo step  3# BL    6in and 4in steps, airex pads, foam steps  22ft x3 laps    HR: 102bpm     Tandem         Ther Ex        Treadmill  Solo step  B/L UE  1 6mph  3 0 incline  8mins Solo step  B/L UE  1 6mph  4 0 incline  10 mins  HR 102bpm    VC for increase step height  Solo step   B/L UE  1 6mph  3 0% incline for 8mins  4 0% incline for 4 mins  Total: 12mins       Solo step   3# AW  Unilateral UE   1 6mph  3 0% incline for 10mins    @3mins HR: 108  @7mins HR: 110  RPE: 7/10   Solo step   3# AW   1  6mph  3 0% incline for 7 min    @4 mins HR: 105  @7mins HR: 110   STS    W/o UE  15x   W/o UE   HR 95 bpm   Eccentric step Downs    With UE support    Fwd 15x  Lat 15x    HR: 105bpm np due to time                                           Ther Activity                        Gait Training                        Modalities

## 2022-06-21 ENCOUNTER — APPOINTMENT (OUTPATIENT)
Dept: PHYSICAL THERAPY | Facility: CLINIC | Age: 82
End: 2022-06-21
Payer: MEDICARE

## 2022-06-22 ENCOUNTER — OFFICE VISIT (OUTPATIENT)
Dept: FAMILY MEDICINE CLINIC | Facility: CLINIC | Age: 82
End: 2022-06-22
Payer: MEDICARE

## 2022-06-22 VITALS
HEART RATE: 55 BPM | DIASTOLIC BLOOD PRESSURE: 40 MMHG | HEIGHT: 64 IN | BODY MASS INDEX: 19.12 KG/M2 | OXYGEN SATURATION: 98 % | WEIGHT: 112 LBS | SYSTOLIC BLOOD PRESSURE: 100 MMHG | RESPIRATION RATE: 16 BRPM | TEMPERATURE: 97.5 F

## 2022-06-22 DIAGNOSIS — F51.01 PRIMARY INSOMNIA: ICD-10-CM

## 2022-06-22 DIAGNOSIS — D70.9 NEUTROPENIA, UNSPECIFIED TYPE (HCC): ICD-10-CM

## 2022-06-22 DIAGNOSIS — D69.3 CHRONIC ITP (IDIOPATHIC THROMBOCYTOPENIA) (HCC): ICD-10-CM

## 2022-06-22 DIAGNOSIS — I50.22 CHRONIC SYSTOLIC HEART FAILURE (HCC): ICD-10-CM

## 2022-06-22 DIAGNOSIS — R07.89 CHEST DISCOMFORT: Primary | ICD-10-CM

## 2022-06-22 DIAGNOSIS — K75.4 AUTOIMMUNE HEPATITIS TREATED WITH STEROIDS (HCC): ICD-10-CM

## 2022-06-22 DIAGNOSIS — I11.0 HYPERTENSIVE HEART DISEASE WITH CONGESTIVE HEART FAILURE, UNSPECIFIED HEART FAILURE TYPE (HCC): ICD-10-CM

## 2022-06-22 DIAGNOSIS — I42.0 DILATED CARDIOMYOPATHY (HCC): ICD-10-CM

## 2022-06-22 DIAGNOSIS — F32.A DEPRESSIVE DISORDER: ICD-10-CM

## 2022-06-22 DIAGNOSIS — I50.9 CONGESTIVE HEART FAILURE, UNSPECIFIED HF CHRONICITY, UNSPECIFIED HEART FAILURE TYPE (HCC): ICD-10-CM

## 2022-06-22 DIAGNOSIS — H91.93 BILATERAL HEARING LOSS, UNSPECIFIED HEARING LOSS TYPE: ICD-10-CM

## 2022-06-22 DIAGNOSIS — Z23 ENCOUNTER FOR IMMUNIZATION: ICD-10-CM

## 2022-06-22 PROBLEM — D69.6 THROMBOCYTOPENIA (HCC): Status: RESOLVED | Noted: 2018-08-03 | Resolved: 2022-06-22

## 2022-06-22 PROBLEM — I62.00 SUBDURAL HEMORRHAGE (HCC): Status: RESOLVED | Noted: 2022-03-30 | Resolved: 2022-06-22

## 2022-06-22 PROBLEM — M46.1 SACROILIITIS (HCC): Status: RESOLVED | Noted: 2021-08-10 | Resolved: 2022-06-22

## 2022-06-22 PROCEDURE — 99214 OFFICE O/P EST MOD 30 MIN: CPT | Performed by: FAMILY MEDICINE

## 2022-06-22 PROCEDURE — 90746 HEPB VACCINE 3 DOSE ADULT IM: CPT

## 2022-06-22 PROCEDURE — G0010 ADMIN HEPATITIS B VACCINE: HCPCS

## 2022-06-22 PROCEDURE — 90632 HEPA VACCINE ADULT IM: CPT

## 2022-06-22 PROCEDURE — 90471 IMMUNIZATION ADMIN: CPT

## 2022-06-22 NOTE — ASSESSMENT & PLAN NOTE
Wt Readings from Last 3 Encounters:   06/22/22 50 8 kg (112 lb)   06/14/22 53 8 kg (118 lb 9 6 oz)   05/18/22 54 9 kg (121 lb)     Stable   Care per cardiology

## 2022-06-22 NOTE — ASSESSMENT & PLAN NOTE
Wt Readings from Last 3 Encounters:   06/22/22 50 8 kg (112 lb)   06/14/22 53 8 kg (118 lb 9 6 oz)   05/18/22 54 9 kg (121 lb)     Stable  Continue to monitor

## 2022-06-22 NOTE — ASSESSMENT & PLAN NOTE
Wt Readings from Last 3 Encounters:   06/22/22 50 8 kg (112 lb)   06/14/22 53 8 kg (118 lb 9 6 oz)   05/18/22 54 9 kg (121 lb)     Stable on torsemide  Sees cardiology

## 2022-06-22 NOTE — PROGRESS NOTES
Assessment/Plan:    Chronic systolic heart failure (HCC)  Wt Readings from Last 3 Encounters:   06/22/22 50 8 kg (112 lb)   06/14/22 53 8 kg (118 lb 9 6 oz)   05/18/22 54 9 kg (121 lb)     Stable on torsemide  Sees cardiology        Congestive heart failure ([facility] )  Wt Readings from Last 3 Encounters:   06/22/22 50 8 kg (112 lb)   06/14/22 53 8 kg (118 lb 9 6 oz)   05/18/22 54 9 kg (121 lb)     Stable  Continue to monitor        Hypertensive heart disease with congestive heart failure ([facility] )  Wt Readings from Last 3 Encounters:   06/22/22 50 8 kg (112 lb)   06/14/22 53 8 kg (118 lb 9 6 oz)   05/18/22 54 9 kg (121 lb)     Stable   Care per cardiology        Neutropenia, unspecified type ([facility] )  Stable  Care per hematologist    Chronic ITP (idiopathic thrombocytopenia) ([facility] )  Stable  Sees hematology    Autoimmune hepatitis treated with steroids ([facility] )  She is off of steroids per patient   She needs to f/u with dr Ling Tate     Depressive disorder  Stable   Not on any meds at this time              Diagnoses and all orders for this visit:    Chest discomfort  Comments:  started since PT started  most likely muscle wall pain as pain is reproducible with palpation   tylenol PRN  continue to monitor    Chronic systolic heart failure (HCC)    Congestive heart failure, unspecified HF chronicity, unspecified heart failure type ([facility] )    Dilated cardiomyopathy ([facility] [address] )    Hypertensive heart disease with congestive heart failure, unspecified heart failure type (HCC)    Neutropenia, unspecified type ([facility] )    Chronic ITP (idiopathic thrombocytopenia) ([facility] )    Bilateral hearing loss, unspecified hearing loss type  Comments:  had an evaluation done already   she still has to go back for hearing aid   Orders:  -     Ambulatory Referral to Audiology;  Future    Primary insomnia    Autoimmune hepatitis treated with steroids (HCC)    Depressive disorder    Encounter for immunization  -     HEPATITIS A VACCINE ADULT IM  -     HEPATITIS B VACCINE ADULT IM        Subjective:      Patient ID: Randy Chen is a 78 y o  female  HPI  Follow-up (Patient is being seen for a follow up on a fall and physical therapy )  Chest Pain (Patient is having chest pain and muscle pain )  Hearing Loss (Patient is having symptoms of hearing loss  )      C/o chest pain on and off started since may 2022 since starting of outpatient therapy for her gait  Saw her cardiologist last week and she didn't bring it up about this   Recent Echo and EKGs are stable   Not sleeping well either for few months   She is going through PT and also taking care of her sick  at home     The following portions of the patient's history were reviewed and updated as appropriate: allergies, current medications, past family history, past medical history, past social history, past surgical history and problem list     Review of Systems   Constitutional: Negative  HENT: Negative  Respiratory: Negative  Cardiovascular: Positive for chest pain  Genitourinary: Negative  Hematological: Negative  Psychiatric/Behavioral: Negative  Objective:      BP (!) 100/40 (BP Location: Right arm, Patient Position: Sitting, Cuff Size: Adult)   Pulse 55   Temp 97 5 °F (36 4 °C) (Skin)   Resp 16   Ht 5' 3 66" (1 617 m)   Wt 50 8 kg (112 lb)   SpO2 98%   BMI 19 43 kg/m²          Physical Exam  Constitutional:       Appearance: She is well-developed  Cardiovascular:      Rate and Rhythm: Normal rate and regular rhythm  Pulmonary:      Effort: Pulmonary effort is normal       Breath sounds: No decreased breath sounds  Chest:      Chest wall: Tenderness present  Comments: Left anterior chest   Abdominal:      Palpations: Abdomen is soft  Musculoskeletal:      Cervical back: Normal range of motion  Right lower leg: No edema  Left lower leg: No edema

## 2022-06-23 ENCOUNTER — TELEPHONE (OUTPATIENT)
Dept: HEMATOLOGY ONCOLOGY | Facility: CLINIC | Age: 82
End: 2022-06-23

## 2022-06-23 NOTE — TELEPHONE ENCOUNTER
CALL RETURN FORM   Reason for patient call? Patient is calling to see if she  still needs to get the cbc that was ordered 5/20 and 6/17  Patient's primary oncologist? Dr Hallie Stephenson     Name of person the patient was calling for? Georgina/Yecenia    Any additional information to add, if applicable? Best call back number is [phone]   Informed patient that the message will be forwarded to the team and someone will get back to them as soon as possible    Did you relay this information to the patient?   yes

## 2022-06-28 ENCOUNTER — TELEPHONE (OUTPATIENT)
Dept: HEMATOLOGY ONCOLOGY | Facility: CLINIC | Age: 82
End: 2022-06-28

## 2022-06-28 NOTE — TELEPHONE ENCOUNTER
Received voicemail from [facility] requesting call back regarding a critical lab [phone]  Left voicemail on this line  Provided my direct line for call back prior to 4:30 and [employer] number for call back after 4:30

## 2022-06-29 DIAGNOSIS — D69.3 CHRONIC ITP (IDIOPATHIC THROMBOCYTOPENIA) (HCC): Primary | ICD-10-CM

## 2022-06-30 DIAGNOSIS — D50.0 IRON DEFICIENCY ANEMIA DUE TO CHRONIC BLOOD LOSS: ICD-10-CM

## 2022-06-30 DIAGNOSIS — D69.3 CHRONIC ITP (IDIOPATHIC THROMBOCYTOPENIA) (HCC): Primary | ICD-10-CM

## 2022-06-30 NOTE — PROGRESS NOTES
Received call from [facility] lab this morning reporting hgb 6 1  Attempted to reach pt a few times but line was busy  Reached her this afternoon and she states her "energy is low" and she has noticed dark stools  Reviewed with Dr Ken Newsome who is recommending ED  Pt agreeable  [facility] to fax full report

## 2022-07-01 ENCOUNTER — APPOINTMENT (EMERGENCY)
Dept: RADIOLOGY | Facility: HOSPITAL | Age: 82
DRG: 378 | End: 2022-07-01
Payer: MEDICARE

## 2022-07-01 ENCOUNTER — TELEPHONE (OUTPATIENT)
Dept: HEMATOLOGY ONCOLOGY | Facility: CLINIC | Age: 82
End: 2022-07-01

## 2022-07-01 ENCOUNTER — HOSPITAL ENCOUNTER (INPATIENT)
Facility: HOSPITAL | Age: 82
LOS: 4 days | Discharge: HOME HEALTH | DRG: 378 | End: 2022-07-05
Attending: EMERGENCY MEDICINE | Admitting: INTERNAL MEDICINE
Payer: MEDICARE

## 2022-07-01 DIAGNOSIS — D64.9 SYMPTOMATIC ANEMIA: Primary | ICD-10-CM

## 2022-07-01 DIAGNOSIS — I42.0 DILATED CARDIOMYOPATHY (HCC): ICD-10-CM

## 2022-07-01 DIAGNOSIS — R53.83 FATIGUE: ICD-10-CM

## 2022-07-01 DIAGNOSIS — I50.22 CHRONIC SYSTOLIC HEART FAILURE (HCC): ICD-10-CM

## 2022-07-01 DIAGNOSIS — R53.1 WEAKNESS: ICD-10-CM

## 2022-07-01 DIAGNOSIS — R79.9 ELEVATED BUN: ICD-10-CM

## 2022-07-01 DIAGNOSIS — D62 ACUTE BLOOD LOSS ANEMIA: ICD-10-CM

## 2022-07-01 DIAGNOSIS — D61.818 PANCYTOPENIA (HCC): ICD-10-CM

## 2022-07-01 DIAGNOSIS — K92.1 BLOODY STOOL: ICD-10-CM

## 2022-07-01 PROBLEM — I95.9 HYPOTENSION: Status: ACTIVE | Noted: 2022-07-01

## 2022-07-01 PROBLEM — R51.9 HEADACHE: Status: ACTIVE | Noted: 2022-07-01

## 2022-07-01 LAB
2HR DELTA HS TROPONIN: 3 NG/L
4HR DELTA HS TROPONIN: 5 NG/L
ABO GROUP BLD: NORMAL
ALBUMIN SERPL BCP-MCNC: 3.1 G/DL (ref 3.5–5)
ALP SERPL-CCNC: 28 U/L (ref 46–116)
ALT SERPL W P-5'-P-CCNC: 26 U/L (ref 12–78)
ANION GAP SERPL CALCULATED.3IONS-SCNC: 4 MMOL/L (ref 4–13)
AST SERPL W P-5'-P-CCNC: 24 U/L (ref 5–45)
ATRIAL RATE: 79 BPM
BASOPHILS # BLD AUTO: 0.01 THOUSANDS/ΜL (ref 0–0.1)
BASOPHILS NFR BLD AUTO: 0 % (ref 0–1)
BILIRUB SERPL-MCNC: 0.5 MG/DL (ref 0.2–1)
BLD GP AB SCN SERPL QL: NEGATIVE
BUN SERPL-MCNC: 29 MG/DL (ref 5–25)
CALCIUM ALBUM COR SERPL-MCNC: 9.9 MG/DL (ref 8.3–10.1)
CALCIUM SERPL-MCNC: 9.2 MG/DL (ref 8.3–10.1)
CARDIAC TROPONIN I PNL SERPL HS: 24 NG/L
CARDIAC TROPONIN I PNL SERPL HS: 27 NG/L
CARDIAC TROPONIN I PNL SERPL HS: 29 NG/L
CHLORIDE SERPL-SCNC: 108 MMOL/L (ref 100–108)
CO2 SERPL-SCNC: 28 MMOL/L (ref 21–32)
CREAT SERPL-MCNC: 1.03 MG/DL (ref 0.6–1.3)
EOSINOPHIL # BLD AUTO: 0.01 THOUSAND/ΜL (ref 0–0.61)
EOSINOPHIL NFR BLD AUTO: 0 % (ref 0–6)
ERYTHROCYTE [DISTWIDTH] IN BLOOD BY AUTOMATED COUNT: 16 % (ref 11.6–15.1)
GFR SERPL CREATININE-BSD FRML MDRD: 50 ML/MIN/1.73SQ M
GLUCOSE SERPL-MCNC: 85 MG/DL (ref 65–140)
HCT VFR BLD AUTO: 16.9 % (ref 34.8–46.1)
HGB BLD-MCNC: 5.1 G/DL (ref 11.5–15.4)
IMM GRANULOCYTES # BLD AUTO: 0.01 THOUSAND/UL (ref 0–0.2)
IMM GRANULOCYTES NFR BLD AUTO: 0 % (ref 0–2)
INR PPP: 1.27 (ref 0.84–1.19)
LYMPHOCYTES # BLD AUTO: 0.7 THOUSANDS/ΜL (ref 0.6–4.47)
LYMPHOCYTES NFR BLD AUTO: 26 % (ref 14–44)
MCH RBC QN AUTO: 29.2 PG (ref 26.8–34.3)
MCHC RBC AUTO-ENTMCNC: 29.6 G/DL (ref 31.4–37.4)
MCV RBC AUTO: 99 FL (ref 82–98)
MONOCYTES # BLD AUTO: 0.18 THOUSAND/ΜL (ref 0.17–1.22)
MONOCYTES NFR BLD AUTO: 7 % (ref 4–12)
NEUTROPHILS # BLD AUTO: 1.76 THOUSANDS/ΜL (ref 1.85–7.62)
NEUTS SEG NFR BLD AUTO: 67 % (ref 43–75)
NRBC BLD AUTO-RTO: 0 /100 WBCS
P AXIS: 78 DEGREES
PLATELET # BLD AUTO: 72 THOUSANDS/UL (ref 149–390)
PMV BLD AUTO: 11.1 FL (ref 8.9–12.7)
POTASSIUM SERPL-SCNC: 4.4 MMOL/L (ref 3.5–5.3)
PR INTERVAL: 168 MS
PROT SERPL-MCNC: 6.3 G/DL (ref 6.4–8.2)
PROTHROMBIN TIME: 15.3 SECONDS (ref 11.6–14.5)
QRS AXIS: -21 DEGREES
QRSD INTERVAL: 174 MS
QT INTERVAL: 444 MS
QTC INTERVAL: 509 MS
RBC # BLD AUTO: 1.71 MILLION/UL (ref 3.81–5.12)
RH BLD: POSITIVE
SODIUM SERPL-SCNC: 140 MMOL/L (ref 136–145)
SPECIMEN EXPIRATION DATE: NORMAL
T WAVE AXIS: 75 DEGREES
VENTRICULAR RATE: 79 BPM
WBC # BLD AUTO: 2.67 THOUSAND/UL (ref 4.31–10.16)

## 2022-07-01 PROCEDURE — 86850 RBC ANTIBODY SCREEN: CPT

## 2022-07-01 PROCEDURE — 99285 EMERGENCY DEPT VISIT HI MDM: CPT

## 2022-07-01 PROCEDURE — 36415 COLL VENOUS BLD VENIPUNCTURE: CPT

## 2022-07-01 PROCEDURE — 86923 COMPATIBILITY TEST ELECTRIC: CPT

## 2022-07-01 PROCEDURE — 99222 1ST HOSP IP/OBS MODERATE 55: CPT | Performed by: INTERNAL MEDICINE

## 2022-07-01 PROCEDURE — P9016 RBC LEUKOCYTES REDUCED: HCPCS

## 2022-07-01 PROCEDURE — 93005 ELECTROCARDIOGRAM TRACING: CPT

## 2022-07-01 PROCEDURE — 99291 CRITICAL CARE FIRST HOUR: CPT | Performed by: EMERGENCY MEDICINE

## 2022-07-01 PROCEDURE — 85025 COMPLETE CBC W/AUTO DIFF WBC: CPT

## 2022-07-01 PROCEDURE — 71045 X-RAY EXAM CHEST 1 VIEW: CPT

## 2022-07-01 PROCEDURE — 70450 CT HEAD/BRAIN W/O DYE: CPT

## 2022-07-01 PROCEDURE — 86900 BLOOD TYPING SEROLOGIC ABO: CPT

## 2022-07-01 PROCEDURE — 80053 COMPREHEN METABOLIC PANEL: CPT

## 2022-07-01 PROCEDURE — 86901 BLOOD TYPING SEROLOGIC RH(D): CPT

## 2022-07-01 PROCEDURE — 82272 OCCULT BLD FECES 1-3 TESTS: CPT

## 2022-07-01 PROCEDURE — 84484 ASSAY OF TROPONIN QUANT: CPT

## 2022-07-01 PROCEDURE — C9113 INJ PANTOPRAZOLE SODIUM, VIA: HCPCS | Performed by: INTERNAL MEDICINE

## 2022-07-01 PROCEDURE — 93010 ELECTROCARDIOGRAM REPORT: CPT | Performed by: INTERNAL MEDICINE

## 2022-07-01 PROCEDURE — 36430 TRANSFUSION BLD/BLD COMPNT: CPT

## 2022-07-01 PROCEDURE — G1004 CDSM NDSC: HCPCS

## 2022-07-01 PROCEDURE — 85610 PROTHROMBIN TIME: CPT

## 2022-07-01 RX ORDER — ACETAMINOPHEN 325 MG/1
650 TABLET ORAL EVERY 6 HOURS PRN
Status: DISCONTINUED | OUTPATIENT
Start: 2022-07-01 | End: 2022-07-05 | Stop reason: HOSPADM

## 2022-07-01 RX ORDER — FUROSEMIDE 10 MG/ML
20 INJECTION INTRAMUSCULAR; INTRAVENOUS ONCE
Status: COMPLETED | OUTPATIENT
Start: 2022-07-01 | End: 2022-07-02

## 2022-07-01 RX ORDER — ONDANSETRON 2 MG/ML
4 INJECTION INTRAMUSCULAR; INTRAVENOUS EVERY 6 HOURS PRN
Status: DISCONTINUED | OUTPATIENT
Start: 2022-07-01 | End: 2022-07-05 | Stop reason: HOSPADM

## 2022-07-01 RX ORDER — BUMETANIDE 0.25 MG/ML
0.5 INJECTION, SOLUTION INTRAMUSCULAR; INTRAVENOUS ONCE
Status: DISCONTINUED | OUTPATIENT
Start: 2022-07-02 | End: 2022-07-01

## 2022-07-01 RX ORDER — SODIUM CHLORIDE, SODIUM GLUCONATE, SODIUM ACETATE, POTASSIUM CHLORIDE, MAGNESIUM CHLORIDE, SODIUM PHOSPHATE, DIBASIC, AND POTASSIUM PHOSPHATE .53; .5; .37; .037; .03; .012; .00082 G/100ML; G/100ML; G/100ML; G/100ML; G/100ML; G/100ML; G/100ML
100 INJECTION, SOLUTION INTRAVENOUS CONTINUOUS
Status: DISCONTINUED | OUTPATIENT
Start: 2022-07-01 | End: 2022-07-02

## 2022-07-01 RX ADMIN — SODIUM CHLORIDE 8 MG/HR: 9 INJECTION, SOLUTION INTRAVENOUS at 22:31

## 2022-07-01 RX ADMIN — SODIUM CHLORIDE 80 MG: 9 INJECTION, SOLUTION INTRAVENOUS at 22:08

## 2022-07-01 NOTE — ED PROVIDER NOTES
History  Chief Complaint   Patient presents with    Abnormal Lab     Patient had blood work done yesterday, hemoglobin was low 6 1, informed by doctor to come to ED for blood transfusion, c/o of feeling weak and light headed, has had black stools for past two weeks  Patient is an 23-year-old female with past medical history significant for heart failure with EF of 20%, ITP with chronic anemia which has required transfusions in the past presenting to the emergency department for evaluation of low hemoglobin  Patient states that she recently had her hemoglobin taken and she was informed that it was 6 1  Patient states that she has also been experiencing fatigue, shortness of breath with ambulation, weakness, dark stools  Patient states that in March of this year she had a fall and suffered a subdural hematoma  Patient states that since the fall she has had increased Tylenol use for aches and pains  Patient states that she takes 3-4 Tylenol daily for pain  Patient states that she noticed about a few days ago that her to stool was black  She denies any abdominal pain, nausea, vomiting, diarrhea, constipation, dysuria, hematuria, cp  Prior to Admission Medications   Prescriptions Last Dose Informant Patient Reported? Taking?   acetaminophen (TYLENOL) 325 mg tablet   No No   Sig: Take 3 tablets (975 mg total) by mouth every 8 (eight) hours   Patient not taking: Reported on 2022   ascorbic acid (VITAMIN C) 500 mg tablet  Self Yes No   Sig: Take 500 mg by mouth daily   budesonide (ENTOCORT EC) 3 MG capsule  Self Yes No   Sig: Take 9 mg by mouth daily Takes 3 capsules once daily      fluticasone (FLONASE) 50 mcg/act nasal spray  Self No No   Si sprays into each nostril daily   Patient taking differently: 2 sprays into each nostril if needed   metoprolol succinate (TOPROL-XL) 25 mg 24 hr tablet  Self No No   Sig: Take 1 tablet (25 mg total) by mouth daily   potassium chloride (MICRO-K) 10 MEQ CR capsule  Self No No   Sig: Take 1 capsule (10 mEq total) by mouth daily   Patient taking differently: Take 10 mEq by mouth if needed   sacubitril-valsartan (Entresto) 49-51 MG TABS  Self No No   Sig: Take 1 tablet by mouth 2 (two) times a day   spironolactone (ALDACTONE) 25 mg tablet  Self No No   Sig: Take 0 5 tablets (12 5 mg total) by mouth daily   torsemide (DEMADEX) 10 mg tablet  Self No No   Sig: Take 1 tablet (10 mg total) by mouth daily      Facility-Administered Medications: None       Past Medical History:   Diagnosis Date    Bell's palsy     Cardiac disease     Ear problems     HL (hearing loss)     Hypertension     [facility] [address] ) 8/10/2021    [facility] 3/30/2022    Thrombocytopenia ([facility] ) 8/3/2018    Tinnitus        Past Surgical History:   Procedure Laterality Date    CT BONE MARROW BIOPSY AND ASPIRATION  1/27/2021       Family History   Problem Relation Age of Onset    Autoimmune disease Neg Hx      I have reviewed and agree with the history as documented  E-Cigarette/Vaping     E-Cigarette/Vaping Substances     Social History     Tobacco Use    Smoking status: Never Smoker    Smokeless tobacco: Never Used   Substance Use Topics    Alcohol use: No     Alcohol/week: 0 0 standard drinks    Drug use: No        Review of Systems   Constitutional: Positive for activity change and fatigue  Negative for chills and fever  HENT: Negative for ear pain and sore throat  Eyes: Negative for pain and visual disturbance  Respiratory: Positive for shortness of breath  Negative for cough and chest tightness  Cardiovascular: Positive for chest pain  Negative for palpitations and leg swelling  Gastrointestinal: Positive for blood in stool  Negative for abdominal pain, constipation, diarrhea, nausea and vomiting  Genitourinary: Negative for difficulty urinating, dysuria and hematuria  Musculoskeletal: Negative for arthralgias and back pain     Skin: Negative for color change and rash  Neurological: Positive for light-headedness  Negative for dizziness, seizures, syncope, weakness, numbness and headaches  Psychiatric/Behavioral: Negative for agitation and behavioral problems  All other systems reviewed and are negative  Physical Exam  ED Triage Vitals   Temperature Pulse Respirations Blood Pressure SpO2   07/01/22 1443 07/01/22 1443 07/01/22 1443 07/01/22 1443 07/01/22 1443   98 °F (36 7 °C) 105 18 (!) 86/45 98 %      Temp Source Heart Rate Source Patient Position - Orthostatic VS BP Location FiO2 (%)   07/01/22 1443 07/01/22 1610 07/01/22 1443 07/01/22 1443 --   Temporal Monitor Sitting Left arm       Pain Score       --                    Orthostatic Vital Signs  Vitals:    07/01/22 1443 07/01/22 1610 07/01/22 1942 07/01/22 2015   BP: (!) 86/45 118/55 96/56 97/56   Pulse: 105 80 85 84   Patient Position - Orthostatic VS: Sitting Lying Lying        Physical Exam  Vitals and nursing note reviewed  Constitutional:       General: She is not in acute distress  Appearance: Normal appearance  She is well-developed  HENT:      Head: Normocephalic and atraumatic  Comments: Left facial droop- chronic     Right Ear: External ear normal       Left Ear: External ear normal       Nose: Nose normal       Mouth/Throat:      Mouth: Mucous membranes are dry  Eyes:      Extraocular Movements: Extraocular movements intact  Conjunctiva/sclera: Conjunctivae normal    Cardiovascular:      Rate and Rhythm: Normal rate and regular rhythm  Heart sounds: Normal heart sounds  No murmur heard  Comments: Life vest in place  Pulmonary:      Effort: Pulmonary effort is normal  No respiratory distress  Breath sounds: Normal breath sounds  Abdominal:      Palpations: Abdomen is soft  Tenderness: There is no abdominal tenderness  There is no guarding or rebound  Musculoskeletal:         General: Normal range of motion        Cervical back: Normal range of motion and neck supple  Right lower leg: No edema  Left lower leg: No edema  Skin:     General: Skin is warm and dry  Capillary Refill: Capillary refill takes 2 to 3 seconds  Neurological:      General: No focal deficit present  Mental Status: She is alert and oriented to person, place, and time     Psychiatric:         Mood and Affect: Mood normal          Behavior: Behavior normal          ED Medications  Medications - No data to display    Diagnostic Studies  Results Reviewed     Procedure Component Value Units Date/Time    HS Troponin I 4hr [[id number]]     Lab Status: No result Specimen: Blood     CBC and differential [[id number]]  (Abnormal) Collected: 07/01/22 1725    Lab Status: Final result Specimen: Blood from Arm, Right Updated: 07/01/22 1905     WBC 2 67 Thousand/uL      RBC 1 71 Million/uL      Hemoglobin 5 1 g/dL      Hematocrit 16 9 %      MCV 99 fL      MCH 29 2 pg      MCHC 29 6 g/dL      RDW 16 0 %      MPV 11 1 fL      Platelets 72 Thousands/uL      nRBC 0 /100 WBCs      Neutrophils Relative 67 %      Immat GRANS % 0 %      Lymphocytes Relative 26 %      Monocytes Relative 7 %      Eosinophils Relative 0 %      Basophils Relative 0 %      Neutrophils Absolute 1 76 Thousands/µL      Immature Grans Absolute 0 01 Thousand/uL      Lymphocytes Absolute 0 70 Thousands/µL      Monocytes Absolute 0 18 Thousand/µL      Eosinophils Absolute 0 01 Thousand/µL      Basophils Absolute 0 01 Thousands/µL     Protime-INR [[id number]]  (Abnormal) Collected: 07/01/22 1725    Lab Status: Final result Specimen: Blood from Arm, Right Updated: 07/01/22 1808     Protime 15 3 seconds      INR 1 27    HS Troponin 0hr (reflex protocol) [[id number]]  (Normal) Collected: 07/01/22 1725    Lab Status: Final result Specimen: Blood from Arm, Right Updated: 07/01/22 1805     hs TnI 0hr 24 ng/L     HS Troponin I 2hr [[id number]]     Lab Status: No result Specimen: Blood     Comprehensive metabolic panel [[id number]]  (Abnormal) Collected: 07/01/22 1725    Lab Status: Final result Specimen: Blood from Arm, Right Updated: 07/01/22 1759     Sodium 140 mmol/L      Potassium 4 4 mmol/L      Chloride 108 mmol/L      CO2 28 mmol/L      ANION GAP 4 mmol/L      BUN 29 mg/dL      Creatinine 1 03 mg/dL      Glucose 85 mg/dL      Calcium 9 2 mg/dL      Corrected Calcium 9 9 mg/dL      AST 24 U/L      ALT 26 U/L      Alkaline Phosphatase 28 U/L      Total Protein 6 3 g/dL      Albumin 3 1 g/dL      Total Bilirubin 0 50 mg/dL      eGFR 50 ml/min/1 73sq m     Narrative:      [place] guidelines for Chronic Kidney Disease (CKD):     Stage 1 with normal or high GFR (GFR > 90 mL/min/1 73 square meters)    Stage 2 Mild CKD (GFR = 60-89 mL/min/1 73 square meters)    Stage 3A Moderate CKD (GFR = 45-59 mL/min/1 73 square meters)    Stage 3B Moderate CKD (GFR = 30-44 mL/min/1 73 square meters)    Stage 4 Severe CKD (GFR = 15-29 mL/min/1 73 square meters)    Stage 5 End Stage CKD (GFR <15 mL/min/1 73 square meters)  Note: GFR calculation is accurate only with a steady state creatinine                 CT head without contrast   Final Result by Yasmine Crespo MD (07/01 1615)      No acute intracranial abnormality  Microangiopathic changes  Interim resolution of previously identified small left frontotemporal subdural hematoma  Workstation performed: [id number]         XR chest 1 view portable   ED Interpretation by José Lion DO (07/01 1552)   No acute cardiopulmonary process noted life vest noted  Final Result by Jerilyn Opitz, MD (07/01 8652)      No active pulmonary disease                    Workstation performed: [id number]               Procedures  Procedures      ED Course  ED Course as of 07/01/22 2024 Fri Jul 01, 2022   1446 Blood Pressure(!): 86/45   1446 Temperature: 98 °F (36 7 °C)   1446 Temp Source: Temporal   1446 Pulse: 105   1446 Respirations: 18   1446 SpO2: 98 %   1527 Will evaluate patient with CT head without contrast, EKG, chest x-ray, protime INR, troponin, CBC, CMP, type and screen will give patient blood as her hemoglobin was 6 1  Patient has symptomatic anemia  Patient is consented for blood  Patient turns out to be Hemoccult positive on exam    1629 No acute intracranial abnormality  Microangiopathic changes  Interim resolution of previously identified small left frontotemporal subdural hematoma  1803 BUN(!): 29  Isolated elvation may be secondary to bleed   1856 Spoke with patient about her blood results that they were 5 1  She will receiving 3 units of blood  Advised her to stay overnight patient is amenable to that  Will reach out for admission  Patient admitted to in service team   Spoke to patient about GI follow-up for possible bleed  Patient understands has no questions or concerns at this time stable for admission  1908 Hemoglobin(!!): 5 1                             SBIRT 22yo+    Flowsheet Row Most Recent Value   SBIRT (23 yo +)    In order to provide better care to our patients, we are screening all of our patients for alcohol and drug use  Would it be okay to ask you these screening questions? Yes Filed at: 07/01/2022 1922   Initial Alcohol Screen: US AUDIT-C     1  How often do you have a drink containing alcohol? 0 Filed at: 07/01/2022 1922   2  How many drinks containing alcohol do you have on a typical day you are drinking? 0 Filed at: 07/01/2022 1922   3b  FEMALE Any Age, or MALE 65+: How often do you have 4 or more drinks on one occassion? 0 Filed at: 07/01/2022 1922   Audit-C Score 0 Filed at: 07/01/2022 1922   LEXA: How many times in the past year have you    Used an illegal drug or used a prescription medication for non-medical reasons?  Never Filed at: 07/01/2022 5856                [facility]    Disposition  Final diagnoses:   Symptomatic anemia   Bloody stool   Elevated BUN   Weakness   Fatigue Time reflects when diagnosis was documented in both MDM as applicable and the Disposition within this note     Time User Action Codes Description Comment    7/1/2022  7:20 PM Maria Luisa Velma Add [D64 9] Symptomatic anemia     7/1/2022  7:20 PM PalladinoFrancesca rich Boers Add [K92 1] Bloody stool     7/1/2022  7:21 PM PalladinoFrancesca Boers Add [R79 9] Elevated BUN     7/1/2022  7:21 PM PalladinoFrancesca rich Boers Add [R53 1] Weakness     7/1/2022  7:21 PM Maria Luisa Velma Add [R53 83] Fatigue       ED Disposition     ED Disposition   Admit    Condition   Stable    Date/Time   Fri Jul 1, 2022  7:20 PM    Comment   Case was discussed with Dr Dillard  and the patient's admission status was agreed to be SD2 the service of Dr [place]    None         Patient's Medications   Discharge Prescriptions    No medications on file     No discharge procedures on file  PDMP Review       Value Time User    PDMP Reviewed  Yes 4/1/2022 10:26 AM [facility]           ED Provider  Attending physically available and evaluated Tere Ortega  CARMINA managed the patient along with the ED Attending      Electronically Signed by         Sol Donahue DO  07/01/22 2024

## 2022-07-01 NOTE — ED ATTENDING ATTESTATION
7/1/2022  IJeff MD, saw and evaluated the patient  I have discussed the patient with the resident/non-physician practitioner and agree with the resident's/non-physician practitioner's findings, Plan of Care, and MDM as documented in the resident's/non-physician practitioner's note, except where noted  All available labs and Radiology studies were reviewed  I was present for key portions of any procedure(s) performed by the resident/non-physician practitioner and I was immediately available to provide assistance  At this point I agree with the current assessment done in the Emergency Department  I have conducted an independent evaluation of this patient a history and physical is as follows:    ED Course         Critical Care Time  CriticalCare Time  Performed by: Jeff Law MD  Authorized by: Jeff Law MD     Critical care provider statement:     Critical care time (minutes):  33    Critical care time was exclusive of:  Separately billable procedures and treating other patients and teaching time    Critical care was necessary to treat or prevent imminent or life-threatening deterioration of the following conditions:  Metabolic crisis    Critical care was time spent personally by me on the following activities:  Ordering and review of laboratory studies, ordering and review of radiographic studies, re-evaluation of patient's condition, review of old charts, examination of patient, evaluation of patient's response to treatment, obtaining history from patient or surrogate and development of treatment plan with patient or surrogate        79 yo female with hx of chf, anemia, sdh, having fatigue, sob, and found to have outpatient labs showing hgb of 6 1  Pt noted dark stools last few days  Pt with no cp, no abdominal pain  Pt on no blood thinners  Pt did fall recently but no known head trauma    Vss, afebrile, lungs cta, rrr, abdomen soft nontender, pedal edema, rectal heme positive, hemorrhoid  Labs, ekg, cxr, transfuse for symptomatic anemia

## 2022-07-01 NOTE — TELEPHONE ENCOUNTER
Spoke with pt who states she did not understand that she was supposed to go to the ER yesterday  She will go now

## 2022-07-01 NOTE — TELEPHONE ENCOUNTER
CALL RETURN FORM   Reason for patient call? Patient calling back regarding blood transfusion    Patient's primary oncologist? Mckenna Morris    Name of person the patient was calling for? Georgina   Any additional information to add, if applicable? Please call [phone]   Informed patient that the message will be forwarded to the team and someone will get back to them as soon as possible    Did you relay this information to the patient?  yes

## 2022-07-02 PROBLEM — D61.818 PANCYTOPENIA (HCC): Status: ACTIVE | Noted: 2022-07-02

## 2022-07-02 LAB
HCT VFR BLD AUTO: 22.3 % (ref 34.8–46.1)
HCT VFR BLD AUTO: 22.9 % (ref 34.8–46.1)
HGB BLD-MCNC: 7.1 G/DL (ref 11.5–15.4)
HGB BLD-MCNC: 7.4 G/DL (ref 11.5–15.4)

## 2022-07-02 PROCEDURE — 97162 PT EVAL MOD COMPLEX 30 MIN: CPT

## 2022-07-02 PROCEDURE — C9113 INJ PANTOPRAZOLE SODIUM, VIA: HCPCS | Performed by: INTERNAL MEDICINE

## 2022-07-02 PROCEDURE — 85014 HEMATOCRIT: CPT | Performed by: INTERNAL MEDICINE

## 2022-07-02 PROCEDURE — 99233 SBSQ HOSP IP/OBS HIGH 50: CPT | Performed by: INTERNAL MEDICINE

## 2022-07-02 PROCEDURE — 99222 1ST HOSP IP/OBS MODERATE 55: CPT | Performed by: INTERNAL MEDICINE

## 2022-07-02 PROCEDURE — 85018 HEMOGLOBIN: CPT | Performed by: INTERNAL MEDICINE

## 2022-07-02 RX ORDER — PANTOPRAZOLE SODIUM 40 MG/10ML
40 INJECTION, POWDER, LYOPHILIZED, FOR SOLUTION INTRAVENOUS EVERY 12 HOURS SCHEDULED
Status: DISCONTINUED | OUTPATIENT
Start: 2022-07-02 | End: 2022-07-03

## 2022-07-02 RX ORDER — FLUTICASONE PROPIONATE 50 MCG
2 SPRAY, SUSPENSION (ML) NASAL AS NEEDED
Status: DISCONTINUED | OUTPATIENT
Start: 2022-07-02 | End: 2022-07-05 | Stop reason: HOSPADM

## 2022-07-02 RX ADMIN — SODIUM CHLORIDE, SODIUM GLUCONATE, SODIUM ACETATE, POTASSIUM CHLORIDE, MAGNESIUM CHLORIDE, SODIUM PHOSPHATE, DIBASIC, AND POTASSIUM PHOSPHATE 100 ML/HR: .53; .5; .37; .037; .03; .012; .00082 INJECTION, SOLUTION INTRAVENOUS at 01:15

## 2022-07-02 RX ADMIN — SODIUM CHLORIDE 8 MG/HR: 9 INJECTION, SOLUTION INTRAVENOUS at 07:29

## 2022-07-02 RX ADMIN — PANTOPRAZOLE SODIUM 40 MG: 40 INJECTION, POWDER, FOR SOLUTION INTRAVENOUS at 20:33

## 2022-07-02 RX ADMIN — FUROSEMIDE 20 MG: 10 INJECTION, SOLUTION INTRAVENOUS at 01:15

## 2022-07-02 RX ADMIN — ACETAMINOPHEN 650 MG: 325 TABLET, FILM COATED ORAL at 20:35

## 2022-07-02 NOTE — ASSESSMENT & PLAN NOTE
Etiology of thrombocytopenia  No longer on promacta or prednisone  Cont to monitor  Hold a/c, antiplts

## 2022-07-02 NOTE — ASSESSMENT & PLAN NOTE
Wt Readings from Last 3 Encounters:   06/22/22 50 8 kg (112 lb)   06/14/22 53 8 kg (118 lb 9 6 oz)   05/18/22 54 9 kg (121 lb)     Euvolemic  EF of 20%  Will give x 1 dose of IV lasix after 2nd unit of prbc  Cont to monitor fluid status  Holding entresto, aldactone and torsemide given boderline bp

## 2022-07-02 NOTE — PHYSICAL THERAPY NOTE
Physical Therapy Evaluation     Patient's Name: Dom Briseno    Admitting Diagnosis  Acute blood loss anemia [D62]  Fatigue [R53 83]  Weakness [R53 1]  Bloody stool [K92 1]  Elevated BUN [R79 9]  Abnormal laboratory test result [R89 9]  Symptomatic anemia [D64 9]    Problem List  Patient Active Problem List   Diagnosis    Chronic systolic heart failure (HCC)    Hypertension, essential    Other specified glaucoma    Autoimmune hepatitis treated with steroids ([address] )    Dilated cardiomyopathy (HCC)    Complete left bundle branch block (LBBB)    LEA positive    Congestive heart failure (HCC)    Depressive disorder    Neutropenia, unspecified type (HCC)    Left-sided Bell's palsy    Chronic ITP (idiopathic thrombocytopenia) (HCC)    Hypertensive heart disease with congestive heart failure (HCC)    Bilateral hearing loss    Acute blood loss anemia    Hypotension    Headache    Pancytopenia (HCC)       Past Medical History  Past Medical History:   Diagnosis Date    Bell's palsy     Cardiac disease     Ear problems     HL (hearing loss)     Hypertension     Sacroiliitis ([address] ) 8/10/2021    Subdural hemorrhage ([address] ) 3/30/2022    Thrombocytopenia ([address] ) 8/3/2018    Tinnitus        Past Surgical History  Past Surgical History:   Procedure Laterality Date    CT BONE MARROW BIOPSY AND ASPIRATION  1/27/2021 07/02/22 0908   PT Last Visit   PT Visit Date 07/02/22   Note Type   Note type Evaluation   Pain Assessment   Pain Assessment Tool 0-10   Pain Score No Pain   Restrictions/Precautions   Weight Bearing Precautions Per Order No   Other Precautions Fall Risk;Multiple lines;Telemetry   Home Living   Type of Home House   Home Layout Multi-level;Bed/bath upstairs  (13 steps to upstairs)   Bathroom Shower/Tub Tub/shower unit   Bathroom Toilet Standard   Bathroom Equipment Grab bars in shower   Bathroom Accessibility Accessible   Additional Comments Pt's  is LVAD pt and she takes care of him   Prior Function   Level of [place] Independent with ADLs and functional mobility   Lives With Spouse   Receives Help From Family; [address] in the last 6 months 1 to 4   Vocational Retired   Comments   (1 fall in March?)   General   Family/Caregiver Present No   Cognition   Orientation Level Oriented X4   Following Commands Follows one step commands without difficulty   RLE Assessment   RLE Assessment WNL   LLE Assessment   LLE Assessment WNL   Bed Mobility   Supine to Sit 6  Modified independent   Additional items Increased time required   Transfers   Sit to Stand 6  Modified independent   Additional items Increased time required   Stand to Sit 6  Modified independent   Additional items Increased time required   Ambulation/Elevation   Gait pattern Inconsistent denia;Decreased foot clearance; Step through pattern   Gait Assistance 4  Minimal assist  (CG, w/ fatigue pt had noted increased gait disturbances)   Assistive Device None   Distance 125'   Balance   Static Sitting Fair +   Dynamic Sitting Fair +   Static Standing Fair   Dynamic Standing Fair -   Ambulatory Fair -   Endurance Deficit   Endurance Deficit Yes   Activity Tolerance   Activity Tolerance Patient limited by fatigue;Patient tolerated treatment well   Nurse Made Aware RN cleared pt for PT evaluation and [place] from restorative assisted with line managment  Assessment   Prognosis Good   Problem List Decreased strength;Decreased endurance; Impaired balance;Decreased mobility; Decreased cognition;Decreased safety awareness   Assessment Pt seen for moderate complexity PT evaluation  Pt with active PT eval/treat and up with assitance orders  Pt is a 78 y o  female who was [facility] to [facility] on 7/1/2022  Pt's current dx/ problem list include: acute blood loss anemia   Comorbidities affecting pt's physical performance at time of assessment are as follows: hypotension, chronic systolic heart failure, pancytopenia, headache, chronic ITP and autoimmune hepatitis treated with steriods  Personal factors affecting pt at time of initial examination include: steps to enter environment, limited home support, past experience, inability to perform IADLs, inability to perform ADLs, inability to ambulate household distances  Due to acute medical issues, ongoing medical workup for primary dx; pain, fall risk, decreased activity tolerance compared to baseline, increased assistance needed from caregiver at current time, multiple lines, decline in overall functional mobility status; health management issues; note unstable clinical picture (moderate complexity)  At baseline, pt resides with her  in a 2  with 4 JULIAN and was independent with ADLs/ IADLs  Prior to hospital admission pt was the primary caregiver of her spouse  Currently, upon initial examination, pt  is requiring min supervision for supine to sit and min A for sit to stand and ambulation  PT to continue to follow and assess functional transfers and ambulation as appropriate and able  Currently, pt presents functioning below baseline and with overall mobility deficits 2* to: decreased LE strength/AROM; limited flexibility;  generalized weakness/ deconditioning; decreased endurance; decreased activity tolerance; impaired balance; gait deviations  Pt currently at increased risk for falls  At the end of evaluation, pt was left seated in bed side recliner with all needs in reach  Pt would benefit from continued skilled PT services while in hospital to address remaining limitations and maximize functional potential  The patient's AM-PAC Basic Mobility Inpatient Short Form Raw Score is 21  A Raw score of greater than 16 suggests the patient may benefit from discharge to home  Please also refer to the recommendation of the Physical Therapist for safe discharge planning  Barriers to Discharge Decreased caregiver support; Inaccessible home environment   Goals   Patient Goals To go for a walk   [facility] Expiration Date 07/12/22   Short Term Goal #1 STG 1  Pt will be able to perform bed mobility with mod I in order to improve overall functional mobility and assist in safe d/c  STG 2  Pt will be able to perform functional transfer with mod I in order to improve overall functional mobility and assist in safe d/c  STG 3  Pt will be able to ambulate at least 150 feet with least restrictive device with mod I A in order to improve overall functional mobility and assist in safe d/c  STG 4  Pt will improve sitting/standing static/dynamic balance 1 grade in order to improve functional mobility and assist in safe d/c  STG 5  Pt will improve LE strength by one grade in order to improve functional mobility and assist in safe d/c  STG 6  Pt will negotiate at least 1 step at mod I level A in order to improve overall funcitonal mobility and assist in safe d/c   Plan   Treatment/Interventions ADL retraining;Functional transfer training;LE strengthening/ROM; Elevations; Therapeutic exercise; Endurance training;Patient/family training;Equipment eval/education; Bed mobility;Gait training;Spoke to nursing   PT Frequency 3-5x/wk   Recommendation   PT Discharge Recommendation Home with home health rehabilitation   [address] [place] [address] Mobility Inpatient   Turning in Bed Without Bedrails 4   Lying on Back to Sitting on Edge of Flat Bed 4   Moving Bed to Chair 3   Standing Up From Chair 4   Walk in Room 3   Climb 3-5 Stairs 3   Basic Mobility Inpatient Raw Score 21   Basic Mobility Standardized Score 45 55   Highest Level Of Mobility   JH-HLM Goal 6: Walk 10 steps or more   JH-HLM Achieved 7: Walk 25 feet or more           Rafael Cutler, PT

## 2022-07-02 NOTE — ASSESSMENT & PLAN NOTE
Hx of SDH for which was recently admitted in 4/2022  S/p CT head no acute intracranial pathology; resolved L frontotemporal SDH  Prn tylenol

## 2022-07-02 NOTE — ASSESSMENT & PLAN NOTE
On budesonide (presumbly for txt of this though pt is unsure herself)  In setting of ABLA, hold for now   Resume as soon as possible to avoid withdrawal symptoms or adrenal insufficiency  Will have GI weigh in on recs for continued use  Pt follows with Dr Tyshawn Kuhn

## 2022-07-02 NOTE — ASSESSMENT & PLAN NOTE
On budesonide (presumbly for txt of this though pt is unsure herself)  In setting of ABLA, hold for now   Resume as soon as possible to avoid withdrawal symptoms or adrenal insufficiency  Will have GI weigh in on recs for continued use  Pt follows with Dr Reji Hernandez

## 2022-07-02 NOTE — H&P
[address] [facility]  H&P- Deandra Wall 1940, 78 y o  female MRN: [id number]  Unit/Bed#: ED 28 Encounter: [id number]  Primary Care Provider: Anson Garcia MD   Date and time admitted to hospital: 7/1/2022  2:44 PM    * Acute blood loss anemia  Assessment & Plan  Admit to step down level 2  Given presenting melena likely UGI bleed  Last melena bm a day prior to presentation  Pt reports use of chronic steroids  She had been on prednisone for txt of ITP but has been off since 5/2022  She is chronically on budesonide (pt unsure but indication but maybe due to tx for autoimmune hepatitis)  She denies use of a/c or nsaids  Consult GI  3 units of prbc ordered  Will transfuse 2 units now  Give dose of IV lasix 20mg x 1 after 2nd unit  H/H monitoring q 8 hours  Hold a/c, antiplt  Protonix gtt  Clears, npo after midnight        Hypotension  Assessment & Plan  Secondary to above  Systolic BP in the   Holding bp meds including toprol xl and entresto  Close monitor      Chronic systolic heart failure (HCC)  Assessment & Plan  Wt Readings from Last 3 Encounters:   06/22/22 50 8 kg (112 lb)   06/14/22 53 8 kg (118 lb 9 6 oz)   05/18/22 54 9 kg (121 lb)     Euvolemic  EF of 20%  Will give x 1 dose of IV lasix after 2nd unit of prbc  Cont to monitor fluid status  Holding entresto, aldactone and torsemide given boderline bp        Chronic ITP (idiopathic thrombocytopenia) (HCC)  Assessment & Plan  Etiology of thrombocytopenia  No longer on promacta or prednisone  Cont to monitor  Hold a/c, antiplts    Autoimmune hepatitis treated with steroids ([facility] [address] )  Assessment & Plan  On budesonide (presumbly for txt of this though pt is unsure herself)  In setting of ABLA, hold for now   Resume as soon as possible to avoid withdrawal symptoms or adrenal insufficiency  Will have GI weigh in on recs for continued use  Pt follows with Dr Gris Conte    Neutropenia, unspecified type [facility])  Assessment & Plan  Chronic  No sign of acute infection process    Headache  Assessment & Plan  Secondary to anemia  Hx of SDH for which was recently admitted in 4/2022  S/p CT head no acute intracranial pathology; resolved L frontotemporal SDH  Prn tylenol    VTE Prophylaxis: Pharmacologic VTE Prophylaxis contraindicated due to abla  / sequential compression device   Code Status: Full code  POLST: There is no POLST form on file for this patient (pre-hospital)  Discussion with family: Patient and her Dtr    Anticipated Length of Stay:  Patient will be admitted on an Inpatient basis with an anticipated length of stay of  > 2 midnights  Justification for Hospital Stay: ABLA    Total Time for Visit, including Counseling / Coordination of Care: 70 minutes  Greater than 50% of this total time spent on direct patient counseling and coordination of care  Chief Complaint:   Melena x 2 weeks    History of Present Illness:    Andrade Ceron is a 78 y o  female medical hx of significant for chronic systolic HF, htn, ITP chronically on prednisone now off since may, autoimmune hepatitis, SDH was instructed to present to the ED having been found with anemia  Pt reports due to her hx of ITP she was having her routine labwork which revealed severe anemia  She reports that she has been having melena for the past 2 weeks  She states that she did not seek help as she thought would resolve on its own  She denies use of chronic NSAIDs but chronically on budesonide    She denies abd pain, hematemesis, hematochezia  She notes that from a few days ago started to experience generalized weakness and headache  Upon presentation to the ED, hgb of 5 1, hemooccult done positive  She reports last melena bm was a day ago  She has been ordered for 3 units of prbc  Review of Systems:    Review of Systems   Constitutional: Positive for fatigue  Gastrointestinal: Positive for blood in stool  Neurological: Positive for headaches     All other systems reviewed and are negative  Past Medical and Surgical History:     Past Medical History:   Diagnosis Date    Bell's palsy     Cardiac disease     Ear problems     HL (hearing loss)     Hypertension     Sacroiliitis ([facility] [address] ) 8/10/2021    Subdural hemorrhage ([facility] [address] ) 3/30/2022    Thrombocytopenia ([facility] [address] ) 8/3/2018    Tinnitus        Past Surgical History:   Procedure Laterality Date    CT BONE MARROW BIOPSY AND ASPIRATION  1/27/2021       Meds/Allergies:    Prior to Admission medications    Medication Sig Start Date End Date Taking? Authorizing Provider   acetaminophen (TYLENOL) 325 mg tablet Take 3 tablets (975 mg total) by mouth every 8 (eight) hours  Patient not taking: Reported on 6/22/2022 4/1/22   ALONZO Solares   ascorbic acid (VITAMIN C) 500 mg tablet Take 500 mg by mouth daily    Historical Provider, MD   budesonide (ENTOCORT EC) 3 MG capsule Take 9 mg by mouth daily Takes 3 capsules once daily  12/1/21   Historical Provider, MD   fluticasone (FLONASE) 50 mcg/act nasal spray 2 sprays into each nostril daily  Patient taking differently: 2 sprays into each nostril if needed 3/30/22   Kassandra Zarate MD   metoprolol succinate (TOPROL-XL) 25 mg 24 hr tablet Take 1 tablet (25 mg total) by mouth daily 11/4/21   Yaz Arreola DO   potassium chloride (MICRO-K) 10 MEQ CR capsule Take 1 capsule (10 mEq total) by mouth daily  Patient taking differently: Take 10 mEq by mouth if needed 11/4/21   Yaz Arreola DO   sacubitril-valsartan Illa Mates) 49-51 MG TABS Take 1 tablet by mouth 2 (two) times a day 11/4/21   Yaz Arreola DO   spironolactone (ALDACTONE) 25 mg tablet Take 0 5 tablets (12 5 mg total) by mouth daily 11/4/21   Yaz Arreola DO   torsemide [facility]) 10 mg tablet Take 1 tablet (10 mg total) by mouth daily 11/4/21   Yaz Arreola DO     I have reviewed home medications using allscripts  Allergies:    Allergies   Allergen Reactions    Ambien [Zolpidem] Other (See Comments)     Did not allow sleep per pt       Social History:     Marital Status: /Civil Union   Occupation:   Patient Pre-hospital Living Situation: resides with   Patient Pre-hospital Level of Mobility: Independent  Patient Pre-hospital Diet Restrictions:   Substance Use History:   Social History     Substance and Sexual Activity   Alcohol Use No    Alcohol/week: 0 0 standard drinks     Social History     Tobacco Use   Smoking Status Never Smoker   Smokeless Tobacco Never Used     Social History     Substance and Sexual Activity   Drug Use No       Family History:    Family History   Problem Relation Age of Onset    Autoimmune disease Neg Hx        Physical Exam:     Vitals:   Blood Pressure: 99/58 (07/01/22 2145)  Pulse: 86 (07/01/22 2145)  Temperature: 98 4 °F (36 9 °C) (07/01/22 2130)  Temp Source: Oral (07/01/22 2130)  Respirations: 16 (07/01/22 2145)  SpO2: 99 % (07/01/22 2130)    Physical Exam  Cardiovascular:      Rate and Rhythm: Normal rate and regular rhythm  Pulses: Normal pulses  Heart sounds: Normal heart sounds  Pulmonary:      Effort: Pulmonary effort is normal  No respiratory distress  Breath sounds: Normal breath sounds  No wheezing or rales  Abdominal:      General: Abdomen is flat  Bowel sounds are normal  There is no distension  Palpations: Abdomen is soft  Tenderness: There is no abdominal tenderness  There is no guarding  Musculoskeletal:         General: Normal range of motion  Cervical back: Normal range of motion and neck supple  Right lower leg: No edema  Left lower leg: No edema  Skin:     General: Skin is warm and dry  Neurological:      General: No focal deficit present  Mental Status: She is alert and oriented to person, place, and time  Mental status is at baseline  Cranial Nerves: No cranial nerve deficit  Motor: No weakness  Additional Data:     Lab Results: I have personally reviewed pertinent reports        Results from last 7 days   Lab Units 07/01/22  1725   WBC Thousand/uL 2 67*   HEMOGLOBIN g/dL 5 1*   HEMATOCRIT % 16 9*   PLATELETS Thousands/uL 72*   NEUTROS PCT % 67   LYMPHS PCT % 26   MONOS PCT % 7   EOS PCT % 0     Results from last 7 days   Lab Units 07/01/22  1725   SODIUM mmol/L 140   POTASSIUM mmol/L 4 4   CHLORIDE mmol/L 108   CO2 mmol/L 28   BUN mg/dL 29*   CREATININE mg/dL 1 03   ANION GAP mmol/L 4   CALCIUM mg/dL 9 2   ALBUMIN g/dL 3 1*   TOTAL BILIRUBIN mg/dL 0 50   ALK PHOS U/L 28*   ALT U/L 26   AST U/L 24   GLUCOSE RANDOM mg/dL 85     Results from last 7 days   Lab Units 07/01/22  1725   INR  1 27*                   Imaging: I have personally reviewed pertinent reports  CT head without contrast   Final Result by Jane Strong MD (07/01 1615)      No acute intracranial abnormality  Microangiopathic changes  Interim resolution of previously identified small left frontotemporal subdural hematoma  Workstation performed: [id number]         XR chest 1 view portable   ED Interpretation by Claude Ybarra DO (07/01 5284)   No acute cardiopulmonary process noted life vest noted  Final Result by Richard Bower MD (07/01 9877)      No active pulmonary disease  Workstation performed: [id number]             EKG, Pathology, and Other Studies Reviewed on Admission:   · EKG: NSR, LBBB    Allscripts / Epic Records Reviewed: Yes     ** Please Note: This note has been constructed using a voice recognition system   **

## 2022-07-02 NOTE — CONSULTS
Consult Service: Gastroenterology      PATIENT INFORMATION      Sandeep Damico 78 y o  female MRN: [id number]  Unit/Bed#: [address] Encounter: [id number]  PCP: Geeta Andrews MD  Date of Admission:  7/1/2022  Date of Consultation: 07/02/22  Requesting Physician: Boogie Mejia Sophie is a 78 y o  old female with PMH of CHF, ITP on prednisone, reported autoimmune hepatitis who presents with pancytopenia      Gastroenterology has been consulted for assistance with management of anemia    Anemia   · Patient reports dark brown stools since 2 weeks but denies black stools  · Given history of steroid use, peptic ulcer disease is certainly in the differential  · Patient is on a protonix drip currently  · Rectal exam currently without blood or melena  · Patient and son do not want an endoscopy currently given that her hemoglobin has responded to transfusion and there are no signs of active bleeding  · However if her hemoglobin drops or there are signs of bleeding we will need to perform an upper endoscopy tomorrow  · Continue PPI  · Start diet today  · NPO at midnight tentatively in case hemoglobin does not respond  · Discussed plan with son    ITP  · On chronic prednisone  · Currently presenting with pancytopenia  · Consider hematology consult    Suspected autoimmune hepatitis  · Patient reports that she follows with Dr Nani Jason however I do not see any records of the same  · Will check ultrasound RUQ with dopplers  · Liver enzymes currently within normal limits     HISTORY OF PRESENT ILLNESS      Sandeep Damico is a 78 y o  female who is originally admitted for anemia and is being consulted for the same  Patient gets routine blood work and this time it showed that her hemoglobin was low  She came to the hospital for evaluation of the same  Patient is a poor historian but tells me that she does not have black stools, she reports dark brown stools   She does tell me that she has hemorrhoids which bled previously  She is on chronic steroid therapy  Patient denies any other symptoms  Patient denies abdominal pain, nausea, vomiting, heartburn, dysphagia, constipation, diarrhea  She reports that she follows with Dr Chi Hamilton as an outpatient       REVIEW OF SYSTEMS     A thorough 12-point review of systems has been conducted  Pertinent positives and negatives are mentioned in the history of present illness  PAST MEDICAL & SURGICAL HISTORY      Past Medical History:   Diagnosis Date    Bell's palsy     Cardiac disease     Ear problems     HL (hearing loss)     Hypertension     Sacroiliitis ([facility] [address] ) 8/10/2021    Subdural hemorrhage ([facility] [address] ) 3/30/2022    Thrombocytopenia ([facility] [address] ) 8/3/2018    Tinnitus        Past Surgical History:   Procedure Laterality Date    CT BONE MARROW BIOPSY AND ASPIRATION  1/27/2021         MEDICATIONS & ALLERGIES       Medications:   Prior to Admission medications    Medication Sig Start Date End Date Taking? Authorizing Provider   acetaminophen (TYLENOL) 325 mg tablet Take 3 tablets (975 mg total) by mouth every 8 (eight) hours  Patient not taking: Reported on 6/22/2022 4/1/22   ALONZO Arthur   ascorbic acid (VITAMIN C) 500 mg tablet Take 500 mg by mouth daily    Historical Provider, MD   budesonide (ENTOCORT EC) 3 MG capsule Take 9 mg by mouth daily Takes 3 capsules once daily    12/1/21   Historical Provider, MD   fluticasone (FLONASE) 50 mcg/act nasal spray 2 sprays into each nostril daily  Patient taking differently: 2 sprays into each nostril if needed 3/30/22   Kassandra Zarate MD   metoprolol succinate (TOPROL-XL) 25 mg 24 hr tablet Take 1 tablet (25 mg total) by mouth daily 11/4/21   Kaity Meyer DO   potassium chloride (MICRO-K) 10 MEQ CR capsule Take 1 capsule (10 mEq total) by mouth daily  Patient taking differently: Take 10 mEq by mouth if needed 11/4/21   Kaity Meyer DO   sacubitril-valsartan (Entresto) 49-51 MG TABS Take 1 tablet by mouth 2 (two) times a day 11/4/21   Caitlyn Donahue, DO   spironolactone (ALDACTONE) 25 mg tablet Take 0 5 tablets (12 5 mg total) by mouth daily 11/4/21   Caitlyn Donahue, DO   torsemide [facility]) 10 mg tablet Take 1 tablet (10 mg total) by mouth daily 11/4/21   Caitlyn Rowan, DO       Allergies: Allergies   Allergen Reactions    Ambien [Zolpidem] Other (See Comments)     Did not allow sleep per pt         SOCIAL HISTORY      Marital Status: /Civil Union    Substance Use History:   Social History     Substance and Sexual Activity   Alcohol Use No    Alcohol/week: 0 0 standard drinks     Social History     Tobacco Use   Smoking Status Never Smoker   Smokeless Tobacco Never Used     Social History     Substance and Sexual Activity   Drug Use No         FAMILY HISTORY      Non-Contributory      PHYSICAL EXAM     Vitals:   Blood Pressure: (!) 94/47 (07/02/22 0700)  Pulse: 73 (07/02/22 0700)  Temperature: 98 °F (36 7 °C) (07/02/22 0700)  Temp Source: Oral (07/02/22 0700)  Respirations: 16 (07/02/22 0700)  Height: 5' 3" (160 cm) (07/02/22 0015)  Weight - Scale: 48 3 kg (106 lb 7 7 oz) (07/02/22 0015)  SpO2: 98 % (07/02/22 0800)    Physical Exam:   GENERAL: NAD  HEENT:  NC/AT, MMM  CARDIAC:  RRR, +S1/S2, no S3/S4 heard  PULMONARY:  CTA B/L, no wheezing/rales/rhonci, non-labored breathing  ABDOMEN:  Soft, NT/ND, +BS, no rebound/guarding/rigidity  DIGITAL RECTAL EXAM: No blood seen   NEUROLOGIC:  Alert/oriented x3  SKIN:  No rashes or erythema       ADDITIONAL DATA     Lab Results:     Results from last 7 days   Lab Units 07/02/22  0559 07/02/22  0109 07/01/22  1725   WBC Thousand/uL  --   --  2 67*   HEMOGLOBIN g/dL 7 4*   < > 5 1*   HEMATOCRIT % 22 9*   < > 16 9*   PLATELETS Thousands/uL  --   --  72*   NEUTROS PCT %  --   --  67   LYMPHS PCT %  --   --  26   MONOS PCT %  --   --  7   EOS PCT %  --   --  0    < > = values in this interval not displayed       Results from last 7 days   Lab Units 07/01/22  1725   POTASSIUM mmol/L 4  4   CHLORIDE mmol/L 108   CO2 mmol/L 28   BUN mg/dL 29*   CREATININE mg/dL 1 03   CALCIUM mg/dL 9 2   ALK PHOS U/L 28*   ALT U/L 26   AST U/L 24     Results from last 7 days   Lab Units 07/01/22  1725   INR  1 27*       Imaging:    XR chest 1 view portable    Result Date: 7/1/2022  Narrative: CHEST INDICATION:   sob  COMPARISON:  August 1, 2018  EXAM PERFORMED/VIEWS:  XR CHEST PORTABLE FINDINGS: Mild enlargement of the cardiac silhouette  Mediastinal contours are within normal limits  No focal consolidation, pleural effusion or discrete pneumothorax  Osseous structures appear within normal limits for patient age  Impression: No active pulmonary disease  Workstation performed: [id number]     CT head without contrast    Result Date: 7/1/2022  Narrative: CT BRAIN - WITHOUT CONTRAST INDICATION:   Ataxia, head trauma fall  COMPARISON:  CT 5/9/2022 TECHNIQUE:  CT examination of the brain was performed  In addition to axial images, sagittal and coronal 2D reformatted images were created and submitted for interpretation  Radiation dose length product (DLP) for this visit:  815 mGy-cm   This examination, like all CT scans performed in the [facility], was performed utilizing techniques to minimize radiation dose exposure, including the use of iterative reconstruction and automated exposure control  IMAGE QUALITY:  Diagnostic  FINDINGS: PARENCHYMA: Decreased attenuation is noted in periventricular and subcortical white matter demonstrating an appearance that is statistically most likely to represent mild microangiopathic change; this appearance is similar when compared to most recent prior examination  No CT signs of acute infarction  No intracranial mass, mass effect or midline shift  No acute parenchymal hemorrhage  VENTRICLES AND EXTRA-AXIAL SPACES:  Ventricles and extra-axial CSF spaces are prominent commensurate with the degree of volume loss  No hydrocephalus    No acute extra-axial hemorrhage  Previously identified small left frontotemporal subdural hematoma has resolved  VISUALIZED ORBITS AND PARANASAL SINUSES: Bilateral ocular lens implants are present  The visualized paranasal sinuses are free of mucosal disease  CALVARIUM AND EXTRACRANIAL SOFT TISSUES:  Normal      Impression: No acute intracranial abnormality  Microangiopathic changes  Interim resolution of previously identified small left frontotemporal subdural hematoma  Workstation performed: [id number]       EKG, Pathology, and Other Studies Reviewed on Admission:   · EKG: Reviewed      Counseling / Coordination of Care Time: 30 total mins spent n consult  Greater than 50% of total time spent on patient counseling and coordination of care  Magalie Harvey MD   PGY-5,   Gastroenterology         ** Please Note: This note is constructed using a voice recognition dictation system   **

## 2022-07-02 NOTE — ASSESSMENT & PLAN NOTE
Secondary to above  Systolic BP in the   Holding bp meds including toprol xl and entresto  Close monitor

## 2022-07-02 NOTE — PLAN OF CARE
Problem: PHYSICAL THERAPY ADULT  Goal: Performs mobility at highest level of function for planned discharge setting  See evaluation for individualized goals  Description: Treatment/Interventions: ADL retraining, Functional transfer training, LE strengthening/ROM, Elevations, Therapeutic exercise, Endurance training, Patient/family training, Equipment eval/education, Bed mobility, Gait training, Spoke to nursing  Equipment Recommended: [employer]       See flowsheet documentation for full assessment, interventions and recommendations  Note: Prognosis: Good  Problem List: Decreased strength, Decreased endurance, Impaired balance, Decreased mobility, Decreased cognition, Decreased safety awareness  Assessment: Pt seen for moderate complexity PT evaluation  Pt with active PT eval/treat and up with assitance orders  Pt is a 78 y o  female who was admitted to [facility] on 7/1/2022  Pt's current dx/ problem list include: acute blood loss anemia  Comorbidities affecting pt's physical performance at time of assessment are as follows: hypotension, chronic systolic heart failure, pancytopenia, headache, chronic ITP and autoimmune hepatitis treated with steriods  Personal factors affecting pt at time of initial examination include: steps to enter environment, limited home support, past experience, inability to perform IADLs, inability to perform ADLs, inability to ambulate household distances  Due to acute medical issues, ongoing medical workup for primary dx; pain, fall risk, decreased activity tolerance compared to baseline, increased assistance needed from caregiver at current time, multiple lines, decline in overall functional mobility status; health management issues; note unstable clinical picture (moderate complexity)  At baseline, pt resides with her  in a 2  with 4 JULIAN and was independent with ADLs/ IADLs  Prior to hospital admission pt was the primary caregiver of her spouse   Currently, upon initial examination, pt  is requiring min supervision for supine to sit and min A for sit to stand and ambulation  PT to continue to follow and assess functional transfers and ambulation as appropriate and able  Currently, pt presents functioning below baseline and with overall mobility deficits 2* to: decreased LE strength/AROM; limited flexibility;  generalized weakness/ deconditioning; decreased endurance; decreased activity tolerance; impaired balance; gait deviations  Pt currently at increased risk for falls  At the end of evaluation, pt was left seated in bed side recliner with all needs in reach  Pt would benefit from continued skilled PT services while in hospital to address remaining limitations and maximize functional potential  The patient's AM-PAC Basic Mobility Inpatient Short Form Raw Score is 21  A Raw score of greater than 16 suggests the patient may benefit from discharge to home  Please also refer to the recommendation of the Physical Therapist for safe discharge planning  Barriers to Discharge: Decreased caregiver support, Inaccessible home environment        PT Discharge Recommendation: Home with home health rehabilitation          See flowsheet documentation for full assessment

## 2022-07-02 NOTE — CASE MANAGEMENT
Case Management Discharge Planning Note    Patient name Allan Rashid  [facility] [id number]  : 1940 Date 2022       Current Admission Date: 2022  Current Admission Diagnosis:Acute blood loss anemia   Patient Active Problem List    Diagnosis Date Noted    Pancytopenia ([facility] [address] ) 2022    Acute blood loss anemia 2022    Hypotension 2022    Headache 2022    Bilateral hearing loss 2022    Hypertensive heart disease with congestive heart failure ([facility] [address] ) 2021    Chronic ITP (idiopathic thrombocytopenia) ([facility] [address] ) 11/10/2021    Neutropenia, unspecified type ([facility] [address] ) 08/10/2021    Left-sided Bell's palsy 08/10/2021    LEA positive 2020    Complete left bundle branch block (LBBB) 10/22/2019    Dilated cardiomyopathy ([facility] [address] ) 2018    Congestive heart failure ([facility] [address] ) 2018    Chronic systolic heart failure ([facility] [address] ) 2018    Hypertension, essential 2018    Other specified glaucoma 2018    Autoimmune hepatitis treated with steroids ([facility] [address] ) 2018    Depressive disorder 2017      LOS (days): 1  Geometric Mean LOS (GMLOS) (days):   Days to GMLOS:     OBJECTIVE:  Risk of Unplanned Readmission Score: 15 17         Current admission status: Inpatient   Preferred Pharmacy:   [address] [address]  Phone: [phone] Fax: [phone]    [id number] - [place] [address]  Phone: [phone] Fax: [phone]    Primary Care Provider: [address], MD    Primary Insurance: MEDICARE  Secondary Insurance: [address] DETAILS:    Discharge planning discussed with[de-identified] patient and son  Freedom of Choice: Yes  Comments - Freedom of Choice: CM discussed HHC recommendation  CM contacted family/caregiver?: No- see comments  Were Treatment Team discharge recommendations reviewed with patient/caregiver?: Yes  Did patient/caregiver verbalize understanding of patient care needs?: Yes  Were patient/caregiver advised of the risks associated with not following Treatment Team discharge recommendations?: Yes    Contacts  Reason/Outcome: Discharge [address]         Is the patient interested in [facility] at discharge?: Yes  [address] requested[de-identified] Physical [address] [facility] Provider[de-identified] PCP  Home Health Services Needed[de-identified] Strengthening/Theraputic Exercises to Improve Function  Homebound Criteria Met[de-identified] Requires the Assistance of Another Person for Safe Ambulation or to Leave the Home  Supporting Clincal Findings[de-identified] Fatigues Easliy in [facility] [employer], [employer] Endurance      Treatment Team Recommendation: Home with [address]  Discharge Destination Plan[de-identified] Home with [address]     Pt and son agreeable to [facility] recommendation  CM placed referral to JOHN [facility]ULL, awaiting response

## 2022-07-02 NOTE — PROGRESS NOTES
[facility]  Progress Note - Krystal Rivera 1940, 78 y o  female MRN: [id number]  Unit/Bed#: [facility] 421-01 Encounter: [id number]  Primary Care Provider: Mari Thompson MD   Date and time admitted to hospital: 7/1/2022  2:44 PM    * Acute blood loss anemia  Assessment & Plan  Patient presented with melena x2 weeks  Pt reports use of chronic steroids  She had been on prednisone for txt of ITP but has been off since 5/2022  She is chronically on budesonide (pt unsure but indication but maybe due to tx for autoimmune hepatitis)  She denies use  nsaids  H&H improving after blood transfusion  GI consulted for further management  Monitor H&H        Hypotension  Assessment & Plan  Secondary to above  Systolic BP in the   Holding bp meds including toprol xl and entresto  Close monitor      Chronic systolic heart failure (HCC)  Assessment & Plan  Wt Readings from Last 3 Encounters:   06/22/22 50 8 kg (112 lb)   06/14/22 53 8 kg (118 lb 9 6 oz)   05/18/22 54 9 kg (121 lb)     Euvolemic  EF of 20%  Patient on p r n  torsemide and Aldactone at home  Cont to monitor fluid status  Holding entresto  Monitor        Pancytopenia [facility])  Assessment & Plan  Chronic   Monitor    Headache  Assessment & Plan  Hx of SDH for which was recently admitted in 4/2022  S/p CT head no acute intracranial pathology; resolved L frontotemporal SDH  Prn tylenol    Chronic ITP (idiopathic thrombocytopenia) (HCC)  Assessment & Plan  Etiology of thrombocytopenia  No longer on promacta or prednisone  Cont to monitor    Neutropenia, unspecified type (HCC)  Assessment & Plan  Chronic  No sign of acute infection process    Autoimmune hepatitis treated with steroids ([address] )  Assessment & Plan  On budesonide (presumbly for txt of this though pt is unsure herself)  In setting of ABLA, hold for now   Resume as soon as possible to avoid withdrawal symptoms or adrenal insufficiency  Will have GI weigh in on recs for continued use  Pt follows with Dr Benjamin Cordero      VTE Pharmacologic Prophylaxis:   High Risk (Score >/= 5) - Pharmacological DVT Prophylaxis Contraindicated  Sequential Compression Devices Ordered  Patient Centered Rounds: I performed bedside rounds with nursing staff today  Discussions with Specialists or Other Care Team Provider:     Education and Discussions with Family / Patient: Updated  (son) at bedside  Time Spent for Care: 45 minutes  More than 50% of total time spent on counseling and coordination of care as described above  Current Length of Stay: 1 day(s)  Current Patient Status: Inpatient   Certification Statement: The patient will continue to require additional inpatient hospital stay due to Management of severe anemia  Discharge Plan: Anticipate discharge in 24-48 hrs to home  Code Status: Level 1 - Full Code    Subjective:   Patient seen and examined  Comfortable in bed  No chest pain or shortness of breath  No headache or abdominal pain    Objective:     Vitals:   Temp (24hrs), Av 3 °F (36 8 °C), Min:97 9 °F (36 6 °C), Max:98 7 °F (37 1 °C)    Temp:  [97 9 °F (36 6 °C)-98 7 °F (37 1 °C)] 98 °F (36 7 °C)  HR:  [] 73  Resp:  [12-25] 16  BP: ()/(41-58) 94/47  SpO2:  [96 %-100 %] 98 %  Body mass index is 18 86 kg/m²  Input and Output Summary (last 24 hours):      Intake/Output Summary (Last 24 hours) at 2022 1126  Last data filed at 2022 2489  Gross per 24 hour   Intake 1509 83 ml   Output 1700 ml   Net -190 17 ml       Physical Exam:   Physical Exam   Patient is awake alert oriented no acute distress  Thin female  Lung clear to auscultation bilateral anteriorly  Heart positive S1-S2 no murmur  Abdomen soft nontender  Lower extremities no edema    Additional Data:     Labs:  Results from last 7 days   Lab Units 22  0559 22  0109 22  1725   WBC Thousand/uL  --   --  2 67*   HEMOGLOBIN g/dL 7 4*   < > 5 1*   HEMATOCRIT % 22 9*   < > 16 9* PLATELETS Thousands/uL  --   --  72*   NEUTROS PCT %  --   --  67   LYMPHS PCT %  --   --  26   MONOS PCT %  --   --  7   EOS PCT %  --   --  0    < > = values in this interval not displayed  Results from last 7 days   Lab Units 07/01/22  1725   SODIUM mmol/L 140   POTASSIUM mmol/L 4 4   CHLORIDE mmol/L 108   CO2 mmol/L 28   BUN mg/dL 29*   CREATININE mg/dL 1 03   ANION GAP mmol/L 4   CALCIUM mg/dL 9 2   ALBUMIN g/dL 3 1*   TOTAL BILIRUBIN mg/dL 0 50   ALK PHOS U/L 28*   ALT U/L 26   AST U/L 24   GLUCOSE RANDOM mg/dL 85     Results from last 7 days   Lab Units 07/01/22  1725   INR  1 27*                   Lines/Drains:  Invasive Devices  Report    Peripheral Intravenous Line  Duration           Peripheral IV 07/01/22 Left Forearm <1 day    Peripheral IV 07/01/22 Right;Ventral (anterior) Forearm <1 day                      Imaging: No pertinent imaging reviewed  Recent Cultures (last 7 days):         Last 24 Hours Medication List:   Current Facility-Administered Medications   Medication Dose Route Frequency Provider Last Rate    acetaminophen  650 mg Oral Q6H PRN Misty Nancy, DO      fluticasone  2 spray Nasal PRN Misty Nancy, DO      ondansetron  4 mg Intravenous Q6H PRN Misty Nancy, DO      pantoprozole (PROTONIX) infusion (Continuous)  8 mg/hr Intravenous Continuous Misty Nancy, DO 8 mg/hr (07/02/22 7338)        Today, Patient Was Seen By: Carlie Cheema DO    **Please Note: This note may have been constructed using a voice recognition system  **

## 2022-07-02 NOTE — ASSESSMENT & PLAN NOTE
Wt Readings from Last 3 Encounters:   06/22/22 50 8 kg (112 lb)   06/14/22 53 8 kg (118 lb 9 6 oz)   05/18/22 54 9 kg (121 lb)     Euvolemic  EF of 20%  Patient on p r n  torsemide and Aldactone at home  Cont to monitor fluid status  Holding entresto  Monitor

## 2022-07-02 NOTE — ASSESSMENT & PLAN NOTE
Admit to step down level 2  Given presenting melena likely UGI bleed  Last melena bm a day prior to presentation  Pt reports use of chronic steroids  She had been on prednisone for txt of ITP but has been off since 5/2022  She is chronically on budesonide (pt unsure but indication but maybe due to tx for autoimmune hepatitis)  She denies use of a/c or nsaids  Consult GI  3 units of prbc ordered  Will transfuse 2 units now   Give dose of IV lasix 20mg x 1 after 2nd unit  H/H monitoring q 8 hours  Hold a/c, antiplt  Protonix gtt  Clears, npo after midnight

## 2022-07-02 NOTE — ASSESSMENT & PLAN NOTE
Patient presented with melena x2 weeks  Pt reports use of chronic steroids  She had been on prednisone for txt of ITP but has been off since 5/2022   She is chronically on budesonide (pt unsure but indication but maybe due to tx for autoimmune hepatitis)  She denies use  nsaids  H&H improving after blood transfusion  GI consulted for further management  Monitor H&H

## 2022-07-03 ENCOUNTER — APPOINTMENT (INPATIENT)
Dept: RADIOLOGY | Facility: HOSPITAL | Age: 82
DRG: 378 | End: 2022-07-03
Payer: MEDICARE

## 2022-07-03 LAB
ABO GROUP BLD BPU: NORMAL
ANION GAP SERPL CALCULATED.3IONS-SCNC: 3 MMOL/L (ref 4–13)
BASOPHILS # BLD AUTO: 0.01 THOUSANDS/ΜL (ref 0–0.1)
BASOPHILS NFR BLD AUTO: 0 % (ref 0–1)
BPU ID: NORMAL
BUN SERPL-MCNC: 20 MG/DL (ref 5–25)
CALCIUM SERPL-MCNC: 8.3 MG/DL (ref 8.3–10.1)
CHLORIDE SERPL-SCNC: 110 MMOL/L (ref 100–108)
CO2 SERPL-SCNC: 28 MMOL/L (ref 21–32)
CREAT SERPL-MCNC: 1.04 MG/DL (ref 0.6–1.3)
CROSSMATCH: NORMAL
EOSINOPHIL # BLD AUTO: 0.02 THOUSAND/ΜL (ref 0–0.61)
EOSINOPHIL NFR BLD AUTO: 1 % (ref 0–6)
ERYTHROCYTE [DISTWIDTH] IN BLOOD BY AUTOMATED COUNT: 17.8 % (ref 11.6–15.1)
FOLATE SERPL-MCNC: 14.5 NG/ML (ref 3.1–17.5)
GFR SERPL CREATININE-BSD FRML MDRD: 49 ML/MIN/1.73SQ M
GLUCOSE SERPL-MCNC: 103 MG/DL (ref 65–140)
HCT VFR BLD AUTO: 23 % (ref 34.8–46.1)
HGB BLD-MCNC: 7.3 G/DL (ref 11.5–15.4)
IMM GRANULOCYTES # BLD AUTO: 0.01 THOUSAND/UL (ref 0–0.2)
IMM GRANULOCYTES NFR BLD AUTO: 0 % (ref 0–2)
LYMPHOCYTES # BLD AUTO: 0.96 THOUSANDS/ΜL (ref 0.6–4.47)
LYMPHOCYTES NFR BLD AUTO: 29 % (ref 14–44)
MCH RBC QN AUTO: 29.7 PG (ref 26.8–34.3)
MCHC RBC AUTO-ENTMCNC: 31.7 G/DL (ref 31.4–37.4)
MCV RBC AUTO: 94 FL (ref 82–98)
MONOCYTES # BLD AUTO: 0.27 THOUSAND/ΜL (ref 0.17–1.22)
MONOCYTES NFR BLD AUTO: 8 % (ref 4–12)
NEUTROPHILS # BLD AUTO: 2.08 THOUSANDS/ΜL (ref 1.85–7.62)
NEUTS SEG NFR BLD AUTO: 62 % (ref 43–75)
NRBC BLD AUTO-RTO: 0 /100 WBCS
PLATELET # BLD AUTO: 55 THOUSANDS/UL (ref 149–390)
PMV BLD AUTO: 11.6 FL (ref 8.9–12.7)
POTASSIUM SERPL-SCNC: 3.8 MMOL/L (ref 3.5–5.3)
RBC # BLD AUTO: 2.46 MILLION/UL (ref 3.81–5.12)
RETICS # AUTO: ABNORMAL 10*3/UL (ref 14097–95744)
RETICS # CALC: 5.13 % (ref 0.37–1.87)
SODIUM SERPL-SCNC: 141 MMOL/L (ref 136–145)
UNIT DISPENSE STATUS: NORMAL
UNIT PRODUCT CODE: NORMAL
UNIT PRODUCT VOLUME: 350 ML
UNIT RH: NORMAL
WBC # BLD AUTO: 3.35 THOUSAND/UL (ref 4.31–10.16)

## 2022-07-03 PROCEDURE — 86015 ACTIN ANTIBODY EACH: CPT | Performed by: INTERNAL MEDICINE

## 2022-07-03 PROCEDURE — 80048 BASIC METABOLIC PNL TOTAL CA: CPT | Performed by: INTERNAL MEDICINE

## 2022-07-03 PROCEDURE — 85025 COMPLETE CBC W/AUTO DIFF WBC: CPT | Performed by: INTERNAL MEDICINE

## 2022-07-03 PROCEDURE — 85045 AUTOMATED RETICULOCYTE COUNT: CPT | Performed by: INTERNAL MEDICINE

## 2022-07-03 PROCEDURE — 83615 LACTATE (LD) (LDH) ENZYME: CPT | Performed by: INTERNAL MEDICINE

## 2022-07-03 PROCEDURE — 76705 ECHO EXAM OF ABDOMEN: CPT

## 2022-07-03 PROCEDURE — 82746 ASSAY OF FOLIC ACID SERUM: CPT | Performed by: INTERNAL MEDICINE

## 2022-07-03 PROCEDURE — 86376 MICROSOMAL ANTIBODY EACH: CPT | Performed by: INTERNAL MEDICINE

## 2022-07-03 PROCEDURE — NC001 PR NO CHARGE: Performed by: INTERNAL MEDICINE

## 2022-07-03 PROCEDURE — 99233 SBSQ HOSP IP/OBS HIGH 50: CPT | Performed by: INTERNAL MEDICINE

## 2022-07-03 PROCEDURE — C9113 INJ PANTOPRAZOLE SODIUM, VIA: HCPCS | Performed by: INTERNAL MEDICINE

## 2022-07-03 RX ORDER — PANTOPRAZOLE SODIUM 40 MG/1
40 TABLET, DELAYED RELEASE ORAL
Status: DISCONTINUED | OUTPATIENT
Start: 2022-07-03 | End: 2022-07-05 | Stop reason: HOSPADM

## 2022-07-03 RX ADMIN — PANTOPRAZOLE SODIUM 40 MG: 40 INJECTION, POWDER, FOR SOLUTION INTRAVENOUS at 08:42

## 2022-07-03 NOTE — CONSULTS
Oncology Consult Note  Myriam Simmons 78 y o  female MRN: [id number]  Unit/Bed#: [facility] 421-01 Encounter: [id number]      Presenting Complaint:  Pancytopenia, symptomatic anemia    History of Presenting Illness:  78-year-old  female with history of dilated cardiomyopathy with low ejection fraction, hypertension, autoimmune hepatitis, Bell's palsy in 2007 with residual right-sided eye palsy, positive anti smooth muscle antibodies of 93, who was seen initially in 2021 for leukopenia, thrombocytopenia, a bone marrow biopsy in January 2021 showed normal bone marrow cellularity for the age, no evidence of dysplastic features, no evidence of vasculitis, necrosis, fibrosis she had adequate iron stains, no evidence of myeloma or lymphoma    Because of autoimmune hepatitis had been on prednisone 10 mg p o  daily and she also had thrombocytopenia consistent with chronic immune thrombocytopenic purpura initiated on Promacta 50 mg p o  daily in September 2021 with significant increase in platelet count up to 278,000, she had persistent mild leukopenia    Had been on Entresto, spironolactone, torsemide, metoprolol, budesonide    She had normal vitamin O92, folic acid, TSH, SPEP    In the past month she had been feeling tired with dark stool, she received hepatitis vaccine with subsequent dyspnea    On 07/01/2022 WBC 2 6, hemoglobin 5 1, MCV 99, RDW 16, platelets 99978, 18% neutrophils, 26% lymphocytes, 7% monocytes    Three months ago her hemoglobin 11 8 and WBC 3 4, platelets 13297    She was transfused with 3 units of packed RBC, current hemoglobin of 7 3, WBC 3 3, platelets 68278    The patient in the ultrasound unit, I could not interview her however her son who is very familiar with his mother situation told me she has no fever chills nausea angina abdominal pain dysuria hematuria he reported his mother reported black stool  Review of Systems - As stated in the HPI otherwise the fourteen point review of systems was negative  Past Medical History:   Diagnosis Date    Bell's palsy     Cardiac disease     Ear problems     HL (hearing loss)     Hypertension     Sacroiliitis ([facility] ) 8/10/2021    Subdural hemorrhage ([facility] ) 3/30/2022    Thrombocytopenia ([facility] ) 8/3/2018    Tinnitus        Social History     Socioeconomic History    Marital status: /Civil Union     Spouse name: None    Number of children: None    Years of education: None    Highest education level: None   Occupational History    None   Tobacco Use    Smoking status: Never Smoker    Smokeless tobacco: Never Used   Vaping Use    Vaping Use: Never used   Substance and Sexual Activity    Alcohol use: No     Alcohol/week: 0 0 standard drinks    Drug use: No    Sexual activity: Not Currently     Partners: Male   Other Topics Concern    None   Social History Narrative    None     Social Determinants of Health     Financial Resource Strain: Not on file   Food Insecurity: No Food Insecurity    Worried About Running Out of Food in the Last Year: Never true    Lety of Food in the Last Year: Never true   Transportation Needs: No Transportation Needs    Lack of Transportation (Medical): No    Lack of Transportation (Non-Medical):  No   Physical Activity: Not on file   Stress: Not on file   Social Connections: Not on file   Intimate Partner Violence: Not on file   Housing Stability: Low Risk     Unable to Pay for Housing in the Last Year: No    Number of Places Lived in the Last Year: 1    Unstable Housing in the Last Year: No       Family History   Problem Relation Age of Onset    Autoimmune disease Neg Hx        Allergies   Allergen Reactions    Ambien [Zolpidem] Other (See Comments)     Did not allow sleep per pt         Current Facility-Administered Medications:     acetaminophen (TYLENOL) tablet 650 mg, 650 mg, Oral, Q6H PRN, Ashwini Ordaz, DO, 650 mg at 07/02/22 2035    fluticasone (FLONASE) 50 mcg/act nasal spray 2 spray, 2 spray, Nasal, PRN, Ignacia Sheridan,     ondansetron [facility]) injection 4 mg, 4 mg, Intravenous, Q6H PRN, Ignacia Sheridan DO    pantoprazole (PROTONIX) EC tablet 40 mg, 40 mg, Oral, Early Morning, Malik Garrison, DO      /60   Pulse 89   Temp (!) 97 4 °F (36 3 °C)   Resp 14   Ht 5' 3" (1 6 m)   Wt 48 3 kg (106 lb 7 7 oz)   SpO2 99%   BMI 18 86 kg/m²       General Appearance:         Eyes:     Ears:     Nose:    Throat:    Neck:        Lungs:      Chest Wall:      Heart:         Abdomen:              Extremities:        Skin:    Lymph nodes:    Neurologic:        Recent Results (from the past 48 hour(s))   ECG 12 lead    Collection Time: 07/01/22  4:06 PM   Result Value Ref Range    Ventricular Rate 79 BPM    Atrial Rate 79 BPM    PA Interval 168 ms    QRSD Interval 174 ms    QT Interval 444 ms    QTC Interval 509 ms    P [place] 78 degrees    QRS Axis -21 degrees    T Wave Axis 75 degrees   CBC and differential    Collection Time: 07/01/22  5:25 PM   Result Value Ref Range    WBC 2 67 (L) 4 31 - 10 16 Thousand/uL    RBC 1 71 (L) 3 81 - 5 12 Million/uL    Hemoglobin 5 1 (LL) 11 5 - 15 4 g/dL    Hematocrit 16 9 (L) 34 8 - 46 1 %    MCV 99 (H) 82 - 98 fL    MCH 29 2 26 8 - 34 3 pg    MCHC 29 6 (L) 31 4 - 37 4 g/dL    RDW 16 0 (H) 11 6 - 15 1 %    MPV 11 1 8 9 - 12 7 fL    Platelets 72 (L) 306 - 390 Thousands/uL    nRBC 0 /100 WBCs    Neutrophils Relative 67 43 - 75 %    Immat GRANS % 0 0 - 2 %    Lymphocytes Relative 26 14 - 44 %    Monocytes Relative 7 4 - 12 %    Eosinophils Relative 0 0 - 6 %    Basophils Relative 0 0 - 1 %    Neutrophils Absolute 1 76 (L) 1 85 - 7 62 Thousands/µL    Immature Grans Absolute 0 01 0 00 - 0 20 Thousand/uL    Lymphocytes Absolute 0 70 0 60 - 4 47 Thousands/µL    Monocytes Absolute 0 18 0 17 - 1 22 Thousand/µL    Eosinophils Absolute 0 01 0 00 - 0 61 Thousand/µL    Basophils Absolute 0 01 0 00 - 0 10 Thousands/µL   Comprehensive metabolic panel Collection Time: 07/01/22  5:25 PM   Result Value Ref Range    Sodium 140 136 - 145 mmol/L    Potassium 4 4 3 5 - 5 3 mmol/L    Chloride 108 100 - 108 mmol/L    CO2 28 21 - 32 mmol/L    ANION GAP 4 4 - 13 mmol/L    BUN 29 (H) 5 - 25 mg/dL    Creatinine 1 03 0 60 - 1 30 mg/dL    Glucose 85 65 - 140 mg/dL    Calcium 9 2 8 3 - 10 1 mg/dL    Corrected Calcium 9 9 8 3 - 10 1 mg/dL    AST 24 5 - 45 U/L    ALT 26 12 - 78 U/L    Alkaline Phosphatase 28 (L) 46 - 116 U/L    Total Protein 6 3 (L) 6 4 - 8 2 g/dL    Albumin 3 1 (L) 3 5 - 5 0 g/dL    Total Bilirubin 0 50 0 20 - 1 00 mg/dL    eGFR 50 ml/min/1 73sq m   HS Troponin 0hr (reflex protocol)    Collection Time: 07/01/22  5:25 PM   Result Value Ref Range    hs TnI 0hr 24 "Refer to ACS Flowchart"- see link ng/L   Type and screen    Collection Time: 07/01/22  5:25 PM   Result Value Ref Range    ABO Grouping A     Rh Factor Positive     Antibody Screen Negative     Specimen Expiration Date [id number]    Protime-INR    Collection Time: 07/01/22  5:25 PM   Result Value Ref Range    Protime 15 3 (H) 11 6 - 14 5 seconds    INR 1 27 (H) 0 84 - 1 19   HS Troponin I 2hr    Collection Time: 07/01/22  8:37 PM   Result Value Ref Range    hs TnI 2hr 27 "Refer to ACS Flowchart"- see link ng/L    Delta 2hr hsTnI 3 <20 ng/L   HS Troponin I 4hr    Collection Time: 07/01/22 10:05 PM   Result Value Ref Range    hs TnI 4hr 29 "Refer to ACS Flowchart"- see link ng/L    Delta 4hr hsTnI 5 <20 ng/L   Hemoglobin and hematocrit, blood    Collection Time: 07/02/22  1:09 AM   Result Value Ref Range    Hemoglobin 7 1 (L) 11 5 - 15 4 g/dL    Hematocrit 22 3 (L) 34 8 - 46 1 %   Hemoglobin and hematocrit, blood    Collection Time: 07/02/22  5:59 AM   Result Value Ref Range    Hemoglobin 7 4 (L) 11 5 - 15 4 g/dL    Hematocrit 22 9 (L) 34 8 - 46 1 %   Folate    Collection Time: 07/03/22  5:07 AM   Result Value Ref Range    Folate 14 5 3 1 - 17 5 ng/mL   Basic metabolic panel    Collection Time: 07/03/22 5: 07 AM   Result Value Ref Range    Sodium 141 136 - 145 mmol/L    Potassium 3 8 3 5 - 5 3 mmol/L    Chloride 110 (H) 100 - 108 mmol/L    CO2 28 21 - 32 mmol/L    ANION GAP 3 (L) 4 - 13 mmol/L    BUN 20 5 - 25 mg/dL    Creatinine 1 04 0 60 - 1 30 mg/dL    Glucose 103 65 - 140 mg/dL    Calcium 8 3 8 3 - 10 1 mg/dL    eGFR 49 ml/min/1 73sq m   CBC and differential    Collection Time: 07/03/22  5:07 AM   Result Value Ref Range    WBC 3 35 (L) 4 31 - 10 16 Thousand/uL    RBC 2 46 (L) 3 81 - 5 12 Million/uL    Hemoglobin 7 3 (L) 11 5 - 15 4 g/dL    Hematocrit 23 0 (L) 34 8 - 46 1 %    MCV 94 82 - 98 fL    MCH 29 7 26 8 - 34 3 pg    MCHC 31 7 31 4 - 37 4 g/dL    RDW 17 8 (H) 11 6 - 15 1 %    MPV 11 6 8 9 - 12 7 fL    Platelets 55 (L) 971 - 390 Thousands/uL    nRBC 0 /100 WBCs    Neutrophils Relative 62 43 - 75 %    Immat GRANS % 0 0 - 2 %    Lymphocytes Relative 29 14 - 44 %    Monocytes Relative 8 4 - 12 %    Eosinophils Relative 1 0 - 6 %    Basophils Relative 0 0 - 1 %    Neutrophils Absolute 2 08 1 85 - 7 62 Thousands/µL    Immature Grans Absolute 0 01 0 00 - 0 20 Thousand/uL    Lymphocytes Absolute 0 96 0 60 - 4 47 Thousands/µL    Monocytes Absolute 0 27 0 17 - 1 22 Thousand/µL    Eosinophils Absolute 0 02 0 00 - 0 61 Thousand/µL    Basophils Absolute 0 01 0 00 - 0 10 Thousands/µL   Prepare Leukoreduced RBC: 3 Units    Collection Time: 07/03/22  7:15 AM   Result Value Ref Range    Unit Product Code N6156Y57     Unit Number [id number]     Unit ABO A     Unit RH POS     Crossmatch Compatible     Unit Dispense Status Return to [facility]     Unit Product Volume 350 ml    Unit Product Code U2665J19     Unit Number [id number]     Unit ABO A     Unit [facility] POS     Crossmatch Compatible     Unit Dispense Status Presumed Trans     Unit Product Volume 350 ml    Unit Product Code W9696F78     Unit Number [id number]     Unit ABO A     Unit RH NEG     Crossmatch Compatible     Unit Dispense Status Return to Inv     Unit Product Volume 350 mL    Unit Product Code J0077C23     Unit Number [id number]     Unit ABO A     Unit RH NEG     Crossmatch Compatible     Unit Dispense Status Presumed Trans     Unit Product Volume 350 mL         XR chest 1 view portable    Result Date: 7/1/2022  Narrative: CHEST INDICATION:   sob  COMPARISON:  August 1, 2018  EXAM PERFORMED/VIEWS:  XR CHEST PORTABLE FINDINGS: Mild enlargement of the cardiac silhouette  Mediastinal contours are within normal limits  No focal consolidation, pleural effusion or discrete pneumothorax  Osseous structures appear within normal limits for patient age  Impression: No active pulmonary disease  Workstation performed: [id number]     CT head without contrast    Result Date: 7/1/2022  Narrative: CT BRAIN - WITHOUT CONTRAST INDICATION:   Ataxia, head trauma fall  COMPARISON:  CT 5/9/2022 TECHNIQUE:  CT examination of the brain was performed  In addition to axial images, sagittal and coronal 2D reformatted images were created and submitted for interpretation  Radiation dose length product (DLP) for this visit:  815 mGy-cm   This examination, like all CT scans performed in the [facility], was performed utilizing techniques to minimize radiation dose exposure, including the use of iterative reconstruction and automated exposure control  IMAGE QUALITY:  Diagnostic  FINDINGS: PARENCHYMA: Decreased attenuation is noted in periventricular and subcortical white matter demonstrating an appearance that is statistically most likely to represent mild microangiopathic change; this appearance is similar when compared to most recent prior examination  No CT signs of acute infarction  No intracranial mass, mass effect or midline shift  No acute parenchymal hemorrhage  VENTRICLES AND EXTRA-AXIAL SPACES:  Ventricles and extra-axial CSF spaces are prominent commensurate with the degree of volume loss  No hydrocephalus  No acute extra-axial hemorrhage  Previously identified small left frontotemporal subdural hematoma has resolved  VISUALIZED ORBITS AND PARANASAL SINUSES: Bilateral ocular lens implants are present  The visualized paranasal sinuses are free of mucosal disease  CALVARIUM AND EXTRACRANIAL SOFT TISSUES:  Normal      Impression: No acute intracranial abnormality  Microangiopathic changes  Interim resolution of previously identified small left frontotemporal subdural hematoma  Workstation performed: [id number]     ECOG :  Undetermined      Assessment and plan:    69-year-old very pleasant  female with past medical history of cardiomyopathy, she is on pump, hypertension, autoimmune hepatitis with positive LEA, had been on prednisone many years ago, was found to have leukopenia, thrombocytopenia, a bone marrow biopsy in 2021 showed no evidence of MDS, necrosis, granulomata, vasculitis, myeloma, lymphoma    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 actually platelets improved on Promacta 50 mg p o  daily up to 200,000 range    She came with pancytopenia after she received hepatitis vaccine, she reported dark stool with hemoglobin of 5 1, status post 3 units of packed RBC with current hemoglobin of 7 3, platelets 07324, WBC 3 3    Normal differential    1  I believe the patient had intermittent GI bleed, with symptomatic anemia given her description of dark stool she might need capsule endoscopy also with thrombocytopenia and discontinuation of Promacta in the past this might enhance the GI bleeding    I believe she needs to go back on Promacta 50 mg p o  daily to prevent additional GI bleed    2  Mild leukopenia autoimmune also she received hepatitis vaccine 2 weeks ago  3   Will order reticulocyte count, LDH

## 2022-07-03 NOTE — ASSESSMENT & PLAN NOTE
Patient presented with dark brown stool x2 weeks  Pt reports use of chronic steroids  She had been on prednisone for txt of ITP but has been off since 5/2022   She is chronically on budesonide (pt unsure but indication but maybe due to tx for autoimmune hepatitis)  She denies use  nsaids  Evaluated by GI, no blood or diarrhea on physical exam  H&H improving after blood transfusion  No overt GI bleed, no need for EGD at this time  Consult hematology for further management  Continue PPI  Monitor

## 2022-07-03 NOTE — ASSESSMENT & PLAN NOTE
On budesonide (presumbly for txt of this though pt is unsure herself)  In setting of ABLA, hold for now   Resume as soon as possible to avoid withdrawal symptoms or adrenal insufficiency  GI is following  Follow on right upper quadrant ultrasound with Doppler  Pt follows with Dr Abe Kliner

## 2022-07-03 NOTE — PROGRESS NOTES
[address] [facility]  Progress Note - Agustin Madrid 1940, 78 y o  female MRN: [id number]  Unit/Bed#: [facility] 421-01 Encounter: [id number]  Primary Care Provider: Emerita Mcnair MD   Date and time admitted to hospital: 7/1/2022  2:44 PM    * Acute blood loss anemia  Assessment & Plan  Patient presented with dark brown stool x2 weeks  Pt reports use of chronic steroids  She had been on prednisone for txt of ITP but has been off since 5/2022  She is chronically on budesonide (pt unsure but indication but maybe due to tx for autoimmune hepatitis)  She denies use  nsaids  Evaluated by GI, no blood or diarrhea on physical exam  H&H improving after blood transfusion  No overt GI bleed, no need for EGD at this time  Consult hematology for further management  Continue PPI  Monitor        Pancytopenia [facility])  Assessment & Plan  Chronic  Monitor  Hematology evaluation    Hypotension  Assessment & Plan  Secondary to above  Systolic BP in the   Holding bp meds including toprol xl and entresto  Close monitor      Autoimmune hepatitis treated with steroids ([address] )  Assessment & Plan  On budesonide (presumbly for txt of this though pt is unsure herself)  In setting of ABLA, hold for now  Resume as soon as possible to avoid withdrawal symptoms or adrenal insufficiency  GI is following  Follow on right upper quadrant ultrasound with Doppler  Pt follows with Dr Abe Chery    Chronic ITP (idiopathic thrombocytopenia) (HCC)  Assessment & Plan  No longer on promacta or prednisone  Cont to monitor    Chronic systolic heart failure (HCC)  Assessment & Plan  Wt Readings from Last 3 Encounters:   07/02/22 48 3 kg (106 lb 7 7 oz)   06/22/22 50 8 kg (112 lb)   06/14/22 53 8 kg (118 lb 9 6 oz)     Euvolemic  EF of 20%  Patient on p r n  torsemide and Aldactone at home  Cont to monitor fluid status  Holding entresto  Monitor          VTE Pharmacologic Prophylaxis:   contraindicated    Patient Centered Rounds:  I performed bedside rounds with nursing staff today  Discussions with Specialists or Other Care Team Provider:     Education and Discussions with Family / Patient: Updated  (son) at bedside  Time Spent for Care: 45 minutes  More than 50% of total time spent on counseling and coordination of care as described above  Current Length of Stay: 2 day(s)  Current Patient Status: Inpatient   Certification Statement: The patient will continue to require additional inpatient hospital stay due to Management of anemia  Discharge Plan: Anticipate discharge in 24-48 hrs to home  Code Status: Level 1 - Full Code    Subjective:   Patient seen examined  Comfortable sitting in chair  No chest pain or shortness of breath  No bleeding    Objective:     Vitals:   Temp (24hrs), Av 4 °F (36 9 °C), Min:97 4 °F (36 3 °C), Max:99 3 °F (37 4 °C)    Temp:  [97 4 °F (36 3 °C)-99 3 °F (37 4 °C)] 97 4 °F (36 3 °C)  HR:  [72-90] 89  Resp:  [14-17] 14  BP: ()/(47-60) 114/60  SpO2:  [97 %-100 %] 99 %  Body mass index is 18 86 kg/m²  Input and Output Summary (last 24 hours):      Intake/Output Summary (Last 24 hours) at 7/3/2022 1037  Last data filed at 7/3/2022 0532  Gross per 24 hour   Intake --   Output 450 ml   Net -450 ml       Physical Exam:   Physical Exam   Patient is awake alert oriented no acute distress  Thin female  Lung clear to auscultation bilateral anteriorly  Heart positive S1-S2 no murmur  Abdomen soft nontender  Lower extremities no edema       Additional Data:     Labs:  Results from last 7 days   Lab Units 22  0507   WBC Thousand/uL 3 35*   HEMOGLOBIN g/dL 7 3*   HEMATOCRIT % 23 0*   PLATELETS Thousands/uL 55*   NEUTROS PCT % 62   LYMPHS PCT % 29   MONOS PCT % 8   EOS PCT % 1     Results from last 7 days   Lab Units 22  0507 22  1725   SODIUM mmol/L 141 140   POTASSIUM mmol/L 3 8 4 4   CHLORIDE mmol/L 110* 108   CO2 mmol/L 28 28   BUN mg/dL 20 29*   CREATININE mg/dL 1 04 1 03 ANION GAP mmol/L 3* 4   CALCIUM mg/dL 8 3 9 2   ALBUMIN g/dL  --  3 1*   TOTAL BILIRUBIN mg/dL  --  0 50   ALK PHOS U/L  --  28*   ALT U/L  --  26   AST U/L  --  24   GLUCOSE RANDOM mg/dL 103 85     Results from last 7 days   Lab Units 07/01/22  1725   INR  1 27*                   Lines/Drains:  Invasive Devices  Report    Peripheral Intravenous Line  Duration           Peripheral IV 07/01/22 Left Forearm 1 day    Peripheral IV 07/01/22 Right;Ventral (anterior) Forearm 1 day                      Imaging: No pertinent imaging reviewed  Recent Cultures (last 7 days):         Last 24 Hours Medication List:   Current Facility-Administered Medications   Medication Dose Route Frequency Provider Last Rate    acetaminophen  650 mg Oral Q6H PRN Eulene Cable, DO      fluticasone  2 spray Nasal PRN Eulene Cable, DO      ondansetron  4 mg Intravenous Q6H PRN Eulene Cable, DO      pantoprazole  40 mg Oral Early Morning Leti Kuhn DO          Today, Patient Was Seen By: Leti Kuhn DO    **Please Note: This note may have been constructed using a voice recognition system  **

## 2022-07-03 NOTE — ASSESSMENT & PLAN NOTE
Wt Readings from Last 3 Encounters:   07/02/22 48 3 kg (106 lb 7 7 oz)   06/22/22 50 8 kg (112 lb)   06/14/22 53 8 kg (118 lb 9 6 oz)     Euvolemic  EF of 20%  Patient on p r n  torsemide and Aldactone at home  Cont to monitor fluid status  Holding entresto  Monitor

## 2022-07-04 LAB
BASOPHILS # BLD AUTO: 0.01 THOUSANDS/ΜL (ref 0–0.1)
BASOPHILS NFR BLD AUTO: 0 % (ref 0–1)
EOSINOPHIL # BLD AUTO: 0.02 THOUSAND/ΜL (ref 0–0.61)
EOSINOPHIL NFR BLD AUTO: 1 % (ref 0–6)
ERYTHROCYTE [DISTWIDTH] IN BLOOD BY AUTOMATED COUNT: 17.3 % (ref 11.6–15.1)
HCT VFR BLD AUTO: 23.8 % (ref 34.8–46.1)
HGB BLD-MCNC: 7.4 G/DL (ref 11.5–15.4)
IMM GRANULOCYTES # BLD AUTO: 0.01 THOUSAND/UL (ref 0–0.2)
IMM GRANULOCYTES NFR BLD AUTO: 0 % (ref 0–2)
LDH SERPL-CCNC: 201 U/L (ref 81–234)
LYMPHOCYTES # BLD AUTO: 0.9 THOUSANDS/ΜL (ref 0.6–4.47)
LYMPHOCYTES NFR BLD AUTO: 27 % (ref 14–44)
MCH RBC QN AUTO: 28.8 PG (ref 26.8–34.3)
MCHC RBC AUTO-ENTMCNC: 31.1 G/DL (ref 31.4–37.4)
MCV RBC AUTO: 93 FL (ref 82–98)
MONOCYTES # BLD AUTO: 0.27 THOUSAND/ΜL (ref 0.17–1.22)
MONOCYTES NFR BLD AUTO: 8 % (ref 4–12)
NEUTROPHILS # BLD AUTO: 2.08 THOUSANDS/ΜL (ref 1.85–7.62)
NEUTS SEG NFR BLD AUTO: 64 % (ref 43–75)
NRBC BLD AUTO-RTO: 0 /100 WBCS
PLATELET # BLD AUTO: 56 THOUSANDS/UL (ref 149–390)
PMV BLD AUTO: 11.4 FL (ref 8.9–12.7)
RBC # BLD AUTO: 2.57 MILLION/UL (ref 3.81–5.12)
WBC # BLD AUTO: 3.29 THOUSAND/UL (ref 4.31–10.16)

## 2022-07-04 PROCEDURE — 99233 SBSQ HOSP IP/OBS HIGH 50: CPT | Performed by: INTERNAL MEDICINE

## 2022-07-04 PROCEDURE — 97530 THERAPEUTIC ACTIVITIES: CPT

## 2022-07-04 PROCEDURE — 85025 COMPLETE CBC W/AUTO DIFF WBC: CPT | Performed by: INTERNAL MEDICINE

## 2022-07-04 PROCEDURE — 99232 SBSQ HOSP IP/OBS MODERATE 35: CPT | Performed by: INTERNAL MEDICINE

## 2022-07-04 PROCEDURE — 97167 OT EVAL HIGH COMPLEX 60 MIN: CPT

## 2022-07-04 PROCEDURE — 97116 GAIT TRAINING THERAPY: CPT

## 2022-07-04 RX ORDER — METOPROLOL SUCCINATE 25 MG/1
25 TABLET, EXTENDED RELEASE ORAL DAILY
Status: DISCONTINUED | OUTPATIENT
Start: 2022-07-04 | End: 2022-07-05 | Stop reason: HOSPADM

## 2022-07-04 RX ADMIN — SACUBITRIL AND VALSARTAN 1 TABLET: 49; 51 TABLET, FILM COATED ORAL at 11:05

## 2022-07-04 RX ADMIN — METOPROLOL SUCCINATE 25 MG: 25 TABLET, FILM COATED, EXTENDED RELEASE ORAL at 11:05

## 2022-07-04 RX ADMIN — PANTOPRAZOLE SODIUM 40 MG: 40 TABLET, DELAYED RELEASE ORAL at 05:49

## 2022-07-04 RX ADMIN — ONDANSETRON 4 MG: 2 INJECTION INTRAMUSCULAR; INTRAVENOUS at 17:26

## 2022-07-04 NOTE — ASSESSMENT & PLAN NOTE
No longer on promacta or prednisone  Evaluated by Hematology  Plan to restart Promacta as an outpatient  Cont to monitor

## 2022-07-04 NOTE — PLAN OF CARE
Problem: Nutrition/Hydration-ADULT  Goal: Nutrient/Hydration intake appropriate for improving, restoring or maintaining nutritional needs  Description: Monitor and assess patient's nutrition/hydration status for malnutrition  Collaborate with interdisciplinary team and initiate plan and interventions as ordered  Monitor patient's weight and dietary intake as ordered or per policy  Utilize nutrition screening tool and intervene as necessary  Determine patient's food preferences and provide high-protein, high-caloric foods as appropriate       INTERVENTIONS:  - Monitor oral intake, urinary output, labs, and treatment plans  - Assess nutrition and hydration status and recommend course of action  - Evaluate amount of meals eaten  - Assist patient with eating if necessary   - Allow adequate time for meals  - Recommend/ encourage appropriate diets, oral nutritional supplements, and vitamin/mineral supplements  - Order, calculate, and assess calorie counts as needed  - Recommend, monitor, and adjust tube feedings and TPN/PPN based on assessed needs  - Assess need for intravenous fluids  - Provide specific nutrition/hydration education as appropriate  - Include patient/family/caregiver in decisions related to nutrition  Outcome: Progressing     Problem: Prexisting or High Potential for Compromised Skin Integrity  Goal: Skin integrity is maintained or improved  Description: INTERVENTIONS:  - Identify patients at risk for skin breakdown  - Assess and monitor skin integrity  - Assess and monitor nutrition and hydration status  - Monitor labs   - Assess for incontinence   - Turn and reposition patient  - Assist with mobility/ambulation  - Relieve pressure over bony prominences  - Avoid friction and shearing  - Provide appropriate hygiene as needed including keeping skin clean and dry  - Evaluate need for skin moisturizer/barrier cream  - Collaborate with interdisciplinary team   - Patient/family teaching  - Consider wound care consult   Outcome: Progressing     Problem: PAIN - ADULT  Goal: Verbalizes/displays adequate comfort level or baseline comfort level  Description: Interventions:  - Encourage patient to monitor pain and request assistance  - Assess pain using appropriate pain scale  - Administer analgesics based on type and severity of pain and evaluate response  - Implement non-pharmacological measures as appropriate and evaluate response  - Consider cultural and social influences on pain and pain management  - Notify physician/advanced practitioner if interventions unsuccessful or patient reports new pain  Outcome: Progressing

## 2022-07-04 NOTE — PROGRESS NOTES
Gastroenterology Progress Note      PATIENT INFORMATION      Patient: Elgin Gomez 78 y o  female   MRN: [id number]  PCP: Jose Rico MD  Unit/Bed#: [facility] 421-01 Encounter: [id number]  Date Of Visit: 22  Current Length of Stay: 3 day(s)     Kate Rodas is a 78 y o  old female with PMH of CHF, ITP on prednisone, reported autoimmune hepatitis who presents with pancytopenia       Gastroenterology has been consulted for assistance with management of anemia     Anemia   · Patient reports dark brown stools since 2 weeks but denies black stools  · Given history of steroid use, peptic ulcer disease is certainly in the differential  · PPI oral bid  · Rectal exam without blood or melena  · Patient and son do not want an endoscopy currently given that her hemoglobin has responded to transfusion and there are no signs of active bleeding  · Patients hematologist is recommending endoscopic evaluation  · I tried calling patients son as she defers decisions to him but he did not answer  · If son agreeable can plan for EGD during hospitalization to evaluate possible melena  · Will keep NPO at midnight tentatively     ITP  · On chronic prednisone  · Currently presenting with pancytopenia     Suspected autoimmune hepatitis  · Patient reports that she follows with Dr Reji Hernandez however I do not see any records of the same  · RUQ ultrasound currently showing findings of cirrhosis likely due to AIH although liver enzymes are normal, they were elevated in the past in a hepatocellular pattern  · Given finding of cirrhosis would need [facility] [address]  screening and varices screening as well  Disposition: Possible EGD tomorrow       SUBJECTIVE     Patient denies abdominal pain, nausea, vomiting, heartburn, dysphagia, constipation, diarrhea, blood in stools           OBJECTIVE     Vitals:   Temp (24hrs), Av 5 °F (36 9 °C), Min:97 5 °F (36 4 °C), Max:98 9 °F (37 2 °C)    Temp:  [97 5 °F (36 4 °C)-98 9 °F (37 2 °C)] 98 1 °F (36 7 °C)  HR:  [77-95] 90  Resp:  [14-18] 18  BP: ()/(49-65) 115/65  SpO2:  [98 %-100 %] 99 %  Body mass index is 18 86 kg/m²  Input and Output Summary (last 24 hours): Intake/Output Summary (Last 24 hours) at 7/4/2022 1230  Last data filed at 7/4/2022 0715  Gross per 24 hour   Intake --   Output 801 ml   Net -801 ml       Physical Exam:   GENERAL: Non toxic appearing  HEENT:  Normocephalic, atraumatic, pupils bilaterally reactive to light  CARDIAC:  Regular rate and rhythm, S1, S2 heard, no murmurs  PULMONARY:  CTA B/L, no wheezing/rales/rhonci, non-labored breathing  ABDOMEN:  Soft, NT/ND, +BS, no rebound/guarding/rigidity  Extremities:  2+ Pulses in DP/PT  No edema, cyanosis, or clubbing  NEUROLOGIC:  Alert/oriented x3  SKIN:  No rashes or erythema          ADDITIONAL DATA     Labs & Recent Cultures:     Results from last 7 days   Lab Units 07/04/22  0502   WBC Thousand/uL 3 29*   HEMOGLOBIN g/dL 7 4*   HEMATOCRIT % 23 8*   PLATELETS Thousands/uL 56*   NEUTROS PCT % 64   LYMPHS PCT % 27   MONOS PCT % 8   EOS PCT % 1     Results from last 7 days   Lab Units 07/03/22  0507 07/01/22  1725   POTASSIUM mmol/L 3 8 4 4   CHLORIDE mmol/L 110* 108   CO2 mmol/L 28 28   BUN mg/dL 20 29*   CREATININE mg/dL 1 04 1 03   CALCIUM mg/dL 8 3 9 2   ALK PHOS U/L  --  28*   ALT U/L  --  26   AST U/L  --  24     Results from last 7 days   Lab Units 07/01/22  1725   INR  1 27*                 Nutrition:  Diet GI; Non Ulcerogenic  Radiology Results:   US right upper quadrant with liver dopplers   Final Result by Brittaney Fitzpatrick MD (07/03 3937)      Cirrhotic liver with mild right upper quadrant ascites  Normal Doppler interrogation of the hepatic vessels  Workstation performed: [id number]         CT head without contrast   Final Result by Tariq Flowers MD (07/01 1615)      No acute intracranial abnormality  Microangiopathic changes    Interim resolution of previously identified small left frontotemporal subdural hematoma  Workstation performed: [id number]         XR chest 1 view portable   ED Interpretation by Marita Favre, DO (07/01 1552)   No acute cardiopulmonary process noted life vest noted  Final Result by Mabel Pate MD (07/01 8338)      No active pulmonary disease  Workstation performed: [id number]           Scheduled Medications:  metoprolol succinate, 25 mg, Daily  pantoprazole, 40 mg, Early Morning  sacubitril-valsartan, 1 tablet, BID        PRN MEDS:  acetaminophen, 650 mg, Q6H PRN  fluticasone, 2 spray, PRN  ondansetron, 4 mg, Q6H PRN        Last 24 Hours Medication List:   Current Facility-Administered Medications   Medication Dose Route Frequency Provider Last Rate    acetaminophen  650 mg Oral Q6H PRN Edwina Block, DO      fluticasone  2 spray Nasal PRN Edwina Block, DO      metoprolol succinate  25 mg Oral Daily Ruben Arlington, DO      ondansetron  4 mg Intravenous Q6H PRN Edwina Block, DO      pantoprazole  40 mg Oral Early Morning Ruben Arlington, DO      sacubitril-valsartan  1 tablet Oral BID Ruebn Arlington, DO            Time Spent for Care: 30 mins spent in total   More than 50% of total time spent on counseling and coordination of care as described above  Current Length of Stay: 3 day(s)      Code Status: Level 1 - Full Code          ** Please Note: This note is constructed using a voice recognition dictation system   **

## 2022-07-04 NOTE — ASSESSMENT & PLAN NOTE
On budesonide (presumbly for txt of this though pt is unsure herself)  In setting of ABLA, hold for now   Resume as soon as possible to avoid withdrawal symptoms or adrenal insufficiency  GI is following  Right upper quadrant ultrasound with Doppler consistent with liver cirrhosis and normal Doppler  Pt follows with Dr Aylin Lopez

## 2022-07-04 NOTE — PLAN OF CARE
Problem: OCCUPATIONAL THERAPY ADULT  Goal: Performs self-care activities at highest level of function for planned discharge setting  See evaluation for individualized goals  Description: Treatment Interventions: ADL retraining, Functional transfer training, Endurance training, UE strengthening/ROM, Patient/family training, Equipment evaluation/education, Compensatory technique education, Energy conservation, Activityengagement          See flowsheet documentation for full assessment, interventions and recommendations  Note: Limitation: Decreased endurance, Decreased high-level ADLs, Decreased Safe judgement during ADL, Decreased ADL status  Prognosis: Good  Assessment: Pt is a 79 yo female seen for OT eval s/p adm to [facility] on 7/1/22 w/ melena dx'd w/ ABLA  Pt  has a past medical history of Bell's palsy, Cardiac disease, Ear problems, HL (hearing loss), Hypertension, Sacroiliitis ([facility] [address] ) (8/10/2021), Subdural hemorrhage ([facility] [address] ) (3/30/2022), Thrombocytopenia ([facility] ) (8/3/2018), and Tinnitus  Pt with active OT orders and activity as tolerated orders  Pt is retired and resides w/ spouse in [address] w/ 2STE thru garage and FF to 2nd flr  Pt lives w/ her spouse who has an LVAD  Pt reports she is the primary caretaker and has to A  with all LVAD management, all IADLS, and occaisonal dressing  Pt reports her son is supportive and can A, however not consistently  Pt reports she is hiring private [employer] to A at home upon D/C  Pt was I w/ ADLS and IADLS, drove, & required no use of DME PTA  Pt is currently demonstrating the following occupational deficits: Min A all UB/LB self care, S functional transfers, CGA functional mobility w/o AD   These deficits that are impacting pt's baseline areas of occupation are a result of the following impairments: endurance, activity tolerance, functional mobility, forward functional reach, functional standing tolerance, unsupportive home environment, decreased I w/ ADLS/IADLS and decreased safety awareness  The following Occupational Performance Areas to address include: grooming, bathing/shower, toilet hygiene, dressing, health maintenance, functional mobility and clothing management  Based on the aforementioned OT evaluation, functional performance deficits, and assessments, pt has been identified as a high complexity evaluation  Recommend home with family support and home OT upon D/C  The patient's raw score on the AM-PAC Daily Activity inpatient short form is 19, standardized score is 40 22, greater than 39 4  Patients at this level are likely to benefit from discharge to home  Please refer to the recommendation of the Occupational Therapist for safe discharge planning   Pt to continue to benefit from acute immediate OT services to address the following goals 3-5x/week to  w/in 7-10 days:     OT Discharge Recommendation: (S) Home with home health rehabilitation (home OT and increased social support)  OT - OK to Discharge: Yes (when medically cleared)

## 2022-07-04 NOTE — PLAN OF CARE
Problem: PHYSICAL THERAPY ADULT  Goal: Performs mobility at highest level of function for planned discharge setting  See evaluation for individualized goals  Description: Treatment/Interventions: LE strengthening/ROM, Elevations, Therapeutic exercise, Endurance training, Bed mobility, Gait training, Spoke to nursing  Equipment Recommended: Cane (r/o [facility])       See flowsheet documentation for full assessment, interventions and recommendations  Outcome: Progressing  Note: Prognosis: Good  Problem List: Decreased endurance  Assessment: Pt demonstrated overall improvement in balance and functional mobility skills progressing to mod (I) level w/ transfers and amb (including w/ use of SPC) and (S) on the steps; SPC also seemed to facilitate gait patterns/stability w/ no overt LOB noted; overall, cont to recommend home PT follow up upon returning home when medically cleared; will follow  Barriers to Discharge: None        PT Discharge Recommendation: Home with home health rehabilitation (home PT)          See flowsheet documentation for full assessment

## 2022-07-04 NOTE — OCCUPATIONAL THERAPY NOTE
Occupational Therapy Evaluation      Deandra Wall    7/4/2022    Principal Problem:    Acute blood loss anemia  Active Problems:    Chronic systolic heart failure (HCC)    Autoimmune hepatitis treated with steroids (HCC)    Neutropenia, unspecified type (HCC)    Chronic ITP (idiopathic thrombocytopenia) (HCC)    Hypotension    Pancytopenia (HCC)      Past Medical History:   Diagnosis Date    Bell's palsy     Cardiac disease     Ear problems     HL (hearing loss)     Hypertension     Sacroiliitis ([facility] [address] ) 8/10/2021    Subdural hemorrhage ([facility] [address] ) 3/30/2022    Thrombocytopenia ([facility] [address] ) 8/3/2018    Tinnitus        Past Surgical History:   Procedure Laterality Date    CT BONE MARROW BIOPSY AND ASPIRATION  1/27/2021 07/04/22 1025   OT Last Visit   OT Visit Date 07/04/22   Note Type   Note type Evaluation   Restrictions/Precautions   Weight Bearing Precautions Per Order No   Other Precautions Multiple lines;Telemetry; Fall Risk   Pain Assessment   Pain Assessment Tool 0-10   Pain Score No Pain   Home Living   Type of Home House   Home Layout Multi-level;Bed/bath upstairs;Stairs to enter with rails;1/2 bath on main level  [facility]; 2STE thru garage; FF to 2nd flr bed/bath)   Bathroom Shower/Tub Tub/shower unit  (Pt sponge bathes at baseline)   [address] in [place] [address]   (denies DME)   Prior Function   Level of [place] Independent with ADLs and functional mobility   Lives With Spouse   Receives Help From Family; [address] in the last 6 months 1 to 4  (1 fall in march, per Pt)   Vocational Retired   Lifestyle   Autonomy Pt reports being IND w/ all ADLS and IADLS; (+) drives; ambulates w/o AD PTA; Pt reports she has felt off-balanced since her fall in march 2022   Reciprocal Relationships Pt lives w/ her spouse who has an LVAD   Pt reports she is the primary caretaker and has to A  with all LVAD management, all IADLS, and occaisonal dressing  Pt reports her son is supportive and can A, however not consistently  Pt reports she is hiring private HHA to A at home upon D/C   Service to Others Pt is primary caretaker for her  with LVAD   Intrinsic Gratification Pt reports enjoying being home   Psychosocial   Psychosocial (WDL) WDL   ADL   Where Assessed Edge of bed   Eating Assistance 5  Supervision/Setup   Grooming Assistance 5  Supervision/Setup   [facility]  [address]  4  Minimal Assistance   Bed Mobility   Supine to [address]   Additional items Bedrails; Increased time required   Sit to Supine Unable to assess   Additional Comments Pt laying supine in bed upon OT arrival  Pt seated at EOB w/ all needs in reach s/p OT session   Transfers   Sit to Stand 5  Supervision   Additional items Assist x 1; Increased time required;Verbal cues;Armrests   Stand to Sit 5  Supervision   Additional items Assist x 1; Increased time required;Verbal cues;Armrests   Additional Comments Transfers w/o AD   Functional Mobility   Functional Mobility 4  Minimal assistance   Additional Comments Pt demonstrated long distance household mobility into hallway w/o AD at [facility] level  Pt w/ occaisonal lateral LOB, however able to self correct     Additional items   (none)   Balance   Static Sitting Fair +   Dynamic Sitting Fair   Static Standing Fair -   Dynamic Standing Fair -   Ambulatory Poor +   Activity Tolerance   Activity Tolerance Patient limited by fatigue   Nurse Made Aware RN cleared Pt for OT session   RUE Assessment   RUE Assessment WFL   LUE Assessment   LUE Assessment WFL   Hand Function   Gross Motor Coordination Functional   Fine Motor Coordination Functional   Sensation   Light Touch No apparent deficits Cognition   Overall Cognitive Status WFL   Arousal/Participation Alert; Cooperative   Attention Within functional limits   Orientation Level Oriented X4   Memory Within functional limits   Following Commands Follows one step commands with increased time or repetition   Comments Pt is pleasant and cooperative to participate in therapy this day  Pt requires VC for safety awareness t/o session   Assessment   Limitation Decreased endurance;Decreased high-level ADLs; Decreased Safe judgement during ADL;Decreased ADL status   Prognosis Good   Assessment Pt is a 77 yo female seen for OT eval s/p adm to [facility] on 7/1/22 w/ nicolle dx'd w/ ABLA  Pt  has a past medical history of Bell's palsy, Cardiac disease, Ear problems, HL (hearing loss), Hypertension, Sacroiliitis ([facility] [address] ) (8/10/2021), Subdural hemorrhage ([facility] [address] ) (3/30/2022), Thrombocytopenia ([facility] [address] ) (8/3/2018), and Tinnitus  Pt with active OT orders and activity as tolerated orders  Pt is retired and resides w/ spouse in [facility] w/ 2STE thru garage and FF to 2nd flr  Pt lives w/ her spouse who has an LVAD  Pt reports she is the primary caretaker and has to A  with all LVAD management, all IADLS, and occaisonal dressing  Pt reports her son is supportive and can A, however not consistently  Pt reports she is hiring private HHA to A at home upon D/C  Pt was I w/ ADLS and IADLS, drove, & required no use of DME PTA  Pt is currently demonstrating the following occupational deficits: Min A all UB/LB self care, S functional transfers, CGA functional mobility w/o AD  These deficits that are impacting pt's baseline areas of occupation are a result of the following impairments: endurance, activity tolerance, functional mobility, forward functional reach, functional standing tolerance, unsupportive home environment, decreased I w/ ADLS/IADLS and decreased safety awareness   The following Occupational Performance Areas to address include: grooming, bathing/shower, toilet hygiene, dressing, health maintenance, functional mobility and clothing management  Based on the aforementioned OT evaluation, functional performance deficits, and assessments, pt has been identified as a high complexity evaluation  Recommend home with family support and home OT upon D/C  The patient's raw score on the AM-PAC Daily Activity inpatient short form is 19, standardized score is 40 22, greater than 39 4  Patients at this level are likely to benefit from discharge to home  Please refer to the recommendation of the Occupational Therapist for safe discharge planning  Pt to continue to benefit from acute immediate OT services to address the following goals 3-5x/week to  w/in 7-10 days:   Goals   Patient Goals To improve her strength   LTG Time Frame 7-10   Long Term Goal #1 Refer to goals below   Plan   Treatment Interventions ADL retraining;Functional transfer training; Endurance training;UE strengthening/ROM; Patient/family training;Equipment evaluation/education; Compensatory technique education; Energy conservation; Activityengagement   Goal Expiration Date 22   OT Frequency 3-5x/wk   Recommendation   OT Discharge Recommendation (S)  Home with home health rehabilitation  (home OT and increased social support)   OT - OK to Discharge Yes  (when medically cleared)   AM-PAC Daily Activity Inpatient   Lower Body Dressing 3   Bathing 3   Toileting 3   Upper Body Dressing 3   Grooming 3   Eating 4   Daily Activity Raw Score 19   Daily Activity Standardized Score (Calc for Raw Score >=11) 40 22   AM-PAC Applied Cognition Inpatient   Following a Speech/Presentation 4   Understanding Ordinary Conversation 4   Taking Medications 4   Remembering Where Things Are Placed or Put Away 3   Remembering List of 4-5 Errands 3   Taking Care of Complicated Tasks 4   Applied Cognition Raw Score 22   Applied Cognition Standardized Score 47 83   Modified [place] Scale   Modified Kelly Scale 4       GOALS    1) Pt will improve activity tolerance to G for min 30 min txment sessions for increase engagement in functional tasks    2) Pt will complete UB/LB dressing/self care w/ mod I using adaptive device and DME as needed    3) Pt will complete bathing w/ Mod I w/ use of AE and DME as needed    4) Pt will complete toileting w/ mod I w/ G hygiene/thoroughness using DME as needed    5) Pt will improve functional transfers to Mod I on/off all surfaces using DME as needed w/ G balance/safety     6) Pt will improve functional mobility during ADL/IADL/leisure tasks to Mod I using DME as needed w/ G balance/safety     7) Pt will participate in simulated IADL management task to increase independence to Mod I w/ G safety and endurance    8) Pt will be attentive 100% of the time during ongoing cognitive assessment w/ G participation to assist w/ safe d/c planning/recommendations    9) Pt will demonstrate G carryover of pt/caregiver education and training as appropriate w/o cues w/ good tolerance to increase safety during functional tasks    10) Pt will demonstrate 100% carryover of energy conservation techniques t/o functional I/ADL/leisure tasks w/o cues s/p skilled education to increase endurance during functional tasks       Yesenia Kuhn, MS, OTR/L

## 2022-07-04 NOTE — ASSESSMENT & PLAN NOTE
Patient presented with dark brown stool x2 weeks  Pt reports use of chronic steroids  She had been on prednisone for txt of ITP but has been off since 5/2022   She is chronically on budesonide (pt unsure but indication but maybe due to tx for autoimmune hepatitis)  She denies use  nsaids  Evaluated by GI, no blood or diarrhea on physical exam  H&H improving after blood transfusion  No overt GI bleed, GI is following with possible EGD tomorrow  Continue PPI  Monitor

## 2022-07-04 NOTE — PROGRESS NOTES
[facility]  Progress Note - Tere Ortega 1940, 78 y o  female MRN: [id number]  Unit/Bed#: [facility] 421-01 Encounter: [id number]  Primary Care Provider: Dari Dela Cruz MD   Date and time admitted to hospital: 7/1/2022  2:44 PM    * Acute blood loss anemia  Assessment & Plan  Patient presented with dark brown stool x2 weeks  Pt reports use of chronic steroids  She had been on prednisone for txt of ITP but has been off since 5/2022  She is chronically on budesonide (pt unsure but indication but maybe due to tx for autoimmune hepatitis)  She denies use  nsaids  Evaluated by GI, no blood or diarrhea on physical exam  H&H improving after blood transfusion  No overt GI bleed, GI is following with possible EGD tomorrow  Continue PPI  Monitor        Pancytopenia (HCC)  Assessment & Plan  Chronic  Evaluated by Hematology, likely intermittent GI bleed  Recommendation to restart Promacta 50 mg daily to prevent addition GI bleed  Patient will resume this medication as an outpatient  Monitor    Hypotension  Assessment & Plan  Improving  Restart home meds  Monitor      Autoimmune hepatitis treated with steroids ([address] )  Assessment & Plan  On budesonide (presumbly for txt of this though pt is unsure herself)  In setting of ABLA, hold for now   Resume as soon as possible to avoid withdrawal symptoms or adrenal insufficiency  GI is following  Right upper quadrant ultrasound with Doppler consistent with liver cirrhosis and normal Doppler  Pt follows with Dr Muriel Rogers    Chronic ITP (idiopathic thrombocytopenia) (HCC)  Assessment & Plan  No longer on promacta or prednisone  Evaluated by Hematology  Plan to restart Promacta as an outpatient  Cont to monitor    Chronic systolic heart failure [facility])  Assessment & Plan  Wt Readings from Last 3 Encounters:   07/02/22 48 3 kg (106 lb 7 7 oz)   06/22/22 50 8 kg (112 lb)   06/14/22 53 8 kg (118 lb 9 6 oz)     Euvolemic  EF of 20%  Patient on p r n  torsemide and Aldactone at home  Cont to monitor fluid status  Restart Entresto and Toprol XL  Monitor          VTE Pharmacologic Prophylaxis:   High Risk (Score >/= 5) - Pharmacological DVT Prophylaxis Contraindicated  Sequential Compression Devices Ordered  Patient Centered Rounds: I performed bedside rounds with nursing staff today  Discussions with Specialists or Other Care Team Provider:     Education and Discussions with Family / Patient: Updated  (son) at bedside  Time Spent for Care: 45 minutes  More than 50% of total time spent on counseling and coordination of care as described above  Current Length of Stay: 3 day(s)  Current Patient Status: Inpatient   Certification Statement: The patient will continue to require additional inpatient hospital stay due to Management of anemia  Discharge Plan: Anticipate discharge tomorrow to home  Code Status: Level 1 - Full Code    Subjective:     Patient seen examined  Comfortable in bed  No event overnight  No chest pain or shortness of breath    Objective:     Vitals:   Temp (24hrs), Av 5 °F (36 9 °C), Min:97 5 °F (36 4 °C), Max:98 9 °F (37 2 °C)    Temp:  [97 5 °F (36 4 °C)-98 9 °F (37 2 °C)] 98 1 °F (36 7 °C)  HR:  [77-95] 90  Resp:  [14-18] 18  BP: ()/(49-65) 115/65  SpO2:  [98 %-100 %] 99 %  Body mass index is 18 86 kg/m²  Input and Output Summary (last 24 hours):      Intake/Output Summary (Last 24 hours) at 2022 1239  Last data filed at 2022 0715  Gross per 24 hour   Intake --   Output 801 ml   Net -801 ml       Physical Exam:   Physical Exam   Patient is awake alert oriented no acute distress  Thin female  Lung clear to auscultation bilateral anteriorly  Heart positive S1-S2 no murmur  Abdomen soft nontender  Lower extremities no edema    Additional Data:     Labs:  Results from last 7 days   Lab Units 22  0502   WBC Thousand/uL 3 29*   HEMOGLOBIN g/dL 7 4*   HEMATOCRIT % 23 8*   PLATELETS Thousands/uL 56*   NEUTROS PCT % 64   LYMPHS PCT % 27   MONOS PCT % 8   EOS PCT % 1     Results from last 7 days   Lab Units 07/03/22  0507 07/01/22  1725   SODIUM mmol/L 141 140   POTASSIUM mmol/L 3 8 4 4   CHLORIDE mmol/L 110* 108   CO2 mmol/L 28 28   BUN mg/dL 20 29*   CREATININE mg/dL 1 04 1 03   ANION GAP mmol/L 3* 4   CALCIUM mg/dL 8 3 9 2   ALBUMIN g/dL  --  3 1*   TOTAL BILIRUBIN mg/dL  --  0 50   ALK PHOS U/L  --  28*   ALT U/L  --  26   AST U/L  --  24   GLUCOSE RANDOM mg/dL 103 85     Results from last 7 days   Lab Units 07/01/22  1725   INR  1 27*                   Lines/Drains:  Invasive Devices  Report    Peripheral Intravenous Line  Duration           Peripheral IV 07/01/22 Left Forearm 2 days    Peripheral IV 07/01/22 Right;Ventral (anterior) Forearm 2 days                      Imaging: No pertinent imaging reviewed  Recent Cultures (last 7 days):         Last 24 Hours Medication List:   Current Facility-Administered Medications   Medication Dose Route Frequency Provider Last Rate    acetaminophen  650 mg Oral Q6H PRN Braden Pen, DO      fluticasone  2 spray Nasal PRN Braden Pen, DO      metoprolol succinate  25 mg Oral Daily Carlie Harrison DO      ondansetron  4 mg Intravenous Q6H PRN Braden Pen, DO      pantoprazole  40 mg Oral Early Morning Carlie Harrison DO      sacubitril-valsartan  1 tablet Oral BID Carlie Harrison DO          Today, Patient Was Seen By: Carlie Harrison DO    **Please Note: This note may have been constructed using a voice recognition system  **

## 2022-07-04 NOTE — MALNUTRITION/BMI
This medical record reflects one or more clinical indicators suggestive of malnutrition  Malnutrition Findings:   Adult Malnutrition type: Acute illness  Adult Degree of Malnutrition: Malnutrition of moderate degree  Malnutrition Characteristics: Inadequate energy, Weight loss                  360 Statement: Acute/moderate malnutrition r/t condition, as evidenced by intake meeting <75% estimated needs x > 1 month, and 12 3% wt loss x 2 months (5/18/22: 121#, 7/2/22: 106#)  Treatment: PO diet + nutrition supplements       Body mass index is 18 86 kg/m²  See Nutrition note dated 7/4/22 for additional details  Completed nutrition assessment is viewable in the nutrition documentation

## 2022-07-04 NOTE — PHYSICAL THERAPY NOTE
PHYSICAL THERAPY NOTE          Patient Name: Dom FISHERP Date: 7/4/2022 07/04/22 1612   PT Last Visit   PT Visit Date 07/04/22   Note Type   Note Type Treatment   Pain Assessment   Pain Assessment Tool 0-10   Pain Score No Pain   Restrictions/Precautions   Other Precautions Hard of hearing   General   Chart Reviewed Yes   Additional Pertinent History cleared for Tx session (spoke to andres)   Response to Previous Treatment Patient with no complaints from previous session  Cognition   Overall Cognitive Status WFL   Arousal/Participation Alert; Cooperative   Attention Within functional limits   Orientation Level Oriented to person;Oriented to place;Oriented to situation   Memory Within functional limits   Following Commands Follows one step commands without difficulty   Subjective   Subjective Alert; sitting on the edge of bed; reports she may be going home tomorrow; agreeable to amb and try steps   Transfers   Sit to Stand 6  Modified independent   Stand to Sit 6  Modified independent   Ambulation/Elevation   Gait pattern Inconsistent denia; Short stride; Excessively slow  (occasional lateral sway but self corrected)   Gait Assistance 6  Modified independent  (w/ SPC (after initial (S) w/o D))   Assistive Device None;SPC   Distance 2 x 30 ft w/ steps negotiation in between (no AD); after seated rest period, another 130 ft + 160 ft w/ extendned seated rest period in between (w/ [facility]); Stair Management Assistance 5  Supervision   Additional items Assist x 1;Verbal cues   Stair Management Technique One rail L;Step to pattern;Nonreciprocal   Number of Stairs 7   Balance   Static Sitting Good   Static Standing Fair +   Ambulatory Fair   Activity Tolerance   Activity Tolerance Patient tolerated treatment well   Nurse Made Aware spoke to [facility] OF [address]   Exercises   Knee AROM Long Arc Quad Sitting;15 reps;AROM; Bilateral   Ankle Pumps Sitting;15 reps;AROM; Bilateral   Marching Sitting;10 reps;AROM; Bilateral   Assessment   Prognosis Good   Problem List Decreased endurance   Assessment Pt demonstrated overall improvement in balance and functional mobility skills progressing to mod (I) level w/ transfers and amb (including w/ use of SPC) and (S) on the steps; SPC also seemed to facilitate gait patterns/stability w/ no overt LOB noted; overall, cont to recommend home PT follow up upon returning home when medically cleared; will follow  Barriers to Discharge None   Goals   Patient Goals to go home   STG Expiration Date 07/12/22   PT Treatment Day 1   Plan   Treatment/Interventions LE strengthening/ROM; Elevations; Therapeutic exercise; Endurance training;Bed mobility;Gait training;Spoke to nursing   Progress Progressing toward goals   PT Frequency 3-5x/wk   Recommendation   PT Discharge Recommendation Home with home health rehabilitation  (home PT)   Equipment Recommended Cane  (r/o [facility])   AM-PAC Basic Mobility Inpatient   Turning in Bed Without Bedrails 4   Lying on Back to Sitting on Edge of Flat Bed 4   Moving Bed to Chair 4   Standing Up From Chair 4   Walk in Room 4   Climb 3-5 Stairs 3   Basic Mobility Inpatient Raw Score 23   Basic Mobility Standardized Score 50 88   Highest Level Of Mobility   JH-HLM Goal 7: Walk 25 feet or more   JH-HLM Achieved 8: Walk 250 feet ot more   Education   Education Provided Mobility training;Assistive device;Home exercise program   Patient Demonstrates verbal understanding   End of Consult   Patient Position at End of Consult Bedside chair; All needs within reach     [facility], PT

## 2022-07-04 NOTE — ASSESSMENT & PLAN NOTE
Chronic  Evaluated by Hematology, likely intermittent GI bleed  Recommendation to restart Promacta 50 mg daily to prevent addition GI bleed  Patient will resume this medication as an outpatient  Monitor

## 2022-07-04 NOTE — ASSESSMENT & PLAN NOTE
Wt Readings from Last 3 Encounters:   07/02/22 48 3 kg (106 lb 7 7 oz)   06/22/22 50 8 kg (112 lb)   06/14/22 53 8 kg (118 lb 9 6 oz)     Euvolemic  EF of 20%  Patient on p r n  torsemide and Aldactone at home  Cont to monitor fluid status  Restart Entresto and Toprol XL  Monitor

## 2022-07-05 ENCOUNTER — HOME HEALTH ADMISSION (OUTPATIENT)
Dept: HOME HEALTH SERVICES | Facility: HOME HEALTHCARE | Age: 82
End: 2022-07-05
Payer: MEDICARE

## 2022-07-05 VITALS
DIASTOLIC BLOOD PRESSURE: 43 MMHG | TEMPERATURE: 97.8 F | RESPIRATION RATE: 17 BRPM | SYSTOLIC BLOOD PRESSURE: 95 MMHG | WEIGHT: 106.48 LBS | HEART RATE: 69 BPM | BODY MASS INDEX: 18.87 KG/M2 | OXYGEN SATURATION: 97 % | HEIGHT: 63 IN

## 2022-07-05 DIAGNOSIS — D61.818 PANCYTOPENIA (HCC): Primary | ICD-10-CM

## 2022-07-05 PROBLEM — E44.0 MODERATE PROTEIN-CALORIE MALNUTRITION (HCC): Status: ACTIVE | Noted: 2022-07-05

## 2022-07-05 LAB
ACTIN IGG SERPL-ACNC: 68 UNITS (ref 0–19)
DME PARACHUTE DELIVERY DATE REQUESTED: NORMAL
DME PARACHUTE ITEM DESCRIPTION: NORMAL
DME PARACHUTE ORDER STATUS: NORMAL
DME PARACHUTE SUPPLIER NAME: NORMAL
DME PARACHUTE SUPPLIER PHONE: NORMAL
ERYTHROCYTE [DISTWIDTH] IN BLOOD BY AUTOMATED COUNT: 17.1 % (ref 11.6–15.1)
HCT VFR BLD AUTO: 26.4 % (ref 34.8–46.1)
HGB BLD-MCNC: 8.3 G/DL (ref 11.5–15.4)
LKM-1 AB SER-ACNC: <20.1 UNITS (ref 0–20)
MCH RBC QN AUTO: 28.7 PG (ref 26.8–34.3)
MCHC RBC AUTO-ENTMCNC: 31.4 G/DL (ref 31.4–37.4)
MCV RBC AUTO: 91 FL (ref 82–98)
PLATELET # BLD AUTO: 69 THOUSANDS/UL (ref 149–390)
PMV BLD AUTO: 12.1 FL (ref 8.9–12.7)
RBC # BLD AUTO: 2.89 MILLION/UL (ref 3.81–5.12)
WBC # BLD AUTO: 3.96 THOUSAND/UL (ref 4.31–10.16)

## 2022-07-05 PROCEDURE — 97535 SELF CARE MNGMENT TRAINING: CPT

## 2022-07-05 PROCEDURE — 97530 THERAPEUTIC ACTIVITIES: CPT

## 2022-07-05 PROCEDURE — 99239 HOSP IP/OBS DSCHRG MGMT >30: CPT | Performed by: INTERNAL MEDICINE

## 2022-07-05 PROCEDURE — 85027 COMPLETE CBC AUTOMATED: CPT | Performed by: INTERNAL MEDICINE

## 2022-07-05 RX ORDER — FERROUS SULFATE TAB EC 324 MG (65 MG FE EQUIVALENT) 324 (65 FE) MG
324 TABLET DELAYED RESPONSE ORAL
Qty: 30 TABLET | Refills: 0 | Status: SHIPPED | OUTPATIENT
Start: 2022-07-05 | End: 2022-08-15

## 2022-07-05 RX ADMIN — PANTOPRAZOLE SODIUM 40 MG: 40 TABLET, DELAYED RELEASE ORAL at 06:25

## 2022-07-05 NOTE — PHYSICAL THERAPY NOTE
PHYSICAL THERAPY NOTE          Patient Name: Marybel Cee  STNYE'Q Date: 7/5/2022 07/05/22 0945   PT Last Visit   PT Visit Date 07/05/22   Note Type   Note Type Treatment   Pain Assessment   Pain Assessment Tool 0-10   Pain Score No Pain   Restrictions/Precautions   Other Precautions Telemetry; Fall Risk;Hard of hearing   General   Chart Reviewed Yes   Additional Pertinent History cleared for Tx session (spoke to nsg)   Response to Previous Treatment Patient with no complaints from previous session  Cognition   Overall Cognitive Status WFL   Arousal/Participation Alert; Cooperative   Attention Within functional limits   Orientation Level Oriented to person;Oriented to place;Oriented to situation   Memory Decreased recall of precautions   Following Commands Follows one step commands without difficulty   Subjective   Subjective Alert; in the chair; agreeable to amb; denies dizziness   Transfers   Sit to Stand 6  Modified independent   Stand to Sit 6  Modified independent   Ambulation/Elevation   Gait pattern Excessively slow; Short stride; Inconsistent denia  (increased sway w/ SPC w/ LOB x2 requiring min (A) on 1st LOB to recover; no LOB w/ use of rw)   Gait Assistance 6  Modified independent  (w/ rw; (S) w/ SPC)   Assistive Device SPC;Rolling walker  (SPC ---> rw)   Distance 10 ft w/ SPC + 130 ft + 180 ft + 140 ft (all w/ rw) w/ standing and seated rest periods/stops in between   Balance   Static Sitting Good   Static Standing Fair +   Ambulatory Fair   Activity Tolerance   Activity Tolerance Patient tolerated treatment well   Nurse Made Aware spoke to Maral Montanez, [address] [place] [address]   Assessment   Prognosis Good   Problem List Decreased strength;Decreased endurance; Impaired balance   Assessment Pt demonstrated min decreased gait stability initially w/ use of SPC which then improved w/ use of rw w/ no overt uncorrected LOB, gross knee buckling, or excessive swaying observed at that point; rest periods provided t/o the session and pt remained in NAD  Overall, cont to recommend home PT follow up upon returning home when medically cleared; will follow  Barriers to Discharge None   Goals   Patient Goals to go home   STG Expiration Date 07/12/22   PT Treatment Day 2   Plan   Treatment/Interventions LE strengthening/ROM; Elevations; Therapeutic exercise; Endurance training;Equipment eval/education; Bed mobility;Gait training;Spoke to nursing;Spoke to case management   Progress Progressing toward goals   PT Frequency 3-5x/wk   Recommendation   PT Discharge Recommendation Home with home health rehabilitation  (home PT)   Equipment Recommended [place] walker   [address] [place] [address] Mobility Inpatient   Turning in Bed Without Bedrails 4   Lying on Back to Sitting on Edge of Flat Bed 4   Moving Bed to Chair 4   Standing Up From Chair 4   Walk in Room 4   Climb 3-5 Stairs 3   Basic Mobility Inpatient Raw Score 23   Basic Mobility Standardized Score 50 88   Highest Level Of Mobility   JH-HLM Goal 7: Walk 25 feet or more   JH-HLM Achieved 8: Walk 250 feet ot more   Education   Education Provided Mobility training;Assistive device   Patient Demonstrates verbal understanding   End of Consult   Patient Position at End of Consult Bedside chair; All needs within reach     [facility], PT

## 2022-07-05 NOTE — PLAN OF CARE
Problem: OCCUPATIONAL THERAPY ADULT  Goal: Performs self-care activities at highest level of function for planned discharge setting  See evaluation for individualized goals  Description: Treatment Interventions: ADL retraining, Functional transfer training, Endurance training, UE strengthening/ROM, Patient/family training, Equipment evaluation/education, Compensatory technique education, Energy conservation, Activityengagement          See flowsheet documentation for full assessment, interventions and recommendations  Outcome: Progressing  Note: Limitation: Decreased endurance, Decreased high-level ADLs, Decreased Safe judgement during ADL, Decreased ADL status  Prognosis: Good  Assessment: Patient participated in Skilled OT session this date with interventions consisting of ADL re training with the use of correct body mechanics and  therapeutic activities to: increase activity tolerance   Patient agreeable to OT treatment session, upon arrival patient was found seated OOB to Chair  In comparison to previous session, patient with improvements in ADLS, functional mobility, and activity tolerance  Patient requiring frequent rest periods  From OT standpoint, recommendation at time of d/c would be Home with family support and Home OT  Pt reports that she has no concerns about returning home other than BP control  Pt reports that she has BSC, educated pt on overnight use of this  Recommend continued participation in [facility] and functional mobility with staff  No further acute OT needs, d/c OT       OT Discharge Recommendation: Home with home health rehabilitation (Home with increased social support)  OT - OK to Discharge: Yes (When medically appropriate)

## 2022-07-05 NOTE — DISCHARGE SUMMARY
[facility]  Discharge- [facility] 1940, 78 y o  female MRN: [id number]  Unit/Bed#: [address] 421[address]01 Encounter: [id number]  Primary Care Provider: Ana Hassan MD   Date and time admitted to hospital: 7/1/2022  2:44 PM    * Acute blood loss anemia  Assessment & Plan  Patient presents with dark stools  Status post transfusion  GI input noted patient has declined endoscopic evaluation at this time  Monitor hemoglobin  Outpatient GI follow-up        Chronic ITP (idiopathic thrombocytopenia) (HCC)  Assessment & Plan  Platelet counts are normalizing  Hematology input noted plans for restarting Promacta noted  She will follow-up with Dr Ramakrishna Coy on discharge    Moderate protein-calorie malnutrition ([address] )  Assessment & Plan  Malnutrition Findings:   Adult Malnutrition type: Acute illness  Adult Degree of Malnutrition: Malnutrition of moderate degree  Malnutrition Characteristics: Inadequate energy, Weight loss                  360 Statement: Acute/moderate malnutrition r/t condition, as evidenced by intake meeting <75% estimated needs x > 1 month, and 12 3% wt loss x 2 months (5/18/22: 121#, 7/2/22: 106#)  Treatment: PO diet + nutrition supplements    BMI Findings: Body mass index is 18 86 kg/m²         Hypotension  Assessment & Plan  Her blood pressure is improving  Monitor ambulatory blood pressures encouraged  Entresto on hold at discharge - updated Heart failure Team in-hospital  She will continue with metoprolol              Neutropenia, unspecified type [facility])  Assessment & Plan  Monitor  Outpatient hematology follow-up    Autoimmune hepatitis treated with steroids [facility])  Assessment & Plan  Continue budesonide at discharge  Outpatient GI follow-up    Chronic systolic heart failure [facility])  Assessment & Plan  Wt Readings from Last 3 Encounters:   07/02/22 48 3 kg (106 lb 7 7 oz)   06/22/22 50 8 kg (112 lb)   06/14/22 53 8 kg (118 lb 9 6 oz)       2D echo noted reveals EF 20%  Presently euvolemic  She reports taking Aldactone and torsemide p r n  Based on her weight  She will continue with metoprolol at discharge  She is noted to have some soft blood pressures hence Entresto will be on hold  Discussed with Cardiology team  Visiting nurses will coordinate with Cardiology for ongoing blood pressure and medication management              Discharge Summary - [facility] Internal Medicine    Patient Information: Marybel Cee 78 y o  female MRN: [id number]  Unit/Bed#: [facility] 421-01 Encounter: [id number]    Discharging Physician / Practitioner: Edson Bautista MD  PCP: Anna Villalobos MD  Admission Date: 7/1/2022  Discharge Date: 07/05/22    Disposition:     Home    Reason for Admission:  Melena    Discharge Diagnoses:     Principal Problem:    Acute blood loss anemia  Active Problems:    Chronic ITP (idiopathic thrombocytopenia) (HCC)    Chronic systolic heart failure (HCC)    Autoimmune hepatitis treated with steroids (HCC)    Neutropenia, unspecified type (HCC)    Hypotension    Pancytopenia (HCC)    Moderate protein-calorie malnutrition ([address] )  Resolved Problems:    * No resolved hospital problems  *      Consultations During Hospital Stay:  · GI  · Hematology    Procedures Performed:     · Chest x-ray no active lung disease  · CT head no acute intracranial abnormality  · Right upper quadrant ultrasound cirrhotic liver      Hospital Course:     Marybel Cee is a 78 y o  female patient who originally presented to the hospital on 7/1/2022 due to melena  Patient with history of chronic systolic congestive Heart failure chronic ITP hypertension not immune hepatitis presented with anemia  She also reported history of melena  She was provided with packed cells  She was seen in consultation GI  She reported brown stools she also did not want endoscopic evaluation at this time  Her hemoglobin is stable  No active GI bleeds        She was seen in consultation with Hematology, plan is for her to resume Promacta  She was noted to have low blood pressures Entresto is on hold at discharge  She takes torsemide and Aldactone p r n  Based on her weight and blood pressures  She will continue metoprolol  Outpatient follow-up with Cardiology recommended    She has symptomatically improved hemodynamically stable no episodes of active GI bleed hemoglobin is stable and is deemed ready for discharge today  Kindly review the chart for details  Condition at Discharge: fair     Discharge Day Visit / Exam:     Subjective:      Comfortably sitting up in chair  Reports feeling okay  Denies chest pain shortness of breath  Denies presyncope dizziness  No episodes of active GI bleed  History chart labs medications reviewed  Agreeable to discharge plan      Vitals: Blood Pressure: (!) 95/43 (07/05/22 0840)  Pulse: 69 (07/05/22 0840)  Temperature: 97 8 °F (36 6 °C) (07/05/22 0646)  Temp Source: Oral (07/02/22 1100)  Respirations: 17 (07/05/22 0646)  Height: 5' 3" (160 cm) (07/02/22 0015)  Weight - Scale: 48 3 kg (106 lb 7 7 oz) (07/02/22 0015)  SpO2: 97 % (07/05/22 1158)  Exam:   Physical Exam    Comfortably sitting up in chair  Features of protein calorie malnutrition noted  Neck supple  Lungs diminished breath sounds bilaterally  Heart sounds S1 and S2 noted  Abdomen soft  Awake alert obeys simple commands  No pedal edema  No rash    Discharge instructions/Information to patient and family:   See after visit summary for information provided to patient and family  Discharge plan discussed with the patient, updated son in detail at bedside questions answered  Discharge plan discussed with Cardiology  Outpatient follow-up with Cardiology, Hematology, primary care physician    Provisions for Follow-Up Care:  See after visit summary for information related to follow-up care and any pertinent home health orders        Planned Readmission: no     Discharge Statement:  I spent 45 minutes discharging the patient  This time was spent on the day of discharge  I had direct contact with the patient on the day of discharge  Greater than 50% of the total time was spent examining patient, answering all patient questions, arranging and discussing plan of care with patient as well as directly providing post-discharge instructions  Additional time then spent on discharge activities  Discharge Medications:  See after visit summary for reconciled discharge medications provided to patient and family        ** Please Note: This note has been constructed using a voice recognition system **

## 2022-07-05 NOTE — CASE MANAGEMENT
Case Management Discharge Note    Patient name Elgin Gomez  Location [address] 326[address]85 MRN [id number]  : 1940 Date 2022       Current Admission Date: 2022  Current Admission Diagnosis:Acute blood loss anemia      LOS (days): 4  Geometric Mean LOS (GMLOS) (days): 3 00  Days to GMLOS:-0 8     OBJECTIVE:  Risk of Unplanned Readmission Score: 12 76     Current admission status: Inpatient     Primary Insurance: MEDICARE  Secondary Insurance: [facility]    DISCHARGE DETAILS:    Discharge planning discussed with[de-identified] Patient  Freedom of Choice: Yes  CM contacted family/caregiver?: Yes  Were Treatment Team discharge recommendations reviewed with patient/caregiver?: Yes  Did patient/caregiver verbalize understanding of patient care needs?: Yes  Were patient/caregiver advised of the risks associated with not following Treatment Team discharge recommendations?: Yes    Contacts  Patient Contacts: Celena Mejia (pt's son)  Relationship to Patient[de-identified] Family  Contact Method: Phone  Phone Number: [phone] (mobile phone)  Reason/Outcome: Emergency [address]         Is the patient interested in [facility] at discharge?: Yes  [address] requested[de-identified] Nursing, Physical [address] Name[de-identified] [facility] Provider[de-identified] PCP  Home Health Services Needed[de-identified] Evaluate Functional Status and Safety, Gait/ADL Training, Heart Failure Management, Strengthening/Theraputic Exercises to Improve Function  Homebound Criteria Met[de-identified] Requires the Assistance of Another Person for Safe Ambulation or to Leave the Home, Uses an Assist Device (i e  cane, walker, etc)  Supporting Clincal Findings[de-identified] Limited Endurance    DME Referral Provided  Referral made for DME?: Yes  DME referral completed for the following items[de-identified] Hayden Bui  DME Supplier Name[de-identified] [employer]    Treatment Team Recommendation: Home with  [facility]  Discharge Destination Plan[de-identified] Home with Home Health Care  Transport at Discharge : Family    Additional Comments: Pt is cleared for d/c by GASTON Mariscal  RN Poppy Cardona was notified of pt's d/c order  Pt is accepted for services by [facility] for her aftercare plan  Pt also received a Rolling Walker from [facility] prior to d/c  The pt and her son Zoila Dos Santos were both informed of d/c  Son will transport pt home later this day, pickup time TBD  IMM signed by pt  No chart copy required  CM to follow

## 2022-07-05 NOTE — QUICK NOTE
Spoke with son  Patient's hemoglobin stable  Carmela Ciara stool  Patient's son would like to hold off on any endoscopic intervention at this time  Continue promacta as outpatient  Patient has outpatient follow with gastroenterologist Dr yTshawn Kuhn where patient and son can discuss possible endoscopic intervention  Okay for discharge from gastroenterology standpoint

## 2022-07-05 NOTE — ASSESSMENT & PLAN NOTE
Patient presents with dark stools  Status post transfusion  GI input noted patient has declined endoscopic evaluation at this time  Monitor hemoglobin  Outpatient GI follow-up

## 2022-07-05 NOTE — OCCUPATIONAL THERAPY NOTE
Occupational Therapy Progress Note     Patient Name: Andrade Ceron  Today's Date: 7/5/2022  Problem List  Principal Problem:    Acute blood loss anemia  Active Problems:    Chronic systolic heart failure (HCC)    Autoimmune hepatitis treated with steroids (HCC)    Neutropenia, unspecified type (HCC)    Chronic ITP (idiopathic thrombocytopenia) (HCC)    Hypotension    Pancytopenia (HCC)    Moderate protein-calorie malnutrition ([address] )            07/05/22 1320   OT Last Visit   OT Visit Date 07/05/22   Note Type   Note Type Treatment   Restrictions/Precautions   Weight Bearing Precautions Per Order No   Other Precautions Telemetry   General   Response to Previous Treatment Patient with no complaints from previous session   Lifestyle   Autonomy Pt reports being IND w/ all ADLS and IADLS; (+) drives; ambulates w/o AD PTA; Pt reports she has felt off-balanced since her fall in march 2022   Reciprocal Relationships Pt lives w/ her spouse who has an LVAD  Pt reports she is the primary caretaker and has to A  with all LVAD management, all IADLS, and occaisonal dressing  Pt reports her son is supportive and can A, however not consistently  Pt reports she is hiring private [employer] to A at home upon D/C   Service to Others Pt is primary caretaker for her  with LVAD   Intrinsic Gratification Pt reports enjoying being home   Pain Assessment   Pain Assessment Tool 0-10   Pain Score No Pain   ADL   Where Assessed Chair   LB Dressing Assistance 5  Supervision/Setup   LB Dressing Deficit Setup;Verbal cueing   Transfers   Sit to Stand 6  Modified independent   Stand to Sit 6  Modified independent   Functional Mobility   Functional Mobility 5  Supervision   Additional Comments Pt demonstrated household mobility with one extended seated rest break  Additional items Rolling walker   Cognition   Overall Cognitive Status WFL   Arousal/Participation Alert; Cooperative   Attention Within functional limits   Orientation Level Oriented X4   Memory Within functional limits   Following Commands Follows one step commands without difficulty   Comments Pt is very pleasant and cooperative  Assessment   Assessment Patient participated in Skilled OT session this date with interventions consisting of ADL re training with the use of correct body mechanics and  therapeutic activities to: increase activity tolerance   Patient agreeable to OT treatment session, upon arrival patient was found seated OOB to Chair  In comparison to previous session, patient with improvements in ADLS, functional mobility, and activity tolerance  Patient requiring frequent rest periods  From OT standpoint, recommendation at time of d/c would be Home with family support and Home OT  Pt reports that she has no concerns about returning home other than BP control  Pt reports that she has BSC, educated pt on overnight use of this  Recommend continued participation in [facility] and functional mobility with staff  No further acute OT needs, d/c OT  Plan   Treatment Interventions ADL retraining; Endurance training;Patient/family training   Goal Expiration Date 07/14/22   OT Treatment Day 1   OT Frequency 3-5x/wk   Recommendation   OT Discharge Recommendation Home with home health rehabilitation  (Home with increased social support)   OT - OK to Discharge Yes  (When medically appropriate)   AM-PAC Daily Activity Inpatient   Lower Body Dressing 4   Bathing 3   Toileting 3   Upper Body Dressing 4   Grooming 4   Eating 4   Daily Activity Raw Score 22   Daily Activity Standardized Score (Calc for Raw Score >=11) 47  1   AM-PAC Applied Cognition Inpatient   Following a Speech/Presentation 3   Understanding Ordinary Conversation 4   Taking Medications 4   Remembering Where Things Are Placed or Put Away 4   Remembering List of 4-5 Errands 4   Taking Care of Complicated Tasks 3   Applied Cognition Raw Score 22   Applied Cognition Standardized Score 47 83   Modified [place] Scale   Modified [place] Scale 3       Elizabeth Varghese, MOT, OTR/L

## 2022-07-05 NOTE — ASSESSMENT & PLAN NOTE
Her blood pressure is improving  Monitor ambulatory blood pressures encouraged  Entresto on hold at discharge - updated Heart failure Team in-hospital  She will continue with metoprolol

## 2022-07-05 NOTE — PLAN OF CARE
Problem: PHYSICAL THERAPY ADULT  Goal: Performs mobility at highest level of function for planned discharge setting  See evaluation for individualized goals  Description: Treatment/Interventions: LE strengthening/ROM, Elevations, Therapeutic exercise, Endurance training, Equipment eval/education, Bed mobility, Gait training, Spoke to nursing, Spoke to case management  Equipment Recommended: Guy Bill       See flowsheet documentation for full assessment, interventions and recommendations  Outcome: Progressing  Note: Prognosis: Good  Problem List: Decreased strength, Decreased endurance, Impaired balance  Assessment: Pt demonstrated min decreased gait stability initially w/ use of SPC which then improved w/ use of rw w/ no overt uncorrected LOB, gross knee buckling, or excessive swaying observed at that point; rest periods provided t/o the session and pt remained in NAD  Overall, cont to recommend home PT follow up upon returning home when medically cleared; will follow  Barriers to Discharge: None        PT Discharge Recommendation: Home with home health rehabilitation (home PT)          See flowsheet documentation for full assessment

## 2022-07-05 NOTE — ASSESSMENT & PLAN NOTE
Platelet counts are normalizing  Hematology input noted plans for restarting Promacta noted  She will follow-up with Dr Jazmin Kuhn on discharge

## 2022-07-05 NOTE — ASSESSMENT & PLAN NOTE
Wt Readings from Last 3 Encounters:   07/02/22 48 3 kg (106 lb 7 7 oz)   06/22/22 50 8 kg (112 lb)   06/14/22 53 8 kg (118 lb 9 6 oz)       2D echo noted reveals EF 20%  Presently euvolemic  She reports taking Aldactone and torsemide p r n   Based on her weight  She will continue with metoprolol at discharge  She is noted to have some soft blood pressures hence Entresto will be on hold  Discussed with Cardiology team  Visiting nurses will coordinate with Cardiology for ongoing blood pressure and medication management

## 2022-07-05 NOTE — PLAN OF CARE
Problem: Nutrition/Hydration-ADULT  Goal: Nutrient/Hydration intake appropriate for improving, restoring or maintaining nutritional needs  Description: Monitor and assess patient's nutrition/hydration status for malnutrition  Collaborate with interdisciplinary team and initiate plan and interventions as ordered  Monitor patient's weight and dietary intake as ordered or per policy  Utilize nutrition screening tool and intervene as necessary  Determine patient's food preferences and provide high-protein, high-caloric foods as appropriate       INTERVENTIONS:  - Monitor oral intake, urinary output, labs, and treatment plans  - Assess nutrition and hydration status and recommend course of action  - Evaluate amount of meals eaten  - Assist patient with eating if necessary   - Allow adequate time for meals  - Recommend/ encourage appropriate diets, oral nutritional supplements, and vitamin/mineral supplements  - Order, calculate, and assess calorie counts as needed  - Recommend, monitor, and adjust tube feedings and TPN/PPN based on assessed needs  - Assess need for intravenous fluids  - Provide specific nutrition/hydration education as appropriate  - Include patient/family/caregiver in decisions related to nutrition  Outcome: Adequate for Discharge     Problem: Prexisting or High Potential for Compromised Skin Integrity  Goal: Skin integrity is maintained or improved  Description: INTERVENTIONS:  - Identify patients at risk for skin breakdown  - Assess and monitor skin integrity  - Assess and monitor nutrition and hydration status  - Monitor labs   - Assess for incontinence   - Turn and reposition patient  - Assist with mobility/ambulation  - Relieve pressure over bony prominences  - Avoid friction and shearing  - Provide appropriate hygiene as needed including keeping skin clean and dry  - Evaluate need for skin moisturizer/barrier cream  - Collaborate with interdisciplinary team   - Patient/family teaching  - Consider wound care consult   Outcome: Adequate for Discharge     Problem: PAIN - ADULT  Goal: Verbalizes/displays adequate comfort level or baseline comfort level  Description: Interventions:  - Encourage patient to monitor pain and request assistance  - Assess pain using appropriate pain scale  - Administer analgesics based on type and severity of pain and evaluate response  - Implement non-pharmacological measures as appropriate and evaluate response  - Consider cultural and social influences on pain and pain management  - Notify physician/advanced practitioner if interventions unsuccessful or patient reports new pain  Outcome: Adequate for Discharge

## 2022-07-05 NOTE — ASSESSMENT & PLAN NOTE
Malnutrition Findings:   Adult Malnutrition type: Acute illness  Adult Degree of Malnutrition: Malnutrition of moderate degree  Malnutrition Characteristics: Inadequate energy, Weight loss                  360 Statement: Acute/moderate malnutrition r/t condition, as evidenced by intake meeting <75% estimated needs x > 1 month, and 12 3% wt loss x 2 months (5/18/22: 121#, 7/2/22: 106#)  Treatment: PO diet + nutrition supplements    BMI Findings: Body mass index is 18 86 kg/m²

## 2022-07-06 ENCOUNTER — TRANSITIONAL CARE MANAGEMENT (OUTPATIENT)
Dept: FAMILY MEDICINE CLINIC | Facility: CLINIC | Age: 82
End: 2022-07-06

## 2022-07-06 ENCOUNTER — HOME CARE VISIT (OUTPATIENT)
Dept: HOME HEALTH SERVICES | Facility: HOME HEALTHCARE | Age: 82
End: 2022-07-06

## 2022-07-06 NOTE — CASE COMMUNICATION
TC to pt to discuss [place] [address] services    Agreeable to [facility] services and no other SN agencies in home: yes    Communication to the physician regarding delay: na    Insurance, copays, HB status reviewed: yes    Verified address and phone number: yes    Requested that patient secure pets: no    Medication bottles and DC instructions in home: yes    Wound care/IV supplies in home: na    CG present to learn: son    Other concerns patient/family woul d like to address at visit:

## 2022-07-07 ENCOUNTER — HOME CARE VISIT (OUTPATIENT)
Dept: HOME HEALTH SERVICES | Facility: HOME HEALTHCARE | Age: 82
End: 2022-07-07
Payer: MEDICARE

## 2022-07-07 ENCOUNTER — TELEPHONE (OUTPATIENT)
Dept: CARDIOLOGY CLINIC | Facility: CLINIC | Age: 82
End: 2022-07-07

## 2022-07-07 ENCOUNTER — TELEPHONE (OUTPATIENT)
Dept: HEMATOLOGY ONCOLOGY | Facility: CLINIC | Age: 82
End: 2022-07-07

## 2022-07-07 VITALS
HEART RATE: 64 BPM | DIASTOLIC BLOOD PRESSURE: 62 MMHG | OXYGEN SATURATION: 96 % | SYSTOLIC BLOOD PRESSURE: 102 MMHG | RESPIRATION RATE: 16 BRPM | TEMPERATURE: 96 F

## 2022-07-07 DIAGNOSIS — I50.9 CONGESTIVE HEART FAILURE, UNSPECIFIED HF CHRONICITY, UNSPECIFIED HEART FAILURE TYPE (HCC): Primary | ICD-10-CM

## 2022-07-07 DIAGNOSIS — I95.9 HYPOTENSION, UNSPECIFIED HYPOTENSION TYPE: ICD-10-CM

## 2022-07-07 DIAGNOSIS — D69.3 CHRONIC ITP (IDIOPATHIC THROMBOCYTOPENIA) (HCC): ICD-10-CM

## 2022-07-07 PROCEDURE — 10330081 VN NO-PAY CLAIM PROCEDURE

## 2022-07-07 PROCEDURE — 400013 VN SOC

## 2022-07-07 PROCEDURE — G0299 HHS/HOSPICE OF RN EA 15 MIN: HCPCS

## 2022-07-07 RX ORDER — SPIRONOLACTONE 25 MG/1
12.5 TABLET ORAL DAILY
Qty: 90 TABLET | Refills: 3 | Status: CANCELLED | OUTPATIENT
Start: 2022-07-07

## 2022-07-07 RX ORDER — TORSEMIDE 10 MG/1
10 TABLET ORAL DAILY
Qty: 90 TABLET | Refills: 3 | Status: CANCELLED | OUTPATIENT
Start: 2022-07-07

## 2022-07-07 RX ORDER — POTASSIUM CHLORIDE 750 MG/1
10 CAPSULE, EXTENDED RELEASE ORAL DAILY
Qty: 90 CAPSULE | Refills: 3 | Status: CANCELLED | OUTPATIENT
Start: 2022-07-07

## 2022-07-07 NOTE — CASE COMMUNICATION
TC from [address] [place] [address] reporting verbal order to continue potassium spironolactone and torsemide daily with a hold parameter of all three for a systolic BP 90 or less  Call for instructions if there is any SS of fluid overload or dehydration  TC to patient notified Son of the above  Son repeated using teachback

## 2022-07-07 NOTE — TELEPHONE ENCOUNTER
Called question the script for Promacta being called to [facility].  I explained that the medication would come from a specialty pharmacy and go directly to the house so he wouldn't have to worry about picking anything up.  I also mentioned it being called in but didn't know details please call him at above number

## 2022-07-07 NOTE — CASE COMMUNICATION
----- Message -----  From: Philipp Carrizales MD  Sent: 7/7/2022   1:43 PM EDT  To: Bar Navas RN, Aniket Baeza DO, *      Put in referral for OT   She see dr Lopez Hutson for her hepatitis issue and dr Lana Calix for her heart meds  Please check with them as well    Thanks  Dr Orion Benson    ----- Message -----  From: Bar Navas RN  Sent: 7/7/2022   1:04 PM EDT  To: Philipp Carrizales MD    [facility] has admitted your patient to [address] [place] [address] with the following disciplines:      SN and PT  Response needed, please respond via TIger connect or In Basket with verbal for OT  Primary focus of home health care: Cardiac  Patient stated goals of care: Improve heart and not need life vest    Anticipated visit pattern: 2w3 1w1 and next visit date: 7/9/22 Cardiac assess  See medication list - meds in home differ from AVS: Patient has no pain and no need for tylenol  Patient no l onger using flonase daily only as needed  Promacta not on AVS, will assess if obtained  Significant clinical findings: Patient took entresto first day home by accident then stopped  Potential barriers to goal achievement: poor short term memory  Other pertinent information: TC to [facility] left VM to notify patient was only taking torsemide and aldacton PRN for edema and holding for hypotension for as long as patient can remember  Reques piter clarification of diuretic orders  Thank you for allowing us to participate in the care of your patient        National Leon Matias RN

## 2022-07-07 NOTE — TELEPHONE ENCOUNTER
CHIEF COMPLAINT:  Chief Complaint   Patient presents with   • Penis/Scrotum Problem     on and off right testicle pain        HISTORY OF PRESENT ILLNESS:  Patient comes in with his mother for evaluation of scrotal discomfort.  He states his right testicle has been intermittently painful for the last 2 days.  It will last for 10-20 minutes or so.  He thinks it could be triggered by changes in body position.  There is no known injury noted.  He denies any changes in bowel or bladder.  No previous history of scrotal or testicular issue.  They relate that he did have this many months ago as well and it seemed to resolve without issue.    PAST MEDICAL HISTORY:  History reviewed. No pertinent past medical history.     PAST SURGICAL HISTORY:  Past Surgical History:   Procedure Laterality Date   • Circumcision surg excision <28 days   2009    Circumcision, other,    • Circumcision, primary         ALLERGIES:  ALLERGIES:   Allergen Reactions   • Penicillins HIVES     Per mother   • Shellfish Allergy   (Food Or Med) HIVES   • Shrimp   (Food) HIVES     Per mother       MEDICATIONS:  Current Outpatient Medications   Medication Sig Dispense Refill   • atomoxetine (STRATTERA) 25 MG capsule Take 1 capsule by mouth 2 times daily. 60 capsule 3   • sertraline (ZOLOFT) 50 MG tablet Take 1 tablet by mouth daily. 30 tablet 3   • Multiple Vitamins-Minerals (DAILY MULTI VITAMIN/MINERALS PO)      • MELATONIN PO        No current facility-administered medications for this visit.       SOCIAL HISTORY:  Social History     Socioeconomic History   • Marital status: Single     Spouse name: Not on file   • Number of children: Not on file   • Years of education: Not on file   • Highest education level: Not on file   Occupational History   • Not on file   Tobacco Use   • Smoking status: Passive Smoke Exposure - Never Smoker   • Smokeless tobacco: Current User     Types: Chew   • Tobacco comment: Grandparents/ Aunt Smokes  Vn called, seeing pt today post hospitalization  Blood transfusion    Pt states she only takes torsemide , Aldactone and potassium if she has edema or rapid wt gain  Does not take often  Can I change med list to reflect that?     Bp - 102/40   No edema      Please advise Outside. Father Chews   Vaping Use   • Vaping Use: Never assessed   Substance and Sexual Activity   • Alcohol use: Not on file   • Drug use: No   • Sexual activity: Not on file   Other Topics Concern   • Not on file   Social History Narrative   • Not on file     Social Determinants of Health     Financial Resource Strain:    • Social Determinants: Financial Resource Strain:    Food Insecurity:    • Social Determinants: Food Insecurity:    Transportation Needs:    • Lack of Transportation (Medical):    • Lack of Transportation (Non-Medical):    Physical Activity:    • Days of Exercise per Week:    • Minutes of Exercise per Session:    Stress:    • Social Determinants: Stress:    Social Connections:    • Social Determinants: Social Connections:    Intimate Partner Violence:    • Social Determinants: Intimate Partner Violence Past Fear:    • Social Determinants: Intimate Partner Violence Current Fear:        FAMILY HISTORY:  Family History   Problem Relation Age of Onset   • Cancer Maternal Grandfather         lung   • Other Maternal Uncle         Acromegaly       PHYSICAL EXAM:  VITAL SIGNS:    Visit Vitals  /66 (BP Location: RUE - Right upper extremity, Patient Position: Sitting, Cuff Size: Regular)   Pulse (!) 130   Temp 98 °F (36.7 °C) (Temporal)   Ht 5' 0.25\" (1.53 m)   Wt 63 kg   SpO2 98%   BMI 26.88 kg/m²     General: he is alert and oriented in no acute distress.    Vital signs: as noted above.    Genitourinary:  Does have some high riding testicles.  Does seem to have an absent cremasteric reflex.  Nontender to palpation and the testicle is not hard.  No obvious Bell clapper deformity      Psychiatric: his mood and affect are appropriate his speech is fluent and not pressured he makes good eye contact.    ASSESSMENT/PLAN:    Would like further evaluation to rule out testicular torsion.  Ultrasound is ordered.  Further recommendation based on ultrasound results.    Scrotal pain  (primary encounter  diagnosis)     Jadon was seen today for penis/scrotum problem.    Diagnoses and all orders for this visit:    Scrotal pain  -     US TESTICLES AND SCROTUM W DUPLEX; Future        Return if symptoms worsen or fail to improve.

## 2022-07-07 NOTE — CASE COMMUNICATION
[facility] has admitted your patient to [address] [place] [address] service with the following disciplines:      SN and PT  Response needed, please respond via TIger connect or In Basket with verbal for OT  Primary focus of home health care: Cardiac  Patient stated goals of care: Improve heart and not need life vest    Anticipated visit pattern: 2w3 1w1 and next visit date: 7/9/22 Cardiac assess  See medication list - meds in home differ from AVS:  Patient has no pain and no need for tylenol  Patient no longer using flonase daily only as needed  Promacta not on AVS, will assess if obtained  Significant clinical findings: Patient took entresto first day home by accident then stopped  Potential barriers to goal achievement: poor short term memory  Other pertinent information: TC to [facility] left VM to notify patient was only taking torsemide and aldacton PRN for edema and holding f or hypotension for as long as patient can remember  Requested clarification of diuretic orders  Thank you for allowing us to participate in the care of your patient        National Leon Matias RN

## 2022-07-07 NOTE — TELEPHONE ENCOUNTER
Left message on sons cell phone letting him know that we are waiting to hear back from finance regarding auth and payment for this medication.

## 2022-07-08 ENCOUNTER — OFFICE VISIT (OUTPATIENT)
Dept: FAMILY MEDICINE CLINIC | Facility: CLINIC | Age: 82
End: 2022-07-08
Payer: MEDICARE

## 2022-07-08 ENCOUNTER — HOME CARE VISIT (OUTPATIENT)
Dept: HOME HEALTH SERVICES | Facility: HOME HEALTHCARE | Age: 82
End: 2022-07-08
Payer: MEDICARE

## 2022-07-08 VITALS
HEART RATE: 65 BPM | BODY MASS INDEX: 20.13 KG/M2 | WEIGHT: 113.6 LBS | DIASTOLIC BLOOD PRESSURE: 68 MMHG | TEMPERATURE: 97.4 F | HEIGHT: 63 IN | SYSTOLIC BLOOD PRESSURE: 100 MMHG | RESPIRATION RATE: 16 BRPM

## 2022-07-08 VITALS
SYSTOLIC BLOOD PRESSURE: 100 MMHG | HEART RATE: 74 BPM | DIASTOLIC BLOOD PRESSURE: 40 MMHG | OXYGEN SATURATION: 93 % | TEMPERATURE: 97.4 F

## 2022-07-08 DIAGNOSIS — I11.0 HYPERTENSIVE HEART DISEASE WITH CONGESTIVE HEART FAILURE, UNSPECIFIED HEART FAILURE TYPE (HCC): ICD-10-CM

## 2022-07-08 DIAGNOSIS — K75.4 AUTOIMMUNE HEPATITIS TREATED WITH STEROIDS (HCC): ICD-10-CM

## 2022-07-08 DIAGNOSIS — I50.9 CONGESTIVE HEART FAILURE, UNSPECIFIED HF CHRONICITY, UNSPECIFIED HEART FAILURE TYPE (HCC): ICD-10-CM

## 2022-07-08 DIAGNOSIS — D62 ACUTE BLOOD LOSS ANEMIA: ICD-10-CM

## 2022-07-08 DIAGNOSIS — I50.22 CHRONIC SYSTOLIC HEART FAILURE (HCC): ICD-10-CM

## 2022-07-08 DIAGNOSIS — D61.818 PANCYTOPENIA (HCC): ICD-10-CM

## 2022-07-08 DIAGNOSIS — I42.0 DILATED CARDIOMYOPATHY (HCC): ICD-10-CM

## 2022-07-08 DIAGNOSIS — D69.3 CHRONIC ITP (IDIOPATHIC THROMBOCYTOPENIA) (HCC): ICD-10-CM

## 2022-07-08 DIAGNOSIS — Z09 HOSPITAL DISCHARGE FOLLOW-UP: Primary | ICD-10-CM

## 2022-07-08 PROCEDURE — 99496 TRANSJ CARE MGMT HIGH F2F 7D: CPT | Performed by: FAMILY MEDICINE

## 2022-07-08 PROCEDURE — G0151 HHCP-SERV OF PT,EA 15 MIN: HCPCS

## 2022-07-08 NOTE — CASE COMMUNICATION
pt will need home OT referral and order for medication management and organization  MD made aware for placement and ordering of home OT

## 2022-07-08 NOTE — CASE COMMUNICATION
Pt was seen for home PT initial evaluation and assessment on Friday 07/08/22  Pts home physical therapy plan of care is 1wk1 and starting week of 07/10/22  2wk2 and 1wk2  Please order home OT for home medication management and proper and safe organization  Pts son is willing and agreeable to purchase pt a pill box that is labeled Sun-Sat with proper AM and PM slots  Pts short term goals are to be able to walk to the mailbox and o utside again without feeling wobbly and off balance, to get back to a good balance  Pt able to ambulate short household distances needing min Ax1 for verbal and physical instruction to slow denia, inc BLE step length and to dec use of furniture for balance and stability  Recommend pts son purchase a cane in order to assess gait during future home PT visits  pts son is in agreement  Pt owns RW since recent DC from the hospital, but  currently does not use RW for mobility  Pt needs min Ax1 for transfers for verbal and physical instruction for proper BLE and B hand placement prior to transfers and to inc control descent during stand to sit transfers  Pt reports difficulty exiting home at this time and needs A to exit and enter the home  Pt was driving prior to recently hospital admission, but currently pts son is providing transportation assistance  Pt is fartun bliss's primary caregiver  P's son is currently staying with pt and pts , but will be returning to work fulltime on Monday 07/11/22  Pt would cont to benefit from skilled home PT services to maximize functional independence and to dec caregiver burden      Thank you,  Oliver Shultz, DPT

## 2022-07-08 NOTE — PROGRESS NOTES
Assessment/Plan:     Autoimmune hepatitis treated with steroids (HCC)  Stable on Budesonide    Pancytopenia (HCC)  Chronic  Care per hematology    Chronic ITP (idiopathic thrombocytopenia) (HCC)  She is waiting to get Promacta from the pharmacy   F/u with hematology    Acute blood loss anemia  S/p 2 U PRBC  Denies further dark stool  She refused endoscopy while in the hospital  Recheck labs as directed  To f/u with GI dr Ismael Warner    [facility]  Wt Readings from Last 3 Encounters:   07/08/22 51 5 kg (113 lb 9 6 oz)   07/02/22 48 3 kg (106 lb 7 7 oz)   06/22/22 50 8 kg (112 lb)   stable on torsemide          Dilated cardiomyopathy (HCC)  Stable on current meds  Care per cardiologist    Chronic systolic heart failure ([facility] )  Wt Readings from Last 3 Encounters:   07/08/22 51 5 kg (113 lb 9 6 oz)   07/02/22 48 3 kg (106 lb 7 7 oz)   06/22/22 50 8 kg (112 lb)     Torsemide as directed            Diagnoses and all orders for this visit:    Hospital discharge follow-up    Acute blood loss anemia    Autoimmune hepatitis treated with steroids (HCC)    Pancytopenia (HCC)  -     Eltrombopag Olamine (PROMACTA PO); Take by mouth    Chronic ITP (idiopathic thrombocytopenia) (HCC)  -     Eltrombopag Olamine (PROMACTA PO); Take by mouth    Congestive heart failure, unspecified HF chronicity, unspecified heart failure type ([facility] )    Dilated cardiomyopathy ([facility] )    Hypertensive heart disease with congestive heart failure, unspecified heart failure type ([facility] )    Chronic systolic heart failure (HCC)       Subjective:     Patient ID: Ann-Marie Caldwell is a 78 y o  female      HPI  Here for follow up  Was admitted on acute blood loss anemia with hemoglobin 5 1  Received 2 U PRBC while in the hospital  Hemoglobin upon discharge was 8 3  Refused endoscopy while in the hospital   hasnt gotten her Promacta yet as there was no sample available and waiting for speciality pharmacy to send her the supply   Patient states that she is feeling fine today         Review of Systems   Constitutional: Negative  HENT: Negative  Respiratory: Negative  Cardiovascular: Negative  Gastrointestinal: Negative  Endocrine: Negative  Genitourinary: Negative  Allergic/Immunologic: Negative  Neurological: Negative  Psychiatric/Behavioral: Negative  Objective:     Physical Exam  Constitutional:       Appearance: Normal appearance  Cardiovascular:      Rate and Rhythm: Normal rate and regular rhythm  Pulses: Normal pulses  Heart sounds: Normal heart sounds  Pulmonary:      Effort: Pulmonary effort is normal       Breath sounds: Normal breath sounds  Neurological:      General: No focal deficit present  Mental Status: She is alert and oriented to person, place, and time  Psychiatric:         Mood and Affect: Mood normal          Behavior: Behavior normal            Vitals:    07/08/22 1508   BP: 100/68   BP Location: Left arm   Patient Position: Sitting   Cuff Size: Adult   Pulse: 65   Resp: 16   Temp: (!) 97 4 °F (36 3 °C)   TempSrc: Skin   Weight: 51 5 kg (113 lb 9 6 oz)   Height: 5' 3" (1 6 m)       Transitional Care Management Review:  Blank Nunez is a 78 y o  female here for TCM follow up  During the TCM phone call patient stated:    TCM Call (since 6/7/2022)     Date and time call was made  7/6/2022  9:15 AM    Hospital care reviewed  Records not available    Patient was hospitialized at  [facility]    Date of Admission  07/01/22    Date of discharge  07/05/22    Diagnosis  Acute blood loss anemia    Disposition  Home    Were the patients medications reviewed and updated  No    Current Symptoms  None      TCM Call (since 6/7/2022)     Post hospital issues  None    Should patient be enrolled in anticoag monitoring? No    Scheduled for follow up?   Yes    Did you obtain your prescribed medications  Yes    Do you need help managing your prescriptions or medications  No    Is transportation to your appointment needed  No    I have advised the patient to call PCP with any new or worsening symptoms  Lauren Sellers MA    Living Arrangements  Family members    Are you recieving any outpatient services  No    Are you recieving home care services  Yes    Types of home care services  Home PT; Other (comment);  Nurse visit    Are you using any community resources  No    Current waiver services  No    Have you fallen in the last 12 months  Yes    How many times  1    Interperter language line needed  No          Lona Chacko MD

## 2022-07-08 NOTE — ASSESSMENT & PLAN NOTE
S/p 2 U PRBC  Denies further dark stool  She refused endoscopy while in the hospital  Recheck labs as directed  To f/u with GI dr Landry Boehringer

## 2022-07-08 NOTE — ASSESSMENT & PLAN NOTE
Wt Readings from Last 3 Encounters:   07/08/22 51 5 kg (113 lb 9 6 oz)   07/02/22 48 3 kg (106 lb 7 7 oz)   06/22/22 50 8 kg (112 lb)   stable on torsemide

## 2022-07-08 NOTE — ASSESSMENT & PLAN NOTE
Wt Readings from Last 3 Encounters:   07/08/22 51 5 kg (113 lb 9 6 oz)   07/02/22 48 3 kg (106 lb 7 7 oz)   06/22/22 50 8 kg (112 lb)     Torsemide as directed

## 2022-07-09 ENCOUNTER — HOME CARE VISIT (OUTPATIENT)
Dept: HOME HEALTH SERVICES | Facility: HOME HEALTHCARE | Age: 82
End: 2022-07-09
Payer: MEDICARE

## 2022-07-09 VITALS
WEIGHT: 118 LBS | DIASTOLIC BLOOD PRESSURE: 56 MMHG | RESPIRATION RATE: 18 BRPM | TEMPERATURE: 97.8 F | BODY MASS INDEX: 20.9 KG/M2 | HEART RATE: 68 BPM | OXYGEN SATURATION: 95 % | SYSTOLIC BLOOD PRESSURE: 94 MMHG

## 2022-07-09 PROCEDURE — G0300 HHS/HOSPICE OF LPN EA 15 MIN: HCPCS

## 2022-07-09 NOTE — CASE COMMUNICATION
Verbal order for OT requested at [facility] not recieved  Please follow up

## 2022-07-11 ENCOUNTER — TELEPHONE (OUTPATIENT)
Dept: FAMILY MEDICINE CLINIC | Facility: CLINIC | Age: 82
End: 2022-07-11

## 2022-07-11 ENCOUNTER — HOME CARE VISIT (OUTPATIENT)
Dept: HOME HEALTH SERVICES | Facility: HOME HEALTHCARE | Age: 82
End: 2022-07-11
Payer: MEDICARE

## 2022-07-11 PROCEDURE — G0180 MD CERTIFICATION HHA PATIENT: HCPCS | Performed by: INTERNAL MEDICINE

## 2022-07-12 ENCOUNTER — HOME CARE VISIT (OUTPATIENT)
Dept: HOME HEALTH SERVICES | Facility: HOME HEALTHCARE | Age: 82
End: 2022-07-12
Payer: MEDICARE

## 2022-07-12 VITALS
HEART RATE: 68 BPM | OXYGEN SATURATION: 100 % | DIASTOLIC BLOOD PRESSURE: 62 MMHG | RESPIRATION RATE: 16 BRPM | TEMPERATURE: 96.3 F | SYSTOLIC BLOOD PRESSURE: 110 MMHG

## 2022-07-12 VITALS — DIASTOLIC BLOOD PRESSURE: 60 MMHG | SYSTOLIC BLOOD PRESSURE: 100 MMHG | OXYGEN SATURATION: 98 % | HEART RATE: 74 BPM

## 2022-07-12 PROCEDURE — G0299 HHS/HOSPICE OF RN EA 15 MIN: HCPCS

## 2022-07-12 PROCEDURE — G0152 HHCP-SERV OF OT,EA 15 MIN: HCPCS

## 2022-07-12 NOTE — Clinical Note
Lexy Alba sending request for MSW Eval and treat for homecare  Patient wants information about getting services to assist her with housekeeping

## 2022-07-12 NOTE — Clinical Note
yes  ----- Message -----  From: hSen Greene MD  Sent: 7/13/2022   7:34 AM EDT  To: Aaron Allred OT      MSW nuria as in  evaluation?   ----- Message -----  From: Aaron Allred OT  Sent: 7/12/2022   4:52 PM EDT  To: Shen Greene MD    [facility] Dr Mandy Fitzgerald sending request for MSW Eval and treat for homecare  Patient wants information about getting services to assist her with housekeeping

## 2022-07-13 ENCOUNTER — HOME CARE VISIT (OUTPATIENT)
Dept: HOME HEALTH SERVICES | Facility: HOME HEALTHCARE | Age: 82
End: 2022-07-13
Payer: MEDICARE

## 2022-07-13 ENCOUNTER — IMMUNIZATIONS (OUTPATIENT)
Dept: FAMILY MEDICINE CLINIC | Facility: CLINIC | Age: 82
End: 2022-07-13
Payer: MEDICARE

## 2022-07-13 DIAGNOSIS — R53.81 PHYSICAL DECONDITIONING: Primary | ICD-10-CM

## 2022-07-13 DIAGNOSIS — I50.22 CHRONIC SYSTOLIC HEART FAILURE (HCC): ICD-10-CM

## 2022-07-13 DIAGNOSIS — E44.0 MODERATE PROTEIN-CALORIE MALNUTRITION (HCC): ICD-10-CM

## 2022-07-13 DIAGNOSIS — Z23 ENCOUNTER FOR IMMUNIZATION: Primary | ICD-10-CM

## 2022-07-13 DIAGNOSIS — D69.3 CHRONIC ITP (IDIOPATHIC THROMBOCYTOPENIA) (HCC): ICD-10-CM

## 2022-07-13 PROCEDURE — 91305 PR SARSCOV2 VACCINE 30MCG/0.3ML TRIS-SUCROSE IM USE: CPT

## 2022-07-13 PROCEDURE — 0053A PR IMM ADMN SARSCOV2 30MCG/0.3ML TRIS-SUCROSE 3RD: CPT

## 2022-07-13 NOTE — CASE COMMUNICATION
Homecare Occupational therapy evaluation completed 7/12/2022  No further homecare OT needed at this time   MSW referral sent to get in home support for housekeeping

## 2022-07-14 NOTE — CASE COMMUNICATION
called patient to set up visit for Wednesday and patient states she had too many appointments prefers PT comes Friday   today called patient and PT visit is now set up for Friday between 1230 and 130 pm   orders are out of compliance for this week as patient will be seen for one visit only not 2 x as ordered

## 2022-07-15 ENCOUNTER — HOME CARE VISIT (OUTPATIENT)
Dept: HOME HEALTH SERVICES | Facility: HOME HEALTHCARE | Age: 82
End: 2022-07-15
Payer: MEDICARE

## 2022-07-15 VITALS — OXYGEN SATURATION: 99 % | HEART RATE: 84 BPM | SYSTOLIC BLOOD PRESSURE: 110 MMHG | DIASTOLIC BLOOD PRESSURE: 50 MMHG

## 2022-07-15 DIAGNOSIS — R53.81 PHYSICAL DECONDITIONING: ICD-10-CM

## 2022-07-15 DIAGNOSIS — D69.3 CHRONIC ITP (IDIOPATHIC THROMBOCYTOPENIA) (HCC): ICD-10-CM

## 2022-07-15 DIAGNOSIS — I50.22 CHRONIC SYSTOLIC HEART FAILURE (HCC): Primary | ICD-10-CM

## 2022-07-15 PROCEDURE — G0151 HHCP-SERV OF PT,EA 15 MIN: HCPCS

## 2022-07-17 ENCOUNTER — HOME CARE VISIT (OUTPATIENT)
Dept: HOME HEALTH SERVICES | Facility: HOME HEALTHCARE | Age: 82
End: 2022-07-17
Payer: MEDICARE

## 2022-07-18 ENCOUNTER — HOME CARE VISIT (OUTPATIENT)
Dept: HOME HEALTH SERVICES | Facility: HOME HEALTHCARE | Age: 82
End: 2022-07-18
Payer: MEDICARE

## 2022-07-18 VITALS
TEMPERATURE: 98 F | HEART RATE: 77 BPM | RESPIRATION RATE: 18 BRPM | SYSTOLIC BLOOD PRESSURE: 102 MMHG | DIASTOLIC BLOOD PRESSURE: 58 MMHG | OXYGEN SATURATION: 98 %

## 2022-07-18 PROCEDURE — G0299 HHS/HOSPICE OF RN EA 15 MIN: HCPCS

## 2022-07-18 NOTE — PROGRESS NOTES
Discharge  Patient family called PT indicating she was going to follow up with PCP due to not feeling well  Patient was hospitalized and discharged with home health services  Timmy is discharged form out patient physical therapy service at this time

## 2022-07-19 ENCOUNTER — HOME CARE VISIT (OUTPATIENT)
Dept: HOME HEALTH SERVICES | Facility: HOME HEALTHCARE | Age: 82
End: 2022-07-19

## 2022-07-19 ENCOUNTER — HOME CARE VISIT (OUTPATIENT)
Dept: HOME HEALTH SERVICES | Facility: HOME HEALTHCARE | Age: 82
End: 2022-07-19
Payer: MEDICARE

## 2022-07-19 PROCEDURE — G0151 HHCP-SERV OF PT,EA 15 MIN: HCPCS

## 2022-07-19 PROCEDURE — G0155 HHCP-SVS OF CSW,EA 15 MIN: HCPCS

## 2022-07-20 ENCOUNTER — TELEPHONE (OUTPATIENT)
Dept: HEMATOLOGY ONCOLOGY | Facility: CLINIC | Age: 82
End: 2022-07-20

## 2022-07-20 ENCOUNTER — DOCUMENTATION (OUTPATIENT)
Dept: HEMATOLOGY ONCOLOGY | Facility: CLINIC | Age: 82
End: 2022-07-20

## 2022-07-20 VITALS — HEART RATE: 72 BPM | DIASTOLIC BLOOD PRESSURE: 52 MMHG | SYSTOLIC BLOOD PRESSURE: 98 MMHG | OXYGEN SATURATION: 99 %

## 2022-07-20 DIAGNOSIS — D69.3 CHRONIC ITP (IDIOPATHIC THROMBOCYTOPENIA) (HCC): Primary | ICD-10-CM

## 2022-07-20 NOTE — TELEPHONE ENCOUNTER
CALL RETURN FORM   Reason for patient call? Patient calling has received letter from pharmacy stating issues with medication  Patient asking about Dr Dara Kunz decision for:  Eltrombopag Olamine (PROMACTA PO)       Patient's primary oncologist? Bill Molina    Name of person the patient was calling for? Faroun   Any additional information to add, if applicable? Please call [phone]   Informed patient that the message will be forwarded to the team and someone will get back to them as soon as possible    Did you relay this information to the patient?   yes

## 2022-07-20 NOTE — TELEPHONE ENCOUNTER
Script pended for signature  [facility] will process and finance is working on [facility]

## 2022-07-20 NOTE — PROGRESS NOTES
Received request for patient to get PA on her Promacta 50mg  This is a re elizabeth  Florina Cochran has been submitted via cover my meds and was initially denied  I then called [phone] and verbally submitted an appeal on this request for the patient  This has 72 hours to make a determination       [facility]- [phone]    ID- [id number]  BIN: [id number] / PCN: [id number]  Group- [id number]

## 2022-07-21 ENCOUNTER — HOME CARE VISIT (OUTPATIENT)
Dept: HOME HEALTH SERVICES | Facility: HOME HEALTHCARE | Age: 82
End: 2022-07-21
Payer: MEDICARE

## 2022-07-21 ENCOUNTER — LAB REQUISITION (OUTPATIENT)
Dept: LAB | Facility: HOSPITAL | Age: 82
End: 2022-07-21
Payer: MEDICARE

## 2022-07-21 VITALS
HEART RATE: 79 BPM | DIASTOLIC BLOOD PRESSURE: 72 MMHG | TEMPERATURE: 98 F | OXYGEN SATURATION: 98 % | RESPIRATION RATE: 17 BRPM | SYSTOLIC BLOOD PRESSURE: 110 MMHG

## 2022-07-21 DIAGNOSIS — D61.818 OTHER PANCYTOPENIA (HCC): ICD-10-CM

## 2022-07-21 LAB
ALBUMIN SERPL BCP-MCNC: 3.9 G/DL (ref 3.5–5)
ALP SERPL-CCNC: 35 U/L (ref 34–104)
ALT SERPL W P-5'-P-CCNC: 18 U/L (ref 7–52)
ANION GAP SERPL CALCULATED.3IONS-SCNC: 6 MMOL/L (ref 4–13)
AST SERPL W P-5'-P-CCNC: 21 U/L (ref 13–39)
BASOPHILS # BLD AUTO: 0 THOUSANDS/ΜL (ref 0–0.1)
BASOPHILS NFR BLD AUTO: 0 % (ref 0–1)
BILIRUB SERPL-MCNC: 0.43 MG/DL (ref 0.2–1)
BUN SERPL-MCNC: 24 MG/DL (ref 5–25)
CALCIUM SERPL-MCNC: 9.3 MG/DL (ref 8.4–10.2)
CHLORIDE SERPL-SCNC: 102 MMOL/L (ref 96–108)
CO2 SERPL-SCNC: 29 MMOL/L (ref 21–32)
CREAT SERPL-MCNC: 0.81 MG/DL (ref 0.6–1.3)
EOSINOPHIL # BLD AUTO: 0 THOUSAND/ΜL (ref 0–0.61)
EOSINOPHIL NFR BLD AUTO: 0 % (ref 0–6)
ERYTHROCYTE [DISTWIDTH] IN BLOOD BY AUTOMATED COUNT: 15.4 % (ref 11.6–15.1)
GFR SERPL CREATININE-BSD FRML MDRD: 66 ML/MIN/1.73SQ M
GLUCOSE SERPL-MCNC: 104 MG/DL (ref 65–140)
HCT VFR BLD AUTO: 29.1 % (ref 34.8–46.1)
HGB BLD-MCNC: 9 G/DL (ref 11.5–15.4)
IMM GRANULOCYTES # BLD AUTO: 0.02 THOUSAND/UL (ref 0–0.2)
IMM GRANULOCYTES NFR BLD AUTO: 1 % (ref 0–2)
LYMPHOCYTES # BLD AUTO: 0.35 THOUSANDS/ΜL (ref 0.6–4.47)
LYMPHOCYTES NFR BLD AUTO: 10 % (ref 14–44)
MCH RBC QN AUTO: 27.6 PG (ref 26.8–34.3)
MCHC RBC AUTO-ENTMCNC: 30.9 G/DL (ref 31.4–37.4)
MCV RBC AUTO: 89 FL (ref 82–98)
MONOCYTES # BLD AUTO: 0.32 THOUSAND/ΜL (ref 0.17–1.22)
MONOCYTES NFR BLD AUTO: 9 % (ref 4–12)
NEUTROPHILS # BLD AUTO: 2.94 THOUSANDS/ΜL (ref 1.85–7.62)
NEUTS SEG NFR BLD AUTO: 80 % (ref 43–75)
NRBC BLD AUTO-RTO: 0 /100 WBCS
PLATELET # BLD AUTO: 66 THOUSANDS/UL (ref 149–390)
PMV BLD AUTO: 13.7 FL (ref 8.9–12.7)
POTASSIUM SERPL-SCNC: 3.7 MMOL/L (ref 3.5–5.3)
PROT SERPL-MCNC: 6.7 G/DL (ref 6.4–8.4)
RBC # BLD AUTO: 3.26 MILLION/UL (ref 3.81–5.12)
SODIUM SERPL-SCNC: 137 MMOL/L (ref 135–147)
WBC # BLD AUTO: 3.63 THOUSAND/UL (ref 4.31–10.16)

## 2022-07-21 PROCEDURE — 85025 COMPLETE CBC W/AUTO DIFF WBC: CPT | Performed by: INTERNAL MEDICINE

## 2022-07-21 PROCEDURE — G0299 HHS/HOSPICE OF RN EA 15 MIN: HCPCS

## 2022-07-21 PROCEDURE — 80053 COMPREHEN METABOLIC PANEL: CPT | Performed by: INTERNAL MEDICINE

## 2022-07-21 PROCEDURE — G0151 HHCP-SERV OF PT,EA 15 MIN: HCPCS

## 2022-07-22 ENCOUNTER — TRANSCRIBE ORDERS (OUTPATIENT)
Dept: ADMINISTRATIVE | Age: 82
End: 2022-07-22

## 2022-07-22 ENCOUNTER — APPOINTMENT (OUTPATIENT)
Dept: LAB | Age: 82
End: 2022-07-22
Payer: MEDICARE

## 2022-07-22 VITALS — HEART RATE: 79 BPM | OXYGEN SATURATION: 99 % | DIASTOLIC BLOOD PRESSURE: 64 MMHG | SYSTOLIC BLOOD PRESSURE: 114 MMHG

## 2022-07-22 DIAGNOSIS — D70.8 AUTO IMMUNE NEUTROPENIA (HCC): Primary | ICD-10-CM

## 2022-07-22 DIAGNOSIS — D70.8 AUTO IMMUNE NEUTROPENIA (HCC): ICD-10-CM

## 2022-07-22 LAB
ALBUMIN SERPL BCP-MCNC: 3.4 G/DL (ref 3.5–5)
ALP SERPL-CCNC: 35 U/L (ref 46–116)
ALT SERPL W P-5'-P-CCNC: 28 U/L (ref 12–78)
ANION GAP SERPL CALCULATED.3IONS-SCNC: 5 MMOL/L (ref 4–13)
AST SERPL W P-5'-P-CCNC: 25 U/L (ref 5–45)
BILIRUB SERPL-MCNC: 0.35 MG/DL (ref 0.2–1)
BUN SERPL-MCNC: 23 MG/DL (ref 5–25)
CALCIUM ALBUM COR SERPL-MCNC: 9.6 MG/DL (ref 8.3–10.1)
CALCIUM SERPL-MCNC: 9.1 MG/DL (ref 8.3–10.1)
CHLORIDE SERPL-SCNC: 105 MMOL/L (ref 96–108)
CO2 SERPL-SCNC: 29 MMOL/L (ref 21–32)
CREAT SERPL-MCNC: 0.88 MG/DL (ref 0.6–1.3)
GFR SERPL CREATININE-BSD FRML MDRD: 60 ML/MIN/1.73SQ M
GLUCOSE SERPL-MCNC: 104 MG/DL (ref 65–140)
POTASSIUM SERPL-SCNC: 3.5 MMOL/L (ref 3.5–5.3)
PROT SERPL-MCNC: 7.2 G/DL (ref 6.4–8.4)
SODIUM SERPL-SCNC: 139 MMOL/L (ref 135–147)

## 2022-07-22 PROCEDURE — 80053 COMPREHEN METABOLIC PANEL: CPT | Performed by: INTERNAL MEDICINE

## 2022-07-26 ENCOUNTER — HOME CARE VISIT (OUTPATIENT)
Dept: HOME HEALTH SERVICES | Facility: HOME HEALTHCARE | Age: 82
End: 2022-07-26
Payer: MEDICARE

## 2022-07-26 VITALS — DIASTOLIC BLOOD PRESSURE: 60 MMHG | HEART RATE: 76 BPM | OXYGEN SATURATION: 99 % | SYSTOLIC BLOOD PRESSURE: 108 MMHG

## 2022-07-26 PROCEDURE — G0151 HHCP-SERV OF PT,EA 15 MIN: HCPCS

## 2022-07-27 ENCOUNTER — HOME CARE VISIT (OUTPATIENT)
Dept: HOME HEALTH SERVICES | Facility: HOME HEALTHCARE | Age: 82
End: 2022-07-27
Payer: MEDICARE

## 2022-07-27 VITALS
DIASTOLIC BLOOD PRESSURE: 64 MMHG | OXYGEN SATURATION: 99 % | HEART RATE: 77 BPM | TEMPERATURE: 97.7 F | RESPIRATION RATE: 16 BRPM | SYSTOLIC BLOOD PRESSURE: 114 MMHG

## 2022-07-27 PROCEDURE — G0299 HHS/HOSPICE OF RN EA 15 MIN: HCPCS

## 2022-07-28 ENCOUNTER — TELEPHONE (OUTPATIENT)
Dept: HEMATOLOGY ONCOLOGY | Facility: CLINIC | Age: 82
End: 2022-07-28

## 2022-07-28 NOTE — TELEPHONE ENCOUNTER
CALL RETURN FORM   Reason for patient call? Patient is calling in from the lab indicating she is unsure if she needs to complete her labs every 2 weeks or 4 weeks  Patient states her last lab was on 7/22  Patient is requesting a call back to clarify her standing lab orders schedule  Patient's primary oncologist?  Dr Gregorio Cook    Name of person the patient was calling for? Georgina   Any additional information to add, if applicable? Patient's best call back number is [phone]   Informed patient that the message will be forwarded to the team and someone will get back to them as soon as possible    Did you relay this information to the patient?   Yes

## 2022-08-04 ENCOUNTER — HOME CARE VISIT (OUTPATIENT)
Dept: HOME HEALTH SERVICES | Facility: HOME HEALTHCARE | Age: 82
End: 2022-08-04
Payer: MEDICARE

## 2022-08-04 VITALS — DIASTOLIC BLOOD PRESSURE: 62 MMHG | SYSTOLIC BLOOD PRESSURE: 124 MMHG | HEART RATE: 84 BPM | OXYGEN SATURATION: 99 %

## 2022-08-04 PROCEDURE — G0151 HHCP-SERV OF PT,EA 15 MIN: HCPCS

## 2022-08-04 NOTE — CASE COMMUNICATION
patient DC today as reached PT goals  patient may want to go to outpatient PT as patient and family feel she needs more therapy though homecare goals are now met as no further skilled homecare goals are needed  will call patient in one week and see what family would like to do

## 2022-08-09 ENCOUNTER — RA CDI HCC (OUTPATIENT)
Dept: OTHER | Facility: HOSPITAL | Age: 82
End: 2022-08-09

## 2022-08-15 ENCOUNTER — OFFICE VISIT (OUTPATIENT)
Dept: FAMILY MEDICINE CLINIC | Facility: CLINIC | Age: 82
End: 2022-08-15
Payer: MEDICARE

## 2022-08-15 VITALS
WEIGHT: 112.8 LBS | HEART RATE: 78 BPM | TEMPERATURE: 96.8 F | DIASTOLIC BLOOD PRESSURE: 68 MMHG | HEIGHT: 64 IN | OXYGEN SATURATION: 99 % | SYSTOLIC BLOOD PRESSURE: 112 MMHG | BODY MASS INDEX: 19.26 KG/M2 | RESPIRATION RATE: 16 BRPM

## 2022-08-15 DIAGNOSIS — I50.22 CHRONIC SYSTOLIC HEART FAILURE (HCC): ICD-10-CM

## 2022-08-15 DIAGNOSIS — Z00.00 MEDICARE ANNUAL WELLNESS VISIT, SUBSEQUENT: Primary | ICD-10-CM

## 2022-08-15 DIAGNOSIS — D69.3 CHRONIC ITP (IDIOPATHIC THROMBOCYTOPENIA) (HCC): ICD-10-CM

## 2022-08-15 DIAGNOSIS — D70.9 NEUTROPENIA, UNSPECIFIED TYPE (HCC): ICD-10-CM

## 2022-08-15 PROCEDURE — G0439 PPPS, SUBSEQ VISIT: HCPCS | Performed by: FAMILY MEDICINE

## 2022-08-15 PROCEDURE — 99214 OFFICE O/P EST MOD 30 MIN: CPT | Performed by: FAMILY MEDICINE

## 2022-08-15 NOTE — PROGRESS NOTES
Assessment and Plan:     Problem List Items Addressed This Visit        Cardiovascular and Mediastinum    Chronic systolic heart failure (HCC)     Wt Readings from Last 3 Encounters:   08/15/22 51 2 kg (112 lb 12 8 oz)   07/09/22 53 5 kg (118 lb)   07/08/22 51 5 kg (113 lb 9 6 oz)   stable  Continue current meds                  Musculoskeletal and Integument    Chronic ITP (idiopathic thrombocytopenia) (HCC)     Stable  Care per hematology            Other    Neutropenia, unspecified type ([facility] [place] [facility] )     Stable  Sees hematology           Other Visit Diagnoses     Medicare annual wellness visit, subsequent    -  Primary           Preventive health issues were discussed with patient, and age appropriate screening tests were ordered as noted in patient's After Visit Summary  Personalized health advice and appropriate referrals for health education or preventive services given if needed, as noted in patient's After Visit Summary  History of Present Illness:     Patient presents for a Medicare Wellness Visit    HPI   Here for follow up  Feeling well overall  Done with home PT and her son wants her to get outpatient therapy done         Patient Care Team:  Robin Doan MD as PCP - General (Family Medicine)     Review of Systems:     Review of Systems   Constitutional: Negative  HENT: Negative  Eyes: Negative  Respiratory: Negative  Cardiovascular: Negative  Gastrointestinal: Negative  Endocrine: Negative  Genitourinary: Negative  Musculoskeletal: Negative  Skin: Negative  Allergic/Immunologic: Negative  Neurological: Negative  Hematological: Negative  Psychiatric/Behavioral: Negative           Problem List:     Patient Active Problem List   Diagnosis    [facility]    Hypertension, essential    Other specified glaucoma    [facility]immune hepatitis [facility] steroids [facility]    Dilated cardiomyopathy ([facility] [place] [facility] )    Complete left bundle branch block (LBBB)    LEA positive    Congestive heart failure (HCC)    Depressive disorder    Neutropenia, unspecified type (HCC)    Left-sided Bell's palsy    Chronic ITP (idiopathic thrombocytopenia) (HCC)    Hypertensive heart disease with congestive heart failure (HCC)    Bilateral hearing loss    Acute blood loss anemia    Hypotension    Headache    Pancytopenia (HCC)    Moderate protein-calorie malnutrition (HCC)      Past Medical and Surgical History:     Past Medical History:   Diagnosis Date    Bell's palsy     Cardiac disease     Ear problems     HL (hearing loss)     Hypertension     Sacroiliitis ([facility] ) 8/10/2021    Subdural hemorrhage (HCC) 3/30/2022    Thrombocytopenia ([facility] ) 8/3/2018    Tinnitus      Past Surgical History:   Procedure Laterality Date    CT BONE MARROW BIOPSY AND ASPIRATION  1/27/2021      Family History:     Family History   Problem Relation Age of Onset    Autoimmune disease Neg Hx       Social History:     Social History     Socioeconomic History    Marital status: /Civil Union     Spouse name: None    Number of children: None    Years of education: None    Highest education level: None   Occupational History    None   Tobacco Use    Smoking status: Never Smoker    Smokeless tobacco: Never Used   Vaping Use    Vaping Use: Never used   Substance and Sexual Activity    Alcohol use: No     Alcohol/week: 0 0 standard drinks    Drug use: No    Sexual activity: Not Currently     Partners: Male   Other Topics Concern    None   Social History Narrative    None     Social Determinants of Health     Financial Resource Strain: Low Risk     Difficulty of Paying Living Expenses: Not hard at all   Food Insecurity: No Food Insecurity    Worried About Running Out of Food in the Last Year: Never true    Lety of Food in the Last Year: Never true   Transportation Needs: No Transportation Needs    Lack of Transportation (Medical): No    Lack of Transportation (Non-Medical):  No Physical Activity: Not on file   Stress: Not on file   Social Connections: Not on file   Intimate Partner Violence: Not on file   Housing Stability: Low Risk     Unable to Pay for Housing in the Last Year: No    Number of Places Lived in the Last Year: 1    Unstable Housing in the Last Year: No      Medications and Allergies:     Current Outpatient Medications   Medication Sig Dispense Refill    ascorbic acid (VITAMIN C) 500 mg tablet Take 500 mg by mouth daily      budesonide (ENTOCORT EC) 3 MG capsule Take 9 mg by mouth daily Takes 3 capsules once daily   eltrombopag (PROMACTA) 50 MG tablet Take 1 tablet (50 mg total) by mouth daily Administer on an empty stomach, 1 hour before or 2 hours after a meal  30 tablet 5    ferrous sulfate 324 (65 Fe) mg Take 1 tablet (324 mg total) by mouth daily before breakfast 30 tablet 0    fluticasone (FLONASE) 50 mcg/act nasal spray 2 sprays into each nostril daily (Patient taking differently: 2 sprays into each nostril daily as needed for allergies) 16 g 0    metoprolol succinate (TOPROL-XL) 25 mg 24 hr tablet Take 1 tablet (25 mg total) by mouth daily 90 tablet 3    potassium chloride (MICRO-K) 10 MEQ CR capsule Take 1 capsule (10 mEq total) by mouth daily (Patient taking differently: Take 10 mEq by mouth daily Hold parameter for a systolic BP 90 or less  Call for instructions if there is any SS of fluid overload or dehydration ) 90 capsule 3    spironolactone (ALDACTONE) 25 mg tablet Take 0 5 tablets (12 5 mg total) by mouth daily (Patient taking differently: Take 12 5 mg by mouth daily Hold parameter for a systolic BP 90 or less  Call for instructions if there is any SS of fluid overload or dehydration ) 90 tablet 3    torsemide (DEMADEX) 10 mg tablet Take 1 tablet (10 mg total) by mouth daily (Patient taking differently: Take 10 mg by mouth daily Hold parameter for a systolic BP 90 or less   Call for instructions if there is any SS of fluid overload or dehydration ) 90 tablet 3     No current facility-administered medications for this visit  Allergies   Allergen Reactions    Ambien [Zolpidem] Other (See Comments)     Did not allow sleep per pt      Immunizations:     Immunization History   Administered Date(s) Administered    COVID-19 PFIZER VACCINE 0 3 ML IM 05/12/2021, 06/05/2021    COVID-19 Pfizer vac (Misael-sucrose, gray cap) 12 yr+ IM 07/13/2022    Hep A, adult 06/22/2022    Hep B, adult 06/22/2022    Pneumococcal Conjugate 13-Valent 08/10/2021    Pneumococcal Polysaccharide PPV23 04/06/2022      Health Maintenance:         Topic Date Due    Hepatitis C Screening  Completed         Topic Date Due    Influenza Vaccine (1) 09/01/2022      Medicare Screening Tests and Risk Assessments:     Beatrice Cranker is here for her Subsequent Wellness visit  Last Medicare Wellness visit information reviewed, patient interviewed and updates made to the record today  Health Risk Assessment:   Patient rates overall health as good  Patient feels that their physical health rating is slightly worse  Patient is satisfied with their life  Eyesight was rated as slightly worse  Hearing was rated as slightly worse  Patient feels that their emotional and mental health rating is same  Patients states they are never, rarely angry  Patient states they are sometimes unusually tired/fatigued  Pain experienced in the last 7 days has been none  Patient states that she has experienced no weight loss or gain in last 6 months  Fall Risk Screening: In the past year, patient has experienced: history of falling in past year    Number of falls: 1  Injured during fall?: Yes    Feels unsteady when standing or walking?: Yes    Worried about falling?: No      Urinary Incontinence Screening:   Patient has not leaked urine accidently in the last six months  Home Safety:  Patient does not have trouble with stairs inside or outside of their home   Patient has working smoke alarms and has working carbon monoxide detector  Home safety hazards include: none  Nutrition:   Current diet is Regular  Medications:   Patient is currently taking over-the-counter supplements  OTC medications include: see medication list  Patient is able to manage medications  Activities of Daily Living (ADLs)/Instrumental Activities of Daily Living (IADLs):   Walk and transfer into and out of bed and chair?: Yes  Dress and groom yourself?: Yes    Bathe or shower yourself?: Yes    Feed yourself?  Yes  Do your laundry/housekeeping?: Yes  Manage your money, pay your bills and track your expenses?: Yes  Make your own meals?: Yes    Do your own shopping?: Yes    Previous Hospitalizations:   Any hospitalizations or ED visits within the last 12 months?: Yes    How many hospitalizations have you had in the last year?: 1-2    PREVENTIVE SCREENINGS      Cardiovascular Screening:    General: Screening Current      Diabetes Screening:     General: Screening Current      Colorectal Cancer Screening:     General: Screening Current      Cervical Cancer Screening:    General: Screening Not Indicated      Osteoporosis Screening:      Due for: DXA Axial      Lung Cancer Screening:     General: Screening Not Indicated      Hepatitis C Screening:    General: Screening Current    Screening, Brief Intervention, and Referral to Treatment (SBIRT)    Screening      Single Item Drug Screening:  How often have you used an illegal drug (including marijuana) or a prescription medication for non-medical reasons in the past year? never    Single Item Drug Screen Score: 0  Interpretation: Negative screen for possible drug use disorder    No exam data present     Physical Exam:     /68 (BP Location: Left arm, Patient Position: Sitting, Cuff Size: Adult)   Pulse 78   Temp (!) 96 8 °F (36 °C) (Skin)   Resp 16   Ht 5' 3 86" (1 622 m)   Wt 51 2 kg (112 lb 12 8 oz)   SpO2 99%   BMI 19 45 kg/m²     Physical Exam  Vitals and nursing note reviewed  Constitutional:       General: She is not in acute distress  Appearance: Normal appearance  She is well-developed  HENT:      Head: Normocephalic and atraumatic  Right Ear: Tympanic membrane normal       Left Ear: Tympanic membrane normal       Mouth/Throat:      Mouth: Mucous membranes are moist    Eyes:      Conjunctiva/sclera: Conjunctivae normal    Cardiovascular:      Rate and Rhythm: Normal rate and regular rhythm  Pulses: Normal pulses  Heart sounds: No murmur heard  Pulmonary:      Effort: Pulmonary effort is normal  No respiratory distress  Breath sounds: Normal breath sounds  Abdominal:      Palpations: Abdomen is soft  Tenderness: There is no abdominal tenderness  Musculoskeletal:      Cervical back: Normal range of motion and neck supple  Skin:     General: Skin is warm and dry  Capillary Refill: Capillary refill takes less than 2 seconds  Neurological:      General: No focal deficit present  Mental Status: She is alert and oriented to person, place, and time     Psychiatric:         Mood and Affect: Mood normal          Behavior: Behavior normal           Lee Martin MD

## 2022-08-15 NOTE — PATIENT INSTRUCTIONS
Medicare Preventive Visit Patient Instructions  Thank you for completing your Welcome to Medicare Visit or Medicare Annual Wellness Visit today  Your next wellness visit will be due in one year (8/16/2023)  The screening/preventive services that you may require over the next 5-10 years are detailed below  Some tests may not apply to you based off risk factors and/or age  Screening tests ordered at today's visit but not completed yet may show as past due  Also, please note that scanned in results may not display below  Preventive Screenings:  Service Recommendations Previous Testing/Comments   Colorectal Cancer Screening  * Colonoscopy    * Fecal Occult Blood Test (FOBT)/Fecal Immunochemical Test (FIT)  * Fecal DNA/Cologuard Test  * Flexible Sigmoidoscopy Age: 39-70 years old   Colonoscopy: every 10 years (may be performed more frequently if at higher risk)  OR  FOBT/FIT: every 1 year  OR  Cologuard: every 3 years  OR  Sigmoidoscopy: every 5 years  Screening may be recommended earlier than age 39 if at higher risk for colorectal cancer  Also, an individualized decision between you and your healthcare provider will decide whether screening between the ages of 74-80 would be appropriate  Colonoscopy: Not on file  FOBT/FIT: 06/10/2020  Cologuard: Not on file  Sigmoidoscopy: Not on file          Breast Cancer Screening Age: 36 years old  Frequency: every 1-2 years  Not required if history of left and right mastectomy Mammogram: Not on file        Cervical Cancer Screening Between the ages of 21-29, pap smear recommended once every 3 years  Between the ages of 33-67, can perform pap smear with HPV co-testing every 5 years     Recommendations may differ for women with a history of total hysterectomy, cervical cancer, or abnormal pap smears in past  Pap Smear: Not on file    Screening Not Indicated   Hepatitis C Screening Once for adults born between [facility]  More frequently in patients at high risk for Hepatitis C Hep C Antibody: 08/02/2018    Screening Current   Diabetes Screening 1-2 times per year if you're at risk for diabetes or have pre-diabetes Fasting glucose: 91 mg/dL (2/24/2021)  A1C: No results in last 5 years (No results in last 5 years)  Screening Current   Cholesterol Screening Once every 5 years if you don't have a lipid disorder  May order more often based on risk factors  Lipid panel: 08/02/2018    Screening Current     Other Preventive Screenings Covered by Medicare:  1  Abdominal Aortic Aneurysm (AAA) Screening: covered once if your at risk  You're considered to be at risk if you have a family history of AAA  2  Lung Cancer Screening: covers low dose CT scan once per year if you meet all of the following conditions: (1) Age 50-69; (2) No signs or symptoms of lung cancer; (3) Current smoker or have quit smoking within the last 15 years; (4) You have a tobacco smoking history of at least 20 pack years (packs per day multiplied by number of years you smoked); (5) You get a written order from a healthcare provider  3  Glaucoma Screening: covered annually if you're considered high risk: (1) You have diabetes OR (2) Family history of glaucoma OR (3)  aged 48 and older OR (3)  American aged 72 and older  3  Osteoporosis Screening: covered every 2 years if you meet one of the following conditions: (1) You're estrogen deficient and at risk for osteoporosis based off medical history and other findings; (2) Have a vertebral abnormality; (3) On glucocorticoid therapy for more than 3 months; (4) Have primary hyperparathyroidism; (5) On osteoporosis medications and need to assess response to drug therapy  · Last bone density test (DXA Scan): Not on file  5  HIV Screening: covered annually if you're between the age of 12-76  Also covered annually if you are younger than 13 and older than 72 with risk factors for HIV infection   For pregnant patients, it is covered up to 3 times per pregnancy  Immunizations:  Immunization Recommendations   Influenza Vaccine Annual influenza vaccination during flu season is recommended for all persons aged >= 6 months who do not have contraindications   Pneumococcal Vaccine   * Pneumococcal conjugate vaccine = PCV13 (Prevnar 13), PCV15 (Vaxneuvance), PCV20 (Prevnar 20)  * Pneumococcal polysaccharide vaccine = PPSV23 (Pneumovax) Adults 25-60 years old: 1-3 doses may be recommended based on certain risk factors  Adults 72 years old: 1-2 doses may be recommended based off what pneumonia vaccine you previously received   Hepatitis B Vaccine 3 dose series if at intermediate or high risk (ex: diabetes, end stage renal disease, liver disease)   Tetanus (Td) Vaccine - COST NOT COVERED BY MEDICARE PART B Following completion of primary series, a booster dose should be given every 10 years to maintain immunity against tetanus  Td may also be given as tetanus wound prophylaxis  Tdap Vaccine - COST NOT COVERED BY MEDICARE PART B Recommended at least once for all adults  For pregnant patients, recommended with each pregnancy  Shingles Vaccine (Shingrix) - COST NOT COVERED BY MEDICARE PART B  2 shot series recommended in those aged 48 and above     Health Maintenance Due:      Topic Date Due    Hepatitis C Screening  Completed     Immunizations Due:      Topic Date Due    Influenza Vaccine (1) 09/01/2022     Advance Directives   What are advance directives? Advance directives are legal documents that state your wishes and plans for medical care  These plans are made ahead of time in case you lose your ability to make decisions for yourself  Advance directives can apply to any medical decision, such as the treatments you want, and if you want to donate organs  What are the types of advance directives? There are many types of advance directives, and each state has rules about how to use them   You may choose a combination of any of the following:  · Living will: This is a written record of the treatment you want  You can also choose which treatments you do not want, which to limit, and which to stop at a certain time  This includes surgery, medicine, IV fluid, and tube feedings  · Durable power of  for healthcare [facility]): This is a written record that states who you want to make healthcare choices for you when you are unable to make them for yourself  This person, called a proxy, is usually a family member or a friend  You may choose more than 1 proxy  · Do not resuscitate (DNR) order:  A DNR order is used in case your heart stops beating or you stop breathing  It is a request not to have certain forms of treatment, such as CPR  A DNR order may be included in other types of advance directives  · Medical directive: This covers the care that you want if you are in a coma, near death, or unable to make decisions for yourself  You can list the treatments you want for each condition  Treatment may include pain medicine, surgery, blood transfusions, dialysis, IV or tube feedings, and a ventilator (breathing machine)  · Values history: This document has questions about your views, beliefs, and how you feel and think about life  This information can help others choose the care that you would choose  Why are advance directives important? An advance directive helps you control your care  Although spoken wishes may be used, it is better to have your wishes written down  Spoken wishes can be misunderstood, or not followed  Treatments may be given even if you do not want them  An advance directive may make it easier for your family to make difficult choices about your care  Fall Prevention    Fall prevention  includes ways to make your home and other areas safer  It also includes ways you can move more carefully to prevent a fall  Health conditions that cause changes in your blood pressure, vision, or muscle strength and coordination may increase your risk for falls  Medicines may also increase your risk for falls if they make you dizzy, weak, or sleepy  Fall prevention tips:   · Stand or sit up slowly  · Use assistive devices as directed  · Wear shoes that fit well and have soles that   · Wear a personal alarm  · Stay active  · Manage your medical conditions  Home Safety Tips:  · Add items to prevent falls in the bathroom  · Keep paths clear  · Install bright lights in your home  · Keep items you use often on shelves within reach  · Paint or place reflective tape on the edges of your stairs  © Copyright [employer] 2018 Information is for End User's use only and may not be sold, redistributed or otherwise used for commercial purposes   All illustrations and images included in CareNotes® are the copyrighted property of A D A M , Inc  or [address] [place] [address] [facility]

## 2022-08-15 NOTE — ASSESSMENT & PLAN NOTE
Wt Readings from Last 3 Encounters:   08/15/22 51 2 kg (112 lb 12 8 oz)   07/09/22 53 5 kg (118 lb)   07/08/22 51 5 kg (113 lb 9 6 oz)   stable  Continue current meds

## 2022-08-29 ENCOUNTER — OFFICE VISIT (OUTPATIENT)
Dept: HEMATOLOGY ONCOLOGY | Facility: CLINIC | Age: 82
End: 2022-08-29
Payer: MEDICARE

## 2022-08-29 VITALS
OXYGEN SATURATION: 98 % | SYSTOLIC BLOOD PRESSURE: 118 MMHG | DIASTOLIC BLOOD PRESSURE: 72 MMHG | BODY MASS INDEX: 19.49 KG/M2 | TEMPERATURE: 97.9 F | WEIGHT: 110 LBS | HEIGHT: 63 IN | RESPIRATION RATE: 16 BRPM | HEART RATE: 70 BPM

## 2022-08-29 DIAGNOSIS — D62 ACUTE BLOOD LOSS ANEMIA: Primary | ICD-10-CM

## 2022-08-29 DIAGNOSIS — D69.3 CHRONIC ITP (IDIOPATHIC THROMBOCYTOPENIA) (HCC): ICD-10-CM

## 2022-08-29 PROCEDURE — 99215 OFFICE O/P EST HI 40 MIN: CPT | Performed by: PHYSICIAN ASSISTANT

## 2022-08-29 NOTE — PROGRESS NOTES
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  She is on Promacta 50 mg p o  daily since 8/2021  Platelet count went from 30,000 range July 2021 to normal by 9/2021         2  Elevation of AST, ALT with positive LEA most likely autoimmune, she was back on prednisone 10 mg p o  daily with normalization of AST, ALT managed per Dr Jones Cortes  Appears discontinued since at least 3/2021         Leukopenia autoimmune  Acute blood loss anemia  Status post fall with subdural hemorrhage March 2022  Hemoglobin 5 1 7/1/2022  She felt very fatigued prior to admission  She and her son deny any evidence of GI bleed  She declined GI workup  She was taking oral iron for a few weeks post discharge but has since stopped  She is advised to resume oral iron  We will check iron level with her next set of blood work  Decreased memory  Possible medication noncompliance with Promacta earlier 2022  Also, her lab assessment was intermittent earlier in 2022  Her son has been very helpful with her getting routine blood draws and taking Promacta daily since her July 2022 discharge      Her son states he will take his mom the 1st week of the month to have CBC drawn (ordered by our office), CMP drawn, (ordered per Dr Grupo Quintero office)  And she will have iron panel drawn with next blood which is anticipated 10/1/2022    Continue with Promacta 50 milligrams p o  daily      Follow-up in 3 months           HPI:    70-year-old [place] American female who was seen initially 5/2020 regarding pancytopenia     She has a history of dilated cardiomyopathy with low ejection fraction on Entresto, spironolactone, torsemide, metoprolol and budesonide   She has history of hypertension, autoimmune hepatitis treated with steroids, 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         She had outpatient labs 4/17/20 at [facility]   hemoglobin 6 9, MCV 70, RDW 19 7, WBC 1 9, 59% neutrophils, 30% lymphocytes, platelet 22697,   She was transfused with 2 units packed RBC ordered by her cardiologist, Dr Connie Linton and received Feraheme weekly x 2 doses end of June 2020      [facility] on 09/09/2019 showed hemoglobin 11 3, MCV 93, WBC of 2 7, normal differential platelets 04271  August 2018 iron saturation 8% ferritin 26  On 07/2018 hemoglobin 11, MCV 91, WBC 3 1, platelets 15220  77/23/8667:  Hemoglobin 12 9, MCV of 90, white blood cell count 2 95, platelets 860  On 86/7652 hemoglobin 13 3, MCV 89, WBC 3 4, platelets [id number]     She had normal vitamin Z61, folic acid, TSH, protein electrophoresis     Autoimmune workup by Rheumatology was negative except for anti smooth muscle antibodies of 93     Because of the leukopenia, thrombocytopenia bone marrow biopsy performed on January 2021 showed normal bone marrow cellularity for the age, no evidence of dysplastic features, normal plasma cells and lymphocytes, no evidence of vasculitis, necrosis, fibrosis adequate iron stains     Back on prednisone 10 mg p o  daily because of elevation of ALT, AST, with persistent thrombocytopenia of 38,000  Initiated on Promacta 50 mg at the end of August 2021, platelet count [id number] in November 2021, WBC 4, and decrease in the AST, ALT    3/20/22 admitted when outpatient CT identified subdural hemorrhage  She would a mechanical fall 1 week prior and had intermittent headaches which prompted the CT scan       4/25/2022 hemoglobin 11 8, platelet count 18,599    No f/u outpatient labs until 6/30/22  Hemoglobin 6 1, platelet count 889  Admitted July 1st through July 5th Hemoglobin on admission 5 1 July 1st   She underwent transfusion of packed red blood cells   She declined endoscopy  Discharge hemoglobin 8 3    On discussion today, August 29th, patient and her son report that she did not have dark stool     She was taking oral iron for a few weeks post discharge from her June 2022 hospitalization  8/25/2022 hemoglobin 10 7, MCV 80, white blood cell count 4 3, 65% neutrophils, 24% lymphocytes, 10% monocytes, platelet count 169      Test Results:        Labs:   Lab Results   Component Value Date    HGB 8 9 (L) 07/22/2022    HCT 30 3 (L) 07/22/2022    MCV 92 07/22/2022    PLT 51 (L) 07/22/2022    WBC 3 49 (L) 07/22/2022    NRBC 0 07/22/2022     Lab Results   Component Value Date     12/04/2015    K 3 5 07/22/2022     07/22/2022    CO2 29 07/22/2022    ANIONGAP 4 12/04/2015    BUN 23 07/22/2022    CREATININE 0 88 07/22/2022    GLUCOSE 94 12/04/2015    GLUF 91 02/24/2021    CALCIUM 9 1 07/22/2022    CORRECTEDCA 9 6 07/22/2022    AST 25 07/22/2022    ALT 28 07/22/2022    ALKPHOS 35 (L) 07/22/2022    PROT 7 5 12/04/2015    BILITOT 0 48 12/04/2015    EGFR 60 07/22/2022       Imaging: No results found  ROS:  As mentioned in HPI & Interval History otherwise 14 point ROS negative  Allergies: Allergies   Allergen Reactions    Ambien [Zolpidem] Other (See Comments)     Did not allow sleep per pt     Current Medications: Reviewed  PMH/FH/SH:  Reviewed      Physical Exam:    There is no height or weight on file to calculate BSA  Ht Readings from Last 3 Encounters:   08/15/22 5' 3 86" (1 622 m)   07/08/22 5' 3" (1 6 m)   07/02/22 5' 3" (1 6 m)        Wt Readings from Last 3 Encounters:   08/15/22 51 2 kg (112 lb 12 8 oz)   07/09/22 53 5 kg (118 lb)   07/08/22 51 5 kg (113 lb 9 6 oz)        Temp Readings from Last 3 Encounters:   08/15/22 (!) 96 8 °F (36 °C) (Skin)   07/27/22 97 7 °F (36 5 °C) (Temporal)   07/21/22 98 °F (36 7 °C) (Temporal)        BP Readings from Last 3 Encounters:   08/15/22 112/68   08/04/22 124/62   07/27/22 114/64           Physical Exam  Vitals reviewed  Constitutional:       General: She is not in acute distress  Appearance: She is well-developed   She is not diaphoretic  HENT:      Head: Normocephalic and atraumatic  Right Ear: No decreased hearing noted  Left Ear: No decreased hearing noted  Eyes:      Conjunctiva/sclera: Conjunctivae normal    Neck:      Trachea: No tracheal deviation  Cardiovascular:      Rate and Rhythm: Normal rate and regular rhythm  Heart sounds: No murmur heard  No friction rub  No gallop  Pulmonary:      Effort: Pulmonary effort is normal  No respiratory distress  Breath sounds: Normal breath sounds  No wheezing or rales  Chest:      Chest wall: No tenderness  Abdominal:      General: There is no distension  Palpations: Abdomen is soft  Tenderness: There is no abdominal tenderness  Musculoskeletal:      Cervical back: Normal range of motion and neck supple  Lymphadenopathy:      Cervical: No cervical adenopathy  Skin:     General: Skin is warm and dry  Coloration: Skin is not pale  Findings: No erythema  Neurological:      Mental Status: She is alert and oriented to person, place, and time  Comments: Left-sided facial droop-chronic from Bell's palsy   Psychiatric:         Behavior: Behavior normal          Thought Content:  Thought content normal          Judgment: Judgment normal          ECOG:       Emergency Contacts:    Extended Emergency Contact Information  Primary Emergency Contact: [address] Phone: [phone]  Relation: Spouse  Secondary Emergency Contact: Rohini This  Mobile Phone: [phone]  Relation: Son

## 2022-09-28 DIAGNOSIS — D69.3 CHRONIC ITP (IDIOPATHIC THROMBOCYTOPENIA) (HCC): ICD-10-CM

## 2022-09-28 RX ORDER — ELTROMBOPAG OLAMINE 50 MG/1
TABLET, FILM COATED ORAL
Qty: 90 TABLET | Refills: 2 | Status: SHIPPED | OUTPATIENT
Start: 2022-09-28

## 2022-10-20 ENCOUNTER — TELEPHONE (OUTPATIENT)
Dept: HEMATOLOGY ONCOLOGY | Facility: CLINIC | Age: 82
End: 2022-10-20

## 2022-10-20 DIAGNOSIS — D69.3 CHRONIC ITP (IDIOPATHIC THROMBOCYTOPENIA) (HCC): Primary | ICD-10-CM

## 2022-10-20 NOTE — TELEPHONE ENCOUNTER
Spoke with [facility] and was informed CBC was incorrectly processed and will not be resulted  Called pt and she will go to a [facility]'s lab probably on Saturday 10/22 to have this redrawn  Order placed

## 2022-10-20 NOTE — TELEPHONE ENCOUNTER
CALL RETURN FORM   Reason for patient call? Aston Jones from [facility] [place] Lab medicine calling to advise office that a lab was drawn incorrectly and cannot be processed  Patient's primary oncologist? Dr Plascencia Res    Name of person the patient was calling for? Dr Plascencia Res   Any additional information to add, if applicable? N/A   Informed patient that the message will be forwarded to the team and someone will get back to them as soon as possible    Did you relay this information to the patient?  Yes

## 2022-10-24 ENCOUNTER — APPOINTMENT (OUTPATIENT)
Dept: LAB | Age: 82
End: 2022-10-24
Payer: MEDICARE

## 2022-10-24 DIAGNOSIS — D69.3 CHRONIC ITP (IDIOPATHIC THROMBOCYTOPENIA) (HCC): ICD-10-CM

## 2022-11-10 LAB
DME PARACHUTE DELIVERY DATE ACTUAL: NORMAL
DME PARACHUTE DELIVERY DATE REQUESTED: NORMAL
DME PARACHUTE ITEM DESCRIPTION: NORMAL
DME PARACHUTE ORDER STATUS: NORMAL
DME PARACHUTE SUPPLIER NAME: NORMAL
DME PARACHUTE SUPPLIER PHONE: NORMAL

## 2022-11-17 DIAGNOSIS — K75.4 AUTOIMMUNE HEPATITIS (HCC): ICD-10-CM

## 2022-11-18 ENCOUNTER — TELEPHONE (OUTPATIENT)
Dept: HEMATOLOGY ONCOLOGY | Facility: CLINIC | Age: 82
End: 2022-11-18

## 2022-11-18 DIAGNOSIS — D69.3 CHRONIC ITP (IDIOPATHIC THROMBOCYTOPENIA) (HCC): Primary | ICD-10-CM

## 2022-11-18 RX ORDER — METOPROLOL SUCCINATE 25 MG/1
TABLET, EXTENDED RELEASE ORAL
Qty: 90 TABLET | Refills: 3 | Status: SHIPPED | OUTPATIENT
Start: 2022-11-18

## 2022-11-18 NOTE — TELEPHONE ENCOUNTER
Received call from [place] with [facility] lab, platelet count cannot be resulted due to specimen clumping  Spoke with patient, she will go to [place] [id number] today or tomorrow morning to have her labs re drawn  Pt denied any bleeding or bruising

## 2022-11-20 ENCOUNTER — APPOINTMENT (OUTPATIENT)
Dept: LAB | Age: 82
End: 2022-11-20

## 2022-11-20 DIAGNOSIS — D69.3 CHRONIC ITP (IDIOPATHIC THROMBOCYTOPENIA) (HCC): ICD-10-CM

## 2022-11-21 ENCOUNTER — TELEPHONE (OUTPATIENT)
Dept: HEMATOLOGY ONCOLOGY | Facility: CLINIC | Age: 82
End: 2022-11-21

## 2022-11-21 NOTE — TELEPHONE ENCOUNTER
Left voicemail for pt letting her know plt count [phone]  Instruction provided to take Promacta every other day  Provided my direct line for any questions

## 2022-12-05 ENCOUNTER — APPOINTMENT (OUTPATIENT)
Dept: LAB | Facility: CLINIC | Age: 82
End: 2022-12-05

## 2022-12-05 DIAGNOSIS — D70.8 AUTO IMMUNE NEUTROPENIA (HCC): ICD-10-CM

## 2022-12-05 LAB
ALBUMIN SERPL BCP-MCNC: 3.4 G/DL (ref 3.5–5)
ALP SERPL-CCNC: 69 U/L (ref 34–104)
ALT SERPL W P-5'-P-CCNC: 154 U/L (ref 7–52)
ANION GAP SERPL CALCULATED.3IONS-SCNC: 4 MMOL/L (ref 4–13)
AST SERPL W P-5'-P-CCNC: 159 U/L (ref 13–39)
BILIRUB SERPL-MCNC: 0.9 MG/DL (ref 0.2–1)
BUN SERPL-MCNC: 15 MG/DL (ref 5–25)
CALCIUM ALBUM COR SERPL-MCNC: 9.6 MG/DL (ref 8.3–10.1)
CALCIUM SERPL-MCNC: 9.1 MG/DL (ref 8.4–10.2)
CHLORIDE SERPL-SCNC: 105 MMOL/L (ref 96–108)
CO2 SERPL-SCNC: 27 MMOL/L (ref 21–32)
CREAT SERPL-MCNC: 0.6 MG/DL (ref 0.6–1.3)
ERYTHROCYTE [DISTWIDTH] IN BLOOD BY AUTOMATED COUNT: 18 % (ref 11.6–15.1)
GFR SERPL CREATININE-BSD FRML MDRD: 83 ML/MIN/1.73SQ M
GLUCOSE SERPL-MCNC: 107 MG/DL (ref 65–140)
HCT VFR BLD AUTO: 31.3 % (ref 34.8–46.1)
HGB BLD-MCNC: 9.8 G/DL (ref 11.5–15.4)
MCH RBC QN AUTO: 28.7 PG (ref 26.8–34.3)
MCHC RBC AUTO-ENTMCNC: 31.3 G/DL (ref 31.4–37.4)
MCV RBC AUTO: 92 FL (ref 82–98)
PLATELET # BLD AUTO: 441 THOUSANDS/UL (ref 149–390)
PMV BLD AUTO: 12.4 FL (ref 8.9–12.7)
POTASSIUM SERPL-SCNC: 3.9 MMOL/L (ref 3.5–5.3)
PROT SERPL-MCNC: 7.3 G/DL (ref 6.4–8.4)
RBC # BLD AUTO: 3.42 MILLION/UL (ref 3.81–5.12)
SODIUM SERPL-SCNC: 136 MMOL/L (ref 135–147)
WBC # BLD AUTO: 4.78 THOUSAND/UL (ref 4.31–10.16)

## 2023-01-03 ENCOUNTER — TELEPHONE (OUTPATIENT)
Dept: HEMATOLOGY ONCOLOGY | Facility: CLINIC | Age: 83
End: 2023-01-03

## 2023-01-03 NOTE — TELEPHONE ENCOUNTER
Spoke with patient advising Dr Pepper Cutler will not be in office on 1/23 he will be rounding  Patient has been r/s to 2/8/23 at 2pm in the [place] office with Dr Dinorah Walton patient voiced understanding

## 2023-01-06 ENCOUNTER — NURSE TRIAGE (OUTPATIENT)
Dept: OTHER | Facility: OTHER | Age: 83
End: 2023-01-06

## 2023-01-07 ENCOUNTER — TELEPHONE (OUTPATIENT)
Dept: FAMILY MEDICINE CLINIC | Facility: CLINIC | Age: 83
End: 2023-01-07

## 2023-01-07 ENCOUNTER — NURSE TRIAGE (OUTPATIENT)
Dept: OTHER | Facility: OTHER | Age: 83
End: 2023-01-07

## 2023-01-07 DIAGNOSIS — U07.1 COVID-19 VIRUS INFECTION: Primary | ICD-10-CM

## 2023-01-07 RX ORDER — NIRMATRELVIR AND RITONAVIR 300-100 MG
3 KIT ORAL 2 TIMES DAILY
Qty: 30 TABLET | Refills: 0 | Status: SHIPPED | OUTPATIENT
Start: 2023-01-07 | End: 2023-01-12

## 2023-01-07 NOTE — TELEPHONE ENCOUNTER
Regarding: medication concern (covid )  ----- Message from [employer] sent at 1/7/2023  3:22 PM EST -----  " My mother was prescribed plaxovid, but she's also on steroids and medication says don't take one if you're taking the other "

## 2023-01-07 NOTE — TELEPHONE ENCOUNTER
I received a Tiger Text from [employer] last night at 10:11  Home test  positive for COV 01/06/2023  Symptoms started on 01/04/2023  Script sent today for Paxlovid  Current medications reviewed

## 2023-01-08 NOTE — TELEPHONE ENCOUNTER
Reason for Disposition  • Caller has medicine question only, adult not sick, AND triager answers question    Answer Assessment - Initial Assessment Questions  1  NAME of MEDICATION: "What medicine are you calling about?"      Paxlovid and steroids cannot be taken together?     Protocols used: [facility]

## 2023-01-08 NOTE — TELEPHONE ENCOUNTER
Pharmacist called patient and let patient know to stop Prednisone while on paxlovid  Patient understood and agreed

## 2023-01-09 ENCOUNTER — TELEMEDICINE (OUTPATIENT)
Dept: FAMILY MEDICINE CLINIC | Facility: CLINIC | Age: 83
End: 2023-01-09

## 2023-01-09 DIAGNOSIS — I11.0 HYPERTENSIVE HEART DISEASE WITH CONGESTIVE HEART FAILURE, UNSPECIFIED HEART FAILURE TYPE (HCC): ICD-10-CM

## 2023-01-09 DIAGNOSIS — K75.4 AUTOIMMUNE HEPATITIS TREATED WITH STEROIDS (HCC): ICD-10-CM

## 2023-01-09 DIAGNOSIS — I50.22 CHRONIC SYSTOLIC HEART FAILURE (HCC): ICD-10-CM

## 2023-01-09 DIAGNOSIS — U07.1 COVID-19: Primary | ICD-10-CM

## 2023-01-09 DIAGNOSIS — D61.818 PANCYTOPENIA (HCC): ICD-10-CM

## 2023-01-09 DIAGNOSIS — D69.3 CHRONIC ITP (IDIOPATHIC THROMBOCYTOPENIA) (HCC): ICD-10-CM

## 2023-01-09 DIAGNOSIS — D70.9 NEUTROPENIA, UNSPECIFIED TYPE (HCC): ICD-10-CM

## 2023-01-09 DIAGNOSIS — I42.0 DILATED CARDIOMYOPATHY (HCC): ICD-10-CM

## 2023-01-09 PROBLEM — I50.9 CONGESTIVE HEART FAILURE (HCC): Status: RESOLVED | Noted: 2018-08-08 | Resolved: 2023-01-09

## 2023-01-09 RX ORDER — PREDNISONE 10 MG/1
10 TABLET ORAL DAILY
COMMUNITY
Start: 2023-01-06 | End: 2023-01-09 | Stop reason: ALTCHOICE

## 2023-01-09 NOTE — PROGRESS NOTES
COVID-19 Outpatient Progress Note    Assessment/Plan:    Problem List Items Addressed This Visit        Digestive    Autoimmune hepatitis treated with steroids [facility])     Doing well on Budesonide 9 mg daily            Cardiovascular and Mediastinum    Chronic systolic heart failure (HCC)     Wt Readings from Last 3 Encounters:   08/29/22 49 9 kg (110 lb)   08/15/22 51 2 kg (112 lb 12 8 oz)   07/09/22 53 5 kg (118 lb)     Metoprolol and torsemide as directed              Dilated cardiomyopathy (HCC)     Torsemide daily          Hypertensive heart disease with congestive heart failure (HCC)     Wt Readings from Last 3 Encounters:   08/29/22 49 9 kg (110 lb)   08/15/22 51 2 kg (112 lb 12 8 oz)   07/09/22 53 5 kg (118 lb)     Stable  Care per cardiologist                 Musculoskeletal and Integument    Chronic ITP (idiopathic thrombocytopenia) (HCC)     Stable  Sees hematology            Hematopoietic and Hemostatic    Pancytopenia ([facility] [address] )     Stable  Care per hematology             Other    Neutropenia, unspecified type ([facility] [address] )     Stable  Continue care per hematology        Other Visit Diagnoses     COVID-19    -  Primary    day # 3 from the symptoms onset  paxlovid started on 1/7/23  feeling well overall  continue paxlovid as directed           Disposition:     Patient has asymptomatic or mild COVID-19 infection  Based off CDC guidelines, they were recommended to isolate for 5 days  If they are asymptomatic or symptoms are improving with no fevers in the past 24 hours, isolation may be ended followed by 5 days of wearing a mask when around othes to minimize risk of infecting others  If still have a fever or other symptoms have not improved, continue to isolate until they improve  Regardless of when they end isolation, avoid being around people who are more likely to get very sick from COVID-19 until at least day 11  Patient with moderate or severe illness or has a weakened immune system   They should isolate from others through at least day 10  Isolation can be ended if symptoms are improving and they are fever free for the past 24 hours  If they still have fever or other symptoms have not improved, continue to isolate until they improve  Regardless of when you isolation is ended, avoid being around people who are more likely to get very sick from COVID-19 until at least day 11  Patient meets criteria for PAXLOVID and they have been counseled appropriately according to EUA documentation released by the FDA  After discussion, patient agrees to treatment  Priscilla Denson is an investigational medicine used to treat mild-to-moderate COVID-19 in adults and children (15years of age and older weighing at least 80 pounds (40 kg)) with positive results of direct SARS-CoV-2 viral testing, and who are at high risk for progression to severe COVID-19, including hospitalization or death  PAXLOVID is investigational because it is still being studied  There is limited information about the safety and effectiveness of using PAXLOVID to treat people with mild-to-moderate COVID-19  The FDA has authorized the emergency use of PAXLOVID for the treatment of mild-tomoderate COVID-19 in adults and children (15years of age and older weighing at least 80 pounds (40 kg)) with a positive test for the virus that causes COVID-19, and who are at high risk for progression to severe COVID-19, including hospitalization or death, under an EUA  What should I tell my healthcare provider before I take PAXLOVID? Tell your healthcare provider if you:  - Have any allergies  - Have liver or kidney disease  - Are pregnant or plan to become pregnant  - Are breastfeeding a child  - Have any serious illnesses    Tell your healthcare provider about all the medicines you take, including prescription and over-the-counter medicines, vitamins, and herbal supplements  Some medicines may interact with PAXLOVID and may cause serious side effects   Keep a list of your medicines to show your healthcare provider and pharmacist when you get a new medicine  You can ask your healthcare provider or pharmacist for a list of medicines that interact with PAXLOVID  Do not start taking a new medicine without telling your healthcare provider  Your healthcare provider can tell you if it is safe to take PAXLOVID with other medicines  Tell your healthcare provider if you are taking combined hormonal contraceptive  PAXLOVID may affect how your birth control pills work  Females who are able to become pregnant should use another effective alternative form of contraception or an additional barrier method of contraception  Talk to your healthcare provider if you have any questions about contraceptive methods that might be right for you  How do I take PAXLOVID? PAXLOVID consists of 2 medicines: nirmatrelvir and ritonavir  - Take 2 pink tablets of nirmatrelvir with 1 white tablet of ritonavir by mouth 2 times each day (in the morning and in the evening) for 5 days  For each dose, take all 3 tablets at the same time  - If you have kidney disease, talk to your healthcare provider  You may need a different dose  - Swallow the tablets whole  Do not chew, break, or crush the tablets  - Take PAXLOVID with or without food  - Do not stop taking PAXLOVID without talking to your healthcare provider, even if you feel better  - If you miss a dose of PAXLOVID within 8 hours of the time it is usually taken, take it as soon as you remember  If you miss a dose by more than 8 hours, skip the missed dose and take the next dose at your regular time  Do not take 2 doses of PAXLOVID at the same time  - If you take too much PAXLOVID, call your healthcare provider or go to the nearest hospital emergency room right away    - If you are taking a ritonavir- or cobicistat-containing medicine to treat hepatitis C or Human Immunodeficiency Virus (HIV), you should continue to take your medicine as prescribed by your healthcare provider   - Talk to your healthcare provider if you do not feel better or if you feel worse after 5 days  Who should generally not take PAXLOVID? Do not take PAXLOVID if:  You are allergic to nirmatrelvir, ritonavir, or any of the ingredients in PAXLOVID  You are taking any of the following medicines:  - Alfuzosin  - Pethidine, piroxicam, propoxyphene  - Ranolazine  - Amiodarone, dronedarone, flecainide, propafenone, quinidine  - Colchicine  - Lurasidone, pimozide, clozapine  - Dihydroergotamine, ergotamine, methylergonovine  - Lovastatin, simvastatin  - Sildenafil (Revatio®) for pulmonary arterial hypertension (PAH)  - Triazolam, oral midazolam  - Apalutamide  - Carbamazepine, phenobarbital, phenytoin  - Rifampin  - St  Jay Jay’s Wort (hypericum perforatum)    What are the important possible side effects of PAXLOVID? Possible side effects of PAXLOVID are:  - Liver Problems  Tell your healthcare provider right away if you have any of these signs and symptoms of liver problems: loss of appetite, yellowing of your skin and the whites of eyes (jaundice), dark-colored urine, pale colored stools and itchy skin, stomach area (abdominal) pain  - Resistance to HIV Medicines  If you have untreated HIV infection, PAXLOVID may lead to some HIV medicines not working as well in the future  - Other possible side effects include: altered sense of taste, diarrhea, high blood pressure, or muscle aches    These are not all the possible side effects of PAXLOVID  Not many people have taken PAXLOVID  Serious and unexpected side effects may happen  Angie Foy is still being studied, so it is possible that all of the risks are not known at this time  What other treatment choices are there? Like Quan Moore may allow for the emergency use of other medicines to treat people with COVID-19   Go to https://PinPay/ for information on the emergency use of other medicines that are authorized by FDA to treat people with COVID-19  Your healthcare provider may talk with you about clinical trials for which you may be eligible  It is your choice to be treated or not to be treated with PAXLOVID  Should you decide not to receive it or for your child not to receive it, it will not change your standard medical care  What if I am pregnant or breastfeeding? There is no experience treating pregnant women or breastfeeding mothers with PAXLOVID  For a mother and unborn baby, the benefit of taking PAXLOVID may be greater than the risk from the treatment  If you are pregnant, discuss your options and specific situation with your healthcare provider  It is recommended that you use effective barrier contraception or do not have sexual activity while taking PAXLOVID  If you are breastfeeding, discuss your options and specific situation with your healthcare provider  How do I report side effects with PAXLOVID? Contact your healthcare provider if you have any side effects that bother you or do not go away  Report side effects to FDA MedWatch at www fda gov/medwatch or call 4-223-WTE5953 or you can report side effects to [employer]  at the contact information provided below  Website Fax number Telephone number   [employer] [phone]     How should I store [address]? Store PAXLOVID tablets at room temperature between 68°F to 77°F (20°C to 25°C)  Full fact sheet for patients, parents, and caregivers can be found at: [employer]    I have spent 15 minutes directly with the patient         Encounter provider: Mati Leyva MD     Provider located at: [address] [place] [address] Emilia HINDS [id number]     Recent Visits  No visits were found meeting these conditions  Showing recent visits within past 7 days and meeting all other requirements  Today's Visits  Date Type Provider Dept   01/09/23 Telemedicine Alexus Nunez MD [address] today's visits and meeting all other requirements  Future Appointments  No visits were found meeting these conditions  Showing future appointments within next 150 days and meeting all other requirements     This virtual check-in was done via telephone and she agrees to proceed  Patient agrees to participate in a virtual check in via telephone or video visit instead of presenting to the office to address urgent/immediate medical needs  Patient is aware this is a billable service  She acknowledged consent and understanding of privacy and security of the video platform  The patient has agreed to participate and understands they can discontinue the visit at any time  After connecting through Telephone, the patient was identified by name and date of birth  Myriam Simmons was informed that this was a telemedicine visit and that the exam was being conducted confidentially over secure lines  My office door was closed  No one else was in the room  Myriam Simmons acknowledged consent and understanding of privacy and security of the telemedicine visit  I informed the patient that I have reviewed her record in Epic and presented the opportunity for her to ask any questions regarding the visit today  The patient agreed to participate  It was my intent to perform this visit via video technology but the patient was not able to do a video connection so the visit was completed via audio telephone only  Verification of patient location:  Patient is located in the following state in which I hold an active license: [place]    Subjective:   Myriam Simmons is a 78 y o  female who has been screened for COVID-19  Symptom change since last report: improving  Patient's symptoms include nasal congestion and rhinorrhea  Patient denies fever, chills, fatigue, malaise, sore throat, anosmia, loss of taste, cough, shortness of breath, chest tightness, abdominal pain, nausea, vomiting, diarrhea, myalgias and headaches  - Date of symptom onset: 1/6/2023  - Date of positive COVID-19 test: 1/6/2023  Type of test: Home antigen  Patient with typical symptoms of COVID-19 and they attest that they were positive on home rapid antigen testing  Image of positive result is not able to be uploaded into their chart  COVID-19 vaccination status: Fully vaccinated with booster    Rosanna Roger has been staying home and has isolated themselves in her home  She is taking care to not share personal items and is cleaning all surfaces that are touched often, like counters, tabletops, and doorknobs using household cleaning sprays or wipes  She is wearing a mask when she leaves her room  No results found for: Goran Jordan, SARSCORONAVI, CORONAVIRUSR, SARSCOVAG, [facility]    Review of Systems   Constitutional: Negative for chills, fatigue and fever  HENT: Positive for congestion and rhinorrhea  Negative for sore throat  Respiratory: Negative for cough, chest tightness and shortness of breath  Gastrointestinal: Negative for abdominal pain, diarrhea, nausea and vomiting  Musculoskeletal: Negative for myalgias  Neurological: Negative for headaches  Current Outpatient Medications on File Prior to Visit   Medication Sig   • nirmatrelvir & ritonavir (Paxlovid, 300/100,) tablet therapy pack Take 3 tablets by mouth 2 (two) times a day for 5 days Take 2 nirmatrelvir tablets + 1 ritonavir tablet together per dose   • ascorbic acid (VITAMIN C) 500 mg tablet Take 500 mg by mouth daily   • budesonide (ENTOCORT EC) 3 MG capsule Take 9 mg by mouth daily Takes 3 capsules once daily      • ferrous sulfate 324 (65 Fe) mg Take 1 tablet (324 mg total) by mouth daily before breakfast   • fluticasone (FLONASE) 50 mcg/act nasal spray 2 sprays into each nostril daily (Patient taking differently: 2 sprays into each nostril daily as needed for allergies)   • metoprolol succinate (TOPROL-XL) 25 mg 24 hr tablet TAKE 1 TABLET BY MOUTH EVERY DAY   • potassium chloride (MICRO-K) 10 MEQ CR capsule Take 1 capsule (10 mEq total) by mouth daily (Patient taking differently: Take 10 mEq by mouth daily Hold parameter for a systolic BP 90 or less  Call for instructions if there is any SS of fluid overload or dehydration )   • Promacta 50 MG tablet TAKE 1 TABLET BY MOUTH 1 TIME A DAY ON AN EMPTY STOMACH  1 HOUR BEFORE OR 2 HOURS AFTER A MEAL  • spironolactone (ALDACTONE) 25 mg tablet Take 0 5 tablets (12 5 mg total) by mouth daily (Patient taking differently: Take 12 5 mg by mouth daily Hold parameter for a systolic BP 90 or less  Call for instructions if there is any SS of fluid overload or dehydration )   • torsemide (DEMADEX) 10 mg tablet Take 1 tablet (10 mg total) by mouth daily (Patient taking differently: Take 10 mg by mouth daily Hold parameter for a systolic BP 90 or less  Call for instructions if there is any SS of fluid overload or dehydration )   • [DISCONTINUED] predniSONE 10 mg tablet Take 10 mg by mouth daily       Objective: There were no vitals taken for this visit       Physical Exam     No distress over the phone     Marco Major MD

## 2023-01-09 NOTE — TELEPHONE ENCOUNTER
FYI
I would like to see her today virtual visit    Dr Sukhjinder Guzman
Regarding: [facility]-COVID positive/would like paxlovid  ----- Message from Renee Stoner sent at 1/6/2023  9:51 PM EST -----  Pt son called "my mom tested positive for COVID and we would like Paxlovid called in for her "
Scheduled for today at 11 am
Tested positive for covid with a home test today  Started Wednesday with a headache, runny nose and fatigue  Per patients son- denies fever or cough at this time  History of CHF  Would like to know if paxlovid is an option for his mom  Call back- [phone]  On call provider notified    Per on call- script will be sent to pharmacy in the morning  Patients son made aware and verbalized understanding       Reason for Disposition  • HIGH RISK for severe COVID complications (e g , weak immune system, age > 59 years, obesity with BMI > 22, pregnant, chronic lung disease or other chronic medical condition)  (Exception: Already seen by PCP and no new or worsening symptoms )    Answer Assessment - Initial Assessment Questions  Were you within 6 feet or less, for up to 15 minutes or more with a person that has a confirmed COVID-19 test? unsure  What was the date of your exposure? unsure  Are you experiencing any symptoms attributed to the virus?  (Assess for SOB, cough, fever, difficulty breathing) headache, runny nose, fatigue  HIGH RISK: Do you have any history heart or lung conditions, weakened immune system, diabetes, Asthma, CHF, HIV, COPD, Chemo, renal failure, sickle cell, etc? CHF    Protocols used: CORONAVIRUS (COVID-19) DIAGNOSED OR SUSPECTED-ADULT-
Mey Acosta

## 2023-01-09 NOTE — ASSESSMENT & PLAN NOTE
Wt Readings from Last 3 Encounters:   08/29/22 49 9 kg (110 lb)   08/15/22 51 2 kg (112 lb 12 8 oz)   07/09/22 53 5 kg (118 lb)     Stable  Care per cardiologist

## 2023-01-09 NOTE — ASSESSMENT & PLAN NOTE
Wt Readings from Last 3 Encounters:   08/29/22 49 9 kg (110 lb)   08/15/22 51 2 kg (112 lb 12 8 oz)   07/09/22 53 5 kg (118 lb)     Metoprolol and torsemide as directed

## 2023-01-13 ENCOUNTER — TELEPHONE (OUTPATIENT)
Dept: OTHER | Facility: OTHER | Age: 83
End: 2023-01-13

## 2023-01-13 NOTE — TELEPHONE ENCOUNTER
Per patient's phone call, she has finished the Paxlovid  She wants to know if she should go back on the Prednisone

## 2023-01-14 ENCOUNTER — NURSE TRIAGE (OUTPATIENT)
Dept: OTHER | Facility: OTHER | Age: 83
End: 2023-01-14

## 2023-01-14 NOTE — TELEPHONE ENCOUNTER
called in office yesterday and today stating she was to told to hold prednisone while taking paxlovid  pt would like to know if she needs to restart  been taking it over 2 years  Medication no longer on MAR  TC out to oncall  Advised to restart previous dose and follow up with pcp when they open  Called pt advised on providers recommendations  Verbalized understanding  Reason for Disposition  • [1] Caller has URGENT medicine question about med that PCP or specialist prescribed AND [2] triager unable to answer question    Answer Assessment - Initial Assessment Questions  1  NAME of MEDICATION: "What medicine are you calling about?"      Paxlovid   2  QUESTION: "What is your question?" (e g , medication refill, side effect)      Am I able to restart my prednisone since I completed my paxlovid on Wednesday? 3  PRESCRIBING HCP: "Who prescribed it?" Reason: if prescribed by specialist, call should be referred to that group        *No Answer*    Protocols used: MEDICATION QUESTION CALL-ADULT-

## 2023-01-14 NOTE — TELEPHONE ENCOUNTER
Regarding: finished Paxlovid, can go back on Prednisone?  ----- Message from Marcio Fraser sent at 1/14/2023 11:04 AM EST -----  " I have finished the Paxlovid on Wednesday, and I want to know if I can go back taking my prednisone "

## 2023-01-16 NOTE — TELEPHONE ENCOUNTER
I am not sure what she is referring to as I didn't stop any of her meds  She said she is not taking prednisone when I spoke to her last time   Please clarify with patient     Dr Anahi Lundberg

## 2023-01-16 NOTE — TELEPHONE ENCOUNTER
Spoke with patient - it was her GI who prescribed the prednisone - she will be contacting that office

## 2023-01-16 NOTE — TELEPHONE ENCOUNTER
Patient called today regarding she finished taking Paxlovid and needs to know if she should start taking the Prednisone again  Patient was taken off of the Prednisone while she was in [place] from [place] and now needs advise for the Prednisone

## 2023-01-16 NOTE — TELEPHONE ENCOUNTER
Spoke with patient - she was told by [facility] to discontinue prednisone when she was given the paxlovid - finished the paxlovid and now asking if she can continue with her prednisone that she still has    Please advise

## 2023-01-16 NOTE — TELEPHONE ENCOUNTER
Not sure who gave her prednisone?  She was prednisone chronically prior and she is no longer taking it that I am aware of    Dr Marisol Crawford

## 2023-01-23 ENCOUNTER — APPOINTMENT (OUTPATIENT)
Dept: LAB | Facility: CLINIC | Age: 83
End: 2023-01-23

## 2023-02-06 ENCOUNTER — TELEPHONE (OUTPATIENT)
Dept: HEMATOLOGY ONCOLOGY | Facility: CLINIC | Age: 83
End: 2023-02-06

## 2023-02-06 ENCOUNTER — TELEPHONE (OUTPATIENT)
Dept: OTHER | Facility: OTHER | Age: 83
End: 2023-02-06

## 2023-02-06 NOTE — TELEPHONE ENCOUNTER
Appointment Confirmation (to confirm pre existing appointments - ONLY)  No need to route   Who is calling to confirm? Patient   If someone other than patient is calling, are they listed on the communication consent form? N/A   Appointment with  Dr Leilani House   Appointment date & time  02/08/23 2PM   Appointment location [place]   Patient verbilized understanding Yes     Patient calling to discuss alternative appointment time options  I offered patient appointment 02/08/23 at 9:20AM and appointment for 02/07/23 at 2:40PM      Patient states she will call her son to discuss and will call back once she has decided which appointment she can take  No changes made at this time

## 2023-02-06 NOTE — TELEPHONE ENCOUNTER
Spoke with patient reminding her to have labs completed prior to appt  Patient stated she has another Dr's appointment on 2/8  She requested to r/s  Scheduled patient to see Dr Peyton Ugarte tomorrow 2/7 at 2:40 pm in the [place] office  Patient stated she will talk to her son and call us back if tomorrow appt date/time does not work with her schedule  Provided her with [phone]

## 2023-02-07 NOTE — TELEPHONE ENCOUNTER
Spoke with Camila Dillard, She did not have her labs drawn  Appt has been r/s to 3/9/23 230pm with [facility]  Reminded pt to have her labs drawn prior to appt

## 2023-02-10 ENCOUNTER — OFFICE VISIT (OUTPATIENT)
Dept: FAMILY MEDICINE CLINIC | Facility: CLINIC | Age: 83
End: 2023-02-10

## 2023-02-10 VITALS
OXYGEN SATURATION: 99 % | DIASTOLIC BLOOD PRESSURE: 70 MMHG | TEMPERATURE: 98.1 F | HEIGHT: 63 IN | HEART RATE: 80 BPM | SYSTOLIC BLOOD PRESSURE: 114 MMHG | RESPIRATION RATE: 16 BRPM | WEIGHT: 111.8 LBS | BODY MASS INDEX: 19.81 KG/M2

## 2023-02-10 DIAGNOSIS — M54.50 ACUTE BILATERAL LOW BACK PAIN WITHOUT SCIATICA: Primary | ICD-10-CM

## 2023-02-10 NOTE — ASSESSMENT & PLAN NOTE
Onset about 2 weeks ago, no preceding incident or trauma, possible started following lifting boxes?    Normal strength, no pain with AROM of hips, knees, bilateral lumbar rotation  Pain with forward flexion of 45 degrees  Check xr of lumbar spine  Pt is higher risk for compression fx as she is on long term steroids  Hx of autoimmune hepatitis, use apap sparingly  Can use nsaids as needed, pt prefers topical to PO so will send rx for voltaren gel  Can use heat at intervals  Will f/u pending result of xr

## 2023-02-10 NOTE — PROGRESS NOTES
Name: Darien Lopez      : 1940      MRN: [id number]  Encounter Provider: ALONZO Cr  Encounter Date: 2/10/2023   Encounter department: [place] [address]  Acute bilateral low back pain without sciatica  Assessment & Plan: Onset about 2 weeks ago, no preceding incident or trauma, possible started following lifting boxes?    Normal strength, no pain with AROM of hips, knees, bilateral lumbar rotation  Pain with forward flexion of 45 degrees  Check xr of lumbar spine  Pt is higher risk for compression fx as she is on long term steroids  Hx of autoimmune hepatitis, use apap sparingly  Can use nsaids as needed, pt prefers topical to PO so will send rx for voltaren gel  Can use heat at intervals  Will f/u pending result of xr  Orders:  -     XR spine lumbar minimum 4 views non injury; Future; Expected date: 02/10/2023  -     Diclofenac Sodium (VOLTAREN) 1 %; Apply 2 g topically 4 (four) times a day           Subjective      Pt is a 78 y o  y/o female who is seen today for evaluation of lower back pain  PMH of autoimmune hepatitis, CHF, LBBB, dilated cardiomyopathy, HTN, hearing loss, bell's palsy, ITP, pancytopenia, LEA positive, depressive disorder, glaucoma  She has had constant bilateral low back pain for a couple of weeks, side alternates  Pain is severe at times, severity waxes and wanes  At it's worst, she rates pain 10/10, when it is not so bad it is 5-6/10  She says pain is sharp at times, dull at times  It prevents her from functioning as normal   She has trouble getting out of bed, she is uncomfortable staying in bed for too long, bending down is extremely painful  Sitting in a chair with arms helps alleviate her pain a bit  She says occasionally walking aggravates it  Denies numbness or tingling  Denies incontinence  She has used tylenol 325 mg as needed, this does provide her with relief    She says she was lifting/moving some boxes around and she is not sure if this triggered her symptoms  Review of Systems   Constitutional: Negative for activity change and fatigue  Respiratory: Negative for shortness of breath  Cardiovascular: Negative for chest pain, palpitations and leg swelling  Gastrointestinal: Negative for abdominal pain  Genitourinary: Negative for enuresis  Musculoskeletal: Positive for back pain  Negative for arthralgias, joint swelling and myalgias  Skin: Negative for color change, rash and wound  Neurological: Negative for weakness and numbness  Hematological: Negative for adenopathy  Current Outpatient Medications on File Prior to Visit   Medication Sig   • ascorbic acid (VITAMIN C) 500 mg tablet Take 500 mg by mouth daily   • budesonide (ENTOCORT EC) 3 MG capsule Take 9 mg by mouth daily Takes 3 capsules once daily  • fluticasone (FLONASE) 50 mcg/act nasal spray 2 sprays into each nostril daily (Patient taking differently: 2 sprays into each nostril daily as needed for allergies)   • metoprolol succinate (TOPROL-XL) 25 mg 24 hr tablet TAKE 1 TABLET BY MOUTH EVERY DAY   • potassium chloride (MICRO-K) 10 MEQ CR capsule Take 1 capsule (10 mEq total) by mouth daily (Patient taking differently: Take 10 mEq by mouth daily Hold parameter for a systolic BP 90 or less  Call for instructions if there is any SS of fluid overload or dehydration )   • Promacta 50 MG tablet TAKE 1 TABLET BY MOUTH 1 TIME A DAY ON AN EMPTY STOMACH  1 HOUR BEFORE OR 2 HOURS AFTER A MEAL  • spironolactone (ALDACTONE) 25 mg tablet Take 0 5 tablets (12 5 mg total) by mouth daily (Patient taking differently: Take 12 5 mg by mouth daily Hold parameter for a systolic BP 90 or less   Call for instructions if there is any SS of fluid overload or dehydration )   • torsemide (DEMADEX) 10 mg tablet Take 1 tablet (10 mg total) by mouth daily (Patient taking differently: Take 10 mg by mouth daily Hold parameter for a systolic BP 90 or less  Call for instructions if there is any SS of fluid overload or dehydration )   • ferrous sulfate 324 (65 Fe) mg Take 1 tablet (324 mg total) by mouth daily before breakfast       Objective     /70 (BP Location: Left arm, Patient Position: Sitting, Cuff Size: Adult)   Pulse 80   Temp 98 1 °F (36 7 °C) (Tympanic)   Resp 16   Ht 5' 3" (1 6 m)   Wt 50 7 kg (111 lb 12 8 oz)   SpO2 99%   BMI 19 80 kg/m²     Physical Exam  Vitals reviewed  Constitutional:       General: She is awake  She is not in acute distress  Appearance: Normal appearance  She is well-developed and well-groomed  She is not ill-appearing  HENT:      Right Ear: Decreased hearing noted  Left Ear: Decreased hearing noted  Cardiovascular:      Rate and Rhythm: Normal rate and regular rhythm  Pulses: Normal pulses  Heart sounds: Normal heart sounds  Pulmonary:      Effort: Pulmonary effort is normal       Breath sounds: Normal breath sounds  Abdominal:      General: Bowel sounds are normal       Tenderness: There is no abdominal tenderness  Musculoskeletal:         General: No tenderness  Cervical back: Full passive range of motion without pain and normal range of motion  No muscular tenderness  Lumbar back: No edema, deformity, tenderness or bony tenderness  Decreased range of motion (pain with forward flexion approx 45 degrees)  Right lower leg: No edema  Left lower leg: No edema  Skin:     General: Skin is warm and dry  Neurological:      Mental Status: She is alert and oriented to person, place, and time  Sensory: Sensation is intact  Motor: Motor function is intact  No weakness  Psychiatric:         Attention and Perception: Attention normal          Mood and Affect: Mood normal          Speech: Speech normal          Behavior: Behavior normal  Behavior is cooperative  Thought Content:  Thought content normal          Judgment: Judgment normal        Mony ANGEL Lory Bardales

## 2023-02-15 ENCOUNTER — HOSPITAL ENCOUNTER (OUTPATIENT)
Dept: RADIOLOGY | Facility: HOSPITAL | Age: 83
Discharge: HOME | End: 2023-02-15

## 2023-02-15 ENCOUNTER — OFFICE VISIT (OUTPATIENT)
Dept: FAMILY MEDICINE CLINIC | Facility: CLINIC | Age: 83
End: 2023-02-15

## 2023-02-15 VITALS
SYSTOLIC BLOOD PRESSURE: 126 MMHG | TEMPERATURE: 98.5 F | HEART RATE: 78 BPM | RESPIRATION RATE: 16 BRPM | OXYGEN SATURATION: 99 % | BODY MASS INDEX: 19.6 KG/M2 | HEIGHT: 63 IN | WEIGHT: 110.6 LBS | DIASTOLIC BLOOD PRESSURE: 70 MMHG

## 2023-02-15 DIAGNOSIS — M54.50 ACUTE BILATERAL LOW BACK PAIN WITHOUT SCIATICA: Primary | ICD-10-CM

## 2023-02-15 DIAGNOSIS — I11.0 HYPERTENSIVE HEART DISEASE WITH CONGESTIVE HEART FAILURE, UNSPECIFIED HEART FAILURE TYPE (HCC): ICD-10-CM

## 2023-02-15 DIAGNOSIS — D70.9 NEUTROPENIA, UNSPECIFIED TYPE (HCC): ICD-10-CM

## 2023-02-15 DIAGNOSIS — E44.0 MODERATE PROTEIN-CALORIE MALNUTRITION (HCC): ICD-10-CM

## 2023-02-15 DIAGNOSIS — I42.0 DILATED CARDIOMYOPATHY (HCC): ICD-10-CM

## 2023-02-15 DIAGNOSIS — Z95.811 PRESENCE OF HEART ASSIST DEVICE (HCC): ICD-10-CM

## 2023-02-15 DIAGNOSIS — I44.7 COMPLETE LEFT BUNDLE BRANCH BLOCK (LBBB): ICD-10-CM

## 2023-02-15 DIAGNOSIS — I50.22 CHRONIC SYSTOLIC HEART FAILURE (HCC): ICD-10-CM

## 2023-02-15 DIAGNOSIS — K75.4 AUTOIMMUNE HEPATITIS TREATED WITH STEROIDS (HCC): ICD-10-CM

## 2023-02-15 DIAGNOSIS — M54.50 ACUTE BILATERAL LOW BACK PAIN WITHOUT SCIATICA: ICD-10-CM

## 2023-02-15 DIAGNOSIS — D69.3 CHRONIC ITP (IDIOPATHIC THROMBOCYTOPENIA) (HCC): ICD-10-CM

## 2023-02-15 PROBLEM — S06.5X9A TRAUMATIC SUBDURAL HEMORRHAGE WITH LOSS OF CONSCIOUSNESS OF UNSPECIFIED DURATION, INITIAL ENCOUNTER (HCC): Status: ACTIVE | Noted: 2023-02-15

## 2023-02-15 PROBLEM — K74.3 HEPATIC CIRRHOSIS DUE TO PRIMARY BILIARY CHOLANGITIS (HCC): Status: RESOLVED | Noted: 2023-02-15 | Resolved: 2023-02-15

## 2023-02-15 PROBLEM — S06.5X9A TRAUMATIC SUBDURAL HEMORRHAGE WITH LOSS OF CONSCIOUSNESS OF UNSPECIFIED DURATION, INITIAL ENCOUNTER (HCC): Status: RESOLVED | Noted: 2023-02-15 | Resolved: 2023-02-15

## 2023-02-15 PROBLEM — D62 ACUTE BLOOD LOSS ANEMIA: Status: RESOLVED | Noted: 2022-07-01 | Resolved: 2023-02-15

## 2023-02-15 PROBLEM — K74.3 HEPATIC CIRRHOSIS DUE TO PRIMARY BILIARY CHOLANGITIS (HCC): Status: ACTIVE | Noted: 2023-02-15

## 2023-02-15 RX ORDER — PREDNISONE 10 MG/1
10 TABLET ORAL DAILY
COMMUNITY

## 2023-02-15 NOTE — ASSESSMENT & PLAN NOTE
Wt Readings from Last 3 Encounters:   02/15/23 50 2 kg (110 lb 9 6 oz)   02/10/23 50 7 kg (111 lb 12 8 oz)   08/29/22 49 9 kg (110 lb)   continue current meds

## 2023-02-15 NOTE — ASSESSMENT & PLAN NOTE
Malnutrition Findings:                to increase protein in her diet                 BMI Findings: Body mass index is 19 59 kg/m²

## 2023-02-15 NOTE — PROGRESS NOTES
Name: Zhane Goetz      : 1940      MRN: [id number]  Encounter Provider: Roro Cobos MD  Encounter Date: 2/15/2023   [facility]     1  Acute bilateral low back pain without sciatica  Comments:  to r/o fracture although i doubt that it is  to get X ray done today   to start PT   Orders:  -     XR spine lumbar minimum 4 views non injury; Future; Expected date: 02/15/2023  -     Ambulatory Referral to Physical Therapy; Future    2  Presence of heart assist device ([facility] [address] )    3  Neutropenia, unspecified type [facility])  Assessment & Plan:  Recent neutrophil was normal   Continue to monitor      4  Moderate protein-calorie malnutrition ([facility] [address] )  Assessment & Plan:  Malnutrition Findings:                to increase protein in her diet                 BMI Findings: Body mass index is 19 59 kg/m²  5  Chronic ITP (idiopathic thrombocytopenia) (HCC)  Assessment & Plan:  Care per hematology      6  Complete left bundle branch block (LBBB)  Assessment & Plan:  Stable  Continue to monitor      7  Chronic systolic heart failure [facility])  Assessment & Plan:  Wt Readings from Last 3 Encounters:   02/15/23 50 2 kg (110 lb 9 6 oz)   02/10/23 50 7 kg (111 lb 12 8 oz)   22 49 9 kg (110 lb)   continue current meds            8  Autoimmune hepatitis treated with steroids [facility])  Assessment & Plan:  Stable   Care per dr David Carlos      9  Hypertensive heart disease with congestive heart failure, unspecified heart failure type [facility])  Assessment & Plan:  Wt Readings from Last 3 Encounters:   02/15/23 50 2 kg (110 lb 9 6 oz)   02/10/23 50 7 kg (111 lb 12 8 oz)   22 49 9 kg (110 lb)     Doing well on current meds          10  Dilated cardiomyopathy ([facility] [address] )  Assessment & Plan: Torsemide as directed          Falls Plan of Care: balance, strength, and gait training instructions were provided           Subjective    Here for low back pain for 2-3 weeks  No injury or trauma that she could recall   She was seen here for this last week and ordered X ray but didn't get it done since she is afraid to take off her  Heart device for Xray  She has been back on prednisone for 1 month given by dr Sigrid Levine - not sure the dosage   Didn't start her diclofenac cream for pain since she is worried about side effects  Tylenol is helping with pain     Back Pain  This is a new problem  The current episode started 1 to 4 weeks ago  The problem has been waxing and waning since onset  The pain is present in the lumbar spine  Review of Systems   Constitutional: Negative  HENT: Negative  Eyes: Negative  Respiratory: Negative  Cardiovascular: Negative  Gastrointestinal: Negative  Musculoskeletal: Positive for back pain  Skin: Negative  Allergic/Immunologic: Negative  Neurological: Negative  Hematological: Negative  Psychiatric/Behavioral: Negative  Current Outpatient Medications on File Prior to Visit   Medication Sig   • ascorbic acid (VITAMIN C) 500 mg tablet Take 500 mg by mouth daily   • budesonide (ENTOCORT EC) 3 MG capsule Take 9 mg by mouth daily Takes 3 capsules once daily  • Diclofenac Sodium (VOLTAREN) 1 % Apply 2 g topically 4 (four) times a day   • fluticasone (FLONASE) 50 mcg/act nasal spray 2 sprays into each nostril daily (Patient taking differently: 2 sprays into each nostril daily as needed for allergies)   • metoprolol succinate (TOPROL-XL) 25 mg 24 hr tablet TAKE 1 TABLET BY MOUTH EVERY DAY   • potassium chloride (MICRO-K) 10 MEQ CR capsule Take 1 capsule (10 mEq total) by mouth daily (Patient taking differently: Take 10 mEq by mouth daily Hold parameter for a systolic BP 90 or less   Call for instructions if there is any SS of fluid overload or dehydration )   • predniSONE 10 mg tablet Take 10 mg by mouth daily   • Promacta 50 MG tablet TAKE 1 TABLET BY MOUTH 1 TIME A DAY ON AN EMPTY STOMACH  1 HOUR BEFORE OR 2 HOURS AFTER A MEAL    • spironolactone (ALDACTONE) 25 mg tablet Take 0 5 tablets (12 5 mg total) by mouth daily (Patient taking differently: Take 12 5 mg by mouth daily Hold parameter for a systolic BP 90 or less  Call for instructions if there is any SS of fluid overload or dehydration )   • torsemide (DEMADEX) 10 mg tablet Take 1 tablet (10 mg total) by mouth daily (Patient taking differently: Take 10 mg by mouth daily Hold parameter for a systolic BP 90 or less  Call for instructions if there is any SS of fluid overload or dehydration )   • ferrous sulfate 324 (65 Fe) mg Take 1 tablet (324 mg total) by mouth daily before breakfast       Objective     /70 (BP Location: Left arm, Patient Position: Sitting, Cuff Size: Adult)   Pulse 78   Temp 98 5 °F (36 9 °C) (Tympanic)   Resp 16   Ht 5' 3" (1 6 m)   Wt 50 2 kg (110 lb 9 6 oz)   SpO2 99%   BMI 19 59 kg/m²     Physical Exam  Constitutional:       Appearance: Normal appearance  HENT:      Head: Normocephalic  Right Ear: Tympanic membrane normal       Left Ear: Tympanic membrane normal       Nose: Nose normal       Mouth/Throat:      Mouth: Mucous membranes are moist    Cardiovascular:      Rate and Rhythm: Normal rate and regular rhythm  Pulses: Normal pulses  Heart sounds: Normal heart sounds  Pulmonary:      Effort: Pulmonary effort is normal       Breath sounds: Normal breath sounds  Musculoskeletal:         General: Tenderness present  No swelling  Comments: Mild TTP on left lower paraspinal muscle   Neurological:      General: No focal deficit present  Mental Status: She is alert         Makayla Harley MD

## 2023-02-15 NOTE — ASSESSMENT & PLAN NOTE
PT ACUTE  Treatment Session          Pt seen on 9S nursing unit.                                                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 Pt with out c/o pain, sitting in recliner at end of session with alam activated and all needs in reach. (01/30/18 1302)    OBJECTIVE:  Basic Lines: Capped IV (01/30/18 1302)  Safety Measures: Alarms (01/30/18 1302)    RN reported Bucio Fall Scale Score: 55    ASSESSMENT:   Pt is making good progress toward mobility goals. Pt is limited by weakness, balance deficits, decreased tolerance, decreased safety awareness. Pt requires supervision during bed mobility, min a for sit <> stand transfer from EOB, toilet, recliner with WW, and min a with WW during 150' x 2 of ambulation. Pt would benefit from continued participation in skilled therapy during acute stay to address all aspects of functional mobility. Pt prognosis for reaching goals is good.  Recommending d/c [facility] when medically able.           Other Therapeutic Intervention: time for toiletting, slow mobility (01/30/18 1302)     EDUCATION:   On this date, the patient was educated on safety, pacing.    The response to education was: Verbalizes understanding and Needs reinforcement    PT Identified Barriers to Discharge: medical, weakness, decreased tolerance, balance deficits     PLAN:   Continue skilled PT, including the following  Stable   Care per dr Roberto Corrales Treatment/Interventions: Neuromuscular re-education;Safety Education;Functional transfer training;Strengthening;Continued evaluation;Gait training;Bed mobility;Equipment eval/education;ROM;Endurance training;Patient/Family training (01/30/18 1302)   Frequency Comments: M-F (with Marva PT) (01/30/18 1302)    Treatment Plan for Next Session: mobility in [facility], LE therex          RECOMMENDATIONS FOR DISCHARGE:  Recommendation for Discharge: PT: Sub-acute nursing home (01/30/18 1302)    PT/OT Mobility Equipment for Discharge: none; owns WW (01/30/18 1302)  PT/OT ADL Equipment for Discharge: no needs (01/27/18 1230)    ICU Mobility Assesment (PERME):       Last 24 hours of Functional Data  Bed Mobility   Bed Mobility  Supine to Sit: Supervision (Supv) (01/30/18 1302)  Bed Mobility Comments: supervision for safety (01/30/18 1302)    Transfers  Transfers  Sit to Stand: Supervision (Supv) (01/30/18 1302)  Stand to Sit: Supervision (Supv) (01/30/18 1302)  Toilet Transfers: Minimal Assist (Min) (01/30/18 1302)  Assistive Device/: 1 Person;Gait Belt;2-wheeled walker (01/30/18 1302)  Transfer Comments 1: min a d/t weakness, decreased tolerance, balance deficits (01/30/18 1302)      Gait  Gait  Gait Assistance: Minimal Assist (Min) (01/30/18 1302)  Assistive Device/: 2-wheeled walker;1 Person;Gait Belt (01/30/18 1302)  Ambulation Distance (Feet): 120 Feet (x 2) (01/30/18 1302)  Pattern: Shuffle (very kyphotic posture) (01/30/18 1302)  Ambulation Surface: Tile;Carpet (01/30/18 1302)  Gait Comments 1: min a with walker management d/t low vision and safety cues (01/30/18 1302),      Stairs          Neuromuscular Re-education       Balance  Balance  Sitting - Static: Modified Independent (01/30/18 1302)  Sitting - Dynamic: Supervision (Supv) (01/30/18 1302)  Standing - Static: Supervision (Supv) (01/30/18 1302)  Standing - Dynamic (eyes open): Minimal Assist (Min) (01/30/18 1302)  Balance  Comments #1: min a with ambulation d/t weakness, decreased tolerance, mild balance deficits (01/30/18 1302)    Wheelchair Mobility       Patient's Personal Goal: n stated (01/30/18 1302)    Therapy Goals:    Goals  Short Term Goals to Be Reviewed On: 01/31/18 (01/30/18 1302)  Short Term Goals = Discharge Goals: Yes (01/30/18 1302)  Goal Agreement: Patient unable to agree with goals and treatment plan;Family/significant other/caregiver agrees (01/30/18 1302)  Bed Mobility Discharge Goal: pt will perform bed mobility with HOB flat at [facility] (01/30/18 1302)  Bed Mobility Discharge Goal Progress: Outcome not met, continue to monitor (01/30/18 1302)  Transfer Discharge Goal: pt will perform sit<> stand transfer from varying surface height with WW at [facility] (01/30/18 1302)  Transfer Discharge Goal Progress: Outcome not met, continue to monitor (01/30/18 1302)  Ambulation Discharge Goal: pt will ambulate 100' with WW and supervision x 1 (01/30/18 1302)  Ambulation Discharge Goal Progress: Outcome not met, continue to monitor (01/30/18 1302)        PT Time Spent: 46 minutes (01/30/18 1302)    See PT flowsheet for full details regarding the PT therapy provided.

## 2023-02-15 NOTE — ASSESSMENT & PLAN NOTE
Wt Readings from Last 3 Encounters:   02/15/23 50 2 kg (110 lb 9 6 oz)   02/10/23 50 7 kg (111 lb 12 8 oz)   08/29/22 49 9 kg (110 lb)     Doing well on current meds

## 2023-02-22 ENCOUNTER — APPOINTMENT (OUTPATIENT)
Dept: LAB | Facility: CLINIC | Age: 83
End: 2023-02-22

## 2023-02-22 DIAGNOSIS — D62 ACUTE BLOOD LOSS ANEMIA: ICD-10-CM

## 2023-02-22 LAB
FERRITIN SERPL-MCNC: 87 NG/ML (ref 8–388)
IRON SATN MFR SERPL: 29 % (ref 15–50)
IRON SERPL-MCNC: 98 UG/DL (ref 50–170)
TIBC SERPL-MCNC: 339 UG/DL (ref 250–450)

## 2023-02-24 DIAGNOSIS — M54.50 ACUTE BILATERAL LOW BACK PAIN WITHOUT SCIATICA: Primary | ICD-10-CM

## 2023-03-06 NOTE — PROGRESS NOTES
Heart Failure Outpatient Progress Note - Maia Núñez 78 y o  female MRN: [id number]    @ Encounter: [id number]      Assessment/Plan:    Patient Active Problem List    Diagnosis Date Noted   • LEA positive 06/12/2020   • Dilated cardiomyopathy ([place] [address] ) 08/09/2018   • Chronic systolic heart failure ([place] [address] ) 08/01/2018   • Hypertension, essential 08/01/2018   • Other specified glaucoma 08/01/2018   • Autoimmune hepatitis treated with steroids ([place] [address] ) 08/01/2018   • Presence of heart assist device ([place] [address] ) 02/15/2023   • Acute bilateral low back pain without sciatica 02/10/2023   • Moderate protein-calorie malnutrition ([place] [address] ) 07/05/2022   • Pancytopenia ([place] [address] ) 07/02/2022   • Hypotension 07/01/2022   • Headache 07/01/2022   • Bilateral hearing loss 06/22/2022   • Hypertensive heart disease with congestive heart failure ([place] [address] ) 12/07/2021   • Chronic ITP (idiopathic thrombocytopenia) ([place] [address] ) 11/10/2021   • Neutropenia, unspecified type ([place] [address] ) 08/10/2021   • Left-sided Bell's palsy 08/10/2021   • Complete left bundle branch block (LBBB) 10/22/2019   • Depressive disorder 07/19/2017       # ChronicSystolic CHF, Stage D, NYHA 3 with mildly reduced RV systolic function  Unclear etiology   HTN (normotensive on admission) +/- valvular (less likely, central MR likely functional) +/- myocarditis +/- stress CM +/- LBBB     Weight: 108 lbs  NT pro BNP 5/29/20: 9483     --HIV, SPEP/UPEP, LEA, hepatitis panel all negative, ferritin ok     Studies- personally reviewed by me  Echo 4/8/22:  LVEF: 20%  LVEDd: 5 8 cm  RV: normal    Echo 4/23/21  LVEF: 25-30%, grade 2 DD  LVEDd: 6 9 cm  RV: normal  Mild to moderate MR    Echo 10/17/19:  LVEF: 30%, LVEDd: 5 cm  LVEDd:, grade 2 DD  RV: normal     --R+LHC 8/3/18: shows normal coronaries  RA 3  RV 32/6  PA 32/11/20  PCWP 6  AO sat 89%  MvO2 56 5%  TD CO/CI: 2 3/1 4  Isacc CO/CI: 2 6/1 6  TPG 14  DPG 5  PVR 5 4 (Isacc); 6 1 (TD)  SVR 2070  PVR:SVR <0 25     Echo 4/16/19: LVEF: 20%  LVEDd 5 4 cm  Moderate to severe MR     --cMRI 8/6/18: LVEF ~18%, LVIDd 7 cm, normal RV  There is a thin strip of intramyocardial hyperenhancement of the basal interventricular septum      Echo 12/6/18: LVEF: 20%, LVEDd 6 4 cm- no improvement  Good RV function  Small IVC     Neurohormonal Blockade:  --Beta-Blocker: Continue metoprolol succinate 25 mg PO daily  --ACEi, ARB or ARNi: Continue Entresto to 49-51 mg PO BID (also for SVR reduction)  --Aldosterone Receptor Blocker: spironolactone 12 5 mg daily- not taking it, she states it made her dizzy  --Diuretic: Continue torsemide 5- 10 PO daily- not taking every day with potassium K 20 meq daily  K 4 4 on 10/21     Sudden Cardiac Death Risk Reduction:  --ICD: recommended     Electrical Resynchronization:  --Candidacy for BiV device: LBBB 164 msec  Recommend BiV ICD- repeatedly     Advanced Therapies: Will continue to monitor  If does not recover, age precludes OHT  Possible LVAD candidate, but social situation will be complicated as  has LVAD w/ complications and son works in [place]  Other son is in [place]  Will monitor closely      # Pancytopenia with microcytic anemia- Chronic ITP  Hematology following- felt to be autoimmune related  Hx of transfusions  10/7/21: HgB 11 8     # HTN- now runs low with CM  # Hypothyroidism  # Autoimmune hepatitis: Continue budesonide   7/27/20 labs:   ,   # social- cares for her  who is debilitated with LVAD       TODAY'S PLAN:  I am Still pushing for BiV ICD - explained CRT benefits in LBBB, mortality benefit from ICD  Continue metoprolol, entresto and spironolactone for HFrEF  BP at goal  Discussed stop wearing LifeVest as no treatments since prescribed    --2g sodium diet  - Daily weights     HPI:     83 woman with hx of HTN, autoimmune hepatitis wo follows now for NICM, EF: 25% with workup as above  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 has LVAD so working diagnosis of variant of stress myopathy if plausible   Follow up echo done 12/6/18 showed EF: 20% with LVEDd 6 4 cm, I referred for BiV ICD    Follow up echo 4/16/19 unfortunately showed EF: 20% with moderate to severe functional MR  She continues to wear LifeVest for very long period of time, repeated echos have shown persistent reduction in LVEF  Fortunately, no LifeVest treatments      Interval History:  No show last two appts  Still wearing lifevest    Review of Systems   Constitutional: Negative for activity change, appetite change, fatigue and unexpected weight change  HENT: Negative for congestion and nosebleeds  Eyes: Negative  Respiratory: Negative for cough, chest tightness and shortness of breath  Cardiovascular: Negative for chest pain, palpitations and leg swelling  Gastrointestinal: Negative for abdominal distention  Endocrine: Negative  Genitourinary: Negative  Musculoskeletal: Negative  Skin: Negative  Neurological: Negative for dizziness, syncope and weakness  Hematological: Negative  Psychiatric/Behavioral: Negative  Past Medical History:   Diagnosis Date   • Bell's palsy    • Cardiac disease    • Ear problems    • HL (hearing loss)    • Hypertension    • Sacroiliitis ([place] [address] ) 8/10/2021   • Subdural hemorrhage ([place] [address] ) 3/30/2022   • Thrombocytopenia ([place] [address] ) 8/3/2018   • Tinnitus    • Traumatic subdural hemorrhage with loss of consciousness of unspecified duration, initial encounter ([place] [address] ) 2/15/2023         Allergies   Allergen Reactions   • Ambien [Zolpidem] Other (See Comments)     Did not allow sleep per pt       Current Outpatient Medications:   •  ascorbic acid (VITAMIN C) 500 mg tablet, Take 500 mg by mouth daily, Disp: , Rfl:   •  budesonide (ENTOCORT EC) 3 MG capsule, Take 9 mg by mouth daily Takes 3 capsules once daily   , Disp: , Rfl:   •  Diclofenac Sodium (VOLTAREN) 1 %, Apply 2 g topically 4 (four) times a day, Disp: 100 g, Rfl: 0  •  ferrous sulfate 324 (65 Fe) mg, Take 1 tablet (324 mg total) by mouth daily before breakfast, Disp: 30 tablet, Rfl: 0  •  fluticasone (FLONASE) 50 mcg/act nasal spray, 2 sprays into each nostril daily (Patient taking differently: 2 sprays into each nostril daily as needed for allergies), Disp: 16 g, Rfl: 0  •  metoprolol succinate (TOPROL-XL) 25 mg 24 hr tablet, TAKE 1 TABLET BY MOUTH EVERY DAY, Disp: 90 tablet, Rfl: 3  •  potassium chloride (MICRO-K) 10 MEQ CR capsule, Take 1 capsule (10 mEq total) by mouth daily (Patient taking differently: Take 10 mEq by mouth daily Hold parameter for a systolic BP 90 or less  Call for instructions if there is any SS of fluid overload or dehydration ), Disp: 90 capsule, Rfl: 3  •  predniSONE 10 mg tablet, Take 10 mg by mouth daily, Disp: , Rfl:   •  Promacta 50 MG tablet, TAKE 1 TABLET BY MOUTH 1 TIME A DAY ON AN EMPTY STOMACH  1 HOUR BEFORE OR 2 HOURS AFTER A MEAL , Disp: 90 tablet, Rfl: 2  •  spironolactone (ALDACTONE) 25 mg tablet, Take 0 5 tablets (12 5 mg total) by mouth daily (Patient taking differently: Take 12 5 mg by mouth daily Hold parameter for a systolic BP 90 or less  Call for instructions if there is any SS of fluid overload or dehydration ), Disp: 90 tablet, Rfl: 3  •  torsemide (DEMADEX) 10 mg tablet, Take 1 tablet (10 mg total) by mouth daily (Patient taking differently: Take 10 mg by mouth daily Hold parameter for a systolic BP 90 or less   Call for instructions if there is any SS of fluid overload or dehydration ), Disp: 90 tablet, Rfl: 3    Social History     Socioeconomic History   • Marital status: /Civil Union     Spouse name: Not on file   • Number of children: Not on file   • Years of education: Not on file   • Highest education level: Not on file   Occupational History   • Not on file   Tobacco Use   • Smoking status: Never   • Smokeless tobacco: Never   Vaping Use   • Vaping Use: Never used   Substance and Sexual Activity   • Alcohol use: No     Alcohol/week: 0 0 standard drinks   • Drug use: No   • Sexual activity: Not Currently Partners: Male   Other Topics Concern   • Not on file   Social History Narrative   • Not on file     Social Determinants of Health     Financial Resource Strain: Low Risk    • Difficulty of Paying Living Expenses: Not hard at all   Food Insecurity: No Food Insecurity   • Worried About [address] in the Last Year: Never true   • Ran Out of Food in the Last Year: Never true   Transportation Needs: No Transportation Needs   • Lack of Transportation (Medical): No   • Lack of Transportation (Non-Medical): No   Physical Activity: Not on file   Stress: Not on file   Social Connections: Not on file   Intimate Partner Violence: Not on file   Housing Stability: Low Risk    • Unable to Pay for Housing in the Last Year: No   • Number of Places Lived in the Last Year: 1   • Unstable Housing in the Last Year: No       Family History   Problem Relation Age of Onset   • Autoimmune disease Neg Hx        Physical Exam:    Vitals: There were no vitals filed for this visit  Physical Exam  Constitutional:       Appearance: She is well-developed  HENT:      Head: Normocephalic and atraumatic  Eyes:      Pupils: Pupils are equal, round, and reactive to light  Neck:      Vascular: No JVD  Cardiovascular:      Rate and Rhythm: Normal rate and regular rhythm  Heart sounds: No murmur heard  Pulmonary:      Effort: Pulmonary effort is normal  No respiratory distress  Breath sounds: Normal breath sounds  Abdominal:      General: There is no distension  Palpations: Abdomen is soft  Tenderness: There is no abdominal tenderness  Musculoskeletal:         General: Normal range of motion  Cervical back: Normal range of motion  Skin:     General: Skin is warm and dry  Findings: No rash  Neurological:      Mental Status: She is alert and oriented to person, place, and time         Labs & Results:    Lab Results   Component Value Date    SODIUM 139 02/22/2023    K 3 6 02/22/2023     02/22/2023    CO2 30 02/22/2023    BUN 19 02/22/2023    CREATININE 0 71 02/22/2023    GLUC 98 02/22/2023    CALCIUM 9 0 02/22/2023     Lab Results   Component Value Date    WBC 3 45 (L) 02/22/2023    HGB 10 9 (L) 02/22/2023    HCT 33 2 (L) 02/22/2023    MCV 96 02/22/2023    PLT 94 (L) 02/22/2023     Lab Results   Component Value Date    NTBNP 9,483 (H) 05/29/2020      Lab Results   Component Value Date    CHOLESTEROL 108 08/02/2018     Lab Results   Component Value Date    HDL 41 08/02/2018    HDL 76 10/23/2015     Lab Results   Component Value Date    TRIG 75 08/02/2018    TRIG 87 10/23/2015     No results found for: [facility]    EKG personally reviewed by Anel Mojica  Counseling / Coordination of Care  Time spent today 25 minutes  Greater than 50% of total time was spent with the patient and / or family counseling and / or coordination of care  We discussed diagnoses, most recent studies, tests and any changes in treatment plan    Thank you for the opportunity to participate in the care of this patient      [address] [facility] PULMONARY [facility]  MEDICAL DIRECTOR OF [place]

## 2023-03-07 ENCOUNTER — OFFICE VISIT (OUTPATIENT)
Dept: CARDIOLOGY CLINIC | Facility: CLINIC | Age: 83
End: 2023-03-07

## 2023-03-07 VITALS
HEART RATE: 52 BPM | WEIGHT: 108 LBS | HEIGHT: 63 IN | DIASTOLIC BLOOD PRESSURE: 52 MMHG | OXYGEN SATURATION: 99 % | SYSTOLIC BLOOD PRESSURE: 100 MMHG | BODY MASS INDEX: 19.14 KG/M2

## 2023-03-07 DIAGNOSIS — I42.0 DILATED CARDIOMYOPATHY (HCC): ICD-10-CM

## 2023-03-07 DIAGNOSIS — I11.0 HYPERTENSIVE HEART DISEASE WITH CONGESTIVE HEART FAILURE, UNSPECIFIED HEART FAILURE TYPE (HCC): ICD-10-CM

## 2023-03-07 DIAGNOSIS — I50.22 CHRONIC SYSTOLIC HEART FAILURE (HCC): Primary | ICD-10-CM

## 2023-03-07 DIAGNOSIS — I44.7 COMPLETE LEFT BUNDLE BRANCH BLOCK (LBBB): ICD-10-CM

## 2023-03-07 NOTE — PATIENT INSTRUCTIONS
I always recommend a BiV ICD implant    I don't think you should be wearing the LifeVest anymore- I am saying this to try to make
31

## 2023-03-09 ENCOUNTER — EVALUATION (OUTPATIENT)
Dept: PHYSICAL THERAPY | Facility: CLINIC | Age: 83
End: 2023-03-09

## 2023-03-09 ENCOUNTER — OFFICE VISIT (OUTPATIENT)
Dept: HEMATOLOGY ONCOLOGY | Facility: CLINIC | Age: 83
End: 2023-03-09

## 2023-03-09 VITALS
SYSTOLIC BLOOD PRESSURE: 112 MMHG | DIASTOLIC BLOOD PRESSURE: 70 MMHG | HEART RATE: 78 BPM | OXYGEN SATURATION: 98 % | TEMPERATURE: 96.4 F | HEIGHT: 63 IN | WEIGHT: 108.6 LBS | BODY MASS INDEX: 19.24 KG/M2 | RESPIRATION RATE: 16 BRPM

## 2023-03-09 DIAGNOSIS — D69.3 CHRONIC ITP (IDIOPATHIC THROMBOCYTOPENIA) (HCC): Primary | ICD-10-CM

## 2023-03-09 DIAGNOSIS — M54.50 ACUTE BILATERAL LOW BACK PAIN WITHOUT SCIATICA: Primary | ICD-10-CM

## 2023-03-09 DIAGNOSIS — D69.6 THROMBOCYTOPENIA (HCC): ICD-10-CM

## 2023-03-09 DIAGNOSIS — D50.9 IRON DEFICIENCY ANEMIA, UNSPECIFIED IRON DEFICIENCY ANEMIA TYPE: Primary | ICD-10-CM

## 2023-03-09 NOTE — PROGRESS NOTES
Hematology/Oncology Outpatient Follow- up Note  Bre Coelho 78 y o  female MRN: @ Encounter: [id number]        Date:  3/9/2023      Assessment / Plan: 1  Chronic immune thrombocytopenic purpura, refractory to steroids  She is on Promacta 50 mg p o  daily since 8/2021      Platelet count went from 30,000 range July 2021 to normal by 9/2021  She has been without her medication for about 6 weeks  Platelet count went from 441 12/5/22 to 70 1/23/23 and 94 2/22/23  Given her age, multiple other problems, history of falls and subdural hemorrhage, would like to keep her platelet count around 100,000  As she had a platelet count above 400,000 on Promacta 50 mg, will reduce Promacta to 25 mg p o  daily           2  Elevation of AST, ALT with positive LEA most likely autoimmune, she was back on prednisone 10 mg p o  daily with normalization of AST, ALT managed per Dr Jatinder Sime  Appears discontinued since at least 3/2021         Leukopenia autoimmune      3  History of Acute blood loss anemia  Status post fall with subdural hemorrhage March 2022  Hemoglobin 5 1 7/1/2022  She felt very fatigued prior to admission  She and her son deny any evidence of GI bleed  She declined GI workup  She was taking oral iron for a few weeks post discharge but discontinued  At her 8/29/22 office visit, she was advised to resume oral iron  8/2022 - Hemoglobin 10 - 11 g/dL range 8/22 - 2/20232 2/22/23 iron saturation 29%; ferritin 87    Patient reports dark stool for the past few days  She reports she is going for liver function test assessment every other week ordered by GI  Will check CBC D every 2 weeks  Will assess iron panel monthly and supplement as needed    Given her age, she has declined GI evaluation    F/U in 4 months               HPI:    40-year-old [place] American female who was seen initially 5/2020 regarding pancytopenia     She has a history of dilated cardiomyopathy with low ejection fraction on Entresto, spironolactone, torsemide, metoprolol and budesonide  Elpidio Sepulveda has history of hypertension, autoimmune hepatitis treated with steroids, 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         She had outpatient labs [facility]/17/[facility]   hemoglobin 6 9, MCV 70, RDW 19 7, WBC 1 9, 59% neutrophils, 30% lymphocytes, platelet 71886,   She was transfused with 2 units packed RBC ordered by her cardiologist, Dr Bakari Burch and received Feraheme weekly x 2 doses end of June 2020      [facility] on 09/09/2019 showed hemoglobin 11 3, MCV 93, WBC of 2 7, normal differential platelets 05810  August 2018 iron saturation 8% ferritin 26  On 07/2018 hemoglobin 11, MCV 91, WBC 3 1, platelets 35237  37/93/0172:  Hemoglobin 12 9, MCV of 90, white blood cell count 2 95, platelets 745  On 70/1631 hemoglobin 13 3, MCV 89, WBC 3 4, platelets 710511     She had normal vitamin I92, folic acid, TSH, protein electrophoresis     Autoimmune workup by Rheumatology was negative except for anti smooth muscle antibodies of 93     Because of the leukopenia, thrombocytopenia bone marrow biopsy performed on January 2021 showed normal bone marrow cellularity for the age, no evidence of dysplastic features, normal plasma cells and lymphocytes, no evidence of vasculitis, necrosis, fibrosis adequate iron stains     Back on prednisone 10 mg p o  daily because of elevation of ALT, AST, with persistent thrombocytopenia of 38,000      Initiated on Promacta 50 mg at the end of August 2021, platelet count 341295 in November 2021, WBC 4, and decrease in the AST, ALT     3/20/22 admitted when outpatient CT identified subdural hemorrhage  She would a mechanical fall 1 week prior and had intermittent headaches which prompted the CT scan         4/25/2022 hemoglobin 11 8, platelet count 22,964     No f/u outpatient labs until 6/30/22    Hemoglobin 6 1, platelet count 627      Admitted July 1st through July 5th Hemoglobin on admission 5 1 July 1st   She underwent transfusion of packed red blood cells   She declined endoscopy  Discharge hemoglobin 8 3  On discussion today, August 29th, patient and her son report that she did not have dark stool      She was taking oral iron for a few weeks post discharge from her June 2022 hospitalization       8/25/2022 hemoglobin 10 7, MCV 80, white blood cell count 4 3, 65% neutrophils, 24% lymphocytes, 10% monocytes, platelet count 077    Interval History:        Review of Systems   Constitutional: Positive for fatigue  Negative for appetite change, chills, diaphoresis, fever and unexpected weight change  HENT:   Negative for mouth sores, nosebleeds, sore throat, tinnitus and voice change  Eyes: Negative for eye problems  Respiratory: Negative for chest tightness, cough, shortness of breath and wheezing  Cardiovascular: Negative for chest pain, leg swelling and palpitations  Gastrointestinal: Negative for abdominal distention, abdominal pain, blood in stool, constipation, diarrhea, nausea, rectal pain and vomiting  Dark stool   Endocrine: Negative for hot flashes  Genitourinary: Negative  Musculoskeletal: Negative for gait problem and myalgias  Skin: Negative for itching and rash  Neurological: Positive for light-headedness  Negative for dizziness, gait problem, headaches and numbness  Hematological: Negative for adenopathy  Psychiatric/Behavioral: Negative for confusion and sleep disturbance  The patient is not nervous/anxious           Test Results:        Labs:   Lab Results   Component Value Date    HGB 10 9 (L) 02/22/2023    HCT 33 2 (L) 02/22/2023    MCV 96 02/22/2023    PLT 94 (L) 02/22/2023    WBC 3 45 (L) 02/22/2023    NRBC 0 02/22/2023     Lab Results   Component Value Date     12/04/2015    K 3 6 02/22/2023     02/22/2023    CO2 30 02/22/2023    ANIONGAP 4 12/04/2015    BUN 19 02/22/2023    CREATININE 0 71 02/22/2023    GLUCOSE 94 12/04/2015    GLUF 110 (H) 01/23/2023    CALCIUM 9 0 02/22/2023    CORRECTEDCA 9 6 12/05/2022    AST 22 02/22/2023    ALT 15 02/22/2023    ALKPHOS 59 02/22/2023    PROT 7 5 12/04/2015    BILITOT 0 48 12/04/2015    EGFR 78 02/22/2023           Imaging: XR spine lumbar minimum 4 views non injury    Result Date: 2/15/2023  Narrative: LUMBAR SPINE INDICATION:   M[age]: Low back pain, unspecified  COMPARISON:  None VIEWS:  XR SPINE LUMBAR MINIMUM 4 VIEWS NON INJURY FINDINGS: There are 5 non rib bearing lumbar vertebral bodies  There is no evidence of acute fracture or destructive osseous lesion  L1-L2, L2-L3 slight retrolisthesis  L4-L5, L5-S1 slight degenerative spondylolisthesis  Anatomic alignment otherwise  L4-5, L5-S1 bilateral posterior facet mild arthrosis  L5-S1 disc space narrowing  Chronic appearing L5 mild loss of stature  The pedicles appear intact  Soft tissues are unremarkable  Impression: No acute osseous abnormality Workstation performed: [id number]             Allergies: Allergies   Allergen Reactions   • Ambien [Zolpidem] Other (See Comments)     Did not allow sleep per pt     Current Medications: Reviewed  PMH/FH/SH:  Reviewed      Physical Exam:    1 49 meters squared    Ht Readings from Last 3 Encounters:   03/09/23 5' 3" (1 6 m)   03/07/23 5' 3" (1 6 m)   02/15/23 5' 3" (1 6 m)        Wt Readings from Last 3 Encounters:   03/09/23 49 3 kg (108 lb 9 6 oz)   03/07/23 49 kg (108 lb)   02/15/23 50 2 kg (110 lb 9 6 oz)        Temp Readings from Last 3 Encounters:   03/09/23 (!) 96 4 °F (35 8 °C)   02/15/23 98 5 °F (36 9 °C) (Tympanic)   02/10/23 98 1 °F (36 7 °C) (Tympanic)        BP Readings from Last 3 Encounters:   03/09/23 112/70   03/07/23 100/52   02/15/23 126/70             Physical Exam  Vitals reviewed  Constitutional:       General: She is not in acute distress  Appearance: She is well-developed  She is not diaphoretic  HENT:      Head: Normocephalic and atraumatic     Eyes:      Conjunctiva/sclera: Conjunctivae normal  Neck:      Trachea: No tracheal deviation  Cardiovascular:      Rate and Rhythm: Normal rate and regular rhythm  Heart sounds: No murmur heard  No friction rub  No gallop  Pulmonary:      Effort: Pulmonary effort is normal  No respiratory distress  Breath sounds: Normal breath sounds  No wheezing or rales  Chest:      Chest wall: No tenderness  Abdominal:      General: There is no distension  Palpations: Abdomen is soft  Tenderness: There is no abdominal tenderness  Musculoskeletal:      Cervical back: Normal range of motion and neck supple  Lymphadenopathy:      Cervical: No cervical adenopathy  Skin:     General: Skin is warm and dry  Coloration: Skin is not pale  Findings: No erythema  Neurological:      Mental Status: She is alert  Comments: Decreased memory   Psychiatric:         Behavior: Behavior normal          Thought Content:  Thought content normal          Judgment: Judgment normal              Emergency Contacts:    Extended Emergency Contact Information  Primary Emergency Contact: [address] Phone: [phone]  Relation: Spouse  Secondary Emergency Contact: Sebastian Woodson  Mobile Phone: [phone]  Relation: Son

## 2023-03-10 NOTE — PROGRESS NOTES
PT Evaluation     Today's date: 3/9/2023  Patient name: Lawyer Boudreaux  : 1940  MRN: [id number]  Referring provider: Christos Ohara MD  Dx:   Encounter Diagnosis     ICD-10-CM    1  Acute bilateral low back pain without sciatica  M54 48 Ambulatory Referral to Physical Therapy    to r/o fracture although i doubt that it is  to get X ray done today   to start PT                      Assessment  Assessment details: Pt presents with s/s consistent with lumbar DDD/DJD with a posture correction and core stabilization preference  Pt will benefit from PT to address impairments and restore function  Impairments: abnormal gait, activity intolerance, impaired physical strength, lacks appropriate home exercise program, pain with function, poor posture  and poor body mechanics    Goals  ST-4 weeks  1   Pt will decrease pain > 50%  2   Pt will improve sitting posture > 50%  LT-8 weeks  1   Pt will decrease pain > 75%  2   Pt will walk 10 min without pain  Plan  Planned therapy interventions: manual therapy, abdominal trunk stabilization, neuromuscular re-education, patient education, postural training, body mechanics training, self care, strengthening, stretching, therapeutic activities, therapeutic exercise, flexibility, functional ROM exercises, gait training and home exercise program  Frequency: 2x week  Duration in weeks: 4        Subjective Evaluation    History of Present Illness  Mechanism of injury: Pt is a 78 yof c/o LBP with L > R LE radiculopathy to the foot that began with an insidious onset 4 weeks ago    Pt reports this LBP coincides with getting a new life vest   Pain  Current pain rating: 3  At best pain ratin  At worst pain ratin  Quality: dull ache, radiating and pressure  Relieving factors: change in position and medications  Aggravating factors: sitting and walking  Progression: worsening      Diagnostic Tests  X-ray: abnormal (2/15/23 mild DDD/DJD L/S)  Patient Goals  Patient goals for therapy: decreased pain, increased motion, increased strength, independence with ADLs/IADLs and return to sport/leisure activities          Objective     Postural Observations  Seated posture: poor  Standing posture: poor  Correction of posture: makes symptoms better        Neurological Testing     Sensation     Lumbar   Left   Intact: light touch    Right   Intact: light touch    Reflexes   Left   Patellar (L4): trace (1+)  Achilles (S1): trace (1+)    Right   Patellar (L4): trace (1+)  Achilles (S1): trace (1+)    Active Range of Motion     Lumbar   Flexion:  WFL  Extension:  Restriction level: moderate  Left lateral flexion:  Restriction level: minimal  Right lateral flexion:  Restriction level: minimal  Left rotation:  Restriction level: minimal  Right rotation:  Restriction level: minimal  Mechanical Assessment    Cervical      Thoracic      Lumbar    Standing flexion: repeated movements   Pain location:no change  Standing extension: repeated movements  Pain location: no change    Strength/Myotome Testing     Left Knee   Flexion: 3+  Extension: 3+    Right Knee   Flexion: 3+  Extension: 3+    Left Ankle/Foot   Dorsiflexion: 4-  Plantar flexion: 4-  Great toe extension: 4-    Right Ankle/Foot   Dorsiflexion: 4-  Plantar flexion: 4-  Great toe extension: 4-    Tests     Lumbar     Left   Negative slump test      Right   Negative slump test               Precautions: Cardiac, increased fall risk  Dx: posture correction, core stab        Manuals 3/9                                                                Neuro Re-Ed             Posture correction/ ed 10 min            bridges 1x10            LTR 1x5                                                                Ther Ex                                                                                                                     Ther Activity             STS  Foam/ 1x10                        Gait Training             No AD  4 min                        Modalities

## 2023-03-13 ENCOUNTER — OFFICE VISIT (OUTPATIENT)
Dept: PHYSICAL THERAPY | Facility: CLINIC | Age: 83
End: 2023-03-13

## 2023-03-13 DIAGNOSIS — M54.50 ACUTE BILATERAL LOW BACK PAIN WITHOUT SCIATICA: Primary | ICD-10-CM

## 2023-03-13 NOTE — PROGRESS NOTES
Daily Note     Today's date: 3/13/2023  Patient name: Yang Walters  : 1940  MRN: [id number]  Referring provider: Sol Almonte MD  Dx:   Encounter Diagnosis     ICD-10-CM    1  Acute bilateral low back pain without sciatica  M54 48                      Subjective: Pt reports working on improved sitting posture since IE with some relief  Pt reports no radicular sx currently  Objective: See treatment diary below    Assessment: Pt demonstrated improved sitting and standing posture  Plan: Assess response to core stab POC  Precautions: Cardiac, increased fall risk  Dx: posture correction, core stab        Manuals 3/9 3/13           Seated Gr III P-A L1-S1 mobs  1x20 ea                                                  Neuro Re-Ed             Posture correction/ ed 10 min Sitting 5 min standing 2 min           bridges 1x10 1x20           LTR 1x5 1x5 ea           pallof press  Y/ 1x10           Seated lumbar flexion  1x10                                     Ther Ex             Back extension machine  10#/ 1x10                                                                                                      Ther Activity             STS   Foam/ 1x10                       Gait Training             No AD   4 min                       Modalities

## 2023-03-16 ENCOUNTER — OFFICE VISIT (OUTPATIENT)
Dept: PHYSICAL THERAPY | Facility: CLINIC | Age: 83
End: 2023-03-16

## 2023-03-16 DIAGNOSIS — M54.50 ACUTE BILATERAL LOW BACK PAIN WITHOUT SCIATICA: Primary | ICD-10-CM

## 2023-03-16 NOTE — PROGRESS NOTES
Daily Note     Today's date: 3/16/2023  Patient name: Zhane Goetz  : 1940  MRN: [id number]  Referring provider: Isaias Macias MD  Dx:   Encounter Diagnosis     ICD-10-CM    1  Acute bilateral low back pain without sciatica  M54 48                      Subjective: Pt reports today isn't the best day for her  Objective: See treatment diary below      Assessment: Pt did well with bridges having no issues or discomfort  Pt had a lot of crepitus with seated lumbar mobs but no reported discomfort  Pt required rest breaks between standing exercises as she was very fatigued  Pt did well with standing hip abduction but was very fatigued following  Patient demonstrated fatigue post treatment, exhibited good technique with therapeutic exercises and would benefit from continued PT      Plan: Continue per plan of care  Precautions: Cardiac, increased fall risk  Dx: posture correction, core stab        Manuals 3/9 3/13 3/16          Seated Gr III P-A L1-S1 mobs  1x20 ea 1x20 ea                                                 Neuro Re-Ed             Posture correction/ ed 10 min Sitting 5 min standing 2 min           bridges 1x10 1x20 1x20          LTR 1x5 1x5 ea 1x5 ea          pallof press  Y/ 1x10 Y/ 1x10          Seated lumbar flexion  1x10 1x10                                    Ther Ex             Back extension machine  10#/ 1x10 10#/ 1x10          Standing hip abduction   2x10 ea                                                                                        Ther Activity             STS   Foam/ 1x10                       Gait Training             No AD   3 laps                       Modalities

## 2023-03-20 ENCOUNTER — TELEPHONE (OUTPATIENT)
Dept: OTHER | Facility: HOSPITAL | Age: 83
End: 2023-03-20

## 2023-03-20 ENCOUNTER — TELEPHONE (OUTPATIENT)
Dept: HEMATOLOGY ONCOLOGY | Facility: CLINIC | Age: 83
End: 2023-03-20

## 2023-03-20 ENCOUNTER — OFFICE VISIT (OUTPATIENT)
Dept: PHYSICAL THERAPY | Facility: CLINIC | Age: 83
End: 2023-03-20

## 2023-03-20 ENCOUNTER — TELEPHONE (OUTPATIENT)
Dept: OTHER | Facility: OTHER | Age: 83
End: 2023-03-20

## 2023-03-20 ENCOUNTER — APPOINTMENT (OUTPATIENT)
Dept: LAB | Facility: CLINIC | Age: 83
End: 2023-03-20

## 2023-03-20 DIAGNOSIS — D69.3 CHRONIC ITP (IDIOPATHIC THROMBOCYTOPENIA) (HCC): ICD-10-CM

## 2023-03-20 DIAGNOSIS — R76.8 ANA POSITIVE: ICD-10-CM

## 2023-03-20 DIAGNOSIS — D50.0 IRON DEFICIENCY ANEMIA DUE TO CHRONIC BLOOD LOSS: ICD-10-CM

## 2023-03-20 DIAGNOSIS — I50.22 CHRONIC SYSTOLIC HEART FAILURE (HCC): ICD-10-CM

## 2023-03-20 DIAGNOSIS — D50.0 IRON DEFICIENCY ANEMIA DUE TO CHRONIC BLOOD LOSS: Primary | ICD-10-CM

## 2023-03-20 DIAGNOSIS — M54.50 ACUTE BILATERAL LOW BACK PAIN WITHOUT SCIATICA: Primary | ICD-10-CM

## 2023-03-20 DIAGNOSIS — D62 ACUTE BLOOD LOSS ANEMIA: Primary | ICD-10-CM

## 2023-03-20 DIAGNOSIS — D50.9 IRON DEFICIENCY ANEMIA, UNSPECIFIED IRON DEFICIENCY ANEMIA TYPE: ICD-10-CM

## 2023-03-20 LAB
ABO GROUP BLD: NORMAL
BLD GP AB SCN SERPL QL: NEGATIVE
FERRITIN SERPL-MCNC: 20 NG/ML (ref 8–388)
IRON SATN MFR SERPL: 5 % (ref 15–50)
IRON SERPL-MCNC: 18 UG/DL (ref 50–170)
RH BLD: POSITIVE
SPECIMEN EXPIRATION DATE: NORMAL
TIBC SERPL-MCNC: 385 UG/DL (ref 250–450)

## 2023-03-20 RX ORDER — SODIUM CHLORIDE 9 MG/ML
20 INJECTION, SOLUTION INTRAVENOUS ONCE
Status: CANCELLED | OUTPATIENT
Start: 2023-03-21

## 2023-03-20 RX ORDER — SODIUM CHLORIDE 9 MG/ML
20 INJECTION, SOLUTION INTRAVENOUS ONCE
OUTPATIENT
Start: 2023-03-20

## 2023-03-20 RX ORDER — SODIUM CHLORIDE 9 MG/ML
20 INJECTION, SOLUTION INTRAVENOUS ONCE
Status: CANCELLED | OUTPATIENT
Start: 2023-03-23

## 2023-03-20 NOTE — TELEPHONE ENCOUNTER
Spoke with pt who states she has occasional nausea and sob with stairs  Is not currently taking oral iron  Simran Brown recommending transfusion if pt agreeable as well as iron infusion twice weekly x6  Continue CBC every 2 weeks

## 2023-03-20 NOTE — TELEPHONE ENCOUNTER
Lab Result: Hemoglobin  5 8   Date/Time Drawn: 3/20/2023 @ 17:43   Ordering Provider: Mariia Given   Lab Personnel's Name: Clifford Levy        The following critical/stat result was read back to the lab as stated above and [employer] to the on-call provider  The provider confirmed receipt of the message

## 2023-03-20 NOTE — TELEPHONE ENCOUNTER
Please schedule Venofer appts  Pt also scheduled for 1 unit prbc's on 3/22 at noon  When you speak with pt/family please inform them of transfusion appt (they are aware this is in the works) as well as Venofer appts  Thank you!

## 2023-03-20 NOTE — PROGRESS NOTES
[facility] [address]  coding opportunities       Chart reviewed, no opportunity found: CHART REVIEWED, NO OPPORTUNITY FOUND        Patients Insurance     Medicare Insurance: Medicare nose bleed 7 times since 9 days ago,does not take blood thinner

## 2023-03-20 NOTE — PROGRESS NOTES
Daily Note     Today's date: 3/20/2023  Patient name: Deny Ellis  : 1940  MRN: [id number]  Referring provider: Bernabe Chirinos MD  Dx:   Encounter Diagnosis     ICD-10-CM    1  Acute bilateral low back pain without sciatica  M54 50                      Subjective: Pt reports no low back pain pre tx  Objective: See treatment diary below      Assessment: Pt demonstrated increased endurance with core stabilization an hip strengthening  Pt did require several rest breaks throughout TE program but was able to complete treatment without low back pain  Plan: Continue per plan of care  Precautions: Cardiac, increased fall risk  Dx: posture correction, core stab        Manuals 3/9 3/13 3/16 3/20         Seated Gr III P-A L1-S1 mobs  1x20 ea 1x20 ea nv                                                Neuro Re-Ed             Posture correction/ ed 10 min Sitting 5 min standing 2 min           bridges 1x10 1x20 1x20 3x10         LTR 1x5 1x5 ea 1x5 ea 1x5 ea         pallof press  Y/ 1x10 Y/ 1x10 Y/ 1x10         Seated lumbar flexion  1x10 1x10 1x10                                   Ther Ex             Back extension machine  10#/ 1x10 10#/ 1x10 10#/ 2x10         Standing hip abduction   2x10 ea 2x10 ea                                                                                       Ther Activity             STS   Foam/ 1x10 Foam  1x10                      Gait Training             No AD   3 laps 3 laps                      Modalities

## 2023-03-20 NOTE — TELEPHONE ENCOUNTER
Received a message while on call regarding critical lab value, Hgb 5 8  Chart reviewed  Ms Yunier Wadsworth is an 78 Y F with hx of blood loss anemia  Patient had declined any GI workup in the past    Hematology team plan to check CBC q2 weeks and iron panel once monthly noted  Patient with baseline Hgb 9-10 based on CBCs from October 2022 to Feb 2023  CBC drawn this afternoon showed a Hgb of 6 1 with repeat one in the evening 5 8  Iron panel drawn this afternoon is highly suggestive of LASHANDA with ferritin 20, iron sat 5% and low iron of 18 ug/dL  Based on encounter notes from earlier in the day, primary team is already aware of the low Hgb 6 1 and had talked to patient regarding arranging blood transfusions and iron infusions  When primary team called this morning, patient had reported some ROCHA  Alia infusion appt for tomorrow noted  I tried calling patient 3x via the number listed on chart based on reported Hgb 5 8 to see if she had any acute complaints, but kept getting busy signal  I also tried calling the phone number listed for son, but phone call was not answered  Forwarding this to pt's primary hematology team so they are also aware of the situation, but with the upcoming infusion appt tomorrow, patient should likely be ok with waiting for blood transfusion and iron infusion tomorrow given that she was minimally symptomatic earlier in the day

## 2023-03-21 ENCOUNTER — HOSPITAL ENCOUNTER (OUTPATIENT)
Dept: INFUSION CENTER | Facility: CLINIC | Age: 83
Discharge: HOME | End: 2023-03-21

## 2023-03-21 ENCOUNTER — HOSPITAL ENCOUNTER (INPATIENT)
Facility: HOSPITAL | Age: 83
LOS: 4 days | Discharge: HOME | End: 2023-03-25
Attending: EMERGENCY MEDICINE | Admitting: INTERNAL MEDICINE

## 2023-03-21 DIAGNOSIS — D62 ACUTE BLOOD LOSS ANEMIA: ICD-10-CM

## 2023-03-21 DIAGNOSIS — D50.0 IRON DEFICIENCY ANEMIA DUE TO CHRONIC BLOOD LOSS: Primary | ICD-10-CM

## 2023-03-21 DIAGNOSIS — I50.22 CHRONIC SYSTOLIC HEART FAILURE (HCC): ICD-10-CM

## 2023-03-21 DIAGNOSIS — D69.3 CHRONIC ITP (IDIOPATHIC THROMBOCYTOPENIA) (HCC): ICD-10-CM

## 2023-03-21 DIAGNOSIS — R19.5 GUAIAC + STOOL: ICD-10-CM

## 2023-03-21 DIAGNOSIS — D50.0 IRON DEFICIENCY ANEMIA DUE TO CHRONIC BLOOD LOSS: ICD-10-CM

## 2023-03-21 DIAGNOSIS — D64.9 ANEMIA: Primary | ICD-10-CM

## 2023-03-21 PROBLEM — E87.6 HYPOKALEMIA: Status: ACTIVE | Noted: 2023-03-21

## 2023-03-21 LAB
ABO GROUP BLD BPU: NORMAL
ABO GROUP BLD: NORMAL
ANION GAP SERPL CALCULATED.3IONS-SCNC: 3 MMOL/L (ref 4–13)
BASOPHILS # BLD AUTO: 0.02 THOUSANDS/ÂΜL (ref 0–0.1)
BASOPHILS NFR BLD AUTO: 0 % (ref 0–1)
BLD GP AB SCN SERPL QL: NEGATIVE
BPU ID: NORMAL
BUN SERPL-MCNC: 26 MG/DL (ref 5–25)
CALCIUM SERPL-MCNC: 9.4 MG/DL (ref 8.3–10.1)
CHLORIDE SERPL-SCNC: 104 MMOL/L (ref 96–108)
CO2 SERPL-SCNC: 30 MMOL/L (ref 21–32)
CREAT SERPL-MCNC: 0.98 MG/DL (ref 0.6–1.3)
CROSSMATCH: NORMAL
EOSINOPHIL # BLD AUTO: 0.01 THOUSAND/ÂΜL (ref 0–0.61)
EOSINOPHIL NFR BLD AUTO: 0 % (ref 0–6)
ERYTHROCYTE [DISTWIDTH] IN BLOOD BY AUTOMATED COUNT: 17.1 % (ref 11.6–15.1)
GFR SERPL CREATININE-BSD FRML MDRD: 52 ML/MIN/1.73SQ M
GLUCOSE SERPL-MCNC: 86 MG/DL (ref 65–140)
HCT VFR BLD AUTO: 20 % (ref 34.8–46.1)
HCT VFR BLD AUTO: 23 % (ref 34.8–46.1)
HCT VFR BLD AUTO: 24.1 % (ref 34.8–46.1)
HGB BLD-MCNC: 6 G/DL (ref 11.5–15.4)
HGB BLD-MCNC: 7.6 G/DL (ref 11.5–15.4)
HGB BLD-MCNC: 8 G/DL (ref 11.5–15.4)
IMM GRANULOCYTES # BLD AUTO: 0.01 THOUSAND/UL (ref 0–0.2)
IMM GRANULOCYTES NFR BLD AUTO: 0 % (ref 0–2)
INR PPP: 1.35 (ref 0.84–1.19)
LYMPHOCYTES # BLD AUTO: 1.04 THOUSANDS/ÂΜL (ref 0.6–4.47)
LYMPHOCYTES NFR BLD AUTO: 22 % (ref 14–44)
MCH RBC QN AUTO: 26.8 PG (ref 26.8–34.3)
MCHC RBC AUTO-ENTMCNC: 30 G/DL (ref 31.4–37.4)
MCV RBC AUTO: 89 FL (ref 82–98)
MONOCYTES # BLD AUTO: 0.54 THOUSAND/ÂΜL (ref 0.17–1.22)
MONOCYTES NFR BLD AUTO: 12 % (ref 4–12)
NEUTROPHILS # BLD AUTO: 3.09 THOUSANDS/ÂΜL (ref 1.85–7.62)
NEUTS SEG NFR BLD AUTO: 66 % (ref 43–75)
NRBC BLD AUTO-RTO: 0 /100 WBCS
PLATELET # BLD AUTO: 118 THOUSANDS/UL (ref 149–390)
PMV BLD AUTO: 11.7 FL (ref 8.9–12.7)
POTASSIUM SERPL-SCNC: 3.2 MMOL/L (ref 3.5–5.3)
PROTHROMBIN TIME: 16.9 SECONDS (ref 11.6–14.5)
RBC # BLD AUTO: 2.24 MILLION/UL (ref 3.81–5.12)
RH BLD: POSITIVE
SODIUM SERPL-SCNC: 137 MMOL/L (ref 135–147)
SPECIMEN EXPIRATION DATE: NORMAL
UNIT DISPENSE STATUS: NORMAL
UNIT PRODUCT CODE: NORMAL
UNIT PRODUCT VOLUME: 350 ML
UNIT RH: NORMAL
WBC # BLD AUTO: 4.71 THOUSAND/UL (ref 4.31–10.16)

## 2023-03-21 PROCEDURE — 30233N1 TRANSFUSION OF NONAUTOLOGOUS RED BLOOD CELLS INTO PERIPHERAL VEIN, PERCUTANEOUS APPROACH: ICD-10-PCS | Performed by: STUDENT IN AN ORGANIZED HEALTH CARE EDUCATION/TRAINING PROGRAM

## 2023-03-21 RX ORDER — MELATONIN
1000 DAILY
Status: DISCONTINUED | OUTPATIENT
Start: 2023-03-22 | End: 2023-03-25 | Stop reason: HOSPADM

## 2023-03-21 RX ORDER — POTASSIUM CHLORIDE 20 MEQ/1
40 TABLET, EXTENDED RELEASE ORAL ONCE
Status: COMPLETED | OUTPATIENT
Start: 2023-03-21 | End: 2023-03-21

## 2023-03-21 RX ORDER — OMEGA-3-ACID ETHYL ESTERS 1 G/1
2 CAPSULE, LIQUID FILLED ORAL DAILY
COMMUNITY

## 2023-03-21 RX ORDER — ACETAMINOPHEN 325 MG/1
650 TABLET ORAL EVERY 6 HOURS PRN
Status: DISCONTINUED | OUTPATIENT
Start: 2023-03-21 | End: 2023-03-25 | Stop reason: HOSPADM

## 2023-03-21 RX ORDER — UBIDECARENONE 75 MG
250 CAPSULE ORAL DAILY
COMMUNITY

## 2023-03-21 RX ORDER — PREDNISONE 10 MG/1
10 TABLET ORAL DAILY
Status: DISCONTINUED | OUTPATIENT
Start: 2023-03-22 | End: 2023-03-25 | Stop reason: HOSPADM

## 2023-03-21 RX ORDER — MELATONIN
1000 DAILY
COMMUNITY

## 2023-03-21 RX ORDER — ONDANSETRON 2 MG/ML
4 INJECTION INTRAMUSCULAR; INTRAVENOUS EVERY 6 HOURS PRN
Status: DISCONTINUED | OUTPATIENT
Start: 2023-03-21 | End: 2023-03-25 | Stop reason: HOSPADM

## 2023-03-21 RX ADMIN — SODIUM CHLORIDE 80 MG: 9 INJECTION, SOLUTION INTRAVENOUS at 12:02

## 2023-03-21 RX ADMIN — POTASSIUM CHLORIDE 40 MEQ: 1500 TABLET, EXTENDED RELEASE ORAL at 14:45

## 2023-03-21 NOTE — CONSULTS
Consultation - [facility]  Cristal Bustos 78 y o  female MRN: [id number]  Unit/Bed#: VERNELL Encounter: [id number]        Inpatient consult to gastroenterology  Consult performed by: Pipe Phillips DO  Consult ordered by: Valentino Laud, DO          Reason for Consult / Principal Problem:    Acute blood loss anemia in setting of chronic iron deficiency anemia    ASSESSMENT AND PLAN:      49-year-old female with Hx chronic ITP, chronic LASHANDA, autoimmune hepatitis, subdural hemorrhage in 03/2022 who presented to ED as instructed by hematologist for Hgb 6 0 on outpatient labs  1  Acute on Chronic Iron Deficiency Anemia  Patient followed by hematology as outpatient  She is s/p fall with subdural hemorrhage in March 2022 causing drop in hemoglobin to 5  She was using oral iron supplementation for some time  Baseline hemoglobin seems to be 10-11  Patient has been seen by gastroenterology service in past (2022) for similary complaints  · At recent hematology visit patient had been reporting dark stools for few days  She has refused GI workup in past for acute blood loss anemia  · Plan was to monitor CBC every 2 weeks, assess iron panel monthly, and schedule IV iron supplementation twice weekly  · Iron panel performed 03/20 showed iron saturation 5%, Iron 18, TIBC 385, ferritin 20 consistent with iron deficiency anemia  · Outpatient CBC showed hemoglobin of 6, patient instructed to go to ED where hemoglobin of 6 was confirmed  · No prior EGDs or colonoscopies   · No witnessed black BMs while in ED  · Patient denies hematemesis  · Patient is prescribed ferrous sulfate 324 mg daily, though she denies taking it at admission  · She does take prednisone 10 mg daily, seemingly as far back as 03/202-06/20021 and then again starting 02/15/2023     · Rectal exam was performed in ED, + heme occult though brown stool  · Would recommend PPI IV 40 mg BID  · Continue to monitor stool and Hgb  · Spoke with patient's son, Harry Polanco, who aid's patient in making medical decisions  Harry Polanco states that in past patient has responded well to transfusion with no further signs of bleeding  He would like to hold off on any endoscopic procedure at this time to assess patient's response to transfusion  · Will monitor patient's response to transfusion and readdress endoscopy if needed    2  Autoimmune Hepatitis  Patient with history of autoimmune hepatitis treated with steroids  Per chart review patient had elevated AST, ALT and positive LEA  · Patient states she was managed by Dr Mimi Torres of gastroenterology, though no encounter found in chart since 2015  · Patient did have RUQ US performed on 07/03/2022 which showed cirrhotic liver with mild right upper quadrant ascites  Normal doppler interrogation of hepatic vessels  · CMP performed on 03/20 showing AST and ALT within normal limits  · Patient would benefit from outpatient [facility] [address]  screening and variceal screening given cirrhosis  3  Chronic ITP  · Patient with history of chronic ITP refractory to steroids  Has been on Promacta since 2021, recently decreased to 25 mg daily  · Patient states that she has been without her medication for weeks  · Hematology consulted  · Platelet count appears stable    ______________________________________________________________________    HPI:  Ms Marina Cerna is an 78-year-old female with past medical history of chronic ITP, dilated cardiomyopathy with reduced ejection fraction, hypertension, autoimmune hepatitis treated with steroids, Bell's palsy 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, history of acute blood loss anemia status post fall with subdural hemorrhage in March 2022  Patient was told by hematologist to come to emergency department for blood transfusion at admission  Patient had hemoglobin on approximately 10 3 weeks prior, however today it was found to be 6    Patient states she has been feeling weak and has had some dyspnea on exertion  She denies black or tarry stools, denies bright red blood per rectum, denies bloody emesis, and denies abdominal pain  She does state that she has occasional constipation  She denies having ever had a colonoscopy in the past   Patient has been seeing hematology as outpatient for ITP and iron deficiency anemia  She was seen by hematology on 03/09, at that time Promacta was reduced to 25 mg p o  daily  At that visit she was also reporting dark stools for the past few days  She has been scheduled for twice weekly Venofer infusions as outpatient  Patient arrived to ED hemodynamically stable, afebrile, pulse 80, blood pressure 118/51, O2 saturation 100% room air  CBC did confirm a hemoglobin of 6 0 with platelets 382, stable from prior  BMP showed BUN elevated at 26 with normal creatinine at 0 98  She has been ordered 2 units of PRBCs to be transfused  Gastroenterology service has been consulted at admission for history of dark stools and acute on chronic anemia  REVIEW OF SYSTEMS:    CONSTITUTIONAL: Denies any fever, chills, rigors, and weight loss  HEENT: No earache or tinnitus  Denies hearing loss or visual disturbances  CARDIOVASCULAR: No chest pain or palpitations  RESPIRATORY: Denies any cough, hemoptysis, shortness of breath or dyspnea on exertion  GASTROINTESTINAL: As noted in the History of Present Illness  GENITOURINARY: No problems with urination  Denies any hematuria or dysuria  NEUROLOGIC: No dizziness or vertigo, denies headaches  MUSCULOSKELETAL: Denies any muscle or joint pain  SKIN: Denies skin rashes or itching  ENDOCRINE: Denies excessive thirst  Denies intolerance to heat or cold  PSYCHOSOCIAL: Denies depression or anxiety  Denies any recent memory loss         Historical Information   Past Medical History:   Diagnosis Date   • Bell's palsy    • Cardiac disease    • Ear problems    • HL (hearing loss)    • Hypertension • Sacroiliitis ([facility] ) 8/10/2021   • Subdural hemorrhage ([place] [address] ) 3/30/2022   • Thrombocytopenia ([place] [address] ) 8/3/2018   • Tinnitus    • Traumatic subdural hemorrhage with loss of consciousness of unspecified duration, initial encounter ([place] [address] ) 2/15/2023     Past Surgical History:   Procedure Laterality Date   • CT BONE MARROW BIOPSY AND ASPIRATION  1/27/2021     Social History   Social History     Substance and Sexual Activity   Alcohol Use No   • Alcohol/week: 0 0 standard drinks     Social History     Substance and Sexual Activity   Drug Use No     Social History     Tobacco Use   Smoking Status Never   Smokeless Tobacco Never     Family History   Problem Relation Age of Onset   • Autoimmune disease Neg Hx        Meds/Allergies     (Not in a hospital admission)    Current Facility-Administered Medications   Medication Dose Route Frequency   • acetaminophen (TYLENOL) tablet 650 mg  650 mg Oral Q6H PRN   • ondansetron (ZOFRAN) injection 4 mg  4 mg Intravenous Q6H PRN   • potassium chloride (K-DUR,KLOR-CON) CR tablet 40 mEq  40 mEq Oral Once       Allergies   Allergen Reactions   • Ambien [Zolpidem] Other (See Comments)     Did not allow sleep per pt           Objective     Blood pressure 105/52, pulse 70, temperature 98 1 °F (36 7 °C), temperature source Oral, resp  rate 18, SpO2 100 %, not currently breastfeeding  There is no height or weight on file to calculate BMI  Intake/Output Summary (Last 24 hours) at 3/21/2023 1410  Last data filed at 3/21/2023 1336  Gross per 24 hour   Intake 350 ml   Output --   Net 350 ml         PHYSICAL EXAM:      General Appearance:   Alert, cooperative, no distress   HEENT:   Normocephalic, atraumatic, anicteric      Neck:  Supple, symmetrical, trachea midline   Lungs:   Clear to auscultation bilaterally; no rales, rhonchi or wheezing; respirations unlabored    Heart[de-identified]   Regular rate and rhythm; no murmur, rub, or gallop     Abdomen:   Soft, non-tender, non-distended; normal bowel sounds; no masses, no organomegaly    Genitalia:   Deferred    Rectal:   Deferred    Extremities:  No cyanosis, clubbing or edema    Pulses:  2+ and symmetric all extremities    Skin:  No jaundice, rashes, or lesions    Lymph nodes:  No palpable cervical lymphadenopathy        Lab Results:   Admission on 03/21/2023   Component Date Value   • WBC 03/21/2023 4 71    • RBC 03/21/2023 2 24 (L)    • Hemoglobin 03/21/2023 6 0 (LL)    • Hematocrit 03/21/2023 20 0 (L)    • MCV 03/21/2023 89    • MCH 03/21/2023 26 8    • MCHC 03/21/2023 30 0 (L)    • RDW 03/21/2023 17 1 (H)    • MPV 03/21/2023 11 7    • Platelets 46/11/8034 118 (L)    • nRBC 03/21/2023 0    • ABO Grouping 03/21/2023 A    • Rh Factor 03/21/2023 Positive    • Antibody Screen 03/21/2023 Negative    • Specimen Expiration Date 03/21/2023 [id number]    • Unit Product Code 03/21/2023 [id number]    • Unit Number 03/21/2023 [id number]    • Unit ABO 03/21/2023 A    • Unit [facility] 03/21/2023 POS    • Crossmatch 03/21/2023 Compatible    • Unit Dispense Status 03/21/2023 Issued    • Unit Product Volume 03/21/2023 350    • Unit Product Code 03/21/2023 [id number]    • Unit Number 03/21/2023 [id number]    • Unit ABO 03/21/2023 A    • Unit [facility] 03/21/2023 POS    • Crossmatch 03/21/2023 Compatible    • Unit Dispense Status 03/21/2023 Crossmatched    • Unit Product Volume 03/21/2023 350    • Sodium 03/21/2023 137    • Potassium 03/21/2023 3 2 (L)    • Chloride 03/21/2023 104    • CO2 03/21/2023 30    • ANION GAP 03/21/2023 3 (L)    • BUN 03/21/2023 26 (H)    • Creatinine 03/21/2023 0 98    • Glucose 03/21/2023 86    • Calcium 03/21/2023 9 4    • eGFR 03/21/2023 52    Hospital Outpatient Visit on 03/21/2023   Component Date Value   • Unit Product Code 03/21/2023 L8865Q03    • Unit Number 03/21/2023 [id number]    • Unit ABO 03/21/2023 A    • Unit [facility] 03/21/2023 POS    • Crossmatch 03/21/2023 Compatible    • Unit Dispense Status 03/21/2023 Issued    • Unit Product Volume 03/21/2023 350 Imaging Studies: I have personally reviewed pertinent imaging studies

## 2023-03-21 NOTE — ASSESSMENT & PLAN NOTE
Hx of refractory to steroids  Initiated on promacta since 2021  Follows with hematology  Is supposed to be on Promacta but states has been w/o her meds for weeks   Per hem/oncology notes on 3/9/2023 dose was reduced from 50mg to 25mg  Hem/onco consulted to weigh in on further dosing  Cont to monitor plt count

## 2023-03-21 NOTE — ASSESSMENT & PLAN NOTE
Wt Readings from Last 3 Encounters:   03/09/23 49 3 kg (108 lb 9 6 oz)   03/07/23 49 kg (108 lb)   02/15/23 50 2 kg (110 lb 9 6 oz)   Euvolemic  Holding diuretics (torsemide/aldactone) in setting of anemia  Cont to monitor fluid status given planned prbc transfusion  If bp able to tolerate may consider administering IV lasix 20mg after transfusion  Follows with HF service  Holding toprol xl given hypotension   Per notes pt is supposed to be on entresto though not on med list which was reviewed with patient

## 2023-03-21 NOTE — H&P
[address] [facility]  H&P- Geraline Argelia 1940, 78 y o  female MRN: [id number]  [facility] Encounter: [id number]  Primary Care Provider: Linda Roa MD   Date and time admitted to hospital: 3/21/2023 10:41 AM    * Acute blood loss anemia  Assessment & Plan  Known hx of chronic blood loss due to Fe deficiency anemia  Receives routine venofer and blood transfusions as outpt  In the past had been approached in regards to endoscopy eval but had declined  She states she is now open to it thus will consult GI  + hemoccult though brown stool per ED  S/p bone marrow bx, 1/27/21  Follows with hematology  Will consult as well  Will receive 2 units prbc  Cont on ppi gtt  Serial h/h  Monitor bp  F/u Coag profile    Hypotension  Assessment & Plan  Likely secondary to anemia, asymptomatic  Cont to hold anti htn meds    Chronic systolic heart failure (HCC)  Assessment & Plan  Wt Readings from Last 3 Encounters:   03/09/23 49 3 kg (108 lb 9 6 oz)   03/07/23 49 kg (108 lb)   02/15/23 50 2 kg (110 lb 9 6 oz)   Euvolemic  Holding diuretics (torsemide/aldactone) in setting of anemia  Cont to monitor fluid status given planned prbc transfusion  If bp able to tolerate may consider administering IV lasix 20mg after transfusion  Follows with HF service  Holding toprol xl given hypotension  Per notes pt is supposed to be on entresto though not on med list which was reviewed with patient        Chronic ITP (idiopathic thrombocytopenia) (HCC)  Assessment & Plan  Hx of refractory to steroids  Initiated on promacta since 2021  Follows with hematology  Is supposed to be on Promacta but states has been w/o her meds for weeks   Per hem/oncology notes on 3/9/2023 dose was reduced from 50mg to 25mg  Hem/onco consulted to weigh in on further dosing  Cont to monitor plt count    Autoimmune hepatitis treated with steroids [facility])  Assessment & Plan  Follows with Dr Linh Mcwilliams  On budesonide and "prednisone  Resumed back on prednisone, holding budesonide as non formularly    Hypokalemia  Assessment & Plan  Replete accordingly    Moderate protein-calorie malnutrition (HCC)  Assessment & Plan  Malnutrition Findings:                             BMI Findings:         Nutrition eval  There is no height or weight on file to calculate BMI  VTE Prophylaxis: Pharmacologic VTE Prophylaxis contraindicated due to Anemia, thrombocytopenia  / sequential compression device   Code Status: Full code  POLST: There is no POLST form on file for this patient (pre-hospital)  Discussion with family: Discussed with patient and her niece    Anticipated Length of Stay:  Patient will be admitted on an Inpatient basis with an anticipated length of stay of more than 2 midnights  Justification for Hospital Stay: Acute blood loss anemia    Total Time for Visit, including Counseling / Coordination of Care: 70 minutes  Greater than 50% of this total time spent on direct patient counseling and coordination of care  Chief Complaint:   Sent from hematologist office due to drop in hemoglobin    History of Present Illness:    Tiffanie Busch is a 78 y o  female medical history significant for chronic systolic heart failure, 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, iron deficiency anemia with resultant chronic blood loss, history of autoimmune hepatitis on steroids was sent per her hematologist due to drop in hemoglobin  Patient routinely receives PRBC transfusion and IV Venofer as outpatient  Upon routine blood work was noted drop in hemoglobin from 10 to 6 thus recommended to present to the ER for further evaluation and PRBC transfusion  She denies any cardiac symptoms associated with anemia  She reports that she has been having \"dark tarry\" stools for the past few days  She denies any coffee-ground emesis or bright red blood per rectum  Upon presentation to the ER, was found positive occult though brown stool    She has been " ordered for PRBC transfusion x2    Review of Systems:    Review of Systems   Gastrointestinal:        Melena   All other systems reviewed and are negative  Past Medical and Surgical History:     Past Medical History:   Diagnosis Date   • Bell's palsy    • Cardiac disease    • Ear problems    • HL (hearing loss)    • Hypertension    • Sacroiliitis ([facility] ) 8/10/2021   • Subdural hemorrhage ([facility] ) 3/30/2022   • Thrombocytopenia ([facility] ) 8/3/2018   • Tinnitus    • Traumatic subdural hemorrhage with loss of consciousness of unspecified duration, initial encounter ([place] [id number] ) 2/15/2023       Past Surgical History:   Procedure Laterality Date   • CT BONE MARROW BIOPSY AND ASPIRATION  1/27/2021       Meds/Allergies:    Prior to Admission medications    Medication Sig Start Date End Date Taking? Authorizing Provider   cholecalciferol (VITAMIN D3) 1,000 units tablet Take 1,000 Units by mouth daily   Yes Historical Provider, MD   cyanocobalamin (VITAMIN B-12) 100 mcg tablet Take 250 mcg by mouth daily   Yes Historical Provider, MD   omega-3-acid ethyl esters (LOVAZA) 1 g capsule Take 2 g by mouth daily   Yes Historical Provider, MD   ascorbic acid (VITAMIN C) 500 mg tablet Take 500 mg by mouth daily    Historical Provider, MD   budesonide (ENTOCORT EC) 3 MG capsule Take 9 mg by mouth daily Takes 3 capsules once daily    12/1/21   Historical Provider, MD   Diclofenac Sodium (VOLTAREN) 1 % Apply 2 g topically 4 (four) times a day 2/10/23   ALONZO Palacios   eltrombopag (PROMACTA) 25 mg tablet Take 1 tablet (25 mg total) by mouth daily Administer on an empty stomach, 1 hour before or 2 hours after a meal   Patient not taking: Reported on 3/21/2023 3/9/23   Bishnu Price PA-C   ferrous sulfate 324 (65 Fe) mg Take 1 tablet (324 mg total) by mouth daily before breakfast  Patient not taking: Reported on 3/7/2023 7/5/22 3/7/23  Bassam Navas MD   fluticasone (FLONASE) 50 mcg/act nasal spray 2 sprays into each nostril daily  Patient taking differently: 2 sprays into each nostril daily as needed for allergies 3/30/22   Kassandra Zarate MD   metoprolol succinate (TOPROL-XL) 25 mg 24 hr tablet TAKE 1 TABLET BY MOUTH EVERY DAY 11/18/22   Sultana Laboy DO   potassium chloride (MICRO-K) 10 MEQ CR capsule Take 1 capsule (10 mEq total) by mouth daily  Patient taking differently: Take 10 mEq by mouth daily Hold parameter for a systolic BP 90 or less  Call for instructions if there is any SS of fluid overload or dehydration  11/4/21   Sultana Laboy DO   predniSONE 10 mg tablet Take 10 mg by mouth daily    Historical Provider, MD   spironolactone (ALDACTONE) 25 mg tablet Take 0 5 tablets (12 5 mg total) by mouth daily  Patient taking differently: Take 12 5 mg by mouth daily Hold parameter for a systolic BP 90 or less  Call for instructions if there is any SS of fluid overload or dehydration  11/4/21   Sultana Laboy DO   torsemide (DEMADEX) 10 mg tablet Take 1 tablet (10 mg total) by mouth daily  Patient taking differently: Take 10 mg by mouth daily Hold parameter for a systolic BP 90 or less  Call for instructions if there is any SS of fluid overload or dehydration  11/4/21   Sultana Laboy DO     I have reviewed home medications with patient personally  Allergies:    Allergies   Allergen Reactions   • Ambien [Zolpidem] Other (See Comments)     Did not allow sleep per pt       Social History:     Marital Status: /Civil Union   Occupation:   Patient Pre-hospital Living Situation: Resides with  who is also presently admitted  Patient Pre-hospital Level of Mobility: Independent  Patient Pre-hospital Diet Restrictions: None  Substance Use History:   Social History     Substance and Sexual Activity   Alcohol Use No   • Alcohol/week: 0 0 standard drinks     Social History     Tobacco Use   Smoking Status Never   Smokeless Tobacco Never     Social History     Substance and Sexual Activity   Drug Use No       Family History:    non-contributory    Physical Exam:     Vitals:   Blood Pressure: 105/52 (03/21/23 1336)  Pulse: 70 (03/21/23 1336)  Temperature: 98 1 °F (36 7 °C) (03/21/23 1336)  Temp Source: Oral (03/21/23 1336)  Respirations: 18 (03/21/23 1336)  SpO2: 100 % (03/21/23 1336)    Physical Exam  Cardiovascular:      Rate and Rhythm: Normal rate and regular rhythm  Pulses: Normal pulses  Heart sounds: Normal heart sounds  No murmur heard  Pulmonary:      Effort: Pulmonary effort is normal  No respiratory distress  Breath sounds: Normal breath sounds  No wheezing or rales  Abdominal:      General: Abdomen is flat  Bowel sounds are normal  There is no distension  Palpations: Abdomen is soft  Tenderness: There is no abdominal tenderness  There is no guarding  Musculoskeletal:         General: Normal range of motion  Cervical back: Normal range of motion and neck supple  Right lower leg: No edema  Left lower leg: No edema  Skin:     General: Skin is warm and dry  Neurological:      Mental Status: She is alert and oriented to person, place, and time  Mental status is at baseline  Motor: No weakness  Comments: Hard of hearing         Additional Data:     Lab Results: I have personally reviewed pertinent reports        Results from last 7 days   Lab Units 03/21/23  1054 03/20/23  1743   WBC Thousand/uL 4 71 3 96*   HEMOGLOBIN g/dL 6 0* 5 8*   HEMATOCRIT % 20 0* 19 5*   PLATELETS Thousands/uL 118* 122*   NEUTROS PCT %  --  67   LYMPHS PCT %  --  23   MONOS PCT %  --  9   EOS PCT %  --  0     Results from last 7 days   Lab Units 03/21/23  1202 03/20/23  1250   SODIUM mmol/L 137 136   POTASSIUM mmol/L 3 2* 3 3*   CHLORIDE mmol/L 104 101   CO2 mmol/L 30 31   BUN mg/dL 26* 20   CREATININE mg/dL 0 98 0 91   ANION GAP mmol/L 3* 4   CALCIUM mg/dL 9 4 9 1   ALBUMIN g/dL  --  3 5   TOTAL BILIRUBIN mg/dL  --  0 80   ALK PHOS U/L  --  31*   ALT U/L  --  13   AST U/L  --  20   GLUCOSE RANDOM mg/dL 86  --                        Imaging: no imaging to review    No orders to display       EKG, Pathology, and Other Studies Reviewed on Admission:   · EKG:     Allscripts / Epic Records Reviewed: Yes     ** Please Note: This note has been constructed using a voice recognition system   **

## 2023-03-21 NOTE — TELEPHONE ENCOUNTER
Spoke with pt's niece and advised that per Dr Sarah Abel pt should go to ED due to additional drop in hgb  Transfusion at [facility] canceled  ADT order placed

## 2023-03-21 NOTE — ASSESSMENT & PLAN NOTE
Known hx of chronic blood loss due to Fe deficiency anemia  Receives routine venofer and blood transfusions as outpt  In the past had been approached in regards to endoscopy eval but had declined  She states she is now open to it thus will consult GI  + hemoccult though brown stool per ED  S/p bone marrow bx, 1/27/21  Follows with hematology   Will consult as well  Will receive 2 units prbc  Cont on ppi gtt  Serial h/h  Monitor bp  F/u Coag profile

## 2023-03-21 NOTE — ASSESSMENT & PLAN NOTE
Malnutrition Findings:                             BMI Findings:         Nutrition eval  There is no height or weight on file to calculate BMI

## 2023-03-21 NOTE — ASSESSMENT & PLAN NOTE
Follows with Dr Boswell Done  On budesonide and prednisone  Resumed back on prednisone, holding budesonide as non formularly

## 2023-03-21 NOTE — ED ATTENDING ATTESTATION
3/21/2023  I, Yuriy Sidhu DO, saw and evaluated the patient  I have discussed the patient with the resident/non-physician practitioner and agree with the resident's/non-physician practitioner's findings, Plan of Care, and MDM as documented in the resident's/non-physician practitioner's note, except where noted  All available labs and Radiology studies were reviewed  I was present for key portions of any procedure(s) performed by the resident/non-physician practitioner and I was immediately available to provide assistance  At this point I agree with the current assessment done in the Emergency Department  I have conducted an independent evaluation of this patient a history and physical is as follows:    64-year-old female presents with anemia  Patient was told by her hematologist to come to the emergency department for blood transfusion and admission  Patient had hemoglobin over 10 3 weeks ago and today it is 6  Patient states she has been feeling weak and has had some dyspnea on exertion  Denies black or tarry stools, denies abdominal pain  Does admit to some constipation at times  Has never had a colonoscopy  Dr Siddharth Almazan her hematologist recommended admission and GI consult  On exam-no acute distress, heart regular, no respiratory distress, abdomen soft nontender  Plan- blood transfusion, check labs, plan for admission        ED Course     I reviewed risk and benefit of blood transfusion, patient signed consent form    Critical Care Time  CriticalCare Time    Date/Time: 3/21/2023 12:47 PM  Performed by: Yuriy Sidhu DO  Authorized by: Yuriy Sidhu DO     Critical care provider statement:     Critical care time (minutes):  31    Critical care time was exclusive of:  Separately billable procedures and treating other patients and teaching time    Critical care was necessary to treat or prevent imminent or life-threatening deterioration of the following conditions: Circulatory failure (requiring blood transfusion)    Critical care was time spent personally by me on the following activities:  Obtaining history from patient or surrogate, examination of patient, review of old charts and ordering and review of laboratory studies    I assumed direction of critical care for this patient from another provider in my specialty: no

## 2023-03-22 ENCOUNTER — HOSPITAL ENCOUNTER (OUTPATIENT)
Dept: INFUSION CENTER | Facility: CLINIC | Age: 83
Discharge: HOME | End: 2023-03-22

## 2023-03-22 LAB
ABO GROUP BLD BPU: NORMAL
ABO GROUP BLD BPU: NORMAL
ANION GAP SERPL CALCULATED.3IONS-SCNC: 3 MMOL/L (ref 4–13)
BASOPHILS # BLD AUTO: 0.03 THOUSANDS/ÂΜL (ref 0–0.1)
BASOPHILS NFR BLD AUTO: 1 % (ref 0–1)
BPU ID: NORMAL
BPU ID: NORMAL
BUN SERPL-MCNC: 13 MG/DL (ref 5–25)
CALCIUM SERPL-MCNC: 8.6 MG/DL (ref 8.3–10.1)
CHLORIDE SERPL-SCNC: 109 MMOL/L (ref 96–108)
CO2 SERPL-SCNC: 26 MMOL/L (ref 21–32)
CREAT SERPL-MCNC: 0.65 MG/DL (ref 0.6–1.3)
CROSSMATCH: NORMAL
CROSSMATCH: NORMAL
EOSINOPHIL # BLD AUTO: 0.02 THOUSAND/ÂΜL (ref 0–0.61)
EOSINOPHIL NFR BLD AUTO: 0 % (ref 0–6)
ERYTHROCYTE [DISTWIDTH] IN BLOOD BY AUTOMATED COUNT: 15.4 % (ref 11.6–15.1)
GFR SERPL CREATININE-BSD FRML MDRD: 81 ML/MIN/1.73SQ M
GLUCOSE SERPL-MCNC: 84 MG/DL (ref 65–140)
HCT VFR BLD AUTO: 24.8 % (ref 34.8–46.1)
HCT VFR BLD AUTO: 30.8 % (ref 34.8–46.1)
HGB BLD-MCNC: 10.2 G/DL (ref 11.5–15.4)
HGB BLD-MCNC: 8.2 G/DL (ref 11.5–15.4)
IMM GRANULOCYTES # BLD AUTO: 0.02 THOUSAND/UL (ref 0–0.2)
IMM GRANULOCYTES NFR BLD AUTO: 0 % (ref 0–2)
LYMPHOCYTES # BLD AUTO: 1.62 THOUSANDS/ÂΜL (ref 0.6–4.47)
LYMPHOCYTES NFR BLD AUTO: 32 % (ref 14–44)
MCH RBC QN AUTO: 28.6 PG (ref 26.8–34.3)
MCHC RBC AUTO-ENTMCNC: 33.1 G/DL (ref 31.4–37.4)
MCV RBC AUTO: 86 FL (ref 82–98)
MONOCYTES # BLD AUTO: 0.43 THOUSAND/ÂΜL (ref 0.17–1.22)
MONOCYTES NFR BLD AUTO: 9 % (ref 4–12)
NEUTROPHILS # BLD AUTO: 2.95 THOUSANDS/ÂΜL (ref 1.85–7.62)
NEUTS SEG NFR BLD AUTO: 58 % (ref 43–75)
NRBC BLD AUTO-RTO: 0 /100 WBCS
PLATELET # BLD AUTO: 71 THOUSANDS/UL (ref 149–390)
PMV BLD AUTO: 11.5 FL (ref 8.9–12.7)
POTASSIUM SERPL-SCNC: 4.2 MMOL/L (ref 3.5–5.3)
RBC # BLD AUTO: 2.87 MILLION/UL (ref 3.81–5.12)
SODIUM SERPL-SCNC: 138 MMOL/L (ref 135–147)
UNIT DISPENSE STATUS: NORMAL
UNIT DISPENSE STATUS: NORMAL
UNIT PRODUCT CODE: NORMAL
UNIT PRODUCT CODE: NORMAL
UNIT PRODUCT VOLUME: 350 ML
UNIT PRODUCT VOLUME: 350 ML
UNIT RH: NORMAL
UNIT RH: NORMAL
WBC # BLD AUTO: 5.07 THOUSAND/UL (ref 4.31–10.16)

## 2023-03-22 RX ORDER — PANTOPRAZOLE SODIUM 40 MG/10ML
40 INJECTION, POWDER, LYOPHILIZED, FOR SOLUTION INTRAVENOUS EVERY 12 HOURS SCHEDULED
Status: DISCONTINUED | OUTPATIENT
Start: 2023-03-22 | End: 2023-03-25 | Stop reason: HOSPADM

## 2023-03-22 RX ADMIN — CHOLECALCIFEROL TAB 25 MCG (1000 UNIT) 1000 UNITS: 25 TAB at 08:34

## 2023-03-22 RX ADMIN — PREDNISONE 10 MG: 10 TABLET ORAL at 08:34

## 2023-03-22 RX ADMIN — CYANOCOBALAMIN TAB 500 MCG 250 MCG: 500 TAB at 08:34

## 2023-03-22 RX ADMIN — PANTOPRAZOLE SODIUM 40 MG: 40 INJECTION, POWDER, FOR SOLUTION INTRAVENOUS at 11:22

## 2023-03-22 RX ADMIN — IRON SUCROSE 300 MG: 20 INJECTION, SOLUTION INTRAVENOUS at 13:52

## 2023-03-22 RX ADMIN — PANTOPRAZOLE SODIUM 40 MG: 40 INJECTION, POWDER, FOR SOLUTION INTRAVENOUS at 21:34

## 2023-03-22 NOTE — PLAN OF CARE
Problem: PAIN - ADULT  Goal: Verbalizes/displays adequate comfort level or baseline comfort level  Description: Interventions:  - Encourage patient to monitor pain and request assistance  - Assess pain using appropriate pain scale  - Administer analgesics based on type and severity of pain and evaluate response  - Implement non-pharmacological measures as appropriate and evaluate response  - Consider cultural and social influences on pain and pain management  - Notify physician/advanced practitioner if interventions unsuccessful or patient reports new pain  Outcome: Progressing     Problem: INFECTION - ADULT  Goal: Absence or prevention of progression during hospitalization  Description: INTERVENTIONS:  - Assess and monitor for signs and symptoms of infection  - Monitor lab/diagnostic results  - Monitor all insertion sites, i e  indwelling lines, tubes, and drains  - Monitor endotracheal if appropriate and nasal secretions for changes in amount and color  - [place] appropriate cooling/warming therapies per order  - Administer medications as ordered  - Instruct and encourage patient and family to use good hand hygiene technique  - Identify and instruct in appropriate isolation precautions for identified infection/condition  Outcome: Progressing  Goal: Absence of fever/infection during neutropenic period  Description: INTERVENTIONS:  - Monitor WBC    Outcome: Progressing     Problem: SAFETY ADULT  Goal: Patient will remain free of falls  Description: INTERVENTIONS:  - Educate patient/family on patient safety including physical limitations  - Instruct patient to call for assistance with activity   - Consult OT/PT to assist with strengthening/mobility   - Keep Call bell within reach  - Keep bed low and locked with side rails adjusted as appropriate  - Keep care items and personal belongings within reach  - Initiate and maintain comfort rounds  - Make Fall Risk Sign visible to staff  - Offer Toileting every 2 Hours, in advance of need  - Initiate/Maintain bed alarm  - Obtain necessary fall risk management equipment:   - Apply yellow socks and bracelet for high fall risk patients  - Consider moving patient to room near nurses station  Outcome: Progressing  Goal: Maintain or return to baseline ADL function  Description: INTERVENTIONS:  -  Assess patient's ability to carry out ADLs; assess patient's baseline for ADL function and identify physical deficits which impact ability to perform ADLs (bathing, care of mouth/teeth, toileting, grooming, dressing, etc )  - Assess/evaluate cause of self-care deficits   - Assess range of motion  - Assess patient's mobility; develop plan if impaired  - Assess patient's need for assistive devices and provide as appropriate  - Encourage maximum independence but intervene and supervise when necessary  - Involve family in performance of ADLs  - Assess for home care needs following discharge   - Consider OT consult to assist with ADL evaluation and planning for discharge  - Provide patient education as appropriate  Outcome: Progressing

## 2023-03-22 NOTE — PROGRESS NOTES
[address] [facility]  Progress Note - Marcelo Rojas 1940, 78 y o  female MRN: [id number]  Unit/Bed#: [facility] 924-01 Encounter: [id number]  Primary Care Provider: Claudio Harris MD   Date and time admitted to hospital: 3/21/2023 10:41 AM    * Acute blood loss anemia  Assessment & Plan  · S/p 2 unit transfusion of PRBC on admission  · Known hx of chronic blood loss due to iron deficiency anemia  Receives routine venofer and blood transfusions as outpt  · In the past had been approached in regards to endoscopy eval but had declined  · Evaluated by GI, as patient stated she would go for endoscopy and colonoscopy if needed  · GI recommendations noted, apparently patient's son to visit her when they will decide if they want to proceed with endoscopy/colonoscopy  · S/p bone marrow bx, 1/27/21, that was remarkable for normocellular megakaryocytes suggestive of ITP  · Evaluated by hematology, recommendations noted  Patient to continue IV iron infusions as scheduled in outpatient setting  · Patient will need CBC every 2 weeks  Hypokalemia  Assessment & Plan  Resolved with repletion  Moderate protein-calorie malnutrition ([address] )  Assessment & Plan  Malnutrition Findings:                             BMI Findings:         Nutrition eval  There is no height or weight on file to calculate BMI  Hypotension  Assessment & Plan  Likely secondary to anemia, asymptomatic  Cont to hold anti htn meds    Chronic ITP (idiopathic thrombocytopenia) (HCC)  Assessment & Plan  Hx of refractory to steroids  Initiated on promacta since 2021  Follows with hematology  Is supposed to be on Promacta but states has been w/o her meds for weeks  Per hem/oncology notes on 3/9/2023 dose was reduced from 50mg to 25mg  Seen by heme-onc, recommended to continue home dosage of 25 mg Promacta  Patient's family to bring from home    Cont to monitor plt count    Autoimmune hepatitis treated with steroids [facility])  Assessment & Plan  Follows with Dr Vivienne Sharp  On budesonide and prednisone  Resumed back on prednisone, holding budesonide as non formularly    Chronic systolic heart failure ([address] )  Assessment & Plan  Wt Readings from Last 3 Encounters:   03/09/23 49 3 kg (108 lb 9 6 oz)   03/07/23 49 kg (108 lb)   02/15/23 50 2 kg (110 lb 9 6 oz)   Euvolemic  Holding diuretics (torsemide/aldactone) in setting of anemia  Per notes pt is supposed to be on entresto though not on med list which was reviewed with patient  Continue to hold metoprolol in setting of hypotension  VTE Pharmacologic Prophylaxis:   Moderate Risk (Score 3-4) - Pharmacological DVT Prophylaxis Contraindicated  Sequential Compression Devices Ordered  Patient Centered Rounds: I performed bedside rounds with nursing staff today  Discussions with Specialists or Other Care Team Provider: GI, oncology, CM  Education and Discussions with Family / Patient: Updated  (son) via phone  Total Time Spent on Date of Encounter in care of patient: 35 minutes This time was spent on one or more of the following: performing physical exam; counseling and coordination of care; obtaining or reviewing history; documenting in the medical record; reviewing/ordering tests, medications or procedures; communicating with other healthcare professionals and discussing with patient's family/caregivers  Current Length of Stay: 1 day(s)  Current Patient Status: Inpatient   Certification Statement: The patient will continue to require additional inpatient hospital stay due to Ongoing management for anemia  Discharge Plan: Anticipate discharge in 24-48 hrs to home  Code Status: Level 1 - Full Code    Subjective:   Patient examined at bedside, reports feeling better since she got transfusion  I had extensive discussion with patient regarding colonoscopy/endoscopy    She stated that if it is really needed she will proceed with endoscopy/colonoscopy, however wants me to call her son for further discussion  I called patient's son over phone, was not able to discuss further due to network issues  We will try calling him again  Objective:     Vitals:   Temp (24hrs), Av 3 °F (36 3 °C), Min:97 2 °F (36 2 °C), Max:97 3 °F (36 3 °C)    Temp:  [97 2 °F (36 2 °C)-97 3 °F (36 3 °C)] 97 2 °F (36 2 °C)  HR:  [68-81] 81  Resp:  [16-18] 16  BP: ()/(45-49) 99/48  SpO2:  [98 %-100 %] 99 %  There is no height or weight on file to calculate BMI  Input and Output Summary (last 24 hours): Intake/Output Summary (Last 24 hours) at 3/22/2023 1619  Last data filed at 3/22/2023 1337  Gross per 24 hour   Intake 914 58 ml   Output --   Net 914 58 ml       Physical Exam:   Physical Exam  Constitutional:       Appearance: Normal appearance  HENT:      Head: Normocephalic and atraumatic  Mouth/Throat:      Mouth: Mucous membranes are moist       Pharynx: Oropharynx is clear  Eyes:      Conjunctiva/sclera: Conjunctivae normal       Pupils: Pupils are equal, round, and reactive to light  Cardiovascular:      Rate and Rhythm: Normal rate and regular rhythm  Pulses: Normal pulses  Heart sounds: Normal heart sounds  Pulmonary:      Effort: Pulmonary effort is normal       Breath sounds: Normal breath sounds  Abdominal:      General: Abdomen is flat  Bowel sounds are normal    Musculoskeletal:         General: Normal range of motion  Cervical back: Normal range of motion  Skin:     General: Skin is warm and dry  Neurological:      General: No focal deficit present  Mental Status: She is alert and oriented to person, place, and time            Additional Data:     Labs:  Results from last 7 days   Lab Units 23  0520   WBC Thousand/uL 5 07   HEMOGLOBIN g/dL 8 2*   HEMATOCRIT % 24 8*   PLATELETS Thousands/uL 71*   NEUTROS PCT % 58   LYMPHS PCT % 32   MONOS PCT % 9   EOS PCT % 0     Results from last 7 days   Lab Units 23  0520 03/21/23  1202 03/20/23  1250   SODIUM mmol/L 138   < > 136   POTASSIUM mmol/L 4 2   < > 3 3*   CHLORIDE mmol/L 109*   < > 101   CO2 mmol/L 26   < > 31   BUN mg/dL 13   < > 20   CREATININE mg/dL 0 65   < > 0 91   ANION GAP mmol/L 3*   < > 4   CALCIUM mg/dL 8 6   < > 9 1   ALBUMIN g/dL  --   --  3 5   TOTAL BILIRUBIN mg/dL  --   --  0 80   ALK PHOS U/L  --   --  31*   ALT U/L  --   --  13   AST U/L  --   --  20   GLUCOSE RANDOM mg/dL 84   < >  --     < > = values in this interval not displayed  Results from last 7 days   Lab Units 03/21/23  1339   INR  1 35*                   Lines/Drains:  Invasive Devices     Peripheral Intravenous Line  Duration           Peripheral IV Left;Proximal;Ventral (anterior) Forearm -- days                      Imaging: No pertinent imaging reviewed  Recent Cultures (last 7 days):         Last 24 Hours Medication List:   Current Facility-Administered Medications   Medication Dose Route Frequency Provider Last Rate   • acetaminophen  650 mg Oral Q6H PRN Boles Charter, DO     • cholecalciferol  1,000 Units Oral Daily Boles Charter, DO     • cyanocobalamin  250 mcg Oral Daily Boles Charter, DO     • ondansetron  4 mg Intravenous Q6H PRN Boles Charter, DO     • pantoprazole  40 mg Intravenous Q12H [address], MD     • predniSONE  10 mg Oral Daily Boles Charter, DO          Today, Patient Was Seen By: Jarred Maldonado MD    **Please Note: This note may have been constructed using a voice recognition system  **

## 2023-03-22 NOTE — PROGRESS NOTES
Progress Note- Cristal Bustos 78 y o  female MRN: [id number]    [facility] Encounter: [id number]      Assessment and Plan:    75-year-old female with Hx chronic ITP, chronic LASHANDA, autoimmune hepatitis, subdural hemorrhage in 03/2022 who presented to ED as instructed by hematologist for Hgb 6 0 on outpatient labs       1  Acute on Chronic Iron Deficiency Anemia  Patient followed by hematology as outpatient  She is s/p fall with subdural hemorrhage in March 2022 causing drop in hemoglobin to 5  She was using oral iron supplementation for some time  Baseline hemoglobin seems to be 10-11  Patient has been seen by gastroenterology service in past (2022) for similary complaints  • At recent hematology visit patient had been reporting dark stools for few days  She has refused GI workup in past for acute blood loss anemia  • Plan was to monitor CBC every 2 weeks, assess iron panel monthly, and schedule IV iron supplementation twice weekly  • Iron panel performed 03/20 showed iron saturation 5%, Iron 18, TIBC 385, ferritin 20 consistent with iron deficiency anemia  • Outpatient CBC showed hemoglobin of 6, patient instructed to go to ED where hemoglobin of 6 was confirmed  • No prior EGDs or colonoscopies   • No witnessed black BMs while in ED  • Patient denies hematemesis  • Patient is prescribed ferrous sulfate 324 mg daily, though she denies taking it at admission  • She does take prednisone 10 mg daily, seemingly as far back as 03/202-06/20021 and then again starting 02/15/2023  • Rectal exam was performed in ED, + heme occult though brown stool  • Would recommend PPI IV 40 mg BID  • Continue to monitor stool and Hgb  • 03/22 patient's hemoglobin improved to 8 0 from 6 0, most recent Hgb 8 2 today  • Discussed these updates and patient's stable hemoglobin with patient's son, Calbe Castaneda via telephone   I explained to Caleb Castaneda that patient's hemoglobin and vital signs do remain stable, but that patient may benefit from endoscopy to further evaluate cause of bleeding and to rule out masses/cancers that may be causing this  Tremayne understood, and states that he would like to visit and speak with patient first before deciding on pursuing endoscopy  • Can advance to regular diet today       2  Autoimmune Hepatitis  Patient with history of autoimmune hepatitis treated with steroids  Per chart review patient had elevated AST, ALT and positive LEA  • Patient states she was managed by Dr Onur Palmer of gastroenterology, though no encounter found in chart since 2015  • Patient did have RUQ US performed on 07/03/2022 which showed cirrhotic liver with mild right upper quadrant ascites  Normal doppler interrogation of hepatic vessels  • CMP performed on 03/20 showing AST and ALT within normal limits  • Patient would benefit from outpatient [address]  screening and variceal screening given cirrhosis       3  Chronic ITP  • Patient with history of chronic ITP refractory to steroids  Has been on Promacta since 2021, recently decreased to 25 mg daily  • Patient states that she has been without her medication for weeks  • Hematology consulted  • Platelet count appears stable    ______________________________________________________________________    Subjective:     Patient resting comfortably in hospital bed  Presently denies any fever, chills, nausea, vomiting, chest pain, shortness of breath  No new or worsening complaints       Medication Administration - last 24 hours from 03/21/2023 0801 to 03/22/2023 0801       Date/Time Order Dose Route Action Action by     03/21/2023 1232 EDT pantoprazole (PROTONIX) 80 mg in sodium chloride 0 9 % 100 mL IVPB 0 mg Intravenous Stopped Rome Fountain RN     03/21/2023 1202 EDT pantoprazole (PROTONIX) 80 mg in sodium chloride 0 9 % 100 mL IVPB 80 mg Intravenous Apple 37 Rome Fountain RN     03/21/2023 1445 EDT potassium chloride (K-DUR,KLOR-CON) CR tablet 40 mEq 40 mEq Oral Given Barbara Griffin RN          Objective:     Vitals: Blood pressure (!) 100/48, pulse 69, temperature (!) 97 2 °F (36 2 °C), resp  rate 16, SpO2 99 %, not currently breastfeeding  ,There is no height or weight on file to calculate BMI        Intake/Output Summary (Last 24 hours) at 3/22/2023 0801  Last data filed at 3/21/2023 1745  Gross per 24 hour   Intake 784 58 ml   Output --   Net 784 58 ml       Physical Exam:   General Appearance: Awake and alert, in no acute distress  Abdomen: Soft, non-tender, non-distended; bowel sounds normal; no masses or no organomegaly    Invasive Devices     Peripheral Intravenous Line  Duration           Peripheral IV Left;Proximal;Ventral (anterior) Forearm -- days                Lab Results:  Admission on 03/21/2023   Component Date Value   • WBC 03/21/2023 4 71    • RBC 03/21/2023 2 24 (L)    • Hemoglobin 03/21/2023 6 0 (LL)    • Hematocrit 03/21/2023 20 0 (L)    • MCV 03/21/2023 89    • MCH 03/21/2023 26 8    • MCHC 03/21/2023 30 0 (L)    • RDW 03/21/2023 17 1 (H)    • MPV 03/21/2023 11 7    • Platelets 13/87/5295 118 (L)    • nRBC 03/21/2023 0    • Neutrophils Relative 03/21/2023 66    • Immat GRANS % 03/21/2023 0    • Lymphocytes Relative 03/21/2023 22    • Monocytes Relative 03/21/2023 12    • Eosinophils Relative 03/21/2023 0    • Basophils Relative 03/21/2023 0    • Neutrophils Absolute 03/21/2023 3 09    • Immature Grans Absolute 03/21/2023 0 01    • Lymphocytes Absolute 03/21/2023 1 04    • Monocytes Absolute 03/21/2023 0 54    • Eosinophils Absolute 03/21/2023 0 01    • Basophils Absolute 03/21/2023 0 02    • ABO Grouping 03/21/2023 A    • Rh Factor 03/21/2023 Positive    • Antibody Screen 03/21/2023 Negative    • Specimen Expiration Date 03/21/2023 [id number]    • Unit Product Code 03/22/2023 [id number]    • Unit Number 03/22/2023 [id number]    • Unit ABO 03/22/2023 A    • Unit [facility] 03/22/2023 POS    • Crossmatch 03/22/2023 Compatible    • Unit Dispense Status 03/22/2023 Presumed Trans    • Unit Product Volume 03/22/2023 350    • Unit Product Code 03/22/2023 [id number]    • Unit Number 03/22/2023 [id number]    • Unit ABO 03/22/2023 A    • Unit [facility] 03/22/2023 POS    • Crossmatch 03/22/2023 Compatible    • Unit Dispense Status 03/22/2023 Presumed Trans    • Unit Product Volume 03/22/2023 350    • Protime 03/21/2023 16 9 (H)    • INR 03/21/2023 1 35 (H)    • Sodium 03/21/2023 137    • Potassium 03/21/2023 3 2 (L)    • Chloride 03/21/2023 104    • CO2 03/21/2023 30    • ANION GAP 03/21/2023 3 (L)    • BUN 03/21/2023 26 (H)    • Creatinine 03/21/2023 0 98    • Glucose 03/21/2023 86    • Calcium 03/21/2023 9 4    • eGFR 03/21/2023 52    • Hemoglobin 03/21/2023 8 0 (L)    • Hematocrit 03/21/2023 24 1 (L)    • Hemoglobin 03/21/2023 7 6 (L)    • Hematocrit 03/21/2023 23 0 (L)    • Sodium 03/22/2023 138    • Potassium 03/22/2023 4 2    • Chloride 03/22/2023 109 (H)    • CO2 03/22/2023 26    • ANION GAP 03/22/2023 3 (L)    • BUN 03/22/2023 13    • Creatinine 03/22/2023 0 65    • Glucose 03/22/2023 84    • Calcium 03/22/2023 8 6    • eGFR 03/22/2023 81    • WBC 03/22/2023 5 07    • RBC 03/22/2023 2 87 (L)    • Hemoglobin 03/22/2023 8 2 (L)    • Hematocrit 03/22/2023 24 8 (L)    • MCV 03/22/2023 86    • MCH 03/22/2023 28 6    • MCHC 03/22/2023 33 1    • RDW 03/22/2023 15 4 (H)    • MPV 03/22/2023 11 5    • Platelets 61/50/1278 71 (L)    • nRBC 03/22/2023 0    • Neutrophils Relative 03/22/2023 58    • Immat GRANS % 03/22/2023 0    • Lymphocytes Relative 03/22/2023 32    • Monocytes Relative 03/22/2023 9    • Eosinophils Relative 03/22/2023 0    • Basophils Relative 03/22/2023 1    • Neutrophils Absolute 03/22/2023 2 95    • Immature Grans Absolute 03/22/2023 0 02    • Lymphocytes Absolute 03/22/2023 1 62    • Monocytes Absolute 03/22/2023 0 43    • Eosinophils Absolute 03/22/2023 0 02    • Basophils Absolute 03/22/2023 0 03        Imaging Studies: I have personally reviewed pertinent imaging studies

## 2023-03-22 NOTE — ASSESSMENT & PLAN NOTE
Wt Readings from Last 3 Encounters:   03/09/23 49 3 kg (108 lb 9 6 oz)   03/07/23 49 kg (108 lb)   02/15/23 50 2 kg (110 lb 9 6 oz)   Euvolemic  Holding diuretics (torsemide/aldactone) in setting of anemia  Per notes pt is supposed to be on entresto though not on med list which was reviewed with patient  Continue to hold metoprolol in setting of hypotension

## 2023-03-22 NOTE — ASSESSMENT & PLAN NOTE
Follows with Dr Deandra Bearden  On budesonide and prednisone  Resumed back on prednisone, holding budesonide as non formularly

## 2023-03-22 NOTE — PLAN OF CARE
Problem: PAIN - ADULT  Goal: Verbalizes/displays adequate comfort level or baseline comfort level  Description: Interventions:  - Encourage patient to monitor pain and request assistance  - Assess pain using appropriate pain scale  - Administer analgesics based on type and severity of pain and evaluate response  - Implement non-pharmacological measures as appropriate and evaluate response  - Consider cultural and social influences on pain and pain management  - Notify physician/advanced practitioner if interventions unsuccessful or patient reports new pain  Outcome: Progressing     Problem: INFECTION - ADULT  Goal: Absence or prevention of progression during hospitalization  Description: INTERVENTIONS:  - Assess and monitor for signs and symptoms of infection  - Monitor lab/diagnostic results  - Monitor all insertion sites, i e  indwelling lines, tubes, and drains  - Monitor endotracheal if appropriate and nasal secretions for changes in amount and color  - [place] appropriate cooling/warming therapies per order  - Administer medications as ordered  - Instruct and encourage patient and family to use good hand hygiene technique  - Identify and instruct in appropriate isolation precautions for identified infection/condition  Outcome: Progressing  Goal: Absence of fever/infection during neutropenic period  Description: INTERVENTIONS:  - Monitor WBC    Outcome: Progressing     Problem: SAFETY ADULT  Goal: Patient will remain free of falls  Description: INTERVENTIONS:  - Educate patient/family on patient safety including physical limitations  - Instruct patient to call for assistance with activity   - Consult OT/PT to assist with strengthening/mobility   - Keep Call bell within reach  - Keep bed low and locked with side rails adjusted as appropriate  - Keep care items and personal belongings within reach  - Initiate and maintain comfort rounds  - Make Fall Risk Sign visible to staff  - Offer Toileting every  Hours, in advance of need  - Initiate/Maintain alarm  - Obtain necessary fall risk management equipment:   - Apply yellow socks and bracelet for high fall risk patients  - Consider moving patient to room near nurses station  Outcome: Progressing  Goal: Maintain or return to baseline ADL function  Description: INTERVENTIONS:  -  Assess patient's ability to carry out ADLs; assess patient's baseline for ADL function and identify physical deficits which impact ability to perform ADLs (bathing, care of mouth/teeth, toileting, grooming, dressing, etc )  - Assess/evaluate cause of self-care deficits   - Assess range of motion  - Assess patient's mobility; develop plan if impaired  - Assess patient's need for assistive devices and provide as appropriate  - Encourage maximum independence but intervene and supervise when necessary  - Involve family in performance of ADLs  - Assess for home care needs following discharge   - Consider OT consult to assist with ADL evaluation and planning for discharge  - Provide patient education as appropriate  Outcome: Progressing  Goal: Maintains/Returns to pre admission functional level  Description: INTERVENTIONS:  - Perform BMAT or MOVE assessment daily    - Set and communicate daily mobility goal to care team and patient/family/caregiver  - Collaborate with rehabilitation services on mobility goals if consulted  - Perform Range of Motion  times a day  - Reposition patient every  hours    - Dangle patient  times a day  - Stand patient  times a day  - Ambulate patient  times a day  - Out of bed to chair  times a day   - Out of bed for meals times a day  - Out of bed for toileting  - Record patient progress and toleration of activity level   Outcome: Progressing     Problem: DISCHARGE PLANNING  Goal: Discharge to home or other facility with appropriate resources  Description: INTERVENTIONS:  - Identify barriers to discharge w/patient and caregiver  - Arrange for needed discharge resources and transportation as appropriate  - Identify discharge learning needs (meds, wound care, etc )  - Arrange for interpretive services to assist at discharge as needed  - Refer to Case Management Department for coordinating discharge planning if the patient needs post-hospital services based on physician/advanced practitioner order or complex needs related to functional status, cognitive ability, or social support system  Outcome: Progressing     Problem: Knowledge Deficit  Goal: Patient/family/caregiver demonstrates understanding of disease process, treatment plan, medications, and discharge instructions  Description: Complete learning assessment and assess knowledge base    Interventions:  - Provide teaching at level of understanding  - Provide teaching via preferred learning methods  Outcome: Progressing

## 2023-03-22 NOTE — CONSULTS
Medical Oncology/Hematology Consult Note  Lucia Ca, female, 78 y o , 1940,  PPHP 924/PPHP [id number], [id number]     Assessment and Plan  1  Acute on chronic anemia  - Suspect secondary to GI bleed as patient reports having darker stool with hemoglobin down to 5 9 on presentation, has refused GI work-up in the past, also noted to have worsening ITP as patient is not taking Promacta currently, patient also is on chronic steroids  - She is following up with heme-onc outpatient and getting iron infusion outpatient  - Iron panel recently showing iron deficiency anemia  - Baseline hemoglobin is around 9-11  - Given no signs of active bleeding and hemoglobin stable after 2 units PRBC, family opted not to pursue any GI work-up for now and consider it in future if noticed to have any active bleeding  - Had bone marrow biopsy on 1/27/2021 showing normocellular with megakaryocyte and findings suggestive of ITP    2  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, on Promacta  - Patient's platelet count very elevated to 400 so dose of Promacta was reduced to 25 mg daily  - Patient states not been taking Promacta around 6 weeks prior to presentation  - Platelet count today was 70  - She has history of fall with subdural hemorrhage so goal for platelet count would be around more than 100,000    3  Autoimmune hepatitis on chronic steroids    Plan:-  - Recommend GI work-up in future if noted to have active bleeding  - Can restart Promacta at 25 mg daily which can be restarted on discharge if planning for discharge within few days or patient can bring it from home  - Continue CBC with differential every 2 weeks  - Can continue iron infusions as per outpatient schedule  - Goal for transfusion hemoglobin less than 7  - Continue monitoring iron panel every month    Outpatient follow up plan: Outpatient follow-up with heme-onc as routine      Communication with patient/family:  I did update the patient, as well as his son on phone     Communication with team:  Did communicate with primary team     I did review this patient with Dr Mike Becerra and he is in agreement with this plan  Reason for consultation: Chronic ITP, acute on chronic blood loss anemia    History of present illness:  Sylvie Simeon is a 78 y o  female with autoimmune hepatitis, systolic CHF, hypertension, Bell's palsy in 2017 with residual left-sided policy chronic ITP not responding to steroids on Promacta, history of subdural hemorrhage requiring hospitalization in the past, acute on chronic blood loss anemia as patient reported dark stool but refused GI work-up in the past   Patient having chronic iron deficiency anemia requiring IV iron infusion outpatient  Has refused GI work-up in the past   Has also been on chronic prednisone for autoimmune hepatitis  Getting CBC every 2 weeks  Was noted to have hemoglobin of 5 9 on outpatient lab and also patient was symptomatic with complaining of shortness of breath, fatigue, generalized weakness  Patient also reports having darker stools prior to presentation  No active bleeding were noted  Received 2 units PRBC after hospitalization  Hemoglobin baseline is around 9-11  Came with hemoglobin of 5 9 improved to around 7 5-8  Patient and family member were consulted by GI as patient is not having any active bleeding family still wants to hold off any GI work-up  Does have history of fall with subdural hemorrhage in March 2022  Also has chronic ITP for which she was on Promacta and dose was reduced outpatient but patient was not taking her medication since about 6 weeks prior to presentation causing thrombocytopenia which can make her prone to bleed  Denies any fever, nausea vomiting, abdominal pain  Review of Systems:   Review of Systems   Constitutional: Positive for activity change and fatigue  Negative for appetite change, chills, diaphoresis, fever and unexpected weight change     HENT: Negative for rhinorrhea and sore throat  Eyes: Negative for visual disturbance  Respiratory: Positive for shortness of breath  Negative for cough and chest tightness  Cardiovascular: Negative for chest pain, palpitations and leg swelling  Gastrointestinal: Negative for abdominal distention, abdominal pain, blood in stool, constipation, diarrhea, nausea and vomiting  Genitourinary: Negative for difficulty urinating, flank pain and hematuria  Musculoskeletal: Negative for arthralgias  Neurological: Negative for headaches  Psychiatric/Behavioral: Negative for behavioral problems and sleep disturbance  Oncology History:   Cancer Staging   No matching staging information was found for the patient  Oncology History    No history exists         Past Medical History:   Past Medical History:   Diagnosis Date   • Bell's palsy    • Cardiac disease    • Ear problems    • HL (hearing loss)    • Hypertension    • Sacroiliitis ([facility] ) 8/10/2021   • Subdural hemorrhage ([facility] ) 3/30/2022   • Thrombocytopenia ([facility] ) 8/3/2018   • Tinnitus    • Traumatic subdural hemorrhage with loss of consciousness of unspecified duration, initial encounter ([place] [id number] ) 2/15/2023       Past Surgical History:   Procedure Laterality Date   • CT BONE MARROW BIOPSY AND ASPIRATION  1/27/2021       Family History   Problem Relation Age of Onset   • Autoimmune disease Neg Hx        Social History     Socioeconomic History   • Marital status: /Civil Union     Spouse name: None   • Number of children: None   • Years of education: None   • Highest education level: None   Occupational History   • None   Tobacco Use   • Smoking status: Never   • Smokeless tobacco: Never   Vaping Use   • Vaping Use: Never used   Substance and Sexual Activity   • Alcohol use: Not Currently   • Drug use: Not Currently   • Sexual activity: Not Currently     Partners: Male   Other Topics Concern   • None   Social History Narrative   • None     Social Determinants of Health Financial Resource Strain: Low Risk    • Difficulty of Paying Living Expenses: Not hard at all   Food Insecurity: No Food Insecurity   • Worried About Running Out of Food in the Last Year: Never true   • Ran Out of Food in the Last Year: Never true   Transportation Needs: No Transportation Needs   • Lack of Transportation (Medical): No   • Lack of Transportation (Non-Medical):  No   Physical Activity: Not on file   Stress: Not on file   Social Connections: Not on file   Intimate Partner Violence: Not on file   Housing Stability: Low Risk    • Unable to Pay for Housing in the Last Year: No   • Number of Places Lived in the Last Year: 1   • Unstable Housing in the Last Year: No         Current Facility-Administered Medications:   •  acetaminophen (TYLENOL) tablet 650 mg, 650 mg, Oral, Q6H PRN, Reita Led, DO  •  cholecalciferol (VITAMIN D3) tablet 1,000 Units, 1,000 Units, Oral, Daily, Reita Led, DO, 1,000 Units at 03/22/23 9199  •  cyanocobalamin (VITAMIN B-12) tablet 250 mcg, 250 mcg, Oral, Daily, Reita Led, DO, 250 mcg at 03/22/23 3377  •  ondansetron (ZOFRAN) injection 4 mg, 4 mg, Intravenous, Q6H PRN, Reita Led, DO  •  predniSONE tablet 10 mg, 10 mg, Oral, Daily, Reita Led, DO, 10 mg at 03/22/23 9494    Medications Prior to Admission   Medication   • cholecalciferol (VITAMIN D3) 1,000 units tablet   • cyanocobalamin (VITAMIN B-12) 100 mcg tablet   • omega-3-acid ethyl esters (LOVAZA) 1 g capsule   • ascorbic acid (VITAMIN C) 500 mg tablet   • budesonide (ENTOCORT EC) 3 MG capsule   • Diclofenac Sodium (VOLTAREN) 1 %   • eltrombopag (PROMACTA) 25 mg tablet   • ferrous sulfate 324 (65 Fe) mg   • fluticasone (FLONASE) 50 mcg/act nasal spray   • metoprolol succinate (TOPROL-XL) 25 mg 24 hr tablet   • potassium chloride (MICRO-K) 10 MEQ CR capsule   • predniSONE 10 mg tablet   • spironolactone (ALDACTONE) 25 mg tablet   • torsemide (DEMADEX) 10 mg tablet       Allergies   Allergen Reactions   • Ambien [Zolpidem] Other (See Comments)     Did not allow sleep per pt         Physical Exam:     BP (!) 100/48   Pulse 69   Temp (!) 97 2 °F (36 2 °C)   Resp 16   SpO2 99%     Physical Exam  Vitals reviewed  Constitutional:       General: She is not in acute distress  Appearance: She is well-developed  HENT:      Head: Normocephalic and atraumatic  Eyes:      General: No scleral icterus  Right eye: No discharge  Left eye: No discharge  Comments: Pale conjunctiva   Cardiovascular:      Rate and Rhythm: Normal rate and regular rhythm  Pulses: Normal pulses  Heart sounds: Normal heart sounds  Pulmonary:      Effort: Pulmonary effort is normal  No respiratory distress  Breath sounds: Normal breath sounds  No wheezing or rales  Chest:      Chest wall: No tenderness  Abdominal:      General: Bowel sounds are normal  There is no distension  Palpations: Abdomen is soft  Tenderness: There is no abdominal tenderness  Musculoskeletal:         General: No swelling or tenderness  Normal range of motion  Cervical back: Normal range of motion  Right lower leg: No edema  Left lower leg: No edema  Skin:     General: Skin is warm  Coloration: Skin is pale  Neurological:      General: No focal deficit present  Mental Status: She is alert and oriented to person, place, and time  Mental status is at baseline  Cranial Nerves: No cranial nerve deficit  Sensory: No sensory deficit  Motor: No weakness     Psychiatric:         Mood and Affect: Mood normal          Behavior: Behavior normal          Recent Results (from the past 48 hour(s))   CBC and differential    Collection Time: 03/20/23 12:50 PM   Result Value Ref Range    WBC 4 40 4 31 - 10 16 Thousand/uL    RBC 2 22 (L) 3 81 - 5 12 Million/uL    Hemoglobin 6 1 (LL) 11 5 - 15 4 g/dL    Hematocrit 19 9 (L) 34 8 - 46 1 %    MCV 90 82 - 98 fL    MCH 27 5 26 8 - 34 3 pg    MCHC 30 7 (L) 31 4 - 37 4 g/dL    RDW 16 9 (H) 11 6 - 15 1 %    MPV 11 1 8 9 - 12 7 fL    Platelets 434 (L) 591 - 390 Thousands/uL    nRBC 0 /100 WBCs    Neutrophils Relative 60 43 - 75 %    Immat GRANS % 0 0 - 2 %    Lymphocytes Relative 28 14 - 44 %    Monocytes Relative 11 4 - 12 %    Eosinophils Relative 0 0 - 6 %    Basophils Relative 1 0 - 1 %    Neutrophils Absolute 2 63 1 85 - 7 62 Thousands/µL    Immature Grans Absolute 0 01 0 00 - 0 20 Thousand/uL    Lymphocytes Absolute 1 25 0 60 - 4 47 Thousands/µL    Monocytes Absolute 0 48 0 17 - 1 22 Thousand/µL    Eosinophils Absolute 0 01 0 00 - 0 61 Thousand/µL    Basophils Absolute 0 02 0 00 - 0 10 Thousands/µL   Comprehensive metabolic panel    Collection Time: 03/20/23 12:50 PM   Result Value Ref Range    Sodium 136 135 - 147 mmol/L    Potassium 3 3 (L) 3 5 - 5 3 mmol/L    Chloride 101 96 - 108 mmol/L    CO2 31 21 - 32 mmol/L    ANION GAP 4 4 - 13 mmol/L    BUN 20 5 - 25 mg/dL    Creatinine 0 91 0 60 - 1 30 mg/dL    Glucose, Fasting 103 (H) 65 - 99 mg/dL    Calcium 9 1 8 4 - 10 2 mg/dL    AST 20 13 - 39 U/L    ALT 13 7 - 52 U/L    Alkaline Phosphatase 31 (L) 34 - 104 U/L    Total Protein 6 0 (L) 6 4 - 8 4 g/dL    Albumin 3 5 3 5 - 5 0 g/dL    Total Bilirubin 0 80 0 20 - 1 00 mg/dL    eGFR 57 ml/min/1 73sq m   Iron Saturation %    Collection Time: 03/20/23 12:50 PM   Result Value Ref Range    Iron Saturation 5 (L) 15 - 50 %    TIBC 385 250 - 450 ug/dL    Iron 18 (L) 50 - 170 ug/dL   Ferritin    Collection Time: 03/20/23 12:50 PM   Result Value Ref Range    Ferritin 20 8 - 388 ng/mL   CBC and differential    Collection Time: 03/20/23  5:43 PM   Result Value Ref Range    WBC 3 96 (L) 4 31 - 10 16 Thousand/uL    RBC 2 17 (L) 3 81 - 5 12 Million/uL    Hemoglobin 5 8 (LL) 11 5 - 15 4 g/dL    Hematocrit 19 5 (L) 34 8 - 46 1 %    MCV 90 82 - 98 fL    MCH 26 7 (L) 26 8 - 34 3 pg    MCHC 29 7 (L) 31 4 - 37 4 g/dL    RDW 17 1 (H) 11 6 - 15 1 %    MPV 11 7 8 9 - 12 7 fL    Platelets 108 (L) 701 - 390 Thousands/uL    nRBC 0 /100 WBCs    Neutrophils Relative 67 43 - 75 %    Immat GRANS % 1 0 - 2 %    Lymphocytes Relative 23 14 - 44 %    Monocytes Relative 9 4 - 12 %    Eosinophils Relative 0 0 - 6 %    Basophils Relative 0 0 - 1 %    Neutrophils Absolute 2 68 1 85 - 7 62 Thousands/µL    Immature Grans Absolute 0 02 0 00 - 0 20 Thousand/uL    Lymphocytes Absolute 0 91 0 60 - 4 47 Thousands/µL    Monocytes Absolute 0 34 0 17 - 1 22 Thousand/µL    Eosinophils Absolute 0 00 0 00 - 0 61 Thousand/µL    Basophils Absolute 0 01 0 00 - 0 10 Thousands/µL   Type and screen    Collection Time: 03/20/23  5:43 PM   Result Value Ref Range    ABO Grouping A     Rh Factor Positive     Antibody Screen Negative     Specimen Expiration Date [id number]    CBC and differential    Collection Time: 03/21/23 10:54 AM   Result Value Ref Range    WBC 4 71 4 31 - 10 16 Thousand/uL    RBC 2 24 (L) 3 81 - 5 12 Million/uL    Hemoglobin 6 0 (LL) 11 5 - 15 4 g/dL    Hematocrit 20 0 (L) 34 8 - 46 1 %    MCV 89 82 - 98 fL    MCH 26 8 26 8 - 34 3 pg    MCHC 30 0 (L) 31 4 - 37 4 g/dL    RDW 17 1 (H) 11 6 - 15 1 %    MPV 11 7 8 9 - 12 7 fL    Platelets 701 (L) 973 - 390 Thousands/uL    nRBC 0 /100 WBCs    Neutrophils Relative 66 43 - 75 %    Immat GRANS % 0 0 - 2 %    Lymphocytes Relative 22 14 - 44 %    Monocytes Relative 12 4 - 12 %    Eosinophils Relative 0 0 - 6 %    Basophils Relative 0 0 - 1 %    Neutrophils Absolute 3 09 1 85 - 7 62 Thousands/µL    Immature Grans Absolute 0 01 0 00 - 0 20 Thousand/uL    Lymphocytes Absolute 1 04 0 60 - 4 47 Thousands/µL    Monocytes Absolute 0 54 0 17 - 1 22 Thousand/µL    Eosinophils Absolute 0 01 0 00 - 0 61 Thousand/µL    Basophils Absolute 0 02 0 00 - 0 10 Thousands/µL   Type and screen    Collection Time: 03/21/23 10:54 AM   Result Value Ref Range    ABO Grouping A     Rh Factor Positive     Antibody Screen Negative     Specimen Expiration Date [id number]    Basic metabolic panel    Collection Time: 03/21/23 12:02 PM   Result Value Ref Range    Sodium 137 135 - 147 mmol/L    Potassium 3 2 (L) 3 5 - 5 3 mmol/L    Chloride 104 96 - 108 mmol/L    CO2 30 21 - 32 mmol/L    ANION GAP 3 (L) 4 - 13 mmol/L    BUN 26 (H) 5 - 25 mg/dL    Creatinine 0 98 0 60 - 1 30 mg/dL    Glucose 86 65 - 140 mg/dL    Calcium 9 4 8 3 - 10 1 mg/dL    eGFR 52 ml/min/1 73sq m   Protime-INR    Collection Time: 03/21/23  1:39 PM   Result Value Ref Range    Protime 16 9 (H) 11 6 - 14 5 seconds    INR 1 35 (H) 0 84 - 1 19   Prepare Leukoreduced RBC: 1 Units, Irradiated, Leukoreduced    Collection Time: 03/21/23  4:31 PM   Result Value Ref Range    Unit Product Code W8363N00     Unit Number [id number]     Unit ABO A     Unit RH POS     Crossmatch Compatible     Unit Dispense Status Return to Inv     Unit Product Volume 350 mL   Hemoglobin and hematocrit, blood    Collection Time: 03/21/23  5:53 PM   Result Value Ref Range    Hemoglobin 8 0 (L) 11 5 - 15 4 g/dL    Hematocrit 24 1 (L) 34 8 - 46 1 %   Hemoglobin and hematocrit, blood    Collection Time: 03/21/23  8:40 PM   Result Value Ref Range    Hemoglobin 7 6 (L) 11 5 - 15 4 g/dL    Hematocrit 23 0 (L) 34 8 - 46 1 %   Basic metabolic panel    Collection Time: 03/22/23  5:20 AM   Result Value Ref Range    Sodium 138 135 - 147 mmol/L    Potassium 4 2 3 5 - 5 3 mmol/L    Chloride 109 (H) 96 - 108 mmol/L    CO2 26 21 - 32 mmol/L    ANION GAP 3 (L) 4 - 13 mmol/L    BUN 13 5 - 25 mg/dL    Creatinine 0 65 0 60 - 1 30 mg/dL    Glucose 84 65 - 140 mg/dL    Calcium 8 6 8 3 - 10 1 mg/dL    eGFR 81 ml/min/1 73sq m   CBC and differential    Collection Time: 03/22/23  5:20 AM   Result Value Ref Range    WBC 5 07 4 31 - 10 16 Thousand/uL    RBC 2 87 (L) 3 81 - 5 12 Million/uL    Hemoglobin 8 2 (L) 11 5 - 15 4 g/dL    Hematocrit 24 8 (L) 34 8 - 46 1 %    MCV 86 82 - 98 fL    MCH 28 6 26 8 - 34 3 pg    MCHC 33 1 31 4 - 37 4 g/dL    RDW 15 4 (H) 11 6 - 15 1 %    MPV 11 5 8 9 - 12 7 fL    Platelets 71 (L) 225 - 390 Thousands/uL    nRBC 0 /100 WBCs    Neutrophils Relative 58 43 - 75 %    Immat GRANS % 0 0 - 2 %    Lymphocytes Relative 32 14 - 44 %    Monocytes Relative 9 4 - 12 %    Eosinophils Relative 0 0 - 6 %    Basophils Relative 1 0 - 1 %    Neutrophils Absolute 2 95 1 85 - 7 62 Thousands/µL    Immature Grans Absolute 0 02 0 00 - 0 20 Thousand/uL    Lymphocytes Absolute 1 62 0 60 - 4 47 Thousands/µL    Monocytes Absolute 0 43 0 17 - 1 22 Thousand/µL    Eosinophils Absolute 0 02 0 00 - 0 61 Thousand/µL    Basophils Absolute 0 03 0 00 - 0 10 Thousands/µL   Prepare Leukoreduced RBC: 2 Units, Irradiated, Leukoreduced    Collection Time: 03/22/23  5:55 AM   Result Value Ref Range    Unit Product Code P8891N67     Unit Number [id number]     Unit ABO A     Unit RH POS     Crossmatch Compatible     Unit Dispense Status Presumed Trans     Unit Product Volume 350 mL    Unit Product Code A7774Y55     Unit Number [id number]     Unit ABO A     Unit RH POS     Crossmatch Compatible     Unit Dispense Status Presumed Trans     Unit Product Volume 350 mL       No results found  Labs and pertinent reports reviewed  This note has been generated by voice recognition software system  Therefore, there may be spelling, grammar, and or syntax errors  Please contact if questions arise

## 2023-03-22 NOTE — ASSESSMENT & PLAN NOTE
· S/p 2 unit transfusion of PRBC on admission  · Known hx of chronic blood loss due to iron deficiency anemia  Receives routine venofer and blood transfusions as outpt  · In the past had been approached in regards to endoscopy eval but had declined  · Evaluated by GI, as patient stated she would go for endoscopy and colonoscopy if needed  · GI recommendations noted, apparently patient's son to visit her when they will decide if they want to proceed with endoscopy/colonoscopy  · S/p bone marrow bx, 1/27/21, that was remarkable for normocellular megakaryocytes suggestive of ITP  · Evaluated by hematology, recommendations noted  Patient to continue IV iron infusions as scheduled in outpatient setting  · Patient will need CBC every 2 weeks

## 2023-03-22 NOTE — RESTORATIVE TECHNICIAN NOTE
Restorative Technician Note      Patient Name: Peter Abbott     Restorative Tech Visit Date: 03/22/23  Note Type: Mobility  Patient Position Upon Consult: Bedside chair  Activity Performed: Ambulated  Assistive Device: Roller walker  Patient Position at End of Consult: Bedside chair;  All needs within [facility]

## 2023-03-22 NOTE — ASSESSMENT & PLAN NOTE
Hx of refractory to steroids  Initiated on promacta since 2021  Follows with hematology  Is supposed to be on Promacta but states has been w/o her meds for weeks  Per hem/oncology notes on 3/9/2023 dose was reduced from 50mg to 25mg  Seen by heme-onc, recommended to continue home dosage of 25 mg Promacta  Patient's family to bring from home    Cont to monitor plt count

## 2023-03-22 NOTE — CASE MANAGEMENT
Case Management Assessment & Discharge Planning Note    Patient name Catrachito Cr  Location [facility] 924/[facility] 114-37 MRN [id number]  : 1940 Date 3/22/2023       Current Admission Date: 3/21/2023  Current Admission Diagnosis:Acute blood loss anemia   Patient Active Problem List    Diagnosis Date Noted   • Acute blood loss anemia 2023   • Hypokalemia 2023   • Iron deficiency anemia due to chronic blood loss 2023   • Presence of heart assist device ([facility] [address] ) 02/15/2023   • Acute bilateral low back pain without sciatica 02/10/2023   • Moderate protein-calorie malnutrition ([address] ) 2022   • Pancytopenia ([address] ) 2022   • Hypotension 2022   • Headache 2022   • Bilateral hearing loss 2022   • Hypertensive heart disease with congestive heart failure ([address] ) 2021   • Chronic ITP (idiopathic thrombocytopenia) ([facility] [address] ) 11/10/2021   • Neutropenia, unspecified type ([address] ) 08/10/2021   • Left-sided Bell's palsy 08/10/2021   • LEA positive 2020   • Complete left bundle branch block (LBBB) 10/22/2019   • Dilated cardiomyopathy ([address] ) 2018   • Chronic systolic heart failure ([address] ) 2018   • Hypertension, essential 2018   • Other specified glaucoma 2018   • Autoimmune hepatitis treated with steroids ([facility] [address] ) 2018   • Depressive disorder 2017      LOS (days): 1  Geometric Mean LOS (GMLOS) (days): 3 00  Days to GMLOS:1 9     OBJECTIVE:    Risk of Unplanned Readmission Score: 14 21         Current admission status: Inpatient       Preferred Pharmacy:   [facility], [address] [place] [address]  Phone: [phone] Fax: [phone]    [facility] ([id number] - [place] [address]  Phone: [phone] Fax: [phone]    [address] [place] [address], [address] [address]  Phone: [phone] Fax: [phone]    Primary Care Provider: Mateo Mcleod MD    Primary Insurance: MEDICARE  Secondary Insurance: Johnathan NGUYEN    ASSESSMENT:  Wilfrido Plascencia Representative - Son   Primary Phone: [phone] (Mobile)               Advance Directives  Does patient have a [address]?: No  Was patient offered paperwork?: No (Pt reported they are working on this )  Does patient currently have a Health Care decision maker?: Yes, please see Health Care Proxy section  Does patient have Advance Directives?: No  Was patient offered paperwork?: No (Pt reported they are working on this )         Readmission Root Cause  30 Day Readmission: No    Patient Information  Admitted from[de-identified] Home  Mental Status: Alert  During Assessment patient was accompanied by: Not accompanied during assessment  Assessment information provided by[de-identified] Patient  Primary Caregiver: Self  Support Systems: [address] [facility] of Residence: [address] do you live in?: [employer] entry access options   Select all that apply : Stairs  Number of steps to enter home : 3  Do the steps have railings?: Yes  Type of Current Residence: [address] [place] home  Upon entering residence, is there a bedroom on the main floor (no further steps)?: Yes  Upon entering residence, is there a bathroom on the main floor (no further steps)?: Yes  In the last 12 months, was there a time when you were not able to pay the mortgage or rent on time?: No  In the last 12 months, how many places have you lived?: 1  In the last 12 months, was there a time when you did not have a steady place to sleep or slept in a shelter (including now)?: No  Homeless/housing insecurity resource given?: N/A  Living Arrangements: Lives w/ Spouse/significant other  Is patient a ?: No    Activities of Daily Living Prior to Admission  Functional Status: Independent  Completes ADLs independently?: Yes  Ambulates independently?: Yes  Does patient use assisted devices?: Yes  Assisted Devices (DME) used: Shandra Marese  Does patient currently own DME?: Yes  What DME does the patient currently own?: Shandra Montoya  Does patient have a history of Outpatient Therapy (PT/OT)?: Yes ([facility]  (pt reports current))  Does the patient have a history of Short-Term Rehab?: No  Does patient have a history of HHC?: Yes ([facility])  Does patient currently have Kia Polk?: No         Patient Information Continued  Income Source: Pension/snf  Does patient have prescription coverage?: Yes  Within the past 12 months, you worried that your food would run out before you got the money to buy more : Never true  Within the past 12 months, the food you bought just didn't last and you didn't have money to get more : Never true  Food insecurity resource given?: N/A  Does patient receive dialysis treatments?: No  Does patient have a history of substance abuse?: No  Does patient have a history of Mental Health Diagnosis?: No         Means of Transportation  Means of Transport to Appts[de-identified] Family transport  In the past 12 months, has lack of transportation kept you from medical appointments or from getting medications?: No  In the past 12 months, has lack of transportation kept you from meetings, work, or from getting things needed for daily living?: No  Was application for public transport provided?: N/A        DISCHARGE DETAILS:    Discharge planning discussed with[de-identified] Patient  Freedom of Choice: Yes     CM contacted family/caregiver?: No- see comments (Pt alert and oriented)       Other Referral/Resources/Interventions Provided:  Referral Comments: Pending recs         Treatment Team Recommendation: Other (TBD)  Discharge Destination Plan[de-identified] Other (TBD)  Transport at Discharge : Family (Pt's son)              Additional Comments: CM met with pt to complete open assessment  CM introduced self and explained role   Pt reported that her son is able to transport at d/c  CM to follow

## 2023-03-23 ENCOUNTER — APPOINTMENT (OUTPATIENT)
Dept: PHYSICAL THERAPY | Facility: CLINIC | Age: 83
End: 2023-03-23

## 2023-03-23 LAB
ANION GAP SERPL CALCULATED.3IONS-SCNC: 3 MMOL/L (ref 4–13)
BUN SERPL-MCNC: 14 MG/DL (ref 5–25)
CALCIUM SERPL-MCNC: 8.5 MG/DL (ref 8.3–10.1)
CHLORIDE SERPL-SCNC: 107 MMOL/L (ref 96–108)
CO2 SERPL-SCNC: 26 MMOL/L (ref 21–32)
CREAT SERPL-MCNC: 0.69 MG/DL (ref 0.6–1.3)
ERYTHROCYTE [DISTWIDTH] IN BLOOD BY AUTOMATED COUNT: 16 % (ref 11.6–15.1)
GFR SERPL CREATININE-BSD FRML MDRD: 79 ML/MIN/1.73SQ M
GLUCOSE SERPL-MCNC: 96 MG/DL (ref 65–140)
HCT VFR BLD AUTO: 24.4 % (ref 34.8–46.1)
HGB BLD-MCNC: 8 G/DL (ref 11.5–15.4)
MCH RBC QN AUTO: 28.4 PG (ref 26.8–34.3)
MCHC RBC AUTO-ENTMCNC: 32.8 G/DL (ref 31.4–37.4)
MCV RBC AUTO: 87 FL (ref 82–98)
PLATELET # BLD AUTO: 69 THOUSANDS/UL (ref 149–390)
PMV BLD AUTO: 11.2 FL (ref 8.9–12.7)
POTASSIUM SERPL-SCNC: 4 MMOL/L (ref 3.5–5.3)
RBC # BLD AUTO: 2.82 MILLION/UL (ref 3.81–5.12)
SODIUM SERPL-SCNC: 136 MMOL/L (ref 135–147)
WBC # BLD AUTO: 5.44 THOUSAND/UL (ref 4.31–10.16)

## 2023-03-23 RX ADMIN — CHOLECALCIFEROL TAB 25 MCG (1000 UNIT) 1000 UNITS: 25 TAB at 08:16

## 2023-03-23 RX ADMIN — PANTOPRAZOLE SODIUM 40 MG: 40 INJECTION, POWDER, FOR SOLUTION INTRAVENOUS at 21:00

## 2023-03-23 RX ADMIN — PANTOPRAZOLE SODIUM 40 MG: 40 INJECTION, POWDER, FOR SOLUTION INTRAVENOUS at 08:16

## 2023-03-23 RX ADMIN — PREDNISONE 10 MG: 10 TABLET ORAL at 08:16

## 2023-03-23 RX ADMIN — CYANOCOBALAMIN TAB 500 MCG 250 MCG: 500 TAB at 08:16

## 2023-03-23 NOTE — PROGRESS NOTES
[facility]  Progress Note  Name: Marisol Cox  MRN: [id number]  Unit/Bed#: [facility] 924-01 I Date of Admission: 3/21/2023   Date of Service: 3/23/2023 I Hospital Day: 2    Assessment/Plan   * Acute blood loss anemia  Assessment & Plan  · S/p 2 unit transfusion of PRBC on admission  · Known hx of chronic blood loss due to iron deficiency anemia  Receives routine venofer and blood transfusions as outpt  · In the past had been approached in regards to endoscopy eval but had declined  · Evaluated by GI, yesterday patient's son said will discuss with patient if they want to proceed with endoscopy or colonoscopy  · I called patient's son again today to ask further plan, to that he showed frustration that he was expecting call from GI this morning to let him know about hemoglobin, depending on that they will decide  · S/p bone marrow bx, 1/27/21, that was remarkable for normocellular megakaryocytes suggestive of ITP  · Evaluated by hematology, recommendations noted  Patient to continue IV iron infusions as scheduled in outpatient setting  · Patient will need CBC every 2 weeks  · Discussed with GI, if hemoglobin remained stable and no further episodes of bleeding per rectum, patient will be discharged home tomorrow, she can follow-up with GI in outpatient setting to discuss regarding endoscopy/colonoscopy  Moderate protein-calorie malnutrition ([address] )  Assessment & Plan  Malnutrition Findings:                             BMI Findings:         Nutrition eval  There is no height or weight on file to calculate BMI  Hypotension  Assessment & Plan  Likely secondary to anemia, asymptomatic  Cont to hold anti htn meds    Chronic ITP (idiopathic thrombocytopenia) (HCC)  Assessment & Plan  Hx of refractory to steroids  Initiated on promacta since 2021  Follows with hematology  Is supposed to be on Promacta but states has been w/o her meds for weeks   Per hem/oncology notes on 3/9/2023 dose was reduced from 50mg to 25mg  Seen by heme-onc, recommended to continue home dosage of 25 mg Promacta  Patient's family to bring from home  Cont to monitor plt count    Autoimmune hepatitis treated with steroids [facility])  Assessment & Plan  Follows with Dr Coleman   On budesonide and prednisone  Continue prednisone, holding budesonide as non formularly    Chronic systolic heart failure ([facility] [address] )  Assessment & Plan  Wt Readings from Last 3 Encounters:   03/09/23 49 3 kg (108 lb 9 6 oz)   03/07/23 49 kg (108 lb)   02/15/23 50 2 kg (110 lb 9 6 oz)   Euvolemic  Holding diuretics (torsemide/aldactone) in setting of anemia  Per notes pt is supposed to be on entresto though not on med list which was reviewed with patient  Continue to hold metoprolol in setting of hypotension  VTE Pharmacologic Prophylaxis:   Moderate Risk (Score 3-4) - Pharmacological DVT Prophylaxis Contraindicated  Sequential Compression Devices Ordered  Patient Centered Rounds: I performed bedside rounds with nursing staff today  Discussions with Specialists or Other Care Team Provider: Cm,GI     Education and Discussions with Family / Patient: Updated  (son) via phone  Total Time Spent on Date of Encounter in care of patient: 35 minutes This time was spent on one or more of the following: performing physical exam; counseling and coordination of care; obtaining or reviewing history; documenting in the medical record; reviewing/ordering tests, medications or procedures; communicating with other healthcare professionals and discussing with patient's family/caregivers  Current Length of Stay: 2 day(s)  Current Patient Status: Inpatient   Certification Statement: The patient will continue to require additional inpatient hospital stay due to Anemia  Discharge Plan: Anticipate discharge tomorrow to home  Code Status: Level 1 - Full Code    Subjective:   Patient examined at bedside    Was all dressed up and thought she is having family meeting for her, I discussed with GI as of to my knowledge there was no meeting scheduled  I spoke to patient's son and came to know that meeting was scheduled for patient's  who is also admitted in hospital     Objective:     Vitals:   Temp (24hrs), Av 3 °F (36 3 °C), Min:97 3 °F (36 3 °C), Max:97 3 °F (36 3 °C)    Temp:  [97 3 °F (36 3 °C)] 97 3 °F (36 3 °C)  HR:  [83-92] 92  Resp:  [16-18] 18  BP: ()/(47-63) 108/63  SpO2:  [97 %-100 %] 100 %  There is no height or weight on file to calculate BMI  Input and Output Summary (last 24 hours): Intake/Output Summary (Last 24 hours) at 3/23/2023 1925  Last data filed at 3/23/2023 191  Gross per 24 hour   Intake 240 ml   Output --   Net 240 ml       Physical Exam:   Physical Exam  Constitutional:       Appearance: Normal appearance  HENT:      Head: Normocephalic and atraumatic  Mouth/Throat:      Mouth: Mucous membranes are moist       Pharynx: Oropharynx is clear  Eyes:      Conjunctiva/sclera: Conjunctivae normal       Pupils: Pupils are equal, round, and reactive to light  Cardiovascular:      Rate and Rhythm: Normal rate and regular rhythm  Pulmonary:      Effort: Pulmonary effort is normal       Breath sounds: Normal breath sounds  Abdominal:      General: Abdomen is flat  Bowel sounds are normal    Musculoskeletal:         General: Normal range of motion  Cervical back: Normal range of motion and neck supple  Skin:     General: Skin is warm and dry  Neurological:      General: No focal deficit present  Mental Status: She is alert and oriented to person, place, and time            Additional Data:     Labs:  Results from last 7 days   Lab Units 23  0607 23  2121 23  0520   WBC Thousand/uL 5 44  --  5 07   HEMOGLOBIN g/dL 8 0*   < > 8 2*   HEMATOCRIT % 24 4*   < > 24 8*   PLATELETS Thousands/uL 69*  --  71*   NEUTROS PCT %  --   --  58   LYMPHS PCT % --   --  32   MONOS PCT %  --   --  9   EOS PCT %  --   --  0    < > = values in this interval not displayed  Results from last 7 days   Lab Units 03/23/23  0607 03/21/23  1202 03/20/23  1250   SODIUM mmol/L 136   < > 136   POTASSIUM mmol/L 4 0   < > 3 3*   CHLORIDE mmol/L 107   < > 101   CO2 mmol/L 26   < > 31   BUN mg/dL 14   < > 20   CREATININE mg/dL 0 69   < > 0 91   ANION GAP mmol/L 3*   < > 4   CALCIUM mg/dL 8 5   < > 9 1   ALBUMIN g/dL  --   --  3 5   TOTAL BILIRUBIN mg/dL  --   --  0 80   ALK PHOS U/L  --   --  31*   ALT U/L  --   --  13   AST U/L  --   --  20   GLUCOSE RANDOM mg/dL 96   < >  --     < > = values in this interval not displayed  Results from last 7 days   Lab Units 03/21/23  1339   INR  1 35*                   Lines/Drains:  Invasive Devices     Peripheral Intravenous Line  Duration           Peripheral IV Left;Proximal;Ventral (anterior) Forearm -- days                      Imaging: No pertinent imaging reviewed  Recent Cultures (last 7 days):         Last 24 Hours Medication List:   Current Facility-Administered Medications   Medication Dose Route Frequency Provider Last Rate   • acetaminophen  650 mg Oral Q6H PRN Katrina Castillo DO     • cholecalciferol  1,000 Units Oral Daily Lve Jonathan DO     • cyanocobalamin  250 mcg Oral Daily Katrina Castillo DO     • ondansetron  4 mg Intravenous Q6H PRN Katrina Castillo DO     • pantoprazole  40 mg Intravenous Q12H [address], MD     • predniSONE  10 mg Oral Daily Katrina Castillo DO          Today, Patient Was Seen By: Vinicio Reid MD    **Please Note: This note may have been constructed using a voice recognition system  **

## 2023-03-23 NOTE — ED PROVIDER NOTES
History  Chief Complaint   Patient presents with   • Abnormal Lab     Pt sent for low hgb, to receive iron infusion but told to come to ED for blood transfusion d/t low hgb     Pt is an 79 YO F, PMHx of autoimmune hepatitis, LASHANDA (per chart review this is secondary to acute and chronic GI blood loss but she has been refusing GI work-up), and ITP, who presents to the ED for acute on chronic anemia  Pt states that recently she has developed fatigue and shortness of breath on exertion  She gets relatively frequent blood draws  She states she had her blood drawn earlier and her Hgb was found to be low  She was scheduled for an outpatient blood transfusion, but given her persistent symptoms and further drop on repeat Hgb draw it was recommended she come to the ED for further evaluation and to rule out acute bleeding  Besides fatigue and ROCHA pt has no other complaints  Denies any CP, abdominal pain, back pain, palpitations, lightheadedness, and dizziness  Denies any rectal bleeding or blood in the stool  No other complaints or concerning at this time  Chart reviewed  Baseline HGB 10-11  Hgb yesterday was 6 1  Per chart review, both PCP and heme onc aware  Per heme onc note from yesterday, blood transfusion was scheduled [facility] at [facility]  A telephone encounter from earlier today notes that Heme Onc recommended pt go to ED as her Hgb dropped further to 5 8  Transfusion order was cancelled  Latest Reference Range & Units 02/22/23 13:10 03/20/23 12:50 03/20/23 17:43   Hemoglobin 11 5 - 15 4 g/dL 10 9 (L) 6 1 (LL) 5 8 (LL)   (LL): Data is critically low  (L): Data is abnormally low    Prior to Admission Medications   Prescriptions Last Dose Informant Patient Reported? Taking?    Diclofenac Sodium (VOLTAREN) 1 %   No No   Sig: Apply 2 g topically 4 (four) times a day   ascorbic acid (VITAMIN C) 500 mg tablet   Yes No   Sig: Take 500 mg by mouth daily   budesonide (ENTOCORT EC) 3 MG capsule   Yes No   Sig: Take 9 mg by mouth daily Takes 3 capsules once daily  cholecalciferol (VITAMIN D3) 1,000 units tablet   Yes Yes   Sig: Take 1,000 Units by mouth daily   cyanocobalamin (VITAMIN B-12) 100 mcg tablet   Yes Yes   Sig: Take 250 mcg by mouth daily   eltrombopag (PROMACTA) 25 mg tablet Not Taking  No No   Sig: Take 1 tablet (25 mg total) by mouth daily Administer on an empty stomach, 1 hour before or 2 hours after a meal    Patient not taking: Reported on 3/21/2023   ferrous sulfate 324 (65 Fe) mg   No No   Sig: Take 1 tablet (324 mg total) by mouth daily before breakfast   Patient not taking: Reported on 3/7/2023   fluticasone (FLONASE) 50 mcg/act nasal spray   No No   Si sprays into each nostril daily   Patient taking differently: 2 sprays into each nostril daily as needed for allergies   metoprolol succinate (TOPROL-XL) 25 mg 24 hr tablet   No No   Sig: TAKE 1 TABLET BY MOUTH EVERY DAY   omega-3-acid ethyl esters (LOVAZA) 1 g capsule   Yes Yes   Sig: Take 2 g by mouth daily   potassium chloride (MICRO-K) 10 MEQ CR capsule   No No   Sig: Take 1 capsule (10 mEq total) by mouth daily   Patient taking differently: Take 10 mEq by mouth daily Hold parameter for a systolic BP 90 or less  Call for instructions if there is any SS of fluid overload or dehydration  predniSONE 10 mg tablet   Yes No   Sig: Take 10 mg by mouth daily   spironolactone (ALDACTONE) 25 mg tablet   No No   Sig: Take 0 5 tablets (12 5 mg total) by mouth daily   Patient taking differently: Take 12 5 mg by mouth daily Hold parameter for a systolic BP 90 or less  Call for instructions if there is any SS of fluid overload or dehydration  torsemide (DEMADEX) 10 mg tablet   No No   Sig: Take 1 tablet (10 mg total) by mouth daily   Patient taking differently: Take 10 mg by mouth daily Hold parameter for a systolic BP 90 or less  Call for instructions if there is any SS of fluid overload or dehydration        Facility-Administered Medications: None Past Medical History:   Diagnosis Date   • Bell's palsy    • Cardiac disease    • Ear problems    • HL (hearing loss)    • Hypertension    • Sacroiliitis ([facility] ) 8/10/2021   • Subdural hemorrhage ([facility] ) 3/30/2022   • Thrombocytopenia ([facility] ) 8/3/2018   • Tinnitus    • Traumatic subdural hemorrhage with loss of consciousness of unspecified duration, initial encounter ([place] [id number] ) 2/15/2023       Past Surgical History:   Procedure Laterality Date   • CT BONE MARROW BIOPSY AND ASPIRATION  1/27/2021       Family History   Problem Relation Age of Onset   • Autoimmune disease Neg Hx      I have reviewed and agree with the history as documented  E-Cigarette/Vaping   • E-Cigarette Use Never User      E-Cigarette/Vaping Substances   • Nicotine No    • THC No    • CBD No    • Flavoring No    • Other No    • Unknown No      Social History     Tobacco Use   • Smoking status: Never   • Smokeless tobacco: Never   Vaping Use   • Vaping Use: Never used   Substance Use Topics   • Alcohol use: Not Currently   • Drug use: Not Currently        Review of Systems   Constitutional: Positive for fatigue  Negative for chills and fever  Respiratory: Positive for shortness of breath  Cardiovascular: Negative for chest pain  Gastrointestinal: Negative for blood in stool, nausea and vomiting  All other systems reviewed and are negative        Physical Exam  ED Triage Vitals   Temperature Pulse Respirations Blood Pressure SpO2   03/21/23 1225 03/21/23 1039 03/21/23 1039 03/21/23 1039 03/21/23 1039   (!) 96 5 °F (35 8 °C) 80 18 118/51 100 %      Temp Source Heart Rate Source Patient Position - Orthostatic VS BP Location FiO2 (%)   03/21/23 1039 03/21/23 1039 03/21/23 1039 03/21/23 1039 --   Oral Monitor Sitting Left arm       Pain Score       03/21/23 1039       No Pain             Orthostatic Vital Signs  Vitals:    03/22/23 1417 03/22/23 1458 03/22/23 2201 03/23/23 0707   BP: (!) 100/49 (!) 99/48 (!) 97/47 106/62   Pulse: 81 81 85 83 Patient Position - Orthostatic VS:    Sitting       Physical Exam  Vitals and nursing note reviewed  Exam conducted with a chaperone present  Constitutional:       General: She is not in acute distress  Appearance: She is well-developed  She is not ill-appearing, toxic-appearing or diaphoretic  HENT:      Head: Normocephalic and atraumatic  Right Ear: External ear normal       Left Ear: External ear normal       Nose: Nose normal    Eyes:      General: Lids are normal  No scleral icterus  Cardiovascular:      Rate and Rhythm: Normal rate and regular rhythm  Heart sounds: Normal heart sounds  No murmur heard  No friction rub  No gallop  Pulmonary:      Effort: Pulmonary effort is normal  No respiratory distress  Breath sounds: Normal breath sounds  No wheezing or rales  Abdominal:      Palpations: Abdomen is soft  Tenderness: There is no abdominal tenderness  There is no guarding or rebound  Genitourinary:     Rectum: Guaiac result positive  Comments: Brown guaiac positive stool  Musculoskeletal:         General: No deformity  Normal range of motion  Cervical back: Normal range of motion and neck supple  Skin:     General: Skin is warm and dry  Neurological:      General: No focal deficit present  Mental Status: She is alert     Psychiatric:         Mood and Affect: Mood normal          Behavior: Behavior normal          ED Medications  Medications   acetaminophen (TYLENOL) tablet 650 mg (has no administration in time range)   ondansetron (ZOFRAN) injection 4 mg (has no administration in time range)   cholecalciferol (VITAMIN D3) tablet 1,000 Units (1,000 Units Oral Given 3/23/23 0816)   cyanocobalamin (VITAMIN B-12) tablet 250 mcg (250 mcg Oral Given 3/23/23 0816)   predniSONE tablet 10 mg (10 mg Oral Given 3/23/23 0816)   pantoprazole (PROTONIX) injection 40 mg (40 mg Intravenous Given 3/23/23 0816)   pantoprazole (PROTONIX) 80 mg in sodium chloride 0 9 % 100 mL IVPB (0 mg Intravenous Stopped 3/21/23 1232)   potassium chloride (K-DUR,KLOR-CON) CR tablet 40 mEq (40 mEq Oral Given 3/21/23 1445)   iron sucrose (VENOFER) 300 mg in sodium chloride 0 9 % 250 mL IVPB (300 mg Intravenous New Bag 3/22/23 1352)       Diagnostic Studies  Results Reviewed     Procedure Component Value Units Date/Time    Basic metabolic panel [[id number]]  (Abnormal) Collected: 03/22/23 0520    Lab Status: Final result Specimen: Blood from Arm, Left Updated: 03/22/23 2906     Sodium 138 mmol/L      Potassium 4 2 mmol/L      Chloride 109 mmol/L      CO2 26 mmol/L      ANION GAP 3 mmol/L      BUN 13 mg/dL      Creatinine 0 65 mg/dL      Glucose 84 mg/dL      Calcium 8 6 mg/dL      eGFR 81 ml/min/1 73sq m     Narrative:      [place] guidelines for Chronic Kidney Disease (CKD):   •  Stage 1 with normal or high GFR (GFR > 90 mL/min/1 73 square meters)  •  Stage 2 Mild CKD (GFR = 60-89 mL/min/1 73 square meters)  •  Stage 3A Moderate CKD (GFR = 45-59 mL/min/1 73 square meters)  •  Stage 3B Moderate CKD (GFR = 30-44 mL/min/1 73 square meters)  •  Stage 4 Severe CKD (GFR = 15-29 mL/min/1 73 square meters)  •  Stage 5 End Stage CKD (GFR <15 mL/min/1 73 square meters)  Note: GFR calculation is accurate only with a steady state creatinine    CBC and differential [[id number]]  (Abnormal) Collected: 03/21/23 1054    Lab Status: Final result Specimen: Blood from Arm, Left Updated: 03/21/23 1425     WBC 4 71 Thousand/uL      RBC 2 24 Million/uL      Hemoglobin 6 0 g/dL      Hematocrit 20 0 %      MCV 89 fL      MCH 26 8 pg      MCHC 30 0 g/dL      RDW 17 1 %      MPV 11 7 fL      Platelets 080 Thousands/uL      nRBC 0 /100 WBCs      Neutrophils Relative 66 %      Immat GRANS % 0 %      Lymphocytes Relative 22 %      Monocytes Relative 12 %      Eosinophils Relative 0 %      Basophils Relative 0 %      Neutrophils Absolute 3 09 Thousands/µL      Immature Grans Absolute 0 01 Thousand/uL Lymphocytes Absolute 1 04 Thousands/µL      Monocytes Absolute 0 54 Thousand/µL      Eosinophils Absolute 0 01 Thousand/µL      Basophils Absolute 0 02 Thousands/µL     Narrative: This is an appended report  These results have been appended to a previously verified report  Protime-INR [[id number]]  (Abnormal) Collected: 03/21/23 1339    Lab Status: Final result Specimen: Blood from Arm, Right Updated: 03/21/23 1422     Protime 16 9 seconds      INR 6 25    Basic metabolic panel [[id number]]  (Abnormal) Collected: 03/21/23 1202    Lab Status: Final result Specimen: Blood from Arm, Right Updated: 03/21/23 1302     Sodium 137 mmol/L      Potassium 3 2 mmol/L      Chloride 104 mmol/L      CO2 30 mmol/L      ANION GAP 3 mmol/L      BUN 26 mg/dL      Creatinine 0 98 mg/dL      Glucose 86 mg/dL      Calcium 9 4 mg/dL      eGFR 52 ml/min/1 73sq m     Narrative:      [place] guidelines for Chronic Kidney Disease (CKD):   •  Stage 1 with normal or high GFR (GFR > 90 mL/min/1 73 square meters)  •  Stage 2 Mild CKD (GFR = 60-89 mL/min/1 73 square meters)  •  Stage 3A Moderate CKD (GFR = 45-59 mL/min/1 73 square meters)  •  Stage 3B Moderate CKD (GFR = 30-44 mL/min/1 73 square meters)  •  Stage 4 Severe CKD (GFR = 15-29 mL/min/1 73 square meters)  •  Stage 5 End Stage CKD (GFR <15 mL/min/1 73 square meters)  Note: GFR calculation is accurate only with a steady state creatinine                 No orders to display         Procedures  Procedures      ED Course  ED Course as of 03/23/23 1103   Tue Mar 21, 2023   1105 TT sent to Dr Tiago Ken w/ heme onc   1107 Discussed with Dr Tiago Ken  Agrees with transfusing 2 uPRBC if repeat Hgb is similar   1120 Hemoglobin(!!): 6 0  Will tranfuse two units   1146 TT sent to [facility] for admission   1157 Discussed with SLIM   Accepts admission                                       Medical Decision Making  Patient is a 78 y o  female who presents to the ED for acute on "chronic anemia  Patient is non-toxic, well appearing  Vitals are stable  Pt does have guaiac + stool, but it is brown  The rest of patient's exam is unremarkable  Given significant drop in Hgb, there is concern for acute bleed  Given guaiac positive stool, this is likely the source  Do not suspect hemolysis  Plan: Repeat CBC, transfusion, admit for further evaluation of ABLA                 Portions of the record may have been created with voice recognition software  Occasional wrong word or \"sound a like\" substitutions may have occurred due to the inherent limitations of voice recognition software  Read the chart carefully and recognize, using context, where substitutions have occurred  Anemia: acute illness or injury that poses a threat to life or bodily functions  Guaiac + stool: acute illness or injury  Amount and/or Complexity of Data Reviewed  External Data Reviewed: labs and notes  Details: Details documented in HPI  Labs: ordered  Decision-making details documented in ED Course  Risk  OTC drugs  Prescription drug management  Decision regarding hospitalization  Risk Details: Discussed with Heme Onc  Details documented in ED course             Disposition  Final diagnoses:   Anemia   Guaiac + stool     Time reflects when diagnosis was documented in both MDM as applicable and the Disposition within this note     Time User Action Codes Description Comment    3/21/2023 11:09 AM [facility] Add [D64 9] Anemia     3/21/2023  1:49 PM Kishan Julian Add [D50 0] Iron deficiency anemia due to chronic blood loss     3/21/2023  1:49 PM Kishan Julian Add [D69 3] Chronic ITP (idiopathic thrombocytopenia) (HCC)     3/21/2023  1:49 PM Kishan Julian Remove [D69 3] Chronic ITP (idiopathic thrombocytopenia) (HCC)     3/21/2023  1:49 PM Kishan Julian Add [D69 3] Chronic ITP (idiopathic thrombocytopenia) (HCC)     3/23/2023 10:56 AM [facility] Add [R19 5] Guaiac + stool       ED " Disposition     ED Disposition   Admit    Condition   Stable    Date/Time   Thu Mar 23, 2023 10:57 AM    Comment   Case was discussed with Dr Leola Loera and the patient's admission status was agreed to be Admission Status: inpatient status to the service of Dr Leola Loera   Follow-up Information    None         Current Discharge Medication List      CONTINUE these medications which have NOT CHANGED    Details   cholecalciferol (VITAMIN D3) 1,000 units tablet Take 1,000 Units by mouth daily      cyanocobalamin (VITAMIN B-12) 100 mcg tablet Take 250 mcg by mouth daily      omega-3-acid ethyl esters (LOVAZA) 1 g capsule Take 2 g by mouth daily      ascorbic acid (VITAMIN C) 500 mg tablet Take 500 mg by mouth daily      budesonide (ENTOCORT EC) 3 MG capsule Take 9 mg by mouth daily Takes 3 capsules once daily  Associated Diagnoses: Autoimmune hepatitis treated with steroids ([facility])      Diclofenac Sodium (VOLTAREN) 1 % Apply 2 g topically 4 (four) times a day  Qty: 100 g, Refills: 0    Associated Diagnoses: Acute bilateral low back pain without sciatica      eltrombopag (PROMACTA) 25 mg tablet Take 1 tablet (25 mg total) by mouth daily Administer on an empty stomach, 1 hour before or 2 hours after a meal   Qty: 30 tablet, Refills: 11    Associated Diagnoses: Chronic ITP (idiopathic thrombocytopenia) ([facility])      ferrous sulfate 324 (65 Fe) mg Take 1 tablet (324 mg total) by mouth daily before breakfast  Qty: 30 tablet, Refills: 0    Associated Diagnoses: Acute blood loss anemia      fluticasone (FLONASE) 50 mcg/act nasal spray 2 sprays into each nostril daily  Qty: 16 g, Refills: 0    Associated Diagnoses: Nasal congestion      metoprolol succinate (TOPROL-XL) 25 mg 24 hr tablet TAKE 1 TABLET BY MOUTH EVERY DAY  Qty: 90 tablet, Refills: 3    Comments: DX Code Needed      Associated Diagnoses: Autoimmune hepatitis ([address] )      potassium chloride (MICRO-K) 10 MEQ CR capsule Take 1 capsule (10 mEq total) by mouth daily  Qty: 90 capsule, Refills: 3    Associated Diagnoses: Chronic systolic CHF (congestive heart failure), NYHA class 3 (HCC)      predniSONE 10 mg tablet Take 10 mg by mouth daily      spironolactone (ALDACTONE) 25 mg tablet Take 0 5 tablets (12 5 mg total) by mouth daily  Qty: 90 tablet, Refills: 3    Associated Diagnoses: Dilated cardiomyopathy ([facility] ); Chronic systolic CHF (congestive heart failure), NYHA class 3 (HCC)      torsemide (DEMADEX) 10 mg tablet Take 1 tablet (10 mg total) by mouth daily  Qty: 90 tablet, Refills: 3    Associated Diagnoses: Autoimmune hepatitis ([facility] )           No discharge procedures on file  PDMP Review       Value Time User    PDMP Reviewed  Yes 4/1/2022 10:26 AM Aissatou Stone           ED Provider  Attending physically available and evaluated Tuyet Mcclellan  CARMINA managed the patient along with the ED Attending      Electronically Signed by         Kristopher Hunter DO  03/23/23 5524

## 2023-03-23 NOTE — QUICK NOTE
I spoke to patient's Forestine Schaumann over phone, as continuation of yesterday's discussion  Apparently patient's son visited patient last evening when he was supposed to discuss regarding endoscopy/colonoscopy as patient always direct to her son for any decisions  While asking him about further plans, he got frustrated and started sarcastic comments  He said he was expecting a call from GI this morning to know about stool studies and hemoglobin, depending on that he would discuss with his mother and has not discussed anything till now  He further stated he cannot make decisions with updates on phone call, while I was asking when could he come to hospital so I can provide him further information, he lost connection or got muted as I was not able to hear anything hence call was disconnected  I discussed with GI, if patient's hemoglobin remained stable she will be discharged home tomorrow and can follow-up with GI in outpatient setting if she wants to pursue for endoscopy or colonoscopy

## 2023-03-23 NOTE — CASE MANAGEMENT
Case Management Discharge Planning Note    Patient name Green Button  Location [facility] [id number] MRN [id number]  : 1940 Date 3/23/2023       Current Admission Date: 3/21/2023  Current Admission Diagnosis:Acute blood loss anemia   Patient Active Problem List    Diagnosis Date Noted   • Acute blood loss anemia 2023   • Hypokalemia 2023   • Iron deficiency anemia due to chronic blood loss 2023   • Presence of heart assist device ([facility] ) 02/15/2023   • Acute bilateral low back pain without sciatica 02/10/2023   • Moderate protein-calorie malnutrition ([address] ) 2022   • Pancytopenia ([address] ) 2022   • Hypotension 2022   • Headache 2022   • Bilateral hearing loss 2022   • Hypertensive heart disease with congestive heart failure ([facility] ) 2021   • Chronic ITP (idiopathic thrombocytopenia) ([facility] ) 11/10/2021   • Neutropenia, unspecified type ([facility] ) 08/10/2021   • Left-sided Bell's palsy 08/10/2021   • LEA positive 2020   • Complete left bundle branch block (LBBB) 10/22/2019   • Dilated cardiomyopathy ([facility] ) 2018   • Chronic systolic heart failure ([facility] ) 2018   • Hypertension, essential 2018   • Other specified glaucoma 2018   • Autoimmune hepatitis treated with steroids ([facility] ) 2018   • Depressive disorder 2017      LOS (days): 2  Geometric Mean LOS (GMLOS) (days): 3 00  Days to GMLOS:0 9     OBJECTIVE:  Risk of Unplanned Readmission Score: 14 27         Current admission status: Inpatient   Preferred Pharmacy:   [facility] [place] [address] [address]  Phone: [phone] Fax: [phone]    [id number] - [place] [address]  Phone: [phone] Fax: [phone]    [address]  Phone: [phone] Fax: [phone]    Primary Care Provider: Carrington Schulte MD    Primary Insurance: MEDICARE  Secondary Insurance: [employer] [place] SHIELD    DISCHARGE DETAILS:             Additional Comments: Per provider, pt likely to d/c this evening vs  early tomorrow pending pt decision regarding endoscopy  CM to follow

## 2023-03-23 NOTE — PLAN OF CARE
Problem: PAIN - ADULT  Goal: Verbalizes/displays adequate comfort level or baseline comfort level  Description: Interventions:  - Encourage patient to monitor pain and request assistance  - Assess pain using appropriate pain scale  - Administer analgesics based on type and severity of pain and evaluate response  - Implement non-pharmacological measures as appropriate and evaluate response  - Consider cultural and social influences on pain and pain management  - Notify physician/advanced practitioner if interventions unsuccessful or patient reports new pain  Outcome: Progressing     Problem: INFECTION - ADULT  Goal: Absence or prevention of progression during hospitalization  Description: INTERVENTIONS:  - Assess and monitor for signs and symptoms of infection  - Monitor lab/diagnostic results  - Monitor all insertion sites, i e  indwelling lines, tubes, and drains  - Monitor endotracheal if appropriate and nasal secretions for changes in amount and color  - [place] appropriate cooling/warming therapies per order  - Administer medications as ordered  - Instruct and encourage patient and family to use good hand hygiene technique  - Identify and instruct in appropriate isolation precautions for identified infection/condition  Outcome: Progressing  Goal: Absence of fever/infection during neutropenic period  Description: INTERVENTIONS:  - Monitor WBC    Outcome: Progressing     Problem: SAFETY ADULT  Goal: Patient will remain free of falls  Description: INTERVENTIONS:  - Educate patient/family on patient safety including physical limitations  - Instruct patient to call for assistance with activity   - Consult OT/PT to assist with strengthening/mobility   - Keep Call bell within reach  - Keep bed low and locked with side rails adjusted as appropriate  - Keep care items and personal belongings within reach  - Initiate and maintain comfort rounds  - Make Fall Risk Sign visible to staff  - Offer Toileting every 3 Hours, in advance of need  - Initiate/Maintain 3alarm  - Obtain necessary fall risk management equipment: 3  - Apply yellow socks and bracelet for high fall risk patients  - Consider moving patient to room near nurses station  Outcome: Progressing  Goal: Maintain or return to baseline ADL function  Description: INTERVENTIONS:  -  Assess patient's ability to carry out ADLs; assess patient's baseline for ADL function and identify physical deficits which impact ability to perform ADLs (bathing, care of mouth/teeth, toileting, grooming, dressing, etc )  - Assess/evaluate cause of self-care deficits   - Assess range of motion  - Assess patient's mobility; develop plan if impaired  - Assess patient's need for assistive devices and provide as appropriate  - Encourage maximum independence but intervene and supervise when necessary  - Involve family in performance of ADLs  - Assess for home care needs following discharge   - Consider OT consult to assist with ADL evaluation and planning for discharge  - Provide patient education as appropriate  Outcome: Progressing  Goal: Maintains/Returns to pre admission functional level  Description: INTERVENTIONS:  - Perform BMAT or MOVE assessment daily    - Set and communicate daily mobility goal to care team and patient/family/caregiver  - Collaborate with rehabilitation services on mobility goals if consulted  - Perform Range of Motion 3 times a day  - Reposition patient every 3 hours    - Dangle patient 3 times a day  - Stand patient 3 times a day  - Ambulate patient 3 times a day  - Out of bed to chair 3 times a day   - Out of bed for meals 3 times a day  - Out of bed for toileting  - Record patient progress and toleration of activity level   Outcome: Progressing     Problem: DISCHARGE PLANNING  Goal: Discharge to home or other facility with appropriate resources  Description: INTERVENTIONS:  - Identify barriers to discharge w/patient and caregiver  - Arrange for needed discharge resources and transportation as appropriate  - Identify discharge learning needs (meds, wound care, etc )  - Arrange for interpretive services to assist at discharge as needed  - Refer to Case Management Department for coordinating discharge planning if the patient needs post-hospital services based on physician/advanced practitioner order or complex needs related to functional status, cognitive ability, or social support system  Outcome: Progressing     Problem: Knowledge Deficit  Goal: Patient/family/caregiver demonstrates understanding of disease process, treatment plan, medications, and discharge instructions  Description: Complete learning assessment and assess knowledge base    Interventions:  - Provide teaching at level of understanding  - Provide teaching via preferred learning methods  Outcome: Progressing

## 2023-03-23 NOTE — ASSESSMENT & PLAN NOTE
Follows with Dr Nelda Rizvi  On budesonide and prednisone  Continue prednisone, holding budesonide as non formularly

## 2023-03-23 NOTE — PLAN OF CARE
Problem: PAIN - ADULT  Goal: Verbalizes/displays adequate comfort level or baseline comfort level  Description: Interventions:  - Encourage patient to monitor pain and request assistance  - Assess pain using appropriate pain scale  - Administer analgesics based on type and severity of pain and evaluate response  - Implement non-pharmacological measures as appropriate and evaluate response  - Consider cultural and social influences on pain and pain management  - Notify physician/advanced practitioner if interventions unsuccessful or patient reports new pain  Outcome: Progressing     Problem: INFECTION - ADULT  Goal: Absence or prevention of progression during hospitalization  Description: INTERVENTIONS:  - Assess and monitor for signs and symptoms of infection  - Monitor lab/diagnostic results  - Monitor all insertion sites, i e  indwelling lines, tubes, and drains  - Monitor endotracheal if appropriate and nasal secretions for changes in amount and color  - [place] appropriate cooling/warming therapies per order  - Administer medications as ordered  - Instruct and encourage patient and family to use good hand hygiene technique  - Identify and instruct in appropriate isolation precautions for identified infection/condition  Outcome: Progressing     Problem: SAFETY ADULT  Goal: Patient will remain free of falls  Description: INTERVENTIONS:  - Educate patient/family on patient safety including physical limitations  - Instruct patient to call for assistance with activity   - Consult OT/PT to assist with strengthening/mobility   - Keep Call bell within reach  - Keep bed low and locked with side rails adjusted as appropriate  - Keep care items and personal belongings within reach  - Initiate and maintain comfort rounds  - Make Fall Risk Sign visible to staff  - Offer Toileting every 2 Hours, in advance of need  - Obtain necessary fall risk management equipment:   - Apply yellow socks and bracelet for high fall risk patients  - Consider moving patient to room near nurses station  Outcome: Progressing

## 2023-03-23 NOTE — ASSESSMENT & PLAN NOTE
· S/p 2 unit transfusion of PRBC on admission  · Known hx of chronic blood loss due to iron deficiency anemia  Receives routine venofer and blood transfusions as outpt  · In the past had been approached in regards to endoscopy eval but had declined  · Evaluated by GI, yesterday patient's son said will discuss with patient if they want to proceed with endoscopy or colonoscopy  · I called patient's son again today to ask further plan, to that he showed frustration that he was expecting call from GI this morning to let him know about hemoglobin, depending on that they will decide  · S/p bone marrow bx, 1/27/21, that was remarkable for normocellular megakaryocytes suggestive of ITP  · Evaluated by hematology, recommendations noted  Patient to continue IV iron infusions as scheduled in outpatient setting  · Patient will need CBC every 2 weeks  · Discussed with GI, if hemoglobin remained stable and no further episodes of bleeding per rectum, patient will be discharged home tomorrow, she can follow-up with GI in outpatient setting to discuss regarding endoscopy/colonoscopy

## 2023-03-24 ENCOUNTER — TELEPHONE (OUTPATIENT)
Dept: HEMATOLOGY ONCOLOGY | Facility: CLINIC | Age: 83
End: 2023-03-24

## 2023-03-24 DIAGNOSIS — D50.0 IRON DEFICIENCY ANEMIA DUE TO CHRONIC BLOOD LOSS: Primary | ICD-10-CM

## 2023-03-24 DIAGNOSIS — R76.8 ANA POSITIVE: ICD-10-CM

## 2023-03-24 LAB
ANION GAP SERPL CALCULATED.3IONS-SCNC: 1 MMOL/L (ref 4–13)
BUN SERPL-MCNC: 11 MG/DL (ref 5–25)
CALCIUM SERPL-MCNC: 8.9 MG/DL (ref 8.3–10.1)
CHLORIDE SERPL-SCNC: 111 MMOL/L (ref 96–108)
CO2 SERPL-SCNC: 27 MMOL/L (ref 21–32)
CREAT SERPL-MCNC: 0.59 MG/DL (ref 0.6–1.3)
ERYTHROCYTE [DISTWIDTH] IN BLOOD BY AUTOMATED COUNT: 16.4 % (ref 11.6–15.1)
GFR SERPL CREATININE-BSD FRML MDRD: 83 ML/MIN/1.73SQ M
GLUCOSE SERPL-MCNC: 92 MG/DL (ref 65–140)
HCT VFR BLD AUTO: 25.4 % (ref 34.8–46.1)
HGB BLD-MCNC: 7.9 G/DL (ref 11.5–15.4)
MCH RBC QN AUTO: 27.5 PG (ref 26.8–34.3)
MCHC RBC AUTO-ENTMCNC: 31.1 G/DL (ref 31.4–37.4)
MCV RBC AUTO: 89 FL (ref 82–98)
PLATELET # BLD AUTO: 58 THOUSANDS/UL (ref 149–390)
PMV BLD AUTO: 13.1 FL (ref 8.9–12.7)
POTASSIUM SERPL-SCNC: 3.9 MMOL/L (ref 3.5–5.3)
RBC # BLD AUTO: 2.87 MILLION/UL (ref 3.81–5.12)
SODIUM SERPL-SCNC: 139 MMOL/L (ref 135–147)
WBC # BLD AUTO: 4.52 THOUSAND/UL (ref 4.31–10.16)

## 2023-03-24 RX ORDER — SODIUM CHLORIDE 9 MG/ML
20 INJECTION, SOLUTION INTRAVENOUS ONCE
Status: CANCELLED | OUTPATIENT
Start: 2023-03-28

## 2023-03-24 RX ORDER — PANTOPRAZOLE SODIUM 40 MG/1
40 TABLET, DELAYED RELEASE ORAL 2 TIMES DAILY
Qty: 30 TABLET | Refills: 0 | Status: SHIPPED | OUTPATIENT
Start: 2023-03-24 | End: 2023-04-08

## 2023-03-24 RX ADMIN — CYANOCOBALAMIN TAB 500 MCG 250 MCG: 500 TAB at 08:25

## 2023-03-24 RX ADMIN — CHOLECALCIFEROL TAB 25 MCG (1000 UNIT) 1000 UNITS: 25 TAB at 08:25

## 2023-03-24 RX ADMIN — PANTOPRAZOLE SODIUM 40 MG: 40 INJECTION, POWDER, FOR SOLUTION INTRAVENOUS at 08:25

## 2023-03-24 RX ADMIN — PANTOPRAZOLE SODIUM 40 MG: 40 INJECTION, POWDER, FOR SOLUTION INTRAVENOUS at 20:44

## 2023-03-24 RX ADMIN — PREDNISONE 10 MG: 10 TABLET ORAL at 08:25

## 2023-03-24 NOTE — DISCHARGE INSTR - AVS FIRST PAGE
Your metoprolol and spironolactone as held due to blood pressure on softer side, continue to hold until you see your primary care doctor  Monitor your blood pressure regularly

## 2023-03-24 NOTE — ASSESSMENT & PLAN NOTE
· S/p 2 unit transfusion of PRBC on admission  · Known hx of chronic blood loss due to iron deficiency anemia  Receives routine venofer and blood transfusions as outpt  · In the past had been approached in regards to endoscopy eval but had declined  · I called patient's son again today to ask further plan, to that he showed frustration that he was expecting call from GI this morning to let him know about hemoglobin, depending on that they will decide  · S/p bone marrow bx, 1/27/21, that was remarkable for normocellular megakaryocytes suggestive of ITP  · Evaluated by hematology, recommendations noted  Patient to continue IV iron infusions as scheduled in outpatient setting  · Patient will need CBC every 2 weeks  · Discussed with GI,patient will be discharged home tomorrow, she can follow-up with GI in outpatient setting to discuss regarding endoscopy/colonoscopy  · Hemoglobin stable on discharge, no further bleeding per rectum

## 2023-03-24 NOTE — ASSESSMENT & PLAN NOTE
Wt Readings from Last 3 Encounters:   03/09/23 49 3 kg (108 lb 9 6 oz)   03/07/23 49 kg (108 lb)   02/15/23 50 2 kg (110 lb 9 6 oz)   Euvolemic  Holding diuretics (torsemide/aldactone) in setting of hypovolemia  Per notes pt is supposed to be on entresto though not on med list which was reviewed with patient  Continue to hold metoprolol in setting of hypotension  Will resume torsemide with holding parameters on discharge  Continue to hold metoprolol spironolactone until she sees her primary care doctor

## 2023-03-24 NOTE — ASSESSMENT & PLAN NOTE
Likely secondary to anemia, asymptomatic  Patient's antihypertensives were held during hospitalization  Continue to hold spironolactone and metoprolol on discharge until she sees her primary care doctor  Blood pressure acceptable being off medications

## 2023-03-24 NOTE — ASSESSMENT & PLAN NOTE
Resolved with repletion  Patient is on p o  potassium chloride at home, that she takes with torsemide, continue same

## 2023-03-24 NOTE — TELEPHONE ENCOUNTER
I arrived at the patient's room and introduced myself and explained my role as the inpatient hematology care coordinator for [facility], with Dr Rui Morris and KEKE Price's office  We reviewed the calendar of her upcoming appointments for Hematology, which includes her infusion appointments and the follow up appointment with Oswaldo Hernandez PA-C  These appointments were highlighted, as was the office/[facility] phone number  The patient expressed understanding of the appointments upon review and was appreciative of the calendar

## 2023-03-24 NOTE — DISCHARGE SUMMARY
[address] [facility]  Discharge- [address] 1940, 78 y o  female MRN: [id number]  Unit/Bed#: [address] 924-01 Encounter: [id number]  Primary Care Provider: Vasu De Los Santos MD   Date and time admitted to hospital: 3/21/2023 10:41 AM    * Acute blood loss anemia  Assessment & Plan  · S/p 2 unit transfusion of PRBC on admission  · Known hx of chronic blood loss due to iron deficiency anemia  Receives routine venofer and blood transfusions as outpt  · In the past had been approached in regards to endoscopy eval but had declined  · I called patient's son again today to ask further plan, to that he showed frustration that he was expecting call from GI this morning to let him know about hemoglobin, depending on that they will decide  · S/p bone marrow bx, 1/27/21, that was remarkable for normocellular megakaryocytes suggestive of ITP  · Evaluated by hematology, recommendations noted  Patient to continue IV iron infusions as scheduled in outpatient setting  · Patient will need CBC every 2 weeks  · Discussed with GI,patient will be discharged home tomorrow, she can follow-up with GI in outpatient setting to discuss regarding endoscopy/colonoscopy  · Hemoglobin stable on discharge, no further bleeding per rectum  Hypokalemia  Assessment & Plan  Resolved with repletion  Patient is on p o  potassium chloride at home, that she takes with torsemide, continue same  Moderate protein-calorie malnutrition ([address] )  Assessment & Plan  Malnutrition Findings:                             BMI Findings:         Nutrition eval  There is no height or weight on file to calculate BMI  Hypotension  Assessment & Plan  Likely secondary to anemia, asymptomatic  Patient's antihypertensives were held during hospitalization  Continue to hold spironolactone and metoprolol on discharge until she sees her primary care doctor  Blood pressure acceptable being off medications      Chronic ITP (idiopathic thrombocytopenia) (HCC)  Assessment & Plan  · Hx of refractory to steroids  Initiated on promacta since 2021  · Follows with hematology  · Is supposed to be on Promacta but states has been w/o her meds for weeks  Per hem/oncology notes on 3/9/2023 dose was reduced from 50mg to 25mg  · Seen by heme-onc, recommended to continue home dosage of 25 mg Promacta  · Patient to continue Promacta 25 mg on discharge    Autoimmune hepatitis treated with steroids [facility])  Assessment & Plan  Follows with Dr Valencia Cannon  On budesonide and prednisone  Continue prednisone, held budesonide as non formularly  Patient to continue prednisone ambulation out and discharge  Chronic systolic heart failure [facility])  Assessment & Plan  Wt Readings from Last 3 Encounters:   03/09/23 49 3 kg (108 lb 9 6 oz)   03/07/23 49 kg (108 lb)   02/15/23 50 2 kg (110 lb 9 6 oz)   Euvolemic  Holding diuretics (torsemide/aldactone) in setting of hypovolemia  Per notes pt is supposed to be on entresto though not on med list which was reviewed with patient  Continue to hold metoprolol in setting of hypotension  Will resume torsemide with holding parameters on discharge  Continue to hold metoprolol spironolactone until she sees her primary care doctor  Medical Problems     Resolved Problems  Date Reviewed: 3/24/2023   None       Discharging Physician / Practitioner: Akosua Campbell MD  PCP: Heri Rosales MD  Admission Date:   Admission Orders (From admission, onward)     Ordered        03/21/23 1159  [address] [facility] [place]  Once                      Discharge Date: 03/24/23    Consultations During Hospital Stay:  · GI  · Heme-onc  Procedures Performed:   · None    Significant Findings / Test Results:   · Please review patient's chart  Incidental Findings:   ·     Test Results Pending at Discharge (will require follow up):   · No     Outpatient Tests Requested:  · No    Complications:  No    Reason for Admission: Anemia      Hospital Course:   Alisha Hayes is a 78 y o  female patient who originally presented to the hospital on 3/21/2023 due to anemia  She was referred from hematologist office when she had her hemoglobin was found to be 6 0  She received 2 unit transfusion of PRBC with appropriate response  Patient was also evaluated by GI given her ongoing iron deficiency anemia, recommended for endoscopy/colonoscopy however patient and her son initially refused for same and wanted to discuss amongst himself  In meantime patient's hemoglobin remained stable, no episodes of bleeding per rectum noted hence decision was made patient to follow-up with GI in outpatient setting to discuss further regarding endoscopy or colonoscopy  Patient was also evaluated by heme-onc, due to her ITP, recommended to continue Promacta 25 mg at home dosage  She will follow-up with heme-onc, GI and PCP in outpatient setting  Her metoprolol and spironolactone is held due to hypotension, until she sees her primary care doctor  Blood pressure acceptable being off medications  Please see above list of diagnoses and related plan for additional information  Condition at Discharge: stable    Discharge Day Visit / Exam:   Subjective: Patient seen and examined at bedside, no overnight events reported  Denies any bleeding per rectum, diarrhea or blood in urine  Vitals: Blood Pressure: 116/62 (03/24/23 0721)  Pulse: 78 (03/24/23 0721)  Temperature: (!) 96 8 °F (36 °C) (03/24/23 0721)  Temp Source: Temporal (03/23/23 0707)  Respirations: 15 (03/24/23 0721)  SpO2: 100 % (03/24/23 0721)  Exam:   Physical Exam  Constitutional:       Appearance: Normal appearance  HENT:      Head: Normocephalic  Ears:      Comments: Very hard of hearing  Mouth/Throat:      Mouth: Mucous membranes are moist       Pharynx: Oropharynx is clear  Eyes:      Conjunctiva/sclera: Conjunctivae normal       Pupils: Pupils are equal, round, and reactive to light  Cardiovascular:      Rate and Rhythm: Normal rate and regular rhythm  Pulses: Normal pulses  Heart sounds: Normal heart sounds  Pulmonary:      Effort: Pulmonary effort is normal    Abdominal:      General: Abdomen is flat  Bowel sounds are normal    Musculoskeletal:         General: Normal range of motion  Skin:     General: Skin is warm  Neurological:      General: No focal deficit present  Mental Status: She is alert and oriented to person, place, and time  Discussion with Family: Attempted to update  (son) via phone  Unable to contact  Discharge instructions/Information to patient and family:   See after visit summary for information provided to patient and family  Provisions for Follow-Up Care:  See after visit summary for information related to follow-up care and any pertinent home health orders  Disposition:   Home    Planned Readmission: No     Discharge Statement:  I spent 38 minutes discharging the patient  This time was spent on the day of discharge  I had direct contact with the patient on the day of discharge  Greater than 50% of the total time was spent examining patient, answering all patient questions, arranging and discussing plan of care with patient as well as directly providing post-discharge instructions  Additional time then spent on discharge activities  Discharge Medications:  See after visit summary for reconciled discharge medications provided to patient and/or family        **Please Note: This note may have been constructed using a voice recognition system**

## 2023-03-24 NOTE — ASSESSMENT & PLAN NOTE
· Hx of refractory to steroids  Initiated on promacta since 2021  · Follows with hematology  · Is supposed to be on Promacta but states has been w/o her meds for weeks  Per hem/oncology notes on 3/9/2023 dose was reduced from 50mg to 25mg  · Seen by heme-onc, recommended to continue home dosage of 25 mg Promacta    · Patient to continue Promacta 25 mg on discharge

## 2023-03-24 NOTE — PLAN OF CARE
Problem: PAIN - ADULT  Goal: Verbalizes/displays adequate comfort level or baseline comfort level  Description: Interventions:  - Encourage patient to monitor pain and request assistance  - Assess pain using appropriate pain scale  - Administer analgesics based on type and severity of pain and evaluate response  - Implement non-pharmacological measures as appropriate and evaluate response  - Consider cultural and social influences on pain and pain management  - Notify physician/advanced practitioner if interventions unsuccessful or patient reports new pain  Outcome: Progressing     Problem: INFECTION - ADULT  Goal: Absence or prevention of progression during hospitalization  Description: INTERVENTIONS:  - Assess and monitor for signs and symptoms of infection  - Monitor lab/diagnostic results  - Monitor all insertion sites, i e  indwelling lines, tubes, and drains  - Monitor endotracheal if appropriate and nasal secretions for changes in amount and color  - [place] appropriate cooling/warming therapies per order  - Administer medications as ordered  - Instruct and encourage patient and family to use good hand hygiene technique  - Identify and instruct in appropriate isolation precautions for identified infection/condition  Outcome: Progressing     Problem: SAFETY ADULT  Goal: Patient will remain free of falls  Description: INTERVENTIONS:  - Educate patient/family on patient safety including physical limitations  - Instruct patient to call for assistance with activity   - Consult OT/PT to assist with strengthening/mobility   - Keep Call bell within reach  - Keep bed low and locked with side rails adjusted as appropriate  - Keep care items and personal belongings within reach  - Initiate and maintain comfort rounds  - Make Fall Risk Sign visible to staff  - Offer Toileting every 2 Hours, in advance of need  - Initiate/Maintain bed alarm  - Obtain necessary fall risk management equipment:   - Apply yellow socks and bracelet for high fall risk patients  - Consider moving patient to room near nurses station  Outcome: Progressing

## 2023-03-24 NOTE — TREATMENT PLAN
Patient was discharged this morning, later noted to have blood pressure on softer side  I tried calling her son multiple times throughout the day, so did nursing staff but he did not responded to any of the calls  Patient has no one to pick her up, lyft options provided but patient not comfortable to go home if no one there to take care of her  Patient's  is also admitted to hospital, and as per patient her son is off tomorrow can take care of her  Given her blood pressure on softer side and concerns for safe return and transport, will cancel discharge orders

## 2023-03-24 NOTE — ASSESSMENT & PLAN NOTE
Follows with Dr Dariela Grossman  On budesonide and prednisone  Continue prednisone, held budesonide as non formularly  Patient to continue prednisone ambulation out and discharge

## 2023-03-24 NOTE — CASE MANAGEMENT
Case Management Discharge Planning Note    Patient name Catrachito Cr  Location [facility] 924/[facility] 499-30 MRN [id number]  : 1940 Date 3/24/2023       Current Admission Date: 3/21/2023  Current Admission Diagnosis:Acute blood loss anemia   Patient Active Problem List    Diagnosis Date Noted   • Acute blood loss anemia 2023   • Hypokalemia 2023   • Iron deficiency anemia due to chronic blood loss 2023   • Presence of heart assist device ([facility] ) 02/15/2023   • Acute bilateral low back pain without sciatica 02/10/2023   • Moderate protein-calorie malnutrition ([address] ) 2022   • Pancytopenia ([address] ) 2022   • Hypotension 2022   • Headache 2022   • Bilateral hearing loss 2022   • Hypertensive heart disease with congestive heart failure ([facility] ) 2021   • Chronic ITP (idiopathic thrombocytopenia) ([facility] ) 11/10/2021   • Neutropenia, unspecified type ([facility] ) 08/10/2021   • Left-sided Bell's palsy 08/10/2021   • LEA positive 2020   • Complete left bundle branch block (LBBB) 10/22/2019   • Dilated cardiomyopathy ([facility] ) 2018   • Chronic systolic heart failure ([facility] ) 2018   • Hypertension, essential 2018   • Other specified glaucoma 2018   • Autoimmune hepatitis treated with steroids ([facility] ) 2018   • Depressive disorder 2017      LOS (days): 3  Geometric Mean LOS (GMLOS) (days): 3 00  Days to GMLOS:0 1     OBJECTIVE:  Risk of Unplanned Readmission Score: 14 45         Current admission status: Inpatient   Preferred Pharmacy:   [facility] [place], [address] [place] [id number]  Phone: [phone] Fax: [phone]    [id number] [place] [address]  Phone: [phone] Fax: [phone]    [facility] [address]  Phone: [phone] Fax: [phone]    Primary Care Provider: Elijah Montero MD    Primary Insurance: MEDICARE  Secondary Insurance: [address]    DISCHARGE DETAILS:      Requested [address]         Is the patient interested in [facility] at discharge?: No    DME Referral Provided  Referral made for DME?: No    Other Referral/Resources/Interventions Provided:  Referral Comments: No recs         Treatment Team Recommendation: Home  Discharge Destination Plan[de-identified] Home  Transport at Discharge : Family           IMM Given (Date):: 03/24/23  IMM Given to[de-identified] Patient     Additional Comments: Per provider, pt is medically clear for d/c today with no CM needs  CM met with pt at bedside to review IMM  IMM was placed in medical records bin

## 2023-03-25 VITALS
DIASTOLIC BLOOD PRESSURE: 53 MMHG | OXYGEN SATURATION: 100 % | SYSTOLIC BLOOD PRESSURE: 107 MMHG | HEART RATE: 82 BPM | RESPIRATION RATE: 18 BRPM | TEMPERATURE: 97.3 F

## 2023-03-25 LAB
ANION GAP SERPL CALCULATED.3IONS-SCNC: 3 MMOL/L (ref 4–13)
BASOPHILS # BLD AUTO: 0.02 THOUSANDS/ÂΜL (ref 0–0.1)
BASOPHILS NFR BLD AUTO: 0 % (ref 0–1)
BUN SERPL-MCNC: 12 MG/DL (ref 5–25)
CALCIUM SERPL-MCNC: 8.7 MG/DL (ref 8.3–10.1)
CHLORIDE SERPL-SCNC: 110 MMOL/L (ref 96–108)
CO2 SERPL-SCNC: 26 MMOL/L (ref 21–32)
CREAT SERPL-MCNC: 0.65 MG/DL (ref 0.6–1.3)
EOSINOPHIL # BLD AUTO: 0.03 THOUSAND/ÂΜL (ref 0–0.61)
EOSINOPHIL NFR BLD AUTO: 1 % (ref 0–6)
ERYTHROCYTE [DISTWIDTH] IN BLOOD BY AUTOMATED COUNT: 17.2 % (ref 11.6–15.1)
GFR SERPL CREATININE-BSD FRML MDRD: 81 ML/MIN/1.73SQ M
GLUCOSE SERPL-MCNC: 102 MG/DL (ref 65–140)
HCT VFR BLD AUTO: 26.2 % (ref 34.8–46.1)
HGB BLD-MCNC: 8.5 G/DL (ref 11.5–15.4)
IMM GRANULOCYTES # BLD AUTO: 0.03 THOUSAND/UL (ref 0–0.2)
IMM GRANULOCYTES NFR BLD AUTO: 1 % (ref 0–2)
LYMPHOCYTES # BLD AUTO: 1.39 THOUSANDS/ÂΜL (ref 0.6–4.47)
LYMPHOCYTES NFR BLD AUTO: 30 % (ref 14–44)
MAGNESIUM SERPL-MCNC: 1.6 MG/DL (ref 1.6–2.6)
MCH RBC QN AUTO: 28.2 PG (ref 26.8–34.3)
MCHC RBC AUTO-ENTMCNC: 32.4 G/DL (ref 31.4–37.4)
MCV RBC AUTO: 87 FL (ref 82–98)
MONOCYTES # BLD AUTO: 0.35 THOUSAND/ÂΜL (ref 0.17–1.22)
MONOCYTES NFR BLD AUTO: 8 % (ref 4–12)
NEUTROPHILS # BLD AUTO: 2.77 THOUSANDS/ÂΜL (ref 1.85–7.62)
NEUTS SEG NFR BLD AUTO: 60 % (ref 43–75)
NRBC BLD AUTO-RTO: 0 /100 WBCS
PLATELET # BLD AUTO: 61 THOUSANDS/UL (ref 149–390)
PMV BLD AUTO: 11.6 FL (ref 8.9–12.7)
POTASSIUM SERPL-SCNC: 3.6 MMOL/L (ref 3.5–5.3)
RBC # BLD AUTO: 3.01 MILLION/UL (ref 3.81–5.12)
SODIUM SERPL-SCNC: 139 MMOL/L (ref 135–147)
WBC # BLD AUTO: 4.59 THOUSAND/UL (ref 4.31–10.16)

## 2023-03-25 RX ORDER — POTASSIUM CHLORIDE 20 MEQ/1
40 TABLET, EXTENDED RELEASE ORAL ONCE
Status: COMPLETED | OUTPATIENT
Start: 2023-03-25 | End: 2023-03-25

## 2023-03-25 RX ADMIN — POTASSIUM CHLORIDE 40 MEQ: 1500 TABLET, EXTENDED RELEASE ORAL at 11:30

## 2023-03-25 RX ADMIN — PANTOPRAZOLE SODIUM 40 MG: 40 INJECTION, POWDER, FOR SOLUTION INTRAVENOUS at 09:38

## 2023-03-25 RX ADMIN — PREDNISONE 10 MG: 10 TABLET ORAL at 11:30

## 2023-03-25 RX ADMIN — CHOLECALCIFEROL TAB 25 MCG (1000 UNIT) 1000 UNITS: 25 TAB at 09:37

## 2023-03-25 RX ADMIN — CYANOCOBALAMIN TAB 500 MCG 250 MCG: 500 TAB at 09:37

## 2023-03-25 NOTE — DISCHARGE SUMMARY
[address] [facility]  Discharge- [facility] 1940, 78 y o  female MRN: [id number]  Unit/Bed#: [address] 924-01 Encounter: [id number]  Primary Care Provider: Akira Diehl MD   Date and time admitted to hospital: 3/21/2023 10:41 AM    * Acute blood loss anemia  Assessment & Plan  · S/p 2 unit transfusion of PRBC on admission  · Known hx of chronic blood loss due to iron deficiency anemia  Receives routine venofer and blood transfusions as outpt  · In the past had been approached in regards to endoscopy eval but had declined  · I called patient's son again today to ask further plan, to that he showed frustration that he was expecting call from GI this morning to let him know about hemoglobin, depending on that they will decide  · S/p bone marrow bx, 1/27/21, that was remarkable for normocellular megakaryocytes suggestive of ITP  · Evaluated by hematology, recommendations noted  Patient to continue IV iron infusions as scheduled in outpatient setting  · Patient will need CBC every 2 weeks  · Discussed with GI,patient will be discharged home tomorrow, she can follow-up with GI in outpatient setting to discuss regarding endoscopy/colonoscopy  · Hemoglobin stable on discharge, no further bleeding per rectum  Hypokalemia  Assessment & Plan  Resolved with repletion  Patient is on p o  potassium chloride at home, that she takes with torsemide, continue same  Moderate protein-calorie malnutrition ([address] )  Assessment & Plan  Malnutrition Findings:                             BMI Findings:         Nutrition eval  There is no height or weight on file to calculate BMI  Hypotension  Assessment & Plan  Likely secondary to anemia, asymptomatic  Patient's antihypertensives were held during hospitalization  Continue to hold spironolactone and metoprolol on discharge until she sees her primary care doctor  Blood pressure acceptable being off medications      Chronic ITP (idiopathic thrombocytopenia) (HCC)  Assessment & Plan  · Hx of refractory to steroids  Initiated on promacta since 2021  · Follows with hematology  · Is supposed to be on Promacta but states has been w/o her meds for weeks  Per hem/oncology notes on 3/9/2023 dose was reduced from 50mg to 25mg  · Seen by heme-onc, recommended to continue home dosage of 25 mg Promacta  · Patient to continue Promacta 25 mg on discharge    Autoimmune hepatitis treated with steroids [facility])  Assessment & Plan  Follows with Dr Angelica Lott  On budesonide and prednisone  Continue prednisone, held budesonide as non formularly  Patient to continue prednisone ambulation out and discharge  Chronic systolic heart failure [facility])  Assessment & Plan  Wt Readings from Last 3 Encounters:   03/09/23 49 3 kg (108 lb 9 6 oz)   03/07/23 49 kg (108 lb)   02/15/23 50 2 kg (110 lb 9 6 oz)   Euvolemic  Holding diuretics (torsemide/aldactone) in setting of hypovolemia  Per notes pt is supposed to be on entresto though not on med list which was reviewed with patient  Continue to hold metoprolol in setting of hypotension  Will resume torsemide with holding parameters on discharge  Continue to hold metoprolol spironolactone until she sees her primary care doctor  Medical Problems     Resolved Problems  Date Reviewed: 3/25/2023   None       Discharging Physician / Practitioner: Abrahan Lynch MD  PCP: Claudio Harris MD  Admission Date:   Admission Orders (From admission, onward)     Ordered        03/21/23 1159  [facility] [facility] [place]  Once                      Discharge Date: 03/25/23    Consultations During Hospital Stay:  · GI  · Heme-onc      Procedures Performed:     Significant Findings / Test Results:   ·     Incidental Findings:       Test Results Pending at Discharge (will require follow up):   · NO     Outpatient Tests Requested:  · NO    Complications:    Reason for Admission: Anemia    Hospital Course:   Marcelo Rojas is a 78 y o  female patient who originally presented to the hospital on 3/21/2023 due to anemia  Patient was seen by hematologist in outpatient setting when she was found to have hemoglobin of 6 and was referred to ED  She received 2 unit transfusion of PRBC in ED with appropriate room response  She was also evaluated by GI given her ongoing iron deficiency anemia, recommendations were made for endoscopy/colonoscopy however patient and her son decided not to opt for it at this point of time, interim she her hemoglobin remained stable with no further episodes of bleeding per rectum, discussed with GI and decision made that patient follow-up with GI in outpatient setting  She was also evaluated by heme-onc due to ITP, recommended to continue Promacta 25 mg  She will follow-up with PCP, GI and heme-onc in outpatient setting  During hospitalization her blood pressure remained on softer side hence her metoprolol and spironolactone were held that will remain held on discharge until she sees her primary doctor  Patient was stable for discharge yesterday but no family member available to pick her up, patient's  hospitalized hence no safe dispo plan, discharge delayed for 1 day  Please see above list of diagnoses and related plan for additional information  Condition at Discharge: stable    Discharge Day Visit / Exam:   Subjective: Patient examined at bedside, reports her son will be coming around 2 PM to pick her up  Vitals: Blood Pressure: 107/53 (03/25/23 0506)  Pulse: 82 (03/25/23 0506)  Temperature: (!) 97 3 °F (36 3 °C) (03/25/23 0506)  Temp Source: Temporal (03/25/23 0506)  Respirations: 18 (03/25/23 0506)  SpO2: 100 % (03/25/23 0506)  Exam:   Physical Exam  Constitutional:       Appearance: Normal appearance  HENT:      Head: Normocephalic  Mouth/Throat:      Mouth: Mucous membranes are moist       Pharynx: Oropharynx is clear     Eyes:      Conjunctiva/sclera: Conjunctivae normal       Pupils: Pupils are equal, round, and reactive to light  Cardiovascular:      Rate and Rhythm: Normal rate  Pulmonary:      Effort: Pulmonary effort is normal    Abdominal:      General: Abdomen is flat  Bowel sounds are normal    Musculoskeletal:         General: Normal range of motion  Skin:     General: Skin is warm  Neurological:      General: No focal deficit present  Mental Status: She is alert and oriented to person, place, and time  Discussion with Family: Attempted to update  (son) via phone  Unable to contact  Discharge instructions/Information to patient and family:   See after visit summary for information provided to patient and family  Provisions for Follow-Up Care:  See after visit summary for information related to follow-up care and any pertinent home health orders  Disposition:   Home    Planned Readmission: no     Discharge Statement:  I spent 32 minutes discharging the patient  This time was spent on the day of discharge  I had direct contact with the patient on the day of discharge  Greater than 50% of the total time was spent examining patient, answering all patient questions, arranging and discussing plan of care with patient as well as directly providing post-discharge instructions  Additional time then spent on discharge activities  Discharge Medications:  See after visit summary for reconciled discharge medications provided to patient and/or family        **Please Note: This note may have been constructed using a voice recognition system**

## 2023-03-27 ENCOUNTER — OFFICE VISIT (OUTPATIENT)
Dept: PHYSICAL THERAPY | Facility: CLINIC | Age: 83
End: 2023-03-27

## 2023-03-27 ENCOUNTER — TRANSITIONAL CARE MANAGEMENT (OUTPATIENT)
Dept: FAMILY MEDICINE CLINIC | Facility: CLINIC | Age: 83
End: 2023-03-27

## 2023-03-27 DIAGNOSIS — M54.50 ACUTE BILATERAL LOW BACK PAIN WITHOUT SCIATICA: Primary | ICD-10-CM

## 2023-03-27 NOTE — PROGRESS NOTES
Daily Note     Today's date: 3/27/2023  Patient name: Susanna Rutledge  : 1940  MRN: [id number]  Referring provider: Sofía Rodrigues MD  Dx:   Encounter Diagnosis     ICD-10-CM    1  Acute bilateral low back pain without sciatica  M54 50                      Subjective: Pt reports min LBP, no radicular sx currently  Pt requests taking a break from PT for a month secondary to other medical appointments  Objective: See treatment diary below    Assessment: Pt demonstrated increased endurance and posture  Plan: Place on hold per pt request for 1 month  Reviewed HEP  Precautions: Cardiac, increased fall risk  Dx: posture correction, core stab        Manuals 3/9 3/13 3/16 3/20 3/27        Seated Gr III P-A L1-S1 mobs  1x20 ea 1x20 ea nv 1x20 ea                                               Neuro Re-Ed             Posture correction/ ed 10 min Sitting 5 min standing 2 min           bridges 1x10 1x20 1x20 3x10 3x10        LTR 1x5 1x5 ea 1x5 ea 1x5 ea 1x5 ea        pallof press  Y/ 1x10 Y/ 1x10 Y/ 1x10 Y/ 1x12 ea        Seated lumbar flexion  1x10 1x10 1x10 1x10                                  Ther Ex             Back extension machine  10#/ 1x10 10#/ 1x10 10#/ 2x10 20#/ 2x10        Standing hip abduction   2x10 ea 2x10 ea 2x10 ea                                                                                      Ther Activity             STS   Foam/ 1x10 Foam  1x10 Foam/ 1x10                     Gait Training             No AD   3 laps 3 laps 280 ft                     Modalities

## 2023-03-28 ENCOUNTER — TELEPHONE (OUTPATIENT)
Dept: OTHER | Facility: OTHER | Age: 83
End: 2023-03-28

## 2023-03-28 ENCOUNTER — HOSPITAL ENCOUNTER (OUTPATIENT)
Dept: INFUSION CENTER | Facility: HOSPITAL | Age: 83
Discharge: HOME | End: 2023-03-28
Attending: INTERNAL MEDICINE

## 2023-03-28 VITALS
HEART RATE: 96 BPM | DIASTOLIC BLOOD PRESSURE: 57 MMHG | RESPIRATION RATE: 18 BRPM | SYSTOLIC BLOOD PRESSURE: 118 MMHG | TEMPERATURE: 98 F

## 2023-03-28 DIAGNOSIS — R76.8 ANA POSITIVE: ICD-10-CM

## 2023-03-28 DIAGNOSIS — D50.0 IRON DEFICIENCY ANEMIA DUE TO CHRONIC BLOOD LOSS: Primary | ICD-10-CM

## 2023-03-28 RX ORDER — SODIUM CHLORIDE 9 MG/ML
20 INJECTION, SOLUTION INTRAVENOUS ONCE
Status: CANCELLED | OUTPATIENT
Start: 2023-03-30

## 2023-03-28 RX ORDER — SODIUM CHLORIDE 9 MG/ML
20 INJECTION, SOLUTION INTRAVENOUS ONCE
Status: COMPLETED | OUTPATIENT
Start: 2023-03-28 | End: 2023-03-28

## 2023-03-28 RX ADMIN — IRON SUCROSE 300 MG: 20 INJECTION, SOLUTION INTRAVENOUS at 09:41

## 2023-03-28 RX ADMIN — SODIUM CHLORIDE 20 ML/HR: 0.9 INJECTION, SOLUTION INTRAVENOUS at 09:41

## 2023-03-28 NOTE — TELEPHONE ENCOUNTER
Patient is calling regarding cancelling an appointment      Date/Time:03-29-23 @ 3:00PM    Patient was rescheduled: YES [] NO [x]    Patient requesting call back to reschedule: YES [x] NO []

## 2023-03-29 ENCOUNTER — TELEPHONE (OUTPATIENT)
Dept: FAMILY MEDICINE CLINIC | Facility: CLINIC | Age: 83
End: 2023-03-29

## 2023-03-29 NOTE — TELEPHONE ENCOUNTER
[address] [facility]   TCM Medication Visit   Jenny Iniguez, Pharmacist     TCM appointment to pharmacy services for medication review following hospitalization for acute blood loss anemia from 3/21 to 3/25  Appointment was completed with: patient and her son Doug Polanco    TCM appointment with PCP: completed      Assessment/Plan:                                                                                                Disease State      Medication Discrepancy Identified      Recommendation    Other Safety: Dosage too high · Non adherence to promacta - expensive? Hard to obtain? · Holding metoprolol and spironolactone until? · [facility] torsemide  · 116/55, 90     Patient was counseled on the following lifestyle modifications:  med adherence techniques     Subjective                                                                                                             1  Medication Review: medications reviewed with patient, reports the following discrepancies:  · Iron, torsemide refills  · Unsure metoprolol and spironolactone restart      Does the patient understand/know which medications were changed/added at discharge? Yes    Person Responsible for setting up patient medications: patient, helper, son     Missed doses: No  # of missed doses in the past week: 0      # of times per day patient takes meds: 2      Use of supportive adherence tools: Yes, describe: pillbox      2  Medication Efficacy:        Home Monitoring   · Blood Sugar Readings: checks 0x/day, avg na   · Blood Pressure Readings: checks 1x/day, avg 115/55   · Weights: avg 110lbs, checks daily         3   Lifestyle:    Lifestyle modifications since discharge: continue current medications     [address] upon discharge: No, but she does have a senior helper some days      Smoking Status: no      Objective                                                                                                         Pertinent medication changes from hospital:    · Other  ? Holding metoprolol spirono for soft pressures     Home Monitoring   · Blood Sugar Readings:    ?  FBG:    ? Pre-lunch:    ? Pre-supper:    ? Bedtime:    · Blood Pressure Readings:    ? BP, Morning:    ? HR, Morning:    ? BP, Evening:    ? HR, Evening:    · Weights:       Pertinent labs    Lab Results   Component Value Date    SODIUM 139 03/25/2023    K 3 6 03/25/2023     (H) 03/25/2023    CO2 26 03/25/2023    BUN 12 03/25/2023    CREATININE 0 65 03/25/2023    GLUC 102 03/25/2023    CALCIUM 8 7 03/25/2023         No results found for: HGBA1C      Lab Results   Component Value Date    WBC 4 59 03/25/2023    HGB 8 5 (L) 03/25/2023    HCT 26 2 (L) 03/25/2023    MCV 87 03/25/2023    PLT 61 (L) 03/25/2023         PF Readings from Last 3 Encounters:   No data found for PF         Pharmacist Tracking Tool  Reason For Outreach: Embedded Pharmacist  Demographics:  Intervention Method: Phone  Type of Intervention: New  Topics Addressed: Heart Failure, Hypertension and Transitions of Care  Pharmacologic Interventions: Prevent or Manage SHARLENE and Med Rec  Non-Pharmacologic Interventions: Adherence addressed, Care coordination, Chart update, Cost, Disease state education and Medication Monitoring  Time:  Direct Patient Care: 15 mins  Care Coordination: 30 mins  Recommendation Recipient: Patient/Caregiver  Outcome: Accepted

## 2023-03-30 ENCOUNTER — HOSPITAL ENCOUNTER (OUTPATIENT)
Dept: INFUSION CENTER | Facility: HOSPITAL | Age: 83
Discharge: HOME | End: 2023-03-30
Attending: INTERNAL MEDICINE

## 2023-03-30 ENCOUNTER — APPOINTMENT (OUTPATIENT)
Dept: PHYSICAL THERAPY | Facility: CLINIC | Age: 83
End: 2023-03-30

## 2023-03-30 VITALS
RESPIRATION RATE: 16 BRPM | TEMPERATURE: 97.8 F | HEART RATE: 99 BPM | SYSTOLIC BLOOD PRESSURE: 111 MMHG | DIASTOLIC BLOOD PRESSURE: 56 MMHG

## 2023-03-30 DIAGNOSIS — D50.0 IRON DEFICIENCY ANEMIA DUE TO CHRONIC BLOOD LOSS: Primary | ICD-10-CM

## 2023-03-30 DIAGNOSIS — R76.8 ANA POSITIVE: ICD-10-CM

## 2023-03-30 RX ORDER — SODIUM CHLORIDE 9 MG/ML
20 INJECTION, SOLUTION INTRAVENOUS ONCE
Status: COMPLETED | OUTPATIENT
Start: 2023-03-30 | End: 2023-03-30

## 2023-03-30 RX ORDER — SODIUM CHLORIDE 9 MG/ML
20 INJECTION, SOLUTION INTRAVENOUS ONCE
Status: CANCELLED | OUTPATIENT
Start: 2023-04-04

## 2023-03-30 RX ADMIN — IRON SUCROSE 300 MG: 20 INJECTION, SOLUTION INTRAVENOUS at 10:32

## 2023-03-30 RX ADMIN — SODIUM CHLORIDE 20 ML/HR: 0.9 INJECTION, SOLUTION INTRAVENOUS at 10:31

## 2023-03-31 ENCOUNTER — OFFICE VISIT (OUTPATIENT)
Dept: FAMILY MEDICINE CLINIC | Facility: CLINIC | Age: 83
End: 2023-03-31

## 2023-03-31 VITALS
RESPIRATION RATE: 16 BRPM | BODY MASS INDEX: 19.88 KG/M2 | OXYGEN SATURATION: 99 % | WEIGHT: 112.2 LBS | TEMPERATURE: 96.5 F | SYSTOLIC BLOOD PRESSURE: 118 MMHG | HEART RATE: 96 BPM | DIASTOLIC BLOOD PRESSURE: 62 MMHG | HEIGHT: 63 IN

## 2023-03-31 DIAGNOSIS — D69.3 CHRONIC ITP (IDIOPATHIC THROMBOCYTOPENIA) (HCC): ICD-10-CM

## 2023-03-31 DIAGNOSIS — Z09 HOSPITAL DISCHARGE FOLLOW-UP: Primary | ICD-10-CM

## 2023-03-31 DIAGNOSIS — K75.4 AUTOIMMUNE HEPATITIS (HCC): ICD-10-CM

## 2023-03-31 DIAGNOSIS — I95.9 HYPOTENSION, UNSPECIFIED HYPOTENSION TYPE: ICD-10-CM

## 2023-03-31 DIAGNOSIS — I50.22 CHRONIC SYSTOLIC HEART FAILURE (HCC): ICD-10-CM

## 2023-03-31 DIAGNOSIS — D62 ACUTE BLOOD LOSS ANEMIA: ICD-10-CM

## 2023-03-31 DIAGNOSIS — D61.818 PANCYTOPENIA (HCC): ICD-10-CM

## 2023-03-31 DIAGNOSIS — K75.4 AUTOIMMUNE HEPATITIS TREATED WITH STEROIDS (HCC): ICD-10-CM

## 2023-03-31 PROBLEM — M54.50 ACUTE BILATERAL LOW BACK PAIN WITHOUT SCIATICA: Status: RESOLVED | Noted: 2023-02-10 | Resolved: 2023-03-31

## 2023-03-31 RX ORDER — TORSEMIDE 10 MG/1
10 TABLET ORAL DAILY
Qty: 90 TABLET | Refills: 0 | Status: SHIPPED | OUTPATIENT
Start: 2023-03-31

## 2023-03-31 NOTE — ASSESSMENT & PLAN NOTE
improving  Off of spironolactone and metoprolol for now   To check with her cardiologist regarding restarting the meds  Continue torsemide as directed

## 2023-03-31 NOTE — PROGRESS NOTES
Assessment & Plan     1  Hospital discharge follow-up    2  Acute blood loss anemia  Assessment & Plan:  S/P 2U PRBC  Refused EGD and colonoscopy at the hospital   Seen by dr Ry Zamudio this week and lowered prednisone to 5 mg daily now   Continue to monitor      3  Hypotension, unspecified hypotension type  Assessment & Plan:  improving  Off of spironolactone and metoprolol for now   To check with her cardiologist regarding restarting the meds  Continue torsemide as directed      4  Autoimmune hepatitis treated with steroids ([facility] )  Assessment & Plan:  Continue prednisone as directed      5  Chronic systolic heart failure [facility])  Assessment & Plan:  Wt Readings from Last 3 Encounters:   03/31/23 50 9 kg (112 lb 3 2 oz)   03/09/23 49 3 kg (108 lb 9 6 oz)   03/07/23 49 kg (108 lb)     Stable  Torsemide as directed  Sees cardiology          6  Pancytopenia ([facility] )  Assessment & Plan:  To f/u with hematology      7  Chronic ITP (idiopathic thrombocytopenia) ([facility])  Assessment & Plan:  Continue current meds           Subjective     Transitional Care Management Review:   Radha Atwood is a 78 y o  female here for TCM follow up  During the TCM phone call patient stated:  TCM Call     Date and time call was made  3/27/2023 10:30 AM    Hospital care reviewed  Records not available    Patient was hospitialized at  [facility]    Date of Admission  03/21/23    Date of discharge  03/25/23    Diagnosis  Acute blood loss anemia    Disposition  Home    Were the patients medications reviewed and updated  No    Current Symptoms  None      TCM Call     Post hospital issues  None    Should patient be enrolled in anticoag monitoring? No    Scheduled for follow up?   Yes    Did you obtain your prescribed medications  Yes    Do you need help managing your prescriptions or medications  No    Is transportation to your appointment needed  No    I have advised the patient to call PCP with any new or worsening symptoms  Ramakrishna Richardson, "MR    Living Arrangements  Family members    Support System  None    Are you recieving any outpatient services  No    What type of services  Physical therapy and Occupational therapy    Are you recieving home care services  No    Types of home care services  Home PT; Other (comment); Nurse visit    Are you using any community resources  No    Current waiver services  No    Have you fallen in the last 12 months  No    How many times  1    Interperter language line needed  No        HPI   Here for follow up  Was admitted on 3/21/23 for anemia   Received 2 U of PRBC   Declined EGD/colonoscopy   Feeling well today   No more black tarry stool since discharge from the hospital   Getting cbc every 2 weeks and infusion 2 x a week     Review of Systems   Constitutional: Negative  HENT: Negative  Eyes: Negative  Respiratory: Negative  Cardiovascular: Negative  Endocrine: Negative  Genitourinary: Negative  Musculoskeletal: Negative  Allergic/Immunologic: Negative  Hematological: Negative  Objective     /62 (BP Location: Left arm, Patient Position: Sitting, Cuff Size: Adult)   Pulse 96   Temp (!) 96 5 °F (35 8 °C) (Tympanic)   Resp 16   Ht 5' 3\" (1 6 m)   Wt 50 9 kg (112 lb 3 2 oz)   SpO2 99%   BMI 19 88 kg/m²      Physical Exam  Vitals and nursing note reviewed  Constitutional:       Appearance: Normal appearance  HENT:      Head: Normocephalic  Right Ear: Tympanic membrane normal       Left Ear: Tympanic membrane normal    Cardiovascular:      Pulses: Normal pulses  Heart sounds: Normal heart sounds  Pulmonary:      Effort: Pulmonary effort is normal       Breath sounds: Normal breath sounds  Neurological:      Mental Status: She is alert         Medications have been reviewed by provider in current encounter    Henrique Massey MD       "

## 2023-03-31 NOTE — ASSESSMENT & PLAN NOTE
S/P 2U PRBC  Refused EGD and colonoscopy at the hospital   Seen by dr Vivienne Sharp this week and lowered prednisone to 5 mg daily now   Continue to monitor

## 2023-03-31 NOTE — TELEPHONE ENCOUNTER
Patient son called and is asking if the office can give him a call regarding his mom medication Metoprolol and Spironolactone  Patient son stated that Dr Devin Barraza had his  mom stop taking these medication and would like to know when can his mom restart taking the medications

## 2023-03-31 NOTE — ASSESSMENT & PLAN NOTE
Wt Readings from Last 3 Encounters:   03/31/23 50 9 kg (112 lb 3 2 oz)   03/09/23 49 3 kg (108 lb 9 6 oz)   03/07/23 49 kg (108 lb)     Stable  Torsemide as directed  Sees cardiology

## 2023-03-31 NOTE — TELEPHONE ENCOUNTER
Patient son called in for a refill of his mom medication torsemide ( DEMADEX) 10 mg, patient son stated that his mom is out of her medication

## 2023-04-03 DIAGNOSIS — D62 ACUTE BLOOD LOSS ANEMIA: ICD-10-CM

## 2023-04-03 RX ORDER — FERROUS SULFATE TAB EC 324 MG (65 MG FE EQUIVALENT) 324 (65 FE) MG
324 TABLET DELAYED RESPONSE ORAL
Qty: 30 TABLET | Refills: 0 | Status: SHIPPED | OUTPATIENT
Start: 2023-04-03 | End: 2023-05-03

## 2023-04-03 RX ORDER — PREDNISONE 1 MG/1
5 TABLET ORAL DAILY
COMMUNITY
Start: 2023-03-31

## 2023-04-03 NOTE — TELEPHONE ENCOUNTER
Medication not currently on active medication list  Reported not taking 3/7/23  Patient called to get medication refilled and reports she is taking it

## 2023-04-04 ENCOUNTER — HOSPITAL ENCOUNTER (OUTPATIENT)
Dept: INFUSION CENTER | Facility: CLINIC | Age: 83
Discharge: HOME | End: 2023-04-04

## 2023-04-04 VITALS
OXYGEN SATURATION: 99 % | RESPIRATION RATE: 18 BRPM | SYSTOLIC BLOOD PRESSURE: 111 MMHG | HEART RATE: 93 BPM | DIASTOLIC BLOOD PRESSURE: 55 MMHG | TEMPERATURE: 97.4 F

## 2023-04-04 DIAGNOSIS — D50.0 IRON DEFICIENCY ANEMIA DUE TO CHRONIC BLOOD LOSS: Primary | ICD-10-CM

## 2023-04-04 DIAGNOSIS — R76.8 ANA POSITIVE: ICD-10-CM

## 2023-04-04 RX ORDER — SODIUM CHLORIDE 9 MG/ML
20 INJECTION, SOLUTION INTRAVENOUS ONCE
Status: COMPLETED | OUTPATIENT
Start: 2023-04-04 | End: 2023-04-04

## 2023-04-04 RX ORDER — SODIUM CHLORIDE 9 MG/ML
20 INJECTION, SOLUTION INTRAVENOUS ONCE
Status: CANCELLED | OUTPATIENT
Start: 2023-04-06

## 2023-04-04 RX ADMIN — SODIUM CHLORIDE 20 ML/HR: 0.9 INJECTION, SOLUTION INTRAVENOUS at 14:15

## 2023-04-04 RX ADMIN — SODIUM CHLORIDE 300 MG: 0.9 INJECTION, SOLUTION INTRAVENOUS at 14:30

## 2023-04-04 NOTE — PROGRESS NOTES
Patient to [facility] for Venofer: Offers no complaints at present time: Lab work ( 03/25/23 ) reviewed:  Within parameters to treat: Left FA PIV initiated without incident

## 2023-05-04 ENCOUNTER — APPOINTMENT (OUTPATIENT)
Dept: LAB | Facility: CLINIC | Age: 83
End: 2023-05-04

## 2023-05-04 ENCOUNTER — OFFICE VISIT (OUTPATIENT)
Dept: HEMATOLOGY ONCOLOGY | Facility: CLINIC | Age: 83
End: 2023-05-04

## 2023-05-04 VITALS
HEART RATE: 88 BPM | BODY MASS INDEX: 17.99 KG/M2 | DIASTOLIC BLOOD PRESSURE: 62 MMHG | RESPIRATION RATE: 16 BRPM | TEMPERATURE: 98.2 F | SYSTOLIC BLOOD PRESSURE: 114 MMHG | OXYGEN SATURATION: 100 % | WEIGHT: 108 LBS | HEIGHT: 65 IN

## 2023-05-04 DIAGNOSIS — D69.3 CHRONIC ITP (IDIOPATHIC THROMBOCYTOPENIA) (HCC): ICD-10-CM

## 2023-05-04 DIAGNOSIS — D62 ACUTE BLOOD LOSS ANEMIA: ICD-10-CM

## 2023-05-04 DIAGNOSIS — D61.818 PANCYTOPENIA (HCC): ICD-10-CM

## 2023-05-04 DIAGNOSIS — D70.8 AUTO IMMUNE NEUTROPENIA (HCC): ICD-10-CM

## 2023-05-04 DIAGNOSIS — K75.4 AUTOIMMUNE HEPATITIS TREATED WITH STEROIDS (HCC): ICD-10-CM

## 2023-05-04 DIAGNOSIS — D50.0 IRON DEFICIENCY ANEMIA DUE TO CHRONIC BLOOD LOSS: ICD-10-CM

## 2023-05-04 DIAGNOSIS — D70.9 NEUTROPENIA, UNSPECIFIED TYPE (HCC): ICD-10-CM

## 2023-05-04 DIAGNOSIS — D69.3 CHRONIC ITP (IDIOPATHIC THROMBOCYTOPENIA) (HCC): Primary | ICD-10-CM

## 2023-05-04 LAB
BASOPHILS # BLD AUTO: 0.02 THOUSANDS/ΜL (ref 0–0.1)
BASOPHILS NFR BLD AUTO: 0 % (ref 0–1)
EOSINOPHIL # BLD AUTO: 0.01 THOUSAND/ΜL (ref 0–0.61)
EOSINOPHIL NFR BLD AUTO: 0 % (ref 0–6)
ERYTHROCYTE [DISTWIDTH] IN BLOOD BY AUTOMATED COUNT: 18 % (ref 11.6–15.1)
FERRITIN SERPL-MCNC: 801 NG/ML (ref 8–388)
HCT VFR BLD AUTO: 35.6 % (ref 34.8–46.1)
HGB BLD-MCNC: 11.2 G/DL (ref 11.5–15.4)
IMM GRANULOCYTES # BLD AUTO: 0.02 THOUSAND/UL (ref 0–0.2)
IMM GRANULOCYTES NFR BLD AUTO: 0 % (ref 0–2)
IRON SATN MFR SERPL: 21 % (ref 15–50)
IRON SERPL-MCNC: 50 UG/DL (ref 50–170)
LYMPHOCYTES # BLD AUTO: 0.62 THOUSANDS/ΜL (ref 0.6–4.47)
LYMPHOCYTES NFR BLD AUTO: 14 % (ref 14–44)
MCH RBC QN AUTO: 29.4 PG (ref 26.8–34.3)
MCHC RBC AUTO-ENTMCNC: 31.5 G/DL (ref 31.4–37.4)
MCV RBC AUTO: 93 FL (ref 82–98)
MONOCYTES # BLD AUTO: 0.41 THOUSAND/ΜL (ref 0.17–1.22)
MONOCYTES NFR BLD AUTO: 9 % (ref 4–12)
NEUTROPHILS # BLD AUTO: 3.5 THOUSANDS/ΜL (ref 1.85–7.62)
NEUTS SEG NFR BLD AUTO: 77 % (ref 43–75)
NRBC BLD AUTO-RTO: 0 /100 WBCS
PLATELET # BLD AUTO: 160 THOUSANDS/UL (ref 149–390)
PMV BLD AUTO: 10.8 FL (ref 8.9–12.7)
RBC # BLD AUTO: 3.81 MILLION/UL (ref 3.81–5.12)
TIBC SERPL-MCNC: 235 UG/DL (ref 250–450)
WBC # BLD AUTO: 4.58 THOUSAND/UL (ref 4.31–10.16)

## 2023-05-04 NOTE — PROGRESS NOTES
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  She is on Promacta 50 mg p o  daily since 8/2021  Platelet count went from 30,000 range July 2021 to normal by 9/2021  She has not been compliant with Promacta  Platelet count went from 441 12/5/22 to 70 1/23/23 and 94 2/22/23  Restarted in March 2023  Hasn't been taking for past 2 weeks  Restart Promacta to 25 mg p o  daily           2  Elevation of AST, ALT with positive LEA most likely autoimmune, she was back on prednisone 10 mg p o  daily with normalization of AST, ALT managed per Dr Karli Kohler  Appears discontinued since at least 3/2021  Leukopenia autoimmune  3  History of Acute blood loss anemia  Status post fall with subdural hemorrhage March 2022  Hemoglobin 5 1 7/1/2022  She felt very fatigued prior to admission  She and her son deny any evidence of GI bleed  She declined GI workup  She was taking oral iron for a few weeks post discharge but discontinued  At her 8/29/22 office visit, she was advised to resume oral iron  8/2022 - Hemoglobin 10 - 11 g/dL range 8/22 - 2/20232 2/22/23 iron saturation 29%; ferritin 87     3/20/23  hemoglobin 6 1, MCV 90, WBC 4 4, platelet count 234  Admitted 3/21-3/25/23  S/p 2 unit transfusion of PRBC on admission  Patient declined EGD, colonoscopy  Venofer 300mg 3/22, March 28, March 30, April 4, April 6, April 11, April 14, 2023 4/6/23 hemoglobin 9 9, platelets 528    F/U labs have not been completed  She and son asked to do so  We again discussed every 2 week labs  Patient's son verbalized understanding  Follow-up in 3 months       HPI:    56-year-old [place] female who was seen initially 5/2020 regarding pancytopenia      She has a history of dilated cardiomyopathy with low ejection fraction on Entresto, spironolactone, torsemide, metoprolol and budesonide  She has history of hypertension, autoimmune hepatitis treated with steroids, 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  She had outpatient labs 4/17/20 at [facility]  hemoglobin 6 9, MCV 70, RDW 19 7, WBC 1 9, 59% neutrophils, 30% lymphocytes, platelet 06513,   She was transfused with 2 units packed RBC ordered by her cardiologist, Dr Jigna Buenrostro and received Feraheme weekly x 2 doses end of June 2020  CBC on 09/09/2019 showed hemoglobin 11 3, MCV 93, WBC of 2 7, normal differential platelets 21102  August 2018 iron saturation 8% ferritin 26  On 07/2018 hemoglobin 11, MCV 91, WBC 3 1, platelets 26105  57/32/5940:  Hemoglobin 12 9, MCV of 90, white blood cell count 2 95, platelets 637  On 81/2100 hemoglobin 13 3, MCV 89, WBC 3 4, platelets [id number]     She had normal vitamin J57, folic acid, TSH, protein electrophoresis     Autoimmune workup by Rheumatology was negative except for anti smooth muscle antibodies of 93     Because of the leukopenia, thrombocytopenia bone marrow biopsy performed on January 2021 showed normal bone marrow cellularity for the age, no evidence of dysplastic features, normal plasma cells and lymphocytes, no evidence of vasculitis, necrosis, fibrosis adequate iron stains     Back on prednisone 10 mg p o  daily because of elevation of ALT, AST, with persistent thrombocytopenia of 38,000  Initiated on Promacta 50 mg at the end of August 2021, platelet count 704015 in November 2021, WBC 4, and decrease in the AST, ALT     3/20/22 admitted when outpatient CT identified subdural hemorrhage  She would a mechanical fall 1 week prior and had intermittent headaches which prompted the CT scan         4/25/2022 hemoglobin 11 8, platelet count 91,799     No f/u outpatient labs until 6/30/22  Hemoglobin 6 1, platelet count 469       Admitted July 1st through July 5th, 2022  Hemoglobin on admission 5 1 July 1st   She underwent transfusion of packed red blood cells  She declined endoscopy  Discharge hemoglobin 8 3  On discussion August 29th, patient and her son report that she did not have dark stool  She was taking oral iron for a few weeks post discharge from her June 2022 hospitalization  8/25/2022 hemoglobin 10 7, MCV 80, white blood cell count 4 3, 65% neutrophils, 24% lymphocytes, 10% monocytes, platelet count 324     1/23/23: 11 3, platelet count 07,127  February 22, 2023 hemoglobin 10 9, platelet count 33,274  At her 3/9/23 office visit, she reported dark stool, wasnt taking promacta x 6 weeks      Interval History  3/20/23  hemoglobin 6 1, MCV 90, WBC 4 4, platelet count 801  Admitted 3/21-3/25/23  S/p 2 unit transfusion of PRBC on admission  Venofer 300mg 3/22, March 28, March 30, April 4, April 6, April 11, April 14    Reports dark stool has resolved  Some fatigue  Has been stressed as she is also dealing with her 's health issues      Review of Systems   Constitutional: Positive for fatigue  Negative for appetite change, chills, diaphoresis, fever and unexpected weight change  HENT:   Negative for mouth sores, nosebleeds, sore throat, tinnitus and voice change  Eyes: Negative for eye problems  Respiratory: Negative for chest tightness, cough, shortness of breath and wheezing  Cardiovascular: Negative for chest pain, leg swelling and palpitations  Gastrointestinal: Negative for abdominal distention, abdominal pain, blood in stool, constipation, diarrhea, nausea, rectal pain and vomiting  Endocrine: Negative for hot flashes  Genitourinary: Negative  Musculoskeletal: Negative for gait problem and myalgias  Skin: Negative for itching and rash  Neurological: Negative for dizziness, gait problem, headaches, light-headedness and numbness  Hematological: Negative for adenopathy  Psychiatric/Behavioral: Negative for confusion and sleep disturbance   The patient is not "nervous/anxious  Test Results:        Labs:   Lab Results   Component Value Date    HGB 9 9 (L) 04/06/2023    HCT 31 9 (L) 04/06/2023    MCV 91 04/06/2023     (L) 04/06/2023    WBC 4 82 04/06/2023    NRBC 0 04/06/2023     Lab Results   Component Value Date     12/04/2015    K 3 6 03/25/2023     (H) 03/25/2023    CO2 26 03/25/2023    ANIONGAP 4 12/04/2015    BUN 12 03/25/2023    CREATININE 0 65 03/25/2023    GLUCOSE 94 12/04/2015    GLUF 103 (H) 03/20/2023    CALCIUM 8 7 03/25/2023    CORRECTEDCA 9 6 12/05/2022    AST 20 03/20/2023    ALT 13 03/20/2023    ALKPHOS 31 (L) 03/20/2023    PROT 7 5 12/04/2015    BILITOT 0 48 12/04/2015    EGFR 81 03/25/2023           Imaging: No results found  Allergies: Allergies   Allergen Reactions    Ambien [Zolpidem] Other (See Comments)     Did not allow sleep per pt     Current Medications: Reviewed  PMH/FH/SH:  Reviewed      Physical Exam:    There is no height or weight on file to calculate BSA  Ht Readings from Last 3 Encounters:   03/31/23 5' 3\" (1 6 m)   03/09/23 5' 3\" (1 6 m)   03/07/23 5' 3\" (1 6 m)        Wt Readings from Last 3 Encounters:   03/31/23 50 9 kg (112 lb 3 2 oz)   03/09/23 49 3 kg (108 lb 9 6 oz)   03/07/23 49 kg (108 lb)        Temp Readings from Last 3 Encounters:   04/14/23 97 8 °F (36 6 °C) (Temporal)   04/11/23 97 9 °F (36 6 °C) (Temporal)   04/06/23 98 5 °F (36 9 °C) (Temporal)        BP Readings from Last 3 Encounters:   04/14/23 113/66   04/11/23 106/60   04/06/23 96/58             Physical Exam  Vitals reviewed  Constitutional:       General: She is not in acute distress  Appearance: She is well-developed  She is not diaphoretic  HENT:      Head: Normocephalic and atraumatic  Eyes:      Conjunctiva/sclera: Conjunctivae normal    Neck:      Trachea: No tracheal deviation  Cardiovascular:      Rate and Rhythm: Normal rate and regular rhythm  Heart sounds: No murmur heard  No friction rub   No " gallop  Pulmonary:      Effort: Pulmonary effort is normal  No respiratory distress  Breath sounds: Normal breath sounds  No wheezing or rales  Chest:      Chest wall: No tenderness  Abdominal:      General: There is no distension  Palpations: Abdomen is soft  Tenderness: There is no abdominal tenderness  Musculoskeletal:      Cervical back: Normal range of motion and neck supple  Lymphadenopathy:      Cervical: No cervical adenopathy  Skin:     General: Skin is warm and dry  Coloration: Skin is not pale  Findings: No erythema  Neurological:      Mental Status: She is alert and oriented to person, place, and time  Psychiatric:         Behavior: Behavior normal          Thought Content:  Thought content normal          Judgment: Judgment normal              Emergency Contacts:    Extended Emergency Contact Information  Primary Emergency Contact: [address] [email or web address] Phone: [phone]  Relation: Spouse  Secondary Emergency Contact: Allen,Tremayne  Mobile Phone: [phone]  Relation: Son

## 2023-05-10 NOTE — PROGRESS NOTES
AICD consultation - Electrophysiology-Cardiology (EP)   Erickson Enriquez 78 y o  female MRN: [id number]  Unit/Bed#:  Encounter: [id number]      1  Chronic systolic CHF (congestive heart failure), NYHA class 3 (HCC)  POCT ECG      2  Complete left bundle branch block (LBBB)  Ambulatory referral to Cardiac Electrophysiology      3  Autoimmune hepatitis treated with steroids ([facility] )        4  Dilated cardiomyopathy ([facility] )        5  Hypertension, essential        6  Hypertensive heart disease with congestive heart failure, unspecified heart failure type ([facility] )        7  Left-sided Bell's palsy        8  Bilateral hearing loss, unspecified hearing loss type        9  Chronic ITP (idiopathic thrombocytopenia) (HCC)        10  Pancytopenia ([facility] )        11  Moderate protein-calorie malnutrition ([facility] )        12   Iron deficiency anemia due to chronic blood loss              Consults  Physician Requesting Consult: Trent Luciano DO   Reason for Consult / Principal Problem: ChronicSystolic CHF, Stage D, NYHA 3 with mildly reduced RV systolic function        Summary of my recommendation   Please set up for BiV ICD, Medtronic, left side, use antibiotic, use contrast    High risk-age 83, BMI 18, 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, heart failure with EF 20%, ITP with prior platelet count in the 50,000 range, right-sided ventricular dilatation    Review with hematology, platelet count needs to be over 50,000 and preferably over 100,000 for this procedure    Patient will need to be admitted the day before procedure, heart failure condition optimized, platelet count more than 50,000 at a minimum to proceed with the procedure safely    Confirm with patient all medication  Patient informed that not on Entresto-please reach out to heart failure team to see if can provide Entresto          clinical condition  Chronic systolic heart failure, stage D  NYHA class III  Reduced RV systolic function  LBBB,  ms  Pancytopenia with microcytic anemia, chronic ITP  Hypertension  Hypothyroidism  Autoimmune hepatitis  Bell's palsy              Assessment/Plan     Chronic systolic heart failure, stage D  Unclear etiology-suspect nonischemic from conduction system abnormality  NYHA class III  Reduced RV systolic function  LBBB,  ms  Optimal medical therapy-not on beta-blocker due to underlying LBBB, confusion about Entresto which has been resolved  Shared decision making with patient and primary cardiologist    High risk-age 83, BMI 18, 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, heart failure with EF 20%, ITP with prior platelet count in the 50,000 range, right-sided ventricular dilatation    Her reduced EF is primarily considered to be secondary to conduction system abnormality      Patient is a candidate for BiV ICD  Medtronic, MRI compatible device, left side  Use antibiotic, use contrast    The above risk factors place her at a high risk of possible complication  To ameliorate that consider use of  Passive atrial lead  RV ICD lead and mid septum  His lead is adequate to narrow the QRS complex            Pancytopenia with microcytic anemia, chronic ITP  Patient needs to be admitted to the hospital a day before the procedure  Plan to keep platelet count over 58,529 and preferably over 100,000 for the procedure      Hypertension  118/50  Continue with torsemide, spironolactone, Entresto      Hypothyroidism  As per primary      Autoimmune hepatitis  Patient is on steroid      Bell's palsy  Chronic          History of Present Illness   HPI: Aislinn Boudreaux is a 78y o  year old female has been referred to me by Dr Keo Stuart for the management heart failure      The patient has significant medical illnesses which include  Chronic systolic heart failure, stage D  NYHA class III  Reduced RV systolic function  LBBB,  ms  Pancytopenia with microcytic anemia, chronic ITP  Hypertension  Hypothyroidism  Autoimmune hepatitis  Bell's palsy    The patient is chronically sick  She has numerous high risk factors conditions - age 78, BMI 18, 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, heart failure with EF 20%, ITP with prior platelet count in the 50,000 range, right-sided ventricular dilatation      She is not complaining of anginal-like chest pain, worsening orthopnea or PND  She does have some leg swelling from time to time  She is not complaining of palpitation presyncope or syncope  She does have exertional intolerance    She confirms that she is compliant with her medication  She has some doubts about being on Entresto but is going to go home confirm and call us back    She is aware that there are multiple high risk conditions that she has and load tried to give this procedure a chance to see if it helps her    She does have longstanding blood disorder and has been on therapy for the same    She has known liver disease and is on steroids as well as immunomodulators    She has a history of Bell's palsy on the left side        Historical Information   Past Medical History:   Diagnosis Date   • Acute bilateral low back pain without sciatica 2/10/2023   • Bell's palsy    • Cardiac disease    • Ear problems    • HL (hearing loss)    • Hypertension    • Sacroiliitis ([facility] ) 8/10/2021   • Subdural hemorrhage ([facility] ) 3/30/2022   • Thrombocytopenia ([address] ) 8/3/2018   • Tinnitus    • Traumatic subdural hemorrhage with loss of consciousness of unspecified duration, initial encounter ([facility] [address] ) 2/15/2023     Past Surgical History:   Procedure Laterality Date   • CT BONE MARROW BIOPSY AND ASPIRATION  1/27/2021     Social History     Substance and Sexual Activity   Alcohol Use Not Currently     Social History     Substance and Sexual Activity   Drug Use Not Currently     Social History     Tobacco Use   Smoking Status Never   Smokeless Tobacco Never     Social History     Socioeconomic "History   • Marital status: /Civil Union     Spouse name: Not on file   • Number of children: Not on file   • Years of education: Not on file   • Highest education level: Not on file   Occupational History   • Not on file   Tobacco Use   • Smoking status: Never   • Smokeless tobacco: Never   Vaping Use   • Vaping Use: Never used   Substance and Sexual Activity   • Alcohol use: Not Currently   • Drug use: Not Currently   • Sexual activity: Not Currently     Partners: Male   Other Topics Concern   • Not on file   Social History Narrative   • Not on file     Social Determinants of Health     Financial Resource Strain: Low Risk    • Difficulty of Paying Living Expenses: Not hard at all   Food Insecurity: No Food Insecurity   • Worried About Running Out of Food in the Last Year: Never true   • Ran Out of Food in the Last Year: Never true   Transportation Needs: No Transportation Needs   • Lack of Transportation (Medical): No   • Lack of Transportation (Non-Medical): No   Physical Activity: Not on file   Stress: Not on file   Social Connections: Not on file   Intimate Partner Violence: Not on file   Housing Stability: Low Risk    • Unable to Pay for Housing in the Last Year: No   • Number of Places Lived in the Last Year: 1   • Unstable Housing in the Last Year: No        Family History:  Family History   Problem Relation Age of Onset   • Autoimmune disease Neg Hx          Meds/Allergies      No current facility-administered medications for this visit          (Not in a hospital admission)      Allergies   Allergen Reactions   • Ambien [Zolpidem] Other (See Comments)     Did not allow sleep per pt           Objective   Vitals:   Visit Vitals  /50 (BP Location: Left arm, Patient Position: Sitting, Cuff Size: Standard)   Pulse 86   Ht 5' 5\" (1 651 m)   Wt 48 9 kg (107 lb 14 4 oz)   BMI 17 96 kg/m²   OB Status Postmenopausal   Smoking Status Never   BSA 1 52 m²      Vitals:    05/12/23 1321   Weight: 48 9 kg (107 " lb 14 4 oz)   [unfilled]    Invasive Devices     None                   ROS  Review of Systems   All other systems reviewed and are negative  As described in my history of present illness      PHYSICAL EXAM  Physical Exam  Vitals reviewed  Constitutional:       General: She is not in acute distress  Appearance: Normal appearance  She is not ill-appearing  Comments: Patient looks chronically sick  She has  malnutrition   HENT:      Head: Normocephalic and atraumatic  Right Ear: External ear normal       Left Ear: External ear normal       Nose: Nose normal       Mouth/Throat:      Pharynx: Oropharynx is clear  Eyes:      General: No scleral icterus  Conjunctiva/sclera: Conjunctivae normal       Pupils: Pupils are equal, round, and reactive to light  Comments: Left-sided Bell's palsy   Cardiovascular:      Rate and Rhythm: Normal rate and regular rhythm  Pulses: Normal pulses  Heart sounds: Normal heart sounds  No murmur heard  Pulmonary:      Effort: Pulmonary effort is normal  No respiratory distress  Breath sounds: Examination of the right-lower field reveals decreased breath sounds  Examination of the left-lower field reveals decreased breath sounds  Decreased breath sounds present  No wheezing  Abdominal:      General: Bowel sounds are normal  There is no distension  Tenderness: There is no abdominal tenderness  Musculoskeletal:         General: Swelling present  No tenderness or deformity  Cervical back: No rigidity  Right lower leg: Edema present  Left lower leg: Edema present  Skin:     Coloration: Skin is not jaundiced  Findings: No bruising  Neurological:      Mental Status: She is alert and oriented to person, place, and time  Mental status is at baseline  Motor: No weakness  Comments: Left-sided Bell's palsy   Psychiatric:         Mood and Affect: Mood normal          Thought Content:  Thought content normal  Judgment: Judgment normal                LAB RESULTS:      CBC:  Results from Last 12 Months   Lab Units 05/04/23  1558 04/06/23  1344 03/25/23  0852 03/24/23  0533 03/23/23  0607 03/22/23  2121 03/22/23  0520 03/21/23  1753 03/21/23  1054   WBC Thousand/uL 4 58 4 82 4 59 4 52 5 44  --  5 07  --  4 71   HEMOGLOBIN g/dL 11 2* 9 9* 8 5* 7 9* 8 0*   < > 8 2*   < > 6 0*   HEMATOCRIT % 35 6 31 9* 26 2* 25 4* 24 4*   < > 24 8*   < > 20 0*   MCV fL 93 91 87 89 87  --  86  --  89   PLATELETS Thousands/uL 160 142* 61* 58* 69*  --  71*  --  118*   MCH pg 29 4 28 4 28 2 27 5 28 4  --  28 6  --  26 8   MCHC g/dL 31 5 31 0* 32 4 31 1* 32 8  --  33 1  --  30 0*   RDW % 18 0* 18 7* 17 2* 16 4* 16 0*  --  15 4*  --  17 1*   MPV fL 10 8 12 0 11 6 13 1* 11 2  --  11 5  --  11 7   NRBC AUTO /100 WBCs 0 0 0  --   --   --  0  --  0    < > = values in this interval not displayed  CMP:  Results from Last 12 Months   Lab Units 05/04/23  1558 03/25/23  7520 03/24/23  0533 03/23/23  1272 03/22/23  0520 03/21/23  1202 03/20/23  1250 02/22/23  1310 01/23/23  1448 12/05/22  1217   POTASSIUM mmol/L 3 6 3 6 3 9 4 0 4 2   < > 3 3* 3 6 4 0 3 9   CHLORIDE mmol/L 105 110* 111* 107 109*   < > 101 105 100 105   CO2 mmol/L 28 26 27 26 26   < > 31 30 30 27   BUN mg/dL 14 12 11 14 13   < > 20 19 15 15   CREATININE mg/dL 0 65 0 65 0 59* 0 69 0 65   < > 0 91 0 71 0 79 0 60   CALCIUM mg/dL 8 8 8 7 8 9 8 5 8 6   < > 9 1 9 0 9 4 9 1   AST U/L 24  --   --   --   --   --  20 22 23 159*   ALT U/L 15  --   --   --   --   --  13 15 27 154*   ALK PHOS U/L 54  --   --   --   --   --  31* 59 45 69   EGFR ml/min/1 73sq m 81 81 83 79 81   < > 57 78 68 83    < > = values in this interval not displayed  Magnesium:   Results from Last 12 Months   Lab Units 03/25/23  0852   MAGNESIUM mg/dL 1 6       A1C:  No results for input(s): HGBA1C in the last 8784 hours  TSH:  No results for input(s): TSH in the last 8784 hours      TSH 3rd Gen:  No results for input(s): [id number] in the last 8784 hours  PT/INR:  Results from Last 12 Months   Lab Units 03/21/23  1339 07/01/22  1725   PROTIME seconds 16 9* 15 3*   INR  1 35* 1 27*       Lipid Panel:  12/22/2021          Troponin:  No results for input(s): TROPONINI in the last 8784 hours  Imaging:    EKG  7/1/2022          Cardiac MRI  8/6/2018    [de-identified]year old woman for evaluation of cardiomyopathy      Technique:  1  3 plane SSFP localizers  2  SSFP cine imaging in long and short axis planes  3  T2 weighted DIR FSE in short axis plane  4  11 ml gadobutrol power injected  5  2D inversion recovery FGRE for delayed myocardial enhancement  6  The patient tolerated the procedure well without complication      Measurements:   Kayden-septal wall 8 mm  Postero-lateral wall 9 mm  Left ventricular end-diastolic dimension 70 mm  Left ventricular end-systolic dimension 67 mm  Left ventricular end-diastolic volume 544 ml  Left ventricular end-systolic volume  327 ml  Stroke volume 59 ml  Cardiac Output 4 7 L/min  Ejection fraction 18 %  Left atrium 37 mm  Aortic Root 31 mm     Findings:    1  The left ventricle is severely dilated with normal wall thickness  Left ventricular systolic function is severely reduced with a measured ejection fraction of 18%  There is severe global hypokinesis with dyskinesis of the interventricular septum  2  The right ventricle is normal in size with normal right ventricular systolic function  3  The aortic, mitral, and tricuspid valves open without restriction  There is likely moderate mitral regurgitation, however cine MRI is inaccurate in the qualitative assessment of valvular regurgitation  4  The left atrium is mildly dilated  The aortic root is normal in size  5  There is no evidence of myocardial edema  6  There are bilateral pleural effusions and a trace circumferential pericardial effusion    7  Delayed post-gadolinium imaging demonstrates a thin strip of intramyocardial hyperenhancement of the basal interventricular septum      IMPRESSION:  Impression:  1  Severely dilated left ventricle with severely reduced left ventricular systolic function  2  Normal right ventricular size and systolic function  3  Likely moderate mitral regurgitation  4  Thin strip of intramyocardial fibrosis of the basal interventricular septum  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  HOLDEN  No results found for this or any previous visit  Echocardiogram  Results for orders placed during the hospital encounter of 22    Echo complete w/ contrast if indicated    Interpretation Summary  •  Left Ventricle: Left ventricular cavity size is moderately dilated  Wall thickness is normal  The left ventricular ejection fraction is 20%  Systolic function is severely reduced  There is severe global hypokinesis with dyskinesis of the interventricular septum  Diastolic function is abnormal   •  Right Ventricle: Right ventricular cavity size is normal  Systolic function is normal   •  Mitral Valve: There is mild to moderate regurgitation  No results found for this or any previous visit  Stress Test:   No results found for this or any previous visit        Cardiac catheterization :  Results for orders placed during the hospital encounter of 18    Cardiac catheterization    Narrative  [facility]  [address] [place] [address]  [phone]    [facility]    Invasive Cardiovascular Lab Complete Report    Patient: Shu Tinoco  MR number: [id number]  Account number: [de-identified]  Study date: 2018  Gender: Female  : 1940  Height: 66 1 in  Weight: 124 7 lb  BSA: 1 64 m squared    Allergies: ZOLPIDEM    Diagnostic Cardiologist:  Roselia Mckeon MD  Primary Physician:  Martín Venegas MD    SUMMARY    CORONARY CIRCULATION:  Left main: Normal   LAD: Normal   Circumflex: Normal   RCA: Normal     HEMODYNAMICS:  Hemodynamic assessment demonstrated mildly depressed cardiac output and normal pulmonary capillary wedge pressure  IMPRESSIONS:  The coronary arteries are normal     INDICATIONS:  --  Congestive heart failure with cardiomyopathy  PROCEDURES PERFORMED    --  Right heart catheterization  --  Left coronary angiography  --  Right coronary angiography  --  Inpatient  --  Mod Sedation Same Physician Initial 15min  --  Mod Sedation Same Physician Add 15min  --  Mod Sedation Same Physician Add 15min  --  Coronary Catheterization (w/ CHC)  --  Cardiac output/Isacc method  --  Cardiac output/thermodilution method  PROCEDURE: The risks and alternatives of the procedures and conscious sedation were explained to the patient and informed consent was obtained  The patient was brought to the cath lab and placed on the table  The planned puncture sites  were prepped and draped in the usual sterile fashion  --  Right femoral vein access  The puncture site was infiltrated with local anesthetic  The vessel was accessed using the modified Seldinger technique, a wire was advanced into the vessel, and a sheath was advanced over the wire into the  vessel  --  Right femoral artery access  The puncture site was infiltrated with local anesthetic  The vessel was accessed using the modified Seldinger technique, a wire was advanced into the vessel, and a sheath was advanced over the wire into the  vessel  --  Right heart catheterization  A [place] Juice catheter was advanced under fluoroscopic guidance into the right heart and intracardiac pressures were obtained  --  Left coronary artery angiography  A catheter was advanced over a guidewire into the aorta and positioned in the left coronary artery ostium under fluoroscopic guidance  Angiography was performed  --  Right coronary artery angiography   A catheter was advanced over a guidewire into the aorta and positioned in the right coronary artery ostium under fluoroscopic guidance  Angiography was performed  --  Inpatient  --  Mod Sedation Same Physician Initial 15min  --  Mod Sedation Same Physician Add 15min  --  Mod Sedation Same Physician Add 15min  --  Coronary Catheterization (w/ CHC)  --  Cardiac output/Isacc method  Blood samples were obtained and measurements of arterial and venous oxygen saturations were made and cardiac output measurements were obtained using the Isacc equation  --  Cardiac output/thermodilution method  Multiple saline injections were performed through the proximal lumen of the [place] Juice catheter, dilution curves obtained and cardiac output measurements performed  PROCEDURE COMPLETION: The patient tolerated the procedure well and was discharged from the cath lab  TIMING: Test started at 13:33  Test concluded at 14:07  HEMOSTASIS: The sheath was removed  The site was compressed manually  Hemostasis  was obtained  The sheath was removed over a wire and the Angioseal delivery sheath was inserted into the femoral artery  Hemostasis was obtained using a closure device ( Angioseal) deployed through the delivery sheath  MEDICATIONS GIVEN:  Versed (2mg/2ml), 0 5 mg, IV, at 13:28  Fentanyl (1OOmcg/2 ml), 25 mcg, IV, at 13:28  1% Lidocaine, 7 ml, subcutaneously, at 13:34  Fentanyl (1OOmcg/2 ml), 25 mcg, IV, at 13:45  Versed (2mg/2ml), 0 5 mg, IV, at 13:45  Fentanyl (1OOmcg/2  ml), 25 mcg, IV, at 13:59  CONTRAST GIVEN: 70 ml Omnipaque (350mg I /ml)  RADIATION EXPOSURE: Fluoroscopy time: 4 88 min  HEMODYNAMICS: Hemodynamic assessment demonstrated mildly depressed cardiac output and normal pulmonary capillary wedge pressure  There was no evidence of pulmonary hypertension  CORONARY VESSELS:   --  The coronary circulation is right dominant    --  Left main: Normal   --  LAD: Normal   --  Circumflex: Normal   --  RCA: Normal     IMPRESSIONS:  The coronary arteries are normal     RECOMMENDATIONS  The patient should continue with the present medications      Prepared and signed by    Antony King MD  Signed 2018 14:10:41    Study diagram    Hemodynamic tables    Pressures:  Baseline  Pressures:  - HR: 72  Pressures:  - Rhythm:  Pressures:  -- Aortic Pressure (S/D/M): 103/50/71  Pressures:  -- Pulmonary Artery (S/D/M): 32/11/20  Pressures:  -- Pulmonary Capillary Wedge: 89/6  Pressures:  -- Right Atrium (a/v/M): 6/3  Pressures:  -- Right Ventricle (s/edp): 32/6/--    O2 Sats:  Baseline  O2 Sats:  - HR: 72  O2 Sats:  - Rhythm:  O2 Sats:  -- AO: 12 3/89 2/14 97  O2 Sats:  -- Pa: 12 3/56 5/9 48    Outputs:  Baseline  Outputs:  -- CALCULATIONS: [age] in years[age]  Outputs:  -- CALCULATIONS: Average Partial Oxygen - MV: 9 70  Outputs:  -- CALCULATIONS: Average Partial Oxygen - SA: 16 50  Outputs:  -- CALCULATIONS: Body Surface Area: 1 64  Outputs:  -- CALCULATIONS: Height in cm: 168 00  Outputs:  -- CALCULATIONS: Sex: Female  Outputs:  -- CALCULATIONS: Weight in k 70  Outputs:  -- OUTPUTS: Blood Oxygen Difference: 5 49  Outputs:  -- OUTPUTS: Cardiac index by thermal dilution: 1 37  Outputs:  -- OUTPUTS: CO by Isacc: 2 63  Outputs:  -- OUTPUTS: CO by thermal dilution: 2 25  Outputs:  -- OUTPUTS: Isacc cardiac index: 1 60  Outputs:  -- OUTPUTS: Isacc HR: 65 00  Outputs:  -- OUTPUTS: O2 consumption: 144 22  Outputs:  -- OUTPUTS: Vo2 Indexed: 87 91  Outputs:  -- RESISTANCES: Left ventricular stroke work: 32 71  Outputs:  -- RESISTANCES: Left Ventricular Stroke Work index: 19 94  Outputs:  -- RESISTANCES: Pulmonary vascular index (dsc): 816 42  Outputs:  -- RESISTANCES: Pulmonary vascular index (Wood Units): 10 21  Outputs:  -- RESISTANCES: Pulmonary vascular resistance (dsc): 497 65  Outputs:  -- RESISTANCES: Pulmonary vascular resistance (Wood Units): 6 22  Outputs:  -- RESISTANCES: PVR_SVR Ratio: 0 24  Outputs:  -- RESISTANCES: Right ventricular stroke work: 7 43  Outputs:  -- RESISTANCES: Right ventricular stroke work index: 4 53  Outputs:  -- RESISTANCES: Systemic vascular index (dsc): [id number]  Outputs:  -- RESISTANCES: Systemic vascular index (Wood Units): 42 47  Outputs:  -- RESISTANCES: Systemic vascular resistance (dsc): 2070 36  Outputs:  -- RESISTANCES: Systemic vascular resistance (Wood Units): 25 89  Outputs:  -- RESISTANCES: Total pulmonary index (dsc): 1166 32  Outputs:  -- RESISTANCES: Total pulmonary index (Wood Units): 14 58  Outputs:  -- RESISTANCES: Total pulmonary resistance (dsc): 710 93  Outputs:  -- RESISTANCES: Total pulmonary resistance (Wood Units): 8 89  Outputs:  -- RESISTANCES: Total vascular index (Wood Units): 44 34  Outputs:  -- RESISTANCES: Total vascular resistance (dsc): 2161 70  Outputs:  -- RESISTANCES: Total vascular resistance (Wood Units): 27 03  Outputs:  -- RESISTANCES: Total vascular resistance index (dsc): 3546 37  Outputs:  -- RESISTANCES: TPR_TVR Ratio: 0 33  Outputs:  -- SHUNTS: Qs Indexed: 1 60  Outputs:  -- SHUNTS: Systemic flow: 2 63      HOLTER MONITOR: 24 HOUR/48 HOUR MONITORS  No results found for this or any previous visit  AMB extended holter monitor  No results found for this or any previous visit  DEVICE CHECK:       No results found for this or any previous visit          Code Status: [unfilled]  Advance Directive and Living Will:      Power of :    POLST:      Counseling / Coordination of Care  Detailed discussion done with the patient with regards to risks and benefits of BiV ICD  Patient aware of the risks and still wants to proceed with an attempt for the Moira Styles MD

## 2023-05-12 ENCOUNTER — TELEPHONE (OUTPATIENT)
Dept: CARDIOLOGY CLINIC | Facility: CLINIC | Age: 83
End: 2023-05-12

## 2023-05-12 ENCOUNTER — CONSULT (OUTPATIENT)
Dept: CARDIOLOGY CLINIC | Facility: CLINIC | Age: 83
End: 2023-05-12

## 2023-05-12 VITALS
WEIGHT: 107.9 LBS | HEART RATE: 86 BPM | SYSTOLIC BLOOD PRESSURE: 118 MMHG | BODY MASS INDEX: 17.98 KG/M2 | DIASTOLIC BLOOD PRESSURE: 50 MMHG | HEIGHT: 65 IN

## 2023-05-12 DIAGNOSIS — I10 HYPERTENSION, ESSENTIAL: ICD-10-CM

## 2023-05-12 DIAGNOSIS — I42.0 DILATED CARDIOMYOPATHY (HCC): ICD-10-CM

## 2023-05-12 DIAGNOSIS — I50.22 CHRONIC SYSTOLIC CHF (CONGESTIVE HEART FAILURE), NYHA CLASS 3 (HCC): Primary | ICD-10-CM

## 2023-05-12 DIAGNOSIS — K75.4 AUTOIMMUNE HEPATITIS TREATED WITH STEROIDS (HCC): ICD-10-CM

## 2023-05-12 DIAGNOSIS — I50.22 CHRONIC SYSTOLIC HEART FAILURE (HCC): Primary | ICD-10-CM

## 2023-05-12 DIAGNOSIS — D69.3 CHRONIC ITP (IDIOPATHIC THROMBOCYTOPENIA) (HCC): ICD-10-CM

## 2023-05-12 DIAGNOSIS — I44.7 COMPLETE LEFT BUNDLE BRANCH BLOCK (LBBB): ICD-10-CM

## 2023-05-12 DIAGNOSIS — E44.0 MODERATE PROTEIN-CALORIE MALNUTRITION (HCC): ICD-10-CM

## 2023-05-12 DIAGNOSIS — H91.93 BILATERAL HEARING LOSS, UNSPECIFIED HEARING LOSS TYPE: ICD-10-CM

## 2023-05-12 DIAGNOSIS — D61.818 PANCYTOPENIA (HCC): ICD-10-CM

## 2023-05-12 DIAGNOSIS — G51.0 LEFT-SIDED BELL'S PALSY: ICD-10-CM

## 2023-05-12 DIAGNOSIS — I11.0 HYPERTENSIVE HEART DISEASE WITH CONGESTIVE HEART FAILURE, UNSPECIFIED HEART FAILURE TYPE (HCC): ICD-10-CM

## 2023-05-12 DIAGNOSIS — D50.0 IRON DEFICIENCY ANEMIA DUE TO CHRONIC BLOOD LOSS: ICD-10-CM

## 2023-05-12 RX ORDER — SPIRONOLACTONE 25 MG/1
25 TABLET ORAL DAILY
COMMUNITY

## 2023-05-12 RX ORDER — SACUBITRIL AND VALSARTAN 49; 51 MG/1; MG/1
1 TABLET, FILM COATED ORAL 2 TIMES DAILY
COMMUNITY

## 2023-05-12 NOTE — LETTER
DEVICE PROCEDURE INSTRUCTIONS    Jemal Overton   : 1940  MRN: [id number]  [address] [place] [id number]    Procedure: BIV ICD     Procedure Date: 23    Location: [facility]  Address: [address], [address] [place] [address]        Labs to be done by 23: CMP /  CBC (FASTING)    BLOOD THINNER INSTRUCTIONS (Coumadin / Warfarin / Tami Martinis / Sheryle Cass / Xarelto):  N/A    Medication Hold: Torsemide - Hold morning of procedure  Spironolactone - Hold morning of procedure  Arrival Time: The Hospital will contact you the day prior to your procedure, usually between 4PM - 6PM to instruct you on the time and place to report  If you do not hear from a [facility]  by 6PM the evening prior to your procedure, please contact the campus you are scheduled at  • DO NOT drink or eat anything after midnight the night prior to your procedure  You may have a minimal amount of water with your AM medications  If your procedure is scheduled after 12:00 noon, you may have clear liquids until 8:00AM the morning of your procedure  (Clear liquids: 7UP, ginger ale, jello, or broth )    • Arrange for a responsible adult to drive you to and from the hospital     • You should continue to take your morning medications with a sip of water UNLESS ADVISED OTHERWISE      •   DO NOT stop taking Plavix or Aspirin unless advised otherwise  •  If you are currently taking Fish Oil, Krill oil and/or Vitamin E please DO NOT TAKE FOR A WEEK PRIOR TO PROCEDURE  • If you are diabetic, DO NOT take any of your diabetic pills the morning of your procedure  Oral diabetic medications may include Glucophage, Prandin, Glyburide, Micronase, Avandia, Glucovance, Precose, Glynase, and Starlix  • Bring a list of daily medications, vitamins, minerals, herbals and nutritional supplements you take  Please include dosages and the times you take them each day       • If you are packing an overnight bag, pack minimal clothing, you will be given hospital sleepwear  DO NOT bring money, valuables or jewelry  The wedding band is ok  • Bring your Photo ID and Insurance cards with you  • Please notify us if you have been admitted to the hospital within 30 days  Pre-Operative Showering Instructions  ONLY FOR DEVICE PROCEDURE  • The two nights prior to your procedure, take a shower each night using the special antiseptic body scrub (Chlorhexidine Scrub Brush) you received from the office or hospital  This scrub will replace your soap at home, the sponge is pre-filled with soap  • The scrub brushes are single use only and should be discarded after use  • Shampoo your hair with regular shampoo and rinse completely before using the antiseptic scrub brush  • Using the sponge side of the scrub brush to wash your entire body from your neck down, with special attention to your armpits, chest and groin area  DO NOT USE the scrub on your face and avoid any open wounds  Do not use any other soap or wash your skin  • DO NOT USE any lotion, powder, deodorant or perfume of any kind on your skin after you shower  • AFTER THE FIRST NIGHT of using the scrub, change your bed linen sheets to a fresh clean set and clean pajamas for bedtime  • AFTER THE SECOND NIGHT of using the scrub, use clean pajamas for bedtime  • DO NOT SHOWER THE MORNING OF SURGERY, you will be prepped and cleansed at the hospital prior to your procedure  PLEASE  THE SPONGES AT YOUR MOST CONVENIENT [facility] CARDIOLOGY OFFICE  FAILURE TO FOLLOW ANY OF THESE INSTRUCTIONS COULD RESULT IN THE CANCELLATION OF YOUR PROCEDURE    Reporting to: [facility]     Address: [address], [address] [facility]           • Please call [phone] if you do not hear from the  by 6:00PM the night before your procedure      • All patients enter through [address] [place] [address],[address] is available free of charge or park on [address]: Proceed through the Lobby past the [employer]  Take the [employer] to the SECOND floor, turn left and follow the signs to [facility]  • [facility]: Proceed through the Lobby past the [employer]  Take the [employer] to the FIRST floor, follow the ORANGE markings to the surgical waiting room and check in at the reception desk            Thank you,   Yudi Yost  Surgery Coordinator  [facility] [address] [place] [address]   [address] [place] [address], [address] [place] [address]  Ph: [phone]

## 2023-05-12 NOTE — LETTER
May 14, 2023     [place]    Patient: Amber Carcamo   YOB: 1940   Date of Visit: 5/12/2023       Dear Dr Tresa Osborne: Thank you for referring Mara Flores to me for evaluation  Below are my notes for this consultation  If you have questions, please do not hesitate to call me  I look forward to following your patient along with you  Sincerely,        Alina Cruz MD        CC: MD Kallie Aviles MD  5/14/2023  9:43 AM  Sign when Signing Visit  AICD consultation - Electrophysiology-Cardiology (EP)   Amber Carcamo 78 y o  female MRN: [id number]  Unit/Bed#:  Encounter: [id number]      1  Chronic systolic CHF (congestive heart failure), NYHA class 3 (HCC)  POCT ECG      2  Complete left bundle branch block (LBBB)  Ambulatory referral to Cardiac Electrophysiology      3  Autoimmune hepatitis treated with steroids ([address] )        4  Dilated cardiomyopathy ([address] )        5  Hypertension, essential        6  Hypertensive heart disease with congestive heart failure, unspecified heart failure type ([address] )        7  Left-sided Bell's palsy        8  Bilateral hearing loss, unspecified hearing loss type        9  Chronic ITP (idiopathic thrombocytopenia) (HCC)        10  Pancytopenia ([address] )        11  Moderate protein-calorie malnutrition ([address] )        12   Iron deficiency anemia due to chronic blood loss              Consults  Physician Requesting Consult: Salas Anaya DO   Reason for Consult / Principal Problem: ChronicSystolic CHF, Stage D, NYHA 3 with mildly reduced RV systolic function        Summary of my recommendation   Please set up for BiV ICD, Medtronic, left side, use antibiotic, use contrast    High risk-age 83, BMI 18, 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, heart failure with EF 20%, ITP with prior platelet count in the 50,000 range, right-sided ventricular dilatation    Review with hematology, platelet count needs to be over 50,000 and preferably over 100,000 for this procedure    Patient will need to be admitted the day before procedure, heart failure condition optimized, platelet count more than 50,000 at a minimum to proceed with the procedure safely    Confirm with patient all medication  Patient informed that not on Entresto-please reach out to heart failure team to see if can provide Entresto          clinical condition  Chronic systolic heart failure, stage D  NYHA class III  Reduced RV systolic function  LBBB,  ms  Pancytopenia with microcytic anemia, chronic ITP  Hypertension  Hypothyroidism  Autoimmune hepatitis  Bell's palsy              Assessment/Plan     Chronic systolic heart failure, stage D  Unclear etiology-suspect nonischemic from conduction system abnormality  NYHA class III  Reduced RV systolic function  LBBB,  ms  Optimal medical therapy-not on beta-blocker due to underlying LBBB, confusion about Entresto which has been resolved  Shared decision making with patient and primary cardiologist    High risk-age 83, BMI 18, 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, heart failure with EF 20%, ITP with prior platelet count in the 50,000 range, right-sided ventricular dilatation    Her reduced EF is primarily considered to be secondary to conduction system abnormality      Patient is a candidate for BiV ICD  Medtronic, MRI compatible device, left side  Use antibiotic, use contrast    The above risk factors place her at a high risk of possible complication  To ameliorate that consider use of  Passive atrial lead  RV ICD lead and mid septum  His lead is adequate to narrow the QRS complex            Pancytopenia with microcytic anemia, chronic ITP  Patient needs to be admitted to the hospital a day before the procedure  Plan to keep platelet count over 74,690 and preferably over 100,000 for the procedure      Hypertension  118/50  Continue with torsemide, spironolactone, Entresto      Hypothyroidism  As per primary      Autoimmune hepatitis  Patient is on steroid      Bell's palsy  Chronic          History of Present Illness   HPI: Emperatriz Mota is a 78y o  year old female has been referred to me by Dr Antonio James for the management heart failure      The patient has significant medical illnesses which include  Chronic systolic heart failure, stage D  NYHA class III  Reduced RV systolic function  LBBB,  ms  Pancytopenia with microcytic anemia, chronic ITP  Hypertension  Hypothyroidism  Autoimmune hepatitis  Bell's palsy    The patient is chronically sick  She has numerous high risk factors conditions - age 78, BMI 18, 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, heart failure with EF 20%, ITP with prior platelet count in the 50,000 range, right-sided ventricular dilatation      She is not complaining of anginal-like chest pain, worsening orthopnea or PND  She does have some leg swelling from time to time  She is not complaining of palpitation presyncope or syncope  She does have exertional intolerance    She confirms that she is compliant with her medication  She has some doubts about being on Entresto but is going to go home confirm and call us back    She is aware that there are multiple high risk conditions that she has and load tried to give this procedure a chance to see if it helps her    She does have longstanding blood disorder and has been on therapy for the same    She has known liver disease and is on steroids as well as immunomodulators    She has a history of Bell's palsy on the left side        Historical Information   Past Medical History:   Diagnosis Date   • Acute bilateral low back pain without sciatica 2/10/2023   • Bell's palsy    • Cardiac disease    • Ear problems    • HL (hearing loss)    • Hypertension    • Sacroiliitis ([address] ) 8/10/2021   • Subdural hemorrhage ([facility] ) 3/30/2022   • Thrombocytopenia ([place] [address] ) 8/3/2018   • Tinnitus    • Traumatic subdural hemorrhage with loss of consciousness of unspecified duration, initial encounter ([place] [address] ) 2/15/2023     Past Surgical History:   Procedure Laterality Date   • CT BONE MARROW BIOPSY AND ASPIRATION  1/27/2021     Social History     Substance and Sexual Activity   Alcohol Use Not Currently     Social History     Substance and Sexual Activity   Drug Use Not Currently     Social History     Tobacco Use   Smoking Status Never   Smokeless Tobacco Never     Social History     Socioeconomic History   • Marital status: /Civil Union     Spouse name: Not on file   • Number of children: Not on file   • Years of education: Not on file   • Highest education level: Not on file   Occupational History   • Not on file   Tobacco Use   • Smoking status: Never   • Smokeless tobacco: Never   Vaping Use   • Vaping Use: Never used   Substance and Sexual Activity   • Alcohol use: Not Currently   • Drug use: Not Currently   • Sexual activity: Not Currently     Partners: Male   Other Topics Concern   • Not on file   Social History Narrative   • Not on file     Social Determinants of Health     Financial Resource Strain: Low Risk    • Difficulty of Paying Living Expenses: Not hard at all   Food Insecurity: No Food Insecurity   • Worried About Running Out of Food in the Last Year: Never true   • Ran Out of Food in the Last Year: Never true   Transportation Needs: No Transportation Needs   • Lack of Transportation (Medical): No   • Lack of Transportation (Non-Medical):  No   Physical Activity: Not on file   Stress: Not on file   Social Connections: Not on file   Intimate Partner Violence: Not on file   Housing Stability: Low Risk    • Unable to Pay for Housing in the Last Year: No   • Number of Places Lived in the Last Year: 1   • Unstable Housing in the Last Year: No        Family History:  Family History   Problem Relation Age of Onset   • Autoimmune "disease Neg Hx          Meds/Allergies       No current facility-administered medications for this visit  (Not in a hospital admission)      Allergies   Allergen Reactions   • Ambien [Zolpidem] Other (See Comments)     Did not allow sleep per pt           Objective    Vitals:   Visit Vitals  /50 (BP Location: Left arm, Patient Position: Sitting, Cuff Size: Standard)   Pulse 86   Ht 5' 5\" (1 651 m)   Wt 48 9 kg (107 lb 14 4 oz)   BMI 17 96 kg/m²   OB Status Postmenopausal   Smoking Status Never   BSA 1 52 m²      Vitals:    05/12/23 1321   Weight: 48 9 kg (107 lb 14 4 oz)   [unfilled]    Invasive Devices     None                   ROS  Review of Systems   All other systems reviewed and are negative  As described in my history of present illness      PHYSICAL EXAM  Physical Exam  Vitals reviewed  Constitutional:       General: She is not in acute distress  Appearance: Normal appearance  She is not ill-appearing  Comments: Patient looks chronically sick  She has  malnutrition   HENT:      Head: Normocephalic and atraumatic  Right Ear: External ear normal       Left Ear: External ear normal       Nose: Nose normal       Mouth/Throat:      Pharynx: Oropharynx is clear  Eyes:      General: No scleral icterus  Conjunctiva/sclera: Conjunctivae normal       Pupils: Pupils are equal, round, and reactive to light  Comments: Left-sided Bell's palsy   Cardiovascular:      Rate and Rhythm: Normal rate and regular rhythm  Pulses: Normal pulses  Heart sounds: Normal heart sounds  No murmur heard  Pulmonary:      Effort: Pulmonary effort is normal  No respiratory distress  Breath sounds: Examination of the right-lower field reveals decreased breath sounds  Examination of the left-lower field reveals decreased breath sounds  Decreased breath sounds present  No wheezing  Abdominal:      General: Bowel sounds are normal  There is no distension  Tenderness:  There is no " abdominal tenderness  Musculoskeletal:         General: Swelling present  No tenderness or deformity  Cervical back: No rigidity  Right lower leg: Edema present  Left lower leg: Edema present  Skin:     Coloration: Skin is not jaundiced  Findings: No bruising  Neurological:      Mental Status: She is alert and oriented to person, place, and time  Mental status is at baseline  Motor: No weakness  Comments: Left-sided Bell's palsy   Psychiatric:         Mood and Affect: Mood normal          Thought Content: Thought content normal          Judgment: Judgment normal                LAB RESULTS:      CBC:  Results from Last 12 Months   Lab Units 05/04/23  1558 04/06/23  1344 03/25/23  0852 03/24/23  0533 03/23/23  0607 03/22/23  2121 03/22/23  0520 03/21/23  1753 03/21/23  1054   WBC Thousand/uL 4 58 4 82 4 59 4 52 5 44  --  5 07  --  4 71   HEMOGLOBIN g/dL 11 2* 9 9* 8 5* 7 9* 8 0*   < > 8 2*   < > 6 0*   HEMATOCRIT % 35 6 31 9* 26 2* 25 4* 24 4*   < > 24 8*   < > 20 0*   MCV fL 93 91 87 89 87  --  86  --  89   PLATELETS Thousands/uL 160 142* 61* 58* 69*  --  71*  --  118*   MCH pg 29 4 28 4 28 2 27 5 28 4  --  28 6  --  26 8   MCHC g/dL 31 5 31 0* 32 4 31 1* 32 8  --  33 1  --  30 0*   RDW % 18 0* 18 7* 17 2* 16 4* 16 0*  --  15 4*  --  17 1*   MPV fL 10 8 12 0 11 6 13 1* 11 2  --  11 5  --  11 7   NRBC AUTO /100 WBCs 0 0 0  --   --   --  0  --  0    < > = values in this interval not displayed          CMP:  Results from Last 12 Months   Lab Units 05/04/23  1558 03/25/23  2613 03/24/23  0533 03/23/23  3960 03/22/23  0520 03/21/23  1202 03/20/23  1250 02/22/23  1310 01/23/23  1448 12/05/22  1217   POTASSIUM mmol/L 3 6 3 6 3 9 4 0 4 2   < > 3 3* 3 6 4 0 3 9   CHLORIDE mmol/L 105 110* 111* 107 109*   < > 101 105 100 105   CO2 mmol/L 28 26 27 26 26   < > 31 30 30 27   BUN mg/dL 14 12 11 14 13   < > 20 19 15 15   CREATININE mg/dL 0 65 0 65 0 59* 0 69 0 65   < > 0 91 0 71 0 79 0 60   CALCIUM mg/dL 8 8 8 7 8 9 8 5 8 6   < > 9 1 9 0 9 4 9 1   AST U/L 24  --   --   --   --   --  20 22 23 159*   ALT U/L 15  --   --   --   --   --  13 15 27 154*   ALK PHOS U/L 54  --   --   --   --   --  31* 59 45 69   EGFR ml/min/1 73sq m 81 81 83 79 81   < > 57 78 68 83    < > = values in this interval not displayed  Magnesium:   Results from Last 12 Months   Lab Units 03/25/23  0852   MAGNESIUM mg/dL 1 6       A1C:  No results for input(s): HGBA1C in the last 8784 hours  TSH:  No results for input(s): TSH in the last 8784 hours  TSH 3rd Gen:  No results for input(s): [id number] in the last 8784 hours  PT/INR:  Results from Last 12 Months   Lab Units 03/21/23  1339 07/01/22  1725   PROTIME seconds 16 9* 15 3*   INR  1 35* 1 27*       Lipid Panel:  12/22/2021          Troponin:  No results for input(s): TROPONINI in the last 8784 hours  Imaging:    EKG  7/1/2022          Cardiac MRI  8/6/2018    [de-identified]year old woman for evaluation of cardiomyopathy      Technique:  1  3 plane SSFP localizers  2  SSFP cine imaging in long and short axis planes  3  T2 weighted DIR FSE in short axis plane  4  11 ml gadobutrol power injected  5  2D inversion recovery FGRE for delayed myocardial enhancement  6  The patient tolerated the procedure well without complication      Measurements:   Kayden-septal wall 8 mm  Postero-lateral wall 9 mm  Left ventricular end-diastolic dimension 70 mm  Left ventricular end-systolic dimension 67 mm  Left ventricular end-diastolic volume 973 ml  Left ventricular end-systolic volume  085 ml  Stroke volume 59 ml  Cardiac Output 4 7 L/min  Ejection fraction 18 %  Left atrium 37 mm  Aortic Root 31 mm     Findings:    1  The left ventricle is severely dilated with normal wall thickness  Left ventricular systolic function is severely reduced with a measured ejection fraction of 18%  There is severe global hypokinesis with dyskinesis of the interventricular septum    2  The right ventricle is normal in size with normal right ventricular systolic function  3  The aortic, mitral, and tricuspid valves open without restriction  There is likely moderate mitral regurgitation, however cine MRI is inaccurate in the qualitative assessment of valvular regurgitation  4  The left atrium is mildly dilated  The aortic root is normal in size  5  There is no evidence of myocardial edema  6  There are bilateral pleural effusions and a trace circumferential pericardial effusion  7  Delayed post-gadolinium imaging demonstrates a thin strip of intramyocardial hyperenhancement of the basal interventricular septum      IMPRESSION:  Impression:  1  Severely dilated left ventricle with severely reduced left ventricular systolic function  2  Normal right ventricular size and systolic function  3  Likely moderate mitral regurgitation  4  Thin strip of intramyocardial fibrosis of the basal interventricular septum  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  HOLDEN  No results found for this or any previous visit  Echocardiogram  Results for orders placed during the hospital encounter of 04/08/22    Echo complete w/ contrast if indicated    Interpretation Summary  •  Left Ventricle: Left ventricular cavity size is moderately dilated  Wall thickness is normal  The left ventricular ejection fraction is 20%  Systolic function is severely reduced  There is severe global hypokinesis with dyskinesis of the interventricular septum  Diastolic function is abnormal   •  Right Ventricle: Right ventricular cavity size is normal  Systolic function is normal   •  Mitral Valve: There is mild to moderate regurgitation  No results found for this or any previous visit  Stress Test:   No results found for this or any previous visit        Cardiac catheterization :  Results for orders placed during the hospital encounter of 08/01/18    Cardiac catheterization    [facility] [address]  [phone]    [place] [facility]    Invasive Cardiovascular Lab Complete Report    Patient: Denton Quach  MR number: [id number]  Account number: [de-identified]  Study date: 2018  Gender: Female  : 1940  Height: 66 1 in  Weight: 124 7 lb  BSA: 1 64 m squared    Allergies: ZOLPIDEM    Diagnostic Cardiologist:  Yelitza Rosenberg MD  Primary Physician:  Bhavana Vazquez MD    SUMMARY    CORONARY CIRCULATION:  Left main: Normal   LAD: Normal   Circumflex: Normal   RCA: Normal     HEMODYNAMICS:  Hemodynamic assessment demonstrated mildly depressed cardiac output and normal pulmonary capillary wedge pressure  IMPRESSIONS:  The coronary arteries are normal     INDICATIONS:  --  Congestive heart failure with cardiomyopathy  PROCEDURES PERFORMED    --  Right heart catheterization  --  Left coronary angiography  --  Right coronary angiography  --  Inpatient  --  Mod Sedation Same Physician Initial 15min  --  Mod Sedation Same Physician Add 15min  --  Mod Sedation Same Physician Add 15min  --  Coronary Catheterization (w/ CHC)  --  Cardiac output/Isacc method  --  Cardiac output/thermodilution method  PROCEDURE: The risks and alternatives of the procedures and conscious sedation were explained to the patient and informed consent was obtained  The patient was brought to the cath lab and placed on the table  The planned puncture sites  were prepped and draped in the usual sterile fashion  --  Right femoral vein access  The puncture site was infiltrated with local anesthetic  The vessel was accessed using the modified Seldinger technique, a wire was advanced into the vessel, and a sheath was advanced over the wire into the  vessel  --  Right femoral artery access  The puncture site was infiltrated with local anesthetic   The vessel was accessed using the modified Seldinger technique, a wire was advanced into the vessel, and a sheath was advanced over the wire into the  vessel  --  Right heart catheterization  A Sumerduck Juice catheter was advanced under fluoroscopic guidance into the right heart and intracardiac pressures were obtained  --  Left coronary artery angiography  A catheter was advanced over a guidewire into the aorta and positioned in the left coronary artery ostium under fluoroscopic guidance  Angiography was performed  --  Right coronary artery angiography  A catheter was advanced over a guidewire into the aorta and positioned in the right coronary artery ostium under fluoroscopic guidance  Angiography was performed  --  Inpatient  --  Mod Sedation Same Physician Initial 15min  --  Mod Sedation Same Physician Add 15min  --  Mod Sedation Same Physician Add 15min  --  Coronary Catheterization (w/ CHC)  --  Cardiac output/Isacc method  Blood samples were obtained and measurements of arterial and venous oxygen saturations were made and cardiac output measurements were obtained using the Isacc equation  --  Cardiac output/thermodilution method  Multiple saline injections were performed through the proximal lumen of the Sumerduck Juice catheter, dilution curves obtained and cardiac output measurements performed  PROCEDURE COMPLETION: The patient tolerated the procedure well and was discharged from the cath lab  TIMING: Test started at 13:33  Test concluded at 14:07  HEMOSTASIS: The sheath was removed  The site was compressed manually  Hemostasis  was obtained  The sheath was removed over a wire and the Angioseal delivery sheath was inserted into the femoral artery  Hemostasis was obtained using a closure device ( Angioseal) deployed through the delivery sheath  MEDICATIONS GIVEN:  Versed (2mg/2ml), 0 5 mg, IV, at 13:28  Fentanyl (1OOmcg/2 ml), 25 mcg, IV, at 13:28  1% Lidocaine, 7 ml, subcutaneously, at 13:34  Fentanyl (1OOmcg/2 ml), 25 mcg, IV, at 13:45  Versed (2mg/2ml), 0 5 mg, IV, at 13:45   Fentanyl (1OOmcg/2  ml), 25 mcg, IV, at 13:59  CONTRAST GIVEN: 70 ml Omnipaque (350mg I /ml)  RADIATION EXPOSURE: Fluoroscopy time: 4 88 min  HEMODYNAMICS: Hemodynamic assessment demonstrated mildly depressed cardiac output and normal pulmonary capillary wedge pressure  There was no evidence of pulmonary hypertension  CORONARY VESSELS:   --  The coronary circulation is right dominant  --  Left main: Normal   --  LAD: Normal   --  Circumflex: Normal   --  RCA: Normal     IMPRESSIONS:  The coronary arteries are normal     RECOMMENDATIONS  The patient should continue with the present medications      Prepared and signed by    Yasemin Roberts MD  Signed 2018 14:10:41    Study diagram    Hemodynamic tables    Pressures:  Baseline  Pressures:  - HR: 72  Pressures:  - Rhythm:  Pressures:  -- Aortic Pressure (S/D/M): 103/50/71  Pressures:  -- Pulmonary Artery (S/D/M): 32/11/20  Pressures:  -- Pulmonary Capillary Wedge: 89/6  Pressures:  -- Right Atrium (a/v/M): 6/4/3  Pressures:  -- Right Ventricle (s/edp): 32/6/--    O2 Sats:  Baseline  O2 Sats:  - HR: 72  O2 Sats:  - Rhythm:  O2 Sats:  -- AO: 12 3/89 2/ 97  O2 Sats:  -- Pa: 12 3/56 5/ 48    Outputs:  Baseline  Outputs:  -- CALCULATIONS: [age]  Outputs:  -- CALCULATIONS: Average Partial Oxygen - MV: 9 70  Outputs:  -- CALCULATIONS: Average Partial Oxygen - SA: 16 50  Outputs:  -- CALCULATIONS: Body Surface Area: 1 64  Outputs:  -- CALCULATIONS: Height in cm: 168 00  Outputs:  -- CALCULATIONS: Sex: Female  Outputs:  -- CALCULATIONS: Weight in k 70  Outputs:  -- OUTPUTS: Blood Oxygen Difference: 5 49  Outputs:  -- OUTPUTS: Cardiac index by thermal dilution: 1 37  Outputs:  -- OUTPUTS: CO by Isacc: 2 63  Outputs:  -- OUTPUTS: CO by thermal dilution: 2 25  Outputs:  -- OUTPUTS: Isacc cardiac index: 1 60  Outputs:  -- OUTPUTS: Isacc HR: 65 00  Outputs:  -- OUTPUTS: O2 consumption: 144 22  Outputs:  -- OUTPUTS: Vo2 Indexed: 87 91  Outputs:  -- RESISTANCES: Left ventricular stroke work: 32 71  Outputs:  -- RESISTANCES: Left Ventricular Stroke Work index: 19 94  Outputs:  -- RESISTANCES: Pulmonary vascular index (dsc): 816 42  Outputs:  -- RESISTANCES: Pulmonary vascular index (Wood Units): 10 21  Outputs:  -- RESISTANCES: Pulmonary vascular resistance (dsc): 497 65  Outputs:  -- RESISTANCES: Pulmonary vascular resistance (Wood Units): 6 22  Outputs:  -- RESISTANCES: PVR_SVR Ratio: 0 24  Outputs:  -- RESISTANCES: Right ventricular stroke work: 7 43  Outputs:  -- RESISTANCES: Right ventricular stroke work index: 4 53  Outputs:  -- RESISTANCES: Systemic vascular index (dsc): 3396 52  Outputs:  -- RESISTANCES: Systemic vascular index (Wood Units): 42 47  Outputs:  -- RESISTANCES: Systemic vascular resistance (dsc): 2070 36  Outputs:  -- RESISTANCES: Systemic vascular resistance (Wood Units): 25 89  Outputs:  -- RESISTANCES: Total pulmonary index (dsc): 1166 32  Outputs:  -- RESISTANCES: Total pulmonary index (Wood Units): 14 58  Outputs:  -- RESISTANCES: Total pulmonary resistance (dsc): 710 93  Outputs:  -- RESISTANCES: Total pulmonary resistance (Wood Units): 8 89  Outputs:  -- RESISTANCES: Total vascular index (Wood Units): 44 34  Outputs:  -- RESISTANCES: Total vascular resistance (dsc): 2161 70  Outputs:  -- RESISTANCES: Total vascular resistance (Wood Units): 27 03  Outputs:  -- RESISTANCES: Total vascular resistance index (dsc): 3546 37  Outputs:  -- RESISTANCES: TPR_TVR Ratio: 0 33  Outputs:  -- SHUNTS: Qs Indexed: 1 60  Outputs:  -- SHUNTS: Systemic flow: 2 63      HOLTER MONITOR: 24 HOUR/48 HOUR MONITORS  No results found for this or any previous visit  AMB extended holter monitor  No results found for this or any previous visit  DEVICE CHECK:       No results found for this or any previous visit          Code Status: [unfilled]  Advance Directive and Living Will:      Power of :    POLST:      Counseling / Coordination of Care  Detailed discussion done with the patient with regards to risks and benefits of BiV ICD  Patient aware of the risks and still wants to proceed with an attempt for the Ashliewalter Remy MD

## 2023-05-12 NOTE — TELEPHONE ENCOUNTER
Consult  Open   5/12/2023  [facility] Cardiology Rebecca Drew MD  Cardiology  Chronic systolic CHF (congestive heart failure), NYHA class 3 ([address] ) +2 more  Dx  Consult ;  Referred by He Layne DO  Reason for Visit     Progress Notes  Joan Soto MD (Physician) • • Cardiology      Summary of my recommendation   Please set up for BiV ICD, [employer], left side, use antibiotic, use contrast     Reviewed with hematology, platelet count needs to be over 50,000 and preferably over 100,000 for this procedure     Confirm with patient all medication  Patient informed that not on Entresto-please reach out to heart failure team to see if can provide [facility]

## 2023-05-12 NOTE — TELEPHONE ENCOUNTER
Patient scheduled for BIV ICD device on 6/8/23 at [facility] with Dr Gilbert Nolasco  Instructions given to patient in person in office  Patient aware of all general instructions  Medication holds: Torsemide - Hold morning of procedure  Spironolactone - Hold morning of procedure  Labs to be done by 5/25/23:  CMP / CBC (FASTING)    Insurance: Medicare     Please obtain auth       Trisch: Please enter case request     Thank you,  Fiona Hernandez

## 2023-05-14 PROBLEM — I10 HYPERTENSION, ESSENTIAL: Status: RESOLVED | Noted: 2018-08-01 | Resolved: 2023-05-14

## 2023-05-15 NOTE — TELEPHONE ENCOUNTER
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

## 2023-05-17 ENCOUNTER — PREP FOR PROCEDURE (OUTPATIENT)
Dept: CARDIOLOGY CLINIC | Facility: CLINIC | Age: 83
End: 2023-05-17

## 2023-05-17 DIAGNOSIS — I50.22 CHRONIC SYSTOLIC CHF (CONGESTIVE HEART FAILURE), NYHA CLASS 3 (HCC): Primary | ICD-10-CM

## 2023-06-01 ENCOUNTER — HOSPITAL ENCOUNTER (OUTPATIENT)
Dept: RADIOLOGY | Age: 83
Discharge: HOME | End: 2023-06-01

## 2023-06-01 DIAGNOSIS — M81.0 AGE-RELATED OSTEOPOROSIS WITHOUT CURRENT PATHOLOGICAL FRACTURE: ICD-10-CM

## 2023-06-01 DIAGNOSIS — Z13.820 OSTEOPOROSIS SCREENING: ICD-10-CM

## 2023-06-03 ENCOUNTER — APPOINTMENT (OUTPATIENT)
Dept: LAB | Facility: CLINIC | Age: 83
End: 2023-06-03
Payer: MEDICARE

## 2023-06-03 LAB
ALBUMIN SERPL BCP-MCNC: 3.7 G/DL (ref 3.5–5)
ALP SERPL-CCNC: 74 U/L (ref 34–104)
ALT SERPL W P-5'-P-CCNC: 15 U/L (ref 7–52)
ANION GAP SERPL CALCULATED.3IONS-SCNC: 6 MMOL/L (ref 4–13)
AST SERPL W P-5'-P-CCNC: 26 U/L (ref 13–39)
BILIRUB SERPL-MCNC: 0.68 MG/DL (ref 0.2–1)
BUN SERPL-MCNC: 18 MG/DL (ref 5–25)
CALCIUM SERPL-MCNC: 9.5 MG/DL (ref 8.4–10.2)
CHLORIDE SERPL-SCNC: 104 MMOL/L (ref 96–108)
CO2 SERPL-SCNC: 30 MMOL/L (ref 21–32)
CREAT SERPL-MCNC: 0.73 MG/DL (ref 0.6–1.3)
GFR SERPL CREATININE-BSD FRML MDRD: 75 ML/MIN/1.73SQ M
GLUCOSE P FAST SERPL-MCNC: 73 MG/DL (ref 65–99)
POTASSIUM SERPL-SCNC: 3.1 MMOL/L (ref 3.5–5.3)
PROT SERPL-MCNC: 6.8 G/DL (ref 6.4–8.4)
SODIUM SERPL-SCNC: 140 MMOL/L (ref 135–147)

## 2023-06-05 ENCOUNTER — TELEPHONE (OUTPATIENT)
Dept: HEMATOLOGY ONCOLOGY | Facility: CLINIC | Age: 83
End: 2023-06-05

## 2023-06-05 DIAGNOSIS — I50.22 CHRONIC SYSTOLIC CHF (CONGESTIVE HEART FAILURE), NYHA CLASS 3 (HCC): ICD-10-CM

## 2023-06-05 RX ORDER — POTASSIUM CHLORIDE 750 MG/1
10 CAPSULE, EXTENDED RELEASE ORAL DAILY
Qty: 90 CAPSULE | Refills: 3 | Status: SHIPPED | OUTPATIENT
Start: 2023-06-05 | End: 2023-07-12 | Stop reason: SDUPTHER

## 2023-06-05 NOTE — TELEPHONE ENCOUNTER
Pt called me back  She would like to call me this afternoon when her son is with her  Will await call back with patient and son

## 2023-06-05 NOTE — TELEPHONE ENCOUNTER
"Called patient, she thinks she \"finished\" taking her promacta  She states she needs to check and will call me back  I gave her my direct number to call me back on  I advised her she should still be taking this once a day    "

## 2023-06-05 NOTE — TELEPHONE ENCOUNTER
----- Message from Nadiya Patricia PA-C sent at 6/5/2023 10:19 AM EDT -----  Is patient taking her promacta?

## 2023-06-07 ENCOUNTER — ANESTHESIA EVENT (INPATIENT)
Dept: NON INVASIVE DIAGNOSTICS | Facility: HOSPITAL | Age: 83
DRG: 227 | End: 2023-06-07
Payer: MEDICARE

## 2023-06-07 ENCOUNTER — HOSPITAL ENCOUNTER (INPATIENT)
Facility: HOSPITAL | Age: 83
LOS: 3 days | Discharge: HOME | DRG: 227 | End: 2023-06-10
Attending: INTERNAL MEDICINE | Admitting: INTERNAL MEDICINE
Payer: MEDICARE

## 2023-06-07 DIAGNOSIS — I50.22 CHRONIC SYSTOLIC CHF (CONGESTIVE HEART FAILURE), NYHA CLASS 3 (HCC): ICD-10-CM

## 2023-06-07 DIAGNOSIS — D61.818 PANCYTOPENIA (HCC): Primary | ICD-10-CM

## 2023-06-07 LAB
ABO GROUP BLD: NORMAL
ANION GAP SERPL CALCULATED.3IONS-SCNC: 2 MMOL/L (ref 4–13)
BASOPHILS # BLD AUTO: 0.04 THOUSANDS/ÂΜL (ref 0–0.1)
BASOPHILS NFR BLD AUTO: 1 % (ref 0–1)
BLD GP AB SCN SERPL QL: NEGATIVE
BUN SERPL-MCNC: 17 MG/DL (ref 5–25)
CALCIUM SERPL-MCNC: 9.2 MG/DL (ref 8.3–10.1)
CHLORIDE SERPL-SCNC: 109 MMOL/L (ref 96–108)
CO2 SERPL-SCNC: 30 MMOL/L (ref 21–32)
CREAT SERPL-MCNC: 0.71 MG/DL (ref 0.6–1.3)
EOSINOPHIL # BLD AUTO: 0.02 THOUSAND/ÂΜL (ref 0–0.61)
EOSINOPHIL NFR BLD AUTO: 1 % (ref 0–6)
ERYTHROCYTE [DISTWIDTH] IN BLOOD BY AUTOMATED COUNT: 16.3 % (ref 11.6–15.1)
GFR SERPL CREATININE-BSD FRML MDRD: 77 ML/MIN/1.73SQ M
GLUCOSE SERPL-MCNC: 78 MG/DL (ref 65–140)
HCT VFR BLD AUTO: 33.3 % (ref 34.8–46.1)
HGB BLD-MCNC: 10.9 G/DL (ref 11.5–15.4)
IMM GRANULOCYTES # BLD AUTO: 0.02 THOUSAND/UL (ref 0–0.2)
IMM GRANULOCYTES NFR BLD AUTO: 1 % (ref 0–2)
LYMPHOCYTES # BLD AUTO: 0.79 THOUSANDS/ÂΜL (ref 0.6–4.47)
LYMPHOCYTES NFR BLD AUTO: 22 % (ref 14–44)
MCH RBC QN AUTO: 30.5 PG (ref 26.8–34.3)
MCHC RBC AUTO-ENTMCNC: 32.7 G/DL (ref 31.4–37.4)
MCV RBC AUTO: 93 FL (ref 82–98)
MONOCYTES # BLD AUTO: 0.38 THOUSAND/ÂΜL (ref 0.17–1.22)
MONOCYTES NFR BLD AUTO: 11 % (ref 4–12)
NEUTROPHILS # BLD AUTO: 2.36 THOUSANDS/ÂΜL (ref 1.85–7.62)
NEUTS SEG NFR BLD AUTO: 64 % (ref 43–75)
NRBC BLD AUTO-RTO: 0 /100 WBCS
PLATELET # BLD AUTO: 68 THOUSANDS/UL (ref 149–390)
PMV BLD AUTO: 12.1 FL (ref 8.9–12.7)
POTASSIUM SERPL-SCNC: 3.5 MMOL/L (ref 3.5–5.3)
RBC # BLD AUTO: 3.57 MILLION/UL (ref 3.81–5.12)
RH BLD: POSITIVE
SODIUM SERPL-SCNC: 141 MMOL/L (ref 135–147)
SPECIMEN EXPIRATION DATE: NORMAL
WBC # BLD AUTO: 3.61 THOUSAND/UL (ref 4.31–10.16)

## 2023-06-07 PROCEDURE — 99223 1ST HOSP IP/OBS HIGH 75: CPT | Performed by: INTERNAL MEDICINE

## 2023-06-07 PROCEDURE — 86900 BLOOD TYPING SEROLOGIC ABO: CPT | Performed by: PHYSICIAN ASSISTANT

## 2023-06-07 PROCEDURE — 93005 ELECTROCARDIOGRAM TRACING: CPT

## 2023-06-07 PROCEDURE — 85025 COMPLETE CBC W/AUTO DIFF WBC: CPT | Performed by: PHYSICIAN ASSISTANT

## 2023-06-07 PROCEDURE — 86850 RBC ANTIBODY SCREEN: CPT | Performed by: PHYSICIAN ASSISTANT

## 2023-06-07 PROCEDURE — 80048 BASIC METABOLIC PNL TOTAL CA: CPT | Performed by: PHYSICIAN ASSISTANT

## 2023-06-07 PROCEDURE — 86901 BLOOD TYPING SEROLOGIC RH(D): CPT | Performed by: PHYSICIAN ASSISTANT

## 2023-06-07 PROCEDURE — P9037 PLATE PHERES LEUKOREDU IRRAD: HCPCS

## 2023-06-07 RX ORDER — POTASSIUM CHLORIDE 20 MEQ/1
40 TABLET, EXTENDED RELEASE ORAL ONCE
Status: COMPLETED | OUTPATIENT
Start: 2023-06-07 | End: 2023-06-07

## 2023-06-07 RX ORDER — LANOLIN ALCOHOL/MO/W.PET/CERES
3 CREAM (GRAM) TOPICAL
Status: DISCONTINUED | OUTPATIENT
Start: 2023-06-07 | End: 2023-06-10 | Stop reason: HOSPADM

## 2023-06-07 RX ORDER — HYDROXYZINE 50 MG/1
50 TABLET, FILM COATED ORAL EVERY 6 HOURS PRN
Status: DISCONTINUED | OUTPATIENT
Start: 2023-06-07 | End: 2023-06-10 | Stop reason: HOSPADM

## 2023-06-07 RX ORDER — POTASSIUM CHLORIDE 750 MG/1
10 TABLET, EXTENDED RELEASE ORAL DAILY
Status: DISCONTINUED | OUTPATIENT
Start: 2023-06-08 | End: 2023-06-10 | Stop reason: HOSPADM

## 2023-06-07 RX ORDER — CEFAZOLIN SODIUM 1 G/50ML
1000 SOLUTION INTRAVENOUS ONCE
Status: COMPLETED | OUTPATIENT
Start: 2023-06-08 | End: 2023-06-08

## 2023-06-07 RX ORDER — DOCUSATE SODIUM 100 MG/1
100 CAPSULE, LIQUID FILLED ORAL 2 TIMES DAILY PRN
Status: DISCONTINUED | OUTPATIENT
Start: 2023-06-07 | End: 2023-06-10 | Stop reason: HOSPADM

## 2023-06-07 RX ORDER — TORSEMIDE 10 MG/1
10 TABLET ORAL DAILY
Status: DISCONTINUED | OUTPATIENT
Start: 2023-06-08 | End: 2023-06-10 | Stop reason: HOSPADM

## 2023-06-07 RX ORDER — PREDNISONE 5 MG/1
5 TABLET ORAL DAILY
Status: DISCONTINUED | OUTPATIENT
Start: 2023-06-08 | End: 2023-06-10 | Stop reason: HOSPADM

## 2023-06-07 RX ADMIN — POTASSIUM CHLORIDE 40 MEQ: 1500 TABLET, EXTENDED RELEASE ORAL at 17:44

## 2023-06-07 NOTE — H&P
H&P Exam - Electrophysiology  Michelle Lew 78 y o  female MRN: [id number]  Unit/Bed#: [facility] 510-01 Encounter: [id number]    Assessment/Plan     Assessment:  1  Congestive heart failure, stage D, NYHA III  2  Dilated cardiomyopathy  3  Left bundle branch block at baseline  4  Essential hypertension  5  Pancytopenia with microcytic anemia, chronic ITP - platelets of 68 today  6  Autoimmune hepatitis  7  Bell's palsy    Plan:  Patient presented for optimization of blood counts prior to BiV ICD implantation tomorrow  Did consult hematology unfortunate unable to see patient today in the hospital   Did reach out to CRNP that patient follows as an outpatient, greatly appreciate hematologic help and will order platelet transfusion tonight  Will check platelets in the morning and should platelets not be around 100 as is preferred for her undergoing a procedure, will order another platelet transfusion  In terms of device, we did discuss ICD implantation and patient would want life-being measures moving forward  She has been set up for BiV ICD tomorrow with Dr Kishan Hanley  She will be fasting after midnight with no anticoagulation to hold  She has no prior surgeries to her left upper chest and it will be left-sided  She was advised that she will stay overnight afterwards, this may allow hematology to also see the patient to ensure that she is able to take Promacta  Son has been updated, is not formally medical POA so patient does sign her own consents  She did sign consent for platelet transfusion that will occur later today  We will also replete potassium  History of Present Illness   HPI:  Michelle Lew is a 78y o  year old female with dilated cardiomyopathy with EF of 20%, maintenance of goal-directed medical therapy, left bundle-branch block, chronic systolic congestive heart failure, non acidic anemia, hypothyroidism, and hepatitis      Patient follows with Dr Nilsa Esquivel of advanced heart failure for many years   She has had a low EF and has been maintained on GDMT for a while  I saw her in the office in 2019 to discuss defibrillator  He had discussed BiV ICD implantation given the fact she had been on GDMT of metoprolol and Entresto with no improvement in her ejection fraction along with baseline left bundle branch block  She is familiar with cardiac devices as her  does have a defibrillator and LVAD  She has been concerned at that point about her 's care and had deferred having this placed  More recently she was seen by Dr Sobeida Amaro in consultation last month at which point she did become agreeable to undergoing BiV ICD implantation  However, 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 and is on steroids with ITP and low platelet count  Therefore, it was recommended at the time of her consultation that she be admitted the day before to be tuned up from hematologic standpoint  There was some confusion today this morning when she was called by patient access to come in and therefore discussion was had on the phone with her son Wicho Brown and Dr Sobeida Amaro  Patient did become agreeable and presented for optimization prior to procedure tomorrow  EKG:       ROS as noted above, otherwise 12 point review of systems was performed and is negative       Historical Information   Past Medical History:   Diagnosis Date   • Acute bilateral low back pain without sciatica 2/10/2023   • Bell's palsy    • Cardiac disease    • Ear problems    • HL (hearing loss)    • Hypertension    • Sacroiliitis ([address] ) 8/10/2021   • Subdural hemorrhage (HCC) 3/30/2022   • Thrombocytopenia (HCC) 8/3/2018   • Tinnitus    • Traumatic subdural hemorrhage with loss of consciousness of unspecified duration, [facility] ) 2/15/2023     Past Surgical History:   Procedure Laterality Date   • CT BONE MARROW BIOPSY AND ASPIRATION  1/27/2021     Family History:   Family History   Problem Relation Age of Onset   • Autoimmune disease Neg Hx Social History   Social History     Substance and Sexual Activity   Alcohol Use Not Currently     Social History     Substance and Sexual Activity   Drug Use Not Currently     Social History     Tobacco Use   Smoking Status Never   Smokeless Tobacco Never       Meds/Allergies   all medications and allergies reviewed  Home Medications:   Medications Prior to Admission   Medication   • ascorbic acid (VITAMIN C) 500 mg tablet   • budesonide (ENTOCORT EC) 3 MG capsule   • cholecalciferol (VITAMIN D3) 1,000 units tablet   • cyanocobalamin (VITAMIN B-12) 100 mcg tablet   • eltrombopag (PROMACTA) 25 mg tablet   • potassium chloride (MICRO-K) 10 MEQ CR capsule   • predniSONE 5 mg tablet   • torsemide (DEMADEX) 10 mg tablet   • fluticasone (FLONASE) 50 mcg/act nasal spray   • omega-3-acid ethyl esters (LOVAZA) 1 g capsule   • sacubitril-valsartan (Entresto) 49-51 MG TABS   • spironolactone (ALDACTONE) 25 mg tablet       Allergies   Allergen Reactions   • Ambien [Zolpidem] Other (See Comments)     Did not allow sleep per pt       Objective   Vitals: Blood pressure 102/58, pulse 73, temperature 98 8 °F (37 1 °C), temperature source Oral, resp  rate 17, SpO2 99 %, not currently breastfeeding  Orthostatic Blood Pressures    Flowsheet Row Most Recent Value   Blood Pressure 102/58 filed at 06/07/2023 1522   Patient Position - Orthostatic VS Lying filed at 06/07/2023 1522          No intake or output data in the 24 hours ending 06/07/23 1705    Invasive Devices     None                 Physical Exam  Vitals and nursing note reviewed  Constitutional:       General: She is not in acute distress  Appearance: She is well-developed  She is not diaphoretic  Comments: Frail   HENT:      Head: Normocephalic and atraumatic  Eyes:      Pupils: Pupils are equal, round, and reactive to light  Neck:      Vascular: No JVD  Cardiovascular:      Rate and Rhythm: Normal rate and regular rhythm        Heart sounds: Normal heart sounds  No murmur heard  No friction rub  Pulmonary:      Effort: Pulmonary effort is normal       Breath sounds: Normal breath sounds  Musculoskeletal:      Cervical back: Normal range of motion  Skin:     General: Skin is warm and dry  Neurological:      Mental Status: She is alert  Lab Results: I have personally reviewed pertinent lab results  Results from last 7 days   Lab Units 23  1253 23  1021   HEMATOCRIT % 33 3* 37 5   HEMOGLOBIN g/dL 10 9* 11 8   PLATELETS Thousands/uL 68* 69*   WBC Thousand/uL 3 61* 3 93*     Results from last 7 days   Lab Units 23  1253 23  1021   BUN mg/dL 17 18   CALCIUM mg/dL 9 2 9 5   CHLORIDE mmol/L 109* 104   CO2 mmol/L 30 30   CREATININE mg/dL 0 71 0 73   POTASSIUM mmol/L 3 5 3 1*                 Imaging: I have personally reviewed pertinent reports  Results for orders placed during the hospital encounter of 21    Echo complete with contrast if indicated    Narrative  [facility] [address]  [address]  [phone]    Transthoracic Echocardiogram  2D, M-mode, Doppler, and Color Doppler    Study date:  2021    Patient: Dianelys Castelan  MR number: [id number]  Account number: [de-identified]  : 1940  Age: 78 years  Gender: Female  Status: Outpatient  Location: [address] [place] [address] [facility]  Height: 64 in  Weight: 128 lb  BP: 124/ 70 mmHg    Indications: Chronic systolic CHF;Dilated CM  Diagnoses: I42 9 - Cardiomyopathy, unspecified, J73 09 - Chronic systolic (congestive) heart failure    Sonographer:  LILY Saldivar  Primary Physician:  Sasha Mayorga MD  Referring Physician:  Tatiana Jordan DO  Group:  [facility]  Interpreting Physician:  Marisol Calhoun MD    SUMMARY    LEFT VENTRICLE:  The ventricle was moderately dilated  Systolic function was moderately to markedly reduced   Ejection fraction was estimated in the range of 25 % to 30 %   There was severe diffuse hypokinesis with regional variations  Features were consistent with a pseudonormal left ventricular filling pattern, with concomitant abnormal relaxation and increased filling pressure (grade 2 diastolic dysfunction)  MITRAL VALVE:  There was mild to moderate regurgitation  TRICUSPID VALVE:  There was mild regurgitation  HISTORY: PRIOR HISTORY: LBBB;DCM;HTN;Chronic systolic ht failure  PROCEDURE: The study was performed in the [address]  This was a routine study  The transthoracic approach was used  The study included complete 2D imaging, M-mode, complete spectral Doppler, and color Doppler  The  heart rate was 94 bpm, at the start of the study  Images were obtained from the parasternal, apical, subcostal, and suprasternal notch acoustic windows  Image quality was adequate  LEFT VENTRICLE: The ventricle was moderately dilated  Systolic function was moderately to markedly reduced  Ejection fraction was estimated in the range of 25 % to 30 %  There was severe diffuse hypokinesis with regional variations  Wall  thickness was normal  DOPPLER: Features were consistent with a pseudonormal left ventricular filling pattern, with concomitant abnormal relaxation and increased filling pressure (grade 2 diastolic dysfunction)  VENTRICULAR SEPTUM: These changes are consistent with LBBB  RIGHT VENTRICLE: The size was normal  Systolic function was normal  Wall thickness was normal     LEFT ATRIUM: Size was normal     RIGHT ATRIUM: Size was normal     MITRAL VALVE: DOPPLER: There was mild to moderate regurgitation  AORTIC VALVE: Leaflets exhibited mildly increased thickness  TRICUSPID VALVE: DOPPLER: There was mild regurgitation  PULMONIC VALVE: Not well visualized  PERICARDIUM: There was no pericardial effusion  The pericardium was normal in appearance  AORTA: The root exhibited normal size      SYSTEMIC VEINS: IVC: Respirophasic changes were normal     SYSTEM MEASUREMENT TABLES    2D  %FS: 17 21 %  Ao Diam: 2 62 cm  EDV(Teich): 245 88 ml  EF(Teich): 34 95 %  ESV(Teich): 159 95 ml  IVSd: 0 88 cm  LA Area: 11 51 cm2  LA Diam: 3 6 cm  LVEDV MOD A4C: 204 61 ml  LVEF MOD A4C: 25 91 %  LVESV MOD A4C: 151 61 ml  LVIDd: 6 88 cm  LVIDs: 5 7 cm  LVLd A4C: 8 5 cm  LVLs A4C: 7 89 cm  LVPWd: 1 09 cm  RA Area: 13 75 cm2  RVIDd: 3 98 cm  SV MOD A4C: 53 01 ml  SV(Teich): 85 93 ml    CW  TR Vmax: 2 75 m/s  TR maxP 3 mmHg    MM  TAPSE: 2 01 cm    PW  E' Sept: 0 01 m/s  E/E' Sept: 41 03  MV A Oseas: 0 9 m/s  MV Dec Pitkin: 6 65 m/s2  MV DecT: 86 92 ms  MV E Oseas: 0 58 m/s  MV E/A Ratio: 0 65  MV PHT: 25 21 ms  MVA By PHT: 8 73 cm2    [employer] Accredited Echocardiography Laboratory    Prepared and electronically signed by    Lory Saavedra MD  Signed 2021 09:17:01      Code Status: Level 1 - Full Code    ** Please Note: Dictation voice to text software may have been used in the creation of this document   **

## 2023-06-07 NOTE — Clinical Note
8/6/2020      RE: Argentina Hawk  [address] [facility] [address]       Argentina Hawk is a 11 year old female who is being evaluated via a billable video visit.          Video-Visit Details    Type of service:  Video Visit    Video Start Time: 802  Video End Time: 824    Originating Location (pt. Location): Home    Distant Location (provider location):  [facility] PEDIATRIC ENDOCRINE     Platform used for Video Visit: Salbador Menard MD            Pediatric Endocrinology Follow-up Consultation    Patient: Argentina Hawk MRN# [id number]   YOB: 2008 Age: 11 year 10 month old   Date of Visit: Aug 6, 2020    Dear Dr. Moy Salvador:    I had the pleasure of seeing your patient, Argentina Hawk in the Pediatric Endocrinology Clinic, [facility], on Aug 6, 2020 for a follow-up consultation of short stature .           Problem list:     Patient Active Problem List    Diagnosis Date Noted     Low IGF-1 level 10/30/2019     Priority: Medium     Growth failure 10/30/2019     Priority: Medium     Screening for eye condition 08/07/2017     Priority: Medium     October 3, 2017, March 13, 2018: normal eye examination.  September 18, 2018: Normal.       Immunosuppression, on etanercept 08/07/2017     Priority: Medium     ANCA (juvenile idiopathic arthritis), oligoarthritis, persistent (H) 11/25/2008     Priority: Medium     Recurrence of arthritis in January 2016 after having been off medications, manifested at that time as a swollen right knee.  Etanercept started and she was in remission shortly thereafter by May 2016.  Medications were discontinued per routine break in February 2018.  By June 2018, she had a recurrence of knee swelling.  Due to her frequent history of r arthritis recurrence off medications, description of symptoms and discomfort, she was started by phone back on Etanercept to which she responded well.       GERD  Pt belongings bilateral hearing aids, necklace, ear rings , and dentures (gastroesophageal reflux disease) 06/02/2008     Priority: Medium     Contact dermatitis and other eczema, due to unspecified cause 04/13/2007     Priority: Medium            HPI:   This represents my first follow-up visit with Argentina.  I saw her in October of 2019.  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  This occurred despite her ANCA being well controlled without the need for recent oral glucocorticoid courses.  AT our visit, her GH markers were markedly low and she had evidence for hypogonadotropic hypogonadism.  Her bone age was delayed at 11.  I had her undergo a primed GH stimulation test which was completed on 11/20/19.  She passed with a peak GH response of 15.  We had discussed a brief course of low dose estrogen therapy to stimulate puberty and agreed to start estrace at 0.5 mg every other day.  At our last visit, her growth rate had improved.  Her labs showed low but measureable estradiol and an undetectable LH level.  I recommended continuing on every other day estrace.    She has continued on Enbrel for her ANCA along with naproxen as needed.  She is followed by Dr. Salvador. Activitiy levels have improved overall though she has c/o more pain after swimming as of late (right knee followed by ankles).  Typically comes after exposure to cold water.  No oral glucocorticoids since I saw her last.  She has been taking the estrogen every other day.  No nausea.  Dad does feel like she is making progress with her height.  No headaches.  No swelling in lower legs.  No SOB.  Increase in her clothing size.  Progressive increase in her breast development.      History was obtained from patient and patient's parents.          Social History:     Social History     Social History Narrative    Physical grade in 8984-2623 school year. Swimming, softball in summer and fall. She likes [employer]. She likes science.      Biking  Some swimming this  "summer.  8th grade this year  Plays flute  Lives in [place]    Social history was reviewed and is unchanged. Refer to the initial note.         Family History:     Family History   Problem Relation Age of Onset     Asthma No family hx of      C.A.D. No family hx of      Diabetes No family hx of      Hypertension No family hx of      Cerebrovascular Disease No family hx of      Breast Cancer No family hx of      Cancer - colorectal No family hx of      Prostate Cancer No family hx of      MPH 5'4\"  Dad- delayed puberty  Family history was reviewed and is unchanged. Refer to the initial note.         Allergies:   No Known Allergies          Medications:     Current Outpatient Medications   Medication Sig Dispense Refill     estradiol (ESTRACE) 0.5 MG tablet Take 1 tablet (0.5 mg) by mouth every other day 15 tablet 6     etanercept (ENBREL) 25 MG vial injection kit Inject 1 mL (25 mg) Subcutaneous once a week 2 kit 5     naproxen (NAPROSYN) 375 MG tablet Take 1 tablet (375 mg) by mouth 2 times daily (with meals) (Patient not taking: Reported on 8/6/2020) 60 tablet 1             Review of Systems:   Gen: negative  Eye: Negative  ENT: Negative  Pulmonary:  Negative  Cardio: Negative  Gastrointestinal: Negative  Hematologic: Negative  Genitourinary: Negative  Musculoskeletal: right knee pain improved after starting naproxen  Psychiatric: Negative  Neurologic: Negative  Skin: Negative  Endocrine: see HPI.            Physical Exam:   There were no vitals taken for this visit.  No blood pressure reading on file for this encounter.  Height: 0 cm  (57.11\") No height on file for this encounter.  Weight: Patient weight not available., No weight on file for this encounter.  BMI: There is no height or weight on file to calculate BMI. No height and weight on file for this encounter.    No recent measurements.    No exam available.        Laboratory results:     Component      Latest Ref Rng & Units 3/5/2020           8:50 AM "   IGF Binding Protein3      3.3 - 9.4 ug/mL 5.3   IGF Binding Protein 3 SD Score       NEG 0.7   Lab Scanned Result       IGF-1 PEDIATRIC-123 (-2.8)   Estradiol Ultrasensitive      pg/mL 16     Component      Latest Ref Rng & Units 3/5/2020           8:50 AM   Lab Scanned Result       LH (7Y AND OLDER)-<0.005     11/19 114 (-2.9)       Assessment and Plan:   Argentina is now 13 10/2 years old female with a history for short stature and delayed puberty.  By dad's report, she has made additional progress into puberty on a minimal amount of introductory estrogen.  At this point, I believe we can discontinue as she should be into central puberty on her own.  We do not have a recent height measurement and will arrange to have that performed along with a follow-up bone age.  Historically, it does appear that she is continuing to accelerate with her linear growth.     Orders Placed This Encounter   Procedures     X-ray Bone age hand pediatrics     Patient Instructions   [facility]  [facility] [place]    1.  Stop estradiol  2.  Return for bone age x-ray  3.  Stop by clinic for a nurse visit (height/weight check)  4.  Staff to contact you to set both of these up  5.  Will contact you with bone age results  6.  Follow-up virtual visit again in about 4-5 months        Pediatric Call Center Scheduling and Nurse Questions:  [phone]  Eveline Ramos RN Care Coordinator    After Hours Needing Immediate Care:  [phone].  Ask for the on-call pediatric doctor for the specialty you are calling for be paged.  For dermatology urgent matters that cannot wait until the next business day, is over a holiday and/or a weekend please call [phone] and ask for the Dermatology Resident On-Call to be paged.    Prescription Renewals:  Please call your pharmacy first.  Your pharmacy must fax requests to [phone].  Please allow 2-3 days for prescriptions to be authorized.    If your physician has  ordered a CT or MRI, you may schedule this test by calling [facility] in [place] at [phone].    **If your child is having a sedated procedure, they will need a history and physical done at their Primary Care Provider within 30 days of the procedure.  If your child was seen by the ordering provider in our office within 30 days of the procedure, their visit summary will work for the H&P unless they inform you otherwise.  If you have any questions, please call the RN Care Coordinator.**      Thank you for allowing me to participate in the care of your patient.  Please do not hesitate to call with questions or concerns.    Sincerely,    Masoud Menard MD    Pager [phone]      CC  Patient Care Team:  Helen Carrion MD as PCP - General  Evita Davis (Nurse Practitioner - Pediatrics)  Mayda Shetty MD as MD (Ophthalmology)  Deni Gillespie MD as MD (Pediatric Pulmonology)  GRAYSON FAYE A    Copy to patient  Parent(s) of Argentina Hawk  [address] [facility] [address]

## 2023-06-07 NOTE — CASE MANAGEMENT
Case Management Assessment    Patient name Alexander Colon  Location [facility] 727-61 MRN [id number]  : 1940 Date 2023       Current Admission Date: 2023  Current Admission Diagnosis:Chronic systolic heart failure [facility])   Patient Active Problem List    Diagnosis Date Noted   • Acute blood loss anemia 2023   • Hypokalemia 2023   • Iron deficiency anemia due to chronic blood loss 2023   • Presence of heart assist device ([address] ) 02/15/2023   • Moderate protein-calorie malnutrition ([address] ) 2022   • Pancytopenia ([address] ) 2022   • Hypotension 2022   • Headache 2022   • Bilateral hearing loss 2022   • Hypertensive heart disease with congestive heart failure ([address] ) 2021   • Chronic ITP (idiopathic thrombocytopenia) ([address] ) 11/10/2021   • Neutropenia, unspecified type ([address] ) 08/10/2021   • Left-sided Bell's palsy 08/10/2021   • LEA positive 2020   • Complete left bundle branch block (LBBB) 10/22/2019   • Dilated cardiomyopathy ([address] ) 2018   • Chronic systolic heart failure ([address] ) 2018   • Other specified glaucoma 2018   • Autoimmune hepatitis treated with steroids ([address] ) 2018   • Depressive disorder 2017      LOS (days): 0  Geometric Mean LOS (GMLOS) (days):   Days to GMLOS:     OBJECTIVE:    Risk of Unplanned Readmission Score: 10 89         Current admission status: Inpatient       Pref[facility] Pharmacy:   [facility] [address] [place] [address]   Phone: [phone] Fax: [phone]    [facility] [place] [id number] [place] [address]  Phone: [phone] Fax: [phone]    [address] [place] [address], [address] [place] [address] Drive [address]  Phone: [phone] Fax: [phone]    Primary Care Provider: King Aryan MD    Primary Insurance: MEDICARE  Secondary Insurance: [address] SHIELD    ASSESSMENT:  [address] [facility] Representative - Son   Primary Phone: [phone] (Mobile)                              Patient Information  Admitted from[de-identified] Home  Mental Status: Alert  During Assessment patient was accompanied by: Son  Assessment information provided by[de-identified] Son  Primary Caregiver: Self  Support Systems: Son, Spouse/significant other  Home entry access options   Select all that apply : Stairs  Number of steps to enter home : 3  Do the steps have railings?: Yes  Type of Current Residence: 2 story home  Upon entering residence, is there a bedroom on the main floor (no further steps)?: No  A bedroom is located on the following floor levels of residence (select all that apply):: 2nd Floor  Upon entering residence, is there a bathroom on the main floor (no further steps)?: Yes  Homeless/housing insecurity resource given?: N/A  Living Arrangements: Lives w/ Spouse/significant other    Activities of Daily Living Prior to Admission  Functional Status: Independent  Completes ADLs independently?: Yes  Ambulates independently?: Yes  Does patient use assisted devices?: Yes  Assisted Devices (DME) used: Straight Cane  Does patient currently own DME?: Yes  What DME does the patient currently own?: Straight [employer]  Does patient have a history of Outpatient Therapy (PT/OT)?: Yes  Does the patient have a history of Short-Term Rehab?: No  Does patient have a history of HHC?: Yes  Does patient currently have [address]?: No         Patient Information Continued  Income Source: Pension/FDC  Food insecurity resource given?: N/A  Does patient receive dialysis treatments?: No  Does patient have a history of substance abuse?: No  Does patient have a history of Mental Health Diagnosis?: No         Means of Transportation  Means of Transport to [facility][de-identified] Family transport  Was application for public transport provided?: N/A      CM called pt room to complete assessment, pt passed the phone to son to answer questions  Per Rand Lyn (son) pt is independent, frequently uses a cane  Bedroom is on the second floor but there is a bedroom available on the first floor  Home aids from [employer] comes to the home to assist pt  with ADLs

## 2023-06-07 NOTE — Clinical Note
Pt is leaving the room per crna and multiple techs with  (mary antoine, harmeet kraft) her hearing aids, dentures, necklace and earings

## 2023-06-07 NOTE — ANESTHESIA PREPROCEDURE EVALUATION
Procedure:  Cardiac biv icd implant, Left side, use ABX (Chest)    Relevant Problems   CARDIO   (+) Complete left bundle branch block (LBBB)   (+) Hypertensive heart disease with congestive heart failure (HCC)      GI/HEPATIC   (+) Autoimmune hepatitis treated with steroids (HCC)      HEMATOLOGY   (+) Acute blood loss anemia   (+) Chronic ITP (idiopathic thrombocytopenia) (HCC)   (+) Iron deficiency anemia due to chronic blood loss   (+) Pancytopenia (HCC)      NEURO/PSYCH   (+) Depressive disorder   (+) Headache     TTE (04/2022):  Left Ventricle Left ventricular cavity size is moderately dilated  Wall thickness is normal  The left ventricular ejection fraction is 20%  Systolic function is severely reduced  There is severe global hypokinesis with dyskinesis of the interventricular septum  Diastolic function is abnormal       Right Ventricle Right ventricular cavity size is normal  Systolic function is normal  Wall thickness is normal       Left Atrium The atrium is normal in size  Right Atrium The atrium is normal in size  Aortic Valve The aortic valve is trileaflet  The leaflets are not thickened  The leaflets are not calcified  The leaflets exhibit normal mobility  There is trace regurgitation  There is no evidence of stenosis  Mitral Valve The mitral valve has normal structure and function  There is mild to moderate regurgitation  There is no evidence of stenosis  Tricuspid Valve Tricuspid valve structure is normal  There is trace regurgitation  There is no evidence of stenosis  There is no indirect evidence of pulmonary hypertension  Pulmonic Valve Pulmonic valve structure is normal  There is no evidence of regurgitation  There is no evidence of stenosis  Ascending Aorta The aortic root is normal in size  IVC/SVC The inferior vena cava is normal in size  Pericardium There is no pericardial effusion  The pericardium is normal in appearance          [facility] (08/2018):  CORONARY VESSELS:   --  The coronary circulation is right dominant  --  Left main: Normal   --  LAD: Normal   --  Circumflex: Normal   --  RCA: Normal      IMPRESSIONS:  The coronary arteries are normal       Smoking Status: Never   Smokeless Tobacco Status: Never   Alcohol use: Not Currently   Drug use: Not Currently     Lab Results   Component Value Date    HCT 33 3 (L) 06/07/2023    HGB 10 9 (L) 06/07/2023    MCV 93 06/07/2023    PLT 68 (L) 06/07/2023    WBC 3 61 (L) 06/07/2023          Chemistry        Component Value Date/Time    BUN 17 06/07/2023 1253    BUN 12 12/04/2015 1526     (H) 06/07/2023 1253     12/04/2015 1526    CO2 30 06/07/2023 1253    CO2 30 12/04/2015 1526    CREATININE 0 71 06/07/2023 1253    CREATININE 0 77 12/04/2015 1526    K 3 5 06/07/2023 1253    K 3 6 12/04/2015 1526     12/04/2015 1526        Component Value Date/Time    ALKPHOS 74 06/03/2023 1021    ALKPHOS 52 12/04/2015 1526    ALT 15 06/03/2023 1021    ALT 61 12/04/2015 1526    AST 26 06/03/2023 1021    AST 32 12/04/2015 1526    BILITOT 0 48 12/04/2015 1526    CALCIUM 9 2 06/07/2023 1253    CALCIUM 8 7 12/04/2015 1526            Physical Exam    Airway    Mallampati score: II         Dental   upper dentures,     Cardiovascular  Rhythm: regular, Rate: normal,     Pulmonary  Breath sounds clear to auscultation,     Other Findings  Intercisor Distance > 3cm          Anesthesia Plan  ASA Score- 4     Anesthesia Type- IV sedation with anesthesia with ASA Monitors  Additional Monitors:   Airway Plan:     Comment: NPO appropriate  Discussed benefits/risks of monitored anesthetic care which involves providing a dynamic level of mild to deep sedation  Complications include awareness and/or airway obstruction/aspiration which may necessitate conversion to general anesthesia  All questions answered  Patient understands and wishes to proceed          Plan Factors-Exercise tolerance (METS): >4 METS  Chart reviewed   EKG reviewed  Existing labs reviewed  Induction-     Postoperative Plan- Plan for postoperative opioid use  Informed Consent- Anesthetic plan and risks discussed with patient  I personally reviewed this patient with the CRNA  Discussed and agreed on the Anesthesia Plan with the CRNA  Lizandro Fontenot

## 2023-06-08 ENCOUNTER — ANESTHESIA (INPATIENT)
Dept: NON INVASIVE DIAGNOSTICS | Facility: HOSPITAL | Age: 83
DRG: 227 | End: 2023-06-08
Payer: MEDICARE

## 2023-06-08 ENCOUNTER — APPOINTMENT (OUTPATIENT)
Dept: RADIOLOGY | Facility: HOSPITAL | Age: 83
DRG: 227 | End: 2023-06-08
Payer: MEDICARE

## 2023-06-08 LAB
ANION GAP SERPL CALCULATED.3IONS-SCNC: 1 MMOL/L (ref 4–13)
ATRIAL RATE: 79 BPM
ATRIAL RATE: 83 BPM
BASOPHILS # BLD AUTO: 0.02 THOUSANDS/ÂΜL (ref 0–0.1)
BASOPHILS NFR BLD AUTO: 1 % (ref 0–1)
BUN SERPL-MCNC: 16 MG/DL (ref 5–25)
CALCIUM SERPL-MCNC: 8.9 MG/DL (ref 8.3–10.1)
CHLORIDE SERPL-SCNC: 112 MMOL/L (ref 96–108)
CO2 SERPL-SCNC: 28 MMOL/L (ref 21–32)
CREAT SERPL-MCNC: 0.71 MG/DL (ref 0.6–1.3)
EOSINOPHIL # BLD AUTO: 0.03 THOUSAND/ÂΜL (ref 0–0.61)
EOSINOPHIL NFR BLD AUTO: 1 % (ref 0–6)
ERYTHROCYTE [DISTWIDTH] IN BLOOD BY AUTOMATED COUNT: 16.4 % (ref 11.6–15.1)
GFR SERPL CREATININE-BSD FRML MDRD: 77 ML/MIN/1.73SQ M
GLUCOSE SERPL-MCNC: 113 MG/DL (ref 65–140)
GLUCOSE SERPL-MCNC: 83 MG/DL (ref 65–140)
HCT VFR BLD AUTO: 32.7 % (ref 34.8–46.1)
HGB BLD-MCNC: 10.7 G/DL (ref 11.5–15.4)
IMM GRANULOCYTES # BLD AUTO: 0.02 THOUSAND/UL (ref 0–0.2)
IMM GRANULOCYTES NFR BLD AUTO: 1 % (ref 0–2)
LYMPHOCYTES # BLD AUTO: 1.09 THOUSANDS/ÂΜL (ref 0.6–4.47)
LYMPHOCYTES NFR BLD AUTO: 28 % (ref 14–44)
MCH RBC QN AUTO: 31.2 PG (ref 26.8–34.3)
MCHC RBC AUTO-ENTMCNC: 32.7 G/DL (ref 31.4–37.4)
MCV RBC AUTO: 95 FL (ref 82–98)
MONOCYTES # BLD AUTO: 0.41 THOUSAND/ÂΜL (ref 0.17–1.22)
MONOCYTES NFR BLD AUTO: 11 % (ref 4–12)
NEUTROPHILS # BLD AUTO: 2.33 THOUSANDS/ÂΜL (ref 1.85–7.62)
NEUTS SEG NFR BLD AUTO: 58 % (ref 43–75)
NRBC BLD AUTO-RTO: 0 /100 WBCS
P AXIS: 72 DEGREES
P AXIS: 74 DEGREES
PLATELET # BLD AUTO: 80 THOUSANDS/UL (ref 149–390)
PMV BLD AUTO: 12.1 FL (ref 8.9–12.7)
POTASSIUM SERPL-SCNC: 4.5 MMOL/L (ref 3.5–5.3)
PR INTERVAL: 148 MS
PR INTERVAL: 162 MS
QRS AXIS: -24 DEGREES
QRS AXIS: -42 DEGREES
QRSD INTERVAL: 154 MS
QRSD INTERVAL: 178 MS
QT INTERVAL: 462 MS
QT INTERVAL: 472 MS
QTC INTERVAL: 541 MS
QTC INTERVAL: 542 MS
RBC # BLD AUTO: 3.43 MILLION/UL (ref 3.81–5.12)
SODIUM SERPL-SCNC: 141 MMOL/L (ref 135–147)
T WAVE AXIS: 130 DEGREES
T WAVE AXIS: 162 DEGREES
VENTRICULAR RATE: 79 BPM
VENTRICULAR RATE: 83 BPM
WBC # BLD AUTO: 3.9 THOUSAND/UL (ref 4.31–10.16)

## 2023-06-08 PROCEDURE — P9037 PLATE PHERES LEUKOREDU IRRAD: HCPCS

## 2023-06-08 PROCEDURE — 33225 L VENTRIC PACING LEAD ADD-ON: CPT | Performed by: INTERNAL MEDICINE

## 2023-06-08 PROCEDURE — 0JH609Z INSERTION OF CARDIAC RESYNCHRONIZATION DEFIBRILLATOR PULSE GENERATOR INTO CHEST SUBCUTANEOUS TISSUE AND FASCIA, OPEN APPROACH: ICD-10-PCS | Performed by: INTERNAL MEDICINE

## 2023-06-08 PROCEDURE — C1894 INTRO/SHEATH, NON-LASER: HCPCS | Performed by: INTERNAL MEDICINE

## 2023-06-08 PROCEDURE — C1892 INTRO/SHEATH,FIXED,PEEL-AWAY: HCPCS | Performed by: INTERNAL MEDICINE

## 2023-06-08 PROCEDURE — 99223 1ST HOSP IP/OBS HIGH 75: CPT | Performed by: INTERNAL MEDICINE

## 2023-06-08 PROCEDURE — C1887 CATHETER, GUIDING: HCPCS | Performed by: INTERNAL MEDICINE

## 2023-06-08 PROCEDURE — C1898 LEAD, PMKR, OTHER THAN TRANS: HCPCS | Performed by: INTERNAL MEDICINE

## 2023-06-08 PROCEDURE — 85025 COMPLETE CBC W/AUTO DIFF WBC: CPT | Performed by: PHYSICIAN ASSISTANT

## 2023-06-08 PROCEDURE — C1777 LEAD, AICD, ENDO SINGLE COIL: HCPCS | Performed by: INTERNAL MEDICINE

## 2023-06-08 PROCEDURE — C1900 LEAD, CORONARY VENOUS: HCPCS | Performed by: INTERNAL MEDICINE

## 2023-06-08 PROCEDURE — C1882 AICD, OTHER THAN SING/DUAL: HCPCS | Performed by: INTERNAL MEDICINE

## 2023-06-08 PROCEDURE — 33208 INSRT HEART PM ATRIAL & VENT: CPT | Performed by: INTERNAL MEDICINE

## 2023-06-08 PROCEDURE — 02H63KZ INSERTION OF DEFIBRILLATOR LEAD INTO RIGHT ATRIUM, PERCUTANEOUS APPROACH: ICD-10-PCS | Performed by: INTERNAL MEDICINE

## 2023-06-08 PROCEDURE — 3E0102A INTRODUCTION OF ANTI-INFECTIVE ENVELOPE INTO SUBCUTANEOUS TISSUE, OPEN APPROACH: ICD-10-PCS | Performed by: INTERNAL MEDICINE

## 2023-06-08 PROCEDURE — 93010 ELECTROCARDIOGRAM REPORT: CPT | Performed by: INTERNAL MEDICINE

## 2023-06-08 PROCEDURE — C1730 CATH, EP, 19 OR FEW ELECT: HCPCS | Performed by: INTERNAL MEDICINE

## 2023-06-08 PROCEDURE — 02HK3KZ INSERTION OF DEFIBRILLATOR LEAD INTO RIGHT VENTRICLE, PERCUTANEOUS APPROACH: ICD-10-PCS | Performed by: INTERNAL MEDICINE

## 2023-06-08 PROCEDURE — 93005 ELECTROCARDIOGRAM TRACING: CPT

## 2023-06-08 PROCEDURE — 02K83ZZ MAP CONDUCTION MECHANISM, PERCUTANEOUS APPROACH: ICD-10-PCS | Performed by: INTERNAL MEDICINE

## 2023-06-08 PROCEDURE — 33249 INSJ/RPLCMT DEFIB W/LEAD(S): CPT | Performed by: INTERNAL MEDICINE

## 2023-06-08 PROCEDURE — 82948 REAGENT STRIP/BLOOD GLUCOSE: CPT

## 2023-06-08 PROCEDURE — 71045 X-RAY EXAM CHEST 1 VIEW: CPT

## 2023-06-08 PROCEDURE — 80048 BASIC METABOLIC PNL TOTAL CA: CPT | Performed by: PHYSICIAN ASSISTANT

## 2023-06-08 PROCEDURE — 02H43KZ INSERTION OF DEFIBRILLATOR LEAD INTO CORONARY VEIN, PERCUTANEOUS APPROACH: ICD-10-PCS | Performed by: INTERNAL MEDICINE

## 2023-06-08 PROCEDURE — C1769 GUIDE WIRE: HCPCS | Performed by: INTERNAL MEDICINE

## 2023-06-08 PROCEDURE — 30233R1 TRANSFUSION OF NONAUTOLOGOUS PLATELETS INTO PERIPHERAL VEIN, PERCUTANEOUS APPROACH: ICD-10-PCS | Performed by: INTERNAL MEDICINE

## 2023-06-08 DEVICE — ENVELOPE CMRM6133 ABSORB LRG MR
Type: IMPLANTABLE DEVICE | Site: CHEST  WALL | Status: FUNCTIONAL
Brand: TYRX™

## 2023-06-08 DEVICE — LEAD 6935M62 QUATTRO SECURE S MRI US
Type: IMPLANTABLE DEVICE | Site: HEART | Status: FUNCTIONAL
Brand: SPRINT QUATTRO SECURE S MRI™ SURESCAN™

## 2023-06-08 DEVICE — LEAD 457453 MRI US BI RCMCRD MVC
Type: IMPLANTABLE DEVICE | Site: HEART | Status: FUNCTIONAL
Brand: CAPSURE SENSE MRI™ SURESCAN™

## 2023-06-08 DEVICE — LEAD 459888 MRI S-TIP US
Type: IMPLANTABLE DEVICE | Site: HEART | Status: FUNCTIONAL
Brand: ATTAIN PERFORMA™ S MRI SURESCAN™

## 2023-06-08 DEVICE — CRTD DTMA1QQ CLARIA MRI QUAD US DF4
Type: IMPLANTABLE DEVICE | Site: CHEST  WALL | Status: FUNCTIONAL
Brand: CLARIA MRI™ QUAD CRT-D SURESCAN™

## 2023-06-08 RX ORDER — GENTAMICIN SULFATE 40 MG/ML
INJECTION, SOLUTION INTRAMUSCULAR; INTRAVENOUS CODE/TRAUMA/SEDATION MEDICATION
Status: DISCONTINUED | OUTPATIENT
Start: 2023-06-08 | End: 2023-06-08 | Stop reason: HOSPADM

## 2023-06-08 RX ORDER — PROPOFOL 10 MG/ML
INJECTION, EMULSION INTRAVENOUS CONTINUOUS PRN
Status: DISCONTINUED | OUTPATIENT
Start: 2023-06-08 | End: 2023-06-08

## 2023-06-08 RX ORDER — LIDOCAINE HYDROCHLORIDE 10 MG/ML
INJECTION, SOLUTION EPIDURAL; INFILTRATION; INTRACAUDAL; PERINEURAL CODE/TRAUMA/SEDATION MEDICATION
Status: DISCONTINUED | OUTPATIENT
Start: 2023-06-08 | End: 2023-06-08 | Stop reason: HOSPADM

## 2023-06-08 RX ORDER — LIDOCAINE HYDROCHLORIDE AND EPINEPHRINE 10; 10 MG/ML; UG/ML
INJECTION, SOLUTION INFILTRATION; PERINEURAL CODE/TRAUMA/SEDATION MEDICATION
Status: DISCONTINUED | OUTPATIENT
Start: 2023-06-08 | End: 2023-06-08 | Stop reason: HOSPADM

## 2023-06-08 RX ORDER — PROPOFOL 10 MG/ML
INJECTION, EMULSION INTRAVENOUS AS NEEDED
Status: DISCONTINUED | OUTPATIENT
Start: 2023-06-08 | End: 2023-06-08

## 2023-06-08 RX ORDER — ACETAMINOPHEN 325 MG/1
650 TABLET ORAL EVERY 4 HOURS PRN
Status: DISCONTINUED | OUTPATIENT
Start: 2023-06-08 | End: 2023-06-10 | Stop reason: HOSPADM

## 2023-06-08 RX ORDER — SODIUM CHLORIDE 9 MG/ML
INJECTION, SOLUTION INTRAVENOUS CONTINUOUS PRN
Status: DISCONTINUED | OUTPATIENT
Start: 2023-06-08 | End: 2023-06-08

## 2023-06-08 RX ADMIN — PROPOFOL 10 MG: 10 INJECTION, EMULSION INTRAVENOUS at 09:40

## 2023-06-08 RX ADMIN — TORSEMIDE 10 MG: 10 TABLET ORAL at 16:54

## 2023-06-08 RX ADMIN — PROPOFOL 10 MG: 10 INJECTION, EMULSION INTRAVENOUS at 09:50

## 2023-06-08 RX ADMIN — POTASSIUM CHLORIDE 10 MEQ: 750 TABLET, EXTENDED RELEASE ORAL at 10:53

## 2023-06-08 RX ADMIN — ACETAMINOPHEN 650 MG: 325 TABLET, FILM COATED ORAL at 13:39

## 2023-06-08 RX ADMIN — PROPOFOL 40 MCG/KG/MIN: 10 INJECTION, EMULSION INTRAVENOUS at 08:09

## 2023-06-08 RX ADMIN — CEFAZOLIN SODIUM 1000 MG: 1 SOLUTION INTRAVENOUS at 08:10

## 2023-06-08 RX ADMIN — PROPOFOL 20 MG: 10 INJECTION, EMULSION INTRAVENOUS at 10:09

## 2023-06-08 RX ADMIN — PHENYLEPHRINE HYDROCHLORIDE 30 MCG/MIN: 10 INJECTION INTRAVENOUS at 08:24

## 2023-06-08 RX ADMIN — SODIUM CHLORIDE: 0.9 INJECTION, SOLUTION INTRAVENOUS at 08:09

## 2023-06-08 RX ADMIN — PROPOFOL 10 MG: 10 INJECTION, EMULSION INTRAVENOUS at 09:26

## 2023-06-08 RX ADMIN — PROPOFOL 10 MG: 10 INJECTION, EMULSION INTRAVENOUS at 09:59

## 2023-06-08 RX ADMIN — PREDNISONE 5 MG: 5 TABLET ORAL at 10:54

## 2023-06-08 NOTE — CONSULTS
Medical Oncology/Hematology Consult Note  Vishnu Tate, female, 78 y o , 1940,  PPHP 510/PPHP 510-01, [id number]     Reason for consultation:   Low platelet count    Assessment and Plan:  1  Thrombocytopenia    PLAN:  -Monitor CBC for changes in platelets  Outpatient follow up plan: to be set up by inpatient Cancer Coordinator, CECILE Herring    Communication with patient/family: Updated patient's family  Thank you for this consult  Grecia Fishman, Medical Student  Hematology-Oncology       History of present illness:  Vishnu Tate is a 78 y o  female presenting for a BiV ICD procedure  Patient has a PMH of Chronic HF, Reduced RV systolic function, pancytopenia, htn, and hypothyroidism  Patient had a platelet transfusion just prior to the procedure, and we saw the patient just after the procedure  Patient was alert after the procedure and we discussed the CBC with the patient and her family  Will continue to monitor platelets on the CBC  Review of Systems:   Review of Systems   Constitutional: Negative for chills and fever  HENT: Negative for ear pain and sore throat  Eyes: Negative for pain and visual disturbance  Respiratory: Negative for cough and shortness of breath  Cardiovascular: Negative for chest pain and palpitations  Gastrointestinal: Negative for abdominal pain and vomiting  Genitourinary: Negative for dysuria and hematuria  Musculoskeletal: Negative for arthralgias and back pain  Skin: Negative for color change and rash  Neurological: Negative for seizures and syncope  All other systems reviewed and are negative  Oncology History:   Cancer Staging   No matching staging information was found for the patient  Oncology History    No history exists         Past Medical History:   Past Medical History:   Diagnosis Date   • Acute bilateral low back pain without sciatica 2/10/2023   • Bell's palsy    • Cardiac disease    • Ear problems    • HL (hearing loss)    • Hypertension    • Sacroiliitis ([facility] ) 8/10/2021   • Subdural hemorrhage ([facility] ) 3/30/2022   • Thrombocytopenia (HCC) 8/3/2018   • Tinnitus    • Traumatic subdural hemorrhage with loss of consciousness of unspecified duration, initial encounter ([facility] ) 2/15/2023       Past Surgical History:   Procedure Laterality Date   • CT BONE MARROW BIOPSY AND ASPIRATION  1/27/2021       Family History   Problem Relation Age of Onset   • Autoimmune disease Neg Hx        Social History     Socioeconomic History   • Marital status: /Civil Union     Spouse name: None   • Number of children: None   • Years of education: None   • Highest education level: None   Occupational History   • None   Tobacco Use   • Smoking status: Never   • Smokeless tobacco: Never   Vaping Use   • Vaping Use: Never used   Substance and Sexual Activity   • Alcohol use: Not Currently   • Drug use: Not Currently   • Sexual activity: Not Currently     Partners: Male   Other Topics Concern   • None   Social History Narrative   • None     Social Determinants of Health     Financial Resource Strain: Low Risk  (8/15/2022)    Overall Financial Resource Strain (CARDIA)    • Difficulty of Paying Living Expenses: Not hard at all   Food Insecurity: No Food Insecurity (3/22/2023)    Hunger Vital Sign    • Worried About Running Out of Food in the Last Year: Never true    • Ran Out of Food in the Last Year: Never true   Transportation Needs: No Transportation Needs (3/22/2023)    PRAPARE - Transportation    • Lack of Transportation (Medical): No    • Lack of Transportation (Non-Medical):  No   Physical Activity: Not on file   Stress: Not on file   Social Connections: Not on file   Intimate Partner Violence: Not on file   Housing Stability: Low Risk  (3/22/2023)    Housing Stability Vital Sign    • Unable to Pay for Housing in the Last Year: No    • Number of Places Lived in the Last Year: 1    • Unstable Housing in the Last Year: No         Current Facility-Administered Medications:   •  acetaminophen (TYLENOL) tablet 650 mg, 650 mg, Oral, Q4H PRN, Jay Jay Rendon PA-C  •  docusate sodium (COLACE) capsule 100 mg, 100 mg, Oral, BID PRN, Dulce Blanco PA-C  •  hydrOXYzine HCL (ATARAX) tablet 50 mg, 50 mg, Oral, Q6H PRN, Dulce Blanco PA-C  •  melatonin tablet 3 mg, 3 mg, Oral, HS PRN, Dulce Blanco PA-C  •  potassium chloride (K-DUR,KLOR-CON) CR tablet 10 mEq, 10 mEq, Oral, Daily, Dulce GEORGE Blanco  •  predniSONE tablet 5 mg, 5 mg, Oral, Daily, Dulce Blanco PA-C  •  torsemide (DEMADEX) tablet 10 mg, 10 mg, Oral, Daily, Dulce Blanco PA-C    Medications Prior to Admission   Medication   • ascorbic acid (VITAMIN C) 500 mg tablet   • budesonide (ENTOCORT EC) 3 MG capsule   • cholecalciferol (VITAMIN D3) 1,000 units tablet   • cyanocobalamin (VITAMIN B-12) 100 mcg tablet   • eltrombopag (PROMACTA) 25 mg tablet   • potassium chloride (MICRO-K) 10 MEQ CR capsule   • predniSONE 5 mg tablet   • torsemide (DEMADEX) 10 mg tablet   • fluticasone (FLONASE) 50 mcg/act nasal spray   • omega-3-acid ethyl esters (LOVAZA) 1 g capsule   • sacubitril-valsartan (Entresto) 49-51 MG TABS   • spironolactone (ALDACTONE) 25 mg tablet       Allergies   Allergen Reactions   • Ambien [Zolpidem] Other (See Comments)     Did not allow sleep per pt         Physical Exam:     /72   Pulse 85   Temp 98 3 °F (36 8 °C)   Resp 18   SpO2 99%     Physical Exam  Cardiovascular:      Rate and Rhythm: Normal rate and regular rhythm  Pulses: Normal pulses  Heart sounds: Normal heart sounds  Pulmonary:      Effort: Pulmonary effort is normal    Neurological:      General: No focal deficit present  Mental Status: She is alert           Recent Results (from the past 48 hour(s))   CBC and differential    Collection Time: 06/07/23 12:53 PM   Result Value Ref Range    WBC 3 61 (L) 4 31 - 10 16 Thousand/uL    RBC 3 57 (L) 3 81 - 5 12 Million/uL    Hemoglobin 10 9 (L) 11 5 - 15 4 g/dL Hematocrit 33 3 (L) 34 8 - 46 1 %    MCV 93 82 - 98 fL    MCH 30 5 26 8 - 34 3 pg    MCHC 32 7 31 4 - 37 4 g/dL    RDW 16 3 (H) 11 6 - 15 1 %    MPV 12 1 8 9 - 12 7 fL    Platelets 68 (L) 946 - 390 Thousands/uL    nRBC 0 /100 WBCs    Neutrophils Relative 64 43 - 75 %    Immat GRANS % 1 0 - 2 %    Lymphocytes Relative 22 14 - 44 %    Monocytes Relative 11 4 - 12 %    Eosinophils Relative 1 0 - 6 %    Basophils Relative 1 0 - 1 %    Neutrophils Absolute 2 36 1 85 - 7 62 Thousands/µL    Immature Grans Absolute 0 02 0 00 - 0 20 Thousand/uL    Lymphocytes Absolute 0 79 0 60 - 4 47 Thousands/µL    Monocytes Absolute 0 38 0 17 - 1 22 Thousand/µL    Eosinophils Absolute 0 02 0 00 - 0 61 Thousand/µL    Basophils Absolute 0 04 0 00 - 0 10 Thousands/µL   Basic metabolic panel    Collection Time: 06/07/23 12:53 PM   Result Value Ref Range    Sodium 141 135 - 147 mmol/L    Potassium 3 5 3 5 - 5 3 mmol/L    Chloride 109 (H) 96 - 108 mmol/L    CO2 30 21 - 32 mmol/L    ANION GAP 2 (L) 4 - 13 mmol/L    BUN 17 5 - 25 mg/dL    Creatinine 0 71 0 60 - 1 30 mg/dL    Glucose 78 65 - 140 mg/dL    Calcium 9 2 8 3 - 10 1 mg/dL    eGFR 77 ml/min/1 73sq m   Type and screen    Collection Time: 06/07/23  4:36 PM   Result Value Ref Range    ABO Grouping A     Rh Factor Positive     Antibody Screen Negative     Specimen Expiration Date [id number]    Basic metabolic panel    Collection Time: 06/08/23  5:11 AM   Result Value Ref Range    Sodium 141 135 - 147 mmol/L    Potassium 4 5 3 5 - 5 3 mmol/L    Chloride 112 (H) 96 - 108 mmol/L    CO2 28 21 - 32 mmol/L    ANION GAP 1 (L) 4 - 13 mmol/L    BUN 16 5 - 25 mg/dL    Creatinine 0 71 0 60 - 1 30 mg/dL    Glucose 83 65 - 140 mg/dL    Calcium 8 9 8 3 - 10 1 mg/dL    eGFR 77 ml/min/1 73sq m   CBC and differential    Collection Time: 06/08/23  5:11 AM   Result Value Ref Range    WBC 3 90 (L) 4 31 - 10 16 Thousand/uL    RBC 3 43 (L) 3 81 - 5 12 Million/uL    Hemoglobin 10 7 (L) 11 5 - 15 4 g/dL    Hematocrit 32 7 (L) 34 8 - 46 1 %    MCV 95 82 - 98 fL    MCH 31 2 26 8 - 34 3 pg    MCHC 32 7 31 4 - 37 4 g/dL    RDW 16 4 (H) 11 6 - 15 1 %    MPV 12 1 8 9 - 12 7 fL    Platelets 80 (L) 221 - 390 Thousands/uL    nRBC 0 /100 WBCs    Neutrophils Relative 58 43 - 75 %    Immat GRANS % 1 0 - 2 %    Lymphocytes Relative 28 14 - 44 %    Monocytes Relative 11 4 - 12 %    Eosinophils Relative 1 0 - 6 %    Basophils Relative 1 0 - 1 %    Neutrophils Absolute 2 33 1 85 - 7 62 Thousands/µL    Immature Grans Absolute 0 02 0 00 - 0 20 Thousand/uL    Lymphocytes Absolute 1 09 0 60 - 4 47 Thousands/µL    Monocytes Absolute 0 41 0 17 - 1 22 Thousand/µL    Eosinophils Absolute 0 03 0 00 - 0 61 Thousand/µL    Basophils Absolute 0 02 0 00 - 0 10 Thousands/µL   Prepare Leukoreduced Platelet Pheresis (1 pheresis product = 6-8 pooled units): 2 Product, Irradiated, Leukoreduced    Collection Time: 06/08/23  8:19 AM   Result Value Ref Range    Unit Product Code P9867D69     Unit Number [id number]     Unit ABO A     Unit [facility] POS     Unit Dispense Status Presumed Trans     Unit Product Volume 300 mL    Unit Product Code Y9980G85     Unit Number [id number]     Unit ABO O     Unit [facility] POS     Unit Dispense Status Issued     Unit Product Volume 300 mL       Cardiac ep lab eps/ablations    Result Date: 6/8/2023  Narrative: Images from the original result were not included  BiV ICD device History and physical were reviewed Patient was examined and history was reviewed No change in patient's condition Since history and physical has been completed NCDR ICD REGISTRY INFO Heart Failure: Yes NYHA Functional Classification: Class III  Ischemic Cardiomyopathy: No  Non-Ischemic Cardiomyopathy: Yes  Timeframe: 0 >= 3 Months  Guideline Directed Medical Therapy Maximum Dose - Yes (for 3 Months)  Beta blocker held due to wide LBBB Ejection Fraction: 20% QRS Morphology: LBBB, Width: 180 ms Ventricular Tachycardia: Yes   Ventricular Tachycardia Type - Non-Sustained VT  Indications for Permanent Pacemaker: Yes  Class I or Class II Guideline Bradycardia Pacemaker Indication Present - Yes  Reason Pacing Indicated - Anticipated Requirement of >40% RV Pacing  Ejection fraction < 35%  Anticipated Requirement of >40% RV Pacing - Yes  ICD Indication: Primary Prevention  A formal shared decision making encounter has occurred with the patient using an evidence-based decision tool on ICDs prior to initial ICD implantation: Yes Generator changes only- Patients clinical condition is unchanged and the LVEF will not be assessed: N/A Syndrome(s) w/Risk of Sudden Death: No  The pre- operative diagnosis: Chronic systolic heart failure, stage D, LVEF -20% Unclear etiology-suspect nonischemic from conduction system abnormality NYHA class III Reduced RV systolic function LBBB,  ms Optimal medical therapy-not on beta-blocker due to underlying LBBB, confusion about Entresto which has been resolved Shared decision making with patient and primary cardiologist Pancytopenia with microcytic anemia, chronic ITP Hypertension Hypothyroidism Autoimmune hepatitis Bell's palsy   Postoperative diagnosis: Chronic systolic heart failure, stage D, LVEF 20% Unclear etiology-suspect nonischemic from conduction system abnormality NYHA class III Reduced RV systolic function LBBB,  ms Optimal medical therapy-not on beta-blocker due to underlying LBBB, confusion about Entresto which has been resolved Shared decision making with patient and primary cardiologist Pancytopenia with microcytic anemia, chronic ITP Hypertension Hypothyroidism Autoimmune hepatitis Bell's palsy  Important points to note - Hugely dilated heart - abnormal anatomy secondary to same - CS more to right than usual - ITP and bleeding - received platelet trans fusion during procedure on top of what she received day before Procedure:  BiV ICD Surgeon: Samson Martinez Assistants -none Specimens - none Estimated blood loss- 10 ml Findings-none Complications none Anesthesia-  Anesthesia by anaesthesiologist , local lidocaine by myself Details of the device Description of procedure: The patient was seen before the procedure  The details of the procedure was explained and patient agreed to the same  Appropriate consent was signed  The patient was brought to the electrophysiologic laboratory  Proper time out was done  Sterile dressing and draping was done  Local lidocaine was infiltrated, In the infraclavicular region at the site of device implant  Initial incision was made by a sharp number 10 blade  Thereafter electrocautery and blunt dissection was used to form a prepectoral pocket  Appropriate hemostasis was taken care of  10 mL of radiocontrast, followed by 20 mL of saline, was injected in a peripheral vein on the side of the implant  Under direct visualization, the axillary vein was  Punctured,extra-thoracic  A single guidewire was inserted, and taking all the way to the inferior vena cava to confirm that it is on the right side  We also confirmed by the left anterior oblique view that the wire is in the right side  Thereafter a second access was obtained using the fluoroscopic image of the venogram as a roadmap  Wire was once again taken to the inferior vena cava, and position checked in Sami views To confirm the appropriate position  There after a third separate venous stick was done , a wire was taken to the right side and once again confirmed that it was in IVC  Step 1 - RV ICD lead A 9F sheath was passed over the wire  The right ventricular lead was taken through the sheath  In MANLEY view the lead was taken to the right ventricular outflow tract  It was then dropped to the mid-septal  Position of the lead was confirmed in both MANLEY and Sami views that it was mid - septal  Appropriate sensing and thresholds were noted  The lead was screwed in  Once again the lead was tested for appropriate sensing, impedance and threshold   The sheath was removed  The lead was sutured to the prepectoral fascia and muscle  Step 2 - His lead placed and removed A 7F sheath, was placed over 2nd wire  A His sheath and lead was passed through the sheath Mapping of His bundle done His signal noted in the lead Position of the lead was confirmed in both MANLEY and [place] views Appropriate sensing and thresholds were noted  On pacing narrowest QRS noted was 167  ms The lead was removed and  Decision made to attempt LV lead Step 3 - LV lead 7F sheath was taken out and a  9F sheath was passed over the second wire  An Attain straight, Attain select II and super CS was passed through the 9F,  and used to access the CS  Thereafter sequentially we used - venography of CS, Select II  to cannulate a vein in the CS  The lead was passed and positioned in chosen vein  Testing done to confirm that there was adequate LV capture without phrenic capture   Sheath removed and lead sutured to pectoral muscle Step 4- RA lead A 7 Amharic sheath was passed over the third wire  The dilator and wire were removed  An atrial lead with the curved stylet in it was taken through this sheath into the right atrium  It was maneuvered into the right atrial appendage  Appropriate sensing and thresholds were noted  The lead is a passive A lead  The sheath was removed  The lead was sutured to the pectoral muscle and fascia The pocket was washed with large amounts of antibiotic saline  Appropriate hemostasis was taken care of  The lead was connected to the generator  The generator and leads were placed inside the pocket  The generator was tied down  Thereafter the device was interrogated and proper sensing and thresholds through the device were confirmed  Hemostasis - cautery, D stat , Avatene Antibiotic pouch - pancytopenia The pocket was closed in 3 separate layers with intermittent sutures  Dressing was done with Steri-Strips, Aquacell Summary of the procedure:  The patient came in to the laboratory for bi-ventricular  ICD device placement  The device has been placed and patient tolerated the procedure well      DXA bone density spine hip and pelvis    Result Date: 6/1/2023  Narrative: DXA SCAN CLINICAL HISTORY: 78 years postmenopausal female  OTHER RISK FACTORS: Prednisone therapy, torsemide therapy  PHARMACOLOGIC THERAPY FOR OSTEOPOROSIS:  None  TECHNIQUE: Bone densitometry was performed using a Hologic Horizon A bone densitometer  Regions of interest appear properly placed  COMPARISON: 5/5/2010  RESULTS: LUMBAR SPINE Level: L1-L4 : BMD: 0 761 gm/cm2 T-score: -2 6 LEFT  TOTAL HIP: BMD: 0 597 gm/cm2 T-score: -2 8 LEFT  FEMORAL NECK: BMD: 0 511 gm/cm2 T score: -3 0     Impression: 1  Osteoporosis  2   Since a DXA study from 5/5/2010, there has been: A  STATISTICALLY SIGNIFICANT DECREASE in bone mineral density of 0 098 g/cm2 (11 4%) in the lumbar spine  A  STATISTICALLY SIGNIFICANT DECREASE in bone mineral density of 0 147 g/cm2 (19 8%) in the left total hip  3   The 10 year risk of hip fracture is 5 4% with the 10 year risk of major osteoporotic fracture being 12% as calculated by the [facility] fracture risk assessment tool (FRAX, which is based on data generated by the [facility]  for Metabolic Bone Diseases)  4   The current NOF guidelines recommend treating patients with a T-score of -2 5 or less in the lumbar spine or hips, or in post-menopausal women and men over the age of 48 with low bone mass (osteopenia) and a FRAX 10 year risk score of >3% for hip fracture and/or >20% for major osteoporotic fracture  5   The NOF recommends follow-up DXA in 1-2 years after initiating therapy for osteoporosis and every 2 years thereafter  More frequent evaluation is appropriate for patients with conditions associated with rapid bone loss, such as glucocorticoid therapy   The interval between DXA screenings may be longer for individuals without major risk factors and initial T-score in the normal or upper low bone mass range  The FRAX algorithm has certain limitations: -FRAX has not been validated in patients currently or previously treated with pharmacotherapy for osteoporosis  In such patients, clinical judgment must be exercised in interpreting FRAX scores  -Prior hip, vertebral and humeral fragility fractures appear to confer greater risk of subsequent fracture than fractures at other sites (this is especially true for individuals with severe vertebral fractures), but quantification of this incremental risk is not possible with FRAX  -FRAX underestimates fracture risk in patients with history of multiple fragility fractures  -FRAX may underestimate fracture risk in patients with history of frequent falls  -It is not appropriate to use FRAX to monitor treatment response  WHO CLASSIFICATION: Normal (a T-score of -1 0 or higher) Low bone mineral density (a T-score of less than -1 0 but higher than -2 5) Osteoporosis (a T-score of -2 5 or less) Severe osteoporosis (a T-score of -2 5 or less with a fragility fracture) LEAST SIGNIFICANT CHANGE (AT 95% C  I): Lumbar spine 0 036 g/cm2; 2 2% Total hip: 0 022 g/cm2; 2 6% Forearm: 0 017 g/cm2; 3 0%  Workstation performed: [id number]       Labs and pertinent reports reviewed  This note has been generated by voice recognition software system  Therefore, there may be spelling, grammar, and or syntax errors  Please contact if questions arise

## 2023-06-08 NOTE — MALNUTRITION/BMI
This medical record reflects one or more clinical indicators suggestive of malnutrition and/or morbid obesity  Malnutrition Findings:   Adult Malnutrition type: Chronic illness  Adult Degree of Malnutrition: Malnutrition of moderate degree  Malnutrition Characteristics: Fat loss, Muscle loss, Inadequate energy                  360 Statement: chronic moderate pro, yen malnutrition d/t conditions as evidence by <75% energy intake >1 month, mild muscle loss at shoulders, triceps, temples, BMI 17 9, currently on cardiac diet liberalized to DARREL, added ensure plus BID    BMI Findings:  Adult BMI Classifications: Underweight < 18 5        There is no height or weight on file to calculate BMI  See Nutrition note dated 6/8/23 for additional details  Completed nutrition assessment is viewable in the nutrition documentation

## 2023-06-08 NOTE — ANESTHESIA POSTPROCEDURE EVALUATION
Post-Op Assessment Note    CV Status:  Stable  Pain Score: 0    Pain management: adequate     Mental Status:  Awake   Hydration Status:  Euvolemic   PONV Controlled:  None   Airway Patency:  Patent      Post Op Vitals Reviewed: Yes      Staff: CRNA         No notable events documented      BP   120/66   Temp      Pulse  93   Resp   16   SpO2   95%

## 2023-06-09 ENCOUNTER — APPOINTMENT (OUTPATIENT)
Dept: RADIOLOGY | Facility: HOSPITAL | Age: 83
DRG: 227 | End: 2023-06-09
Payer: MEDICARE

## 2023-06-09 LAB
ABO GROUP BLD BPU: NORMAL
ABO GROUP BLD BPU: NORMAL
ANION GAP SERPL CALCULATED.3IONS-SCNC: 2 MMOL/L (ref 4–13)
ATRIAL RATE: 92 BPM
BPU ID: NORMAL
BPU ID: NORMAL
BUN SERPL-MCNC: 17 MG/DL (ref 5–25)
CALCIUM SERPL-MCNC: 8.8 MG/DL (ref 8.3–10.1)
CHLORIDE SERPL-SCNC: 107 MMOL/L (ref 96–108)
CO2 SERPL-SCNC: 28 MMOL/L (ref 21–32)
CREAT SERPL-MCNC: 0.79 MG/DL (ref 0.6–1.3)
ERYTHROCYTE [DISTWIDTH] IN BLOOD BY AUTOMATED COUNT: 16.3 % (ref 11.6–15.1)
GFR SERPL CREATININE-BSD FRML MDRD: 68 ML/MIN/1.73SQ M
GLUCOSE SERPL-MCNC: 97 MG/DL (ref 65–140)
HCT VFR BLD AUTO: 38.2 % (ref 34.8–46.1)
HGB BLD-MCNC: 12.2 G/DL (ref 11.5–15.4)
MCH RBC QN AUTO: 30.1 PG (ref 26.8–34.3)
MCHC RBC AUTO-ENTMCNC: 31.9 G/DL (ref 31.4–37.4)
MCV RBC AUTO: 94 FL (ref 82–98)
P AXIS: 70 DEGREES
PLATELET # BLD AUTO: 96 THOUSANDS/UL (ref 149–390)
PMV BLD AUTO: 10.9 FL (ref 8.9–12.7)
POTASSIUM SERPL-SCNC: 3.9 MMOL/L (ref 3.5–5.3)
PR INTERVAL: 134 MS
QRS AXIS: -48 DEGREES
QRSD INTERVAL: 146 MS
QT INTERVAL: 436 MS
QTC INTERVAL: 539 MS
RBC # BLD AUTO: 4.05 MILLION/UL (ref 3.81–5.12)
SODIUM SERPL-SCNC: 137 MMOL/L (ref 135–147)
T WAVE AXIS: 112 DEGREES
UNIT DISPENSE STATUS: NORMAL
UNIT DISPENSE STATUS: NORMAL
UNIT PRODUCT CODE: NORMAL
UNIT PRODUCT CODE: NORMAL
UNIT PRODUCT VOLUME: 300 ML
UNIT PRODUCT VOLUME: 300 ML
UNIT RH: NORMAL
UNIT RH: NORMAL
VENTRICULAR RATE: 92 BPM
WBC # BLD AUTO: 5.8 THOUSAND/UL (ref 4.31–10.16)

## 2023-06-09 PROCEDURE — 85027 COMPLETE CBC AUTOMATED: CPT | Performed by: PHYSICIAN ASSISTANT

## 2023-06-09 PROCEDURE — 71046 X-RAY EXAM CHEST 2 VIEWS: CPT

## 2023-06-09 PROCEDURE — 93005 ELECTROCARDIOGRAM TRACING: CPT

## 2023-06-09 PROCEDURE — 80048 BASIC METABOLIC PNL TOTAL CA: CPT | Performed by: PHYSICIAN ASSISTANT

## 2023-06-09 PROCEDURE — 93010 ELECTROCARDIOGRAM REPORT: CPT | Performed by: INTERNAL MEDICINE

## 2023-06-09 PROCEDURE — 99024 POSTOP FOLLOW-UP VISIT: CPT | Performed by: INTERNAL MEDICINE

## 2023-06-09 RX ADMIN — ACETAMINOPHEN 650 MG: 325 TABLET, FILM COATED ORAL at 04:42

## 2023-06-09 RX ADMIN — TORSEMIDE 10 MG: 10 TABLET ORAL at 08:13

## 2023-06-09 RX ADMIN — PREDNISONE 5 MG: 5 TABLET ORAL at 08:13

## 2023-06-09 RX ADMIN — POTASSIUM CHLORIDE 10 MEQ: 750 TABLET, EXTENDED RELEASE ORAL at 08:13

## 2023-06-09 NOTE — PROGRESS NOTES
Progress Note - Electrophysiology  Dereck Ran 78 y o  female MRN: [id number]  Unit/Bed#: [facility] 510-01 Encounter: [id number]      Assessment:  1  Chronic systolic congestive heart failure, stage D, NYHA III - status post Medtronic BIV ICD implantation by Dr Izabel Mejia on 6/8/2023  2  Dilated cardiomyopathy  3  Left bundle branch block at baseline  4  Essential hypertension  5  Pancytopenia with microcytic anemia, chronic ITP - platelets of 68 today  6  Autoimmune hepatitis  7  Bell's palsy    Plan:  1  Device - Patient is doing well status post biventricular ICD implantation  Vital signs and labs were reviewed  Assessment of chest x-rays show proper lead placement without evidence of pneumothorax  Device interrogation showed appropriate device function with lead parameters such as sensing, threshold, and impedance being tested  Her incision is clean, dry, and intact without signs of hematoma  She is frail and therefore cannot see the outline of the device  Family was concerned about dried blood that was present on Aquacel bandage  Unfortunately was unable to redress today but will do this early tomorrow to ensure a couple hours until she is discharged to make sure that she has no further oozing from the incision site  This is likely due to her platelet count  2  Post care - Discharge instructions and restrictions were discussed with the patient in detail  She will also be provided with written instructions detailing what was discussed  Patient also requires a 2 week device and site check which has already been arranged and is in Epic  These were also discussed with family members at bedside  3  Medications - In terms of medications, we can continue all except holding fish oil for 2 more weeks  4   Family was concerned about patient taking care of of her  who does have an ICD and LVAD in place  She has been sterilely cleaning this since he had the device placed    It is now felt that she cannot perform these tasks and therefore, I did reach out to the heart failure team   Will update son tomorrow that  with heart failure will reach out and see what assistance they qualify for  As living situation is not yet ready for patient to come home, she will be kept overnight  No further lab work needs to be ordered for tomorrow morning  Subjective/Objective   Subjective: Tolu Rizzo is a 78y o  year old female with past medical history as stated above  She is hospital stay day 3 and is status post biventricular ICD implantation  She reports no issues overnight, denies chest pain, shortness of breath, palpitations, lightheadedness or dizziness  Telemetry shows BiV pacing with some nonsustained ventricular tachycardia      Objective:  Vitals: /62   Pulse 89   Temp (!) 97 2 °F (36 2 °C) (Oral)   Resp 19   Wt 49 6 kg (109 lb 6 4 oz)   SpO2 97%   BMI 18 21 kg/m²     Vitals:    06/09/23 0815   Weight: 49 6 kg (109 lb 6 4 oz)     Orthostatic Blood Pressures    Flowsheet Row Most Recent Value   Blood Pressure 107/62 filed at 06/09/2023 1048   Patient Position - Orthostatic VS Sitting filed at 06/09/2023 7316            Intake/Output Summary (Last 24 hours) at 6/9/2023 1421  Last data filed at 6/9/2023 0815  Gross per 24 hour   Intake 180 ml   Output 1900 ml   Net -1720 ml       Invasive Devices     Peripheral Intravenous Line  Duration           Peripheral IV 06/07/23 Right Antecubital 1 day    Peripheral IV 06/08/23 Left;Ventral (anterior) Forearm 1 day                          Scheduled Meds:  Current Facility-Administered Medications   Medication Dose Route Frequency Provider Last Rate   • acetaminophen  650 mg Oral Q4H PRN Jay Jay Rendon PA-C     • docusate sodium  100 mg Oral BID PRN Dulce Blanco PA-C     • hydrOXYzine HCL  50 mg Oral Q6H PRN Dulce Blanco PA-C     • melatonin  3 mg Oral HS PRN Dulce GEORGE Blanco     • potassium chloride  10 mEq Oral Daily Pitney Kumar, PA-C     • predniSONE  5 mg Oral Daily Dulce Soden, PA-C     • torsemide  10 mg Oral Daily Dulce Soden, PA-C       Continuous Infusions:   PRN Meds: •  acetaminophen  •  docusate sodium  •  hydrOXYzine HCL  •  melatonin    Review of Systems:  ROS as noted above, otherwise 12 point review of systems was performed and is negative  Physical Exam:   GEN: NAD, alert and oriented, well appearing  SKIN: dry without significant lesions or rashes  HEENT: NCAT, PERRL, EOMs intact  NECK: No JVD or carotid bruits appreciated  CARDIOVASCULAR: RRR, normal S1, S2 without murmurs, rubs, or gallops appreciated  LUNGS: Clear to auscultation bilaterally without wheezes, rhonchi, or rales  ABDOMEN: Soft, nontender, nondistended  EXTREMITIES/VASCULAR: perfused without clubbing, cyanosis, or edema b/l  PSYCH: Normal mood and affect  NEURO: CN ll-Xll grossly intact              Lab Results: I have personally reviewed pertinent lab results  Results from last 7 days   Lab Units 23  0435 23  0511 23  1253   HEMATOCRIT % 38 2 32 7* 33 3*   HEMOGLOBIN g/dL 12 2 10 7* 10 9*   PLATELETS Thousands/uL 96* 80* 68*   WBC Thousand/uL 5 80 3 90* 3 61*     Results from last 7 days   Lab Units 23  0435 23  0511 23  1253   BUN mg/dL 17 16 17   CALCIUM mg/dL 8 8 8 9 9 2   CHLORIDE mmol/L 107 112* 109*   CO2 mmol/L 28 28 30   CREATININE mg/dL 0 79 0 71 0 71   POTASSIUM mmol/L 3 9 4 5 3 5               Imaging: I have personally reviewed pertinent reports      Results for orders placed during the hospital encounter of 21    Echo complete with contrast if indicated    Narrative  [facility]  [facility], [address] [facility]  [phone]    Transthoracic Echocardiogram  2D, M-mode, Doppler, and Color Doppler    Study date:  2021    Patient: Kash Tse  MR number: [id number]  Account number: [de-identified]  : 1940  Age: 78 years  Gender: Female  Status: Outpatient  Location: [address] [place] [address] and [facility]  Height: 64 in  Weight: 128 lb  BP: 124/ 70 mmHg    Indications: Chronic systolic CHF;Dilated CM  Diagnoses: I42 9 - Cardiomyopathy, unspecified, U54 90 - Chronic systolic (congestive) heart failure    Sonographer:  LILY Ferraro  Primary Physician:  Jaci Negro MD  Referring Physician:  Geronimo Greer DO  Group:  [facility]  Interpreting Physician:  Tawanda Boss MD    SUMMARY    LEFT VENTRICLE:  The ventricle was moderately dilated  Systolic function was moderately to markedly reduced  Ejection fraction was estimated in the range of 25 % to 30 %  There was severe diffuse hypokinesis with regional variations  Features were consistent with a pseudonormal left ventricular filling pattern, with concomitant abnormal relaxation and increased filling pressure (grade 2 diastolic dysfunction)  MITRAL VALVE:  There was mild to moderate regurgitation  TRICUSPID VALVE:  There was mild regurgitation  HISTORY: PRIOR HISTORY: LBBB;DCM;HTN;Chronic systolic ht failure  PROCEDURE: The study was performed in the [address] [facility]  This was a routine study  The transthoracic approach was used  The study included complete 2D imaging, M-mode, complete spectral Doppler, and color Doppler  The  heart rate was 94 bpm, at the start of the study  Images were obtained from the parasternal, apical, subcostal, and suprasternal notch acoustic windows  Image quality was adequate  LEFT VENTRICLE: The ventricle was moderately dilated  Systolic function was moderately to markedly reduced  Ejection fraction was estimated in the range of 25 % to 30 %  There was severe diffuse hypokinesis with regional variations   Wall  thickness was normal  DOPPLER: Features were consistent with a pseudonormal left ventricular filling pattern, with concomitant abnormal relaxation and increased filling pressure (grade 2 diastolic dysfunction)  VENTRICULAR SEPTUM: These changes are consistent with LBBB  RIGHT VENTRICLE: The size was normal  Systolic function was normal  Wall thickness was normal     LEFT ATRIUM: Size was normal     RIGHT ATRIUM: Size was normal     MITRAL VALVE: DOPPLER: There was mild to moderate regurgitation  AORTIC VALVE: Leaflets exhibited mildly increased thickness  TRICUSPID VALVE: DOPPLER: There was mild regurgitation  PULMONIC VALVE: Not well visualized  PERICARDIUM: There was no pericardial effusion  The pericardium was normal in appearance  AORTA: The root exhibited normal size      SYSTEMIC VEINS: IVC: Respirophasic changes were normal     SYSTEM MEASUREMENT TABLES    2D  %FS: 17 21 %  Ao Diam: 2 62 cm  EDV(Teich): 245 88 ml  EF(Teich): 34 95 %  ESV(Teich): 159 95 ml  IVSd: 0 88 cm  LA Area: 11 51 cm2  LA Diam: 3 6 cm  LVEDV MOD A4C: 204 61 ml  LVEF MOD A4C: 25 91 %  LVESV MOD A4C: 151 61 ml  LVIDd: 6 88 cm  LVIDs: 5 7 cm  LVLd A4C: 8 5 cm  LVLs A4C: 7 89 cm  LVPWd: 1 09 cm  RA Area: 13 75 cm2  RVIDd: 3 98 cm  SV MOD A4C: 53 01 ml  SV(Teich): 85 93 ml    CW  TR Vmax: 2 75 m/s  TR maxP 3 mmHg    MM  TAPSE: 2 01 cm    PW  E' Sept: 0 01 m/s  E/E' Sept: 41 03  MV A [place]: 0 9 m/s  MV Dec [place]: 6 65 m/s2  MV DecT: 86 92 ms  MV E [place]: 0 58 m/s  MV E/A Ratio: 0 65  MV PHT: 25 21 ms  MVA By PHT: 8 73 cm2    [employer] Accredited Echocardiography Laboratory    Prepared and electronically signed by    Opal Epps MD  Signed 2021 09:17:01      VTE Pharmacologic Prophylaxis: Sequential compression device (Venodyne)   VTE Mechanical Prophylaxis: sequential compression device

## 2023-06-09 NOTE — CASE MANAGEMENT
Case Management Discharge Planning Note    Patient name Anish Cedeno  Location [facility] 510/[facility] 132-23 MRN [id number]  : 1940 Date 2023       Current Admission Date: 2023  Current Admission Diagnosis:Chronic systolic heart failure [facility])   Patient Active Problem List    Diagnosis Date Noted   • Acute blood loss anemia 2023   • Hypokalemia 2023   • Iron deficiency anemia due to chronic blood loss 2023   • Presence of heart assist device ([facility] [address] ) 02/15/2023   • Moderate protein-calorie malnutrition ([facility] [address] ) 2022   • Pancytopenia ([facility] [address] ) 2022   • Hypotension 2022   • Headache 2022   • Bilateral hearing loss 2022   • Hypertensive heart disease with congestive heart failure ([facility] [address] ) 2021   • Chronic ITP (idiopathic thrombocytopenia) ([facility] [address] ) 11/10/2021   • Neutropenia, unspecified type ([facility] [address] ) 08/10/2021   • Left-sided Bell's palsy 08/10/2021   • LEA positive 2020   • Complete left bundle branch block (LBBB) 10/22/2019   • Dilated cardiomyopathy ([facility] [address] ) 2018   • Chronic systolic heart failure ([facility] [address] ) 2018   • Other specified glaucoma 2018   • Autoimmune hepatitis treated with steroids ([facility] [address] ) 2018   • Depressive disorder 2017      LOS (days): 2  Geometric Mean LOS (GMLOS) (days): 3 90  Days to GMLOS:1 9     OBJECTIVE:  Risk of Unplanned Readmission Score: 11 79      Current admission status: Inpatient   Preferred Pharmacy:   [facility], [address] [place] [id number]  Phone: [phone] Fax: [phone]    [id number] - [place] [address]  Phone: [phone] Fax: [phone]    [facility], [address] [place] [address]  Phone: [phone] Fax: [phone]    Primary Care Provider: Angel Arizmendi MD    Primary Insurance: MEDICARE  Secondary Insurance: [address],[facility]ELD    DISCHARGE DETAILS:    Discharge planning discussed with[de-identified] Patient and patient's son     CM contacted family/caregiver?: Yes  Were Treatment Team discharge recommendations reviewed with patient/caregiver?: Yes  Did patient/caregiver verbalize understanding of patient care needs?: Yes  Were patient/caregiver advised of the risks associated with not following Treatment Team discharge recommendations?: Yes    Contacts  Patient Contacts: Leanne Koch (son)  Relationship to Patient[de-identified] Family  Contact Method: In Person  Reason/Outcome: Discharge [address]         Is the patient interested in [facility] at discharge?: No    IMM Given (Date):: 06/09/23  IMM Given to[de-identified] Patient  Family notified[de-identified] Patient's son at bedside  Additional Comments: Met with patient, patient's son and patient's niece at bedside to discuss DC plan  Per son, patient and patient's spouse has HHA through [facility] 7 days per week  No CM needs identified  Went over IMM with patient and patient's family  Verbalized understanding  Patient signed IMM and provides with a copy

## 2023-06-09 NOTE — DISCHARGE INSTR - AVS FIRST PAGE
Please refer to post device implantation discharge instructions and restrictions below and your device booklet/temporary card  Keep incision dry for one week  Do not use lotions/powders/creams on incision  Leave outer bandage in place for 1 week - it is water proof, and as long as it is fully adhered to your skin you may shower with it  If it appears as though the bandage is coming off and/or there is any communication to the area of device incision, please then keep the whole area dry for the remaining week  After 1 week, please remove by pulling all edges away from the center of the bandage  No overhead reaching/pushing/pulling/lifting greater than 5-10lbs with left arm for six weeks  Please call the office if you notice redness, swelling, bleeding, or drainage from incision or if you develop fevers    Cardiac Resynchronization Therapy (CRT)    If you have any questions, please call [phone] to speak with a nurse (8:30am-4pm, or [phone] after hours)  For appointments, please call [phone]  WHAT YOU SHOULD KNOW:    Your device is a biventricular ICD, which delivers resynchronization therapy and is also a defibrillator (see below)  Cardiac resynchronization therapy (CRT) is a procedure used to treat problems with how your heart contracts  CRT is also called biventricular pacing  Your heart has 2 upper chambers, called atria, and 2 lower chambers, called ventricles  Your heartbeat is synchronized when all areas of your heart contract together properly  When the areas of your heart do not contract as they should, your heart cannot pump enough blood and oxygen to your body  You may have trouble breathing, tire easily, and have swelling in your legs and feet  With a biventricular device, there is one wire in the right atria (in most cases), one wire in the right ventricle, and a third wire that goes through a vein and wraps around to your left ventricle   The two ventricular wires work together to help your heart contract more synchronously and efficiently  AFTER YOU LEAVE:      Medicines:   Heart medicine may be given to help your heart beat strongly and regularly  Ask your healthcare provider for more information about heart medicines  Take your medicine as directed  Contact your healthcare provider if you think your medicine is not helping or if you have side effects  Tell him if you are allergic to any medicine  Keep a list of the medicines, vitamins, and herbs you take  Include the amounts, and when and why you take them  Bring the list or the pill bottles to follow-up visits  Carry your medicine list with you in case of an emergency  Driving: you are ok to drive 48 hours after device is implanted     Follow up with your healthcare provider as directed: You will need to return to have your pacemaker checked  You may also need an EKG, echocardiogram, or chest x-ray to check the leads in your heart, and your doctor will order these tests if necessary  Write down your questions so you remember to ask them during your visits  Device safety: Some electrical devices may interfere with how your pacemaker works  Some examples are cell phones, security systems, power cables, and electric monitors  Ask your healthcare provider how long you can be near these devices, and which devices to avoid  Tell caregivers you have a pacemaker before you have a procedure or surgery  Wound care: Care for your wound as directed  Keep incision dry for one week  Do not use lotions/powders/creams on incision  Leave outer bandage in place for 1 week - it is water proof, and as long as it is fully adhered to your skin you may shower with it  If it appears as though the bandage is coming off and/or there is any communication to the area of device incision, please then keep the whole area dry for the remaining week    After 1 week, please remove by pulling all edges away from the center of the bandage  No overhead reaching/pushing/pulling/lifting greater than 5-10lbs with left arm for one month  Please call the office if you notice redness, swelling, bleeding, or drainage from incision or if you develop fevers      Contact your healthcare provider if:   You have new or increased swelling in your legs or feet  You feel more tired than usual    You have trouble breathing during activity  Your wound is red, warm, swollen, or draining pus  You have a fever  You have questions or concerns about your condition or care  Seek care immediately or call 911 if: You feel like your heart is fluttering or jumping  You have any of the following signs of a heart attack:    Squeezing, pressure, fullness, or pain in your chest that lasts longer than a few minutes or returns   Discomfort or pain in your back, neck, jaw, stomach, or arm   Shortness of breath or breathing problems   A sudden cold sweat, lightheadedness, dizziness, or nausea, especially with chest pain or trouble breathing      Implantable Cardioverter Defibrillator (ICD)    If you have any questions, please call [phone] to speak with a nurse (8:30am-4pm, or [phone] after hours)  For appointments, please call [phone]  WHAT YOU SHOULD KNOW:    Your device is a biventricular ICD, which delivers resynchronization therapy (see above) and is also a defibrillator  An implantable cardioverter defibrillator (ICD) is a small device that monitors your heart rate and rhythm  It is commonly placed inside your upper chest region  It may be used if you have a ventricular arrhythmia, which is an irregular, dangerous rhythm from the bottom chamber of your heart  Some arrhythmias may cause your heart to suddenly stop beating  An ICD can give a shock to your heart to make it start beating again, or it can give pacing therapy (also known as pain-free therapy) to return your heart to normal rhythm   It is also a pacemaker, so it will pace your heart if needed to prevent it from beating too slowly  AFTER YOU LEAVE:      Follow up with your primary healthcare provider or cardiologist as directed: You will need to follow-up to have your ICD checked and make sure you are not having problems  Write down your questions so you remember to ask them during your visits  Self-care:   Ask about activity: Ask if you need to avoid moving your shoulder or arm, and for how long  Ask if you should avoid lifting heavy objects  Do not play any contact sports, such as football or wrestling, until your primary healthcare provider [facility] or cardiologist tells you it is okay  You may only be able to drive for a certain amount of time per day, or during certain hours  Ask when you can return to work  Care for your skin over the ICD: see answer in instructions above     When you get a shock from your ICD: A shock may feel like someone has hit you, or you may feel a thump in the chest  If someone is touching you when you get a shock, they may feel a tingling feeling  The first time you feel a shock, it may scare you  Sit or lie down and stay calm  Ask someone to stay with you if possible  Please either call your cardiologist or report to an emergency room  Safety instructions when you have an ICD:   Carry an ID card for the ICD: This card has important information about your ICD  Stay away from magnets or machines with electric fields: This includes MRI machines  Avoid leaning into a car engine or doing welding  These things can interfere with how your ICD works  Tell airport security you have an ICD: You may need to be searched by hand when you go through a security gate  The security gate or handheld wand could harm your ICD  Keep an ICD diary: Record when you get a shock and what you were doing before you got the shock  Keep track of how you felt before and after the shock, as well as how many shocks you received   Write down the day and time of each shock  Bring the diary with you when you see your PHP or cardiologist    Karey Huitron alert identification: Wear medical alert jewelry or carry a card that says you have an ICD  Ask your PHP or cardiologist where to get these items  Contact your primary healthcare provider or cardiologist if:   You have a fever  You feel 1 or more shocks from your ICD and feel fine afterwards  Your feet or ankles swell  The area around your ICD is painful or tender after surgery  The skin around your stitches or staples is red, swollen, or draining pus or fluid  You have chills, a cough, and feel weak or achy  You are sad or anxious and find it hard to do your usual activities  You have questions or concerns about your condition or care  Seek care immediately or call 911 if:   Your stitches or staples come apart  Blood soaks through your bandage  You feel more than 3 shocks in a row from your ICD  You become weak, dizzy, or faint  You feel your heart skip beats or beat very fast or slow, but you do not feel a shock from your ICD  You have chest pain that does not go away with rest or medicine  © 2014 [employer] is for End User's use only and may not be sold, redistributed or otherwise used for commercial purposes  All illustrations and images included in CareNotes® are the copyrighted property of [employer]  or Shadi Tolentino  The above information is an  only  It is not intended as medical advice for individual conditions or treatments  Talk to your doctor, nurse or pharmacist before following any medical regimen to see if it is safe and effective for you  SENDING A TRANSMISSION:    As you are being sent home the same day as your device was placed, please be sure to send a transmission tomorrow morning   This is a check that we do to ensure that your device is still functioning appropriately and helps to transition you to be monitored by our device clinic  Please read carefully below to determine which method you need to send your transmission based on your specific monitor  IF YOU HAVE A PHONE LB:    Please open the lb and tap “Messages”  Scroll to find and click  “Send Transmission”  You can answer that the device clinic is aware that you are sending a transmission  Keep the lb open while it is transmitting, as this will take about 5 minutes  You will receive a notification when it is complete  IF YOU HAVE A ROUND, WHITE BEDSIDE MONITOR ([id number] Relay):    Please sit down by your monitor and press and hold the grey, circular button in the middle for 2 seconds  The green light around this will flash around as it is sending  Please stay by the monitor for 5 minutes as the monitor will need to gather data from your device during this time  After the five minute waiting period and once the green light returns to being constantly lit, the transmission is complete  Make sure to stay near the monitor during this process  IF YOU HAVE A BOXY RECTANGULAR BEDSIDE MONITOR (QMCODES [id number]):    Make sure home monitor is plugged into an outlet with good cell reception  Press the small gray circular button towards the right hand side of the machine between the two arrows  It will then prompt you to lift the wand on top and place it over your implantable device  Hold the wand on top of your implantable device until the screen tells you to place it back on the charging dock  Wait another 2 minutes while it transmits the report to the office  You will see a green check cynthia when it is complete  Make sure to stay near the base while doing this process

## 2023-06-09 NOTE — DISCHARGE SUMMARY
Discharge Summary - Ashanti Mcwilliams 78 y o  female MRN: [id number]    Unit/Bed#: [facility] 510-01 Encounter: [id number]      Admission Date: 6/7/2023   Discharge Date: 6/9/2023    Discharge Diagnosis:   1  Chronic systolic congestive heart failure, stage D, NYHA III - status post [employer] BIV ICD implantation by Dr Rossy William on 6/8/2023  2  Dilated cardiomyopathy  3  Left bundle branch block at baseline  4  Essential hypertension  5  Pancytopenia with microcytic anemia, chronic ITP - platelets of 68 today  6  Autoimmune hepatitis  7  Bell's palsy    Procedures Performed:   BiV ICD  Orders Placed This Encounter   Procedures   • Cardiac ep lab eps/ablations     Consultants: Hematology    HPI: Please refer to the initial history and physical as well as procedure notes for full details  Briefly, Ashanti Mcwilliams is a 78y o  year old female with dilated cardiomyopathy with EF of 20%, maintenance of goal-directed medical therapy, left bundle-branch block, chronic systolic congestive heart failure, non acidic anemia, hypothyroidism, and hepatitis      Patient follows with Dr Christy Barcenas of advanced heart failure for many years  She has had a low EF and has been maintained on GDMT for a while  I saw her in the office in 2019 to discuss defibrillator  He had discussed BiV ICD implantation given the fact she had been on GDMT of metoprolol and Entresto with no improvement in her ejection fraction along with baseline left bundle branch block  She is familiar with cardiac devices as her  does have a defibrillator and LVAD  She has been concerned at that point about her 's care and had deferred having this placed  More recently she was seen by Dr Rossy William in consultation last month at which point she did become agreeable to undergoing BiV ICD implantation  However, 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 and is on steroids with ITP and low platelet count    Therefore, it was recommended at the time of her consultation that she be admitted the day before to be tuned up from hematologic standpoint  She was seen by Dr Rohini Landaverde as an outpatient, and BiV ICD implantation along with 1 day stay prior to optimize from a hematologic standpoint was recommended  She presented this hospital admission to undergo this procedure  Hospital Course: Nadia Hale presented at her baseline state of health  She was seen and platelets were 68 and therefore she did undergo transfusion in the evening  Procedure, her platelet count was closer to 80 and she therefore received another transfusion of platelets right before the procedure  After the procedure was explained in detail and consent was obtained, she underwent biventricular ICD implantation without complications  She tolerated the procedure well  Chest x-ray immediately following the procedure showed appropriate lead placement without pneumothorax  She was then monitored overnight for further observation  There were no acute issues or events overnight  The following morning she denied all cardiac complaints, including chest pain/pressure, dyspnea, palpitations, dizziness, lightheadedness, or syncope  Her vital signs were reviewed and labs are stable  Telemetry showed BIV paced narrow complex in the 80's and 90's with some NSVT  Chest x-ray this morning again showed appropriate device placement without pneumothorax  Device interrogation showed appropriate device function, including lead sensing, threshold, and impedance  Her incision was clean, dry, and intact without swelling, hematoma, redness, bleeding, drainage, or signs of infection   Physical exam on the day of discharge was as follows:  GEN: NAD, alert and oriented, well appearing  SKIN: dry without significant lesions or rashes  HEENT: NCAT, PERRL, EOMs intact  NECK: No JVD or carotid bruits appreciated  CARDIOVASCULAR: RRR, normal S1, S2 without murmurs, rubs, or gallops appreciated  LUNGS: Clear to auscultation bilaterally without wheezes, rhonchi, or rales  ABDOMEN: Soft, nontender, nondistended  EXTREMITIES/VASCULAR: perfused without clubbing, cyanosis, or edema b/l  PSYCH: Normal mood and affect  NEURO: CN ll-Xll grossly intact    She was given routine post implantation discharge instructions and restrictions, including wound care, and these were explained in detail  She was instructed to leave the Aquacell bandage over top of the incision for the full week - keeping the incision dry during that time  Karthikeyan Rojo She was given a two week follow up appointment with our device clinic for device interrogation and site check, and she was instructed to follow up with her primary cardiologist as previously instructed  In terms of her medications, no changes were made  She is on metoprolol and should be taking Entresto as previously prescribed by her heart failure doctor (GDMT)  She is stable for discharge at this time with all questions answered  She was discussed in detail with Dr Cait Pope who is in agreement with this discharge summary  Discharge Medications:  See after visit summary for reconciled discharge medications provided to patient and family      Medications Prior to Admission   Medication   • ascorbic acid (VITAMIN C) 500 mg tablet   • budesonide (ENTOCORT EC) 3 MG capsule   • cholecalciferol (VITAMIN D3) 1,000 units tablet   • cyanocobalamin (VITAMIN B-12) 100 mcg tablet   • eltrombopag (PROMACTA) 25 mg tablet   • potassium chloride (MICRO-K) 10 MEQ CR capsule   • predniSONE 5 mg tablet   • torsemide (DEMADEX) 10 mg tablet   • fluticasone (FLONASE) 50 mcg/act nasal spray   • omega-3-acid ethyl esters (LOVAZA) 1 g capsule   • sacubitril-valsartan (Entresto) 49-51 MG TABS   • spironolactone (ALDACTONE) 25 mg tablet         Current Facility-Administered Medications   Medication Dose Route Frequency   • acetaminophen (TYLENOL) tablet 650 mg  650 mg Oral Q4H PRN   • docusate sodium (COLACE) capsule 100 mg  100 mg Oral BID PRN • hydrOXYzine HCL (ATARAX) tablet 50 mg  50 mg Oral Q6H PRN   • melatonin tablet 3 mg  3 mg Oral HS PRN   • potassium chloride (K-DUR,KLOR-CON) CR tablet 10 mEq  10 mEq Oral Daily   • predniSONE tablet 5 mg  5 mg Oral Daily   • torsemide (DEMADEX) tablet 10 mg  10 mg Oral Daily       Pertininet Labs/diagnostics:  CBC with diff:   Results from last 7 days   Lab Units 06/09/23  0435 06/08/23  0511 06/07/23  1253 06/03/23  1021   HEMATOCRIT % 38 2 32 7* 33 3* 37 5   HEMOGLOBIN g/dL 12 2 10 7* 10 9* 11 8   MCH pg 30 1 31 2 30 5 30 4   MCHC g/dL 31 9 32 7 32 7 31 5   MCV fL 94 95 93 97   MPV fL 10 9 12 1 12 1  --    NRBC AUTO /100 WBCs  --  0 0 0   PLATELETS Thousands/uL 96* 80* 68* 69*   RBC Million/uL 4 05 3 43* 3 57* 3 88   RDW % 16 3* 16 4* 16 3* 16 6*   WBC Thousand/uL 5 80 3 90* 3 61* 3 93*       BMP:  Results from last 7 days   Lab Units 06/09/23  0435 06/08/23  0511 06/07/23  1253 06/03/23  1021   BUN mg/dL 17 16 17 18   CALCIUM mg/dL 8 8 8 9 9 2 9 5   CHLORIDE mmol/L 107 112* 109* 104   CO2 mmol/L 28 28 30 30   CREATININE mg/dL 0 79 0 71 0 71 0 73   POTASSIUM mmol/L 3 9 4 5 3 5 3 1*       Magnesium:       Coags:         Complications: none    Condition at Discharge: good     Discharge instructions/Information to patient and family:   See after visit summary for information provided to patient and family  Provisions for Follow-Up Care:  See after visit summary for information related to follow-up care and any pertinent home health orders  Disposition: Home    Planned Readmission: No    Discharge Statement   I spent 45 minutes minutes discharging the patient  This time was spent on the day of discharge  I had direct contact with the patient on the day of discharge  Additional documentation is required if more than 30 minutes were spent on discharge   Evaluating the incision, discussing discharge instructions and restrictions, arranging follow up appointments, discussing medications    Discharge Medications:  See after visit summary for reconciled discharge medications provided to patient and family

## 2023-06-10 PROCEDURE — 99024 POSTOP FOLLOW-UP VISIT: CPT | Performed by: INTERNAL MEDICINE

## 2023-06-10 RX ORDER — METOPROLOL SUCCINATE 25 MG/1
25 TABLET, EXTENDED RELEASE ORAL DAILY
Qty: 30 TABLET | Refills: 2 | Status: SHIPPED | OUTPATIENT
Start: 2023-06-10 | End: 2023-06-12 | Stop reason: SDUPTHER

## 2023-06-10 RX ADMIN — PREDNISONE 5 MG: 5 TABLET ORAL at 09:34

## 2023-06-10 RX ADMIN — ACETAMINOPHEN 325 MG: 325 TABLET, FILM COATED ORAL at 06:29

## 2023-06-10 RX ADMIN — POTASSIUM CHLORIDE 10 MEQ: 750 TABLET, EXTENDED RELEASE ORAL at 09:34

## 2023-06-10 RX ADMIN — TORSEMIDE 10 MG: 10 TABLET ORAL at 09:34

## 2023-06-10 NOTE — NURSING NOTE
Patient given discharge instructions and reviewed with patient, son, and niece in patient room  All parties understanding of the instructions provided to the patient  IV removed  All belongings sent with patient  Patient left facility via wheelchair

## 2023-06-10 NOTE — DISCHARGE SUMMARY
Discharge Summary - Alejandro Hwang 78 y o  female MRN: [id number]    Unit/Bed#: [facility] 510-01 Encounter: [id number]      Admission Date: 6/7/2023   Discharge Date: 6/10/2023    Discharge Diagnosis:   1  Chronic systolic congestive heart failure, stage D, NYHA III - status post Medtronic BIV ICD implantation by Dr Wing Hurley on 6/8/2023  2  Dilated cardiomyopathy  3  Left bundle branch block at baseline  4  Essential hypertension  5  Pancytopenia with microcytic anemia, chronic ITP  - platelets low requiring transfusion of platelets the day before and of BIV ICD implantation  6  Autoimmune hepatitis  7  Bell's palsy    Procedures Performed:   BiV ICD  Orders Placed This Encounter   Procedures   • Cardiac ep lab eps/ablations     Consultants: Hematology    HPI: Please refer to the initial history and physical as well as procedure notes for full details  Briefly, Alejandro Hwang is a 78y o  year old female with dilated cardiomyopathy with EF of 20%, maintenance of goal-directed medical therapy, left bundle-branch block, chronic systolic congestive heart failure, non acidic anemia, hypothyroidism, and hepatitis      Patient follows with Dr Олег Ruiz of advanced heart failure for many years  She has had a low EF and has been maintained on GDMT for a while  I saw her in the office in 2019 to discuss defibrillator  He had discussed BiV ICD implantation given the fact she had been on GDMT of metoprolol and Entresto with no improvement in her ejection fraction along with baseline left bundle branch block  She is familiar with cardiac devices as her  does have a defibrillator and LVAD  She has been concerned at that point about her 's care and had deferred having this placed  More recently she was seen by Dr Wing Hurley in consultation last month at which point she did become agreeable to undergoing BiV ICD implantation    However, 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 and is on steroids with ITP and low platelet count   Therefore, it was recommended at the time of her consultation that she be admitted the day before to be tuned up from hematologic standpoint  She was seen by Dr Jose Bearden as an outpatient, and BiV ICD implantation along with 1 day stay prior to optimize from a hematologic standpoint was recommended She presented this hospital admission to undergo this procedure  Hospital Course: Ruy Alexis presented at her baseline state of health  She was seen and platelets were 68 and therefore she did undergo transfusion in the evening  Procedure, her platelet count was closer to 80 and she therefore received another transfusion of platelets right before the procedure  After the procedure was explained in detail and consent was obtained, she underwent biventricular ICD implantation without complications  She tolerated the procedure well  Chest x-ray immediately following the procedure showed appropriate lead placement without pneumothorax  She was then monitored overnight for further observation  There were no acute issues or events overnight  The following morning she denied all cardiac complaints, including chest pain/pressure, dyspnea, palpitations, dizziness, lightheadedness, or syncope  Her vital signs were reviewed and labs are stable  Telemetry showed BiV paced with narrow complex in the 80s and 90s with some nonsustained ventricular tachycardia  Chest x-ray this morning again showed appropriate device placement without pneumothorax  Device interrogation showed appropriate device function, including lead sensing, threshold, and impedance and was adjusted by Medtronic  She was seen that day family was concerned about sending her home postop day 1 as her house/lodging's were not ready  They also requested that Aquacel bandage be redressed to that there were no further droplets of dried blood that they would need to monitor      Postop day 2, her Aquacel bandage was redressed with no further bleeding issues  Her incision was clean, dry, and intact without swelling, hematoma, redness, bleeding, drainage, or signs of infection  Physical exam on the day of discharge was as follows:  GEN: NAD, alert and oriented, somewhat frail  SKIN: dry without significant lesions or rashes  HEENT: NCAT, PERRL, EOMs intact  NECK: No JVD or carotid bruits appreciated  CARDIOVASCULAR: RRR, normal S1, S2 without murmurs, rubs, or gallops appreciated  LUNGS: Clear to auscultation bilaterally without wheezes, rhonchi, or rales  ABDOMEN: Soft, nontender, nondistended  EXTREMITIES/VASCULAR: perfused without clubbing, cyanosis, or edema b/l  PSYCH: Normal mood and affect  NEURO: CN ll-Xll grossly intact    She was given routine post implantation discharge instructions and restrictions, including wound care, and these were explained in detail along with family members at bedside  She was instructed to leave the Aquacell bandage over top of the incision for the full week - keeping the incision dry during that time  Martin Merrill She was given a two week follow up appointment with our device clinic for device interrogation and site check, and she was instructed to follow up with her primary cardiologist as previously instructed  In terms of her medications, we did discuss how she needs to be on both metoprolol and Entresto as an outpatient and these were sent to her pharmacy just in case she did not have these at home  She was also advised to follow-up on hematology recommendations for medication concerning her platelets  She is stable for discharge at this time with all questions answered  She was discussed in detail with Dr Loli Collins who is in agreement with this discharge summary  Discharge Medications:  See after visit summary for reconciled discharge medications provided to patient and family      Medications Prior to Admission   Medication   • ascorbic acid (VITAMIN C) 500 mg tablet   • budesonide (ENTOCORT EC) 3 MG capsule   • cholecalciferol (VITAMIN D3) 1,000 units tablet   • cyanocobalamin (VITAMIN B-12) 100 mcg tablet   • eltrombopag (PROMACTA) 25 mg tablet   • potassium chloride (MICRO-K) 10 MEQ CR capsule   • predniSONE 5 mg tablet   • torsemide (DEMADEX) 10 mg tablet   • fluticasone (FLONASE) 50 mcg/act nasal spray   • omega-3-acid ethyl esters (LOVAZA) 1 g capsule   • sacubitril-valsartan (Entresto) 49-51 MG TABS   • spironolactone (ALDACTONE) 25 mg tablet         Current Facility-Administered Medications   Medication Dose Route Frequency   • acetaminophen (TYLENOL) tablet 650 mg  650 mg Oral Q4H PRN   • docusate sodium (COLACE) capsule 100 mg  100 mg Oral BID PRN   • hydrOXYzine HCL (ATARAX) tablet 50 mg  50 mg Oral Q6H PRN   • melatonin tablet 3 mg  3 mg Oral HS PRN   • potassium chloride (K-DUR,KLOR-CON) CR tablet 10 mEq  10 mEq Oral Daily   • predniSONE tablet 5 mg  5 mg Oral Daily   • torsemide (DEMADEX) tablet 10 mg  10 mg Oral Daily       Pertininet Labs/diagnostics:  CBC with diff:   Results from last 7 days   Lab Units 06/09/23  0435 06/08/23  0511 06/07/23  1253   HEMATOCRIT % 38 2 32 7* 33 3*   HEMOGLOBIN g/dL 12 2 10 7* 10 9*   MCH pg 30 1 31 2 30 5   MCHC g/dL 31 9 32 7 32 7   MCV fL 94 95 93   MPV fL 10 9 12 1 12 1   NRBC AUTO /100 WBCs  --  0 0   PLATELETS Thousands/uL 96* 80* 68*   RBC Million/uL 4 05 3 43* 3 57*   RDW % 16 3* 16 4* 16 3*   WBC Thousand/uL 5 80 3 90* 3 61*       BMP:  Results from last 7 days   Lab Units 06/09/23  0435 06/08/23  0511 06/07/23  1253   BUN mg/dL 17 16 17   CALCIUM mg/dL 8 8 8 9 9 2   CHLORIDE mmol/L 107 112* 109*   CO2 mmol/L 28 28 30   CREATININE mg/dL 0 79 0 71 0 71   POTASSIUM mmol/L 3 9 4 5 3 5       Magnesium:       Coags:         Complications: none    Condition at Discharge: good     Discharge instructions/Information to patient and family:   See after visit summary for information provided to patient and family        Provisions for Follow-Up Care:  See after visit summary for information related to follow-up care and any pertinent home health orders  Disposition: Home    Planned Readmission: No    Discharge Statement   I spent 45 minutes minutes discharging the patient  This time was spent on the day of discharge  I had direct contact with the patient on the day of discharge  Additional documentation is required if more than 30 minutes were spent on discharge  Evaluating the incision, discussing discharge instructions and restrictions, arranging follow up appointments, discussing medications    Discharge Medications:  See after visit summary for reconciled discharge medications provided to patient and family

## 2023-06-11 VITALS
DIASTOLIC BLOOD PRESSURE: 60 MMHG | HEART RATE: 88 BPM | BODY MASS INDEX: 17.72 KG/M2 | SYSTOLIC BLOOD PRESSURE: 105 MMHG | RESPIRATION RATE: 19 BRPM | OXYGEN SATURATION: 98 % | TEMPERATURE: 97.5 F | WEIGHT: 106.48 LBS

## 2023-06-11 DIAGNOSIS — I50.22 CHRONIC SYSTOLIC CHF (CONGESTIVE HEART FAILURE), NYHA CLASS 3 (HCC): Primary | ICD-10-CM

## 2023-06-12 ENCOUNTER — TRANSITIONAL CARE MANAGEMENT (OUTPATIENT)
Dept: FAMILY MEDICINE CLINIC | Facility: CLINIC | Age: 83
End: 2023-06-12

## 2023-06-12 DIAGNOSIS — I50.22 CHRONIC SYSTOLIC CHF (CONGESTIVE HEART FAILURE), NYHA CLASS 3 (HCC): ICD-10-CM

## 2023-06-12 DIAGNOSIS — E56.9 VITAMIN DEFICIENCY: Primary | ICD-10-CM

## 2023-06-12 DIAGNOSIS — D69.3 CHRONIC ITP (IDIOPATHIC THROMBOCYTOPENIA) (HCC): ICD-10-CM

## 2023-06-12 RX ORDER — MELATONIN
1000 DAILY
Qty: 90 TABLET | Refills: 0 | Status: SHIPPED | OUTPATIENT
Start: 2023-06-12 | End: 2023-09-10

## 2023-06-12 RX ORDER — SACUBITRIL AND VALSARTAN 49; 51 MG/1; MG/1
TABLET, FILM COATED ORAL
Qty: 180 TABLET | Refills: 1 | Status: SHIPPED | OUTPATIENT
Start: 2023-06-12 | End: 2023-06-12 | Stop reason: SDUPTHER

## 2023-06-12 RX ORDER — ASCORBIC ACID 500 MG
500 TABLET ORAL DAILY
Qty: 90 TABLET | Refills: 0 | Status: SHIPPED | OUTPATIENT
Start: 2023-06-12 | End: 2023-09-10

## 2023-06-12 RX ORDER — SACUBITRIL AND VALSARTAN 49; 51 MG/1; MG/1
1 TABLET, FILM COATED ORAL 2 TIMES DAILY
Qty: 180 TABLET | Refills: 1 | Status: SHIPPED | OUTPATIENT
Start: 2023-06-12 | End: 2023-06-14 | Stop reason: SDUPTHER

## 2023-06-12 RX ORDER — UBIDECARENONE 75 MG
250 CAPSULE ORAL DAILY
Qty: 225 TABLET | Refills: 0 | Status: SHIPPED | OUTPATIENT
Start: 2023-06-12 | End: 2023-09-10

## 2023-06-12 RX ORDER — METOPROLOL SUCCINATE 25 MG/1
25 TABLET, EXTENDED RELEASE ORAL DAILY
Qty: 30 TABLET | Refills: 0 | Status: SHIPPED | OUTPATIENT
Start: 2023-06-12

## 2023-06-13 ENCOUNTER — TELEPHONE (OUTPATIENT)
Dept: CARDIOLOGY CLINIC | Facility: CLINIC | Age: 83
End: 2023-06-13

## 2023-06-13 NOTE — PROGRESS NOTES
Heart Failure Outpatient Progress Note - Arjun Scherer 78 y o  female MRN: [id number]    @ Encounter: [id number]      Assessment/Plan:    Patient Active Problem List    Diagnosis Date Noted   • Acute blood loss anemia 03/21/2023   • Hypokalemia 03/21/2023   • Iron deficiency anemia due to chronic blood loss 03/20/2023   • Presence of heart assist device ([place] [id number] ) 02/15/2023   • Moderate protein-calorie malnutrition ([place] [address] ) 07/05/2022   • Pancytopenia ([place] [address] ) 07/02/2022   • Hypotension 07/01/2022   • Headache 07/01/2022   • Bilateral hearing loss 06/22/2022   • Hypertensive heart disease with congestive heart failure ([place] [address] ) 12/07/2021   • Chronic ITP (idiopathic thrombocytopenia) ([place] [address] ) 11/10/2021   • Neutropenia, unspecified type ([place] [address] ) 08/10/2021   • Left-sided Bell's palsy 08/10/2021   • LEA positive 06/12/2020   • Complete left bundle branch block (LBBB) 10/22/2019   • Dilated cardiomyopathy ([place] [address] ) 08/09/2018   • Chronic systolic heart failure ([place] [address] ) 08/01/2018   • Other specified glaucoma 08/01/2018   • Autoimmune hepatitis treated with steroids ([place] [address] ) 08/01/2018   • Depressive disorder 07/19/2017     ChronicSystolic CHF, Stage D, NYHA 3 with mildly reduced RV systolic function  Etiology  Unclear, ? myocarditis +/- stress CM +/- LBBB, now S/P CRT-D with more narrowed QRS  Warm, euvolemic on exam  Tolerating GDMT as below  Continue 2 g Na diet, 2 L fluid restriction  Daily weights     Weight: 108 lbs, today 111     NT pro BNP 5/29/20: 9483     --HIV, SPEP/UPEP, LEA, hepatitis panel all negative, ferritin ok     Echo 4/8/22:  LVEF: 20%  LVEDd: 5 8 cm  RV: normal     Echo 4/23/21  LVEF: 25-30%, grade 2 DD  LVEDd: 6 9 cm  RV: normal  Mild to moderate MR     Echo 10/17/19:  LVEF: 30%, LVEDd: 5 cm  LVEDd:, grade 2 DD  RV: normal     --R+LHC 8/3/18: shows normal coronaries  RA 3  RV 32/6  PA 32/11/20  PCWP 6  AO sat 89%  MvO2 56 5%  TD CO/CI: 2 3/1 4  Isacc CO/CI: 2 6/1 6  TPG 14  DPG 5  PVR 5 4 (Isacc); 6 1 (TD)  SVR 2070  PVR:SVR <0 25     Echo 4/16/19: LVEF: 20%  LVEDd 5 4 cm  Moderate to severe MR     --cMRI 8/6/18: LVEF ~18%, LVIDd 7 cm, normal RV  There is a thin strip of intramyocardial hyperenhancement of the basal interventricular septum      Echo 12/6/18: LVEF: 20%, LVEDd 6 4 cm- no improvement  Good RV function  Small IVC     Neurohormonal Blockade:  --Beta-Blocker: Metoprolol succinate 25 mg PO daily  --ACEi, ARB or ARNi: Entresto to 49-51 mg PO BID   --Aldosterone Receptor Blocker: Spironolactone 12 5 mg daily- not taking it, she states it made her dizzy  --Diuretic: Continue torsemide 10 PO daily-K 10 meq daily       Sudden Cardiac Death Risk Reduction:  --ICD: EF 20%, S/P BiV AICD implant 6/8/23     Electrical Resynchronization:  --Candidacy for BiV device: LBBB 170 msec  --QRS post CRT- 146 ms       Advanced Therapies: Will continue to monitor  If does not recover, age precludes OHT  Possible LVAD candidate, but social situation will be complicated as  has LVAD w/ complications and son works in [place]  Other son is in [place]  Will monitor closely      Pancytopenia with microcytic anemia- Chronic ITP  Hematology following- felt to be autoimmune related  Hx of transfusions  Hgb 6/9/23: 12 2  PLT 6/9/23 96     HTN- now runs low with CM  Hypothyroidism  Autoimmune hepatitis: Continue budesonide   Social- cares for her  who is debilitated with LVAD    HPI:     83 woman with hx of HTN, autoimmune hepatitis wo follows now for NICM, EF: 25% with workup as above  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 has LVAD so working diagnosis of variant of stress myopathy if plausible  Follow up echo done 12/6/18 showed EF: 20% with LVEDd 6 4 cm, I referred for BiV ICD    Follow up echo 4/16/19 unfortunately showed EF: 20% with moderate to severe functional MR  She continues to wear LifeVest for very long period of time, repeated echos have shown persistent reduction in LVEF   Fortunately, no LifeVest treatments      Interval History:  No show last two appts  Still wearing lifevest    6/14/23  Presents for follow up with her son, S/P BiV AICD implantation  Had a positive electrical response to the procedure with narrowing of QRS complex from 170 ms down to 146 ms  She is feeling well with no complaints other than incisional soreness  Will see device clinic next week  Past Medical History:   Diagnosis Date   • Acute bilateral low back pain without sciatica 2/10/2023   • Bell's palsy    • Cardiac disease    • Ear problems    • HL (hearing loss)    • Hypertension    • [facility] ) 8/10/2021   • Subdural hemorrhage ([facility] ) 3/30/2022   • Thrombocytopenia ([facility] ) 8/3/2018   • Tinnitus    • Traumatic subdural hemorrhage with loss of consciousness of unspecified duration, initial encounter ([place] [id number] ) 2/15/2023       12 point ROS negative other than that stated in HPI    Allergies   Allergen Reactions   • Ambien [Zolpidem] Other (See Comments)     Did not allow sleep per pt       Current Outpatient Medications:   •  ascorbic acid (VITAMIN C) 500 mg tablet, Take 1 tablet (500 mg total) by mouth daily, Disp: 90 tablet, Rfl: 0  •  budesonide (ENTOCORT EC) 3 MG capsule, Take 3 capsules (9 mg total) by mouth daily Takes 3 capsules once daily  , Disp: 270 capsule, Rfl: 0  •  cholecalciferol (VITAMIN D3) 1,000 units tablet, Take 1 tablet (1,000 Units total) by mouth daily, Disp: 90 tablet, Rfl: 0  •  cyanocobalamin (VITAMIN B-12) 100 mcg tablet, Take 2 5 tablets (250 mcg total) by mouth daily, Disp: 225 tablet, Rfl: 0  •  eltrombopag (PROMACTA) 25 mg tablet, Take 1 tablet (25 mg total) by mouth daily Administer on an empty stomach, 1 hour before or 2 hours after a meal , Disp: 90 tablet, Rfl: 2  •  fluticasone (FLONASE) 50 mcg/act nasal spray, 2 sprays into each nostril daily, Disp: 16 g, Rfl: 0  •  metoprolol succinate (TOPROL-XL) 25 mg 24 hr tablet, Take 1 tablet (25 mg total) by mouth daily, Disp: 30 tablet, Rfl: 0  •  omega-3-acid ethyl esters (LOVAZA) 1 g capsule, Take 2 capsules (2 g total) by mouth daily, Disp: 180 capsule, Rfl: 0  •  potassium chloride (MICRO-K) 10 MEQ CR capsule, Take 1 capsule (10 mEq total) by mouth daily, Disp: 90 capsule, Rfl: 3  •  predniSONE 5 mg tablet, Take 5 mg by mouth daily, Disp: , Rfl:   •  sacubitril-valsartan (Entresto) 49-51 MG TABS, Take 1 tablet by mouth 2 (two) times a day, Disp: 180 tablet, Rfl: 1  •  spironolactone (ALDACTONE) 25 mg tablet, Take 25 mg by mouth daily (Patient not taking: Reported on 6/7/2023), Disp: , Rfl:   •  torsemide (DEMADEX) 10 mg tablet, Take 1 tablet (10 mg total) by mouth daily, Disp: 90 tablet, Rfl: 0    Social History     Socioeconomic History   • Marital status: /Civil Union     Spouse name: Not on file   • Number of children: Not on file   • Years of education: Not on file   • Highest education level: Not on file   Occupational History   • Not on file   Tobacco Use   • Smoking status: Never   • Smokeless tobacco: Never   Vaping Use   • Vaping Use: Never used   Substance and Sexual Activity   • Alcohol use: Not Currently   • Drug use: Not Currently   • Sexual activity: Not Currently     Partners: Male   Other Topics Concern   • Not on file   Social History Narrative   • Not on file     Social Determinants of Health     Financial Resource Strain: Low Risk  (8/15/2022)    Overall Financial Resource Strain (CARDIA)    • Difficulty of Paying Living Expenses: Not hard at all   Food Insecurity: No Food Insecurity (3/22/2023)    Hunger Vital Sign    • Worried About Running Out of Food in the Last Year: Never true    • Ran Out of Food in the Last Year: Never true   Transportation Needs: No Transportation Needs (3/22/2023)    PRAPARE - Transportation    • Lack of Transportation (Medical): No    • Lack of Transportation (Non-Medical):  No   Physical Activity: Not on file   Stress: Not on file   Social Connections: Not on file   Intimate Partner Violence: Not on file   Housing Stability: Low Risk (3/22/2023)    Housing Stability Vital Sign    • Unable to Pay for Housing in the Last Year: No    • Number of Places Lived in the Last Year: 1    • Unstable Housing in the Last Year: No       Family History   Problem Relation Age of Onset   • Autoimmune disease Neg Hx        Physical Exam:    Vitals:/58 (BP Location: Right arm, Patient Position: Sitting, Cuff Size: Standard)   Pulse 71   Wt 50 3 kg (111 lb)   SpO2 99%   BMI 18 47 kg/m²     Wt Readings from Last 3 Encounters:   06/10/23 48 3 kg (106 lb 7 7 oz)   05/12/23 48 9 kg (107 lb 14 4 oz)   05/04/23 49 kg (108 lb)       GEN: Segrio Kim appears well, alert and oriented x 3, pleasant and cooperative   HEENT: pupils equal, round, and reactive to light; extraocular muscles intact  NECK: supple, no carotid bruits   HEART: regular rhythm, normal S1 and S2, no murmurs, clicks, gallops or rubs, JVP is flat    LUNGS: clear to auscultation bilaterally; no wheezes, rales, or rhonchi   ABDOMEN: normal bowel sounds, soft, no tenderness, no distention  EXTREMITIES: peripheral pulses normal; no clubbing, cyanosis, or edema  NEURO: no focal findings   SKIN: normal without suspicious lesions on exposed skin    Labs & Results:    Chemistry        Component Value Date/Time    BUN 17 06/09/2023 0435    BUN 12 12/04/2015 1526     06/09/2023 0435     12/04/2015 1526    CO2 28 06/09/2023 0435    CO2 30 12/04/2015 1526    CREATININE 0 79 06/09/2023 0435    CREATININE 0 77 12/04/2015 1526    K 3 9 06/09/2023 0435    K 3 6 12/04/2015 1526     12/04/2015 1526        Component Value Date/Time    ALKPHOS 74 06/03/2023 1021    ALKPHOS 52 12/04/2015 1526    ALT 15 06/03/2023 1021    ALT 61 12/04/2015 1526    AST 26 06/03/2023 1021    AST 32 12/04/2015 1526    BILITOT 0 48 12/04/2015 1526    CALCIUM 8 8 06/09/2023 0435    CALCIUM 8 7 12/04/2015 1526        Lab Results   Component Value Date    HCT 38 2 06/09/2023    HGB 12 2 06/09/2023    MCV 94 06/09/2023 PLT 96 (L) 06/09/2023    WBC 5 80 06/09/2023     Lab Results   Component Value Date    NTBNP 9,483 (H) 05/29/2020      Lab Results   Component Value Date    LDLCALC 52 08/02/2018       EKG personally reviewed by ALONZO Harry  No results found for this visit on 06/14/23  Counseling / Coordination of Care  Total floor / unit time spent today 40 minutes  Greater than 50% of total time was spent with the patient and / or family counseling and / or coordination of care  A description of the counseling / coordination of care: 20  Thank you for the opportunity to participate in the care of this patient      Almas Brock

## 2023-06-14 ENCOUNTER — OFFICE VISIT (OUTPATIENT)
Dept: CARDIOLOGY CLINIC | Facility: CLINIC | Age: 83
End: 2023-06-14

## 2023-06-14 VITALS
SYSTOLIC BLOOD PRESSURE: 106 MMHG | WEIGHT: 111 LBS | HEART RATE: 71 BPM | DIASTOLIC BLOOD PRESSURE: 58 MMHG | OXYGEN SATURATION: 99 % | BODY MASS INDEX: 18.47 KG/M2

## 2023-06-14 DIAGNOSIS — I50.22 CHRONIC SYSTOLIC CHF (CONGESTIVE HEART FAILURE), NYHA CLASS 3 (HCC): ICD-10-CM

## 2023-06-14 PROCEDURE — 99024 POSTOP FOLLOW-UP VISIT: CPT | Performed by: NURSE PRACTITIONER

## 2023-06-14 RX ORDER — SACUBITRIL AND VALSARTAN 49; 51 MG/1; MG/1
1 TABLET, FILM COATED ORAL 2 TIMES DAILY
Qty: 180 TABLET | Refills: 1 | Status: SHIPPED | OUTPATIENT
Start: 2023-06-14

## 2023-06-14 NOTE — PATIENT INSTRUCTIONS
Weigh yourself daily  If you gain 3 lbs in one day or 5 lbs in one week, please call the office at [phone] and ask for a nurse or the heart failure nurse  Keep your sodium intake to <2 grams, (2000 mg) per day, and fluids <2 Liters (2000 ml) per day   This is around 6-7, 8 oz glasses of fluid per day

## 2023-06-20 ENCOUNTER — OFFICE VISIT (OUTPATIENT)
Dept: FAMILY MEDICINE CLINIC | Facility: CLINIC | Age: 83
End: 2023-06-20
Payer: MEDICARE

## 2023-06-20 VITALS
TEMPERATURE: 98.3 F | HEIGHT: 65 IN | BODY MASS INDEX: 18.13 KG/M2 | WEIGHT: 108.8 LBS | HEART RATE: 77 BPM | RESPIRATION RATE: 16 BRPM | SYSTOLIC BLOOD PRESSURE: 100 MMHG | OXYGEN SATURATION: 97 % | DIASTOLIC BLOOD PRESSURE: 70 MMHG

## 2023-06-20 DIAGNOSIS — D69.3 CHRONIC ITP (IDIOPATHIC THROMBOCYTOPENIA) (HCC): ICD-10-CM

## 2023-06-20 DIAGNOSIS — E44.0 MODERATE PROTEIN-CALORIE MALNUTRITION (HCC): ICD-10-CM

## 2023-06-20 DIAGNOSIS — D61.818 PANCYTOPENIA (HCC): ICD-10-CM

## 2023-06-20 DIAGNOSIS — I11.0 HYPERTENSIVE HEART DISEASE WITH CONGESTIVE HEART FAILURE, UNSPECIFIED HEART FAILURE TYPE (HCC): ICD-10-CM

## 2023-06-20 DIAGNOSIS — Z09 HOSPITAL DISCHARGE FOLLOW-UP: Primary | ICD-10-CM

## 2023-06-20 DIAGNOSIS — I42.0 DILATED CARDIOMYOPATHY (HCC): ICD-10-CM

## 2023-06-20 DIAGNOSIS — Z95.811 PRESENCE OF HEART ASSIST DEVICE (HCC): ICD-10-CM

## 2023-06-20 PROBLEM — D62 ACUTE BLOOD LOSS ANEMIA: Status: RESOLVED | Noted: 2023-03-21 | Resolved: 2023-06-20

## 2023-06-20 PROBLEM — I95.9 HYPOTENSION: Status: RESOLVED | Noted: 2022-07-01 | Resolved: 2023-06-20

## 2023-06-20 PROBLEM — E87.6 HYPOKALEMIA: Status: RESOLVED | Noted: 2023-03-21 | Resolved: 2023-06-20

## 2023-06-20 PROCEDURE — 99495 TRANSJ CARE MGMT MOD F2F 14D: CPT | Performed by: FAMILY MEDICINE

## 2023-06-20 RX ORDER — PHENOL 1.4 %
600 AEROSOL, SPRAY (ML) MUCOUS MEMBRANE 2 TIMES DAILY WITH MEALS
COMMUNITY

## 2023-06-20 NOTE — ASSESSMENT & PLAN NOTE
Wt Readings from Last 3 Encounters:   06/20/23 49 4 kg (108 lb 12 8 oz)   06/14/23 50 3 kg (111 lb)   06/10/23 48 3 kg (106 lb 7 7 oz)   status post Medtronic BIV ICD implantation by Dr Marie Cano on 6/8/2023  On torsemide

## 2023-06-20 NOTE — ASSESSMENT & PLAN NOTE
Malnutrition Findings:                                 BMI Findings: Body mass index is 18 11 kg/m²     To increase protein intake in her diet

## 2023-06-20 NOTE — ASSESSMENT & PLAN NOTE
She is not taking her metoprolol she wasn't sure that she was supposed to take it  To start it again

## 2023-06-20 NOTE — PROGRESS NOTES
Assessment & Plan     1  Hospital discharge follow-up    2  Hypertensive heart disease with congestive heart failure, unspecified heart failure type [facility])  Assessment & Plan:  Wt Readings from Last 3 Encounters:   06/20/23 49 4 kg (108 lb 12 8 oz)   06/14/23 50 3 kg (111 lb)   06/10/23 48 3 kg (106 lb 7 7 oz)   status post [employer] BIV ICD implantation by Dr Zana Naranjo on 6/8/2023  On torsemide          3  Pancytopenia ([address] )  Assessment & Plan:  Stable  Sees hematology      4  Chronic ITP (idiopathic thrombocytopenia) (HCC)  Assessment & Plan:  Doing well on current meds      5  Presence of heart assist device ([address] )    6  Moderate protein-calorie malnutrition ([address] )  Assessment & Plan:  Malnutrition Findings:                                 BMI Findings: Body mass index is 18 11 kg/m²  To increase protein intake in her diet      7  Dilated cardiomyopathy ([address] )  Assessment & Plan:  She is not taking her metoprolol she wasn't sure that she was supposed to take it  To start it again            Subjective     Transitional Care Management Review:   Shabnam Mccabe is a 78 y o  female here for TCM follow up  During the TCM phone call patient stated:  TCM Call     Date and time call was made  6/12/2023  2:21 PM    Hospital care reviewed  Records not available    Patient was hospitialized at  [facility]    Date of Admission  06/07/23    Date of discharge  06/10/23    Diagnosis  Chronic systolic heart failure    Disposition  Home    Were the patients medications reviewed and updated  No    Current Symptoms  None      TCM Call     Post hospital issues  None    Should patient be enrolled in anticoag monitoring? No    Scheduled for follow up?   Yes    Did you obtain your prescribed medications  Yes    Do you need help managing your prescriptions or medications  No    Is transportation to your appointment needed  No    I have advised the patient to call PCP with any new or worsening symptoms  Seven Smith "MR Anne Marie    Living Arrangements  Family members    Support System  None    Are you recieving any outpatient services  No    What type of services  Physical therapy and Occupational therapy    Are you recieving home care services  No    Types of home care services  Home PT; Other (comment); Nurse visit    Are you using any community resources  No    Current waiver services  No    Have you fallen in the last 12 months  No    How many times  1    Interperter language line needed  No        HPI     Here for hospital follow up  Was admitted on 6/7/2023 for CHF S/P BiV AICD implantation  Received platelet transfusion a day before the surgery   Feels well today   Denies any shortness of breath or chest pain       Review of Systems   Constitutional: Negative  HENT: Negative  Eyes: Negative  Respiratory: Negative  Cardiovascular: Negative  Genitourinary: Negative  Musculoskeletal: Negative  Neurological: Negative  Hematological: Negative  Psychiatric/Behavioral: Negative  Objective     /70 (BP Location: Right arm, Patient Position: Sitting, Cuff Size: Standard)   Pulse 77   Temp 98 3 °F (36 8 °C) (Tympanic)   Resp 16   Ht 5' 5\" (1 651 m)   Wt 49 4 kg (108 lb 12 8 oz)   SpO2 97%   BMI 18 11 kg/m²      Physical Exam  Vitals and nursing note reviewed  Constitutional:       Appearance: Normal appearance  HENT:      Right Ear: Tympanic membrane normal       Left Ear: Tympanic membrane normal    Cardiovascular:      Rate and Rhythm: Normal rate and regular rhythm  Pulses: Normal pulses  Heart sounds: Normal heart sounds  Pulmonary:      Effort: Pulmonary effort is normal       Breath sounds: Normal breath sounds  Neurological:      General: No focal deficit present  Mental Status: She is alert and oriented to person, place, and time     Psychiatric:         Mood and Affect: Mood normal          Behavior: Behavior normal        Medications have been reviewed " by provider in current encounter    Jolly Fofana MD

## 2023-06-22 ENCOUNTER — IN-CLINIC DEVICE VISIT (OUTPATIENT)
Dept: CARDIOLOGY CLINIC | Facility: CLINIC | Age: 83
End: 2023-06-22

## 2023-06-22 DIAGNOSIS — Z95.810 AICD (AUTOMATIC CARDIOVERTER/DEFIBRILLATOR) PRESENT: Primary | ICD-10-CM

## 2023-06-22 PROCEDURE — RECHECK: Performed by: INTERNAL MEDICINE

## 2023-06-22 NOTE — PROGRESS NOTES
Results for orders placed or performed in visit on 06/22/23   Cardiac EP device report    Narrative    MDT BI-V ICD/ ACTIVE SYSTEM IS MRI CONDITIONAL  DEVICE INTERROGATED IN THE [place] OFFICE  BATTERY VOLTAGE ADEQUATE (8 9 YRS)  AP-5%, BVP>99% (TOTAL -96 5%+VSR PACE-2 4%)  ALL LEAD PARAMETERS WITHIN NORMAL LIMITS  NO SIGNIFICANT HIGH RATE EPISODES  OPTIVOL INTIALIZING  NORMAL DEVICE FUNCTION  WOUND CHECK: INCISION CLEAN AND DRY WITH EDGES APPROXIMATED; AQUACEL DRESSING REMOVED; WOUND CARE AND RESTRICTIONS REVIEWED WITH PATIENT   GV

## 2023-06-29 ENCOUNTER — TELEPHONE (OUTPATIENT)
Dept: INTERNAL MEDICINE CLINIC | Facility: CLINIC | Age: 83
End: 2023-06-29

## 2023-06-29 NOTE — TELEPHONE ENCOUNTER
Called pt to see if she wanted to proceed with Prolia injection   L/M Pt called back and scheduled for 07/05    [facility]

## 2023-07-05 ENCOUNTER — CLINICAL SUPPORT (OUTPATIENT)
Dept: FAMILY MEDICINE CLINIC | Facility: CLINIC | Age: 83
End: 2023-07-05
Payer: MEDICARE

## 2023-07-05 DIAGNOSIS — K75.4 AUTOIMMUNE HEPATITIS (HCC): ICD-10-CM

## 2023-07-05 DIAGNOSIS — M80.00XA AGE-RELATED OSTEOPOROSIS WITH CURRENT PATHOLOGICAL FRACTURE, INITIAL ENCOUNTER: Primary | ICD-10-CM

## 2023-07-05 DIAGNOSIS — R09.81 NASAL CONGESTION: ICD-10-CM

## 2023-07-05 PROCEDURE — 96372 THER/PROPH/DIAG INJ SC/IM: CPT

## 2023-07-05 RX ORDER — FLUTICASONE PROPIONATE 50 MCG
SPRAY, SUSPENSION (ML) NASAL
Qty: 48 ML | Refills: 1 | Status: SHIPPED | OUTPATIENT
Start: 2023-07-05

## 2023-07-05 RX ORDER — TORSEMIDE 10 MG/1
TABLET ORAL
Qty: 90 TABLET | Refills: 0 | Status: SHIPPED | OUTPATIENT
Start: 2023-07-05 | End: 2023-07-12 | Stop reason: SDUPTHER

## 2023-07-05 NOTE — PROGRESS NOTES
Name: Florencio Fonseca      : 1940      MRN: [id number]  Encounter Provider: Gregory Pickett  Encounter Date: 2023   Encounter department: [place] [address]. Age-related osteoporosis with current pathological fracture, initial encounter  -     denosumab (PROLIA) subcutaneous injection 60 mg           Subjective      HPI  Review of Systems    Current Outpatient Medications on File Prior to Visit   Medication Sig   • ascorbic acid (VITAMIN C) 500 mg tablet Take 1 tablet (500 mg total) by mouth daily   • budesonide (ENTOCORT EC) 3 MG capsule Take 3 capsules (9 mg total) by mouth daily Takes 3 capsules once daily. • calcium carbonate (OS-PABLO) 600 MG tablet Take 600 mg by mouth 2 (two) times a day with meals   • cholecalciferol (VITAMIN D3) 1,000 units tablet Take 1 tablet (1,000 Units total) by mouth daily   • cyanocobalamin (VITAMIN B-12) 100 mcg tablet Take 2.5 tablets (250 mcg total) by mouth daily   • eltrombopag (PROMACTA) 25 mg tablet Take 1 tablet (25 mg total) by mouth daily Administer on an empty stomach, 1 hour before or 2 hours after a meal.   • metoprolol succinate (TOPROL-XL) 25 mg 24 hr tablet Take 1 tablet (25 mg total) by mouth daily   • omega-3-acid ethyl esters (LOVAZA) 1 g capsule Take 2 capsules (2 g total) by mouth daily   • potassium chloride (MICRO-K) 10 MEQ CR capsule Take 1 capsule (10 mEq total) by mouth daily   • predniSONE 5 mg tablet Take 5 mg by mouth daily   • sacubitril-valsartan (Entresto) 49-51 MG TABS Take 1 tablet by mouth 2 (two) times a day   • [DISCONTINUED] fluticasone (FLONASE) 50 mcg/act nasal spray 2 sprays into each nostril daily   • [DISCONTINUED] torsemide (DEMADEX) 10 mg tablet Take 1 tablet (10 mg total) by mouth daily       Objective     There were no vitals taken for this visit.   Gregory Pickett

## 2023-07-10 NOTE — ASSESSMENT & PLAN NOTE
Malnutrition Findings:                             BMI Findings:         Nutrition eval  There is no height or weight on file to calculate BMI  Pt daughter called stating that pt saw PCP today for UTI and was prescribed cipro x 7 d starting today. PCP told pt to adjust warfarin to 1/2 tablet every day until RTC. Advised pt that dose adjustment would be too much. Therefore take 1.25 mg on Tues, Thurs, Sat and Sun and 2.5 mg all other days while on abx. Then return to normal weekly dose of warfarin. R/s pt to RTC on 7/24.         For Pharmacy Admin Tracking Only    Intervention Detail: Dose Adjustment: 1, reason: Interaction  Total # of Interventions Recommended: 1  Total # of Interventions Accepted: 1  Time Spent (min): 10

## 2023-07-11 ENCOUNTER — OFFICE VISIT (OUTPATIENT)
Dept: HEMATOLOGY ONCOLOGY | Facility: CLINIC | Age: 83
End: 2023-07-11
Payer: MEDICARE

## 2023-07-11 VITALS
DIASTOLIC BLOOD PRESSURE: 62 MMHG | SYSTOLIC BLOOD PRESSURE: 106 MMHG | RESPIRATION RATE: 17 BRPM | BODY MASS INDEX: 17.66 KG/M2 | OXYGEN SATURATION: 100 % | HEART RATE: 79 BPM | TEMPERATURE: 98.4 F | WEIGHT: 106 LBS | HEIGHT: 65 IN

## 2023-07-11 DIAGNOSIS — D69.3 CHRONIC ITP (IDIOPATHIC THROMBOCYTOPENIA) (HCC): Primary | ICD-10-CM

## 2023-07-11 PROCEDURE — 99214 OFFICE O/P EST MOD 30 MIN: CPT | Performed by: INTERNAL MEDICINE

## 2023-07-11 NOTE — PROGRESS NOTES
Hematology Outpatient Follow - Up Note  Kam Mercado 78 y.o. female MRN: @ Encounter: [id number]        Date:  7/11/2023        Assessment/ Plan: 1. 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  She is on Promacta 50 mg p.o. daily since 8/2021.     Platelet count went from 30,000 range July 2021 to normal by 9/2021.     She has not been compliant with Promacta. Platelet count went from 441 12/5/22 to 70 1/23/23 and 94 2/22/23. Restarted in March 2023.  25 mg p.o. daily, platelet count of 62,049, hemoglobin 12.2, WBC 5.8    Follow-up in 4 months with CBC and differential        Labs and imaging studies are reviewed by ordering provider once results are available. If there are findings that need immediate attention, you will be contacted when results available. Discussing results and the implication on your healthcare is best discussed in person at your follow-up visit.        HPI:    78-year-old [place] American female who was seen initially 5/2020 regarding pancytopenia.    She has a history of dilated cardiomyopathy with low ejection fraction on Entresto, spironolactone, torsemide, metoprolol and budesonide. Anjelica Martinez has history of hypertension, autoimmune hepatitis treated with steroids, 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.        She had outpatient labs 4/17/20 at [facility].  hemoglobin 6.9, MCV 70, RDW 19.7, WBC 1.9, 59% neutrophils, 30% lymphocytes, platelet 37444,   She was transfused with 2 units packed RBC ordered by her cardiologist, Dr. Dulce Montenegro and received Feraheme weekly x 2 doses end of June 2020.     [facility] on 09/09/2019 showed hemoglobin 11.3, MCV 93, WBC of 2.7, normal differential platelets [id number]  August 2018 iron saturation 8% ferritin 26  On 07/2018 hemoglobin 11, MCV 91, WBC 3.1, platelets 89504  19/67/5453:  Hemoglobin 12.9, MCV of 90, white blood cell count 2.95, platelets 265  On 28/0982 hemoglobin 13.3, MCV 89, WBC 3.4, platelets [id number]     She had normal vitamin J23, folic acid, TSH, protein electrophoresis     Autoimmune workup by Rheumatology was negative except for anti smooth muscle antibodies of 93     Because of the leukopenia, thrombocytopenia bone marrow biopsy performed on January 2021 showed normal bone marrow cellularity for the age, no evidence of dysplastic features, normal plasma cells and lymphocytes, no evidence of vasculitis, necrosis, fibrosis adequate iron stains     Back on prednisone 10 mg p.o. daily because of elevation of ALT, AST, with persistent thrombocytopenia of 38,000.     Initiated on Promacta 50 mg at the end of August 2021, platelet count [id number] in November 2021, WBC 4, and decrease in the AST, ALT     3/20/22 admitted when outpatient CT identified subdural hemorrhage.  She would a mechanical fall 1 week prior and had intermittent headaches which prompted the CT scan. Status post defibrillator    Promacta reduced to 25 mg p.o. daily  Interval History:        Previous Treatment:         Test Results:    Imaging: Cardiac EP device report    Result Date: 6/22/2023  Narrative: MDT BI-V ICD/ ACTIVE SYSTEM IS MRI CONDITIONAL DEVICE [facility]. BATTERY VOLTAGE ADEQUATE (8.9 YRS). AP-5%, BVP>99% (TOTAL -96.5%+VSR PACE-2.4%). ALL LEAD PARAMETERS WITHIN NORMAL LIMITS. NO SIGNIFICANT HIGH RATE EPISODES. OPTIVOL INTIALIZING. NORMAL DEVICE FUNCTION. WOUND CHECK: INCISION CLEAN AND DRY WITH EDGES APPROXIMATED; AQUACEL DRESSING REMOVED; WOUND CARE AND RESTRICTIONS REVIEWED WITH PATIENT.  GV       Labs:   Lab Results   Component Value Date    WBC 5.80 06/09/2023    HGB 12.2 06/09/2023    HCT 38.2 06/09/2023    MCV 94 06/09/2023    PLT 96 (L) 06/09/2023     Lab Results   Component Value Date     12/04/2015    K 3.9 06/09/2023     06/09/2023    CO2 28 06/09/2023    ANIONGAP 4 12/04/2015    BUN 17 06/09/2023    CREATININE 0.79 06/09/2023    GLUCOSE 94 12/04/2015    GLUF 73 06/03/2023    CALCIUM 8.8 06/09/2023 CORRECTEDCA 9.3 05/04/2023    AST 26 06/03/2023    ALT 15 06/03/2023    ALKPHOS 74 06/03/2023    PROT 7.5 12/04/2015    BILITOT 0.48 12/04/2015    EGFR 68 06/09/2023       Lab Results   Component Value Date    IRON 50 05/04/2023    TIBC 235 (L) 05/04/2023    FERRITIN 801 (H) 05/04/2023       Lab Results   Component Value Date    UDCNCDIP81 015 01/11/2021         ROS: Review of Systems   Constitutional: Positive for fatigue. Negative for appetite change, chills, diaphoresis and unexpected weight change. HENT:   Negative for mouth sores, nosebleeds, sore throat, trouble swallowing and voice change. Eyes: Negative for eye problems and icterus. Respiratory: Negative for chest tightness, cough, hemoptysis and wheezing. Cardiovascular: Negative for chest pain, leg swelling and palpitations. Gastrointestinal: Negative for abdominal distention, abdominal pain, blood in stool, constipation, diarrhea, nausea and vomiting. Endocrine: Negative for hot flashes. Genitourinary: Negative for bladder incontinence, difficulty urinating, dyspareunia, dysuria and frequency. Musculoskeletal: Negative for arthralgias, back pain, gait problem, neck pain and neck stiffness. Skin: Negative for itching and rash. Neurological: Negative for dizziness, gait problem, headaches, numbness, seizures and speech difficulty. Hematological: Negative for adenopathy. Does not bruise/bleed easily. Psychiatric/Behavioral: Negative for decreased concentration, depression, sleep disturbance and suicidal ideas. The patient is not nervous/anxious. Current Medications: Reviewed  Allergies: Reviewed  PMH/FH/SH:  Reviewed      Physical Exam:    Body surface area is 1.51 meters squared.     Wt Readings from Last 3 Encounters:   07/11/23 48.1 kg (106 lb)   06/20/23 49.4 kg (108 lb 12.8 oz)   06/14/23 50.3 kg (111 lb)        Temp Readings from Last 3 Encounters:   07/11/23 98.4 °F (36.9 °C) (Temporal)   06/20/23 98.3 °F (36.8 °C) (Tympanic)   23 97.5 °F (36.4 °C)        BP Readings from Last 3 Encounters:   23 106/62   23 100/70   23 106/58         Pulse Readings from Last 3 Encounters:   23 79   23 77   23 71        Physical Exam  Vitals reviewed. Constitutional:       General: She is not in acute distress. Appearance: She is well-developed and underweight. She is not diaphoretic. HENT:      Head: Normocephalic and atraumatic. Comments: Right eye Bell's palsy  Eyes:      Conjunctiva/sclera: Conjunctivae normal.   Neck:      Trachea: No tracheal deviation. Cardiovascular:      Rate and Rhythm: Normal rate and regular rhythm. Heart sounds: Murmur heard. No friction rub. No gallop. Pulmonary:      Effort: Pulmonary effort is normal. No respiratory distress. Breath sounds: Normal breath sounds. No wheezing or rales. Chest:      Chest wall: No tenderness. Abdominal:      General: There is no distension. Palpations: Abdomen is soft. Tenderness: There is no abdominal tenderness. Musculoskeletal:      Cervical back: Normal range of motion and neck supple. Lymphadenopathy:      Cervical: No cervical adenopathy. Skin:     General: Skin is warm and dry. Coloration: Skin is not pale. Findings: No erythema. Neurological:      Mental Status: She is alert and oriented to person, place, and time. Psychiatric:         Behavior: Behavior normal.         Thought Content: Thought content normal.         Judgment: Judgment normal.         ECO    Goals and Barriers:  Current Goal: Minimize effects of disease. Barriers: None. Patient's Capacity to Self Care:  Patient is able to self care.     Code Status: [unfilled]

## 2023-07-12 ENCOUNTER — OFFICE VISIT (OUTPATIENT)
Dept: CARDIOLOGY CLINIC | Facility: CLINIC | Age: 83
End: 2023-07-12

## 2023-07-12 VITALS
HEART RATE: 96 BPM | OXYGEN SATURATION: 97 % | DIASTOLIC BLOOD PRESSURE: 64 MMHG | HEIGHT: 65 IN | WEIGHT: 106.2 LBS | BODY MASS INDEX: 17.69 KG/M2 | SYSTOLIC BLOOD PRESSURE: 124 MMHG

## 2023-07-12 DIAGNOSIS — I50.22 CHRONIC SYSTOLIC CHF (CONGESTIVE HEART FAILURE), NYHA CLASS 3 (HCC): ICD-10-CM

## 2023-07-12 DIAGNOSIS — K75.4 AUTOIMMUNE HEPATITIS (HCC): ICD-10-CM

## 2023-07-12 PROCEDURE — 99024 POSTOP FOLLOW-UP VISIT: CPT | Performed by: NURSE PRACTITIONER

## 2023-07-12 RX ORDER — SACUBITRIL AND VALSARTAN 49; 51 MG/1; MG/1
1 TABLET, FILM COATED ORAL 2 TIMES DAILY
Qty: 180 TABLET | Refills: 1 | Status: SHIPPED | OUTPATIENT
Start: 2023-07-12

## 2023-07-12 RX ORDER — TORSEMIDE 10 MG/1
10 TABLET ORAL DAILY
Qty: 90 TABLET | Refills: 3 | Status: SHIPPED | OUTPATIENT
Start: 2023-07-12

## 2023-07-12 RX ORDER — METOPROLOL SUCCINATE 25 MG/1
25 TABLET, EXTENDED RELEASE ORAL DAILY
Qty: 30 TABLET | Refills: 3 | Status: SHIPPED | OUTPATIENT
Start: 2023-07-12

## 2023-07-12 RX ORDER — POTASSIUM CHLORIDE 750 MG/1
10 CAPSULE, EXTENDED RELEASE ORAL DAILY
Qty: 90 CAPSULE | Refills: 3 | Status: SHIPPED | OUTPATIENT
Start: 2023-07-12

## 2023-07-12 NOTE — PATIENT INSTRUCTIONS
Weight yourself daily  If you gain 3 lbs in one day or 5 lbs in one week, please call the office at [phone] and ask for a nurse or the heart failure nurse  Keep your sodium intake to <2 grams, (2000 mg) per day, and fluids <2 Liters (2000 ml) per day.  This is around 6-7, 8 oz glasses of fluid per day

## 2023-07-12 NOTE — PROGRESS NOTES
Heart Failure Outpatient Progress Note - Reyna Salmon 78 y.o. female MRN: [id number]    @ Encounter: [id number]      Assessment/Plan:    Patient Active Problem List    Diagnosis Date Noted   • Iron deficiency anemia due to chronic blood loss 03/20/2023   • Presence of heart assist device ([address]) 02/15/2023   • Moderate protein-calorie malnutrition ([address]) 07/05/2022   • Pancytopenia ([address]) 07/02/2022   • Headache 07/01/2022   • Bilateral hearing loss 06/22/2022   • Hypertensive heart disease with congestive heart failure ([address]) 12/07/2021   • Chronic ITP (idiopathic thrombocytopenia) ([address]) 11/10/2021   • Neutropenia, unspecified type ([address]) 08/10/2021   • Left-sided Bell's palsy 08/10/2021   • LEA positive 06/12/2020   • Complete left bundle branch block (LBBB) 10/22/2019   • Dilated cardiomyopathy ([address]) 08/09/2018   • Chronic systolic heart failure ([address]) 08/01/2018   • Other specified glaucoma 08/01/2018   • Autoimmune hepatitis treated with steroids ([address]) 08/01/2018   • Depressive disorder 07/19/2017     ChronicSystolic CHF, Stage D, NYHA 3 with mildly reduced RV systolic function. Etiology. Unclear, ? myocarditis +/- stress CM +/- LBBB, now S/P CRT-D with more narrowed QRS. Warm, euvolemic on exam. Tolerating GDMT as below. Continue 2 g Na diet, 2 L fluid restriction.  Daily weights     Weight: 108 lbs, 111, today 106  NT pro BNP 5/29/20: 9483     --HIV, SPEP/UPEP, LEA, hepatitis panel all negative, ferritin ok     Echo 4/8/22:  LVEF: 20%  LVEDd: 5.8 cm  RV: normal     Echo 4/23/21  LVEF: 25-30%, grade 2 DD  LVEDd: 6.9 cm  RV: normal  Mild to moderate MR     Echo 10/17/19:  LVEF: 30%, LVEDd: 5 cm  LVEDd:, grade 2 DD  RV: normal     --R+LHC 8/3/18: shows normal coronaries  RA 3  RV 32/6  PA 32/11/20  PCWP 6  AO sat 89%  MvO2 56.5%  TD CO/CI: 2.3/1.4  Isacc CO/CI: 2.6/1.6  TPG 14  DPG 5  PVR 5.4 (Isacc); 6.1 (TD)  SVR 2070  PVR:SVR <0.25     Echo 4/16/19: LVEF: 20%  LVEDd 5.4 cm  Moderate to severe MR     --cMRI 8/6/18: LVEF ~18%, LVIDd 7 cm, normal RV. There is a thin strip of intramyocardial hyperenhancement of the basal interventricular septum.     Echo 12/6/18: LVEF: 20%, LVEDd 6.4 cm- no improvement  Good RV function  Small IVC     Neurohormonal Blockade:  --Beta-Blocker: Metoprolol succinate 25 mg PO daily  --ACEi, ARB or ARNi: Entresto to 49-51 mg PO BID   --Aldosterone Receptor Blocker: Spironolactone 12.5 mg daily- not taking it, she states it made her dizzy  --Diuretic: Continue torsemide 10 PO daily-K 10 meq daily       Sudden Cardiac Death Risk Reduction:  --ICD: EF 20%, S/P BiV AICD implant 6/8/23  --Interrogation 6/22/23: AP-5%, BVP>99% (TOTAL -96.5%+VSR PACE-2.4%). ALL LEAD PARAMETERS WITHIN NORMAL LIMITS. NO SIGNIFICANT HIGH RATE EPISODES. OPTIVOL INTIALIZING. NORMAL DEVICE FUNCTION. WOUND CHECK: INCISION CLEAN AND DRY WITH EDGES APPROXIMATED; AQUACEL DRESSING REMOVED; WOUND CARE AND RESTRICTIONS REVIEWED WITH PATIENT     Electrical Resynchronization:  --Candidacy for BiV device: LBBB 170 msec  --QRS post CRT- 146 ms.      Advanced Therapies: Will continue to monitor. If does not recover, age precludes OHT. Possible LVAD candidate, but social situation will be complicated as  has LVAD w/ complications and son works in [place]. Other son is in [place]. Will monitor closely.     Pancytopenia with microcytic anemia- Chronic ITP. Hematology following- felt to be autoimmune related. Hx of transfusions  Hgb 6/9/23: 12.2  PLT 6/9/23 96     HTN- now runs low with CM  Hypothyroidism  Autoimmune hepatitis: Continue budesonide   Social- cares for her  who is debilitated with LVAD    HPI:     83 woman with hx of HTN, autoimmune hepatitis wo follows now for NICM, EF: 25% with workup as above. 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  Follow up echo done 12/6/18 showed EF: 20% with LVEDd 6.4 cm, I referred for BiV ICD    Follow up echo 4/16/19 unfortunately showed EF: 20% with moderate to severe functional MR. She continues to wear LifeVest for very long period of time, repeated echos have shown persistent reduction in LVEF. Fortunately, no LifeVest treatments.     Interval History:  No show last two appts  Still wearing lifevest    6/14/23. Presents for follow up with her son, S/P BiV AICD implantation. Had a positive electrical response to the procedure with narrowing of QRS complex from 170 ms down to 146 ms. She is feeling well with no complaints other than incisional soreness. Will see device clinic next week. 7/12/23. Presents for follow up. Feeling well with no complaints. Admits she forgets to eat meals some times but has Ensure and will use those as a supplement. She is still hesitant to do any physical work since her device implant. Past Medical History:   Diagnosis Date   • Acute bilateral low back pain without sciatica 2/10/2023   • Acute blood loss anemia 3/21/2023   • Bell's palsy    • Cardiac disease    • Ear problems    • HL (hearing loss)    • Hypertension    • Hypotension 7/1/2022   • Sacroiliitis ([address]) 8/10/2021   • Subdural hemorrhage ([facility]) 3/30/2022   • Thrombocytopenia ([address]) 8/3/2018   • Tinnitus    • Traumatic subdural hemorrhage with loss of consciousness of unspecified duration, initial encounter ([address]) 2/15/2023       12 point ROS negative other than that stated in HPI    Allergies   Allergen Reactions   • Ambien [Zolpidem] Other (See Comments)     Did not allow sleep per pt     . Current Outpatient Medications:   •  ascorbic acid (VITAMIN C) 500 mg tablet, Take 1 tablet (500 mg total) by mouth daily, Disp: 90 tablet, Rfl: 0  •  budesonide (ENTOCORT EC) 3 MG capsule, Take 3 capsules (9 mg total) by mouth daily Takes 3 capsules once daily. , Disp: 270 capsule, Rfl: 0  •  calcium carbonate (OS-PABLO) 600 MG tablet, Take 600 mg by mouth 2 (two) times a day with meals, Disp: , Rfl:   •  cholecalciferol (VITAMIN D3) 1,000 units tablet, Take 1 tablet (1,000 Units total) by mouth daily, Disp: 90 tablet, Rfl: 0  •  cyanocobalamin (VITAMIN B-12) 100 mcg tablet, Take 2.5 tablets (250 mcg total) by mouth daily, Disp: 225 tablet, Rfl: 0  •  eltrombopag (PROMACTA) 25 mg tablet, Take 1 tablet (25 mg total) by mouth daily Administer on an empty stomach, 1 hour before or 2 hours after a meal., Disp: 90 tablet, Rfl: 2  •  fluticasone (FLONASE) 50 mcg/act nasal spray, SPRAY 2 SPRAYS INTO EACH NOSTRIL EVERY DAY, Disp: 48 mL, Rfl: 1  •  metoprolol succinate (TOPROL-XL) 25 mg 24 hr tablet, Take 1 tablet (25 mg total) by mouth daily, Disp: 30 tablet, Rfl: 0  •  omega-3-acid ethyl esters (LOVAZA) 1 g capsule, Take 2 capsules (2 g total) by mouth daily, Disp: 180 capsule, Rfl: 0  •  potassium chloride (MICRO-K) 10 MEQ CR capsule, Take 1 capsule (10 mEq total) by mouth daily, Disp: 90 capsule, Rfl: 3  •  predniSONE 5 mg tablet, Take 5 mg by mouth daily, Disp: , Rfl:   •  sacubitril-valsartan (Entresto) 49-51 MG TABS, Take 1 tablet by mouth 2 (two) times a day, Disp: 180 tablet, Rfl: 1  •  torsemide (DEMADEX) 10 mg tablet, TAKE 1 TABLET BY MOUTH EVERY DAY, Disp: 90 tablet, Rfl: 0    Current Facility-Administered Medications:   •  denosumab (PROLIA) subcutaneous injection 60 mg, 60 mg, Subcutaneous, Q6 Months, Kassandra Zarate MD, 60 mg at 07/05/23 5714    Social History     Socioeconomic History   • Marital status: /Civil Union     Spouse name: Not on file   • Number of children: Not on file   • Years of education: Not on file   • Highest education level: Not on file   Occupational History   • Not on file   Tobacco Use   • Smoking status: Never   • Smokeless tobacco: Never   Vaping Use   • Vaping Use: Never used   Substance and Sexual Activity   • Alcohol use: Not Currently   • Drug use: Not Currently   • Sexual activity: Not Currently     Partners: Male   Other Topics Concern   • Not on file   Social History Narrative   • Not on file     Social Determinants of Health     Financial Resource Strain: Low Risk  (8/15/2022)    Overall Financial Resource Strain (CARDIA)    • Difficulty of Paying Living Expenses: Not hard at all   Food Insecurity: No Food Insecurity (3/22/2023)    Hunger Vital Sign    • Worried About Running Out of Food in the Last Year: Never true    • Ran Out of Food in the Last Year: Never true   Transportation Needs: No Transportation Needs (3/22/2023)    PRAPARE - Transportation    • Lack of Transportation (Medical): No    • Lack of Transportation (Non-Medical):  No   Physical Activity: Not on file   Stress: Not on file   Social Connections: Not on file   Intimate Partner Violence: Not on file   Housing Stability: Low Risk  (3/22/2023)    Housing Stability Vital Sign    • Unable to Pay for Housing in the Last Year: No    • Number of Places Lived in the Last Year: 1    • Unstable Housing in the Last Year: No       Family History   Problem Relation Age of Onset   • Autoimmune disease Neg Hx        Physical Exam:    Vitals:/64 (BP Location: Right arm, Patient Position: Sitting, Cuff Size: Child)   Pulse 96   Ht 5' 5" (1.651 m)   Wt 48.2 kg (106 lb 3.2 oz)   SpO2 97%   BMI 17.67 kg/m²     Wt Readings from Last 3 Encounters:   07/12/23 48.2 kg (106 lb 3.2 oz)   07/11/23 48.1 kg (106 lb)   06/20/23 49.4 kg (108 lb 12.8 oz)       GEN: Kesha Sung appears well, alert and oriented x 3, pleasant and cooperative   HEENT: pupils equal, round, and reactive to light; extraocular muscles intact  NECK: supple, no carotid bruits   HEART: regular rhythm, normal S1 and S2, no murmurs, clicks, gallops or rubs, JVP is flat    LUNGS: clear to auscultation bilaterally; no wheezes, rales, or rhonchi   ABDOMEN: normal bowel sounds, soft, no tenderness, no distention  EXTREMITIES: peripheral pulses normal; no clubbing, cyanosis, or edema  NEURO: no focal findings   SKIN: normal without suspicious lesions on exposed skin    Labs & Results:    Chemistry Component Value Date/Time     12/04/2015 1526    K 3.9 06/09/2023 0435    K 3.6 12/04/2015 1526     06/09/2023 0435     12/04/2015 1526    CO2 28 06/09/2023 0435    CO2 30 12/04/2015 1526    BUN 17 06/09/2023 0435    BUN 12 12/04/2015 1526    CREATININE 0.79 06/09/2023 0435    CREATININE 0.77 12/04/2015 1526        Component Value Date/Time    CALCIUM 8.8 06/09/2023 0435    CALCIUM 8.7 12/04/2015 1526    ALKPHOS 74 06/03/2023 1021    ALKPHOS 52 12/04/2015 1526    AST 26 06/03/2023 1021    AST 32 12/04/2015 1526    ALT 15 06/03/2023 1021    ALT 61 12/04/2015 1526    BILITOT 0.48 12/04/2015 1526        Lab Results   Component Value Date    WBC 5.80 06/09/2023    HGB 12.2 06/09/2023    HCT 38.2 06/09/2023    MCV 94 06/09/2023    PLT 96 (L) 06/09/2023     Lab Results   Component Value Date    NTBNP 9,483 (H) 05/29/2020      Lab Results   Component Value Date    LDLCALC 52 08/02/2018       EKG personally reviewed by ALONZO Mckeon. No results found for this visit on 07/12/23. Counseling / Coordination of Care  Total floor / unit time spent today 40 minutes. Greater than 50% of total time was spent with the patient and / or family counseling and / or coordination of care. A description of the counseling / coordination of care: 20. Thank you for the opportunity to participate in the care of this patient.     Roxie Patel

## 2023-08-09 NOTE — PROGRESS NOTES
Heart Failure Outpatient Progress Note - Bar Sat 78 y.o. female MRN: [id number]    @ Encounter: [id number]      Assessment/Plan:    Patient Active Problem List    Diagnosis Date Noted   • Iron deficiency anemia due to chronic blood loss 03/20/2023   • Presence of heart assist device ([address]) 02/15/2023   • Moderate protein-calorie malnutrition ([address]) 07/05/2022   • Pancytopenia ([address]) 07/02/2022   • Headache 07/01/2022   • Bilateral hearing loss 06/22/2022   • Hypertensive heart disease with congestive heart failure ([address]) 12/07/2021   • Chronic ITP (idiopathic thrombocytopenia) ([address]) 11/10/2021   • Neutropenia, unspecified type ([address]) 08/10/2021   • Left-sided Bell's palsy 08/10/2021   • LEA positive 06/12/2020   • Complete left bundle branch block (LBBB) 10/22/2019   • Dilated cardiomyopathy ([address]) 08/09/2018   • Chronic systolic heart failure ([address]) 08/01/2018   • Other specified glaucoma 08/01/2018   • Autoimmune hepatitis treated with steroids ([address]) 08/01/2018   • Depressive disorder 07/19/2017       # ChronicSystolic CHF, Stage D, NYHA 3 with mildly reduced RV systolic function  Unclear etiology. HTN (normotensive on admission) +/- valvular (less likely, central MR likely functional) +/- myocarditis +/- stress CM +/- LBBB     Weight: 110 lbs  NT pro BNP 5/29/20: 9483     --HIV, SPEP/UPEP, LEA, hepatitis panel all negative, ferritin ok     Studies- personally reviewed by me  Echo 4/8/22:  LVEF: 20%  LVEDd: 5.8 cm  RV: normal    Echo 4/23/21  LVEF: 25-30%, grade 2 DD  LVEDd: 6.9 cm  RV: normal  Mild to moderate MR    Echo 10/17/19:  LVEF: 30%, LVEDd: 5 cm  LVEDd:, grade 2 DD  RV: normal     --R+LHC 8/3/18: shows normal coronaries  RA 3  RV 32/6  PA 32/11/20  PCWP 6  AO sat 89%  MvO2 56.5%  TD CO/CI: 2.3/1.4  Isacc CO/CI: 2.6/1.6  TPG 14  DPG 5  PVR 5.4 (Isacc); 6.1 (TD)  SVR 2070  PVR:SVR <0.25     Echo 4/16/19: LVEF: 20%  LVEDd 5.4 cm  Moderate to severe MR     --cMRI 8/6/18: LVEF ~18%, LVIDd 7 cm, normal RV. There is a thin strip of intramyocardial hyperenhancement of the basal interventricular septum.     Echo 12/6/18: LVEF: 20%, LVEDd 6.4 cm- no improvement  Good RV function  Small IVC     Neurohormonal Blockade:  --Beta-Blocker: Continue metoprolol succinate 25 mg PO daily  --ACEi, ARB or ARNi:  Entresto to 49-51 mg PO BID   --Aldosterone Receptor Blocker: spironolactone 12.5 mg daily- not taking it, she states it made her dizzy  --Diuretic:  [id number] PO daily-      Sudden Cardiac Death Risk Reduction:  MDT BiV ICD     Electrical Resynchronization:  --Candidacy for BiV device: LBBB 170 msec  MDT BiV ICD 6/8/23  BIV paced  msec  Response: TBD  Interrogation 6/22/23:  96.5%     Advanced Therapies: Will continue to monitor. If does not recover, age precludes OHT. Possible LVAD candidate, but social situation will be complicated as  has LVAD w/ complications and son works in [place]. Other son is in [place]. Will monitor closely.     # Pancytopenia with microcytic anemia- Chronic ITP  Hematology following- felt to be autoimmune related  Hx of transfusions  10/7/21: HgB 11.8     # HTN- now runs low with CM  # Hypothyroidism  # Autoimmune hepatitis: Continue budesonide   7/27/20 labs:   ,   # social- cares for her  who is debilitated with LVAD       TODAY'S PLAN:  F/u echo in 3 months to check response to CRT  97% pacing  Continue metoprolol, entresto and spironolactone for HFrEF  BP at goal    --2g sodium diet  - Daily weights     HPI:     83 woman with hx of HTN, autoimmune hepatitis wo follows now for NICM, EF: 25% with workup as above. 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 Follow up echo done 12/6/18 showed EF: 20% with LVEDd 6.4 cm, I referred for BiV ICD    Follow up echo 4/16/19 unfortunately showed EF: 20% with moderate to severe functional MR.       She continues to wear LifeVest for very long period of time, repeated echos have shown persistent reduction in LVEF. Fortunately, no LifeVest treatments.     Interval History:  Finally underwent BiV ICD    Review of Systems   Constitutional: Negative for activity change, appetite change, fatigue and unexpected weight change. HENT: Negative for congestion and nosebleeds. Eyes: Negative. Respiratory: Negative for cough, chest tightness and shortness of breath. Cardiovascular: Negative for chest pain, palpitations and leg swelling. Gastrointestinal: Negative for abdominal distention. Endocrine: Negative. Genitourinary: Negative. Musculoskeletal: Negative. Skin: Negative. Neurological: Negative for dizziness, syncope and weakness. Hematological: Negative. Psychiatric/Behavioral: Negative. Past Medical History:   Diagnosis Date   • Acute bilateral low back pain without sciatica 2/10/2023   • Acute blood loss anemia 3/21/2023   • Bell's palsy    • Cardiac disease    • Ear problems    • HL (hearing loss)    • Hypertension    • Hypotension 7/1/2022   • Sacroiliitis ([address]) 8/10/2021   • Subdural hemorrhage ([address]) 3/30/2022   • Thrombocytopenia ([address]) 8/3/2018   • Tinnitus    • Traumatic subdural hemorrhage with loss of consciousness of unspecified duration, initial encounter ([address]) 2/15/2023         Allergies   Allergen Reactions   • Ambien [Zolpidem] Other (See Comments)     Did not allow sleep per pt     . Current Outpatient Medications:   •  ascorbic acid (VITAMIN C) 500 mg tablet, Take 1 tablet (500 mg total) by mouth daily, Disp: 90 tablet, Rfl: 0  •  budesonide (ENTOCORT EC) 3 MG capsule, Take 3 capsules (9 mg total) by mouth daily Takes 3 capsules once daily. , Disp: 270 capsule, Rfl: 0  •  calcium carbonate (OS-PABLO) 600 MG tablet, Take 600 mg by mouth, Disp: , Rfl:   •  cholecalciferol (VITAMIN D3) 1,000 units tablet, Take 1 tablet (1,000 Units total) by mouth daily, Disp: 90 tablet, Rfl: 0  •  cyanocobalamin (VITAMIN B-12) 100 mcg tablet, Take 2.5 tablets (250 mcg total) by mouth daily, Disp: 225 tablet, Rfl: 0  •  eltrombopag (PROMACTA) 25 mg tablet, Take 1 tablet (25 mg total) by mouth daily Administer on an empty stomach, 1 hour before or 2 hours after a meal., Disp: 90 tablet, Rfl: 2  •  metoprolol succinate (TOPROL-XL) 25 mg 24 hr tablet, Take 1 tablet (25 mg total) by mouth daily, Disp: 30 tablet, Rfl: 3  •  omega-3-acid ethyl esters (LOVAZA) 1 g capsule, Take 2 capsules (2 g total) by mouth daily, Disp: 180 capsule, Rfl: 0  •  potassium chloride (MICRO-K) 10 MEQ CR capsule, Take 1 capsule (10 mEq total) by mouth daily, Disp: 90 capsule, Rfl: 3  •  predniSONE 5 mg tablet, Take 5 mg by mouth daily, Disp: , Rfl:   •  sacubitril-valsartan (Entresto) 49-51 MG TABS, Take 1 tablet by mouth 2 (two) times a day, Disp: 180 tablet, Rfl: 1  •  torsemide (DEMADEX) 10 mg tablet, Take 1 tablet (10 mg total) by mouth daily, Disp: 90 tablet, Rfl: 3  •  fluticasone (FLONASE) 50 mcg/act nasal spray, SPRAY 2 SPRAYS INTO EACH NOSTRIL EVERY DAY (Patient not taking: Reported on 8/15/2023), Disp: 48 mL, Rfl: 1    Current Facility-Administered Medications:   •  denosumab (PROLIA) subcutaneous injection 60 mg, 60 mg, Subcutaneous, Q6 Months, Kassandra Zarate MD, 60 mg at 07/05/23 4257    Social History     Socioeconomic History   • Marital status: /Civil Union     Spouse name: Not on file   • Number of children: Not on file   • Years of education: Not on file   • Highest education level: Not on file   Occupational History   • Not on file   Tobacco Use   • Smoking status: Never   • Smokeless tobacco: Never   Vaping Use   • Vaping Use: Never used   Substance and Sexual Activity   • Alcohol use: Not Currently   • Drug use: Not Currently   • Sexual activity: Not Currently     Partners: Male   Other Topics Concern   • Not on file   Social History Narrative   • Not on file     Social Determinants of Health     Financial Resource Strain: Low Risk  (8/15/2022)    Overall Financial Resource Strain (CARDIA)    • Difficulty of Paying Living Expenses: Not hard at all   Food Insecurity: No Food Insecurity (3/22/2023)    Hunger Vital Sign    • Worried About Running Out of Food in the Last Year: Never true    • Ran Out of Food in the Last Year: Never true   Transportation Needs: No Transportation Needs (3/22/2023)    PRAPARE - Transportation    • Lack of Transportation (Medical): No    • Lack of Transportation (Non-Medical): No   Physical Activity: Not on file   Stress: Not on file   Social Connections: Not on file   Intimate Partner Violence: Not on file   Housing Stability: Low Risk  (3/22/2023)    Housing Stability Vital Sign    • Unable to Pay for Housing in the Last Year: No    • Number of Places Lived in the Last Year: 1    • Unstable Housing in the Last Year: No       Family History   Problem Relation Age of Onset   • Autoimmune disease Neg Hx        Physical Exam:    Vitals:   Vitals:    08/15/23 1510   BP: 120/60   Pulse: 71   SpO2: 98%         Physical Exam  Constitutional:       Appearance: She is well-developed. HENT:      Head: Normocephalic and atraumatic. Eyes:      Pupils: Pupils are equal, round, and reactive to light. Neck:      Vascular: No JVD. Cardiovascular:      Rate and Rhythm: Normal rate and regular rhythm. Heart sounds: No murmur heard. Pulmonary:      Effort: Pulmonary effort is normal. No respiratory distress. Breath sounds: Normal breath sounds. Abdominal:      General: There is no distension. Palpations: Abdomen is soft. Tenderness: There is no abdominal tenderness. Musculoskeletal:         General: Normal range of motion. Cervical back: Normal range of motion. Skin:     General: Skin is warm and dry. Findings: No rash. Neurological:      Mental Status: She is alert and oriented to person, place, and time.        Labs & Results:    Lab Results   Component Value Date    SODIUM 137 06/09/2023    K 3.9 06/09/2023  06/09/2023    CO2 28 06/09/2023    BUN 17 06/09/2023    CREATININE 0.79 06/09/2023    GLUC 97 06/09/2023    CALCIUM 8.8 06/09/2023     Lab Results   Component Value Date    WBC 5.80 06/09/2023    HGB 12.2 06/09/2023    HCT 38.2 06/09/2023    MCV 94 06/09/2023    PLT 96 (L) 06/09/2023     Lab Results   Component Value Date    NTBNP 9,483 (H) 05/29/2020      Lab Results   Component Value Date    CHOLESTEROL 108 08/02/2018     Lab Results   Component Value Date    HDL 41 08/02/2018    HDL 76 10/23/2015     Lab Results   Component Value Date    TRIG 75 08/02/2018    TRIG 87 10/23/2015     No results found for: "[address]"    EKG personally reviewed by Jasmina Santacruz. Counseling / Coordination of Care  Time spent today 25 minutes. Greater than 50% of total time was spent with the patient and / or family counseling and / or coordination of care. We discussed diagnoses, most recent studies, tests and any changes in treatment plan    Thank you for the opportunity to participate in the care of this patient.     Juan Medina [facility]  MEDICAL DIRECTOR OF [address] [place] [address]

## 2023-08-15 ENCOUNTER — OFFICE VISIT (OUTPATIENT)
Dept: CARDIOLOGY CLINIC | Facility: CLINIC | Age: 83
End: 2023-08-15
Payer: MEDICARE

## 2023-08-15 VITALS
DIASTOLIC BLOOD PRESSURE: 60 MMHG | SYSTOLIC BLOOD PRESSURE: 120 MMHG | BODY MASS INDEX: 18.33 KG/M2 | WEIGHT: 110 LBS | HEART RATE: 71 BPM | HEIGHT: 65 IN | OXYGEN SATURATION: 98 %

## 2023-08-15 DIAGNOSIS — I44.7 COMPLETE LEFT BUNDLE BRANCH BLOCK (LBBB): ICD-10-CM

## 2023-08-15 DIAGNOSIS — I50.22 CHRONIC SYSTOLIC HEART FAILURE (HCC): Primary | ICD-10-CM

## 2023-08-15 DIAGNOSIS — I42.0 DILATED CARDIOMYOPATHY (HCC): ICD-10-CM

## 2023-08-15 DIAGNOSIS — I11.0 HYPERTENSIVE HEART DISEASE WITH CONGESTIVE HEART FAILURE (HCC): ICD-10-CM

## 2023-08-15 PROCEDURE — 99214 OFFICE O/P EST MOD 30 MIN: CPT | Performed by: INTERNAL MEDICINE

## 2023-08-23 ENCOUNTER — OFFICE VISIT (OUTPATIENT)
Dept: FAMILY MEDICINE CLINIC | Facility: CLINIC | Age: 83
End: 2023-08-23
Payer: MEDICARE

## 2023-08-23 VITALS
OXYGEN SATURATION: 99 % | SYSTOLIC BLOOD PRESSURE: 100 MMHG | RESPIRATION RATE: 17 BRPM | WEIGHT: 110.6 LBS | HEIGHT: 65 IN | TEMPERATURE: 97.9 F | HEART RATE: 68 BPM | DIASTOLIC BLOOD PRESSURE: 60 MMHG | BODY MASS INDEX: 18.43 KG/M2

## 2023-08-23 DIAGNOSIS — I50.22 CHRONIC SYSTOLIC CHF (CONGESTIVE HEART FAILURE), NYHA CLASS 3 (HCC): ICD-10-CM

## 2023-08-23 DIAGNOSIS — Z95.811 PRESENCE OF HEART ASSIST DEVICE (HCC): ICD-10-CM

## 2023-08-23 DIAGNOSIS — D70.9 NEUTROPENIA, UNSPECIFIED TYPE (HCC): ICD-10-CM

## 2023-08-23 DIAGNOSIS — I11.0 HYPERTENSIVE HEART DISEASE WITH CONGESTIVE HEART FAILURE, UNSPECIFIED HEART FAILURE TYPE (HCC): ICD-10-CM

## 2023-08-23 DIAGNOSIS — I42.0 DILATED CARDIOMYOPATHY (HCC): ICD-10-CM

## 2023-08-23 DIAGNOSIS — Z00.00 MEDICARE ANNUAL WELLNESS VISIT, SUBSEQUENT: Primary | ICD-10-CM

## 2023-08-23 DIAGNOSIS — K75.4 AUTOIMMUNE HEPATITIS TREATED WITH STEROIDS (HCC): ICD-10-CM

## 2023-08-23 PROBLEM — R51.9 HEADACHE: Status: RESOLVED | Noted: 2022-07-01 | Resolved: 2023-08-23

## 2023-08-23 PROCEDURE — 99214 OFFICE O/P EST MOD 30 MIN: CPT | Performed by: FAMILY MEDICINE

## 2023-08-23 PROCEDURE — G0439 PPPS, SUBSEQ VISIT: HCPCS | Performed by: FAMILY MEDICINE

## 2023-08-23 RX ORDER — METOPROLOL SUCCINATE 25 MG/1
25 TABLET, EXTENDED RELEASE ORAL DAILY
Qty: 90 TABLET | Refills: 3 | Status: SHIPPED | OUTPATIENT
Start: 2023-08-23

## 2023-08-23 NOTE — PROGRESS NOTES
Assessment and Plan:     Problem List Items Addressed This Visit        Digestive    Autoimmune hepatitis treated with steroids ([address])     Stable on prednisone             Cardiovascular and Mediastinum    Dilated cardiomyopathy ([address])     Stable  Care per cardiology         Relevant Medications    metoprolol succinate (TOPROL-XL) 25 mg 24 hr tablet    Hypertensive heart disease with congestive heart failure (HCC)     Wt Readings from Last 3 Encounters:   08/15/23 49.9 kg (110 lb)   07/12/23 48.2 kg (106 lb 3.2 oz)   07/11/23 48.1 kg (106 lb)       Continue current meds           Relevant Medications    metoprolol succinate (TOPROL-XL) 25 mg 24 hr tablet       Other    Neutropenia, unspecified type ([address])     Stable  Care per hematology         Presence of heart assist device [facility])   Other Visit Diagnoses     Medicare annual wellness visit, subsequent    -  Primary    Chronic systolic CHF (congestive heart failure), NYHA class 3 (HCC)        Relevant Medications    metoprolol succinate (TOPROL-XL) 25 mg 24 hr tablet        BMI Counseling: Body mass index is 18.4 kg/m². The BMI is below normal. Patient advised to gain weight. Rationale for BMI follow-up plan is due to patient being underweight. Preventive health issues were discussed with patient, and age appropriate screening tests were ordered as noted in patient's After Visit Summary. Personalized health advice and appropriate referrals for health education or preventive services given if needed, as noted in patient's After Visit Summary. History of Present Illness:     Patient presents for a Medicare Wellness Visit    HPI   Here for medicare wellness  Feels well overall    Patient Care Team:  Ernie Fraser MD as PCP - General (Family Medicine)     Review of Systems:     Review of Systems   Constitutional: Negative. HENT: Negative. Eyes: Negative. Respiratory: Negative. Cardiovascular: Negative. Endocrine: Negative.     Genitourinary: Negative. Musculoskeletal: Negative. Allergic/Immunologic: Negative. Neurological: Negative. Hematological: Negative. Psychiatric/Behavioral: Negative. Problem List:     Patient Active Problem List   Diagnosis   • Chronic systolic heart failure (HCC)   • Other specified glaucoma   • Autoimmune hepatitis treated with steroids ([address])   • Dilated cardiomyopathy ([address])   • Complete left bundle branch block (LBBB)   • LEA positive   • Depressive disorder   • Neutropenia, unspecified type ([address])   • Left-sided Bell's palsy   • Chronic ITP (idiopathic thrombocytopenia) (HCC)   • Hypertensive heart disease with congestive heart failure (HCC)   • Bilateral hearing loss   • Pancytopenia (HCC)   • Moderate protein-calorie malnutrition (HCC)   • Presence of heart assist device (HCC)   • Iron deficiency anemia due to chronic blood loss      Past Medical and Surgical History:     Past Medical History:   Diagnosis Date   • Acute bilateral low back pain without sciatica 2/10/2023   • Acute blood loss anemia 3/21/2023   • Bell's palsy    • Cardiac disease    • Ear problems    • HL (hearing loss)    • Hypertension    • Hypotension 7/1/2022   • Sacroiliitis ([address]) 8/10/2021   • Subdural hemorrhage ([address]) 3/30/2022   • Thrombocytopenia ([address]) 8/3/2018   • Tinnitus    • Traumatic subdural hemorrhage with loss of consciousness of unspecified duration, initial encounter ([address]) 2/15/2023     Past Surgical History:   Procedure Laterality Date   • CARDIAC ELECTROPHYSIOLOGY PROCEDURE N/A 6/8/2023    Procedure: Cardiac biv icd implant, Left side, use ABX; Surgeon: Kallie Lozano MD;  Location: [facility];   Service: Cardiology   • CT BONE MARROW BIOPSY AND ASPIRATION  1/27/2021      Family History:     Family History   Problem Relation Age of Onset   • Autoimmune disease Neg Hx       Social History:     Social History     Socioeconomic History   • Marital status: /Civil Union     Spouse name: None   • Number of children: None   • Years of education: None   • Highest education level: None   Occupational History   • None   Tobacco Use   • Smoking status: Never   • Smokeless tobacco: Never   Vaping Use   • Vaping Use: Never used   Substance and Sexual Activity   • Alcohol use: Not Currently   • Drug use: Not Currently   • Sexual activity: Not Currently     Partners: Male   Other Topics Concern   • None   Social History Narrative   • None     Social Determinants of Health     Financial Resource Strain: Unknown (8/23/2023)    Overall Financial Resource Strain (CARDIA)    • Difficulty of Paying Living Expenses: Patient refused   Food Insecurity: No Food Insecurity (3/22/2023)    Hunger Vital Sign    • Worried About Running Out of Food in the Last Year: Never true    • Ran Out of Food in the Last Year: Never true   Transportation Needs: No Transportation Needs (8/23/2023)    PRAPARE - Transportation    • Lack of Transportation (Medical): No    • Lack of Transportation (Non-Medical): No   Physical Activity: Not on file   Stress: Not on file   Social Connections: Not on file   Intimate Partner Violence: Not on file   Housing Stability: Low Risk  (3/22/2023)    Housing Stability Vital Sign    • Unable to Pay for Housing in the Last Year: No    • Number of Places Lived in the Last Year: 1    • Unstable Housing in the Last Year: No      Medications and Allergies:     Current Outpatient Medications   Medication Sig Dispense Refill   • ascorbic acid (VITAMIN C) 500 mg tablet Take 1 tablet (500 mg total) by mouth daily 90 tablet 0   • budesonide (ENTOCORT EC) 3 MG capsule Take 3 capsules (9 mg total) by mouth daily Takes 3 capsules once daily.  270 capsule 0   • calcium carbonate (OS-PABLO) 600 MG tablet Take 600 mg by mouth     • cholecalciferol (VITAMIN D3) 1,000 units tablet Take 1 tablet (1,000 Units total) by mouth daily 90 tablet 0   • cyanocobalamin (VITAMIN B-12) 100 mcg tablet Take 2.5 tablets (250 mcg total) by mouth daily 225 tablet 0   • eltrombopag (PROMACTA) 25 mg tablet Take 1 tablet (25 mg total) by mouth daily Administer on an empty stomach, 1 hour before or 2 hours after a meal. 90 tablet 2   • metoprolol succinate (TOPROL-XL) 25 mg 24 hr tablet Take 1 tablet (25 mg total) by mouth daily 90 tablet 3   • omega-3-acid ethyl esters (LOVAZA) 1 g capsule Take 2 capsules (2 g total) by mouth daily 180 capsule 0   • potassium chloride (MICRO-K) 10 MEQ CR capsule Take 1 capsule (10 mEq total) by mouth daily 90 capsule 3   • predniSONE 5 mg tablet Take 5 mg by mouth daily     • sacubitril-valsartan (Entresto) 49-51 MG TABS Take 1 tablet by mouth 2 (two) times a day 180 tablet 1   • torsemide (DEMADEX) 10 mg tablet Take 1 tablet (10 mg total) by mouth daily 90 tablet 3   • fluticasone (FLONASE) 50 mcg/act nasal spray SPRAY 2 SPRAYS INTO EACH NOSTRIL EVERY DAY (Patient not taking: Reported on 8/15/2023) 48 mL 1     Current Facility-Administered Medications   Medication Dose Route Frequency Provider Last Rate Last Admin   • denosumab (PROLIA) subcutaneous injection 60 mg  60 mg Subcutaneous Q6 Months Ruddy Martinez MD   60 mg at 07/05/23 1459     Allergies   Allergen Reactions   • Ambien [Zolpidem] Other (See Comments)     Did not allow sleep per pt      Immunizations:     Immunization History   Administered Date(s) Administered   • COVID-19 PFIZER VACCINE 0.3 ML IM 05/12/2021, 06/05/2021   • COVID-19 Pfizer vac (Misael-sucrose, gray cap) 12 yr+ IM 07/13/2022   • Hep A, adult 06/22/2022   • Hep B, adult 06/22/2022   • Pneumococcal Conjugate 13-Valent 08/10/2021   • Pneumococcal Polysaccharide PPV23 04/06/2022      Health Maintenance:         Topic Date Due   • Hepatitis C Screening  Completed         Topic Date Due   • COVID-19 Vaccine (4 - Pfizer series) 09/07/2022   • Influenza Vaccine (1) 09/01/2023      Medicare Screening Tests and Risk Assessments:     Mauri Rose is here for her Subsequent Wellness visit.  Last Medicare Wellness visit information reviewed, patient interviewed and updates made to the record today. Health Risk Assessment:   Patient rates overall health as good. Patient feels that their physical health rating is same. Patient is satisfied with their life. Eyesight was rated as same. Hearing was rated as same. Patient feels that their emotional and mental health rating is same. Patients states they are never, rarely angry. Patient states they are sometimes unusually tired/fatigued. Pain experienced in the last 7 days has been none. Patient states that she has experienced no weight loss or gain in last 6 months. Depression Screening:   PHQ-9 Score: 1      Fall Risk Screening: In the past year, patient has experienced: no history of falling in past year      Urinary Incontinence Screening:   Patient has not leaked urine accidently in the last six months. Home Safety:  Patient does not have trouble with stairs inside or outside of their home. Patient has working smoke alarms and has working carbon monoxide detector. Home safety hazards include: none. Nutrition:   Current diet is Regular. Medications:   Patient is currently taking over-the-counter supplements. OTC medications include: see medication list. Patient is able to manage medications. Activities of Daily Living (ADLs)/Instrumental Activities of Daily Living (IADLs):   Walk and transfer into and out of bed and chair?: Yes  Dress and groom yourself?: Yes    Bathe or shower yourself?: Yes    Feed yourself?  Yes  Do your laundry/housekeeping?: Yes  Manage your money, pay your bills and track your expenses?: Yes  Make your own meals?: Yes    Do your own shopping?: No    Previous Hospitalizations:   Any hospitalizations or ED visits within the last 12 months?: Yes    How many hospitalizations have you had in the last year?: 3-4    PREVENTIVE SCREENINGS        Diabetes Screening:     General: Screening Current      Colorectal Cancer Screening:     General: Screening Not Indicated      Cervical Cancer Screening:    General: Screening Not Indicated      Osteoporosis Screening:    General: Screening Not Indicated and History Osteoporosis      Lung Cancer Screening:     General: Screening Not Indicated      Hepatitis C Screening:    General: Screening Current    Screening, Brief Intervention, and Referral to Treatment (SBIRT)    Screening  Typical number of drinks in a day: 0  Typical number of drinks in a week: 0  Interpretation: Low risk drinking behavior. Single Item Drug Screening:  How often have you used an illegal drug (including marijuana) or a prescription medication for non-medical reasons in the past year? never    Single Item Drug Screen Score: 0  Interpretation: Negative screen for possible drug use disorder    No results found. Physical Exam:     /60 (BP Location: Left arm, Patient Position: Sitting, Cuff Size: Standard)   Pulse 68   Temp 97.9 °F (36.6 °C) (Tympanic)   Resp 17   Ht 5' 5" (1.651 m)   Wt 50.2 kg (110 lb 9.6 oz)   SpO2 99%   BMI 18.40 kg/m²     Physical Exam  Vitals and nursing note reviewed. Constitutional:       General: She is not in acute distress. Appearance: Normal appearance. She is well-developed. HENT:      Head: Normocephalic and atraumatic. Right Ear: Tympanic membrane normal.      Left Ear: Tympanic membrane normal.      Nose: Nose normal.      Mouth/Throat:      Mouth: Mucous membranes are moist.   Eyes:      Conjunctiva/sclera: Conjunctivae normal.   Cardiovascular:      Rate and Rhythm: Normal rate and regular rhythm. Heart sounds: No murmur heard. Pulmonary:      Effort: Pulmonary effort is normal. No respiratory distress. Breath sounds: Normal breath sounds. Abdominal:      General: Abdomen is flat. Palpations: Abdomen is soft. Tenderness: There is no abdominal tenderness. Musculoskeletal:         General: No swelling.       Cervical back: Normal range of motion and neck supple. Skin:     General: Skin is warm and dry. Capillary Refill: Capillary refill takes less than 2 seconds. Neurological:      General: No focal deficit present. Mental Status: She is alert.    Psychiatric:         Mood and Affect: Mood normal.          Makenzie Alba MD

## 2023-08-23 NOTE — ASSESSMENT & PLAN NOTE
Wt Readings from Last 3 Encounters:   08/15/23 49.9 kg (110 lb)   07/12/23 48.2 kg (106 lb 3.2 oz)   07/11/23 48.1 kg (106 lb)       Continue current meds

## 2023-08-30 ENCOUNTER — TELEPHONE (OUTPATIENT)
Dept: HEMATOLOGY ONCOLOGY | Facility: CLINIC | Age: 83
End: 2023-08-30

## 2023-08-30 NOTE — TELEPHONE ENCOUNTER
Appointment Confirmation   Who are you speaking with? Patient   If it is not the patient, are they listed on an active communication consent form? N/A   Which provider is the appointment scheduled with? Gracie Pacheco PA-C   When is the appointment scheduled? Please list date and time 11/15/23 130   At which location is the appointment scheduled to take place? [place]   Did caller verbalize understanding of appointment details?  Yes

## 2023-09-08 DIAGNOSIS — E56.9 VITAMIN DEFICIENCY: ICD-10-CM

## 2023-09-08 DIAGNOSIS — I50.22 CHRONIC SYSTOLIC CHF (CONGESTIVE HEART FAILURE), NYHA CLASS 3 (HCC): ICD-10-CM

## 2023-09-08 RX ORDER — MELATONIN
1000 DAILY
Qty: 90 TABLET | Refills: 0 | Status: SHIPPED | OUTPATIENT
Start: 2023-09-08

## 2023-09-08 RX ORDER — OMEGA-3-ACID ETHYL ESTERS 1 G/1
2 CAPSULE, LIQUID FILLED ORAL DAILY
Qty: 180 CAPSULE | Refills: 0 | Status: SHIPPED | OUTPATIENT
Start: 2023-09-08

## 2023-09-26 ENCOUNTER — IN-CLINIC DEVICE VISIT (OUTPATIENT)
Dept: CARDIOLOGY CLINIC | Facility: CLINIC | Age: 83
End: 2023-09-26
Payer: MEDICARE

## 2023-09-26 DIAGNOSIS — Z95.810 PRESENCE OF AUTOMATIC CARDIOVERTER/DEFIBRILLATOR (AICD): Primary | ICD-10-CM

## 2023-09-26 PROCEDURE — 93284 PRGRMG EVAL IMPLANTABLE DFB: CPT | Performed by: INTERNAL MEDICINE

## 2023-09-26 NOTE — PROGRESS NOTES
Results for orders placed or performed in visit on 09/26/23   Cardiac EP device report    Narrative    MDT BI-V ICD/ ACTIVE SYSTEM IS MRI CONDITIONAL  DEVICE INTERROGATED IN THE [place] OFFICE. BATTERY VOLTAGE ADEQUATE. (8.6 YRS) AP 6%  96%. ALL LEAD PARAMETERS WITHIN NORMAL LIMITS. 7 NSVT EPISODES DETECTED MOST RECENT 9 BEATS @ 370ms, NSVT AND SVT BOTH NOTED. OPTI-VOL FLUID THRESHOLD CROSSED SLIGHTLY, PATIENT DENIES SYMPTOMS. DECREASE MADE TO AMPLITUDE TO PROMOTE DEVICE LONGEVITY WHILE MAINTAINING AN APPROPRIATE SAFETY MARGIN. LV PACE POLARTIY ALSO CHANGED. NORMAL DEVICE FUNCTION. ---RAMOS

## 2023-10-04 ENCOUNTER — TELEPHONE (OUTPATIENT)
Age: 83
End: 2023-10-04

## 2023-10-04 NOTE — TELEPHONE ENCOUNTER
Patient previously had a blood transfusion at the beginning of 2023. Patient feels cold very often and patient's son thinks patient needs blood work done to see what is going on in her body. Please contact patient and advise what the next steps are. Thank you for your help.

## 2023-10-04 NOTE — TELEPHONE ENCOUNTER
She get transfusion through hematology.  I would like hematology to be aware and order the necessary tests for her    Dr Christelle Raza

## 2023-10-05 ENCOUNTER — TELEPHONE (OUTPATIENT)
Dept: HEMATOLOGY ONCOLOGY | Facility: CLINIC | Age: 83
End: 2023-10-05

## 2023-10-05 DIAGNOSIS — D69.3 CHRONIC ITP (IDIOPATHIC THROMBOCYTOPENIA) (HCC): Primary | ICD-10-CM

## 2023-10-05 DIAGNOSIS — D50.0 IRON DEFICIENCY ANEMIA DUE TO CHRONIC BLOOD LOSS: ICD-10-CM

## 2023-10-05 DIAGNOSIS — E55.9 VITAMIN D DEFICIENCY, UNSPECIFIED: ICD-10-CM

## 2023-10-05 NOTE — TELEPHONE ENCOUNTER
Patient Call    Who are you speaking with? Physician Office    If it is not the patient, are they listed on an active communication consent form? N/A   What is the reason for this call? Dr. Piotr Carballo office is calling to request Dr. Ellington Knife call patients son. Patient is complaining about feeling cold and other symptoms. He believes she may need an infusion treatment. Does this require a call back? yes   If a call back is required, please list best call back number [phone]   If a call back is required, advise that a message will be forwarded to their care team and someone will return their call as soon as possible. Did you relay this information to the patient?  Yes

## 2023-10-05 NOTE — TELEPHONE ENCOUNTER
Spoke with patient's son who stated his mother has been feeling tired and weak lately. He states that she is having new difficulty concentrating but not confusion. Pt's son denies that pt is having SOB, lightheadedness, dizziness, bleeding, fevers or pain. I informed son we can place lab orders to evaluate CBC, iron panel, and vitamin D levels. Pt's son verbalized understanding and I left direct number for call back for any questions or concerns    Informed pt's son lab orders are in and provided the lab number so he can schedule the mobile draw.  Pt's son verbalized understanding

## 2023-10-10 ENCOUNTER — TELEPHONE (OUTPATIENT)
Dept: HEMATOLOGY ONCOLOGY | Facility: CLINIC | Age: 83
End: 2023-10-10

## 2023-10-10 NOTE — TELEPHONE ENCOUNTER
Susie Rubinstein, can you please call son to see if her labs were obtained. No results in her chart.

## 2023-10-11 ENCOUNTER — APPOINTMENT (OUTPATIENT)
Dept: LAB | Facility: CLINIC | Age: 83
End: 2023-10-11
Payer: MEDICARE

## 2023-10-11 DIAGNOSIS — D50.0 IRON DEFICIENCY ANEMIA DUE TO CHRONIC BLOOD LOSS: ICD-10-CM

## 2023-10-11 DIAGNOSIS — D69.3 CHRONIC ITP (IDIOPATHIC THROMBOCYTOPENIA) (HCC): ICD-10-CM

## 2023-10-11 DIAGNOSIS — E55.9 VITAMIN D DEFICIENCY, UNSPECIFIED: ICD-10-CM

## 2023-10-11 LAB
25(OH)D3 SERPL-MCNC: >120 NG/ML (ref 30–100)
BASOPHILS # BLD AUTO: 0.03 THOUSANDS/ÂΜL (ref 0–0.1)
BASOPHILS NFR BLD AUTO: 1 % (ref 0–1)
EOSINOPHIL # BLD AUTO: 0.01 THOUSAND/ÂΜL (ref 0–0.61)
EOSINOPHIL NFR BLD AUTO: 0 % (ref 0–6)
ERYTHROCYTE [DISTWIDTH] IN BLOOD BY AUTOMATED COUNT: 15.5 % (ref 11.6–15.1)
FERRITIN SERPL-MCNC: 796 NG/ML (ref 11–307)
HCT VFR BLD AUTO: 36 % (ref 34.8–46.1)
HGB BLD-MCNC: 11.6 G/DL (ref 11.5–15.4)
IMM GRANULOCYTES # BLD AUTO: 0.04 THOUSAND/UL (ref 0–0.2)
IMM GRANULOCYTES NFR BLD AUTO: 1 % (ref 0–2)
IRON SATN MFR SERPL: 62 % (ref 15–50)
IRON SERPL-MCNC: 199 UG/DL (ref 50–212)
LYMPHOCYTES # BLD AUTO: 0.89 THOUSANDS/ÂΜL (ref 0.6–4.47)
LYMPHOCYTES NFR BLD AUTO: 25 % (ref 14–44)
MCH RBC QN AUTO: 31.4 PG (ref 26.8–34.3)
MCHC RBC AUTO-ENTMCNC: 32.2 G/DL (ref 31.4–37.4)
MCV RBC AUTO: 98 FL (ref 82–98)
MONOCYTES # BLD AUTO: 0.48 THOUSAND/ÂΜL (ref 0.17–1.22)
MONOCYTES NFR BLD AUTO: 14 % (ref 4–12)
NEUTROPHILS # BLD AUTO: 2.06 THOUSANDS/ÂΜL (ref 1.85–7.62)
NEUTS SEG NFR BLD AUTO: 59 % (ref 43–75)
NRBC BLD AUTO-RTO: 0 /100 WBCS
PLATELET # BLD AUTO: 306 THOUSANDS/UL (ref 149–390)
PMV BLD AUTO: 12.1 FL (ref 8.9–12.7)
RBC # BLD AUTO: 3.69 MILLION/UL (ref 3.81–5.12)
TIBC SERPL-MCNC: 319 UG/DL (ref 250–450)
UIBC SERPL-MCNC: 120 UG/DL (ref 155–355)
WBC # BLD AUTO: 3.51 THOUSAND/UL (ref 4.31–10.16)

## 2023-10-11 PROCEDURE — 36415 COLL VENOUS BLD VENIPUNCTURE: CPT

## 2023-10-11 PROCEDURE — 83550 IRON BINDING TEST: CPT

## 2023-10-11 PROCEDURE — 83540 ASSAY OF IRON: CPT

## 2023-10-11 PROCEDURE — 82306 VITAMIN D 25 HYDROXY: CPT

## 2023-10-11 PROCEDURE — 85025 COMPLETE CBC W/AUTO DIFF WBC: CPT

## 2023-10-11 PROCEDURE — 82728 ASSAY OF FERRITIN: CPT

## 2023-10-12 ENCOUNTER — TELEPHONE (OUTPATIENT)
Dept: HEMATOLOGY ONCOLOGY | Facility: CLINIC | Age: 83
End: 2023-10-12

## 2023-10-12 NOTE — TELEPHONE ENCOUNTER
----- Message from Senait Low PA-C sent at 10/12/2023 12:10 PM EDT -----  Labs look great. Tiredness and weakness not 2nd to anemia. Platelet count also looks great. Will see her for f/u but labs do not indicate source for her fatigue. VM left for son with above information. Advised him to have her follow up with PCP for symptoms. Call back number provided.

## 2023-11-15 ENCOUNTER — TELEPHONE (OUTPATIENT)
Dept: HEMATOLOGY ONCOLOGY | Facility: CLINIC | Age: 83
End: 2023-11-15

## 2023-11-15 ENCOUNTER — OFFICE VISIT (OUTPATIENT)
Dept: HEMATOLOGY ONCOLOGY | Facility: CLINIC | Age: 83
End: 2023-11-15
Payer: MEDICARE

## 2023-11-15 VITALS
HEIGHT: 65 IN | SYSTOLIC BLOOD PRESSURE: 128 MMHG | BODY MASS INDEX: 18.13 KG/M2 | HEART RATE: 70 BPM | TEMPERATURE: 97.9 F | DIASTOLIC BLOOD PRESSURE: 82 MMHG | WEIGHT: 108.8 LBS | RESPIRATION RATE: 17 BRPM | OXYGEN SATURATION: 98 %

## 2023-11-15 DIAGNOSIS — K75.4 AUTOIMMUNE HEPATITIS TREATED WITH STEROIDS (HCC): ICD-10-CM

## 2023-11-15 DIAGNOSIS — R79.9 ABNORMAL FINDING OF BLOOD CHEMISTRY, UNSPECIFIED: ICD-10-CM

## 2023-11-15 DIAGNOSIS — D69.3 CHRONIC ITP (IDIOPATHIC THROMBOCYTOPENIA) (HCC): ICD-10-CM

## 2023-11-15 DIAGNOSIS — D61.818 PANCYTOPENIA (HCC): Primary | ICD-10-CM

## 2023-11-15 PROBLEM — D50.0 IRON DEFICIENCY ANEMIA DUE TO CHRONIC BLOOD LOSS: Status: RESOLVED | Noted: 2023-03-20 | Resolved: 2023-11-15

## 2023-11-15 PROCEDURE — 99214 OFFICE O/P EST MOD 30 MIN: CPT | Performed by: PHYSICIAN ASSISTANT

## 2023-11-15 NOTE — PROGRESS NOTES
Hematology/Oncology Outpatient Follow- up Note  Manny Hurtado 78 y.o. female MRN: @ Encounter: [id number]        Date:  11/15/2023      Assessment / Plan: 1. 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 She is on Promacta 50 mg p.o. daily since 8/2021. Platelet count went from 30,000 range July 2021 to normal by 9/2021. Period of noncompliance with Promacta. Platelet count went from 441 12/5/22 to 70 1/23/23 and 94 2/22/23. Promacta restarted in March 2023. 25 mg p.o. daily, platelet count of 85,719, hemoglobin 12.2, WBC 5.8      2. History of Acute blood loss anemia. Status post fall with subdural hemorrhage March 2022. Hemoglobin 5.1 7/1/2022. She felt very fatigued prior to admission. She and her son deny any evidence of GI bleed. She declined GI workup. She was taking oral iron for a few weeks post discharge but discontinued. At her 8/29/22 office visit, she was advised to resume oral iron. 8/2022 - Hemoglobin 10 - 11 g/dL range 8/22 - 2/20232 2/22/23 iron saturation 29%; ferritin 87     3/20/23 hemoglobin 6.1, MCV 90, WBC 4.4, platelet count 823  Admitted 3/21-3/25/23  S/p 2 unit transfusion of PRBC on admission  Patient declined EGD, colonoscopy. Venofer 300mg 3/22, March 28, March 30, April 4, April 6, April 11, April 14, 2023 4/6/23 hemoglobin 9.9, platelets 933  56/57/32  Iron saturation 62%, ferritin 796, hemoglobin 11.6, white blood cell count 3.5, ANC 2.06, platelets 639    Patient states today she has been out of Promacta for 1 month. She is here with an aide, Marium Hightower. Discussed with patient and Marium Hightower that it is recommended she continue Promacta and in the past when she discontinued this her platelets would decrease and she was more susceptible to GI bleed. She will go for repeat CBCD on Friday. Promacta was renewed. Marium Hightower will assist in patient compliance and getting Promacta rx.     Anticipate q 2 month assessment unless significant abnormalities on Friday's CBCD and f/u in 4 months            HPI:   Clarissa Crews is an 25-year-old [place] American female who was seen initially 5/2020 regarding pancytopenia. She has a history of dilated cardiomyopathy with low ejection fraction on Entresto, spironolactone, torsemide, metoprolol and budesonide. 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 She had outpatient labs 4/17/20 at [facility]. hemoglobin 6.9, MCV 70, RDW 19.7, WBC 1.9, 59% neutrophils, 30% lymphocytes, platelet 53014,   She was transfused with 2 units packed RBC ordered by her cardiologist, Dr. Maxime Dias and received Feraheme weekly x 2 doses end of June 2020. CBC on 09/09/2019 showed hemoglobin 11.3, MCV 93, WBC of 2.7, normal differential platelets 22636  August 2018 iron saturation 8% ferritin 26  On 07/2018 hemoglobin 11, MCV 91, WBC 3.1, platelets 11863  72/50/3634:  Hemoglobin 12.9, MCV of 90, white blood cell count 2.95, platelets 369  On 43/8189 hemoglobin 13.3, MCV 89, WBC 3.4, platelets 077439     She had normal vitamin R01, folic acid, TSH, protein electrophoresis     Autoimmune workup by Rheumatology was negative except for anti smooth muscle antibodies of 93     Because of the leukopenia, thrombocytopenia bone marrow biopsy performed on January 2021 showed normal bone marrow cellularity for the age, no evidence of dysplastic features, normal plasma cells and lymphocytes, no evidence of vasculitis, necrosis, fibrosis adequate iron stains     Back on prednisone 10 mg p.o. daily because of elevation of ALT, AST, with persistent thrombocytopenia of 38,000. Initiated on Promacta 50 mg at the end of August 2021, platelet count 093861 in November 2021, WBC 4, and decrease in the AST, ALT     3/20/22 admitted when outpatient CT identified subdural hemorrhage.   She would a mechanical fall 1 week prior and had intermittent headaches which prompted the CT scan. Status post defibrillator     Promacta reduced to 25 mg p.o. daily    Period of noncompliance with Promacta. Platelet count went from 441 12/5/22 to 70 1/23/23 and 94 2/22/23. Promacta restarted in March 2023. 25 mg p.o. daily      History of Acute blood loss anemia. Status post fall with subdural hemorrhage March 2022. Hemoglobin 5.1 7/1/2022. She felt very fatigued prior to admission. She and her son deny any evidence of GI bleed. She declined GI workup. She was taking oral iron for a few weeks post discharge but discontinued. At her 8/29/22 office visit, she was advised to resume oral iron. 8/2022 - Hemoglobin 10 - 11 g/dL range 8/22 - 2/20232 2/22/23 iron saturation 29%; ferritin 87     3/20/23 hemoglobin 6.1, MCV 90, WBC 4.4, platelet count 662  Admitted 3/21-3/25/23  S/p 2 unit transfusion of PRBC on admission  Patient declined EGD, colonoscopy. Venofer 300mg 3/22, March 28, March 30, April 4, April 6, April 11, April 14, 2023 4/6/23 hemoglobin 9.9, platelets 955    June 9, 2023 hemoglobin 12.2, white blood cell count 5.8, platelet count 58,456    Interval History:   10/11/23  Iron saturation 62%, ferritin 796 hemoglobin 11.6, white blood cell count 3.5, platelets 266          Interval History:        Review of Systems   Constitutional:  Negative for appetite change, chills, diaphoresis, fatigue, fever and unexpected weight change. HENT:   Negative for mouth sores, nosebleeds, sore throat, tinnitus and voice change. Eyes:  Negative for eye problems. Respiratory:  Negative for chest tightness, cough, shortness of breath and wheezing. Cardiovascular:  Negative for chest pain, leg swelling and palpitations. Gastrointestinal:  Negative for abdominal distention, abdominal pain, blood in stool, constipation, diarrhea, nausea, rectal pain and vomiting. Endocrine: Negative for hot flashes. Genitourinary: Negative.      Musculoskeletal:  Negative for gait problem and myalgias. Skin:  Negative for itching and rash. Neurological:  Negative for dizziness, gait problem, headaches, light-headedness and numbness. Hematological:  Negative for adenopathy. Psychiatric/Behavioral:  Negative for confusion and sleep disturbance. The patient is not nervous/anxious. Test Results:        Labs:   Lab Results   Component Value Date    HGB 11.6 10/11/2023    HCT 36.0 10/11/2023    MCV 98 10/11/2023     10/11/2023    WBC 3.51 (L) 10/11/2023    NRBC 0 10/11/2023     Lab Results   Component Value Date     12/04/2015    K 3.9 06/09/2023     06/09/2023    CO2 28 06/09/2023    ANIONGAP 4 12/04/2015    BUN 17 06/09/2023    CREATININE 0.79 06/09/2023    GLUCOSE 94 12/04/2015    GLUF 73 06/03/2023    CALCIUM 8.8 06/09/2023    CORRECTEDCA 9.3 05/04/2023    AST 26 06/03/2023    ALT 15 06/03/2023    ALKPHOS 74 06/03/2023    PROT 7.5 12/04/2015    BILITOT 0.48 12/04/2015    EGFR 68 06/09/2023           Imaging: No results found. Allergies: Allergies   Allergen Reactions    Ambien [Zolpidem] Other (See Comments)     Did not allow sleep per pt     Current Medications: Reviewed  PMH/FH/SH:  Reviewed      Physical Exam:    There is no height or weight on file to calculate BSA. Ht Readings from Last 3 Encounters:   08/23/23 5' 5" (1.651 m)   08/15/23 5' 5" (1.651 m)   07/12/23 5' 5" (1.651 m)        Wt Readings from Last 3 Encounters:   08/23/23 50.2 kg (110 lb 9.6 oz)   08/15/23 49.9 kg (110 lb)   07/12/23 48.2 kg (106 lb 3.2 oz)        Temp Readings from Last 3 Encounters:   08/23/23 97.9 °F (36.6 °C) (Tympanic)   07/11/23 98.4 °F (36.9 °C) (Temporal)   06/20/23 98.3 °F (36.8 °C) (Tympanic)        BP Readings from Last 3 Encounters:   08/23/23 100/60   08/15/23 120/60   07/12/23 124/64             Physical Exam  Constitutional:       Appearance: She is well-developed. HENT:      Head: Normocephalic and atraumatic.    Cardiovascular:      Rate and Rhythm: Normal rate. Pulmonary:      Effort: Pulmonary effort is normal. No respiratory distress. Breath sounds: Normal breath sounds. No rhonchi. Skin:     General: Skin is warm and dry. Neurological:      Mental Status: She is alert.    Psychiatric:         Behavior: Behavior normal.           Emergency Contacts:    Extended Emergency Contact Information  Primary Emergency Contact: Delvis Damico ([facility])  Address: [facility], [address] [place] [address] Phone: [phone]  Relation: Son  Secondary Emergency Contact: [facility] Phone: [phone]  Relation: Spouse

## 2023-11-15 NOTE — TELEPHONE ENCOUNTER
Appointment Confirmation   Who are you speaking with? Patient   If it is not the patient, are they listed on an active communication consent form? N/A   Which provider is the appointment scheduled with? Dariusz Larkin PA-C   When is the appointment scheduled? Please list date and time 11/15/23 1:30pm   At which location is the appointment scheduled to take place? [place]   Did caller verbalize understanding of appointment details?  Yes

## 2023-11-16 ENCOUNTER — TELEPHONE (OUTPATIENT)
Dept: HEMATOLOGY ONCOLOGY | Facility: CLINIC | Age: 83
End: 2023-11-16

## 2023-11-16 NOTE — TELEPHONE ENCOUNTER
Chief Complaint   Patient presents with     Blood Draw     Labs drawn via CVC by RN in lab. VS taken       Labs collected from CVC by RN, line flushed with saline and heparin.  Vitals taken. Pt checked in for appointment(s).    Amanda Mcintosh RN         Patient Call    Who are you speaking with? Patient @ assistant   If it is not the patient, are they listed on an active communication consent form? na   What is the reason for this call? Patient states [facility] informed her they need an authorization for medication eltrombopag (PROMACTA) 25 mg tablet from [facility]   Does this require a call back? yes   If a call back is required, please list best call back number [phone]    If a call back is required, advise that a message will be forwarded to their care team and someone will return their call as soon as possible. Did you relay this information to the patient?  Yes

## 2023-12-19 DIAGNOSIS — K75.4 AUTOIMMUNE HEPATITIS TREATED WITH STEROIDS (HCC): ICD-10-CM

## 2023-12-19 RX ORDER — BUDESONIDE 3 MG/1
9 CAPSULE, COATED PELLETS ORAL DAILY
Qty: 270 CAPSULE | Refills: 1 | Status: SHIPPED | OUTPATIENT
Start: 2023-12-19

## 2023-12-19 NOTE — TELEPHONE ENCOUNTER
Reason for call:   [x] Refill   [] Prior Auth  [] Other:     Office:   [] PCP/Provider -   [x] Specialty/Provider -     Medication: BUDESONIDE    Dose/Frequency: 3 MG    Quantity: 270    Pharmacy: [facility]  [address]    Does the patient have enough for 3 days?   [] Yes   [x] No - Send as HP to POD

## 2023-12-21 ENCOUNTER — TELEPHONE (OUTPATIENT)
Dept: FAMILY MEDICINE CLINIC | Facility: CLINIC | Age: 83
End: 2023-12-21

## 2023-12-21 NOTE — TELEPHONE ENCOUNTER
LM for patient/patient son to call office to verify if there will be any insurance changes as of 01/2024 due to upcoming Prolia injection.

## 2023-12-26 ENCOUNTER — TELEPHONE (OUTPATIENT)
Age: 83
End: 2023-12-26

## 2023-12-26 NOTE — TELEPHONE ENCOUNTER
Pt called back in regards to medication entresto. Relayed the message to pt from Dr. Zarate. Pt is aware to keep taking the medication.

## 2023-12-26 NOTE — TELEPHONE ENCOUNTER
Yes it was given by her cardiologist and she should be taking it and we are monitoring her kidney function on regular basis    Dr corea

## 2023-12-26 NOTE — TELEPHONE ENCOUNTER
Patient called regarding her Entresto. She stated that her pharmacy told her to contact her doctor to make sure she is still suppose to be taking this medication. They told it can cause kidney issues. She is concerned and would like someone to call her back regarding this.[phone]

## 2023-12-29 ENCOUNTER — HOSPITAL ENCOUNTER (OUTPATIENT)
Dept: NON INVASIVE DIAGNOSTICS | Facility: CLINIC | Age: 83
Discharge: HOME | End: 2023-12-29
Payer: MEDICARE

## 2023-12-29 VITALS
BODY MASS INDEX: 18.15 KG/M2 | DIASTOLIC BLOOD PRESSURE: 82 MMHG | WEIGHT: 108.91 LBS | SYSTOLIC BLOOD PRESSURE: 128 MMHG | HEART RATE: 72 BPM | HEIGHT: 65 IN

## 2023-12-29 DIAGNOSIS — I42.0 DILATED CARDIOMYOPATHY (HCC): ICD-10-CM

## 2023-12-29 DIAGNOSIS — I50.22 CHRONIC SYSTOLIC HEART FAILURE (HCC): ICD-10-CM

## 2023-12-29 DIAGNOSIS — I44.7 COMPLETE LEFT BUNDLE BRANCH BLOCK (LBBB): ICD-10-CM

## 2023-12-29 LAB
AORTIC ROOT: 3 CM
APICAL FOUR CHAMBER EJECTION FRACTION: 33 %
ASCENDING AORTA: 2.9 CM
AV LVOT MEAN GRADIENT: 2 MMHG
AV LVOT PEAK GRADIENT: 3 MMHG
DOP CALC LVOT AREA: 3.14 CM2
DOP CALC LVOT CARDIAC INDEX: 2.01 L/MIN/M2
DOP CALC LVOT CARDIAC OUTPUT: 3.08 L/MIN
DOP CALC LVOT DIAMETER: 2 CM
DOP CALC LVOT PEAK VEL VTI: 13.62 CM
DOP CALC LVOT PEAK VEL: 0.88 M/S
DOP CALC LVOT STROKE INDEX: 27.5 ML/M2
DOP CALC LVOT STROKE VOLUME: 42.77
E WAVE DECELERATION TIME: 481 MS
E/A RATIO: 0.55
FRACTIONAL SHORTENING: 21 (ref 28–44)
GLOBAL LONGITUIDAL STRAIN: -12 %
INTERVENTRICULAR SEPTUM IN DIASTOLE (PARASTERNAL SHORT AXIS VIEW): 1.1 CM
INTERVENTRICULAR SEPTUM: 1.1 CM (ref 0.6–1.1)
LAAS-AP2: 17.7 CM2
LAAS-AP4: 14.7 CM2
LEFT ATRIUM AREA SYSTOLE SINGLE PLANE A4C: 15.8 CM2
LEFT ATRIUM SIZE: 3.1 CM
LEFT ATRIUM VOLUME (MOD BIPLANE): 49 ML
LEFT ATRIUM VOLUME INDEX (MOD BIPLANE): 32 ML/M2
LEFT INTERNAL DIMENSION IN SYSTOLE: 3.1 CM (ref 2.1–4)
LEFT VENTRICLE DIASTOLIC VOLUME (MOD BIPLANE): 109 ML
LEFT VENTRICLE SYSTOLIC VOLUME (MOD BIPLANE): 69 ML
LEFT VENTRICULAR INTERNAL DIMENSION IN DIASTOLE: 3.9 CM (ref 3.5–6)
LEFT VENTRICULAR POSTERIOR WALL IN END DIASTOLE: 1.1 CM
LEFT VENTRICULAR STROKE VOLUME: 30 ML
LV EF: 37 %
LVSV (TEICH): 30 ML
MV E'TISSUE VEL-SEP: 4 CM/S
MV PEAK A VEL: 0.8 M/S
MV PEAK E VEL: 44 CM/S
MV STENOSIS PRESSURE HALF TIME: 140 MS
MV VALVE AREA P 1/2 METHOD: 1.57
RA PRESSURE ESTIMATED: 3 MMHG
RIGHT ATRIUM AREA SYSTOLE A4C: 11.4 CM2
RIGHT VENTRICLE ID DIMENSION: 3.1 CM
RV PSP: 26 MMHG
SL CV LEFT ATRIUM LENGTH A2C: 4.7 CM
SL CV LV EF: 40
SL CV PED ECHO LEFT VENTRICLE DIASTOLIC VOLUME (MOD BIPLANE) 2D: 67 ML
SL CV PED ECHO LEFT VENTRICLE SYSTOLIC VOLUME (MOD BIPLANE) 2D: 37 ML
TR MAX PG: 23 MMHG
TR PEAK VELOCITY: 2.4 M/S
TRICUSPID ANNULAR PLANE SYSTOLIC EXCURSION: 1.9 CM
TRICUSPID VALVE PEAK REGURGITATION VELOCITY: 2.42 M/S

## 2023-12-29 PROCEDURE — 93306 TTE W/DOPPLER COMPLETE: CPT | Performed by: INTERNAL MEDICINE

## 2023-12-29 PROCEDURE — 93306 TTE W/DOPPLER COMPLETE: CPT

## 2024-01-10 ENCOUNTER — TELEPHONE (OUTPATIENT)
Dept: CARDIOLOGY CLINIC | Facility: CLINIC | Age: 84
End: 2024-01-10

## 2024-01-12 ENCOUNTER — VBI (OUTPATIENT)
Dept: ADMINISTRATIVE | Facility: OTHER | Age: 84
End: 2024-01-12

## 2024-01-15 ENCOUNTER — VBI (OUTPATIENT)
Dept: ADMINISTRATIVE | Facility: OTHER | Age: 84
End: 2024-01-15

## 2024-01-17 ENCOUNTER — VBI (OUTPATIENT)
Dept: ADMINISTRATIVE | Facility: OTHER | Age: 84
End: 2024-01-17

## 2024-01-17 NOTE — TELEPHONE ENCOUNTER
I spoke with Symone and advised.She will continue the Entresto.  
Symone had a question about Entresto.    She said when she filled it last, the pharmacist told her it could cause damage to her liver.  Symone asked if the Entresto is safe for her to take.    Please advise.  
Admission

## 2024-01-19 ENCOUNTER — VBI (OUTPATIENT)
Dept: ADMINISTRATIVE | Facility: OTHER | Age: 84
End: 2024-01-19

## 2024-02-15 ENCOUNTER — RA CDI HCC (OUTPATIENT)
Dept: OTHER | Facility: HOSPITAL | Age: 84
End: 2024-02-15

## 2024-02-16 ENCOUNTER — TELEPHONE (OUTPATIENT)
Dept: HEMATOLOGY ONCOLOGY | Facility: CLINIC | Age: 84
End: 2024-02-16

## 2024-02-16 NOTE — TELEPHONE ENCOUNTER
Returned call to patient. She has not been taking her promacta for approximately 2 months. She thought she was out of her medication, but just found 2 bottles of it at home.    She will go for CBC this weekend, follow up is scheduled for 3/15 with Dr. Swift.    Once results are in, we will call her about Promacta dose.

## 2024-02-16 NOTE — TELEPHONE ENCOUNTER
"Received attached VM via teams:  \"Hi, this is Marguerite Ryan. I'm Symone Damico's niece. I'm calling because I need the doctor to call because it's very urgent and important about some medication that he prescribed for her. And please ask them to call the house number at [phone]. It is very urgent that we hear from the doctor. Thank you. Symone Damico is the patient. Thank you.\"    "

## 2024-02-16 NOTE — TELEPHONE ENCOUNTER
Patient Call    Who are you speaking with? Patient    If it is not the patient, are they listed on an active communication consent form? Yes   What is the reason for this call? Patient wants to speak to Dr. Swift    Does this require a call back? Yes   If a call back is required, please list best call back number [phone]   If a call back is required, advise that a message will be forwarded to their care team and someone will return their call as soon as possible.   Did you relay this information to the patient? Yes

## 2024-02-18 ENCOUNTER — APPOINTMENT (OUTPATIENT)
Dept: LAB | Facility: CLINIC | Age: 84
End: 2024-02-18
Payer: MEDICARE

## 2024-02-18 DIAGNOSIS — D50.0 IRON DEFICIENCY ANEMIA DUE TO CHRONIC BLOOD LOSS: ICD-10-CM

## 2024-02-18 DIAGNOSIS — D69.3 CHRONIC ITP (IDIOPATHIC THROMBOCYTOPENIA) (HCC): ICD-10-CM

## 2024-02-18 DIAGNOSIS — D61.818 PANCYTOPENIA (HCC): ICD-10-CM

## 2024-02-18 DIAGNOSIS — R79.9 ABNORMAL FINDING OF BLOOD CHEMISTRY, UNSPECIFIED: ICD-10-CM

## 2024-02-19 ENCOUNTER — TELEPHONE (OUTPATIENT)
Dept: HEMATOLOGY ONCOLOGY | Facility: CLINIC | Age: 84
End: 2024-02-19

## 2024-02-21 ENCOUNTER — TELEPHONE (OUTPATIENT)
Dept: HEMATOLOGY ONCOLOGY | Facility: CLINIC | Age: 84
End: 2024-02-21

## 2024-02-21 ENCOUNTER — TELEPHONE (OUTPATIENT)
Dept: FAMILY MEDICINE CLINIC | Facility: CLINIC | Age: 84
End: 2024-02-21

## 2024-02-21 ENCOUNTER — OFFICE VISIT (OUTPATIENT)
Dept: FAMILY MEDICINE CLINIC | Facility: CLINIC | Age: 84
End: 2024-02-21
Payer: MEDICARE

## 2024-02-21 ENCOUNTER — APPOINTMENT (OUTPATIENT)
Dept: LAB | Facility: CLINIC | Age: 84
End: 2024-02-21
Payer: MEDICARE

## 2024-02-21 VITALS
BODY MASS INDEX: 18.33 KG/M2 | HEART RATE: 81 BPM | HEIGHT: 65 IN | RESPIRATION RATE: 16 BRPM | WEIGHT: 110 LBS | TEMPERATURE: 97.8 F | DIASTOLIC BLOOD PRESSURE: 70 MMHG | SYSTOLIC BLOOD PRESSURE: 120 MMHG | OXYGEN SATURATION: 98 %

## 2024-02-21 DIAGNOSIS — Z95.811 PRESENCE OF HEART ASSIST DEVICE (HCC): ICD-10-CM

## 2024-02-21 DIAGNOSIS — D50.9 IRON DEFICIENCY ANEMIA, UNSPECIFIED IRON DEFICIENCY ANEMIA TYPE: ICD-10-CM

## 2024-02-21 DIAGNOSIS — D69.3 CHRONIC ITP (IDIOPATHIC THROMBOCYTOPENIA) (HCC): ICD-10-CM

## 2024-02-21 DIAGNOSIS — E44.0 MODERATE PROTEIN-CALORIE MALNUTRITION (HCC): ICD-10-CM

## 2024-02-21 DIAGNOSIS — I11.0 HYPERTENSIVE HEART DISEASE WITH CONGESTIVE HEART FAILURE, UNSPECIFIED HEART FAILURE TYPE (HCC): ICD-10-CM

## 2024-02-21 DIAGNOSIS — K75.4 AUTOIMMUNE HEPATITIS TREATED WITH STEROIDS (HCC): ICD-10-CM

## 2024-02-21 DIAGNOSIS — D61.818 PANCYTOPENIA (HCC): ICD-10-CM

## 2024-02-21 DIAGNOSIS — R74.8 ELEVATED LIVER ENZYMES: Primary | ICD-10-CM

## 2024-02-21 DIAGNOSIS — K74.69 OTHER CIRRHOSIS OF LIVER (HCC): ICD-10-CM

## 2024-02-21 PROBLEM — I47.20 VENTRICULAR TACHYCARDIA, UNSPECIFIED (HCC): Status: ACTIVE | Noted: 2024-02-21

## 2024-02-21 PROBLEM — S06.5XAA SUBDURAL HEMATOMA (HCC): Status: ACTIVE | Noted: 2024-02-21

## 2024-02-21 PROBLEM — I47.20 VENTRICULAR TACHYCARDIA, UNSPECIFIED (HCC): Status: RESOLVED | Noted: 2024-02-21 | Resolved: 2024-02-21

## 2024-02-21 PROBLEM — S06.5XAA SUBDURAL HEMATOMA (HCC): Status: RESOLVED | Noted: 2024-02-21 | Resolved: 2024-02-21

## 2024-02-21 LAB
ALBUMIN SERPL BCP-MCNC: 3.2 G/DL (ref 3.5–5)
ALP SERPL-CCNC: 66 U/L (ref 34–104)
ALT SERPL W P-5'-P-CCNC: 216 U/L (ref 7–52)
ANION GAP SERPL CALCULATED.3IONS-SCNC: 3 MMOL/L
AST SERPL W P-5'-P-CCNC: 203 U/L (ref 13–39)
BASOPHILS # BLD AUTO: 0.02 THOUSANDS/ÂΜL (ref 0–0.1)
BASOPHILS NFR BLD AUTO: 1 % (ref 0–1)
BILIRUB SERPL-MCNC: 1.12 MG/DL (ref 0.2–1)
BUN SERPL-MCNC: 14 MG/DL (ref 5–25)
CALCIUM ALBUM COR SERPL-MCNC: 9.7 MG/DL (ref 8.3–10.1)
CALCIUM SERPL-MCNC: 9.1 MG/DL (ref 8.4–10.2)
CHLORIDE SERPL-SCNC: 105 MMOL/L (ref 96–108)
CO2 SERPL-SCNC: 31 MMOL/L (ref 21–32)
CREAT SERPL-MCNC: 0.71 MG/DL (ref 0.6–1.3)
EOSINOPHIL # BLD AUTO: 0.02 THOUSAND/ÂΜL (ref 0–0.61)
EOSINOPHIL NFR BLD AUTO: 1 % (ref 0–6)
ERYTHROCYTE [DISTWIDTH] IN BLOOD BY AUTOMATED COUNT: 15.2 % (ref 11.6–15.1)
FERRITIN SERPL-MCNC: 595 NG/ML (ref 11–307)
GFR SERPL CREATININE-BSD FRML MDRD: 77 ML/MIN/1.73SQ M
GLUCOSE SERPL-MCNC: 89 MG/DL (ref 65–140)
HCT VFR BLD AUTO: 36.7 % (ref 34.8–46.1)
HGB BLD-MCNC: 12.1 G/DL (ref 11.5–15.4)
IMM GRANULOCYTES # BLD AUTO: 0.01 THOUSAND/UL (ref 0–0.2)
IMM GRANULOCYTES NFR BLD AUTO: 0 % (ref 0–2)
LG PLATELETS BLD QL SMEAR: PRESENT
LYMPHOCYTES # BLD AUTO: 0.8 THOUSANDS/ÂΜL (ref 0.6–4.47)
LYMPHOCYTES NFR BLD AUTO: 30 % (ref 14–44)
MCH RBC QN AUTO: 32.9 PG (ref 26.8–34.3)
MCHC RBC AUTO-ENTMCNC: 33 G/DL (ref 31.4–37.4)
MCV RBC AUTO: 100 FL (ref 82–98)
MONOCYTES # BLD AUTO: 0.36 THOUSAND/ÂΜL (ref 0.17–1.22)
MONOCYTES NFR BLD AUTO: 14 % (ref 4–12)
NEUTROPHILS # BLD AUTO: 1.43 THOUSANDS/ÂΜL (ref 1.85–7.62)
NEUTS SEG NFR BLD AUTO: 54 % (ref 43–75)
NRBC BLD AUTO-RTO: 0 /100 WBCS
PLATELET # BLD AUTO: 68 THOUSANDS/UL (ref 149–390)
PLATELET BLD QL SMEAR: ABNORMAL
PMV BLD AUTO: 12.1 FL (ref 8.9–12.7)
POTASSIUM SERPL-SCNC: 3.5 MMOL/L (ref 3.5–5.3)
PROT SERPL-MCNC: 7.4 G/DL (ref 6.4–8.4)
RBC # BLD AUTO: 3.68 MILLION/UL (ref 3.81–5.12)
RBC MORPH BLD: NORMAL
SODIUM SERPL-SCNC: 139 MMOL/L (ref 135–147)
WBC # BLD AUTO: 2.64 THOUSAND/UL (ref 4.31–10.16)

## 2024-02-21 PROCEDURE — 99214 OFFICE O/P EST MOD 30 MIN: CPT | Performed by: FAMILY MEDICINE

## 2024-02-21 PROCEDURE — 85025 COMPLETE CBC W/AUTO DIFF WBC: CPT

## 2024-02-21 PROCEDURE — 36415 COLL VENOUS BLD VENIPUNCTURE: CPT

## 2024-02-21 PROCEDURE — 83540 ASSAY OF IRON: CPT

## 2024-02-21 PROCEDURE — 83550 IRON BINDING TEST: CPT

## 2024-02-21 PROCEDURE — 96372 THER/PROPH/DIAG INJ SC/IM: CPT

## 2024-02-21 PROCEDURE — 80053 COMPREHEN METABOLIC PANEL: CPT

## 2024-02-21 PROCEDURE — 82728 ASSAY OF FERRITIN: CPT

## 2024-02-21 RX ORDER — PREDNISONE 5 MG/1
5 TABLET ORAL DAILY
Qty: 30 TABLET | Refills: 0 | Status: SHIPPED | OUTPATIENT
Start: 2024-02-21

## 2024-02-21 NOTE — TELEPHONE ENCOUNTER
Spoke with Colleen abraham text-spoke with son who lives in [place] and he is awre of order from Hematologist.

## 2024-02-21 NOTE — ASSESSMENT & PLAN NOTE
Malnutrition Findings:       To increase protein daily                           BMI Findings:           Body mass index is 18.3 kg/m².

## 2024-02-21 NOTE — PROGRESS NOTES
Name: Symone Damico      : 1940      MRN: [id number]  Encounter Provider: Kassandra Zarate MD  Encounter Date: 2024   Encounter department: [facility]    Assessment & Plan     1. Elevated liver enzymes  Comments:  restarted on prednisone daily  hematology aware and ordered ultrasound abdomen and repeat labs  dr krishnamurthy out of the office. will contact his covering provider  Orders:  -     Comprehensive metabolic panel  -     predniSONE 5 mg tablet; Take 1 tablet (5 mg total) by mouth daily    2. Hypertensive heart disease with congestive heart failure, unspecified heart failure type (HCC)  Assessment & Plan:  Wt Readings from Last 3 Encounters:   24 49.9 kg (110 lb)   23 49.4 kg (108 lb 14.5 oz)   23 49.4 kg (108 lb 12.8 oz)     Stable on current meds          Orders:  -     Ambulatory Referral to Social Work Care Management Program; Future    3. Presence of heart assist device (HCC)  -     Ambulatory Referral to Social Work Care Management Program; Future    4. Pancytopenia (HCC)  Assessment & Plan:  stable      5. Moderate protein-calorie malnutrition (HCC)  Assessment & Plan:  Malnutrition Findings:       To increase protein daily                           BMI Findings:           Body mass index is 18.3 kg/m².         6. Chronic ITP (idiopathic thrombocytopenia) (HCC)  Assessment & Plan:  Stable   Sees hematologist       7. Autoimmune hepatitis treated with steroids (HCC)  -     predniSONE 5 mg tablet; Take 1 tablet (5 mg total) by mouth daily    8. Other cirrhosis of liver (HCC)  -     Comprehensive metabolic panel           Subjective     HPI  Here for follow up  Her  passed away 1 month ago from heart failure  She will have a visiting holly 3 x week ( tues. Wednesday , Thursday) and  and 3rd Sundays who will help her with appointments and grocery shopping   Feels well overall other than itchy scalp at times   Labs that are done today are reviewed which  showed elevated  and ALT  216 and bilirubin level  1.12      Review of Systems   Constitutional: Negative.    HENT: Negative.     Eyes: Negative.    Respiratory: Negative.     Cardiovascular: Negative.    Gastrointestinal: Negative.    Endocrine: Negative.    Genitourinary: Negative.    Musculoskeletal: Negative.    Neurological: Negative.    Hematological: Negative.        Past Medical History:   Diagnosis Date   • Acute bilateral low back pain without sciatica 02/10/2023   • Acute blood loss anemia 03/21/2023   • Bell's palsy    • Cardiac disease    • Ear problems    • HL (hearing loss)    • Hypertension    • Hypotension 07/01/2022   • Sacroiliitis (HCC) 08/10/2021   • Subdural hematoma (HCC)    • Subdural hemorrhage ([facility]) 03/30/2022   • Thrombocytopenia ([facility]) 08/03/2018   • Tinnitus    • Traumatic subdural hemorrhage with loss of consciousness of unspecified duration, initial encounter ([facility]) 02/15/2023   • Ventricular tachycardia, unspecified ([facility])      Past Surgical History:   Procedure Laterality Date   • CARDIAC ELECTROPHYSIOLOGY PROCEDURE N/A 6/8/2023    Procedure: Cardiac biv icd implant, Left side, use ABX;  Surgeon: Merrick Calderon MD;  Location: [facility];  Service: Cardiology   • CT BONE MARROW BIOPSY AND ASPIRATION  1/27/2021     Family History   Problem Relation Age of Onset   • Autoimmune disease Neg Hx      Social History     Socioeconomic History   • Marital status: /Civil Union     Spouse name: None   • Number of children: None   • Years of education: None   • Highest education level: None   Occupational History   • None   Tobacco Use   • Smoking status: Never   • Smokeless tobacco: Never   Vaping Use   • Vaping status: Never Used   Substance and Sexual Activity   • Alcohol use: Not Currently   • Drug use: Not Currently   • Sexual activity: Not Currently     Partners: Male   Other Topics Concern   • None   Social History Narrative   • None     Social Determinants of Health      Financial Resource Strain: Patient Declined (8/23/2023)    Overall Financial Resource Strain (CARDIA)    • Difficulty of Paying Living Expenses: Patient declined   Food Insecurity: No Food Insecurity (3/22/2023)    Hunger Vital Sign    • Worried About Running Out of Food in the Last Year: Never true    • Ran Out of Food in the Last Year: Never true   Transportation Needs: No Transportation Needs (8/23/2023)    PRAPARE - Transportation    • Lack of Transportation (Medical): No    • Lack of Transportation (Non-Medical): No   Physical Activity: Not on file   Stress: Not on file   Social Connections: Not on file   Intimate Partner Violence: Not on file   Housing Stability: Low Risk  (3/22/2023)    Housing Stability Vital Sign    • Unable to Pay for Housing in the Last Year: No    • Number of Places Lived in the Last Year: 1    • Unstable Housing in the Last Year: No     Current Outpatient Medications on File Prior to Visit   Medication Sig   • ascorbic acid (VITAMIN C) 500 mg tablet Take 1 tablet (500 mg total) by mouth daily   • budesonide (ENTOCORT EC) 3 MG capsule Take 3 capsules (9 mg total) by mouth daily   • calcium carbonate (OS-PABLO) 600 MG tablet Take 600 mg by mouth   • cholecalciferol (VITAMIN D3) 1,000 units tablet TAKE 1 TABLET BY MOUTH EVERY DAY   • cyanocobalamin (VITAMIN B-12) 100 mcg tablet Take 2.5 tablets (250 mcg total) by mouth daily   • eltrombopag (PROMACTA) 25 mg tablet Take 1 tablet (25 mg total) by mouth daily Administer on an empty stomach, 1 hour before or 2 hours after a meal.   • metoprolol succinate (TOPROL-XL) 25 mg 24 hr tablet Take 1 tablet (25 mg total) by mouth daily   • omega-3-acid ethyl esters (LOVAZA) 1 g capsule TAKE 2 CAPSULES BY MOUTH DAILY.   • potassium chloride (MICRO-K) 10 MEQ CR capsule Take 1 capsule (10 mEq total) by mouth daily   • sacubitril-valsartan (Entresto) 49-51 MG TABS Take 1 tablet by mouth 2 (two) times a day   • torsemide (DEMADEX) 10 mg tablet Take 1  "tablet (10 mg total) by mouth daily   • fluticasone (FLONASE) 50 mcg/act nasal spray SPRAY 2 SPRAYS INTO EACH NOSTRIL EVERY DAY (Patient not taking: Reported on 8/15/2023)   • [DISCONTINUED] predniSONE 5 mg tablet Take 5 mg by mouth daily (Patient not taking: Reported on 2/21/2024)     Allergies   Allergen Reactions   • Ambien [Zolpidem] Other (See Comments)     Did not allow sleep per pt     Immunization History   Administered Date(s) Administered   • COVID-19 PFIZER VACCINE 0.3 ML IM 05/12/2021, 06/05/2021   • COVID-19 Pfizer vac (Misael-sucrose, gray cap) 12 yr+ IM 07/13/2022   • Hep A, adult 06/22/2022   • Hep B, adult 06/22/2022   • Pneumococcal Conjugate 13-Valent 08/10/2021   • Pneumococcal Polysaccharide PPV23 04/06/2022       Objective     /70 (BP Location: Left arm, Patient Position: Sitting, Cuff Size: Standard)   Pulse 81   Temp 97.8 °F (36.6 °C) (Tympanic)   Resp 16   Ht 5' 5\" (1.651 m)   Wt 49.9 kg (110 lb)   SpO2 98%   BMI 18.30 kg/m²     Physical Exam  Constitutional:       Appearance: Normal appearance.   Cardiovascular:      Rate and Rhythm: Normal rate and regular rhythm.      Pulses: Normal pulses.      Heart sounds: Normal heart sounds.   Pulmonary:      Effort: Pulmonary effort is normal.      Breath sounds: Normal breath sounds.   Abdominal:      General: There is no distension.      Palpations: Abdomen is soft. There is no mass.      Tenderness: There is no abdominal tenderness.   Neurological:      General: No focal deficit present.      Mental Status: She is alert and oriented to person, place, and time.   Psychiatric:         Mood and Affect: Mood normal.         Behavior: Behavior normal.       Kassandra Zarate MD    "

## 2024-02-21 NOTE — TELEPHONE ENCOUNTER
Attempted to call patient at both home and cell phone number, no answer VM does not . Attempted to call spouse, no answer.  Attempted to call son Tremayne, no answer VM was left asking for a call back to discuss his mothers lab results. Call back number provided.

## 2024-02-21 NOTE — ASSESSMENT & PLAN NOTE
Wt Readings from Last 3 Encounters:   02/21/24 49.9 kg (110 lb)   12/29/23 49.4 kg (108 lb 14.5 oz)   12/27/23 49.4 kg (108 lb 12.8 oz)     Stable on current meds

## 2024-02-21 NOTE — TELEPHONE ENCOUNTER
Attempted to call Dr. Cesar's office [phone] for the above named patient and left messages to return Dr. Zarate's phone call regarding patient's results.

## 2024-02-21 NOTE — TELEPHONE ENCOUNTER
Patient also with elevated ALT, AST.  Bili grade 1 elevation.    Recommend abd u/s and repeat   CBCD, CMP next week.

## 2024-02-22 ENCOUNTER — PATIENT OUTREACH (OUTPATIENT)
Dept: FAMILY MEDICINE CLINIC | Facility: CLINIC | Age: 84
End: 2024-02-22

## 2024-02-22 DIAGNOSIS — R79.89 ELEVATED LIVER FUNCTION TESTS: Primary | ICD-10-CM

## 2024-02-22 DIAGNOSIS — D61.818 PANCYTOPENIA (HCC): ICD-10-CM

## 2024-02-22 LAB
IRON SATN MFR SERPL: 57 % (ref 15–50)
IRON SERPL-MCNC: 165 UG/DL (ref 50–212)
TIBC SERPL-MCNC: 292 UG/DL (ref 250–450)
UIBC SERPL-MCNC: 127 UG/DL (ref 155–355)

## 2024-02-22 NOTE — PROGRESS NOTES
Covering OP CM called to pts son and LM in regards to consult for transportation and any other service pt may qualify for.

## 2024-02-22 NOTE — PROGRESS NOTES
Patient with elevated LFTs, decreased platelet count.    Recommend repeat on Monday and u/s.    Dr. Zarate reached out to me.  Patient was in their office, she informed Symone of plan and reached out to her GI physician.  Awaiting call back.  Patient confirmed she was not taking promacta for 2 months.    Dr. Zarate started her on prednisone 5mg po daily.    Gege called patient to discuss/ schedule

## 2024-02-22 NOTE — TELEPHONE ENCOUNTER
Spoke with patient, she reports feeling fine. She is agreeable to repeat labs, pt wishes to go tomorrow after discussion with her PCP yesterday. Pt is aware and agreeable to abdominal ultrasound.      [facility]: Can you please schedule patient for US Abdomen. Pt is okay with leaving a message.

## 2024-02-23 ENCOUNTER — APPOINTMENT (OUTPATIENT)
Dept: LAB | Facility: CLINIC | Age: 84
End: 2024-02-23
Payer: MEDICARE

## 2024-02-23 ENCOUNTER — TELEPHONE (OUTPATIENT)
Dept: HEMATOLOGY ONCOLOGY | Facility: CLINIC | Age: 84
End: 2024-02-23

## 2024-02-23 DIAGNOSIS — R79.89 ELEVATED LIVER FUNCTION TESTS: ICD-10-CM

## 2024-02-23 DIAGNOSIS — D61.818 PANCYTOPENIA (HCC): ICD-10-CM

## 2024-02-23 DIAGNOSIS — R79.89 ELEVATED LIVER FUNCTION TESTS: Primary | ICD-10-CM

## 2024-02-23 LAB
ALBUMIN SERPL BCP-MCNC: 3.2 G/DL (ref 3.5–5)
ALP SERPL-CCNC: 66 U/L (ref 34–104)
ALT SERPL W P-5'-P-CCNC: 197 U/L (ref 7–52)
ANION GAP SERPL CALCULATED.3IONS-SCNC: 2 MMOL/L
AST SERPL W P-5'-P-CCNC: 166 U/L (ref 13–39)
BASOPHILS # BLD AUTO: 0.02 THOUSANDS/ÂΜL (ref 0–0.1)
BASOPHILS NFR BLD AUTO: 1 % (ref 0–1)
BILIRUB SERPL-MCNC: 1.11 MG/DL (ref 0.2–1)
BUN SERPL-MCNC: 16 MG/DL (ref 5–25)
CALCIUM ALBUM COR SERPL-MCNC: 9.7 MG/DL (ref 8.3–10.1)
CALCIUM SERPL-MCNC: 9.1 MG/DL (ref 8.4–10.2)
CHLORIDE SERPL-SCNC: 106 MMOL/L (ref 96–108)
CO2 SERPL-SCNC: 28 MMOL/L (ref 21–32)
CREAT SERPL-MCNC: 0.69 MG/DL (ref 0.6–1.3)
EOSINOPHIL # BLD AUTO: 0.01 THOUSAND/ÂΜL (ref 0–0.61)
EOSINOPHIL NFR BLD AUTO: 0 % (ref 0–6)
ERYTHROCYTE [DISTWIDTH] IN BLOOD BY AUTOMATED COUNT: 15.1 % (ref 11.6–15.1)
GFR SERPL CREATININE-BSD FRML MDRD: 79 ML/MIN/1.73SQ M
GLUCOSE P FAST SERPL-MCNC: 83 MG/DL (ref 65–99)
HCT VFR BLD AUTO: 39.3 % (ref 34.8–46.1)
HGB BLD-MCNC: 13.1 G/DL (ref 11.5–15.4)
IMM GRANULOCYTES # BLD AUTO: 0.01 THOUSAND/UL (ref 0–0.2)
IMM GRANULOCYTES NFR BLD AUTO: 0 % (ref 0–2)
LYMPHOCYTES # BLD AUTO: 0.86 THOUSANDS/ÂΜL (ref 0.6–4.47)
LYMPHOCYTES NFR BLD AUTO: 26 % (ref 14–44)
MCH RBC QN AUTO: 32.3 PG (ref 26.8–34.3)
MCHC RBC AUTO-ENTMCNC: 33.3 G/DL (ref 31.4–37.4)
MCV RBC AUTO: 97 FL (ref 82–98)
MONOCYTES # BLD AUTO: 0.36 THOUSAND/ÂΜL (ref 0.17–1.22)
MONOCYTES NFR BLD AUTO: 11 % (ref 4–12)
NEUTROPHILS # BLD AUTO: 2.07 THOUSANDS/ÂΜL (ref 1.85–7.62)
NEUTS SEG NFR BLD AUTO: 62 % (ref 43–75)
NRBC BLD AUTO-RTO: 0 /100 WBCS
PLATELET # BLD AUTO: 76 THOUSANDS/UL (ref 149–390)
PLATELET BLD QL SMEAR: ABNORMAL
PMV BLD AUTO: 12.8 FL (ref 8.9–12.7)
POTASSIUM SERPL-SCNC: 3.4 MMOL/L (ref 3.5–5.3)
PROT SERPL-MCNC: 7.9 G/DL (ref 6.4–8.4)
RBC # BLD AUTO: 4.05 MILLION/UL (ref 3.81–5.12)
RBC MORPH BLD: NORMAL
SODIUM SERPL-SCNC: 136 MMOL/L (ref 135–147)
WBC # BLD AUTO: 3.33 THOUSAND/UL (ref 4.31–10.16)

## 2024-02-23 PROCEDURE — 80053 COMPREHEN METABOLIC PANEL: CPT | Performed by: FAMILY MEDICINE

## 2024-02-23 PROCEDURE — 85025 COMPLETE CBC W/AUTO DIFF WBC: CPT

## 2024-02-23 PROCEDURE — 36415 COLL VENOUS BLD VENIPUNCTURE: CPT | Performed by: FAMILY MEDICINE

## 2024-02-23 NOTE — TELEPHONE ENCOUNTER
CMP reviewed with Thania Price PA-C, patient to continue Promacta and repeat CMP last week. Attempted to call patient x2 no answer unable to leave voicemail.

## 2024-02-26 ENCOUNTER — PATIENT OUTREACH (OUTPATIENT)
Dept: FAMILY MEDICINE CLINIC | Facility: CLINIC | Age: 84
End: 2024-02-26

## 2024-02-26 DIAGNOSIS — I50.22 CHRONIC SYSTOLIC CHF (CONGESTIVE HEART FAILURE), NYHA CLASS 3 (HCC): ICD-10-CM

## 2024-02-26 RX ORDER — POTASSIUM CHLORIDE 750 MG/1
10 CAPSULE, EXTENDED RELEASE ORAL DAILY
Qty: 90 CAPSULE | Refills: 3 | Status: SHIPPED | OUTPATIENT
Start: 2024-02-26

## 2024-02-26 NOTE — TELEPHONE ENCOUNTER
Spoke with patient who request I speak with her sister to discuss. Spoke with Arlene, reviewed requested repeat CMP on Wednesday, same day of ultrasound. She reports patient did resume her Promacta as of Friday. She is agreeable to the plan.

## 2024-02-26 NOTE — PROGRESS NOTES
Phone call to sonERICK.  Son was not available at time of phone call.  Detailed message left with reason for phone call and this SW contact number.     Awaiting return call from son.

## 2024-02-28 ENCOUNTER — APPOINTMENT (OUTPATIENT)
Dept: LAB | Age: 84
End: 2024-02-28
Payer: MEDICARE

## 2024-02-28 ENCOUNTER — PATIENT OUTREACH (OUTPATIENT)
Dept: FAMILY MEDICINE CLINIC | Facility: CLINIC | Age: 84
End: 2024-02-28

## 2024-02-28 ENCOUNTER — HOSPITAL ENCOUNTER (OUTPATIENT)
Dept: RADIOLOGY | Age: 84
Discharge: HOME | End: 2024-02-28
Payer: MEDICARE

## 2024-02-28 DIAGNOSIS — R79.89 ELEVATED LIVER FUNCTION TESTS: ICD-10-CM

## 2024-02-28 LAB
ALBUMIN SERPL BCP-MCNC: 3.2 G/DL (ref 3.5–5)
ALP SERPL-CCNC: 68 U/L (ref 34–104)
ALT SERPL W P-5'-P-CCNC: 185 U/L (ref 7–52)
ANION GAP SERPL CALCULATED.3IONS-SCNC: 2 MMOL/L
AST SERPL W P-5'-P-CCNC: 137 U/L (ref 13–39)
BILIRUB SERPL-MCNC: 0.91 MG/DL (ref 0.2–1)
BUN SERPL-MCNC: 13 MG/DL (ref 5–25)
CALCIUM ALBUM COR SERPL-MCNC: 9.5 MG/DL (ref 8.3–10.1)
CALCIUM SERPL-MCNC: 8.9 MG/DL (ref 8.4–10.2)
CHLORIDE SERPL-SCNC: 103 MMOL/L (ref 96–108)
CO2 SERPL-SCNC: 31 MMOL/L (ref 21–32)
CREAT SERPL-MCNC: 0.53 MG/DL (ref 0.6–1.3)
GFR SERPL CREATININE-BSD FRML MDRD: 86 ML/MIN/1.73SQ M
GLUCOSE SERPL-MCNC: 88 MG/DL (ref 65–140)
POTASSIUM SERPL-SCNC: 3.6 MMOL/L (ref 3.5–5.3)
PROT SERPL-MCNC: 7.3 G/DL (ref 6.4–8.4)
SODIUM SERPL-SCNC: 136 MMOL/L (ref 135–147)

## 2024-02-28 PROCEDURE — 76700 US EXAM ABDOM COMPLETE: CPT

## 2024-02-28 PROCEDURE — 80053 COMPREHEN METABOLIC PANEL: CPT

## 2024-02-28 PROCEDURE — 36415 COLL VENOUS BLD VENIPUNCTURE: CPT

## 2024-02-28 NOTE — PROGRESS NOTES
SW placed 3rd outreach phone call to son.  SW introduced self and reason for phone call.  Son reports that care is covered at this time.  Pt has home health aides through [facility] 3-4 days a week.  Son is paying privately for these services.     [facility] provides transportation for pt.      Son asked about alternative to private pay.  SW educated son on waiver.  Pt's income is SS and minimal pension.  Son believes resources are less than $8000.  Pt own's her home.    Son agreed to email receipt of information on waiver process and [employer].  Son will review information with pt and advise if they wish to move forward.      SW will close referral at this time and re-open if pt and son need assistance applying for waiver.

## 2024-03-04 ENCOUNTER — TELEPHONE (OUTPATIENT)
Dept: HEMATOLOGY ONCOLOGY | Facility: CLINIC | Age: 84
End: 2024-03-04

## 2024-03-04 NOTE — TELEPHONE ENCOUNTER
----- Message from Thania Price PA-C sent at 3/4/2024  4:14 PM EST -----  Patient with history of autoimmune hepatitis.  PCP has started her on prednisone.  U/S report pending.  Patient to f/u with GI- used to see Dr. Cesar.  Now Dr. uBrch?  Please confirm with patient and remind her to f/u with GI

## 2024-03-04 NOTE — TELEPHONE ENCOUNTER
I called the patient to remind her that she needs to see GI  Patient states she did see Dr. Cesar in the past.  She told me she would try to call them today or tomorrow for an appointment

## 2024-03-11 NOTE — PROGRESS NOTES
Heart Failure Outpatient Progress Note - Symone Damico 84 y.o. female MRN: [id number]    @ Encounter: [id number]      Assessment/Plan:    Patient Active Problem List    Diagnosis Date Noted    Presence of heart assist device (HCC) 02/15/2023    Moderate protein-calorie malnutrition (HCC) 07/05/2022    Pancytopenia (HCC) 07/02/2022    Bilateral hearing loss 06/22/2022    Hypertensive heart disease with congestive heart failure (HCC) 12/07/2021    Chronic ITP (idiopathic thrombocytopenia) (HCC) 11/10/2021    Neutropenia, unspecified type (HCC) 08/10/2021    Left-sided Bell's palsy 08/10/2021    LEA positive 06/12/2020    Complete left bundle branch block (LBBB) 10/22/2019    Dilated cardiomyopathy (HCC) 08/09/2018    Chronic systolic heart failure (HCC) 08/01/2018    Other specified glaucoma 08/01/2018    Autoimmune hepatitis treated with steroids (HCC) 08/01/2018    Depressive disorder 07/19/2017    Elevated liver enzymes 02/21/2024    Other cirrhosis of liver (HCC) 02/15/2023       # ChronicSystolic CHF, Stage D, NYHA 3 with mildly reduced RV systolic function  Unclear etiology. HTN (normotensive on admission) +/- valvular (less likely, central MR likely functional) +/- myocarditis +/- stress CM +/- LBBB     Weight: 111 lbs  NT pro BNP 5/29/20: 9483     --HIV, SPEP/UPEP, LEA, hepatitis panel all negative, ferritin ok     Studies- personally reviewed by me  Echo 12/29/23:  LVEF: 40%  LVEDd: 3.9 cm  RV: normal  Mild MR    Echo 4/8/22:  LVEF: 20%  LVEDd: 5.8 cm  RV: normal    Echo 4/23/21  LVEF: 25-30%, grade 2 DD  LVEDd: 6.9 cm  RV: normal  Mild to moderate MR    Echo 10/17/19:  LVEF: 30%, LVEDd: 5 cm  LVEDd:, grade 2 DD  RV: normal     --R+LHC 8/3/18: shows normal coronaries  RA 3  RV 32/6  PA 32/11/20  PCWP 6  AO sat 89%  MvO2 56.5%  TD CO/CI: 2.3/1.4  Isacc CO/CI: 2.6/1.6  TPG 14  DPG 5  PVR 5.4 (Isacc); 6.1 (TD)  SVR 2070  PVR:SVR <0.25     Echo 4/16/19: LVEF: 20%  LVEDd 5.4 cm  Moderate to severe MR     --cMRI  8/6/18: LVEF ~18%, LVIDd 7 cm, normal RV. There is a thin strip of intramyocardial hyperenhancement of the basal interventricular septum.     Echo 12/6/18: LVEF: 20%, LVEDd 6.4 cm- no improvement  Good RV function  Small IVC     Neurohormonal Blockade:  --Beta-Blocker: Continue metoprolol succinate 25 mg PO daily  --ACEi, ARB or ARNi:  Entresto to 49-51 mg PO BID   --Aldosterone Receptor Blocker: spironolactone 12.5 mg daily- not taking it, she states it made her dizzy  --Diuretic:  [id number] PO daily-      Sudden Cardiac Death Risk Reduction:  MDT BiV ICD     Electrical Resynchronization:  --Candidacy for BiV device: LBBB 170 msec  MDT BiV ICD 6/8/23  BIV paced  msec  Response: improved, EF up to 40%  Interrogation 6/22/23:  96.5%     Advanced Therapies: Will continue to monitor. If does not recover, age precludes OHT. Possible LVAD candidate, but social situation will be complicated as  has LVAD w/ complications and son works in [place]. Other son is in [place]. Will monitor closely.     # Pancytopenia with microcytic anemia- Chronic ITP  Hematology following- felt to be autoimmune related  Hx of transfusions  2/23/24: 13%     # HTN- now runs low with CM  # Hypothyroidism  # Autoimmune hepatitis: Continue budesonide   7/27/20 labs:   ,   # social- cares for her  who is debilitated with LVAD       TODAY'S PLAN:  Potassium runs on the low side  Nice response to CRT  97% pacing  Continue metoprolol, entresto and spironolactone for HFrEF  BP at goal  PYP scan given LVH, age, speckling on echo  Serum light chains    --2g sodium diet  - Daily weights     HPI:     84 woman with hx of HTN, autoimmune hepatitis wo follows now for NICM, EF: 25% with workup as above. 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 Follow up echo done 12/6/18 showed EF: 20% with LVEDd 6.4 cm, I referred for BiV ICD    Follow up echo 4/16/19  unfortunately showed EF: 20% with moderate to severe functional MR.      She continues to wear LifeVest for very long period of time, repeated echos have shown persistent reduction in LVEF. Fortunately, no LifeVest treatments.     Interval History:   passed    Echo 12/29/23:  LVEF: 40%  LVEDd: 3.9 cm  RV: normal  Mild MR      Review of Systems   Constitutional:  Negative for activity change, appetite change, fatigue and unexpected weight change.   HENT:  Negative for congestion and nosebleeds.    Eyes: Negative.    Respiratory:  Negative for cough, chest tightness and shortness of breath.    Cardiovascular:  Negative for chest pain, palpitations and leg swelling.   Gastrointestinal:  Negative for abdominal distention.   Endocrine: Negative.    Genitourinary: Negative.    Musculoskeletal: Negative.    Skin: Negative.    Neurological:  Negative for dizziness, syncope and weakness.   Hematological: Negative.    Psychiatric/Behavioral: Negative.         Past Medical History:   Diagnosis Date    Acute bilateral low back pain without sciatica 02/10/2023    Acute blood loss anemia 03/21/2023    Bell's palsy     Cardiac disease     Ear problems     HL (hearing loss)     Hypertension     Hypotension 07/01/2022    Sacroiliitis (HCC) 08/10/2021    Subdural hematoma (HCC)     Subdural hemorrhage (HCC) 03/30/2022    Thrombocytopenia (HCC) 08/03/2018    Tinnitus     Traumatic subdural hemorrhage with loss of consciousness of unspecified duration, initial encounter ([facility]) 02/15/2023    Ventricular tachycardia, unspecified ([facility])          Allergies   Allergen Reactions    Ambien [Zolpidem] Other (See Comments)     Did not allow sleep per pt     .    Current Outpatient Medications:     ascorbic acid (VITAMIN C) 500 mg tablet, Take 1 tablet (500 mg total) by mouth daily, Disp: 90 tablet, Rfl: 0    budesonide (ENTOCORT EC) 3 MG capsule, Take 3 capsules (9 mg total) by mouth daily, Disp: 270 capsule, Rfl: 1    cholecalciferol  (VITAMIN D3) 1,000 units tablet, TAKE 1 TABLET BY MOUTH EVERY DAY, Disp: 90 tablet, Rfl: 0    eltrombopag (PROMACTA) 25 mg tablet, Take 1 tablet (25 mg total) by mouth daily Administer on an empty stomach, 1 hour before or 2 hours after a meal., Disp: 90 tablet, Rfl: 2    metoprolol succinate (TOPROL-XL) 25 mg 24 hr tablet, Take 1 tablet (25 mg total) by mouth daily, Disp: 90 tablet, Rfl: 3    omega-3-acid ethyl esters (LOVAZA) 1 g capsule, TAKE 2 CAPSULES BY MOUTH DAILY., Disp: 180 capsule, Rfl: 0    potassium chloride (MICRO-K) 10 MEQ CR capsule, Take 1 capsule (10 mEq total) by mouth daily, Disp: 90 capsule, Rfl: 3    predniSONE 5 mg tablet, Take 1 tablet (5 mg total) by mouth daily, Disp: 30 tablet, Rfl: 0    sacubitril-valsartan (Entresto) 49-51 MG TABS, Take 1 tablet by mouth 2 (two) times a day, Disp: 180 tablet, Rfl: 1    torsemide (DEMADEX) 10 mg tablet, Take 1 tablet (10 mg total) by mouth daily (Patient taking differently: Take 10 mg by mouth daily As needed), Disp: 90 tablet, Rfl: 3    calcium carbonate (OS-PABLO) 600 MG tablet, Take 600 mg by mouth (Patient not taking: Reported on 3/12/2024), Disp: , Rfl:     cyanocobalamin (VITAMIN B-12) 100 mcg tablet, Take 2.5 tablets (250 mcg total) by mouth daily, Disp: 225 tablet, Rfl: 0    fluticasone (FLONASE) 50 mcg/act nasal spray, SPRAY 2 SPRAYS INTO EACH NOSTRIL EVERY DAY (Patient not taking: Reported on 8/15/2023), Disp: 48 mL, Rfl: 1    Current Facility-Administered Medications:     denosumab (PROLIA) subcutaneous injection 60 mg, 60 mg, Subcutaneous, Q6 Months, Kassandra Zarate MD, 60 mg at 02/21/24 1603    Social History     Socioeconomic History    Marital status: /Civil Union     Spouse name: Not on file    Number of children: Not on file    Years of education: Not on file    Highest education level: Not on file   Occupational History    Not on file   Tobacco Use    Smoking status: Never    Smokeless tobacco: Never   Vaping Use    Vaping status:  Never Used   Substance and Sexual Activity    Alcohol use: Not Currently    Drug use: Not Currently    Sexual activity: Not Currently     Partners: Male   Other Topics Concern    Not on file   Social History Narrative    Not on file     Social Determinants of Health     Financial Resource Strain: Patient Declined (8/23/2023)    Overall Financial Resource Strain (CARDIA)     Difficulty of Paying Living Expenses: Patient declined   Food Insecurity: No Food Insecurity (3/22/2023)    Hunger Vital Sign     Worried About Running Out of Food in the Last Year: Never true     Ran Out of Food in the Last Year: Never true   Transportation Needs: No Transportation Needs (8/23/2023)    PRAPARE - Transportation     Lack of Transportation (Medical): No     Lack of Transportation (Non-Medical): No   Physical Activity: Not on file   Stress: Not on file   Social Connections: Not on file   Intimate Partner Violence: Not on file   Housing Stability: Low Risk  (3/22/2023)    Housing Stability Vital Sign     Unable to Pay for Housing in the Last Year: No     Number of Places Lived in the Last Year: 1     Unstable Housing in the Last Year: No       Family History   Problem Relation Age of Onset    Autoimmune disease Neg Hx        Physical Exam:    Vitals:   Vitals:    03/12/24 1508   BP: 128/60   Pulse: 82   SpO2: 98%       Physical Exam  Constitutional:       Appearance: She is well-developed.   HENT:      Head: Normocephalic and atraumatic.   Eyes:      Pupils: Pupils are equal, round, and reactive to light.   Neck:      Vascular: No JVD.   Cardiovascular:      Rate and Rhythm: Normal rate and regular rhythm.      Heart sounds: No murmur heard.  Pulmonary:      Effort: Pulmonary effort is normal. No respiratory distress.      Breath sounds: Normal breath sounds.   Abdominal:      General: There is no distension.      Palpations: Abdomen is soft.      Tenderness: There is no abdominal tenderness.   Musculoskeletal:         General:  "Normal range of motion.      Cervical back: Normal range of motion.   Skin:     General: Skin is warm and dry.      Findings: No rash.   Neurological:      Mental Status: She is alert and oriented to person, place, and time.       Labs & Results:    Lab Results   Component Value Date    SODIUM 136 02/28/2024    K 3.6 02/28/2024     02/28/2024    CO2 31 02/28/2024    BUN 13 02/28/2024    CREATININE 0.53 (L) 02/28/2024    GLUC 88 02/28/2024    CALCIUM 8.9 02/28/2024     Lab Results   Component Value Date    WBC 3.33 (L) 02/23/2024    HGB 13.1 02/23/2024    HCT 39.3 02/23/2024    MCV 97 02/23/2024    PLT 76 (L) 02/23/2024     Lab Results   Component Value Date    NTBNP 9,483 (H) 05/29/2020      Lab Results   Component Value Date    CHOLESTEROL 108 08/02/2018     Lab Results   Component Value Date    HDL 41 08/02/2018    HDL 76 10/23/2015     Lab Results   Component Value Date    TRIG 75 08/02/2018    TRIG 87 10/23/2015     No results found for: \"NONHDLC\"    EKG personally reviewed by Jonathan Ibarra.     Counseling / Coordination of Care  Time spent today 25 minutes.  Greater than 50% of total time was spent with the patient and / or family counseling and / or coordination of care.  We discussed diagnoses, most recent studies, tests and any changes in treatment plan    Thank you for the opportunity to participate in the care of this patient.    JONATHAN IBARRA D.O.  DIRECTOR OF HEART FAILURE/ PULMONARY HYPERTENSION  MEDICAL DIRECTOR OF LVAD PROGRAM  [facility]        "

## 2024-03-12 ENCOUNTER — OFFICE VISIT (OUTPATIENT)
Dept: CARDIOLOGY CLINIC | Facility: CLINIC | Age: 84
End: 2024-03-12
Payer: MEDICARE

## 2024-03-12 VITALS
DIASTOLIC BLOOD PRESSURE: 60 MMHG | HEIGHT: 65 IN | HEART RATE: 82 BPM | WEIGHT: 111 LBS | OXYGEN SATURATION: 98 % | BODY MASS INDEX: 18.49 KG/M2 | SYSTOLIC BLOOD PRESSURE: 128 MMHG

## 2024-03-12 DIAGNOSIS — I44.7 COMPLETE LEFT BUNDLE BRANCH BLOCK (LBBB): ICD-10-CM

## 2024-03-12 DIAGNOSIS — I11.0 HYPERTENSIVE HEART DISEASE WITH CONGESTIVE HEART FAILURE, UNSPECIFIED HEART FAILURE TYPE (HCC): ICD-10-CM

## 2024-03-12 DIAGNOSIS — I43 CARDIAC AMYLOIDOSIS (HCC): ICD-10-CM

## 2024-03-12 DIAGNOSIS — I42.0 DILATED CARDIOMYOPATHY (HCC): ICD-10-CM

## 2024-03-12 DIAGNOSIS — E85.4 CARDIAC AMYLOIDOSIS (HCC): ICD-10-CM

## 2024-03-12 DIAGNOSIS — I50.22 CHRONIC SYSTOLIC HEART FAILURE (HCC): Primary | ICD-10-CM

## 2024-03-12 PROCEDURE — 99214 OFFICE O/P EST MOD 30 MIN: CPT | Performed by: INTERNAL MEDICINE

## 2024-03-12 NOTE — PATIENT INSTRUCTIONS
Heart function looks better    EF up to 40%    I ordered a PYP scan to look for cardiac amyloidosis given thickness of heart muscle    Labs ordered as well    No med changes

## 2024-03-15 ENCOUNTER — OFFICE VISIT (OUTPATIENT)
Dept: HEMATOLOGY ONCOLOGY | Facility: CLINIC | Age: 84
End: 2024-03-15
Payer: MEDICARE

## 2024-03-15 VITALS
HEIGHT: 65 IN | HEART RATE: 84 BPM | TEMPERATURE: 98.5 F | BODY MASS INDEX: 18.16 KG/M2 | DIASTOLIC BLOOD PRESSURE: 78 MMHG | OXYGEN SATURATION: 98 % | SYSTOLIC BLOOD PRESSURE: 132 MMHG | WEIGHT: 109 LBS | RESPIRATION RATE: 17 BRPM

## 2024-03-15 DIAGNOSIS — D69.3 CHRONIC ITP (IDIOPATHIC THROMBOCYTOPENIA) (HCC): Primary | ICD-10-CM

## 2024-03-15 PROCEDURE — 99213 OFFICE O/P EST LOW 20 MIN: CPT | Performed by: INTERNAL MEDICINE

## 2024-03-15 NOTE — PROGRESS NOTES
Hematology/Oncology Outpatient Follow- up Note  Symone Damico 84 y.o. female MRN: @ Encounter: [id number]        Date:  3/15/2024        Assessment / Plan:    Chronic Idiopathic Thrombocytopenia   Symone Damico is an 83-year-old  female who was seen initially 5/2020 regarding pancytopenia.  She has a history of dilated cardiomyopathy with low ejection fraction on Entresto, spironolactone, torsemide, metoprolol and budesonide.  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  Patients current platelet count is 76. Patient was told to continue Promacta 25 mg daily on an empty stomach. Patient was educated on the importance of taking her Promacta as prescribed  Follow up in 6 months with repeat CBC. This was discussed with Dr. Swift.       HPI:  Symone Damico is an 83-year-old  female who was seen initially 5/2020 regarding pancytopenia.    She has a history of dilated cardiomyopathy with low ejection fraction on Entresto, spironolactone, torsemide, metoprolol and budesonide.  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        She had outpatient labs 4/17/20 at [facility].  hemoglobin 6.9, MCV 70, RDW 19.7, WBC 1.9, 59% neutrophils, 30% lymphocytes, platelet 15950,   She was transfused with 2 units packed RBC ordered by her cardiologist, Dr. Damico and received Feraheme weekly x 2 doses end of June 2020.      CBC on 09/09/2019 showed hemoglobin 11.3, MCV 93, WBC of 2.7, normal differential platelets 20114  August 2018 iron saturation 8% ferritin 26  On 07/2018 hemoglobin 11, MCV 91, WBC 3.1, platelets 85569  10/23/2015:  Hemoglobin 12.9, MCV of 90, white blood cell count 2.95, platelets 115  On 11/2014 hemoglobin 13.3, MCV 89, WBC 3.4, platelets 554036     She had normal vitamin B12, folic acid, TSH, protein  electrophoresis     Autoimmune workup by Rheumatology was negative except for anti smooth muscle antibodies of 93     Because of the leukopenia, thrombocytopenia bone marrow biopsy performed on January 2021 showed normal bone marrow cellularity for the age, no evidence of dysplastic features, normal plasma cells and lymphocytes, no evidence of vasculitis, necrosis, fibrosis adequate iron stains     Back on prednisone 10 mg p.o. daily because of elevation of ALT, AST, with persistent thrombocytopenia of 38,000.     Initiated on Promacta 50 mg at the end of August 2021, platelet count 636317 in November 2021, WBC 4, and decrease in the AST, ALT     3/20/22 admitted when outpatient CT identified subdural hemorrhage.  She would a mechanical fall 1 week prior and had intermittent headaches which prompted the CT scan.     Status post defibrillator     Promacta reduced to 25 mg p.o. daily     Period of noncompliance with Promacta.   Platelet count went from 441 12/5/22 to 70 1/23/23 and 94 2/22/23.  Promacta restarted in March 2023. 25 mg p.o. daily      History of Acute blood loss anemia.  Status post fall with subdural hemorrhage March 2022.  Hemoglobin 5.1 7/1/2022.  She felt very fatigued prior to admission.  She and her son deny any evidence of GI bleed.  She declined GI workup.  She was taking oral iron for a few weeks post discharge but discontinued. At her 8/29/22 office visit, she was advised to resume oral iron.       8/2022 - Hemoglobin 10 - 11 g/dL range 8/22 - 2/20232 2/22/23 iron saturation 29%; ferritin 87     3/20/23 hemoglobin 6.1, MCV 90, WBC 4.4, platelet count 128  Admitted 3/21-3/25/23  S/p 2 unit transfusion of PRBC on admission  Patient declined EGD, colonoscopy.  Venofer 300mg 3/22, March 28, March 30, April 4, April 6, April 11, April 14, 2023 4/6/23 hemoglobin 9.9, platelets 142     June 9, 2023 hemoglobin 12.2, white blood cell count 5.8, platelet count 96,000    10/11/23- Iron Saturation  62%, Ferritin 796, hemoglobin 11.6, WBC 3.5, Platelets 306       2/23/24: CBC- WBC 3.33, Hg 13.1, MCV 97, Platelets 76                 ANC  2.07, ,   2/21/24- CBC- WBC 2.64, HG 12.1, , Platelets 68   ANC- 1.43 , ,       2/21/24: FE SAT 57, TIBC 292, Ferritin 595         Interval History:  Patient reports doing well. Patient is here today with her [employer]. Denies any chest pain or shortness of breath. Denies any bleeding gums, blood in urine or stool. Admits she sometimes has mild petechiae on B/L thighs but nothing current. Patient reports she takes her Promacta 25 mg every morning at 5 am on an empty stomach.       Test Results:    Imaging: US abdomen complete    Result Date: 3/5/2024  Narrative: ABDOMEN ULTRASOUND, COMPLETE INDICATION: R79.89: Other specified abnormal findings of blood chemistry. COMPARISON: Right upper quadrant ultrasound 7/3/2022 TECHNIQUE: Real-time ultrasound of the abdomen was performed with a curvilinear transducer with both volumetric sweeps and still imaging techniques. FINDINGS: PANCREAS: Visualized portions of the pancreas are within normal limits. AORTA AND IVC: Visualized portions are normal for patient age. LIVER: Size: Within normal range. The liver measures 15.0 cm in the midclavicular line. Contour: Surface contour is nodular. Parenchyma: There is diffuse coarsened heterogeneous echotexture suggesting underlying cirrhotic changes. No liver mass identified. Limited imaging of the main portal vein shows it to be patent and hepatopetal. BILIARY: Contracted gallbladder, limiting evaluation. Gallbladder wall measures up to 5 mm, which can be secondary to underdistention or presence of ascites and cirrhosis. Sonographic Baker sign is negative. No intrahepatic biliary dilatation. CBD measures 2.0 mm. No choledocholithiasis. KIDNEY: Right kidney measures 10.6 x 4.7 x 5.3 cm. Volume 138.7 mL Kidney within normal limits. Left kidney measures 10.2 x  5.5 x 4.5 cm. Volume 132.6 mL A few left renal simple cysts measuring up to 8 mm.. SPLEEN: Measures 14.7 x 15.3 x 7.8 cm. Volume 922.9 mL Enlarged. ASCITES: There is small volume ascites.     Impression: Cirrhosis, splenomegaly and small volume ascites. Patent main portal vein. Contracted gallbladder, limiting evaluation. Sonographic Baker sign is negative. No intrahepatic or extrahepatic biliary ductal dilation. Workstation performed: [id number]             Labs:   Lab Results   Component Value Date    WBC 3.33 (L) 02/23/2024    HGB 13.1 02/23/2024    HCT 39.3 02/23/2024    MCV 97 02/23/2024    PLT 76 (L) 02/23/2024     Lab Results   Component Value Date     12/04/2015    K 3.6 02/28/2024     02/28/2024    CO2 31 02/28/2024    ANIONGAP 4 12/04/2015    BUN 13 02/28/2024    CREATININE 0.53 (L) 02/28/2024    GLUCOSE 94 12/04/2015    GLUF 83 02/23/2024    CALCIUM 8.9 02/28/2024    CORRECTEDCA 9.5 02/28/2024     (H) 02/28/2024     (H) 02/28/2024    ALKPHOS 68 02/28/2024    PROT 7.5 12/04/2015    BILITOT 0.48 12/04/2015    EGFR 86 02/28/2024         Lab Results   Component Value Date    SPEP  01/11/2021     No monoclonal bands noted. Previous immunofixation 09/12/2020 shows no monoclonal gammopathy. Reviewed by: Jonathan Honeycutt MD, PhD ([id number]) **Electronic Signature**    UPEP The serum total 11/13/2014       Lab Results   Component Value Date    IRON 165 02/21/2024    TIBC 292 02/21/2024    FERRITIN 595 (H) 02/21/2024       Lab Results   Component Value Date    [id number] 767 01/11/2021         ROS: Review of Systems   Constitutional: Negative.    HENT:  Negative for mouth sores.    Respiratory: Negative.     Cardiovascular: Negative.    Skin: Negative.    Neurological:  Negative for headaches.   Psychiatric/Behavioral: Negative.           Current Medications: Reviewed  Allergies: Reviewed  PMH/FH/SH:  Reviewed      Physical Exam:    Body surface area is 1.53 meters squared.    Wt Readings  from Last 3 Encounters:   03/15/24 49.4 kg (109 lb)   03/12/24 50.3 kg (111 lb)   02/21/24 49.9 kg (110 lb)        Temp Readings from Last 3 Encounters:   03/15/24 98.5 °F (36.9 °C) (Temporal)   02/21/24 97.8 °F (36.6 °C) (Tympanic)   11/15/23 97.9 °F (36.6 °C)        BP Readings from Last 3 Encounters:   03/15/24 132/78   03/12/24 128/60   02/21/24 120/70         Pulse Readings from Last 3 Encounters:   03/15/24 84   03/12/24 82   02/21/24 81           Physical Exam  Vitals reviewed.   Constitutional:       Appearance: Normal appearance.   HENT:      Head: Normocephalic.      Mouth/Throat:      Mouth: Mucous membranes are moist.      Pharynx: No oropharyngeal exudate or posterior oropharyngeal erythema.   Eyes:      Pupils: Pupils are equal, round, and reactive to light.   Cardiovascular:      Rate and Rhythm: Normal rate and regular rhythm.      Heart sounds: Normal heart sounds.   Pulmonary:      Breath sounds: Normal breath sounds. No wheezing.   Abdominal:      Palpations: Abdomen is soft.      Tenderness: There is no abdominal tenderness.   Lymphadenopathy:      Cervical:      Right cervical: No superficial, deep or posterior cervical adenopathy.     Left cervical: No superficial, deep or posterior cervical adenopathy.   Neurological:      Mental Status: She is alert and oriented to person, place, and time.   Psychiatric:         Mood and Affect: Mood normal.         Behavior: Behavior normal.           Goals and Barriers:  Current Goal: Prolong Survival from Cancer.   Barriers: None.      Patient's Capacity to Self Care:  Patient is able to self care.

## 2024-03-28 DIAGNOSIS — K75.4 AUTOIMMUNE HEPATITIS TREATED WITH STEROIDS (HCC): ICD-10-CM

## 2024-03-28 DIAGNOSIS — R74.8 ELEVATED LIVER ENZYMES: ICD-10-CM

## 2024-03-28 RX ORDER — PREDNISONE 5 MG/1
5 TABLET ORAL DAILY
Qty: 30 TABLET | Refills: 0 | Status: SHIPPED | OUTPATIENT
Start: 2024-03-28

## 2024-03-28 NOTE — TELEPHONE ENCOUNTER
Patient called the RX Refill Line. Message is being forwarded to the office.     Patient is requesting - Pt called requesting refill for prednisone. Pt states she takes it on a regular basics but wants to make sure and clarify with Dr. Cesar if ok to continue with medication. Pt is also requesting a refill to be send to her [facility] pharmacy and a call back from a clinical staff. Please review, Thank you.    Please contact patient at - [phone]

## 2024-03-28 NOTE — TELEPHONE ENCOUNTER
Symone is wondering if Dr. Zarate is going to continue filling the prednisone? Please review and call her back ASAP.     Her phone connection is not the greatest, so please bear with her.     Reason for call:   [x] Refill   [] Prior Auth  [] Other:     Office:   [x] PCP/Provider - Dr. Zarate  [] Specialty/Provider -     Medication:    Quantity: 30    Pharmacy[facility]    Does the patient have enough for 3 days?   [] Yes   [x] No - Send as HP to POD   Attending

## 2024-03-29 NOTE — TELEPHONE ENCOUNTER
Pt called regarding med refill (Prednisone 5mg) if it was sent to the pharmacy. I did confirm it was sent. NFA

## 2024-04-25 DIAGNOSIS — K75.4 AUTOIMMUNE HEPATITIS TREATED WITH STEROIDS (HCC): ICD-10-CM

## 2024-04-25 DIAGNOSIS — R74.8 ELEVATED LIVER ENZYMES: ICD-10-CM

## 2024-04-26 RX ORDER — PREDNISONE 5 MG/1
5 TABLET ORAL DAILY
Qty: 30 TABLET | Refills: 0 | Status: SHIPPED | OUTPATIENT
Start: 2024-04-26

## 2024-05-10 ENCOUNTER — TELEPHONE (OUTPATIENT)
Dept: FAMILY MEDICINE CLINIC | Facility: CLINIC | Age: 84
End: 2024-05-10

## 2024-05-10 ENCOUNTER — OFFICE VISIT (OUTPATIENT)
Dept: FAMILY MEDICINE CLINIC | Facility: CLINIC | Age: 84
End: 2024-05-10
Payer: MEDICARE

## 2024-05-10 VITALS
DIASTOLIC BLOOD PRESSURE: 68 MMHG | OXYGEN SATURATION: 99 % | SYSTOLIC BLOOD PRESSURE: 118 MMHG | HEIGHT: 65 IN | HEART RATE: 82 BPM | TEMPERATURE: 97.4 F | WEIGHT: 107.4 LBS | BODY MASS INDEX: 17.9 KG/M2 | RESPIRATION RATE: 16 BRPM

## 2024-05-10 DIAGNOSIS — Z95.811 PRESENCE OF HEART ASSIST DEVICE (HCC): ICD-10-CM

## 2024-05-10 DIAGNOSIS — I50.22 CHRONIC SYSTOLIC HEART FAILURE (HCC): ICD-10-CM

## 2024-05-10 DIAGNOSIS — D69.3 CHRONIC ITP (IDIOPATHIC THROMBOCYTOPENIA) (HCC): ICD-10-CM

## 2024-05-10 DIAGNOSIS — K75.4 AUTOIMMUNE HEPATITIS TREATED WITH STEROIDS (HCC): ICD-10-CM

## 2024-05-10 DIAGNOSIS — D69.3 CHRONIC ITP (IDIOPATHIC THROMBOCYTOPENIA) (HCC): Primary | ICD-10-CM

## 2024-05-10 DIAGNOSIS — D61.818 PANCYTOPENIA (HCC): ICD-10-CM

## 2024-05-10 PROCEDURE — G2211 COMPLEX E/M VISIT ADD ON: HCPCS | Performed by: FAMILY MEDICINE

## 2024-05-10 PROCEDURE — 99214 OFFICE O/P EST MOD 30 MIN: CPT | Performed by: FAMILY MEDICINE

## 2024-05-10 NOTE — TELEPHONE ENCOUNTER
Medication Refill Request   Who are you speaking with? Visiting nurse   If it is not the patient, are they listed on an active communication consent form?     Yes   Which medication is being requested for refill?  Please list medication name and dosage  eltrombopag (PROMACTA) 25 mg tablet    How many pills does the patient have left? none   Preferred Pharmacy / Address [facility]   Who is the prescribing provider? Thania Price PA-C   Call back number [phone]    Relevant Information refill

## 2024-05-10 NOTE — ASSESSMENT & PLAN NOTE
Stable on promacta  I am not sure she is taking her meds daily   Spoke with caregiver to count the pills when she gets home   To call [facility] to check the status of her Promecta

## 2024-05-10 NOTE — ASSESSMENT & PLAN NOTE
Wt Readings from Last 3 Encounters:   05/10/24 48.7 kg (107 lb 6.4 oz)   03/15/24 49.4 kg (109 lb)   03/12/24 50.3 kg (111 lb)     Stable on current meds

## 2024-05-10 NOTE — ASSESSMENT & PLAN NOTE
Cannot get in touch with her current GI dr. Cesar after multiple attempts to his office  Referral to new GI   Continue Prednisone as directed

## 2024-05-10 NOTE — PROGRESS NOTES
Name: Symone Damico      : 1940      MRN: [id number]  Encounter Provider: Kassandra Zarate MD  Encounter Date: 5/10/2024   Encounter department: [facility]    Assessment & Plan     1. Chronic ITP (idiopathic thrombocytopenia) (HCC)  Assessment & Plan:  Stable on promacta  I am not sure she is taking her meds daily   Spoke with caregiver to count the pills when she gets home   To call [facility] to check the status of her Promecta    Orders:  -     Ambulatory Referral to Gastroenterology; Future  -     CBC and differential  -     Comprehensive metabolic panel  -     Ambulatory Referral to Complex Care Management Program; Future    2. Autoimmune hepatitis treated with steroids (HCC)  Assessment & Plan:  Cannot get in touch with her current GI dr. Cesar after multiple attempts to his office  Referral to new GI   Continue Prednisone as directed    Orders:  -     Ambulatory Referral to Gastroenterology; Future  -     Ambulatory Referral to Complex Care Management Program; Future    3. Chronic systolic heart failure (HCC)  Assessment & Plan:  Wt Readings from Last 3 Encounters:   05/10/24 48.7 kg (107 lb 6.4 oz)   03/15/24 49.4 kg (109 lb)   24 50.3 kg (111 lb)     Stable on current meds          Orders:  -     Ambulatory Referral to Complex Care Management Program; Future    4. Pancytopenia (HCC)  Assessment & Plan:  Stable   Continue care per hematology    Orders:  -     CBC and differential  -     Comprehensive metabolic panel  -     Ambulatory Referral to Complex Care Management Program; Future    5. Presence of heart assist device (HCC)           Subjective     HPI  Here for follow up  Feels well overall  She ran out promecta and she didn't tell us she did   She seems to be confused about what is going on her medications     Review of Systems   Constitutional: Negative.    HENT: Negative.     Respiratory: Negative.     Cardiovascular: Negative.    Gastrointestinal: Negative.     Genitourinary: Negative.    Musculoskeletal: Negative.    Hematological: Negative.    Psychiatric/Behavioral: Negative.         Past Medical History:   Diagnosis Date   • Acute bilateral low back pain without sciatica 02/10/2023   • Acute blood loss anemia 03/21/2023   • Bell's palsy    • Cardiac disease    • Ear problems    • HL (hearing loss)    • Hypertension    • Hypotension 07/01/2022   • Sacroiliitis ([facility]) 08/10/2021   • Subdural hematoma ([facility])    • Subdural hemorrhage ([facility]) 03/30/2022   • Thrombocytopenia ([facility]) 08/03/2018   • Tinnitus    • Traumatic subdural hemorrhage with loss of consciousness of unspecified duration, initial encounter ([facility]) 02/15/2023   • Ventricular tachycardia, unspecified ([facility])      Past Surgical History:   Procedure Laterality Date   • CARDIAC ELECTROPHYSIOLOGY PROCEDURE N/A 6/8/2023    Procedure: Cardiac biv icd implant, Left side, use ABX;  Surgeon: Merrick Calderon MD;  Location: [facility];  Service: Cardiology   • CT BONE MARROW BIOPSY AND ASPIRATION  1/27/2021     Family History   Problem Relation Age of Onset   • Autoimmune disease Neg Hx      Social History     Socioeconomic History   • Marital status: /Civil Union     Spouse name: None   • Number of children: None   • Years of education: None   • Highest education level: None   Occupational History   • None   Tobacco Use   • Smoking status: Never   • Smokeless tobacco: Never   Vaping Use   • Vaping status: Never Used   Substance and Sexual Activity   • Alcohol use: Not Currently   • Drug use: Not Currently   • Sexual activity: Not Currently     Partners: Male   Other Topics Concern   • None   Social History Narrative   • None     Social Determinants of Health     Financial Resource Strain: Patient Declined (8/23/2023)    Overall Financial Resource Strain (CARDIA)    • Difficulty of Paying Living Expenses: Patient declined   Food Insecurity: No Food Insecurity (3/22/2023)    Hunger Vital Sign    • Worried About  Running Out of Food in the Last Year: Never true    • Ran Out of Food in the Last Year: Never true   Transportation Needs: No Transportation Needs (8/23/2023)    PRAPARE - Transportation    • Lack of Transportation (Medical): No    • Lack of Transportation (Non-Medical): No   Physical Activity: Not on file   Stress: Not on file   Social Connections: Not on file   Intimate Partner Violence: Not on file   Housing Stability: Low Risk  (3/22/2023)    Housing Stability Vital Sign    • Unable to Pay for Housing in the Last Year: No    • Number of Places Lived in the Last Year: 1    • Unstable Housing in the Last Year: No     Current Outpatient Medications on File Prior to Visit   Medication Sig   • ascorbic acid (VITAMIN C) 500 mg tablet Take 1 tablet (500 mg total) by mouth daily   • budesonide (ENTOCORT EC) 3 MG capsule Take 3 capsules (9 mg total) by mouth daily   • cholecalciferol (VITAMIN D3) 1,000 units tablet TAKE 1 TABLET BY MOUTH EVERY DAY   • cyanocobalamin (VITAMIN B-12) 100 mcg tablet Take 2.5 tablets (250 mcg total) by mouth daily   • eltrombopag (PROMACTA) 25 mg tablet Take 1 tablet (25 mg total) by mouth daily Administer on an empty stomach, 1 hour before or 2 hours after a meal.   • metoprolol succinate (TOPROL-XL) 25 mg 24 hr tablet Take 1 tablet (25 mg total) by mouth daily   • potassium chloride (MICRO-K) 10 MEQ CR capsule Take 1 capsule (10 mEq total) by mouth daily   • predniSONE 5 mg tablet TAKE 1 TABLET (5 MG TOTAL) BY MOUTH DAILY.   • sacubitril-valsartan (Entresto) 49-51 MG TABS Take 1 tablet by mouth 2 (two) times a day   • torsemide (DEMADEX) 10 mg tablet Take 1 tablet (10 mg total) by mouth daily (Patient taking differently: Take 10 mg by mouth daily As needed)   • calcium carbonate (OS-PABLO) 600 MG tablet Take 600 mg by mouth (Patient not taking: Reported on 3/12/2024)   • fluticasone (FLONASE) 50 mcg/act nasal spray SPRAY 2 SPRAYS INTO EACH NOSTRIL EVERY DAY (Patient not taking: Reported on  "8/15/2023)   • omega-3-acid ethyl esters (LOVAZA) 1 g capsule TAKE 2 CAPSULES BY MOUTH DAILY. (Patient not taking: Reported on 5/10/2024)     Allergies   Allergen Reactions   • Ambien [Zolpidem] Other (See Comments)     Did not allow sleep per pt     Immunization History   Administered Date(s) Administered   • COVID-19 PFIZER VACCINE 0.3 ML IM 05/12/2021, 06/05/2021   • COVID-19 Pfizer vac (Misael-sucrose, gray cap) 12 yr+ IM 07/13/2022   • Hep A, adult 06/22/2022   • Hep B, adult 06/22/2022   • Pneumococcal Conjugate 13-Valent 08/10/2021   • Pneumococcal Polysaccharide PPV23 04/06/2022       Objective     /68 (BP Location: Left arm, Patient Position: Sitting, Cuff Size: Standard)   Pulse 82   Temp (!) 97.4 °F (36.3 °C) (Tympanic)   Resp 16   Ht 5' 5\" (1.651 m)   Wt 48.7 kg (107 lb 6.4 oz)   SpO2 99%   BMI 17.87 kg/m²     Physical Exam  Constitutional:       Appearance: Normal appearance.   HENT:      Right Ear: Tympanic membrane normal.      Left Ear: Tympanic membrane normal.   Cardiovascular:      Rate and Rhythm: Normal rate and regular rhythm.      Pulses: Normal pulses.      Heart sounds: Normal heart sounds.   Pulmonary:      Effort: Pulmonary effort is normal.      Breath sounds: Normal breath sounds.   Abdominal:      General: There is no distension.      Palpations: There is no mass.   Neurological:      Mental Status: She is alert.       Kassandra Zarate MD    "

## 2024-05-13 ENCOUNTER — PATIENT OUTREACH (OUTPATIENT)
Dept: FAMILY MEDICINE CLINIC | Facility: CLINIC | Age: 84
End: 2024-05-13

## 2024-05-13 NOTE — PROGRESS NOTES
Received referral from PCP for complex care management.  Chart reviewed.  Call to patient, line was busy, no machine.  Call to patients son KAYDEN, he reports his mother lives on her own and has a caregiver three days a week for 4 hours/ day.  Admits that her medications are not monitored.  We discussed bubble packed medication services through [facility] pharmacy.  Son would like to discuss this with patient. We discussed importance of medication compliance.   If pt agrees, can contact PCP to have scripts sent to [facility].   Provided son my name and contact number and he will call this RN CM if further assistance is needed.  Will close episode at this time.

## 2024-05-17 ENCOUNTER — TELEPHONE (OUTPATIENT)
Dept: FAMILY MEDICINE CLINIC | Facility: CLINIC | Age: 84
End: 2024-05-17

## 2024-05-17 NOTE — TELEPHONE ENCOUNTER
Spoke with Lindsay at [facility] office and made an OV for patient 06/05/2024 at 3 pm.  Left message on patient's son mobile number with detailed information.

## 2024-06-05 ENCOUNTER — OFFICE VISIT (OUTPATIENT)
Dept: GASTROENTEROLOGY | Facility: MEDICAL CENTER | Age: 84
End: 2024-06-05
Payer: MEDICARE

## 2024-06-05 VITALS
TEMPERATURE: 98.5 F | DIASTOLIC BLOOD PRESSURE: 60 MMHG | SYSTOLIC BLOOD PRESSURE: 122 MMHG | BODY MASS INDEX: 18.26 KG/M2 | WEIGHT: 109.6 LBS | OXYGEN SATURATION: 96 % | HEART RATE: 78 BPM | HEIGHT: 65 IN

## 2024-06-05 DIAGNOSIS — K74.69 OTHER CIRRHOSIS OF LIVER (HCC): ICD-10-CM

## 2024-06-05 DIAGNOSIS — K75.4 AUTOIMMUNE HEPATITIS TREATED WITH STEROIDS (HCC): ICD-10-CM

## 2024-06-05 DIAGNOSIS — D69.3 CHRONIC ITP (IDIOPATHIC THROMBOCYTOPENIA) (HCC): ICD-10-CM

## 2024-06-05 DIAGNOSIS — R79.89 ELEVATED FERRITIN: Primary | ICD-10-CM

## 2024-06-05 PROCEDURE — 99204 OFFICE O/P NEW MOD 45 MIN: CPT | Performed by: NURSE PRACTITIONER

## 2024-06-05 NOTE — PROGRESS NOTES
[facility] - Outpatient Consultation  Symone Damico 84 y.o. female MRN: [id number]  Encounter: [id number]          ASSESSMENT AND PLAN:    Symone Damico is a 84 y.o. female who presents with history of autoimmune hepatitis and ITP.    1. Chronic ITP (idiopathic thrombocytopenia) (HCC)  2. Autoimmune hepatitis treated with steroids (HCC)    History of autoimmune hepatitis.  Does not recall if she ever had a liver biopsy.  He has been treated with prednisone for her autoimmune hepatitis.  Found to have cirrhosis.  She was followed by Dr. Cesar for GI until he retired.  Currently taking prednisone 5 mg daily as well as budesonide 9 mg daily.  She had an ASMA of 76 in the past.  History of a positive LEA of 1:640 speckled.  She was evaluated by Dr. Hanna at rheumatology.  Feeling well overall.  Reports no history of admission for encephalopathy, jaundice or ascites.  Weight has been stable.  Reports she has never had an EGD.  Last ultrasound of the abdomen was 2/24 which noted splenomegaly, cirrhosis and small amount of ascites.  Currently feeling well overall.    -Labs for MELD, AFP  -MRI/MRCP 8/24  -Follow-up with hepatology  -Will most likely need an EGD in future.  Questionable liver biopsy.  Will leave up to hepatology.  Will hold on EGD for now in case biopsy is recommended.  -Continue prednisone 5 mg daily and budesonide 9 mg daily        ______________________________________________________________________    HPI:    Symone Damico is a 84 y.o. female who presents with history of autoimmune hepatitis and ITP.  She has a past medical history of chronic ITP, autoimmune hepatitis treated with steroids, CHF, dilated cardiomyopathy, on Entresto, cirrhosis, depression.    History of chronic ITP.  Followed by hematology.  She did have a bone marrow biopsy 1/21 which showed normal bone marrow for her age.  Evidence of dysplastic features.  No evidence of vasculitis, necrosis,  fibrosis adequate iron stains.  Currently managed with Promacta 25 mg daily.    History of autoimmune hepatitis.  Does not recall if she ever had a liver biopsy.  He has been treated with prednisone for her autoimmune hepatitis.  Found to have cirrhosis.  She was followed by Dr. Cesar for GI until he retired.  Currently taking prednisone 5 mg daily as well as budesonide 9 mg daily.  She had an ASMA of 76 in the past.  History of a positive LEA of 1:640 speckled.  She was evaluated by Dr. Hanna at rheumatology.  Feeling well overall.  Reports no history of admission for encephalopathy, jaundice or ascites.  Weight has been stable.  Here to establish new GI.      Ultrasound of the abdomen 2/24 noted cirrhosis, splenomegaly and small volume ascites.  Patent main portal vein.  Contracted gallbladder, limiting evaluation.  Baker sign is negative.  No intra or extrahepatic biliary ductal dilatation.    Labs 2/24-sodium 136, creatinine 0.53, , , total bilirubin 0.91, iron 165, ferritin 595, iron sat 57, TIBC 292, hemoglobin 13.1, WBCs 3.33, platelets 76.  No recent INR to calculate MELD.      Previous EGD/colonoscopy    Reports she has never had an EGD or colonoscopy.      REVIEW OF SYSTEMS:    CONSTITUTIONAL: Denies any fever, chills, rigors, and weight loss.  HEENT: No earache or tinnitus. Denies hearing loss or visual disturbances.  CARDIOVASCULAR: No chest pain or palpitations.   RESPIRATORY: Denies any cough, hemoptysis, shortness of breath or dyspnea on exertion.  GASTROINTESTINAL: As noted in the History of Present Illness.   GENITOURINARY: No problems with urination. Denies any hematuria or dysuria.  NEUROLOGIC: No dizziness or vertigo, denies headaches.   MUSCULOSKELETAL: Denies any muscle or joint pain.   SKIN: Denies skin rashes or itching.   ENDOCRINE: Denies excessive thirst. Denies intolerance to heat or cold.  PSYCHOSOCIAL: Denies depression or anxiety. Denies any recent memory loss.        Historical Information   Past Medical History:   Diagnosis Date   • Acute bilateral low back pain without sciatica 02/10/2023   • Acute blood loss anemia 03/21/2023   • Bell's palsy    • Cardiac disease    • Ear problems    • HL (hearing loss)    • Hypertension    • Hypotension 07/01/2022   • Sacroiliitis ([facility]) 08/10/2021   • Subdural hematoma ([facility])    • Subdural hemorrhage ([facility]) 03/30/2022   • Thrombocytopenia ([facility]) 08/03/2018   • Tinnitus    • Traumatic subdural hemorrhage with loss of consciousness of unspecified duration, initial encounter ([facility]) 02/15/2023   • Ventricular tachycardia, unspecified ([facility])      Past Surgical History:   Procedure Laterality Date   • CARDIAC ELECTROPHYSIOLOGY PROCEDURE N/A 6/8/2023    Procedure: Cardiac biv icd implant, Left side, use ABX;  Surgeon: Merrick Calderon MD;  Location: [facility];  Service: Cardiology   • CT BONE MARROW BIOPSY AND ASPIRATION  1/27/2021     Social History   Social History     Substance and Sexual Activity   Alcohol Use Not Currently     Social History     Substance and Sexual Activity   Drug Use Not Currently     Social History     Tobacco Use   Smoking Status Never   Smokeless Tobacco Never     Family History   Problem Relation Age of Onset   • Autoimmune disease Neg Hx        Meds/Allergies       Current Outpatient Medications:   •  budesonide (ENTOCORT EC) 3 MG capsule  •  cholecalciferol (VITAMIN D3) 1,000 units tablet  •  eltrombopag (PROMACTA) 25 mg tablet  •  metoprolol succinate (TOPROL-XL) 25 mg 24 hr tablet  •  potassium chloride (MICRO-K) 10 MEQ CR capsule  •  predniSONE 5 mg tablet  •  sacubitril-valsartan (Entresto) 49-51 MG TABS  •  ascorbic acid (VITAMIN C) 500 mg tablet  •  calcium carbonate (OS-PABLO) 600 MG tablet  •  cyanocobalamin (VITAMIN B-12) 100 mcg tablet  •  fluticasone (FLONASE) 50 mcg/act nasal spray  •  omega-3-acid ethyl esters (LOVAZA) 1 g capsule  •  torsemide (DEMADEX) 10 mg tablet    Current Facility-Administered  "Medications:   •  denosumab (PROLIA) subcutaneous injection 60 mg, 60 mg, Subcutaneous, Q6 Months, 60 mg at 02/21/24 1603    Allergies   Allergen Reactions   • Ambien [Zolpidem] Other (See Comments)     Did not allow sleep per pt           Objective     Blood pressure 122/60, pulse 78, temperature 98.5 °F (36.9 °C), temperature source Tympanic, height 5' 5\" (1.651 m), weight 49.7 kg (109 lb 9.6 oz), SpO2 96%, not currently breastfeeding. Body mass index is 18.24 kg/m².        PHYSICAL EXAM:      General Appearance:   Alert, cooperative, no distress   HEENT:   Normocephalic, atraumatic, anicteric.     Neck:  Supple, symmetrical, trachea midline   Lungs:   Clear to auscultation bilaterally; no rales, rhonchi or wheezing; respirations unlabored    Heart::   Regular rate and rhythm; no murmur, rub, or gallop.   Abdomen:   Soft, non-tender, non-distended; normal bowel sounds; no masses, no organomegaly    Genitalia:   Deferred    Rectal:   Deferred    Extremities:  No cyanosis, clubbing or edema    Pulses:  2+ and symmetric    Skin:  No jaundice, rashes, or lesions    Lymph nodes:  No palpable cervical lymphadenopathy        Lab Results:   No visits with results within 1 Day(s) from this visit.   Latest known visit with results is:   Appointment on 02/28/2024   Component Date Value   • Sodium 02/28/2024 136    • Potassium 02/28/2024 3.6    • Chloride 02/28/2024 103    • CO2 02/28/2024 31    • ANION GAP 02/28/2024 2    • BUN 02/28/2024 13    • Creatinine 02/28/2024 0.53 (L)    • Glucose 02/28/2024 88    • Calcium 02/28/2024 8.9    • Corrected Calcium 02/28/2024 9.5    • AST 02/28/2024 137 (H)    • ALT 02/28/2024 185 (H)    • Alkaline Phosphatase 02/28/2024 68    • Total Protein 02/28/2024 7.3    • Albumin 02/28/2024 3.2 (L)    • Total Bilirubin 02/28/2024 0.91    • eGFR 02/28/2024 86        Lab Results   Component Value Date    WBC 3.33 (L) 02/23/2024    HGB 13.1 02/23/2024    HCT 39.3 02/23/2024    MCV 97 02/23/2024    " PLT 76 (L) 02/23/2024       Lab Results   Component Value Date     12/04/2015    SODIUM 136 02/28/2024    K 3.6 02/28/2024     02/28/2024    CO2 31 02/28/2024    ANIONGAP 4 12/04/2015    AGAP 2 02/28/2024    BUN 13 02/28/2024    CREATININE 0.53 (L) 02/28/2024    GLUC 88 02/28/2024    GLUF 83 02/23/2024    CALCIUM 8.9 02/28/2024     (H) 02/28/2024     (H) 02/28/2024    ALKPHOS 68 02/28/2024    PROT 7.5 12/04/2015    TP 7.3 02/28/2024    BILITOT 0.48 12/04/2015    TBILI 0.91 02/28/2024    EGFR 86 02/28/2024       Lab Results   Component Value Date    CRP <3.0 09/12/2020       Lab Results   Component Value Date    GWX5NNUUSTCE 5.700 (H) 05/29/2020       Lab Results   Component Value Date    IRON 165 02/21/2024    TIBC 292 02/21/2024    FERRITIN 595 (H) 02/21/2024       Radiology Results:   No results found.

## 2024-06-06 DIAGNOSIS — E83.19 IRON EXCESS: ICD-10-CM

## 2024-06-06 DIAGNOSIS — E83.10 DISORDER OF IRON METABOLISM: Primary | ICD-10-CM

## 2024-06-10 ENCOUNTER — NURSE TRIAGE (OUTPATIENT)
Age: 84
End: 2024-06-10

## 2024-06-10 NOTE — TELEPHONE ENCOUNTER
"Please advise    Destiny from [facility] calling in, reports pt has cystic acne and noticed it in January. Pt has painful cysts on face has not seen dermatology yet- abraham is asking if this could be from her prednisone she is on?     I advised she should see derm or PCP but will defer recs to provider.     Answer Assessment - Initial Assessment Questions  1. REASON FOR CALL or QUESTION: \"What is your reason for calling today?\" or \"How can I best help you?\" or \"What question do you have that I can help answer?\"      See notes    Protocols used: Information Only Call - No Triage-ADULT-[place]    "

## 2024-06-14 DIAGNOSIS — E83.10 DISORDER OF IRON METABOLISM: Primary | ICD-10-CM

## 2024-06-15 DIAGNOSIS — R74.8 ELEVATED LIVER ENZYMES: ICD-10-CM

## 2024-06-15 DIAGNOSIS — K75.4 AUTOIMMUNE HEPATITIS TREATED WITH STEROIDS (HCC): ICD-10-CM

## 2024-06-16 RX ORDER — PREDNISONE 5 MG/1
5 TABLET ORAL DAILY
Qty: 30 TABLET | Refills: 5 | Status: SHIPPED | OUTPATIENT
Start: 2024-06-16

## 2024-07-12 ENCOUNTER — HOSPITAL ENCOUNTER (OUTPATIENT)
Dept: RADIOLOGY | Facility: HOSPITAL | Age: 84
Discharge: HOME | End: 2024-07-12

## 2024-07-12 DIAGNOSIS — Z95.0 PACEMAKER: ICD-10-CM

## 2024-07-22 ENCOUNTER — APPOINTMENT (OUTPATIENT)
Dept: LAB | Facility: HOSPITAL | Age: 84
End: 2024-07-22
Payer: MEDICARE

## 2024-07-22 ENCOUNTER — HOSPITAL ENCOUNTER (OUTPATIENT)
Dept: MRI IMAGING | Facility: HOSPITAL | Age: 84
Discharge: HOME | End: 2024-07-22

## 2024-07-22 DIAGNOSIS — D69.3 CHRONIC ITP (IDIOPATHIC THROMBOCYTOPENIA) (HCC): ICD-10-CM

## 2024-07-22 DIAGNOSIS — K75.4 AUTOIMMUNE HEPATITIS TREATED WITH STEROIDS (HCC): ICD-10-CM

## 2024-07-22 LAB
AFP-TM SERPL-MCNC: 4.49 NG/ML (ref 0–9)
ALBUMIN SERPL BCG-MCNC: 3.4 G/DL (ref 3.5–5)
ALP SERPL-CCNC: 37 U/L (ref 34–104)
ALT SERPL W P-5'-P-CCNC: 26 U/L (ref 7–52)
ANION GAP SERPL CALCULATED.3IONS-SCNC: 3 MMOL/L (ref 4–13)
AST SERPL W P-5'-P-CCNC: 31 U/L (ref 13–39)
BASOPHILS # BLD AUTO: 0.03 THOUSANDS/ÂΜL (ref 0–0.1)
BASOPHILS NFR BLD AUTO: 1 % (ref 0–1)
BILIRUB SERPL-MCNC: 1 MG/DL (ref 0.2–1)
BUN SERPL-MCNC: 13 MG/DL (ref 5–25)
CALCIUM ALBUM COR SERPL-MCNC: 9.7 MG/DL (ref 8.3–10.1)
CALCIUM SERPL-MCNC: 9.2 MG/DL (ref 8.4–10.2)
CHLORIDE SERPL-SCNC: 105 MMOL/L (ref 96–108)
CO2 SERPL-SCNC: 30 MMOL/L (ref 21–32)
CREAT SERPL-MCNC: 0.63 MG/DL (ref 0.6–1.3)
EOSINOPHIL # BLD AUTO: 0.01 THOUSAND/ÂΜL (ref 0–0.61)
EOSINOPHIL NFR BLD AUTO: 0 % (ref 0–6)
ERYTHROCYTE [DISTWIDTH] IN BLOOD BY AUTOMATED COUNT: 14.4 % (ref 11.6–15.1)
GFR SERPL CREATININE-BSD FRML MDRD: 81 ML/MIN/1.73SQ M
GLUCOSE SERPL-MCNC: 79 MG/DL (ref 65–140)
HCT VFR BLD AUTO: 37.7 % (ref 34.8–46.1)
HGB BLD-MCNC: 12.9 G/DL (ref 11.5–15.4)
IMM GRANULOCYTES # BLD AUTO: 0.04 THOUSAND/UL (ref 0–0.2)
IMM GRANULOCYTES NFR BLD AUTO: 1 % (ref 0–2)
INR PPP: 1.32 (ref 0.84–1.19)
LYMPHOCYTES # BLD AUTO: 0.94 THOUSANDS/ÂΜL (ref 0.6–4.47)
LYMPHOCYTES NFR BLD AUTO: 17 % (ref 14–44)
MCH RBC QN AUTO: 33.2 PG (ref 26.8–34.3)
MCHC RBC AUTO-ENTMCNC: 34.2 G/DL (ref 31.4–37.4)
MCV RBC AUTO: 97 FL (ref 82–98)
MONOCYTES # BLD AUTO: 0.65 THOUSAND/ÂΜL (ref 0.17–1.22)
MONOCYTES NFR BLD AUTO: 11 % (ref 4–12)
NEUTROPHILS # BLD AUTO: 4.04 THOUSANDS/ÂΜL (ref 1.85–7.62)
NEUTS SEG NFR BLD AUTO: 70 % (ref 43–75)
NRBC BLD AUTO-RTO: 0 /100 WBCS
PLATELET # BLD AUTO: 241 THOUSANDS/UL (ref 149–390)
PMV BLD AUTO: 11.8 FL (ref 8.9–12.7)
POTASSIUM SERPL-SCNC: 3.5 MMOL/L (ref 3.5–5.3)
PROT SERPL-MCNC: 6.7 G/DL (ref 6.4–8.4)
PROTHROMBIN TIME: 16.6 SECONDS (ref 11.6–14.5)
RBC # BLD AUTO: 3.88 MILLION/UL (ref 3.81–5.12)
SODIUM SERPL-SCNC: 138 MMOL/L (ref 135–147)
WBC # BLD AUTO: 5.71 THOUSAND/UL (ref 4.31–10.16)

## 2024-07-22 PROCEDURE — 82105 ALPHA-FETOPROTEIN SERUM: CPT

## 2024-07-22 PROCEDURE — 85025 COMPLETE CBC W/AUTO DIFF WBC: CPT

## 2024-07-22 PROCEDURE — 80053 COMPREHEN METABOLIC PANEL: CPT

## 2024-07-22 PROCEDURE — 85610 PROTHROMBIN TIME: CPT

## 2024-07-22 PROCEDURE — 36415 COLL VENOUS BLD VENIPUNCTURE: CPT

## 2024-07-24 ENCOUNTER — APPOINTMENT (OUTPATIENT)
Dept: LAB | Facility: CLINIC | Age: 84
End: 2024-07-24
Payer: MEDICARE

## 2024-07-24 DIAGNOSIS — I42.0 DILATED CARDIOMYOPATHY (HCC): ICD-10-CM

## 2024-07-24 DIAGNOSIS — R79.89 ELEVATED LIVER FUNCTION TESTS: ICD-10-CM

## 2024-07-24 DIAGNOSIS — I43 CARDIAC AMYLOIDOSIS (HCC): ICD-10-CM

## 2024-07-24 DIAGNOSIS — I50.22 CHRONIC SYSTOLIC HEART FAILURE (HCC): ICD-10-CM

## 2024-07-24 DIAGNOSIS — E85.4 CARDIAC AMYLOIDOSIS (HCC): ICD-10-CM

## 2024-07-24 LAB
ALBUMIN SERPL BCG-MCNC: 3.5 G/DL (ref 3.5–5)
ALP SERPL-CCNC: 32 U/L (ref 34–104)
ALT SERPL W P-5'-P-CCNC: 23 U/L (ref 7–52)
ANION GAP SERPL CALCULATED.3IONS-SCNC: 4 MMOL/L (ref 4–13)
AST SERPL W P-5'-P-CCNC: 29 U/L (ref 13–39)
BASOPHILS # BLD AUTO: 0.03 THOUSANDS/ÂΜL (ref 0–0.1)
BASOPHILS NFR BLD AUTO: 1 % (ref 0–1)
BILIRUB SERPL-MCNC: 0.98 MG/DL (ref 0.2–1)
BUN SERPL-MCNC: 13 MG/DL (ref 5–25)
CALCIUM SERPL-MCNC: 9 MG/DL (ref 8.4–10.2)
CHLORIDE SERPL-SCNC: 103 MMOL/L (ref 96–108)
CO2 SERPL-SCNC: 30 MMOL/L (ref 21–32)
CREAT SERPL-MCNC: 0.62 MG/DL (ref 0.6–1.3)
EOSINOPHIL # BLD AUTO: 0.01 THOUSAND/ÂΜL (ref 0–0.61)
EOSINOPHIL NFR BLD AUTO: 0 % (ref 0–6)
ERYTHROCYTE [DISTWIDTH] IN BLOOD BY AUTOMATED COUNT: 14.4 % (ref 11.6–15.1)
GFR SERPL CREATININE-BSD FRML MDRD: 81 ML/MIN/1.73SQ M
GLUCOSE P FAST SERPL-MCNC: 85 MG/DL (ref 65–99)
HCT VFR BLD AUTO: 35.6 % (ref 34.8–46.1)
HGB BLD-MCNC: 12.1 G/DL (ref 11.5–15.4)
IMM GRANULOCYTES # BLD AUTO: 0.04 THOUSAND/UL (ref 0–0.2)
IMM GRANULOCYTES NFR BLD AUTO: 1 % (ref 0–2)
LYMPHOCYTES # BLD AUTO: 0.77 THOUSANDS/ÂΜL (ref 0.6–4.47)
LYMPHOCYTES NFR BLD AUTO: 14 % (ref 14–44)
MCH RBC QN AUTO: 32.7 PG (ref 26.8–34.3)
MCHC RBC AUTO-ENTMCNC: 34 G/DL (ref 31.4–37.4)
MCV RBC AUTO: 96 FL (ref 82–98)
MONOCYTES # BLD AUTO: 0.51 THOUSAND/ÂΜL (ref 0.17–1.22)
MONOCYTES NFR BLD AUTO: 10 % (ref 4–12)
NEUTROPHILS # BLD AUTO: 4.01 THOUSANDS/ÂΜL (ref 1.85–7.62)
NEUTS SEG NFR BLD AUTO: 74 % (ref 43–75)
NRBC BLD AUTO-RTO: 0 /100 WBCS
PLATELET # BLD AUTO: 217 THOUSANDS/UL (ref 149–390)
PMV BLD AUTO: 12 FL (ref 8.9–12.7)
POTASSIUM SERPL-SCNC: 3.7 MMOL/L (ref 3.5–5.3)
PROT SERPL-MCNC: 6.4 G/DL (ref 6.4–8.4)
RBC # BLD AUTO: 3.7 MILLION/UL (ref 3.81–5.12)
SODIUM SERPL-SCNC: 137 MMOL/L (ref 135–147)
WBC # BLD AUTO: 5.37 THOUSAND/UL (ref 4.31–10.16)

## 2024-07-24 PROCEDURE — 36415 COLL VENOUS BLD VENIPUNCTURE: CPT

## 2024-07-24 PROCEDURE — 84165 PROTEIN E-PHORESIS SERUM: CPT

## 2024-07-24 PROCEDURE — 83521 IG LIGHT CHAINS FREE EACH: CPT

## 2024-07-25 LAB
ALBUMIN SERPL ELPH-MCNC: 3.39 G/DL (ref 3.2–5.1)
ALBUMIN SERPL ELPH-MCNC: 53.8 % (ref 48–70)
ALPHA1 GLOB SERPL ELPH-MCNC: 0.22 G/DL (ref 0.15–0.47)
ALPHA1 GLOB SERPL ELPH-MCNC: 3.5 % (ref 1.8–7)
ALPHA2 GLOB SERPL ELPH-MCNC: 0.72 G/DL (ref 0.42–1.04)
ALPHA2 GLOB SERPL ELPH-MCNC: 11.4 % (ref 5.9–14.9)
BETA GLOB ABNORMAL SERPL ELPH-MCNC: 0.42 G/DL (ref 0.31–0.57)
BETA1 GLOB SERPL ELPH-MCNC: 6.6 % (ref 4.7–7.7)
BETA2 GLOB SERPL ELPH-MCNC: 6.7 % (ref 3.1–7.9)
BETA2+GAMMA GLOB SERPL ELPH-MCNC: 0.42 G/DL (ref 0.2–0.58)
GAMMA GLOB ABNORMAL SERPL ELPH-MCNC: 1.13 G/DL (ref 0.4–1.66)
GAMMA GLOB SERPL ELPH-MCNC: 18 % (ref 6.9–22.3)
IGG/ALB SER: 1.16 {RATIO} (ref 1.1–1.8)
KAPPA LC FREE SER-MCNC: 17.7 MG/L (ref 3.3–19.4)
KAPPA LC FREE/LAMBDA FREE SER: 1.09 {RATIO} (ref 0.26–1.65)
LAMBDA LC FREE SERPL-MCNC: 16.3 MG/L (ref 5.7–26.3)
PROT PATTERN SERPL ELPH-IMP: ABNORMAL
PROT SERPL-MCNC: 6.3 G/DL (ref 6.4–8.2)

## 2024-07-25 PROCEDURE — 84165 PROTEIN E-PHORESIS SERUM: CPT | Performed by: STUDENT IN AN ORGANIZED HEALTH CARE EDUCATION/TRAINING PROGRAM

## 2024-07-26 DIAGNOSIS — K75.4 AUTOIMMUNE HEPATITIS TREATED WITH STEROIDS (HCC): ICD-10-CM

## 2024-07-26 RX ORDER — BUDESONIDE 3 MG/1
9 CAPSULE, COATED PELLETS ORAL DAILY
Qty: 270 CAPSULE | Refills: 1 | Status: SHIPPED | OUTPATIENT
Start: 2024-07-26

## 2024-08-02 ENCOUNTER — TELEPHONE (OUTPATIENT)
Dept: GASTROENTEROLOGY | Facility: MEDICAL CENTER | Age: 84
End: 2024-08-02

## 2024-08-21 ENCOUNTER — RA CDI HCC (OUTPATIENT)
Dept: OTHER | Facility: HOSPITAL | Age: 84
End: 2024-08-21

## 2024-08-21 ENCOUNTER — TELEPHONE (OUTPATIENT)
Dept: GASTROENTEROLOGY | Facility: CLINIC | Age: 84
End: 2024-08-21

## 2024-08-21 NOTE — TELEPHONE ENCOUNTER
"Attempted to contact Pt/Pt's son, ERICK Damico, today  via phone call(s) @ [phone] and [phone] to no avail.  Unable to leave voicemail message(s) due to answering machine/voicemail not picking up.  Message sent via Pt's [employer] account today informing Pt of a pending order by ALONZO Orosco for a MRI Abdomen w wo contrast and MRCP for Pt to complete.  Further informed Pt, via [employer] message, that Pt has an appointment scheduled for 11/13/24 with GEORGE Jackson at the [address] [place] [address] office.  Appointment letter and MRI order mailed to Pt's address in chart.  Pt given office phone number [phone] should she have any questions and/or needs to reschedule appointment.  Pt also given phone number to \"Central Scheduling\" via [employer] message.  "

## 2024-08-27 ENCOUNTER — OFFICE VISIT (OUTPATIENT)
Dept: FAMILY MEDICINE CLINIC | Facility: CLINIC | Age: 84
End: 2024-08-27
Payer: MEDICARE

## 2024-08-27 VITALS
BODY MASS INDEX: 19.81 KG/M2 | TEMPERATURE: 97.8 F | OXYGEN SATURATION: 98 % | DIASTOLIC BLOOD PRESSURE: 70 MMHG | SYSTOLIC BLOOD PRESSURE: 120 MMHG | RESPIRATION RATE: 16 BRPM | HEIGHT: 63 IN | WEIGHT: 111.8 LBS | HEART RATE: 78 BPM

## 2024-08-27 DIAGNOSIS — I50.32 HYPERTENSIVE HEART DISEASE WITH CHRONIC DIASTOLIC CONGESTIVE HEART FAILURE (HCC): ICD-10-CM

## 2024-08-27 DIAGNOSIS — I50.22 CHRONIC SYSTOLIC CHF (CONGESTIVE HEART FAILURE), NYHA CLASS 3 (HCC): ICD-10-CM

## 2024-08-27 DIAGNOSIS — K75.4 AUTOIMMUNE HEPATITIS TREATED WITH STEROIDS (HCC): ICD-10-CM

## 2024-08-27 DIAGNOSIS — R79.9 ABNORMAL FINDING OF BLOOD CHEMISTRY, UNSPECIFIED: ICD-10-CM

## 2024-08-27 DIAGNOSIS — Z95.811 PRESENCE OF HEART ASSIST DEVICE (HCC): ICD-10-CM

## 2024-08-27 DIAGNOSIS — I11.0 HYPERTENSIVE HEART DISEASE WITH CHRONIC DIASTOLIC CONGESTIVE HEART FAILURE (HCC): ICD-10-CM

## 2024-08-27 DIAGNOSIS — R90.89 OTHER ABNORMAL FINDINGS ON DIAGNOSTIC IMAGING OF CENTRAL NERVOUS SYSTEM: ICD-10-CM

## 2024-08-27 DIAGNOSIS — I50.22 CHRONIC SYSTOLIC HEART FAILURE (HCC): ICD-10-CM

## 2024-08-27 DIAGNOSIS — K75.4 AUTOIMMUNE HEPATITIS (HCC): ICD-10-CM

## 2024-08-27 DIAGNOSIS — Z00.00 MEDICARE ANNUAL WELLNESS VISIT, SUBSEQUENT: Primary | ICD-10-CM

## 2024-08-27 PROCEDURE — 96372 THER/PROPH/DIAG INJ SC/IM: CPT | Performed by: FAMILY MEDICINE

## 2024-08-27 PROCEDURE — G0438 PPPS, INITIAL VISIT: HCPCS | Performed by: FAMILY MEDICINE

## 2024-08-27 PROCEDURE — 99214 OFFICE O/P EST MOD 30 MIN: CPT | Performed by: FAMILY MEDICINE

## 2024-08-27 RX ORDER — TORSEMIDE 10 MG/1
10 TABLET ORAL DAILY
Qty: 90 TABLET | Refills: 3 | Status: SHIPPED | OUTPATIENT
Start: 2024-08-27

## 2024-08-27 RX ORDER — BUDESONIDE 3 MG/1
9 CAPSULE, COATED PELLETS ORAL DAILY
Qty: 270 CAPSULE | Refills: 1 | Status: SHIPPED | OUTPATIENT
Start: 2024-08-27

## 2024-08-27 NOTE — ASSESSMENT & PLAN NOTE
Wt Readings from Last 3 Encounters:   08/27/24 50.7 kg (111 lb 12.8 oz)   06/05/24 49.7 kg (109 lb 9.6 oz)   05/10/24 48.7 kg (107 lb 6.4 oz)     Not taking torsemide daily  She is to start daily as directed by cardiology

## 2024-08-27 NOTE — PROGRESS NOTES
Ambulatory Visit  Name: Symone Damico      : 1940      MRN: [id number]  Encounter Provider: Kassandra Zarate MD  Encounter Date: 2024   Encounter department: [facility], [place], operated by [facility]    Assessment & Plan   1. Medicare annual wellness visit, subsequent  -     Comprehensive metabolic panel  -     CBC and differential  -     Lipid panel  -     TSH, 3rd generation with Free T4 reflex  2. Autoimmune hepatitis treated with steroids (HCC)  Assessment & Plan:  Stable on current meds  Orders:  -     budesonide (ENTOCORT EC) 3 MG capsule; Take 3 capsules (9 mg total) by mouth daily  -     Comprehensive metabolic panel  -     CBC and differential  -     Lipid panel  -     TSH, 3rd generation with Free T4 reflex  3. Chronic systolic heart failure (HCC)  Assessment & Plan:  Wt Readings from Last 3 Encounters:   24 50.7 kg (111 lb 12.8 oz)   24 49.7 kg (109 lb 9.6 oz)   05/10/24 48.7 kg (107 lb 6.4 oz)     Not taking torsemide daily  She is to start daily as directed by cardiology        4. Autoimmune hepatitis (HCC)  -     torsemide (DEMADEX) 10 mg tablet; Take 1 tablet (10 mg total) by mouth daily  -     Comprehensive metabolic panel  -     CBC and differential  -     Lipid panel  -     TSH, 3rd generation with Free T4 reflex  5. Hypertensive heart disease with chronic diastolic congestive heart failure (HCC)  Assessment & Plan:  Wt Readings from Last 3 Encounters:   24 50.7 kg (111 lb 12.8 oz)   24 49.7 kg (109 lb 9.6 oz)   05/10/24 48.7 kg (107 lb 6.4 oz)     Stable   Sees cardiology        6. Presence of heart assist device (HCC)  7. Abnormal finding of blood chemistry, unspecified  -     Lipid panel  8. Other abnormal findings on diagnostic imaging of central nervous system  -     TSH, 3rd generation with Free T4 reflex     Preventive health issues were discussed with patient, and age appropriate screening tests were ordered as noted in patient's After Visit Summary. Personalized  health advice and appropriate referrals for health education or preventive services given if needed, as noted in patient's After Visit Summary.    History of Present Illness     HPI   Patient Care Team:  Kassandra Zarate MD as PCP - General (Family Medicine)    Review of Systems   Constitutional: Negative.    HENT: Negative.     Eyes: Negative.    Respiratory: Negative.     Cardiovascular:  Positive for leg swelling.   Endocrine: Negative.    Genitourinary: Negative.    Musculoskeletal: Negative.    Neurological: Negative.    Hematological: Negative.    Psychiatric/Behavioral: Negative.       Medical History Reviewed by provider this encounter:  Tobacco  Allergies  Meds  Problems  Med Hx  Surg Hx  Fam Hx       Annual Wellness Visit Questionnaire   Symone is here for her Subsequent Wellness visit.     Health Risk Assessment:   Patient rates overall health as good. Patient feels that their physical health rating is same. Patient is satisfied with their life. Eyesight was rated as same. Hearing was rated as slightly worse. Patient feels that their emotional and mental health rating is same. Patients states they are sometimes angry. Patient states they are sometimes unusually tired/fatigued. Pain experienced in the last 7 days has been none. Patient states that she has experienced no weight loss or gain in last 6 months.     Depression Screening:   PHQ-9 Score: 2      Fall Risk Screening:   In the past year, patient has experienced: no history of falling in past year      Urinary Incontinence Screening:   Patient has not leaked urine accidently in the last six months.     Home Safety:  Patient has trouble with stairs inside or outside of their home. Patient has working smoke alarms and has working carbon monoxide detector. Home safety hazards include: none.     Nutrition:   Current diet is Regular and No Added Salt.     Medications:   Patient is currently taking over-the-counter supplements. OTC medications  include: see medication list. Patient is able to manage medications.     Activities of Daily Living (ADLs)/Instrumental Activities of Daily Living (IADLs):   Walk and transfer into and out of bed and chair?: Yes  Dress and groom yourself?: Yes    Bathe or shower yourself?: Yes    Feed yourself? Yes  Do your laundry/housekeeping?: Yes  Manage your money, pay your bills and track your expenses?: No  Make your own meals?: Yes    Do your own shopping?: Yes    Previous Hospitalizations:   Any hospitalizations or ED visits within the last 12 months?: No      PREVENTIVE SCREENINGS        Diabetes Screening:     General: Screening Current      Colorectal Cancer Screening:     General: Screening Not Indicated      Cervical Cancer Screening:    General: Screening Not Indicated      Lung Cancer Screening:     General: Screening Not Indicated      Hepatitis C Screening:    General: Screening Current    Screening, Brief Intervention, and Referral to Treatment (SBIRT)    Screening  Typical number of drinks in a day: 0  Typical number of drinks in a week: 0  Interpretation: Low risk drinking behavior.    Single Item Drug Screening:  How often have you used an illegal drug (including marijuana) or a prescription medication for non-medical reasons in the past year? never    Single Item Drug Screen Score: 0  Interpretation: Negative screen for possible drug use disorder    SDOH Risk Assessment  Social determinants of health (SDOH) risk assesment tool was completed. The tool at a minimum covered housing stability, food insecurity, transportation needs, and utility difficulty. Patient had at risk responses for the following SDOH domains: transportation needs.     Social Determinants of Health     Financial Resource Strain: Patient Declined (8/23/2023)    Overall Financial Resource Strain (CARDIA)     Difficulty of Paying Living Expenses: Patient declined   Food Insecurity: No Food Insecurity (8/27/2024)    Hunger Vital Sign     Worried  "About Running Out of Food in the Last Year: Never true     Ran Out of Food in the Last Year: Never true   Transportation Needs: Unmet Transportation Needs (8/27/2024)    PRAPARE - Transportation     Lack of Transportation (Medical): Yes     Lack of Transportation (Non-Medical): Yes   Housing Stability: Low Risk  (8/27/2024)    Housing Stability Vital Sign     Unable to Pay for Housing in the Last Year: No     Number of Times Moved in the Last Year: 1     Homeless in the Last Year: No   Utilities: Not At Risk (8/27/2024)    [facility] Utilities     Threatened with loss of utilities: No     No results found.    Objective     /70 (BP Location: Left arm, Patient Position: Sitting, Cuff Size: Standard)   Pulse 78   Temp 97.8 °F (36.6 °C) (Tympanic)   Resp 16   Ht 5' 3.31\" (1.608 m)   Wt 50.7 kg (111 lb 12.8 oz)   SpO2 98%   BMI 19.61 kg/m²     Physical Exam  Vitals and nursing note reviewed.   Constitutional:       Appearance: Normal appearance.   HENT:      Right Ear: Tympanic membrane normal.      Left Ear: Tympanic membrane normal.   Cardiovascular:      Rate and Rhythm: Normal rate and regular rhythm.      Pulses: Normal pulses.      Heart sounds: Normal heart sounds.   Pulmonary:      Effort: Pulmonary effort is normal.      Breath sounds: Normal breath sounds.   Neurological:      General: No focal deficit present.      Mental Status: She is alert and oriented to person, place, and time.   Psychiatric:         Mood and Affect: Mood normal.         Behavior: Behavior normal.           "

## 2024-08-27 NOTE — PATIENT INSTRUCTIONS
Medicare Preventive Visit Patient Instructions  Thank you for completing your Welcome to Medicare Visit or Medicare Annual Wellness Visit today. Your next wellness visit will be due in one year (8/28/2025).  The screening/preventive services that you may require over the next 5-10 years are detailed below. Some tests may not apply to you based off risk factors and/or age. Screening tests ordered at today's visit but not completed yet may show as past due. Also, please note that scanned in results may not display below.  Preventive Screenings:  Service Recommendations Previous Testing/Comments   Colorectal Cancer Screening  * Colonoscopy    * Fecal Occult Blood Test (FOBT)/Fecal Immunochemical Test (FIT)  * Fecal DNA/Cologuard Test  * Flexible Sigmoidoscopy Age: 45-75 years old   Colonoscopy: every 10 years (may be performed more frequently if at higher risk)  OR  FOBT/FIT: every 1 year  OR  Cologuard: every 3 years  OR  Sigmoidoscopy: every 5 years  Screening may be recommended earlier than age 45 if at higher risk for colorectal cancer. Also, an individualized decision between you and your healthcare provider will decide whether screening between the ages of 76-85 would be appropriate. Colonoscopy: Not on file  FOBT/FIT: Not on file  Cologuard: Not on file  Sigmoidoscopy: Not on file          Breast Cancer Screening Age: 40+ years old  Frequency: every 1-2 years  Not required if history of left and right mastectomy Mammogram: Not on file        Cervical Cancer Screening Between the ages of 21-29, pap smear recommended once every 3 years.   Between the ages of 30-65, can perform pap smear with HPV co-testing every 5 years.   Recommendations may differ for women with a history of total hysterectomy, cervical cancer, or abnormal pap smears in past. Pap Smear: Not on file        Hepatitis C Screening Once for adults born between 1945 and 1965  More frequently in patients at high risk for Hepatitis C Hep C Antibody:  08/02/2018        Diabetes Screening 1-2 times per year if you're at risk for diabetes or have pre-diabetes Fasting glucose: 85 mg/dL (7/24/2024)  A1C: No results in last 5 years (No results in last 5 years)      Cholesterol Screening Once every 5 years if you don't have a lipid disorder. May order more often based on risk factors. Lipid panel: Not on file          Other Preventive Screenings Covered by Medicare:  Abdominal Aortic Aneurysm (AAA) Screening: covered once if your at risk. You're considered to be at risk if you have a family history of AAA.  Lung Cancer Screening: covers low dose CT scan once per year if you meet all of the following conditions: (1) Age 55-77; (2) No signs or symptoms of lung cancer; (3) Current smoker or have quit smoking within the last 15 years; (4) You have a tobacco smoking history of at least 20 pack years (packs per day multiplied by number of years you smoked); (5) You get a written order from a healthcare provider.  Glaucoma Screening: covered annually if you're considered high risk: (1) You have diabetes OR (2) Family history of glaucoma OR (3)  aged 50 and older OR (4)  American aged 65 and older  Osteoporosis Screening: covered every 2 years if you meet one of the following conditions: (1) You're estrogen deficient and at risk for osteoporosis based off medical history and other findings; (2) Have a vertebral abnormality; (3) On glucocorticoid therapy for more than 3 months; (4) Have primary hyperparathyroidism; (5) On osteoporosis medications and need to assess response to drug therapy.   Last bone density test (DXA Scan): 06/01/2023.  HIV Screening: covered annually if you're between the age of 15-65. Also covered annually if you are younger than 15 and older than 65 with risk factors for HIV infection. For pregnant patients, it is covered up to 3 times per pregnancy.    Immunizations:  Immunization Recommendations   Influenza Vaccine Annual  influenza vaccination during flu season is recommended for all persons aged >= 6 months who do not have contraindications   Pneumococcal Vaccine   * Pneumococcal conjugate vaccine = PCV13 (Prevnar 13), PCV15 (Vaxneuvance), PCV20 (Prevnar 20)  * Pneumococcal polysaccharide vaccine = PPSV23 (Pneumovax) Adults 19-63 yo with certain risk factors or if 65+ yo  If never received any pneumonia vaccine: recommend Prevnar 20 (PCV20)  Give PCV20 if previously received 1 dose of PCV13 or PPSV23   Hepatitis B Vaccine 3 dose series if at intermediate or high risk (ex: diabetes, end stage renal disease, liver disease)   Respiratory syncytial virus (RSV) Vaccine - COVERED BY MEDICARE PART D  * RSVPreF3 (Arexvy) CDC recommends that adults 60 years of age and older may receive a single dose of RSV vaccine using shared clinical decision-making (SCDM)   Tetanus (Td) Vaccine - COST NOT COVERED BY MEDICARE PART B Following completion of primary series, a booster dose should be given every 10 years to maintain immunity against tetanus. Td may also be given as tetanus wound prophylaxis.   Tdap Vaccine - COST NOT COVERED BY MEDICARE PART B Recommended at least once for all adults. For pregnant patients, recommended with each pregnancy.   Shingles Vaccine (Shingrix) - COST NOT COVERED BY MEDICARE PART B  2 shot series recommended in those 19 years and older who have or will have weakened immune systems or those 50 years and older     Health Maintenance Due:      Topic Date Due   • Hepatitis C Screening  Completed     Immunizations Due:      Topic Date Due   • Hepatitis B Vaccine (2 of 3 - Risk 3-dose series) 07/20/2022   • Hepatitis A Vaccine (2 of 2 - Risk 2-dose series) 12/22/2022   • COVID-19 Vaccine (4 - 2023-24 season) 09/01/2023   • Influenza Vaccine (1) 09/01/2024     Advance Directives   What are advance directives?  Advance directives are legal documents that state your wishes and plans for medical care. These plans are made ahead  of time in case you lose your ability to make decisions for yourself. Advance directives can apply to any medical decision, such as the treatments you want, and if you want to donate organs.   What are the types of advance directives?  There are many types of advance directives, and each state has rules about how to use them. You may choose a combination of any of the following:  Living will:  This is a written record of the treatment you want. You can also choose which treatments you do not want, which to limit, and which to stop at a certain time. This includes surgery, medicine, IV fluid, and tube feedings.   Durable power of  for healthcare (DPAHC):  This is a written record that states who you want to make healthcare choices for you when you are unable to make them for yourself. This person, called a proxy, is usually a family member or a friend. You may choose more than 1 proxy.  Do not resuscitate (DNR) order:  A DNR order is used in case your heart stops beating or you stop breathing. It is a request not to have certain forms of treatment, such as CPR. A DNR order may be included in other types of advance directives.  Medical directive:  This covers the care that you want if you are in a coma, near death, or unable to make decisions for yourself. You can list the treatments you want for each condition. Treatment may include pain medicine, surgery, blood transfusions, dialysis, IV or tube feedings, and a ventilator (breathing machine).  Values history:  This document has questions about your views, beliefs, and how you feel and think about life. This information can help others choose the care that you would choose.  Why are advance directives important?  An advance directive helps you control your care. Although spoken wishes may be used, it is better to have your wishes written down. Spoken wishes can be misunderstood, or not followed. Treatments may be given even if you do not want them. An advance  directive may make it easier for your family to make difficult choices about your care.       © Copyright [employer] 2018 Information is for End User's use only and may not be sold, redistributed or otherwise used for commercial purposes. All illustrations and images included in CareNotes® are the copyrighted property of A.D.A.M., Inc. or [employer]

## 2024-08-27 NOTE — ASSESSMENT & PLAN NOTE
Wt Readings from Last 3 Encounters:   08/27/24 50.7 kg (111 lb 12.8 oz)   06/05/24 49.7 kg (109 lb 9.6 oz)   05/10/24 48.7 kg (107 lb 6.4 oz)     Stable   Sees cardiology

## 2024-08-28 ENCOUNTER — OFFICE VISIT (OUTPATIENT)
Dept: AUDIOLOGY | Age: 84
End: 2024-08-28

## 2024-08-28 ENCOUNTER — OFFICE VISIT (OUTPATIENT)
Dept: AUDIOLOGY | Age: 84
End: 2024-08-28
Payer: COMMERCIAL

## 2024-08-28 DIAGNOSIS — H90.3 SENSORY HEARING LOSS, BILATERAL: Primary | ICD-10-CM

## 2024-08-28 DIAGNOSIS — H90.3 SENSORINEURAL HEARING LOSS, BILATERAL: ICD-10-CM

## 2024-08-28 PROCEDURE — 92556 SPEECH AUDIOMETRY COMPLETE: CPT

## 2024-08-28 PROCEDURE — 92552 PURE TONE AUDIOMETRY AIR: CPT

## 2024-08-28 RX ORDER — SACUBITRIL AND VALSARTAN 49; 51 MG/1; MG/1
1 TABLET, FILM COATED ORAL 2 TIMES DAILY
Qty: 180 TABLET | Refills: 1 | Status: SHIPPED | OUTPATIENT
Start: 2024-08-28

## 2024-08-28 RX ORDER — METOPROLOL SUCCINATE 25 MG/1
25 TABLET, EXTENDED RELEASE ORAL DAILY
Qty: 90 TABLET | Refills: 1 | Status: SHIPPED | OUTPATIENT
Start: 2024-08-28

## 2024-08-28 NOTE — PROGRESS NOTES
Progress Note    Name:  Symone Damico  :  1940  Age:  84 y.o.  MRN:  [id number]  Date of Evaluation: 24     HISTORY:    The patient  arrived with [employer] P hearing aids fit by [facility].      ACTION/ADJUSTMENTS:    A listening check revealed the hearing aids to be provided adequate amplification.    Julia Negron., [facility]  Clinical Audiologist

## 2024-08-28 NOTE — PROGRESS NOTES
Diagnostic Hearing Evaluation    Name:  Symone Damico  :  1940  Age:  84 y.o.   MRN:  [id number]  Date of Evaluation: 24     HISTORY:     Reason for visit: Known Hearing Loss binaurally    Symone Damico is being seen today at the request of Dr. Palacio for an initial  evaluation of hearing. The patient reports a longstanding hearing loss.  She is fit by [facility] with [employer] P hearing aids. They are requesting another hearing test to compare thresholds. She had a hearing test in 2023. Since it has not been a year, they were advised about a possible bill. Patient opted for self pay today.      EVALUATION:    Otoscopic Evaluation:   Right Ear: Non-occluding cerumen   Left Ear: Non-occluding cerumen    Speech Audiometry:  Speech Reception (SRT)    Right Ear: 55 dB HL    Left Ear: 60 dB HL    Word Recognition Scores (WRS):  Right Ear: fair (64 % correct)     Left Ear: fair (64 % correct)    Stimuli: [id number]    Pure Tone Audiometry:  Conventional pure tone audiometry from 250 - 8000 Hz  was obtained with good reliability and revealed the following:     Right Ear: Moderately severe sensorineural hearing loss (SNHL)    Left Ear: Moderately severe sensorineural hearing loss (SNHL)     Hearing is stable.    *see attached audiogram    RECOMMENDATIONS:  Annual hearing eval, Consult ENT, Return to [facility]. for F/U, Hearing Aid Evaluation, and Copy to Patient/Caregiver    PATIENT EDUCATION:   The results of today's results and recommendations were reviewed with the patient and her hearing thresholds were explained at length. Treatment options, including amplification and communication strategies, were discussed as appropriate. The patient voiced understanding of her test results. Questions were addressed and the patient was encouraged to contact our department should concerns arise.      Julia Negron., [facility]  Clinical Audiologist  [facility]  [address] [phone]

## 2024-09-09 DIAGNOSIS — I11.0 HYPERTENSIVE HEART DISEASE WITH CHRONIC DIASTOLIC CONGESTIVE HEART FAILURE (HCC): Primary | ICD-10-CM

## 2024-09-09 DIAGNOSIS — I50.32 HYPERTENSIVE HEART DISEASE WITH CHRONIC DIASTOLIC CONGESTIVE HEART FAILURE (HCC): Primary | ICD-10-CM

## 2024-09-09 DIAGNOSIS — Z13.9 ENCOUNTER FOR SCREENING INVOLVING SOCIAL DETERMINANTS OF HEALTH (SDOH): ICD-10-CM

## 2024-09-10 ENCOUNTER — PATIENT OUTREACH (OUTPATIENT)
Dept: CASE MANAGEMENT | Facility: OTHER | Age: 84
End: 2024-09-10

## 2024-09-10 NOTE — PROGRESS NOTES
OP CM rcvd referral for pt in regards to transportation.  Called to pt (CARLYLE) and she advised me to call her son ERICK.   Called to JT and LM.  Reviewed chart and pt is noted to have a caregiver 4 hours per day through [employer] and son private pays for this service.  Visiting [place] provides transportation.        Rcvd VM from pts son:    Steve Flores, this is ERICK Damico's son. I got your message and I did talk with my mom. I believe you were calling about transportation. She has a home health aide Mondays, Wednesdays and Friday that can drive her. She also has access to the [employer] bus system where she could schedule a ride.

## 2024-09-11 ENCOUNTER — PATIENT OUTREACH (OUTPATIENT)
Dept: CASE MANAGEMENT | Facility: OTHER | Age: 84
End: 2024-09-11

## 2024-09-16 ENCOUNTER — APPOINTMENT (OUTPATIENT)
Dept: LAB | Facility: CLINIC | Age: 84
End: 2024-09-16
Payer: MEDICARE

## 2024-09-16 ENCOUNTER — OFFICE VISIT (OUTPATIENT)
Dept: HEMATOLOGY ONCOLOGY | Facility: CLINIC | Age: 84
End: 2024-09-16
Payer: MEDICARE

## 2024-09-16 VITALS
HEART RATE: 68 BPM | HEIGHT: 63 IN | TEMPERATURE: 98 F | RESPIRATION RATE: 16 BRPM | BODY MASS INDEX: 19.67 KG/M2 | SYSTOLIC BLOOD PRESSURE: 122 MMHG | DIASTOLIC BLOOD PRESSURE: 62 MMHG | WEIGHT: 111 LBS | OXYGEN SATURATION: 99 %

## 2024-09-16 DIAGNOSIS — D69.3 CHRONIC ITP (IDIOPATHIC THROMBOCYTOPENIA) (HCC): ICD-10-CM

## 2024-09-16 DIAGNOSIS — D69.3 CHRONIC ITP (IDIOPATHIC THROMBOCYTOPENIA) (HCC): Primary | ICD-10-CM

## 2024-09-16 DIAGNOSIS — R79.89 ELEVATED LIVER FUNCTION TESTS: ICD-10-CM

## 2024-09-16 LAB
ALBUMIN SERPL BCG-MCNC: 3.6 G/DL (ref 3.5–5)
ALP SERPL-CCNC: 36 U/L (ref 34–104)
ALT SERPL W P-5'-P-CCNC: 21 U/L (ref 7–52)
ANION GAP SERPL CALCULATED.3IONS-SCNC: 4 MMOL/L (ref 4–13)
AST SERPL W P-5'-P-CCNC: 27 U/L (ref 13–39)
BASOPHILS # BLD AUTO: 0.03 THOUSANDS/ΜL (ref 0–0.1)
BASOPHILS NFR BLD AUTO: 1 % (ref 0–1)
BILIRUB SERPL-MCNC: 0.75 MG/DL (ref 0.2–1)
BUN SERPL-MCNC: 17 MG/DL (ref 5–25)
CALCIUM SERPL-MCNC: 9.2 MG/DL (ref 8.4–10.2)
CHLORIDE SERPL-SCNC: 108 MMOL/L (ref 96–108)
CHOLEST SERPL-MCNC: 172 MG/DL
CO2 SERPL-SCNC: 30 MMOL/L (ref 21–32)
CREAT SERPL-MCNC: 0.67 MG/DL (ref 0.6–1.3)
EOSINOPHIL # BLD AUTO: 0.02 THOUSAND/ΜL (ref 0–0.61)
EOSINOPHIL NFR BLD AUTO: 0 % (ref 0–6)
ERYTHROCYTE [DISTWIDTH] IN BLOOD BY AUTOMATED COUNT: 15.4 % (ref 11.6–15.1)
GFR SERPL CREATININE-BSD FRML MDRD: 79 ML/MIN/1.73SQ M
GLUCOSE P FAST SERPL-MCNC: 77 MG/DL (ref 65–99)
HCT VFR BLD AUTO: 39.4 % (ref 34.8–46.1)
HDLC SERPL-MCNC: 51 MG/DL
HGB BLD-MCNC: 12.8 G/DL (ref 11.5–15.4)
IMM GRANULOCYTES # BLD AUTO: 0.03 THOUSAND/UL (ref 0–0.2)
IMM GRANULOCYTES NFR BLD AUTO: 1 % (ref 0–2)
LDLC SERPL CALC-MCNC: 101 MG/DL (ref 0–100)
LYMPHOCYTES # BLD AUTO: 0.89 THOUSANDS/ΜL (ref 0.6–4.47)
LYMPHOCYTES NFR BLD AUTO: 19 % (ref 14–44)
MCH RBC QN AUTO: 32.2 PG (ref 26.8–34.3)
MCHC RBC AUTO-ENTMCNC: 32.5 G/DL (ref 31.4–37.4)
MCV RBC AUTO: 99 FL (ref 82–98)
MONOCYTES # BLD AUTO: 0.47 THOUSAND/ΜL (ref 0.17–1.22)
MONOCYTES NFR BLD AUTO: 10 % (ref 4–12)
NEUTROPHILS # BLD AUTO: 3.26 THOUSANDS/ΜL (ref 1.85–7.62)
NEUTS SEG NFR BLD AUTO: 69 % (ref 43–75)
NONHDLC SERPL-MCNC: 121 MG/DL
NRBC BLD AUTO-RTO: 0 /100 WBCS
PLATELET # BLD AUTO: 135 THOUSANDS/UL (ref 149–390)
PMV BLD AUTO: 11.8 FL (ref 8.9–12.7)
POTASSIUM SERPL-SCNC: 3.8 MMOL/L (ref 3.5–5.3)
PROT SERPL-MCNC: 6.5 G/DL (ref 6.4–8.4)
RBC # BLD AUTO: 3.98 MILLION/UL (ref 3.81–5.12)
SODIUM SERPL-SCNC: 142 MMOL/L (ref 135–147)
T4 FREE SERPL-MCNC: 0.82 NG/DL (ref 0.61–1.12)
TRIGL SERPL-MCNC: 99 MG/DL
TSH SERPL DL<=0.05 MIU/L-ACNC: 7.22 UIU/ML (ref 0.45–4.5)
WBC # BLD AUTO: 4.7 THOUSAND/UL (ref 4.31–10.16)

## 2024-09-16 PROCEDURE — 99214 OFFICE O/P EST MOD 30 MIN: CPT | Performed by: INTERNAL MEDICINE

## 2024-09-16 NOTE — PROGRESS NOTES
Hematology Outpatient Follow - Up Note  Symone Damico 84 y.o. 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  She is on Promacta 50 mg p.o. daily since 8/2021.     Platelet count went from 30,000 range July 2021 to normal by 9/2021.     Period of noncompliance with Promacta.   Platelet count went from 441 12/5/22 to 70 1/23/23 and 94 2/22/23.  Promacta restarted in March 2023. 25 mg p.o. daily, platelet count of 96,000, hemoglobin 12.2, WBC 5.8      2. History of Acute blood loss anemia.  Status post fall with subdural hemorrhage March 2022.  Hemoglobin 5.1 7/1/2022.  She felt very fatigued prior to admission.  She and her son deny any evidence of GI bleed.  She declined GI workup.  She was taking oral iron for a few weeks post discharge but discontinued. At her 8/29/22 office visit, she was advised to resume oral iron.       8/2022 - Hemoglobin 10 - 11 g/dL range 8/22 - 2/20232 2/22/23 iron saturation 29%; ferritin 87     3/20/23 hemoglobin 6.1, MCV 90, WBC 4.4, platelet count 128  Admitted 3/21-3/25/23  S/p 2 unit transfusion of PRBC on admission  Patient declined EGD, colonoscopy.  Venofer 300mg 3/22, March 28, March 30, April 4, April 6, April 11, April 14, 2023 4/6/23 hemoglobin 9.9, platelets 142  10/11/23  Iron saturation 62%, ferritin 796, hemoglobin 11.6, white blood cell count 3.5, ANC 2.06, platelets 306     Patient states today she has been out of Promacta for 2 to 3 weeks, I do not have any samples, the pharmacy will ship it to her this Thursday or Friday    We still having CBC every month standing orders will follow-up in 4 to 6 months        Labs and imaging studies are reviewed by ordering provider once results are available. If there are findings that need immediate attention, you will be contacted when results available.   Discussing results and the implication on your healthcare is  best discussed in person at your follow-up visit.       HPI:  Symone Damico is an 84-year-old  female who was seen initially 5/2020 regarding pancytopenia.    She has a history of dilated cardiomyopathy with low ejection fraction on Entresto, spironolactone, torsemide, metoprolol and budesonide.  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        She had outpatient labs 4/17/20 at [facility].  hemoglobin 6.9, MCV 70, RDW 19.7, WBC 1.9, 59% neutrophils, 30% lymphocytes, platelet 28402,   She was transfused with 2 units packed RBC ordered by her cardiologist, Dr. Damico and received Feraheme weekly x 2 doses end of June 2020.      CBC on 09/09/2019 showed hemoglobin 11.3, MCV 93, WBC of 2.7, normal differential platelets 94522  August 2018 iron saturation 8% ferritin 26  On 07/2018 hemoglobin 11, MCV 91, WBC 3.1, platelets 14020  10/23/2015:  Hemoglobin 12.9, MCV of 90, white blood cell count 2.95, platelets 115  On 11/2014 hemoglobin 13.3, MCV 89, WBC 3.4, platelets 231516     She had normal vitamin B12, folic acid, TSH, protein electrophoresis     Autoimmune workup by Rheumatology was negative except for anti smooth muscle antibodies of 93     Because of the leukopenia, thrombocytopenia bone marrow biopsy performed on January 2021 showed normal bone marrow cellularity for the age, no evidence of dysplastic features, normal plasma cells and lymphocytes, no evidence of vasculitis, necrosis, fibrosis adequate iron stains     Back on prednisone 10 mg p.o. daily because of elevation of ALT, AST, with persistent thrombocytopenia of 38,000.     Initiated on Promacta 50 mg at the end of August 2021, platelet count 446398 in November 2021, WBC 4, and decrease in the AST, ALT     3/20/22 admitted when outpatient CT identified subdural hemorrhage.  She would a mechanical fall 1 week prior and had intermittent headaches which prompted  the CT scan.     Status post defibrillator     Promacta reduced to 25 mg p.o. daily     Period of noncompliance with Promacta.   Platelet count went from 441 12/5/22 to 70 1/23/23 and 94 2/22/23.  Promacta restarted in March 2023. 25 mg p.o. daily          Interval History:        Previous Treatment:         Test Results:    Imaging: No results found.    Labs:   Lab Results   Component Value Date    WBC 5.37 07/24/2024    HGB 12.1 07/24/2024    HCT 35.6 07/24/2024    MCV 96 07/24/2024     07/24/2024     Lab Results   Component Value Date     12/04/2015    K 3.7 07/24/2024     07/24/2024    CO2 30 07/24/2024    ANIONGAP 4 12/04/2015    BUN 13 07/24/2024    CREATININE 0.62 07/24/2024    GLUCOSE 94 12/04/2015    GLUF 85 07/24/2024    CALCIUM 9.0 07/24/2024    CORRECTEDCA 9.7 07/22/2024    AST 29 07/24/2024    ALT 23 07/24/2024    ALKPHOS 32 (L) 07/24/2024    PROT 7.5 12/04/2015    BILITOT 0.48 12/04/2015    EGFR 81 07/24/2024       Lab Results   Component Value Date    IRON 165 02/21/2024    TIBC 292 02/21/2024    FERRITIN 595 (H) 02/21/2024       Lab Results   Component Value Date    PBRTPUQJ70 767 01/11/2021         ROS: Review of Systems   Constitutional:  Negative for appetite change, chills, diaphoresis, fatigue and unexpected weight change.   HENT:   Negative for mouth sores, nosebleeds, sore throat, trouble swallowing and voice change.    Eyes:  Negative for eye problems and icterus.   Respiratory:  Negative for chest tightness, cough, hemoptysis and wheezing.    Cardiovascular:  Negative for chest pain, leg swelling and palpitations.   Gastrointestinal:  Negative for abdominal distention, abdominal pain, blood in stool, constipation, diarrhea, nausea and vomiting.   Endocrine: Negative for hot flashes.   Genitourinary:  Negative for bladder incontinence, difficulty urinating, dyspareunia, dysuria and frequency.    Musculoskeletal:  Negative for arthralgias, back pain, gait problem, neck pain  and neck stiffness.   Skin:  Negative for itching and rash.   Neurological:  Negative for dizziness, gait problem, headaches, numbness, seizures and speech difficulty.   Hematological:  Negative for adenopathy. Does not bruise/bleed easily.   Psychiatric/Behavioral:  Positive for confusion, decreased concentration and sleep disturbance. Negative for depression and suicidal ideas. The patient is not nervous/anxious.           Current Medications: Reviewed  Allergies: Reviewed  PMH/FH/SH:  Reviewed      Physical Exam:    Body surface area is 1.51 meters squared.    Wt Readings from Last 3 Encounters:   09/16/24 50.3 kg (111 lb)   08/27/24 50.7 kg (111 lb 12.8 oz)   06/05/24 49.7 kg (109 lb 9.6 oz)        Temp Readings from Last 3 Encounters:   09/16/24 98 °F (36.7 °C) (Temporal)   08/27/24 97.8 °F (36.6 °C) (Tympanic)   06/05/24 98.5 °F (36.9 °C) (Tympanic)        BP Readings from Last 3 Encounters:   09/16/24 122/62   08/27/24 120/70   06/05/24 122/60         Pulse Readings from Last 3 Encounters:   09/16/24 68   08/27/24 78   06/05/24 78        Physical Exam  Vitals reviewed.   Constitutional:       General: She is not in acute distress.     Appearance: She is well-developed. She is not diaphoretic.   HENT:      Head: Normocephalic and atraumatic.   Eyes:      Conjunctiva/sclera: Conjunctivae normal.   Neck:      Trachea: No tracheal deviation.   Cardiovascular:      Rate and Rhythm: Normal rate and regular rhythm.      Heart sounds: Murmur heard.      No friction rub. No gallop.   Pulmonary:      Effort: Pulmonary effort is normal. No respiratory distress.      Breath sounds: Normal breath sounds. No wheezing or rales.   Chest:      Chest wall: No tenderness.   Abdominal:      General: There is no distension.      Palpations: Abdomen is soft.      Tenderness: There is no abdominal tenderness.   Musculoskeletal:      Cervical back: Normal range of motion and neck supple.      Right lower leg: No edema.      Left  lower leg: No edema.   Lymphadenopathy:      Cervical: No cervical adenopathy.   Skin:     General: Skin is warm and dry.      Coloration: Skin is not pale.      Findings: No erythema.   Neurological:      Mental Status: She is alert. Mental status is at baseline.      Motor: Weakness present.   Psychiatric:         Behavior: Behavior normal.         Thought Content: Thought content normal.         ECOG PS:2    Goals and Barriers:  Current Goal: Minimize effects of disease.   Barriers: None.      Patient's Capacity to Self Care:  Patient is able to self care.    Code [facility]: [facility]@

## 2024-09-18 ENCOUNTER — PATIENT OUTREACH (OUTPATIENT)
Dept: CASE MANAGEMENT | Facility: OTHER | Age: 84
End: 2024-09-18

## 2024-09-18 NOTE — PROGRESS NOTES
Spoke with Symone briefly then she handed phone to her son. He reports Symone gets confused with local [facility] pharmacy and specialty pharmacy in regards to her promacta medication. He will set up online profile that he will manage for promacta medication  She uses [facility] pill organizer now. I did speak to him about pill packing thru [facility] if he thinks it is needed in future. Symone has care giver three days a week who helps with transportation to appointments . No care management needs identified

## 2024-09-19 ENCOUNTER — HOSPITAL ENCOUNTER (OUTPATIENT)
Dept: MRI IMAGING | Facility: HOSPITAL | Age: 84
Discharge: HOME | End: 2024-09-19
Payer: MEDICARE

## 2024-09-19 DIAGNOSIS — K75.4 AUTOIMMUNE HEPATITIS TREATED WITH STEROIDS (HCC): ICD-10-CM

## 2024-09-19 DIAGNOSIS — D69.3 CHRONIC ITP (IDIOPATHIC THROMBOCYTOPENIA) (HCC): ICD-10-CM

## 2024-09-19 PROCEDURE — 74183 MRI ABD W/O CNTR FLWD CNTR: CPT

## 2024-09-19 PROCEDURE — A9585 GADOBUTROL INJECTION: HCPCS | Performed by: FAMILY MEDICINE

## 2024-09-19 RX ORDER — GADOBUTROL 604.72 MG/ML
5 INJECTION INTRAVENOUS
Status: COMPLETED | OUTPATIENT
Start: 2024-09-19 | End: 2024-09-19

## 2024-09-19 RX ADMIN — GADOBUTROL 5 ML: 604.72 INJECTION INTRAVENOUS at 10:15

## 2024-11-07 ENCOUNTER — TELEPHONE (OUTPATIENT)
Age: 84
End: 2024-11-07

## 2024-11-07 NOTE — TELEPHONE ENCOUNTER
Huong from [facility] specialty pharmacy is faxing over a new prior auth to be filled out by  for medication Promacta which the current auth expires on 11/20/24.    Faxed number was provided

## 2024-11-08 ENCOUNTER — DOCUMENTATION (OUTPATIENT)
Age: 84
End: 2024-11-08

## 2024-11-08 NOTE — PROGRESS NOTES
Received request from clinical for patient to get a re auth on Promacta 25 MG daily. Auth has been submitted via cover my meds and this is pending and marked urgent.    (Key: [id number])

## 2024-11-25 DIAGNOSIS — D69.3 CHRONIC ITP (IDIOPATHIC THROMBOCYTOPENIA) (HCC): ICD-10-CM

## 2024-11-25 RX ORDER — ELTROMBOPAG OLAMINE 25 MG/1
TABLET, FILM COATED ORAL
Qty: 90 TABLET | Refills: 2 | Status: SHIPPED | OUTPATIENT
Start: 2024-11-25

## 2024-11-27 ENCOUNTER — TELEPHONE (OUTPATIENT)
Age: 84
End: 2024-11-27

## 2024-12-16 ENCOUNTER — TELEPHONE (OUTPATIENT)
Dept: HEMATOLOGY ONCOLOGY | Facility: CLINIC | Age: 84
End: 2024-12-16

## 2024-12-16 NOTE — TELEPHONE ENCOUNTER
Received message from [facility] indicating Promacta will not be covered in 2025. Of note PA has been obtained 12/02/2024-12/02/2025. I spoke with pt's son and pt has enough medication through mid-January. Janny vicente.

## 2024-12-20 DIAGNOSIS — I50.22 CHRONIC SYSTOLIC CHF (CONGESTIVE HEART FAILURE), NYHA CLASS 3 (HCC): ICD-10-CM

## 2024-12-20 RX ORDER — OMEGA-3-ACID ETHYL ESTERS 1 G/1
2 CAPSULE, LIQUID FILLED ORAL DAILY
Qty: 180 CAPSULE | Refills: 1 | Status: SHIPPED | OUTPATIENT
Start: 2024-12-20

## 2024-12-23 ENCOUNTER — APPOINTMENT (OUTPATIENT)
Dept: LAB | Facility: CLINIC | Age: 84
End: 2024-12-23
Payer: MEDICARE

## 2024-12-23 ENCOUNTER — OFFICE VISIT (OUTPATIENT)
Dept: HEMATOLOGY ONCOLOGY | Facility: CLINIC | Age: 84
End: 2024-12-23
Payer: MEDICARE

## 2024-12-23 VITALS
HEIGHT: 63 IN | BODY MASS INDEX: 18.61 KG/M2 | SYSTOLIC BLOOD PRESSURE: 122 MMHG | TEMPERATURE: 97.2 F | DIASTOLIC BLOOD PRESSURE: 64 MMHG | HEART RATE: 75 BPM | OXYGEN SATURATION: 99 % | WEIGHT: 105 LBS | RESPIRATION RATE: 16 BRPM

## 2024-12-23 DIAGNOSIS — D69.3 CHRONIC ITP (IDIOPATHIC THROMBOCYTOPENIA) (HCC): ICD-10-CM

## 2024-12-23 DIAGNOSIS — R09.81 NASAL CONGESTION: ICD-10-CM

## 2024-12-23 DIAGNOSIS — D69.3 CHRONIC ITP (IDIOPATHIC THROMBOCYTOPENIA) (HCC): Primary | ICD-10-CM

## 2024-12-23 LAB
BASOPHILS # BLD AUTO: 0.02 THOUSANDS/ÂΜL (ref 0–0.1)
BASOPHILS NFR BLD AUTO: 0 % (ref 0–1)
EOSINOPHIL # BLD AUTO: 0.01 THOUSAND/ÂΜL (ref 0–0.61)
EOSINOPHIL NFR BLD AUTO: 0 % (ref 0–6)
ERYTHROCYTE [DISTWIDTH] IN BLOOD BY AUTOMATED COUNT: 15.3 % (ref 11.6–15.1)
HCT VFR BLD AUTO: 32.7 % (ref 34.8–46.1)
HGB BLD-MCNC: 10.9 G/DL (ref 11.5–15.4)
IMM GRANULOCYTES # BLD AUTO: 0.05 THOUSAND/UL (ref 0–0.2)
IMM GRANULOCYTES NFR BLD AUTO: 1 % (ref 0–2)
LYMPHOCYTES # BLD AUTO: 0.67 THOUSANDS/ÂΜL (ref 0.6–4.47)
LYMPHOCYTES NFR BLD AUTO: 13 % (ref 14–44)
MCH RBC QN AUTO: 32.2 PG (ref 26.8–34.3)
MCHC RBC AUTO-ENTMCNC: 33.3 G/DL (ref 31.4–37.4)
MCV RBC AUTO: 97 FL (ref 82–98)
MONOCYTES # BLD AUTO: 0.36 THOUSAND/ÂΜL (ref 0.17–1.22)
MONOCYTES NFR BLD AUTO: 7 % (ref 4–12)
NEUTROPHILS # BLD AUTO: 3.88 THOUSANDS/ÂΜL (ref 1.85–7.62)
NEUTS SEG NFR BLD AUTO: 79 % (ref 43–75)
NRBC BLD AUTO-RTO: 0 /100 WBCS
PLATELET # BLD AUTO: 98 THOUSANDS/UL (ref 149–390)
PMV BLD AUTO: 11.3 FL (ref 8.9–12.7)
RBC # BLD AUTO: 3.38 MILLION/UL (ref 3.81–5.12)
WBC # BLD AUTO: 4.99 THOUSAND/UL (ref 4.31–10.16)

## 2024-12-23 PROCEDURE — 36415 COLL VENOUS BLD VENIPUNCTURE: CPT

## 2024-12-23 PROCEDURE — 99213 OFFICE O/P EST LOW 20 MIN: CPT | Performed by: INTERNAL MEDICINE

## 2024-12-23 PROCEDURE — 85025 COMPLETE CBC W/AUTO DIFF WBC: CPT

## 2024-12-23 NOTE — PROGRESS NOTES
Hematology Outpatient Follow - Up Note  Symone Damico 84 y.o. 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  She is on Promacta 50 mg p.o. daily since 8/2021.     Platelet count went from 30,000 range July 2021 to normal by 9/2021.     Period of noncompliance with Promacta.   Platelet count went from 441 12/5/22 to 70 1/23/23 and 94 2/22/23.  Promacta restarted in March 2023. 25 mg p.o. daily, platelet count of 96,000, hemoglobin 12.2, WBC 5.8      2. History of Acute blood loss anemia.  Status post fall with subdural hemorrhage March 2022.  Hemoglobin 5.1 7/1/2022.  She felt very fatigued prior to admission.  She and her son deny any evidence of GI bleed.  She declined GI workup.  She was taking oral iron for a few weeks post discharge but discontinued. At her 8/29/22 office visit, she was advised to resume oral iron.       8/2022 - Hemoglobin 10 - 11 g/dL range 8/22 - 2/20232 2/22/23 iron saturation 29%; ferritin 87     3/20/23 hemoglobin 6.1, MCV 90, WBC 4.4, platelet count 128  Admitted 3/21-3/25/23  S/p 2 unit transfusion of PRBC on admission  Patient declined EGD, colonoscopy.  Venofer 300mg 3/22, March 28, March 30, April 4, April 6, April 11, April 14, 2023  Continue Promacta 25 mg p.o. daily, approved till 12/2025, will do CBC every 3 months        Labs and imaging studies are reviewed by ordering provider once results are available. If there are findings that need immediate attention, you will be contacted when results available.   Discussing results and the implication on your healthcare is best discussed in person at your follow-up visit.       HPI:  Symone Damico is an 84-year-old  female who was seen initially 5/2020 regarding pancytopenia.    She has a history of dilated cardiomyopathy with low ejection fraction on Entresto, spironolactone, torsemide, metoprolol and  budesonide.  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        She had outpatient labs 4/17/20 at [facility].  hemoglobin 6.9, MCV 70, RDW 19.7, WBC 1.9, 59% neutrophils, 30% lymphocytes, platelet 01393,   She was transfused with 2 units packed RBC ordered by her cardiologist, Dr. Damico and received Feraheme weekly x 2 doses end of June 2020.      CBC on 09/09/2019 showed hemoglobin 11.3, MCV 93, WBC of 2.7, normal differential platelets 43974  August 2018 iron saturation 8% ferritin 26  On 07/2018 hemoglobin 11, MCV 91, WBC 3.1, platelets 02164  10/23/2015:  Hemoglobin 12.9, MCV of 90, white blood cell count 2.95, platelets 115  On 11/2014 hemoglobin 13.3, MCV 89, WBC 3.4, platelets 202038     She had normal vitamin B12, folic acid, TSH, protein electrophoresis     Autoimmune workup by Rheumatology was negative except for anti smooth muscle antibodies of 93     Because of the leukopenia, thrombocytopenia bone marrow biopsy performed on January 2021 showed normal bone marrow cellularity for the age, no evidence of dysplastic features, normal plasma cells and lymphocytes, no evidence of vasculitis, necrosis, fibrosis adequate iron stains     Back on prednisone 10 mg p.o. daily because of elevation of ALT, AST, with persistent thrombocytopenia of 38,000.     Initiated on Promacta 50 mg at the end of August 2021, platelet count 309381 in November 2021, WBC 4, and decrease in the AST, ALT     3/20/22 admitted when outpatient CT identified subdural hemorrhage.  She would a mechanical fall 1 week prior and had intermittent headaches which prompted the CT scan.     Status post defibrillator     Promacta reduced to 25 mg p.o. daily     Period of noncompliance with Promacta.   Platelet count went from 441 12/5/22 to 70 1/23/23 and 94 2/22/23.  Promacta restarted in March 2023. 25 mg p.o. daily    Interval History:        Previous Treatment:          Test Results:    Imaging: No results found.    Labs:   Lab Results   Component Value Date    WBC 4.70 09/16/2024    HGB 12.8 09/16/2024    HCT 39.4 09/16/2024    MCV 99 (H) 09/16/2024     (L) 09/16/2024     Lab Results   Component Value Date     12/04/2015    K 3.8 09/16/2024     09/16/2024    CO2 30 09/16/2024    ANIONGAP 4 12/04/2015    BUN 17 09/16/2024    CREATININE 0.67 09/16/2024    GLUCOSE 94 12/04/2015    GLUF 77 09/16/2024    CALCIUM 9.2 09/16/2024    CORRECTEDCA 9.7 07/22/2024    AST 27 09/16/2024    ALT 21 09/16/2024    ALKPHOS 36 09/16/2024    PROT 7.5 12/04/2015    BILITOT 0.48 12/04/2015    EGFR 79 09/16/2024       Lab Results   Component Value Date    IRON 165 02/21/2024    TIBC 292 02/21/2024    FERRITIN 595 (H) 02/21/2024       Lab Results   Component Value Date    [id number] 767 01/11/2021         ROS: Review of Systems   HENT:   Positive for hearing loss.           Current Medications: Reviewed  Allergies: Reviewed  PMH/FH/SH:  Reviewed      Physical Exam:    Body surface area is 1.47 meters squared.    Wt Readings from Last 3 Encounters:   12/23/24 47.6 kg (105 lb)   12/16/24 50.3 kg (111 lb)   09/16/24 50.3 kg (111 lb)        Temp Readings from Last 3 Encounters:   12/23/24 (!) 97.2 °F (36.2 °C) (Temporal)   09/16/24 98 °F (36.7 °C) (Temporal)   08/27/24 97.8 °F (36.6 °C) (Tympanic)        BP Readings from Last 3 Encounters:   12/23/24 122/64   09/16/24 122/62   08/27/24 120/70         Pulse Readings from Last 3 Encounters:   12/23/24 75   09/16/24 68   08/27/24 78        Physical Exam  Vitals reviewed.   Constitutional:       General: She is not in acute distress.     Appearance: She is well-developed. She is not diaphoretic.   HENT:      Head: Normocephalic and atraumatic.   Eyes:      Conjunctiva/sclera: Conjunctivae normal.   Neck:      Trachea: No tracheal deviation.   Cardiovascular:      Rate and Rhythm: Normal rate and regular rhythm.      Heart sounds: Murmur  heard.      No friction rub. No gallop.   Pulmonary:      Effort: Pulmonary effort is normal. No respiratory distress.      Breath sounds: Normal breath sounds. No wheezing or rales.   Chest:      Chest wall: No tenderness.   Abdominal:      General: There is no distension.      Palpations: Abdomen is soft.      Tenderness: There is no abdominal tenderness.   Musculoskeletal:      Cervical back: Normal range of motion and neck supple.      Right lower leg: Edema present.      Left lower leg: Edema present.   Lymphadenopathy:      Cervical: No cervical adenopathy.   Skin:     General: Skin is warm and dry.      Coloration: Skin is not pale.      Findings: No erythema.   Neurological:      Mental Status: She is alert. Mental status is at baseline.   Psychiatric:         Behavior: Behavior normal.         Thought Content: Thought content normal.         ECOG PS:2    Goals and Barriers:  Current Goal: Minimize effects of disease.   Barriers: None.      Patient's Capacity to Self Care:  Patient is able to self care.    Code Status: [unfilled]

## 2024-12-24 RX ORDER — FLUTICASONE PROPIONATE 50 MCG
2 SPRAY, SUSPENSION (ML) NASAL DAILY
Qty: 48 ML | Refills: 0 | Status: SHIPPED | OUTPATIENT
Start: 2024-12-24

## 2024-12-26 ENCOUNTER — RESULTS FOLLOW-UP (OUTPATIENT)
Dept: HEMATOLOGY ONCOLOGY | Facility: CLINIC | Age: 84
End: 2024-12-26

## 2025-01-09 ENCOUNTER — DOCUMENTATION (OUTPATIENT)
Age: 85
End: 2025-01-09

## 2025-01-09 NOTE — PROGRESS NOTES
PA for Promacta 25 MG needed.  Submitted for this via cover my meds and marked this as urgent.    (Key: [id number])  PA Case ID #: [id number]

## 2025-01-22 ENCOUNTER — OFFICE VISIT (OUTPATIENT)
Dept: FAMILY MEDICINE CLINIC | Facility: CLINIC | Age: 85
End: 2025-01-22
Payer: MEDICARE

## 2025-01-22 ENCOUNTER — HOSPITAL ENCOUNTER (OUTPATIENT)
Dept: RADIOLOGY | Facility: HOSPITAL | Age: 85
Discharge: HOME | End: 2025-01-22
Payer: MEDICARE

## 2025-01-22 VITALS
DIASTOLIC BLOOD PRESSURE: 70 MMHG | TEMPERATURE: 98.1 F | BODY MASS INDEX: 17.19 KG/M2 | HEART RATE: 96 BPM | RESPIRATION RATE: 17 BRPM | OXYGEN SATURATION: 96 % | HEIGHT: 63 IN | SYSTOLIC BLOOD PRESSURE: 110 MMHG | WEIGHT: 97 LBS

## 2025-01-22 DIAGNOSIS — M25.551 RIGHT HIP PAIN: ICD-10-CM

## 2025-01-22 DIAGNOSIS — Z95.811 PRESENCE OF HEART ASSIST DEVICE (HCC): ICD-10-CM

## 2025-01-22 DIAGNOSIS — K74.69 OTHER CIRRHOSIS OF LIVER (HCC): ICD-10-CM

## 2025-01-22 DIAGNOSIS — D69.3 CHRONIC ITP (IDIOPATHIC THROMBOCYTOPENIA) (HCC): ICD-10-CM

## 2025-01-22 DIAGNOSIS — E85.4 CARDIAC AMYLOIDOSIS (HCC): ICD-10-CM

## 2025-01-22 DIAGNOSIS — R41.0 INTERMITTENT CONFUSION: Primary | ICD-10-CM

## 2025-01-22 DIAGNOSIS — I50.22 CHRONIC SYSTOLIC HEART FAILURE (HCC): ICD-10-CM

## 2025-01-22 DIAGNOSIS — I11.0 HYPERTENSIVE HEART DISEASE WITH CONGESTIVE HEART FAILURE, UNSPECIFIED HEART FAILURE TYPE (HCC): ICD-10-CM

## 2025-01-22 DIAGNOSIS — I43 CARDIAC AMYLOIDOSIS (HCC): ICD-10-CM

## 2025-01-22 DIAGNOSIS — E44.0 MODERATE PROTEIN-CALORIE MALNUTRITION (HCC): ICD-10-CM

## 2025-01-22 DIAGNOSIS — R35.0 URINE FREQUENCY: ICD-10-CM

## 2025-01-22 DIAGNOSIS — D61.818 PANCYTOPENIA (HCC): ICD-10-CM

## 2025-01-22 PROCEDURE — 81002 URINALYSIS NONAUTO W/O SCOPE: CPT | Performed by: FAMILY MEDICINE

## 2025-01-22 PROCEDURE — 99215 OFFICE O/P EST HI 40 MIN: CPT | Performed by: FAMILY MEDICINE

## 2025-01-22 PROCEDURE — 73502 X-RAY EXAM HIP UNI 2-3 VIEWS: CPT

## 2025-01-22 PROCEDURE — G2211 COMPLEX E/M VISIT ADD ON: HCPCS | Performed by: FAMILY MEDICINE

## 2025-01-22 NOTE — PROGRESS NOTES
Name: Symone Damico      : 1940      MRN: [id number]  Encounter Provider: Kassandra Zarate MD  Encounter Date: 2025   Encounter department: [facility]  :  Assessment & Plan  Intermittent confusion  To check urine   Stable exam today  Orders:  •  Urinalysis with microscopic; Future    Right hip pain  Suspect arthritis  To take tylenol as needed for pain   Orders:  •  XR hip/pelv 2-3 vws right if performed; Future    Presence of heart assist device (HCC)  Stable   Sees cardiologist        Pancytopenia (HCC)  Stable  Sees dr Abby Swift        Moderate protein-calorie malnutrition (HCC)  Stable  Continue to increase protein in her diet        Chronic ITP (idiopathic thrombocytopenia) (HCC)  Stable   Sees hematologist        Other cirrhosis of liver (HCC)  Stable   Care per GI        Chronic systolic heart failure (HCC)  Wt Readings from Last 3 Encounters:   25 44 kg (97 lb)   24 47.6 kg (105 lb)   24 50.3 kg (111 lb)     Stable   Continue torsemide as directed   To f/u with cardiologist   Orders:  •  Ambulatory Referral to Cardiology; Future    Cardiac amyloidosis (HCC)  Stable  No recent issues   Orders:  •  Ambulatory Referral to Cardiology; Future    Hypertensive heart disease with congestive heart failure, unspecified heart failure type (HCC)  Wt Readings from Last 3 Encounters:   25 44 kg (97 lb)   24 47.6 kg (105 lb)   24 50.3 kg (111 lb)     Stable   Continue to monitor               History of Present Illness     Back Pain (Patient being seen for Right side lower back pain that radiates to right knee x 7 days-lifted a potty chair)  Accompanying by a caregiver that increased confusion, agitation, lethargy for the last 2 weeks     Leg Pain   There was no injury mechanism. Pain location: right leg pain and right hip pain. The pain is at a severity of 4/10. The pain has been Fluctuating since onset. Pertinent negatives include no inability to bear  "weight, loss of motion, loss of sensation, muscle weakness, numbness or tingling.     Review of Systems   Constitutional: Negative.    HENT: Negative.     Eyes: Negative.    Respiratory: Negative.     Endocrine: Negative.    Genitourinary: Negative.    Musculoskeletal:  Positive for arthralgias and back pain.   Neurological:  Negative for tingling and numbness.   Hematological: Negative.    Psychiatric/Behavioral:  Negative for agitation and behavioral problems.        Objective   /70 (BP Location: Left arm, Patient Position: Sitting, Cuff Size: Standard)   Pulse 96   Temp 98.1 °F (36.7 °C) (Tympanic)   Resp 17   Ht 5' 3\" (1.6 m)   Wt 44 kg (97 lb)   SpO2 96%   BMI 17.18 kg/m²      Physical Exam  Vitals and nursing note reviewed.   Constitutional:       Appearance: Normal appearance.   Cardiovascular:      Rate and Rhythm: Normal rate and regular rhythm.      Pulses: Normal pulses.      Heart sounds: Normal heart sounds.   Pulmonary:      Effort: Pulmonary effort is normal.      Breath sounds: Normal breath sounds.   Musculoskeletal:         General: No swelling, tenderness, deformity or signs of injury.   Neurological:      General: No focal deficit present.      Mental Status: She is alert and oriented to person, place, and time. Mental status is at baseline.      Cranial Nerves: No cranial nerve deficit.   Psychiatric:         Mood and Affect: Mood normal.         Behavior: Behavior normal.       Administrative Statements   I have spent a total time of 40 minutes in caring for this patient on the day of the visit/encounter including Risks and benefits of tx options, Instructions for management, Patient and family education, Importance of tx compliance, Counseling / Coordination of care, and Communicating with other healthcare professionals .   "

## 2025-01-22 NOTE — ASSESSMENT & PLAN NOTE
Wt Readings from Last 3 Encounters:   01/22/25 44 kg (97 lb)   12/23/24 47.6 kg (105 lb)   12/16/24 50.3 kg (111 lb)     Stable   Continue torsemide as directed   To f/u with cardiologist   Orders:  •  Ambulatory Referral to Cardiology; Future

## 2025-01-22 NOTE — ASSESSMENT & PLAN NOTE
Wt Readings from Last 3 Encounters:   01/22/25 44 kg (97 lb)   12/23/24 47.6 kg (105 lb)   12/16/24 50.3 kg (111 lb)     Stable   Continue to monitor

## 2025-01-24 ENCOUNTER — RESULTS FOLLOW-UP (OUTPATIENT)
Dept: FAMILY MEDICINE CLINIC | Facility: CLINIC | Age: 85
End: 2025-01-24

## 2025-01-24 LAB
BACTERIA UR QL AUTO: ABNORMAL /HPF
BILIRUB UR QL STRIP: NEGATIVE
CLARITY UR: CLEAR
COLOR UR: YELLOW
GLUCOSE UR STRIP-MCNC: NEGATIVE MG/DL
HGB UR QL STRIP.AUTO: NEGATIVE
KETONES UR STRIP-MCNC: NEGATIVE MG/DL
LEUKOCYTE ESTERASE UR QL STRIP: ABNORMAL
NITRITE UR QL STRIP: NEGATIVE
NON-SQ EPI CELLS URNS QL MICRO: ABNORMAL /HPF
PH UR STRIP.AUTO: 6 [PH]
PROT UR STRIP-MCNC: ABNORMAL MG/DL
RBC #/AREA URNS AUTO: ABNORMAL /HPF
SL AMB  POCT GLUCOSE, UA: NORMAL
SL AMB LEUKOCYTE ESTERASE,UA: NORMAL
SL AMB POCT BILIRUBIN,UA: NORMAL
SL AMB POCT BLOOD,UA: NORMAL
SL AMB POCT CLARITY,UA: CLEAR
SL AMB POCT COLOR,UA: NORMAL
SL AMB POCT KETONES,UA: NORMAL
SL AMB POCT NITRITE,UA: NORMAL
SL AMB POCT PH,UA: 6
SL AMB POCT SPECIFIC GRAVITY,UA: 1.01
SL AMB POCT URINE PROTEIN: NORMAL
SL AMB POCT UROBILINOGEN: NORMAL
SP GR UR STRIP.AUTO: 1.02 (ref 1–1.03)
UROBILINOGEN UR STRIP-ACNC: <2 MG/DL
WBC #/AREA URNS AUTO: ABNORMAL /HPF

## 2025-01-24 PROCEDURE — 81001 URINALYSIS AUTO W/SCOPE: CPT | Performed by: FAMILY MEDICINE

## 2025-01-27 DIAGNOSIS — F32.A DEPRESSIVE DISORDER: Primary | ICD-10-CM

## 2025-01-27 DIAGNOSIS — R41.81 AGE-RELATED COGNITIVE DECLINE: ICD-10-CM

## 2025-01-27 NOTE — TELEPHONE ENCOUNTER
Son called regarding the results of the urine culture.Warm transferred to the office and spoke to clinical  staff that stated the urine culture  was sent out and will reach out to  patients son when the results are received. Son made aware.

## 2025-01-29 DIAGNOSIS — R09.81 NASAL CONGESTION: ICD-10-CM

## 2025-01-29 DIAGNOSIS — M25.551 RIGHT HIP PAIN: Primary | ICD-10-CM

## 2025-01-29 DIAGNOSIS — R26.9 GAIT ABNORMALITY: ICD-10-CM

## 2025-01-29 RX ORDER — FLUTICASONE PROPIONATE 50 MCG
SPRAY, SUSPENSION (ML) NASAL
Qty: 48 ML | Refills: 0 | Status: SHIPPED | OUTPATIENT
Start: 2025-01-29

## 2025-01-29 NOTE — TELEPHONE ENCOUNTER
Son called back about this. He will await call back on urine culture results.    Son also said pt was referred to cardiology. Pt already sees Dr. Damico and he wants to know if Dr. Zarate wants her to see a different cardio or if she was just not aware pt sees Dr. Damico already.

## 2025-02-04 DIAGNOSIS — R35.0 URINE FREQUENCY: Primary | ICD-10-CM

## 2025-02-04 NOTE — TELEPHONE ENCOUNTER
Patient's son called asking for results from urine culture. He is asking this information be communicated through [employer] rather than phone call.   LOUIS Ashby is scheduled for 2/19/25 with Dr. Damico's office.

## 2025-02-10 ENCOUNTER — EVALUATION (OUTPATIENT)
Dept: PHYSICAL THERAPY | Age: 85
End: 2025-02-10
Payer: MEDICARE

## 2025-02-10 DIAGNOSIS — R26.9 GAIT ABNORMALITY: ICD-10-CM

## 2025-02-10 DIAGNOSIS — M25.551 RIGHT HIP PAIN: ICD-10-CM

## 2025-02-10 PROCEDURE — 97110 THERAPEUTIC EXERCISES: CPT

## 2025-02-10 PROCEDURE — 97161 PT EVAL LOW COMPLEX 20 MIN: CPT

## 2025-02-10 NOTE — PROGRESS NOTES
PT Evaluation     Today's date: 2/10/2025  Patient name: Symone Damico  : 1940  MRN: [id number]  Referring provider: Kassandra Zarate MD  Dx:   Encounter Diagnosis     ICD-10-CM    1. Right hip pain  M25.551 Ambulatory Referral to Physical Therapy      2. Gait abnormality  R26.9 Ambulatory Referral to Physical Therapy                     Assessment  Impairments: abnormal gait, abnormal or restricted ROM, impaired balance, impaired physical strength, lacks appropriate home exercise program, pain with function, safety issue, weight-bearing intolerance and poor posture   Symptom irritability: low    Assessment details: Pt is an 84 y.o. female who presents to OP PT with chief c/o R-sided low back pain that radiates into RLE. Signs and symptoms indicate probable diagnosis of R sacroiliitis vs lumbar radiculopathy. she has primary impairments of decreased lumbar ROM, decreased hip ROM, decreased LE strength, abnormal gait, decreased balance, and increased pain. She is limited functionally as she has difficulty with prolonged standing, prolonged ambulation, transitional movements, and has difficulty participating in her usual leisure activities. Had pt perform hip ROM and lumbar ROM exercises which she tolerated well and without an increase in symptoms. Provided her with HEP handout and educated her on proper completion of HEP, normal response to exercises, diagnosis, and POC. she verbalizes understanding of all education provided. All questions answered. Pt would benefit from skilled OP PT in order to improve upon impairments and return to PLOF.  Understanding of Dx/Px/POC: good     Prognosis: good    Goals  Short term goals  Pt will improve strength by 1/2 grade in order to perform ADL's within 2-3 weeks   Pt will be able to bend down to  object off of the floor with a 50% reduction in symptoms within 2-3 weeks  Pt will be able to stand for >30 minutes with a 50% reduction in symptoms within 2-3  weeks    Long term goals  Pt will be I with advanced HEP or symptom management   Pt will be able to perform all ADL's with <3/10 pain  Pt will report 90% functional improvement  Pt will be able to tolerate all leisure activities (pushing/pulling/lifting) with <3/10 pain  Pt will be able to tolerate prolonged sitting/standing/walking with <3/10 pain  Pt will improve FOTO >/= expected         Plan  Patient would benefit from: PT eval and skilled physical therapy  Referral necessary: No  Other planned modality interventions: Any PRN    Planned therapy interventions: patient education, therapeutic exercise, graded exercise, functional ROM exercises, flexibility, home exercise program, manual therapy, activity modification, strengthening, stretching, joint mobilization, massage, therapeutic activities, balance/weight bearing training, neuromuscular re-education, patient/caregiver education and gait training    Frequency: 1-2x week  Duration in weeks: 6  Treatment plan discussed with: patient        Subjective Evaluation    History of Present Illness  Mechanism of injury: Pt reports some lateral hip pain and radiates into her knee. She doesn't know exactly when it started but knows it's been about a month. She gets symptoms when she stands and walks she will get symptoms. Sometimes she will have pain when she is sitting. All of a sudden she will get symptoms from hip into the knee. Denies pain in the low back area. Denies night pain and it does not disturb her sleep. When she wakes up and gets out of bed she has symptoms. Pain happens often throughout the day but does have some days where she does not have much pain. Occasionally she take tylenol for her symptoms if it bothers her enough. Denies n/t in LE. She walks with a cane at baseline. There are times where she can walk around her house without it. Reports no falls in the past year.   Patient Goals  Patient goals for therapy: decreased pain, independence with  ADLs/IADLs and increased motion    Pain  Current pain ratin  At best pain ratin  At worst pain ratin  Quality: dull ache and discomfort  Relieving factors: medications  Aggravating factors: standing and walking      Diagnostic Tests  X-ray: normal  Treatments  Current treatment: physical therapy        Objective     Active Range of Motion     Lumbar   Flexion:  Restriction level: moderate  Extension:  Restriction level: moderate  Left lateral flexion:  Restriction level: moderate  Right lateral flexion:  Restriction level: moderate  Left rotation:  Restriction level: moderate  Right rotation:  Restriction level: moderate    Right Hip   Flexion: 100 degrees   Abduction: 25 degrees   External rotation (90/90): 25 degrees   Internal rotation (90/90): 10 degrees   Mechanical Assessment    Cervical      Thoracic      Lumbar    Sitting flexion: repeated movements  Pain location: no change  Standing extension: repeated movements  Pain location: no change    Strength/Myotome Testing     Left Hip   Planes of Motion   Flexion: 3+  Extension: 3+  Abduction: 3+    Right Hip   Planes of Motion   Flexion: 3+  Extension: 3+  Abduction: 3+    Left Knee   Flexion: 4-  Extension: 4-    Right Knee   Flexion: 4-  Extension: 4-    Left Ankle/Foot   Dorsiflexion: 4-  Plantar flexion: 3+    Right Ankle/Foot   Dorsiflexion: 4-  Plantar flexion: 3+    Tests     Lumbar   Positive SIJ compression.     Left   Negative crossed SLR, passive SLR and slump test.     Right   Negative crossed SLR, passive SLR and slump test.   Neuro Exam:     Functional outcomes   TU.5 (seconds)             Precautions: FALLS RISK, [place], R hip pain, LBP, L sided Bell's Palsy, Chronic systolic heart failure      Manuals 2/10                                                                Neuro Re-Ed                                                                                                        Ther Ex             Standing lumbar ext 2x10             Standing hip SLR's 2x10            Step ups             Mini squats             Sidestepping             Seated SKTC 2x10            Seated hip add iso             Seated hip abd TB             Seated LAQ             Sit-to-stand             Seated lumbar flex w/ pb                                                                                           Ther Activity                                       Gait Training                                       Modalities

## 2025-02-14 ENCOUNTER — OFFICE VISIT (OUTPATIENT)
Age: 85
End: 2025-02-14
Payer: MEDICARE

## 2025-02-14 VITALS
DIASTOLIC BLOOD PRESSURE: 58 MMHG | WEIGHT: 102 LBS | BODY MASS INDEX: 18.07 KG/M2 | HEIGHT: 63 IN | HEART RATE: 72 BPM | SYSTOLIC BLOOD PRESSURE: 124 MMHG

## 2025-02-14 DIAGNOSIS — K72.90 DECOMPENSATED CIRRHOSIS (HCC): Primary | ICD-10-CM

## 2025-02-14 DIAGNOSIS — K75.4 AUTOIMMUNE HEPATITIS TREATED WITH STEROIDS (HCC): ICD-10-CM

## 2025-02-14 DIAGNOSIS — K74.60 DECOMPENSATED CIRRHOSIS (HCC): Primary | ICD-10-CM

## 2025-02-14 PROCEDURE — G2211 COMPLEX E/M VISIT ADD ON: HCPCS | Performed by: INTERNAL MEDICINE

## 2025-02-14 PROCEDURE — 99204 OFFICE O/P NEW MOD 45 MIN: CPT | Performed by: INTERNAL MEDICINE

## 2025-02-14 RX ORDER — SODIUM CHLORIDE, SODIUM LACTATE, POTASSIUM CHLORIDE, CALCIUM CHLORIDE 600; 310; 30; 20 MG/100ML; MG/100ML; MG/100ML; MG/100ML
125 INJECTION, SOLUTION INTRAVENOUS CONTINUOUS
OUTPATIENT
Start: 2025-02-14

## 2025-02-14 RX ORDER — BUDESONIDE 3 MG/1
CAPSULE, COATED PELLETS ORAL
Qty: 270 CAPSULE | Refills: 1 | Status: SHIPPED | OUTPATIENT
Start: 2025-02-14 | End: 2025-04-15

## 2025-02-14 NOTE — ASSESSMENT & PLAN NOTE
"See management as above under \"decompensated cirrhosis\"    Orders:    budesonide (ENTOCORT EC) 3 MG capsule; Take 2 capsules (6 mg total) by mouth daily for 30 days, THEN 1 capsule (3 mg total) daily.    "

## 2025-02-14 NOTE — PATIENT INSTRUCTIONS
Decrease your Budesonide to 6 mg daily x30 days and then decrease to 3 mg daily  Obtain blood work every 2 weeks while decreasing your Budesonide to ensure your liver numbers remain stable   Schedule abdominal ultrasound for liver cancer screening  Schedule EGD for variceal screening due to your history of cirrhosis  Schedule follow up office visit in 3 months

## 2025-02-14 NOTE — ASSESSMENT & PLAN NOTE
Patient with prior history of AIH for which she has been treated with Budesonide and Prednisone (currently on both medications). Unclear when diagnosis was made and how as patient denies liver biopsy although lab review does demonstrate LFT elevation and positive ASMA, LEA, and IgG in the past. Given patient's age and normal LFTs, do not believe patient would benefit from liver biopsy at this time. However, should be continued on treatment due to her cirrhosis that appears to be decompensated by ascites. Treatment should be limited to prednisone and possibility of Imuran in the future if LFTs again increase. Budesonide should be tapered off due to risk of decompensation. Will work to taper off this medication and otherwise continue to follow up with patient and schedule her for surveillance imaging/EGD for HCC/EV screening.     MELD 3.0: 10 at 7/24/2024  3:18 PM  MELD-Na: 9 at 7/24/2024  3:18 PM  Calculated from:  Serum Creatinine: 0.62 mg/dL (Using min of 1 mg/dL) at 7/24/2024  3:18 PM  Serum Sodium: 137 mmol/L at 7/24/2024  3:18 PM  Total Bilirubin: 0.98 mg/dL (Using min of 1 mg/dL) at 7/24/2024  3:18 PM  Serum Albumin: 3.5 g/dL at 7/24/2024  3:18 PM  INR(ratio): 1.32 at 7/22/2024 11:30 AM  Age at listing (hypothetical): 84 years  Sex: Female at 7/24/2024  3:18 PM    # Autoimmune Hepatitis:   Reported history of AIH although no prior liver biopsy available to review; now c/b cirrhosis  Prior serologies have revealed (+) LEA, 68 ASMA, and 2550 IgG (9/12/2020)   Patient has had LFT elevations previously but most recent labs on 9/16/2024 with LFT WNL  Current regimen includes: Budesonide 9 mg (started in 2024) and Prednisone 5 mg (started for ITP)  Given cirrhosis, Budesonide should be discontinued give risk of further decompensation and PVT  Considering patient has been on longstanding Budesonide plan to slowly taper off  Decrease Budesonide to 6 mg now and continue x1 month then decrease to 3 mg x1 month then  d/c  Close and ongoing lab monitoring q2 weeks while tapering off  If patient with LFT elevation; would plan to increase Prednisone and consider Imuran     # Varices:   Unclear history of EV; no prior EGD   Current Regimen: Not currently on NSBB but on Metoprolol for cardiac reasons  Next EGD due: Now - Schedule EGD to further evaluate. Platelet count 98  If EV present on EGD; discuss with cardiology regarding possible transition to Coreg    # Ascites:  Prior history of both clinical and radiographic ascites   No prior history of paracentesis (possibly also cardiac ascites)   On examination today: Euvolemic   Current Regimen: Torsemide 10 mg daily  Continue current regimen at this time; repeat abdominal US to further evaluate    # Hepatic Encephalopathy:   No prior history of HE  On examination today: Euvolemic and AAOx3  Current Regimen: None - No indication for Lactulose/Rifaximin     # HCC Surveillance and Transplant Evaluation:  Most recent HCC Screening: MRI 9/2024 with no suspicious lesions; repeat abdominal US ordered  Previously Referred: No prior referral   Barriers to Transplant: Advanced age, comorbid conditions, low MELD    # Screening and Health Maintenance:   Colon cancer screening: Past recommended screening age   Encouraged to continue high protein and 2g Na restricted diet; avoid eating raw shellfish  Limit use of Tylenol to no more than 2 grams in 24 hour period and avoid use of all NSAIDs and narcotics  Encouraged to participate in daily exercise to avoid muscle wasting  Avoid use of alcohol and strongly encourage tobacco cessation  Pending HAV and HBC immunity recommend vaccination along with yearly flu and pneumonia vaccine through PCP    Orders:    CBC and differential; Future    Comprehensive metabolic panel; Standing    Protime-INR; Future    IgG; Standing    LEA Screen w/Reflex Cascade; Future    Antimitochondrial antibody; Future    Anti-smooth muscle antibody, IgG; Future    US abdomen  complete; Future    EGD; Future

## 2025-02-14 NOTE — PROGRESS NOTES
Name: Symone Damico      : 1940      MRN: [id number]  Encounter Provider: Sven Leal MD  Encounter Date: 2025   Encounter department: [facility] [address]  :  Assessment & Plan  Decompensated cirrhosis (HCC)  Patient with prior history of AIH for which she has been treated with Budesonide and Prednisone (currently on both medications). Unclear when diagnosis was made and how as patient denies liver biopsy although lab review does demonstrate LFT elevation and positive ASMA, LEA, and IgG in the past. Given patient's age and normal LFTs, do not believe patient would benefit from liver biopsy at this time. However, should be continued on treatment due to her cirrhosis that appears to be decompensated by ascites. Treatment should be limited to prednisone and possibility of Imuran in the future if LFTs again increase. Budesonide should be tapered off due to risk of decompensation. Will work to taper off this medication and otherwise continue to follow up with patient and schedule her for surveillance imaging/EGD for HCC/EV screening.     MELD 3.0: 10 at 2024  3:18 PM  MELD-Na: 9 at 2024  3:18 PM  Calculated from:  Serum Creatinine: 0.62 mg/dL (Using min of 1 mg/dL) at 2024  3:18 PM  Serum Sodium: 137 mmol/L at 2024  3:18 PM  Total Bilirubin: 0.98 mg/dL (Using min of 1 mg/dL) at 2024  3:18 PM  Serum Albumin: 3.5 g/dL at 2024  3:18 PM  INR(ratio): 1.32 at 2024 11:30 AM  Age at listing (hypothetical): 84 years  Sex: Female at 2024  3:18 PM    # Autoimmune Hepatitis:   Reported history of AIH although no prior liver biopsy available to review; now c/b cirrhosis  Prior serologies have revealed (+) LEA, 68 ASMA, and 2550 IgG (2020)   Patient has had LFT elevations previously but most recent labs on 2024 with LFT WNL  Current regimen includes: Budesonide 9 mg (started in ) and Prednisone 5 mg (started for ITP)  Given cirrhosis,  Budesonide should be discontinued give risk of further decompensation and PVT  Considering patient has been on longstanding Budesonide plan to slowly taper off  Decrease Budesonide to 6 mg now and continue x1 month then decrease to 3 mg x1 month then d/c  Close and ongoing lab monitoring q2 weeks while tapering off  If patient with LFT elevation; would plan to increase Prednisone and consider Imuran     # Varices:   Unclear history of EV; no prior EGD   Current Regimen: Not currently on NSBB but on Metoprolol for cardiac reasons  Next EGD due: Now - Schedule EGD to further evaluate. Platelet count 98  If EV present on EGD; discuss with cardiology regarding possible transition to Coreg    # Ascites:  Prior history of both clinical and radiographic ascites   No prior history of paracentesis (possibly also cardiac ascites)   On examination today: Euvolemic   Current Regimen: Torsemide 10 mg daily  Continue current regimen at this time; repeat abdominal US to further evaluate    # Hepatic Encephalopathy:   No prior history of HE  On examination today: Euvolemic and AAOx3  Current Regimen: None - No indication for Lactulose/Rifaximin     # HCC Surveillance and Transplant Evaluation:  Most recent HCC Screening: MRI 9/2024 with no suspicious lesions; repeat abdominal US ordered  Previously Referred: No prior referral   Barriers to Transplant: Advanced age, comorbid conditions, low MELD    # Screening and Health Maintenance:   Colon cancer screening: Past recommended screening age   Encouraged to continue high protein and 2g Na restricted diet; avoid eating raw shellfish  Limit use of Tylenol to no more than 2 grams in 24 hour period and avoid use of all NSAIDs and narcotics  Encouraged to participate in daily exercise to avoid muscle wasting  Avoid use of alcohol and strongly encourage tobacco cessation  Pending HAV and HBC immunity recommend vaccination along with yearly flu and pneumonia vaccine through PCP    Orders:     "CBC and differential; Future    Comprehensive metabolic panel; Standing    Protime-INR; Future    IgG; Standing    LEA Screen w/Reflex Cascade; Future    Antimitochondrial antibody; Future    Anti-smooth muscle antibody, IgG; Future    US abdomen complete; Future    EGD; Future    Autoimmune hepatitis treated with steroids (HCC)  See management as above under \"decompensated cirrhosis\"    Orders:    budesonide (ENTOCORT EC) 3 MG capsule; Take 2 capsules (6 mg total) by mouth daily for 30 days, THEN 1 capsule (3 mg total) daily.        History of Present Illness   HPI  Symone Damico is a 84 y.o. female with a PMHx of chronic ITP, suspected AIH c/b cirrhosis d/b ascites, CHF and DCM with EF 25% S/P ICD, and depression who presents to the office today to establish care with hepatology.     Patient reportedly with history of AIH although patient can not require diagnosis specifics. Denies any prior history of liver biopsy and no records on file. She has had elevations in LFTs in the past and also had a positive LEA, ASMA, and IgG levels. She reports that she has been on Budesonide for many years at varying doses and chart review shows that she has been on this medication since at least 2014. She also had been on prednisone but this was also to help with ITP for which she follows with hematology. Cirrhosis has been evident on her imaging since 2022 and has been decompensated by ascites (possibly also cardiac ascites) for which she is on Torsemide.     During today's visit, patient states that she is doing well overall. Asymptomatic and with no complaints. Admits to compliance with current medications but states that she is on too many. Admits to having abdominal distension in the past but denies this or pain currently. Denies any episodes of confusion, LE edema, changes in bowel habits, pruritus, arthralgias, fatigue, or yellowing or the skin/eyes. Denies any new medication. No current/past alcohol or drug use. Denies " family history of liver cancer or autoimmune conditions.     History obtained from: patient    Review of Systems  Medical History Reviewed by provider this encounter:     .  Current Outpatient Medications on File Prior to Visit   Medication Sig Dispense Refill    calcium carbonate (OS-PABLO) 600 MG tablet Take 600 mg by mouth      cholecalciferol (VITAMIN D3) 1,000 units tablet TAKE 1 TABLET BY MOUTH EVERY DAY 90 tablet 0    Entresto 49-51 MG TABS TAKE 1 TABLET BY MOUTH TWICE A  tablet 1    fluticasone (FLONASE) 50 mcg/act nasal spray SPRAY 2 SPRAYS INTO EACH NOSTRIL EVERY DAY 48 mL 0    metoprolol succinate (TOPROL-XL) 25 mg 24 hr tablet TAKE 1 TABLET (25 MG TOTAL) BY MOUTH DAILY. 90 tablet 1    omega-3-acid ethyl esters (LOVAZA) 1 g capsule Take 2 capsules (2 g total) by mouth daily 180 capsule 1    potassium chloride (MICRO-K) 10 MEQ CR capsule Take 1 capsule (10 mEq total) by mouth daily 90 capsule 3    predniSONE 5 mg tablet TAKE 1 TABLET (5 MG TOTAL) BY MOUTH DAILY. 30 tablet 5    Promacta 25 MG tablet TAKE 1 TABLET BY MOUTH 1 TIME A DAY. TAKE ON AN EMPTY STOMACH 1 HOUR BEFORE OR 2 HOURS AFTER A MEAL. 90 tablet 2    torsemide (DEMADEX) 10 mg tablet Take 1 tablet (10 mg total) by mouth daily 90 tablet 3    [DISCONTINUED] budesonide (ENTOCORT EC) 3 MG capsule Take 3 capsules (9 mg total) by mouth daily 270 capsule 1    ascorbic acid (VITAMIN C) 500 mg tablet Take 1 tablet (500 mg total) by mouth daily 90 tablet 0    cyanocobalamin (VITAMIN B-12) 100 mcg tablet Take 2.5 tablets (250 mcg total) by mouth daily 225 tablet 0     Current Facility-Administered Medications on File Prior to Visit   Medication Dose Route Frequency Provider Last Rate Last Admin    denosumab (PROLIA) subcutaneous injection 60 mg  60 mg Subcutaneous Q6 Months Kassandra Zarate MD   60 mg at 08/27/24 1343      Social History     Tobacco Use    Smoking status: Never     Passive exposure: Never    Smokeless tobacco: Never   Vaping Use     "Vaping status: Never Used   Substance and Sexual Activity    Alcohol use: Not Currently    Drug use: Not Currently    Sexual activity: Not Currently     Partners: Male        Objective   /58   Pulse 72   Ht 5' 3\" (1.6 m)   Wt 46.3 kg (102 lb)   BMI 18.07 kg/m²      Physical Exam  Constitutional:       General: She is not in acute distress.     Appearance: She is normal weight. She is not ill-appearing or toxic-appearing.   HENT:      Head: Normocephalic.      Nose: Nose normal. No congestion.   Eyes:      General: No scleral icterus.  Cardiovascular:      Rate and Rhythm: Normal rate.      Pulses: Normal pulses.   Pulmonary:      Effort: Pulmonary effort is normal. No respiratory distress.   Abdominal:      General: Abdomen is flat. There is no distension.      Palpations: Abdomen is soft.      Tenderness: There is no abdominal tenderness. There is no guarding or rebound.   Musculoskeletal:      Cervical back: Normal range of motion.      Right lower leg: No edema.      Left lower leg: No edema.   Skin:     General: Skin is warm.      Capillary Refill: Capillary refill takes less than 2 seconds.      Coloration: Skin is not pale.   Neurological:      Mental Status: She is alert and oriented to person, place, and time. Mental status is at baseline.   Psychiatric:         Mood and Affect: Mood normal.         Behavior: Behavior normal.         Administrative Statements   I have spent a total time of 45 minutes in caring for this patient on the day of the visit/encounter including Diagnostic results, Prognosis, Risks and benefits of tx options, Instructions for management, Patient and family education, Importance of tx compliance, Risk factor reductions, Impressions, Counseling / Coordination of care, Documenting in the medical record, Reviewing / ordering tests, medicine, procedures  , and Obtaining or reviewing history  .   "

## 2025-02-17 NOTE — ASSESSMENT & PLAN NOTE
Weight has been an ongoing issue that has been followed by PCP.  Educated patient to remain hydrated and with adequate nutrition.  Monitor for increasing weight loss.

## 2025-02-17 NOTE — ASSESSMENT & PLAN NOTE
Patient is pleasant and interactive today in office.   GDS: 0  Patient is not on any medications for mood stabilization at this time.  Still endorses more issues with her mood due to unresolved grief from the loss of her  about a year ago.  Discussed SSRI therapy.  Encouraged stress relief techniques, relaxation, and emotional support.   Will order behavioral health resources and grief counseling.

## 2025-02-17 NOTE — PROGRESS NOTES
Heart Failure Outpatient Progress Note - Symone Damico 84 y.o. female MRN: [id number]    @ Encounter: [id number]      Assessment/Plan:    Patient Active Problem List    Diagnosis Date Noted    Elevated liver enzymes 02/21/2024    Presence of heart assist device (HCC) 02/15/2023    Other cirrhosis of liver (HCC) 02/15/2023    Moderate protein-calorie malnutrition (HCC) 07/05/2022    Pancytopenia (HCC) 07/02/2022    Bilateral hearing loss 06/22/2022    Hypertensive heart disease with congestive heart failure (HCC) 12/07/2021    Chronic ITP (idiopathic thrombocytopenia) (HCC) 11/10/2021    Neutropenia, unspecified type (HCC) 08/10/2021    Left-sided Bell's palsy 08/10/2021    LEA positive 06/12/2020    Complete left bundle branch block (LBBB) 10/22/2019    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 HTN (normotensive on admission) +/- valvular (less likely, central MR likely functional) +/- myocarditis +/- stress CM +/- LBBB  -warm and euvolemic on exam.   -functionally doing well. Still mourning her  some  -needs updated device interrogation  -needs to schedule PYP scan. She will do this today.      Weight: 111 lbs, today 105 lbs,     --Free LT chains WNL  --SPEP no monoclonal bands.   --HIV, SPEP/UPEP, LEA, hepatitis panel all negative, ferritin ok     Echo 12/29/23:  LVEF: 40%  LVEDd: 3.9 cm  RV: normal  Mild MR     Echo 4/8/22:  LVEF: 20%  LVEDd: 5.8 cm  RV: normal     Echo 4/23/21  LVEF: 25-30%, grade 2 DD  LVEDd: 6.9 cm  RV: normal  Mild to moderate MR     Echo 10/17/19:  LVEF: 30%, LVEDd: 5 cm  LVEDd:, grade 2 DD  RV: normal     --R+LHC 8/3/18: shows normal coronaries  RA 3  RV 32/6  PA 32/11/20  PCWP 6  AO sat 89%  MvO2 56.5%  TD CO/CI: 2.3/1.4  Isacc CO/CI:  2.6/1.6  TPG 14  DPG 5  PVR 5.4 (Isacc); 6.1 (TD)  SVR 2070  PVR:SVR <0.25     Echo 4/16/19: LVEF: 20%  LVEDd 5.4 cm  Moderate to severe MR     --cMRI 8/6/18: LVEF ~18%, LVIDd 7 cm, normal RV. There is a thin strip of intramyocardial hyperenhancement of the basal interventricular septum.     Echo 12/6/18: LVEF: 20%, LVEDd 6.4 cm- no improvement  Good RV function  Small IVC     Neurohormonal Blockade:  --Beta-Blocker: Continue metoprolol succinate 25 mg PO daily  --ACEi, ARB or ARNi:  Entresto to 49-51 mg PO BID   --Aldosterone Receptor Blocker: spironolactone 12.5 mg daily- not taking it, she states it made her dizzy  --Diuretic:  [id number] PO daily     Sudden Cardiac Death Risk Reduction:  MDT BiV ICD     Electrical Resynchronization:  --Candidacy for BiV device: LBBB 170 msec  MDT BiV ICD 6/8/23  BIV paced  msec  Response: improved, EF up to 40%  Device checks ?     Advanced Therapies: Will continue to monitor. If does not recover, age precludes OHT. Possible LVAD candidate, but social situation will be complicated  son works in [place]. Other son is in [place]. Will monitor closely.     Pancytopenia with microcytic anemia- Chronic ITP  Hematology following- felt to be autoimmune related  Hx of transfusions  Stable  HTN  Hypothyroidism  Autoimmune hepatitis: Continue budesonide   7/27/20 labs:   ,            HPI:     84 woman with hx of HTN, autoimmune hepatitis wo follows now for NICM, EF: 25% with workup as above. 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 Follow up echo done 12/6/18 showed EF: 20% with LVEDd 6.4 cm, I referred for BiV ICD    Follow up echo 4/16/19 unfortunately showed EF: 20% with moderate to severe functional MR.      She continues to wear LifeVest for very long period of time, repeated echos have shown persistent reduction in LVEF. Fortunately, no LifeVest treatments.     Interval History:   passed      Echo 12/29/23:  LVEF: 40%  LVEDd: 3.9 cm  RV: normal  Mild MR     2/19/2025 patient presents with her caregiver for follow-up.  She is feeling well.  Still mourning the loss of her  doing better with this.  She is fairly active around the house.  She has no exercise or activity intolerance.  She was not aware that she was supposed to have a PYP scan.  States her sons coordinate most of her medical care.  Denies chest pain, shortness of breath, orthopnea or PND no dizziness, lightheadedness, syncopal episodes.    Past Medical History:   Diagnosis Date    Acute bilateral low back pain without sciatica 02/10/2023    Acute blood loss anemia 03/21/2023    Bell's palsy     Cardiac disease     Depression     Ear problems     HL (hearing loss)     Hypertension     Hypotension 07/01/2022    Sacroiliitis ([facility]) 08/10/2021    Subdural hematoma ([facility]) 02/21/2024    Subdural hemorrhage ([facility]) 03/30/2022    Thrombocytopenia ([facility]) 08/03/2018    Tinnitus     Traumatic subdural hemorrhage with loss of consciousness of unspecified duration, initial encounter ([facility]) 02/15/2023    Ventricular tachycardia, unspecified ([facility]) 02/21/2024       12 point ROS negative other than that stated in HPI    Allergies   Allergen Reactions    Ambien [Zolpidem] Other (See Comments)     Did not allow sleep per pt     .    Current Outpatient Medications:     ascorbic acid (VITAMIN C) 500 mg tablet, Take 1 tablet (500 mg total) by mouth daily, Disp: 90 tablet, Rfl: 0    budesonide (ENTOCORT EC) 3 MG capsule, Take 2 capsules (6 mg total) by mouth daily for 30 days, THEN 1 capsule (3 mg total) daily., Disp: 270 capsule, Rfl: 1    calcium carbonate (OS-PABLO) 600 MG tablet, Take 600 mg by mouth, Disp: , Rfl:     cholecalciferol (VITAMIN D3) 1,000 units tablet, TAKE 1 TABLET BY MOUTH EVERY DAY, Disp: 90 tablet, Rfl: 0    cyanocobalamin (VITAMIN B-12) 100 mcg tablet, Take 2.5 tablets (250 mcg total) by mouth daily, Disp: 225 tablet, Rfl: 0    Entresto 49-51  MG TABS, TAKE 1 TABLET BY MOUTH TWICE A DAY, Disp: 180 tablet, Rfl: 1    fluticasone (FLONASE) 50 mcg/act nasal spray, SPRAY 2 SPRAYS INTO EACH NOSTRIL EVERY DAY, Disp: 48 mL, Rfl: 0    metoprolol succinate (TOPROL-XL) 25 mg 24 hr tablet, TAKE 1 TABLET (25 MG TOTAL) BY MOUTH DAILY., Disp: 90 tablet, Rfl: 1    omega-3-acid ethyl esters (LOVAZA) 1 g capsule, Take 2 capsules (2 g total) by mouth daily, Disp: 180 capsule, Rfl: 1    potassium chloride (MICRO-K) 10 MEQ CR capsule, Take 1 capsule (10 mEq total) by mouth daily, Disp: 90 capsule, Rfl: 3    predniSONE 5 mg tablet, TAKE 1 TABLET (5 MG TOTAL) BY MOUTH DAILY., Disp: 30 tablet, Rfl: 5    Promacta 25 MG tablet, TAKE 1 TABLET BY MOUTH 1 TIME A DAY. TAKE ON AN EMPTY STOMACH 1 HOUR BEFORE OR 2 HOURS AFTER A MEAL., Disp: 90 tablet, Rfl: 2    torsemide (DEMADEX) 10 mg tablet, Take 1 tablet (10 mg total) by mouth daily, Disp: 90 tablet, Rfl: 3    Current Facility-Administered Medications:     denosumab (PROLIA) subcutaneous injection 60 mg, 60 mg, Subcutaneous, Q6 Months, Kassandra Zarate MD, 60 mg at 08/27/24 1343    Social History     Socioeconomic History    Marital status: /Civil Union     Spouse name: Not on file    Number of children: Not on file    Years of education: Not on file    Highest education level: Not on file   Occupational History    Not on file   Tobacco Use    Smoking status: Never     Passive exposure: Never    Smokeless tobacco: Never   Vaping Use    Vaping status: Never Used   Substance and Sexual Activity    Alcohol use: Not Currently    Drug use: Not Currently    Sexual activity: Not Currently     Partners: Male   Other Topics Concern    Not on file   Social History Narrative    Not on file     Social Drivers of Health     Financial Resource Strain: Patient Declined (8/23/2023)    Overall Financial Resource Strain (CARDIA)     Difficulty of Paying Living Expenses: Patient declined   Food Insecurity: No Food Insecurity (8/27/2024)    Nursing  "- Inadequate Food Risk Classification     Worried About Running Out of Food in the Last Year: Never true     Ran Out of Food in the Last Year: Never true     Ran Out of Food in the Last Year: Not on file   Transportation Needs: Unmet Transportation Needs (8/27/2024)    PRAPARE - Transportation     Lack of Transportation (Medical): Yes     Lack of Transportation (Non-Medical): Yes   Physical Activity: Not on file   Stress: Not on file   Social Connections: Not on file   Intimate Partner Violence: Not on file   Housing Stability: Low Risk  (8/27/2024)    Housing Stability Vital Sign     Unable to Pay for Housing in the Last Year: No     Number of Times Moved in the Last Year: 1     Homeless in the Last Year: No       Family History   Problem Relation Age of Onset    Autoimmune disease Neg Hx        Physical Exam:    Vitals: /62 (BP Location: Left arm, Patient Position: Sitting, Cuff Size: Standard)   Pulse 88   Ht 5' 3\" (1.6 m)   Wt 47.8 kg (105 lb 6.4 oz)   SpO2 99%   BMI 18.67 kg/m²     Wt Readings from Last 3 Encounters:   02/14/25 46.3 kg (102 lb)   01/22/25 44 kg (97 lb)   12/23/24 47.6 kg (105 lb)       GEN: Symone Damico appears well, alert and oriented x 3, pleasant and cooperative   HEENT: pupils equal, round, and reactive to light; extraocular muscles intact  NECK: supple, no carotid bruits   HEART: regular rhythm, normal S1 and S2, no murmurs, clicks, gallops or rubs, JVP is  flat  LUNGS: clear to auscultation bilaterally; no wheezes, rales, or rhonchi   ABDOMEN: normal bowel sounds, soft, no tenderness, no distention  EXTREMITIES: peripheral pulses normal; no clubbing, cyanosis, or edema  NEURO: no focal findings   SKIN: normal without suspicious lesions on exposed skin    Labs & Results:  Lab Results   Component Value Date     12/04/2015    SODIUM 142 09/16/2024    K 3.8 09/16/2024     09/16/2024    CO2 30 09/16/2024    ANIONGAP 4 12/04/2015    AGAP 4 09/16/2024    BUN 17 " "09/16/2024    CREATININE 0.67 09/16/2024    GLUC 79 07/22/2024    GLUF 77 09/16/2024    CALCIUM 9.2 09/16/2024    AST 27 09/16/2024    ALT 21 09/16/2024    ALKPHOS 36 09/16/2024    PROT 7.5 12/04/2015    TP 6.5 09/16/2024    BILITOT 0.48 12/04/2015    TBILI 0.75 09/16/2024    EGFR 79 09/16/2024     Lab Results   Component Value Date    WBC 4.99 12/23/2024    HGB 10.9 (L) 12/23/2024    HCT 32.7 (L) 12/23/2024    MCV 97 12/23/2024    PLT 98 (L) 12/23/2024     No results found for: \"BNP\"   Lab Results   Component Value Date    UHJ7GSFNADJH 7.221 (H) 09/16/2024     Lab Results   Component Value Date    LDLCALC 101 (H) 09/16/2024     No results found for: \"HGBA1C\"    EKG personally reviewed by ALONZO Robison.   No results found for this visit on 02/19/25.     Counseling / Coordination of Care  Total face to face time spent with patient 20 minutes.  An additional 10 minutes was spent for chart/data review and visit preparation.       Thank you for the opportunity to participate in the care of this patient.    ALONZO Robison   "

## 2025-02-17 NOTE — PROGRESS NOTES
[facility]  [address], [address]  [place] [address]  [phone]    SENIOR CARE CONSULT APPOINTMENT    GERIATRIC EVALUATION - ASSESSMENT AND PLAN:     1. Mild late onset Alzheimer's dementia without behavioral disturbance, psychotic disturbance, mood disturbance, or anxiety ([facility])  Assessment & Plan:  MOCA: 14 out of 30  Memory Loss/Deficits: Visuospatial, recall memory, attention  Patient is independent with ADLs/dependent for most IADLS. Lives alone but does have a caregiver that comes out to help with care.  Recent Labs/Imaging: Will order lab work, TSH, folate, B12.  No recent imaging of the head will order MRI with neuro quant.  Memory medications: Not indicated at this time.  Weight loss since last visit: Weight loss has been an ongoing issue, educated on adequate hydration and dietary intake.  Safe Environment:    Driving -patient no longer drives.  This is not a concern  Falls-no recent falls, is currently in PT services at this time.  Medication Management -caregiver helps her set up medications.  Ensure she is taking medications appropriately.  Caregiver stress: N/A  Monitor for changes in mood, sleep, pain control.  Notify provider if any concerns  Optimize all acute and chronic conditions.  Continue to follow-up with PCP and specialist as directed for management  Vascular risk factors: Multiple vascular comorbidities.  Geriatric syndromes: Bilateral hearing loss, glaucoma.  Changes in chronic conditions: Continues to follow-up with various specialties for chronic condition management.  Ensure adequate hydration, good nutrition  Plan to order labs, MRI with neuro quant, speech therapy, Occupational Therapy  Recommended next follow up: Scheduled care conference.  Orders:  -     Ambulatory Referral to Speech Therapy; Future  -     Ambulatory Referral to Occupational Therapy; Future  -     MRI brain NeuroQuant wo contrast; Future; Expected date: 03/21/2025  -     TSH,  3rd generation with Free T4 reflex; Future; Expected date: 03/21/2025  -     Vitamin B12/Folate, Serum Panel; Future; Expected date: 03/21/2025  2. Depressive disorder  Assessment & Plan:  Patient is pleasant and interactive today in office.   GDS: 0  Patient is not on any medications for mood stabilization at this time.  Still endorses more issues with her mood due to unresolved grief from the loss of her  about a year ago.  Discussed SSRI therapy.  Encouraged stress relief techniques, relaxation, and emotional support.   Will order behavioral health resources and grief counseling.  Orders:  -     Ambulatory Referral to Senior Care  3. Moderate protein-calorie malnutrition (HCC)  Assessment & Plan:  Weight has been an ongoing issue that has been followed by PCP.  Educated patient to remain hydrated and with adequate nutrition.  Monitor for increasing weight loss.  4. Ambulatory dysfunction  Assessment & Plan:  Reports ambulating with a cane, will use rollator for longer distances.  Denies any recent falls.  Educated on strict fall precautions.   Currently in PT for ambulatory dysfunction.  Will order OT services within the home.  Encourage patient to continue to work with PT services at this time.  Will await set up with OT services.  Orders:  -     Ambulatory Referral to Occupational Therapy; Future  5. Sleep  Patient reports she is sleeping well at times.  Denies using any over-the-counter sleep aid at this time.  Educated she can use melatonin if needed to help aid with sleep.  Encouraged good sleep hygiene, avoid daytime napping, avoiding OTC medications that may cause oversedation/increased confusion.       HPI:      Today I had the pleasure of evaluating Symone Damico who is a 84 y.o. female in a Geriatric Consultation today. Ms. Damico is in the office with her  caregiver and her son via video chat. She lives alone. Previous MOCA: No previous MoCA on file.  Comorbidities include hypertension, CHF,  autoimmune hepatitis, cirrhosis of the liver, chronic ITP, depression, pancytopenia.     Patient's work and family history: she used to work at a bank for many years, she was a  to a [employer] worker. She used to write a lot of letters, searching things, etc.     Memory report per son via video chat, and her caregiver Liz (d/t memory issues): has been having ongoing issues with memory loss and has been having increased issues with recall of names. She does get very overwhelmed with the amount of appointments she has. Having issues with the fine motor movements at times, will have difficulty with operating things in the home such as the remote. Memory issues have been more prominent since the death of her  about a year ago, she was his sole caregiver during that time. She was able to focus more. She is in grief counseling now due to increased anxiety/overwhelmed feelings.     Memory report per patient: Patient feels that her memory has been okay, does have some intermittent issues with memory loss especially short-term.    Current activities:  Cognitive: does not do a lot of cognitive exercises.  Physical:  in PT services due to ongoing pain in her right hip.  Social:  she goes to a grief class, she lost her  about a year ago.  Not as involved in other senior groups.       ADL's:  Do you do your own bathing - independent   Dressing - independent   Feeding yourself - independent   Tolieting - independent   Continence - no issues at this time   Transferring - does use a cane, and rolator for long distances. No recent falls.       IADL's:   She has no problems operating household appliance such as TV remote, kitchen appliances, computer.  Can you make your own light meals Yes   Do light cleaning/chores Yes  Do you take care of your own medications No - has list to remind her on when to take her medications will set out pill trays, caregiver will need to remind her to take her  "medications.  Do you do your own shopping No  Do you handle your own financial affairs such as balancing your checkbook, paying bills, investments: No - son is going her finances  Do have any difficulties with handling your financial affairs: Yes - having more issues remembering the due dates.   Do you use a cell phone No - has a land line, can call out if needed   Do you drive: No         Psychosocial concerns:  She has difficulty finding the right word while speaking: Yes  Patient requires repeat information or ask the same question repeatedly: Yes  Have you or your family noted any change in your mood or personality:Yes - increased frustration   Are you currently or have you been treated in the past for depression or anxiety: Yes  Any hallucination or delusion: Yes - will talk to her  and friend who is  like they are still in the room. Has seen people within the home, who are not there.   Sleep Issues: Yes - she is having more issues with sleeping   Hearing and vision issue: Yes - issues with hearing and vision.   Appetite/swallow:  appetite is poor, she does not have any issues with swallowing.   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     Family Review of Behavior [facility]:    pacing. No    agressive/combative behavior. No    agitated. No   wandering. No - has cameras within the home to monitor her.   resistance to care. No   hoarding/hiding objects.No    suspicious  No  withdrawn No  rummaging/pillaging.No    misplacing/losing objects Yes  personal hygiene problems.No  forgetfulness of actions Yes     Family/Medical History:   Family member with dementia and what type? Yes - her sisters   Does patient have previous diagnosis of dementia?  No     Does patient take any \"memory medications\"? No  Stroke history No  Have you had any head trauma Yes - d/t fall a year ago   Does patient have history of alcohol abuse No    REVIEW OF SYSTEMS:      Review of Systems "   HENT:  Positive for hearing loss (Bilateral hearing aids).    Eyes:  Positive for visual disturbance (Eyeglasses.).   Musculoskeletal:  Positive for arthralgias and gait problem.   Neurological:  Positive for weakness.   Psychiatric/Behavioral:  Positive for dysphoric mood and sleep disturbance. The patient is nervous/anxious.    All other systems reviewed and are negative.        OBJECTIVE:       Vitals:  Vitals:    02/21/25 1529   BP: 120/60   Pulse: 100   Temp: 98.4 °F (36.9 °C)   SpO2: 95%       Physical Exam  Observed Ambulation: Independent with a cane.  Physical Exam  Constitutional:       General: She is not in acute distress.     Appearance: Normal appearance. She is normal weight. She is not ill-appearing.   HENT:      Head: Normocephalic and atraumatic.   Cardiovascular:      Rate and Rhythm: Normal rate and regular rhythm.      Pulses: Normal pulses.      Heart sounds: Normal heart sounds. No murmur heard.  Pulmonary:      Effort: Pulmonary effort is normal. No respiratory distress.      Breath sounds: Normal breath sounds. No wheezing.   Abdominal:      General: Bowel sounds are normal. There is no distension.      Palpations: Abdomen is soft.      Tenderness: There is no abdominal tenderness.   Musculoskeletal:         General: Normal range of motion.      Cervical back: Normal range of motion and neck supple.   Skin:     General: Skin is warm and dry.      Capillary Refill: Capillary refill takes less than 2 seconds.   Neurological:      General: No focal deficit present.      Mental Status: She is alert.      Motor: Weakness present.      Gait: Gait abnormal.      Comments: Alert and oriented to person, place, time and situation   Psychiatric:         Mood and Affect: Mood normal.         Behavior: Behavior normal.           HISTORY:     History:  Past Medical History:   Diagnosis Date    Acute bilateral low back pain without sciatica 02/10/2023    Acute blood loss anemia 03/21/2023    Bell's palsy      Cardiac disease     Depression     Ear problems     HL (hearing loss)     Hypertension     Hypotension 07/01/2022    Sacroiliitis ([facility]) 08/10/2021    Subdural hematoma ([facility]) 02/21/2024    Subdural hemorrhage ([facility]) 03/30/2022    Thrombocytopenia ([facility]) 08/03/2018    Tinnitus     Traumatic subdural hemorrhage with loss of consciousness of unspecified duration, initial encounter ([facility]) 02/15/2023    Ventricular tachycardia, unspecified ([facility]) 02/21/2024     Past Surgical History:   Procedure Laterality Date    CARDIAC ELECTROPHYSIOLOGY PROCEDURE N/A 6/8/2023    Procedure: Cardiac biv icd implant, Left side, use ABX;  Surgeon: Merrick Calderon MD;  [facility];  Service: Cardiology    CT BONE MARROW BIOPSY AND ASPIRATION  1/27/2021     Family History   Problem Relation Age of Onset    Autoimmune disease Neg Hx      Social History     Socioeconomic History    Marital status: /Civil Union     Spouse name: Not on file    Number of children: Not on file    Years of education: Not on file    Highest education level: Not on file   Occupational History    Not on file   Tobacco Use    Smoking status: Never     Passive exposure: Never    Smokeless tobacco: Never   Vaping Use    Vaping status: Never Used   Substance and Sexual Activity    Alcohol use: Not Currently    Drug use: Not Currently    Sexual activity: Not Currently     Partners: Male   Other Topics Concern    Not on file   Social History Narrative    Not on file     Social Drivers of Health     Financial Resource Strain: Patient Declined (8/23/2023)    Overall Financial Resource Strain (CARDIA)     Difficulty of Paying Living Expenses: Patient declined   Food Insecurity: No Food Insecurity (8/27/2024)    Nursing - Inadequate Food Risk Classification     Worried About Running Out of Food in the Last Year: Never true     Ran Out of Food in the Last Year: Never true     Ran Out of Food in the Last Year: Not on file   Transportation Needs: Unmet  Transportation Needs (8/27/2024)    PRAPARE - Transportation     Lack of Transportation (Medical): Yes     Lack of Transportation (Non-Medical): Yes   Physical Activity: Not on file   Stress: Not on file   Social Connections: Not on file   Intimate Partner Violence: Not on file   Housing Stability: Low Risk  (8/27/2024)    Housing Stability Vital Sign     Unable to Pay for Housing in the Last Year: No     Number of Times Moved in the Last Year: 1     Homeless in the Last Year: No       Past Surgical History:   Procedure Laterality Date    CARDIAC ELECTROPHYSIOLOGY PROCEDURE N/A 6/8/2023    Procedure: Cardiac biv icd implant, Left side, use ABX;  Surgeon: Merrick Calderon MD;  [facility];  Service: Cardiology    CT BONE MARROW BIOPSY AND ASPIRATION  1/27/2021       Allergies:   Allergies   Allergen Reactions    Ambien [Zolpidem] Other (See Comments)     Did not allow sleep per pt       Medications:      Current Outpatient Medications:     budesonide (ENTOCORT EC) 3 MG capsule, Take 2 capsules (6 mg total) by mouth daily for 30 days, THEN 1 capsule (3 mg total) daily., Disp: 270 capsule, Rfl: 1    calcium carbonate (OS-PABLO) 600 MG tablet, Take 600 mg by mouth, Disp: , Rfl:     cholecalciferol (VITAMIN D3) 1,000 units tablet, TAKE 1 TABLET BY MOUTH EVERY DAY, Disp: 90 tablet, Rfl: 0    Entresto 49-51 MG TABS, TAKE 1 TABLET BY MOUTH TWICE A DAY, Disp: 180 tablet, Rfl: 1    fluticasone (FLONASE) 50 mcg/act nasal spray, SPRAY 2 SPRAYS INTO EACH NOSTRIL EVERY DAY, Disp: 48 mL, Rfl: 0    metoprolol succinate (TOPROL-XL) 25 mg 24 hr tablet, TAKE 1 TABLET (25 MG TOTAL) BY MOUTH DAILY., Disp: 90 tablet, Rfl: 1    omega-3-acid ethyl esters (LOVAZA) 1 g capsule, Take 2 capsules (2 g total) by mouth daily, Disp: 180 capsule, Rfl: 1    potassium chloride (MICRO-K) 10 MEQ CR capsule, Take 1 capsule (10 mEq total) by mouth daily, Disp: 90 capsule, Rfl: 3    predniSONE 5 mg tablet, TAKE 1 TABLET (5 MG TOTAL) BY MOUTH  DAILY., Disp: 30 tablet, Rfl: 5    Promacta 25 MG tablet, TAKE 1 TABLET BY MOUTH 1 TIME A DAY. TAKE ON AN EMPTY STOMACH 1 HOUR BEFORE OR 2 HOURS AFTER A MEAL., Disp: 90 tablet, Rfl: 2    torsemide (DEMADEX) 10 mg tablet, Take 1 tablet (10 mg total) by mouth daily, Disp: 90 tablet, Rfl: 3    ascorbic acid (VITAMIN C) 500 mg tablet, Take 1 tablet (500 mg total) by mouth daily, Disp: 90 tablet, Rfl: 0    cyanocobalamin (VITAMIN B-12) 100 mcg tablet, Take 2.5 tablets (250 mcg total) by mouth daily, Disp: 225 tablet, Rfl: 0    Current Facility-Administered Medications:     denosumab (PROLIA) subcutaneous injection 60 mg, 60 mg, Subcutaneous, Q6 Months, Kassandra Zarate MD, 60 mg at 08/27/24 1343      Past Medical, surgical, social, medication and allergy history and patients previous records reviewed.      ALONZO Diaz  02/21/25  4:51 PM

## 2025-02-19 ENCOUNTER — TELEPHONE (OUTPATIENT)
Age: 85
End: 2025-02-19

## 2025-02-19 ENCOUNTER — OFFICE VISIT (OUTPATIENT)
Dept: CARDIOLOGY CLINIC | Facility: CLINIC | Age: 85
End: 2025-02-19
Payer: MEDICARE

## 2025-02-19 VITALS
HEART RATE: 88 BPM | OXYGEN SATURATION: 99 % | WEIGHT: 105.4 LBS | BODY MASS INDEX: 18.68 KG/M2 | HEIGHT: 63 IN | SYSTOLIC BLOOD PRESSURE: 128 MMHG | DIASTOLIC BLOOD PRESSURE: 62 MMHG

## 2025-02-19 DIAGNOSIS — K75.4 AUTOIMMUNE HEPATITIS (HCC): ICD-10-CM

## 2025-02-19 DIAGNOSIS — I50.22 CHRONIC SYSTOLIC CHF (CONGESTIVE HEART FAILURE), NYHA CLASS 3 (HCC): ICD-10-CM

## 2025-02-19 PROCEDURE — 99214 OFFICE O/P EST MOD 30 MIN: CPT | Performed by: NURSE PRACTITIONER

## 2025-02-19 RX ORDER — POTASSIUM CHLORIDE 750 MG/1
10 CAPSULE, EXTENDED RELEASE ORAL DAILY
Qty: 90 CAPSULE | Refills: 3 | Status: SHIPPED | OUTPATIENT
Start: 2025-02-19

## 2025-02-19 RX ORDER — TORSEMIDE 10 MG/1
10 TABLET ORAL DAILY
Qty: 90 TABLET | Refills: 3 | Status: SHIPPED | OUTPATIENT
Start: 2025-02-19

## 2025-02-19 NOTE — TELEPHONE ENCOUNTER
"[facility]  [address], [address]  [place] [address]  [phone]    Social Work Intake    LSW spoke with patient's sonKATIE to complete social work intake via phone, for patient's upcoming geriatric assessment on 02/21/25 with ALONZO Newsome.     Social/Family History   Marital status:     Does patient have children? Yes How Many? 2 children  (If yes, where do the children live?) ERICK lives in [place], other son-Tremayne in [place]  Family members assisting patient at home: caregiver M, W, F (4 hrs per day) (private pay)   Who is available to provide care in case of illness or crisis? SonKATIE (POA)        Employment and Education   Education: Highest Level of Education: High School Graduate    Retired: Yes   Type of employment: Bank administration    Benefits (if applicable): N/A     Lifestyle/Community Services   Clubs and Organizations: attends [employer], grief share support group (Catholic based)  Have you ever used any community-based services (ex. homecare, waiver services, meal delivery service, or respite care?): caregiver services (vis[facility] angels). Tried meals on wheels but meals were too salty, caregiver transport her to everything   If \"no\" are you interested in information about these services? Interested in activities in the community for patient, will mail information to son       Financial (info assists with future planning options; not required)  Total Monthly income: $3,000     Source of income: Social Security/Pension   Meet current needs?  Son assists with expense     Funds available for home care? No    Funds available for nursing home? No    Do you rent or own your home? Own       ACP REVIEW:  Does patient have POA: Yes  Does patient have a Living will: Yes  Any legal assistance needed for healthcare planning?: No    For all patients, we encourage seeing a  to establish a Financial Power of , a Health Care Power of , and an Advanced " "Directive (living will). Particularly for patients experiencing memory concerns, we strongly recommend that this is completed as soon as possible.     Safety Assessment  Is the patient still driving? Have they had any recent citations or accidents? No, but she does have an active license but do not allow her to drive. Last time she drove was 3 years ago. Son would like her not to have license and just have ID.    Is the patient taking medications as prescribed?  Had difficulty with medications before  Is there a system in place for medication management? Son gave her instructions to help with medication management & she fills a pillbox herself  Are there firearms or weapons in the home? Yes in a storage area that she does not have access    Recommendations per [employer]: removing them from the home, keep ammunition stored separately, encourage patient to consider \"gifting\" them, if necessary, ask local law enforcement for assistance in removing the firearms from the home. The family may receive compensation from a gun buy-back program.    Does the patient use an assistive device?  Usually a cane, may use a Rolator  Do they have any difficulty with gait or balance?  Son says she is frail, worried about her falling due to steps in the home  Does the patient have difficulty with hearing or vision?  Hearing loss, vision difficulty with reading (has reading glasses)    Any falls in the last year? Yes     Any history of wandering? No    Does the patient live alone?  Lives alone, but has the caregivers 3x a week  What is the layout of the home: 2 story home  Do you feel comfortable leaving the patient home alone?  Worry when the caregivers are not there, there are cameras in the home for son to check on her    Do you have any concerns about the patient's cooking or eating habits?  Protein-calorie malnutrition, she often says she does not feel like eating per son. Does take ensure.  Have you thought about when it will " no longer be safe for the patient to be left alone or any future planning if their memory were to decline and they have an increase in care needs? Son would like her to move in with him in [place] as he has a suite for her & granddaughter that could be with her. Son stated if she does go to the hospital he is moving her to [place].    *talks to spouse who is , and sean (friend of the [employer]), may be auditory hallucination. She also called the neighbor saying there was two ladies in the house that were not there*    Education/resources to be provided to patient/family via mail:   Resources mailed to son:  ERICK Damico  [address] [place] [address]    -MOMs meals brochure  -Adult day center list  -Senior center list  -Homecare list  -Waiver/OPTIONS information  -lifeline fall alert  -apple watch information  -LVAIP booklet

## 2025-02-19 NOTE — PATIENT INSTRUCTIONS
Weigh yourself daily  If you gain 3 lbs in one day or 5 lbs in one week, please call the office at [phone] and ask for a nurse or the heart failure nurse  Keep your sodium intake to <2 grams, (2000 mg) per day, and fluids <2 Liters (2000 ml) per day. This is around 6-7, 8 oz glasses of fluid per day    Schedule your cardiac amyloid scan today   Check paperwork from your GI doctor to see when you need lab work.

## 2025-02-20 ENCOUNTER — TELEPHONE (OUTPATIENT)
Age: 85
End: 2025-02-20

## 2025-02-20 NOTE — TELEPHONE ENCOUNTER
Left message w/son Delvis aDmico ([phone]) regarding  moving Consult appointment up from 3:30 pm on 2/21/2025 to 2:30 pm w/Christina on 2/21/2025, if still available.

## 2025-02-21 ENCOUNTER — TELEPHONE (OUTPATIENT)
Facility: MEDICAL CENTER | Age: 85
End: 2025-02-21

## 2025-02-21 ENCOUNTER — CONSULT (OUTPATIENT)
Age: 85
End: 2025-02-21
Payer: MEDICARE

## 2025-02-21 VITALS
HEIGHT: 63 IN | DIASTOLIC BLOOD PRESSURE: 60 MMHG | OXYGEN SATURATION: 95 % | HEART RATE: 100 BPM | BODY MASS INDEX: 17.82 KG/M2 | TEMPERATURE: 98.4 F | WEIGHT: 100.6 LBS | SYSTOLIC BLOOD PRESSURE: 120 MMHG

## 2025-02-21 DIAGNOSIS — R26.2 AMBULATORY DYSFUNCTION: ICD-10-CM

## 2025-02-21 DIAGNOSIS — E44.0 MODERATE PROTEIN-CALORIE MALNUTRITION (HCC): ICD-10-CM

## 2025-02-21 DIAGNOSIS — F02.A0 MILD LATE ONSET ALZHEIMER'S DEMENTIA WITHOUT BEHAVIORAL DISTURBANCE, PSYCHOTIC DISTURBANCE, MOOD DISTURBANCE, OR ANXIETY (HCC): Primary | ICD-10-CM

## 2025-02-21 DIAGNOSIS — G30.1 MILD LATE ONSET ALZHEIMER'S DEMENTIA WITHOUT BEHAVIORAL DISTURBANCE, PSYCHOTIC DISTURBANCE, MOOD DISTURBANCE, OR ANXIETY (HCC): Primary | ICD-10-CM

## 2025-02-21 DIAGNOSIS — F32.A DEPRESSIVE DISORDER: ICD-10-CM

## 2025-02-21 PROCEDURE — 99205 OFFICE O/P NEW HI 60 MIN: CPT

## 2025-02-21 NOTE — TELEPHONE ENCOUNTER
3-25-25 AT 1000AM AT [facility]   Note in chart after scheduling pacemaker:  Topics covered in our conversation:  MRI scheduled on above date and time.  Reminded to get chest x-ray follow-up prior to MRI appt.

## 2025-02-21 NOTE — QUICK NOTE
Note for Medtronic pacer implant:  Device investigated:   Medtronic pacer/icd and leads            Method investigated (surgical report, imaging report, vendor contacted, website used etc):  MRI Verify website    Time spent investigating in minutes: 15    Investigation findings:  MRI Conditional implant    Risk vs Benefit performed.  If so, list physician and outcome:  No

## 2025-02-21 NOTE — ASSESSMENT & PLAN NOTE
Reports ambulating with a cane, will use rollator for longer distances.  Denies any recent falls.  Educated on strict fall precautions.   Currently in PT for ambulatory dysfunction.  Will order OT services within the home.  Encourage patient to continue to work with PT services at this time.  Will await set up with OT services.

## 2025-02-21 NOTE — ASSESSMENT & PLAN NOTE
MOCA: 14 out of 30  Memory Loss/Deficits: Visuospatial, recall memory, attention  Patient is independent with ADLs/dependent for most IADLS. Lives alone but does have a caregiver that comes out to help with care.  Recent Labs/Imaging: Will order lab work, TSH, folate, B12.  No recent imaging of the head will order MRI with neuro quant.  Memory medications: Not indicated at this time.  Weight loss since last visit: Weight loss has been an ongoing issue, educated on adequate hydration and dietary intake.  Safe Environment:    Driving -patient no longer drives.  This is not a concern  Falls-no recent falls, is currently in PT services at this time.  Medication Management -caregiver helps her set up medications.  Ensure she is taking medications appropriately.  Caregiver stress: N/A  Monitor for changes in mood, sleep, pain control.  Notify provider if any concerns  Optimize all acute and chronic conditions.  Continue to follow-up with PCP and specialist as directed for management  Vascular risk factors: Multiple vascular comorbidities.  Geriatric syndromes: Bilateral hearing loss, glaucoma.  Changes in chronic conditions: Continues to follow-up with various specialties for chronic condition management.  Ensure adequate hydration, good nutrition  Plan to order labs, MRI with neuro quant, speech therapy, Occupational Therapy  Recommended next follow up: Scheduled care conference.

## 2025-02-24 ENCOUNTER — RA CDI HCC (OUTPATIENT)
Dept: OTHER | Facility: HOSPITAL | Age: 85
End: 2025-02-24

## 2025-02-24 ENCOUNTER — OFFICE VISIT (OUTPATIENT)
Dept: PHYSICAL THERAPY | Age: 85
End: 2025-02-24
Payer: MEDICARE

## 2025-02-24 ENCOUNTER — LAB (OUTPATIENT)
Dept: LAB | Facility: CLINIC | Age: 85
End: 2025-02-24
Payer: MEDICARE

## 2025-02-24 DIAGNOSIS — K74.60 DECOMPENSATED CIRRHOSIS (HCC): ICD-10-CM

## 2025-02-24 DIAGNOSIS — G30.1 MILD LATE ONSET ALZHEIMER'S DEMENTIA WITHOUT BEHAVIORAL DISTURBANCE, PSYCHOTIC DISTURBANCE, MOOD DISTURBANCE, OR ANXIETY (HCC): ICD-10-CM

## 2025-02-24 DIAGNOSIS — R26.9 GAIT ABNORMALITY: ICD-10-CM

## 2025-02-24 DIAGNOSIS — M25.551 RIGHT HIP PAIN: Primary | ICD-10-CM

## 2025-02-24 DIAGNOSIS — F02.A0 MILD LATE ONSET ALZHEIMER'S DEMENTIA WITHOUT BEHAVIORAL DISTURBANCE, PSYCHOTIC DISTURBANCE, MOOD DISTURBANCE, OR ANXIETY (HCC): ICD-10-CM

## 2025-02-24 DIAGNOSIS — K72.90 DECOMPENSATED CIRRHOSIS (HCC): ICD-10-CM

## 2025-02-24 DIAGNOSIS — D69.3 CHRONIC ITP (IDIOPATHIC THROMBOCYTOPENIA) (HCC): ICD-10-CM

## 2025-02-24 LAB
ALBUMIN SERPL BCG-MCNC: 3.8 G/DL (ref 3.5–5)
ALP SERPL-CCNC: 38 U/L (ref 34–104)
ALT SERPL W P-5'-P-CCNC: 22 U/L (ref 7–52)
ANION GAP SERPL CALCULATED.3IONS-SCNC: 7 MMOL/L (ref 4–13)
ANISOCYTOSIS BLD QL SMEAR: PRESENT
AST SERPL W P-5'-P-CCNC: 31 U/L (ref 13–39)
BASOPHILS # BLD AUTO: 0.02 THOUSANDS/ÂΜL (ref 0–0.1)
BASOPHILS NFR BLD AUTO: 0 % (ref 0–1)
BILIRUB SERPL-MCNC: 0.7 MG/DL (ref 0.2–1)
BUN SERPL-MCNC: 25 MG/DL (ref 5–25)
CALCIUM SERPL-MCNC: 9.6 MG/DL (ref 8.4–10.2)
CHLORIDE SERPL-SCNC: 101 MMOL/L (ref 96–108)
CO2 SERPL-SCNC: 32 MMOL/L (ref 21–32)
CREAT SERPL-MCNC: 0.77 MG/DL (ref 0.6–1.3)
EOSINOPHIL # BLD AUTO: 0 THOUSAND/ÂΜL (ref 0–0.61)
EOSINOPHIL NFR BLD AUTO: 0 % (ref 0–6)
ERYTHROCYTE [DISTWIDTH] IN BLOOD BY AUTOMATED COUNT: 15.6 % (ref 11.6–15.1)
FOLATE SERPL-MCNC: 20.2 NG/ML
GFR SERPL CREATININE-BSD FRML MDRD: 70 ML/MIN/1.73SQ M
GLUCOSE SERPL-MCNC: 92 MG/DL (ref 65–140)
HCT VFR BLD AUTO: 33.1 % (ref 34.8–46.1)
HGB BLD-MCNC: 11.2 G/DL (ref 11.5–15.4)
IGG SERPL-MCNC: 939 MG/DL (ref 635–1741)
IMM GRANULOCYTES # BLD AUTO: 0.03 THOUSAND/UL (ref 0–0.2)
IMM GRANULOCYTES NFR BLD AUTO: 1 % (ref 0–2)
INR PPP: 1.11 (ref 0.85–1.19)
LYMPHOCYTES # BLD AUTO: 0.5 THOUSANDS/ÂΜL (ref 0.6–4.47)
LYMPHOCYTES NFR BLD AUTO: 10 % (ref 14–44)
MCH RBC QN AUTO: 31.7 PG (ref 26.8–34.3)
MCHC RBC AUTO-ENTMCNC: 33.8 G/DL (ref 31.4–37.4)
MCV RBC AUTO: 94 FL (ref 82–98)
MONOCYTES # BLD AUTO: 0.23 THOUSAND/ÂΜL (ref 0.17–1.22)
MONOCYTES NFR BLD AUTO: 5 % (ref 4–12)
NEUTROPHILS # BLD AUTO: 4.28 THOUSANDS/ÂΜL (ref 1.85–7.62)
NEUTS SEG NFR BLD AUTO: 84 % (ref 43–75)
NRBC BLD AUTO-RTO: 0 /100 WBCS
PLATELET # BLD AUTO: 84 THOUSANDS/UL (ref 149–390)
PLATELET BLD QL SMEAR: ABNORMAL
PMV BLD AUTO: 11.5 FL (ref 8.9–12.7)
POTASSIUM SERPL-SCNC: 3.8 MMOL/L (ref 3.5–5.3)
PROT SERPL-MCNC: 6.5 G/DL (ref 6.4–8.4)
PROTHROMBIN TIME: 15.1 SECONDS (ref 12.3–15)
RBC # BLD AUTO: 3.53 MILLION/UL (ref 3.81–5.12)
RBC MORPH BLD: PRESENT
SODIUM SERPL-SCNC: 140 MMOL/L (ref 135–147)
TSH SERPL DL<=0.05 MIU/L-ACNC: 2.68 UIU/ML (ref 0.45–4.5)
VIT B12 SERPL-MCNC: 856 PG/ML (ref 180–914)
WBC # BLD AUTO: 5.06 THOUSAND/UL (ref 4.31–10.16)

## 2025-02-24 PROCEDURE — 86381 MITOCHONDRIAL ANTIBODY EACH: CPT

## 2025-02-24 PROCEDURE — 36415 COLL VENOUS BLD VENIPUNCTURE: CPT

## 2025-02-24 PROCEDURE — 85610 PROTHROMBIN TIME: CPT

## 2025-02-24 PROCEDURE — 86015 ACTIN ANTIBODY EACH: CPT

## 2025-02-24 PROCEDURE — 80053 COMPREHEN METABOLIC PANEL: CPT

## 2025-02-24 PROCEDURE — 86038 ANTINUCLEAR ANTIBODIES: CPT

## 2025-02-24 PROCEDURE — 82607 VITAMIN B-12: CPT

## 2025-02-24 PROCEDURE — 86225 DNA ANTIBODY NATIVE: CPT

## 2025-02-24 PROCEDURE — 82746 ASSAY OF FOLIC ACID SERUM: CPT

## 2025-02-24 PROCEDURE — 85025 COMPLETE CBC W/AUTO DIFF WBC: CPT

## 2025-02-24 PROCEDURE — 84443 ASSAY THYROID STIM HORMONE: CPT

## 2025-02-24 PROCEDURE — 97110 THERAPEUTIC EXERCISES: CPT

## 2025-02-24 PROCEDURE — 82784 ASSAY IGA/IGD/IGG/IGM EACH: CPT

## 2025-02-24 NOTE — PROGRESS NOTES
"Daily Note     Today's date: 2025  Patient name: Symone Damico  : 1940  MRN: [id number]  Referring provider: Kassandra Zarate MD  Dx:   Encounter Diagnosis     ICD-10-CM    1. Right hip pain  M25.551       2. Gait abnormality  R26.9                      Subjective: Pt reports she is not having the best day in regards to her hip pain.       Objective: See treatment diary below      Assessment: Pt exhibits overall decreased LE strength, activity tolerance, and balance. She required a few brief seated rest breaks in between some exercises due to fatigue. She would benefit from continued OP PT in order to address impairments and maximize function. Tolerated treatment well. Patient demonstrated fatigue post treatment, exhibited good technique with therapeutic exercises, and would benefit from continued PT      Plan: Continue per plan of care.  Progress treatment as tolerated.       Precautions: FALLS RISK, [place], R hip pain, LBP, L sided Bell's Palsy, Chronic systolic heart failure      Manuals 2/10 2/24                                                               Neuro Re-Ed                                                                                                        Ther Ex             Nu step for Le ROM/strength   10' L1 S#7           Standing lumbar ext 2x10 2x10            Standing hip SLR's 2x10 2x10 ea           Step ups             Mini squats  x20           Sidestepping             Seated SKTC 2x10            Seated hip add iso  10\"x10           Seated hip abd TB  2x10 blue           Seated LAQ  2x10           Sit-to-stand  x15           Seated lumbar flex w/ pb  2x10                                                                                          Ther Activity                                       Gait Training                                       Modalities                                            "

## 2025-02-25 ENCOUNTER — RESULTS FOLLOW-UP (OUTPATIENT)
Dept: FAMILY MEDICINE CLINIC | Facility: CLINIC | Age: 85
End: 2025-02-25

## 2025-02-25 LAB
ACTIN IGG SERPL-ACNC: 63 UNITS (ref 0–19)
BACTERIA UR CULT: NORMAL
DSDNA IGG SERPL IA-ACNC: 1.7 IU/ML (ref ?–15)
MITOCHONDRIA M2 IGG SER-ACNC: <20 UNITS (ref 0–20)
NUCLEAR IGG SER IA-RTO: 0.1 RATIO (ref ?–1)

## 2025-02-27 ENCOUNTER — RESULTS FOLLOW-UP (OUTPATIENT)
Dept: GASTROENTEROLOGY | Facility: CLINIC | Age: 85
End: 2025-02-27

## 2025-02-27 DIAGNOSIS — K72.90 DECOMPENSATED CIRRHOSIS (HCC): Primary | ICD-10-CM

## 2025-02-27 DIAGNOSIS — K74.60 DECOMPENSATED CIRRHOSIS (HCC): Primary | ICD-10-CM

## 2025-02-27 DIAGNOSIS — K75.4 AUTOIMMUNE HEPATITIS TREATED WITH STEROIDS (HCC): ICD-10-CM

## 2025-02-28 ENCOUNTER — OFFICE VISIT (OUTPATIENT)
Dept: FAMILY MEDICINE CLINIC | Facility: CLINIC | Age: 85
End: 2025-02-28
Payer: MEDICARE

## 2025-02-28 VITALS
TEMPERATURE: 98.1 F | BODY MASS INDEX: 17.36 KG/M2 | SYSTOLIC BLOOD PRESSURE: 110 MMHG | RESPIRATION RATE: 17 BRPM | OXYGEN SATURATION: 98 % | DIASTOLIC BLOOD PRESSURE: 70 MMHG | WEIGHT: 98 LBS | HEIGHT: 63 IN | HEART RATE: 89 BPM

## 2025-02-28 DIAGNOSIS — I11.0 HYPERTENSIVE HEART DISEASE WITH CONGESTIVE HEART FAILURE, UNSPECIFIED HEART FAILURE TYPE (HCC): ICD-10-CM

## 2025-02-28 DIAGNOSIS — K74.69 OTHER CIRRHOSIS OF LIVER (HCC): ICD-10-CM

## 2025-02-28 DIAGNOSIS — Z23 ENCOUNTER FOR IMMUNIZATION: ICD-10-CM

## 2025-02-28 DIAGNOSIS — F02.A0 MILD LATE ONSET ALZHEIMER'S DEMENTIA WITHOUT BEHAVIORAL DISTURBANCE, PSYCHOTIC DISTURBANCE, MOOD DISTURBANCE, OR ANXIETY (HCC): ICD-10-CM

## 2025-02-28 DIAGNOSIS — D61.818 PANCYTOPENIA (HCC): ICD-10-CM

## 2025-02-28 DIAGNOSIS — K75.4 AUTOIMMUNE HEPATITIS TREATED WITH STEROIDS (HCC): ICD-10-CM

## 2025-02-28 DIAGNOSIS — D69.3 CHRONIC ITP (IDIOPATHIC THROMBOCYTOPENIA) (HCC): ICD-10-CM

## 2025-02-28 DIAGNOSIS — R26.2 AMBULATORY DYSFUNCTION: ICD-10-CM

## 2025-02-28 DIAGNOSIS — G30.1 MILD LATE ONSET ALZHEIMER'S DEMENTIA WITHOUT BEHAVIORAL DISTURBANCE, PSYCHOTIC DISTURBANCE, MOOD DISTURBANCE, OR ANXIETY (HCC): ICD-10-CM

## 2025-02-28 DIAGNOSIS — I50.22 CHRONIC SYSTOLIC HEART FAILURE (HCC): Primary | ICD-10-CM

## 2025-02-28 DIAGNOSIS — M80.00XD AGE-RELATED OSTEOPOROSIS WITH CURRENT PATHOLOGICAL FRACTURE WITH ROUTINE HEALING: ICD-10-CM

## 2025-02-28 PROCEDURE — G0008 ADMIN INFLUENZA VIRUS VAC: HCPCS

## 2025-02-28 PROCEDURE — 90662 IIV NO PRSV INCREASED AG IM: CPT

## 2025-02-28 PROCEDURE — 99215 OFFICE O/P EST HI 40 MIN: CPT | Performed by: FAMILY MEDICINE

## 2025-02-28 PROCEDURE — 96372 THER/PROPH/DIAG INJ SC/IM: CPT

## 2025-02-28 PROCEDURE — G2211 COMPLEX E/M VISIT ADD ON: HCPCS | Performed by: FAMILY MEDICINE

## 2025-02-28 RX ORDER — PREDNISONE 5 MG/1
5 TABLET ORAL DAILY
Qty: 30 TABLET | Refills: 5 | Status: SHIPPED | OUTPATIENT
Start: 2025-02-28

## 2025-02-28 NOTE — ASSESSMENT & PLAN NOTE
Wt Readings from Last 3 Encounters:   02/28/25 44.5 kg (98 lb)   02/21/25 45.6 kg (100 lb 9.6 oz)   02/19/25 47.8 kg (105 lb 6.4 oz)     Stable on torsemide

## 2025-02-28 NOTE — ASSESSMENT & PLAN NOTE
Wt Readings from Last 3 Encounters:   02/28/25 44.5 kg (98 lb)   02/21/25 45.6 kg (100 lb 9.6 oz)   02/19/25 47.8 kg (105 lb 6.4 oz)   Weights stable  Sees cardiology  On entresto

## 2025-02-28 NOTE — ASSESSMENT & PLAN NOTE
Stable on current meds  Sees GI next month   Orders:  •  predniSONE 5 mg tablet; Take 1 tablet (5 mg total) by mouth daily

## 2025-02-28 NOTE — PROGRESS NOTES
Name: Symone Damico      : 1940      MRN: [id number]  Encounter Provider: Kassandra Zarate MD  Encounter Date: 2025   Encounter department: [facility]  :  Assessment & Plan  Autoimmune hepatitis treated with steroids (HCC)  Stable on current meds  Sees GI next month   Orders:  •  predniSONE 5 mg tablet; Take 1 tablet (5 mg total) by mouth daily    Chronic systolic heart failure (HCC)  Wt Readings from Last 3 Encounters:   25 44.5 kg (98 lb)   25 45.6 kg (100 lb 9.6 oz)   25 47.8 kg (105 lb 6.4 oz)     Stable on torsemide           Other cirrhosis of liver (HCC)  Stable  Sees GI       Mild late onset Alzheimer's dementia without behavioral disturbance, psychotic disturbance, mood disturbance, or anxiety (HCC)  Waiting to get MRI brain        Pancytopenia (HCC)  stable       Chronic ITP (idiopathic thrombocytopenia) (HCC)  Stable on Promacta  Sees hematologist   Reviewed labs today         Ambulatory dysfunction  Currently in PT for her right hip         Encounter for immunization    Orders:  •  influenza vaccine, high-dose, PF 0.5 mL (Fluzone High Dose)    Hypertensive heart disease with congestive heart failure, unspecified heart failure type (HCC)  Wt Readings from Last 3 Encounters:   25 44.5 kg (98 lb)   25 45.6 kg (100 lb 9.6 oz)   25 47.8 kg (105 lb 6.4 oz)   Weights stable  Sees cardiology  On entresto            Age-related osteoporosis with current pathological fracture with routine healing  Prolia injection given today           Depression Screening and Follow-up Plan: Patient was screened for depression during today's encounter. They screened negative with a PHQ-9 score of 0.        History of Present Illness   HPI  Here for follow up  Feels well otherwise   C/o right hip pain   Seeing PT for this     Review of Systems   Constitutional: Negative.    HENT: Negative.     Respiratory: Negative.     Cardiovascular: Negative.   "  Gastrointestinal: Negative.    Musculoskeletal:  Positive for arthralgias and gait problem.   Skin: Negative.    Hematological: Negative.    Psychiatric/Behavioral: Negative.         Objective   /70 (BP Location: Left arm, Patient Position: Sitting, Cuff Size: Standard)   Pulse 89   Temp 98.1 °F (36.7 °C) (Tympanic)   Resp 17   Ht 5' 3\" (1.6 m)   Wt 44.5 kg (98 lb)   SpO2 98%   BMI 17.36 kg/m²      Physical Exam  Vitals and nursing note reviewed.   Constitutional:       Appearance: Normal appearance.   HENT:      Head: Normocephalic and atraumatic.   Cardiovascular:      Pulses: Normal pulses.      Heart sounds: Murmur heard.   Pulmonary:      Effort: Pulmonary effort is normal.      Breath sounds: Normal breath sounds.   Musculoskeletal:         General: Normal range of motion.   Skin:     Capillary Refill: Capillary refill takes less than 2 seconds.   Neurological:      General: No focal deficit present.      Mental Status: She is alert and oriented to person, place, and time.      Cranial Nerves: No cranial nerve deficit.      Sensory: No sensory deficit.   Psychiatric:         Mood and Affect: Mood normal.         Behavior: Behavior normal.       Administrative Statements   I have spent a total time of 40 minutes in caring for this patient on the day of the visit/encounter including Risks and benefits of tx options, Patient and family education, Importance of tx compliance, Counseling / Coordination of care, and Reviewing/placing orders in the medical record (including tests, medications, and/or procedures).  "

## 2025-03-03 ENCOUNTER — PATIENT MESSAGE (OUTPATIENT)
Dept: GASTROENTEROLOGY | Facility: CLINIC | Age: 85
End: 2025-03-03

## 2025-03-03 NOTE — TELEPHONE ENCOUNTER
----- Message from Rosa Pacheco DO sent at 2/27/2025 12:38 PM EST -----  Received results of blood work. Overall LFTs remain stable and IgG <1000. Did attempt to call patient to inform her of the results but unable to get in contact. Left voicemail on machine and sent patient MyChart message.     Staff, can we please follow up with patient. She should currently be taking Prednisone 5 mg and Budesonide 6 mg. She should continue this for the now but obtain blood work in 2 weeks which I have ordered. Hopeful that her labs will continue to remain normal and that we can then further reduce her Budesonide dose. Thank you.     MELD 3.0: 8 at 2/24/2025  4:04 PM MELD-Na: 7 at 2/24/2025  4:04 PM Calculated from: Serum Creatinine: 0.77 mg/dL (Using min of 1 mg/dL) at 2/24/2025  4:04 PM Serum Sodium: 140 mmol/L (Using max of 137 mmol/L) at 2/24/2025  4:04 PM Total Bilirubin: 0.7 mg/dL (Using min of 1 mg/dL) at 2/24/2025  4:04 PM Serum Albumin: 3.8 g/dL (Using max of 3.5 g/dL) at 2/24/2025  4:04 PM INR(ratio): 1.11 at 2/24/2025  4:04 PM Age at listing (hypothetical): 84 years Sex: Female at 2/24/2025  4:04 PM  ----- Message -----  From: Lab, Background User  Sent: 2/24/2025   5:05 PM EST  To: Rosa Pacheco DO

## 2025-03-03 NOTE — TELEPHONE ENCOUNTER
Spoke with pt's son ERICK, he advised pt continues to take prednisone 5 mg but was unsure of the current dose of budesonide. He will send [facility] to confirm dosage when he speaks with pt.  He is aware of need for bloodwork in 2 weeks

## 2025-03-05 ENCOUNTER — OFFICE VISIT (OUTPATIENT)
Dept: PHYSICAL THERAPY | Age: 85
End: 2025-03-05
Payer: MEDICARE

## 2025-03-05 DIAGNOSIS — R26.9 GAIT ABNORMALITY: ICD-10-CM

## 2025-03-05 DIAGNOSIS — M25.551 RIGHT HIP PAIN: Primary | ICD-10-CM

## 2025-03-05 PROCEDURE — 97110 THERAPEUTIC EXERCISES: CPT

## 2025-03-05 NOTE — PROGRESS NOTES
"Daily Note     Today's date: 3/5/2025  Patient name: Symone Damico  : 1940  MRN: [id number]  Referring provider: Kassandra Zarate MD  Dx:   Encounter Diagnosis     ICD-10-CM    1. Right hip pain  M25.551       2. Gait abnormality  R26.9                      Subjective: Pt reports she has a difficult day today.       Objective: See treatment diary below      Assessment: Pt exhibits decreased activity tolerance, decreased LE strength and decreased balance. Challenged by standing hip strengthening/ROM exercises. Responds positively to lumbar AROM exercises to improve symptoms and overall mobility. Tolerated treatment well. Patient demonstrated fatigue post treatment, exhibited good technique with therapeutic exercises, and would benefit from continued PT      Plan: Continue per plan of care.  Progress treatment as tolerated.       Precautions: FALLS RISK, [place], R hip pain, LBP, L sided Bell's Palsy, Chronic systolic heart failure      Manuals 2/10 2/24 3/5                                                              Neuro Re-Ed                                                                                                        Ther Ex             Nu step for Le ROM/strength   10' L1 S#7 10' L1          Standing lumbar ext 2x10 2x10  2x10           Standing hip SLR's 2x10 2x10 ea 2x10           Step ups             Mini squats  x20 X20           Sidestepping             Seated SKTC 2x10            Seated hip add iso  10\"x10 10\"x10           Seated hip abd TB  2x10 blue 2x10 blue          Seated LAQ  2x10 2x10           Sit-to-stand  x15 x15          Seated lumbar flex w/ pb  2x10  2x10                                                                                         Ther Activity                                       Gait Training                                       Modalities                                              "

## 2025-03-10 ENCOUNTER — HOSPITAL ENCOUNTER (OUTPATIENT)
Dept: NUCLEAR MEDICINE | Facility: HOSPITAL | Age: 85
Discharge: HOME | End: 2025-03-10
Attending: INTERNAL MEDICINE
Payer: MEDICARE

## 2025-03-10 DIAGNOSIS — I43 CARDIAC AMYLOIDOSIS (HCC): ICD-10-CM

## 2025-03-10 DIAGNOSIS — I50.22 CHRONIC SYSTOLIC HEART FAILURE (HCC): ICD-10-CM

## 2025-03-10 DIAGNOSIS — E85.4 CARDIAC AMYLOIDOSIS (HCC): ICD-10-CM

## 2025-03-10 PROCEDURE — A9538 TC99M PYROPHOSPHATE: HCPCS

## 2025-03-10 PROCEDURE — 78830 RP LOCLZJ TUM SPECT W/CT 1: CPT

## 2025-03-10 PROCEDURE — 78800 RP LOCLZJ TUM 1 AREA 1 D IMG: CPT

## 2025-03-12 ENCOUNTER — OFFICE VISIT (OUTPATIENT)
Dept: PHYSICAL THERAPY | Age: 85
End: 2025-03-12
Payer: MEDICARE

## 2025-03-12 DIAGNOSIS — M25.551 RIGHT HIP PAIN: Primary | ICD-10-CM

## 2025-03-12 DIAGNOSIS — R26.9 GAIT ABNORMALITY: ICD-10-CM

## 2025-03-12 PROCEDURE — 97110 THERAPEUTIC EXERCISES: CPT

## 2025-03-12 NOTE — PROGRESS NOTES
"Daily Note     Today's date: 3/12/2025  Patient name: Symone Damico  : 1940  MRN: [id number]  Referring provider: Kassandra Zarate MD  Dx:   Encounter Diagnosis     ICD-10-CM    1. Right hip pain  M25.551       2. Gait abnormality  R26.9                      Subjective: Pt reports that she is not feeling as well as she would like.      Objective: See treatment diary below      Assessment: Pt is improving with activity tolerance and LE strength as she was able to complete more therex with less seated rest breaks. Able to tolerate forward step ups to 4\" step height. Tolerated treatment well. Patient demonstrated fatigue post treatment, exhibited good technique with therapeutic exercises, and would benefit from continued PT      Plan: Continue per plan of care.  Progress treatment as tolerated.       Precautions: FALLS RISK, [place], R hip pain, LBP, L sided Bell's Palsy, Chronic systolic heart failure      Manuals 2/10 2/24 3/5 3/12                                                             Neuro Re-Ed                                                                                                        Ther Ex             Nu step for Le ROM/strength   10' L1 S#7 10' L1 10' L1         Standing lumbar ext 2x10 2x10  2x10  2x10         Standing hip SLR's 2x10 2x10 ea 2x10  2x10 ea          Step ups    X15 4\"         Mini squats  x20 X20  x20         Sidestepping             Seated SKTC 2x10            Seated hip add iso  10\"x10 10\"x10  10\"x10         Seated hip abd TB  2x10 blue 2x10 blue 2x10 blue         Seated LAQ  2x10 2x10  2x10         Sit-to-stand  x15 x15 x15         Seated lumbar flex w/ pb  2x10  2x10  2x10                                                                                        Ther Activity                                       Gait Training                                       Modalities                                                "

## 2025-03-14 ENCOUNTER — IN-CLINIC DEVICE VISIT (OUTPATIENT)
Dept: CARDIOLOGY CLINIC | Facility: CLINIC | Age: 85
End: 2025-03-14

## 2025-03-14 DIAGNOSIS — I42.0 DILATED CARDIOMYOPATHY (HCC): ICD-10-CM

## 2025-03-14 DIAGNOSIS — Z95.810 PRESENCE OF IMPLANTABLE CARDIOVERTER-DEFIBRILLATOR (ICD): ICD-10-CM

## 2025-03-14 DIAGNOSIS — I50.22 CHRONIC SYSTOLIC HEART FAILURE (HCC): Primary | ICD-10-CM

## 2025-03-14 NOTE — PROGRESS NOTES
Results for orders placed or performed in visit on 03/14/25   Cardiac EP device report    Narrative    MDT BI-V ICD/ ACTIVE SYSTEM IS MRI CONDITIONAL  DEVICE INTERROGATED IN THE [facility] OFFICE.  BATTERY VOLTAGE ADEQUATE (7.5 YRS).  AP 4.7%   97.9% (VSRP 2.7%).  ALL LEAD PARAMETERS WITHIN NORMAL LIMITS.  35 VT NS EPISODES, AVAIL EGM'S SHOW PAT.  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   8 V SENSE EPISODES, LONGEST DURATION 2 MINS 22 SECS.  PT TAKES NO A/C, ON METOPROLOL SUCC.  TIME IN AT/AF <0.1%.   OPTI-VOL WITHIN NORMAL LIMITS.   NO SIGNIFICANT HIGH RATE EPISODES.   NORMAL DEVICE FUNCTION.  PAS

## 2025-03-18 DIAGNOSIS — I50.22 CHRONIC SYSTOLIC CHF (CONGESTIVE HEART FAILURE), NYHA CLASS 3 (HCC): ICD-10-CM

## 2025-03-19 ENCOUNTER — OFFICE VISIT (OUTPATIENT)
Dept: PHYSICAL THERAPY | Age: 85
End: 2025-03-19
Payer: MEDICARE

## 2025-03-19 DIAGNOSIS — M25.551 RIGHT HIP PAIN: Primary | ICD-10-CM

## 2025-03-19 DIAGNOSIS — R26.9 GAIT ABNORMALITY: ICD-10-CM

## 2025-03-19 PROCEDURE — 97110 THERAPEUTIC EXERCISES: CPT

## 2025-03-19 RX ORDER — SACUBITRIL AND VALSARTAN 49; 51 MG/1; MG/1
1 TABLET, FILM COATED ORAL 2 TIMES DAILY
Qty: 180 TABLET | Refills: 1 | Status: SHIPPED | OUTPATIENT
Start: 2025-03-19

## 2025-03-19 NOTE — PROGRESS NOTES
"Daily Note     Today's date: 3/19/2025  Patient name: Symone Damico  : 1940  MRN: [id number]  Referring provider: Kassandra Zarate MD  Dx:   Encounter Diagnosis     ICD-10-CM    1. Right hip pain  M25.551       2. Gait abnormality  R26.9                      Subjective: Pt reports that she is having pain from hip down into knee.       Objective: See treatment diary below      Assessment: Pt continues to demonstrate decreased lumbar mobility and decreased LE strength that is contributing to current functional limitations. Tolerated treatment well. Patient demonstrated fatigue post treatment, exhibited good technique with therapeutic exercises, and would benefit from continued PT      Plan: Continue per plan of care.  Progress treatment as tolerated.       Precautions: FALLS RISK, [place], R hip pain, LBP, L sided Bell's Palsy, Chronic systolic heart failure      Manuals 2/10 2/24 3/5 3/12 3/19                                                            Neuro Re-Ed                                                                                                        Ther Ex             Nu step for Le ROM/strength   10' L1 S#7 10' L1 10' L1 10' L1        Standing lumbar ext 2x10 2x10  2x10  2x10 2x10        Standing hip SLR's 2x10 2x10 ea 2x10  2x10 ea  2x10 ea        HR/TR     x20        Ham curl     x20        Step ups    X15 4\" X15 ea 4\"        Mini squats  x20 X20  x20 x20        Sidestepping             Seated SKTC 2x10            Seated hip add iso  10\"x10 10\"x10  10\"x10 10\"x10        Seated hip abd TB  2x10 blue 2x10 blue 2x10 blue 2x10 blue        Seated LAQ  2x10 2x10  2x10 2x10         Sit-to-stand  x15 x15 x15 x15        Seated lumbar flex w/ pb  2x10  2x10  2x10  2x10                                                                                       Ther Activity                                       Gait Training                                       Modalities                                     "

## 2025-03-21 ENCOUNTER — OFFICE VISIT (OUTPATIENT)
Dept: HEMATOLOGY ONCOLOGY | Facility: CLINIC | Age: 85
End: 2025-03-21
Payer: MEDICARE

## 2025-03-21 VITALS
RESPIRATION RATE: 16 BRPM | TEMPERATURE: 96.5 F | OXYGEN SATURATION: 99 % | HEIGHT: 63 IN | WEIGHT: 105 LBS | BODY MASS INDEX: 18.61 KG/M2 | SYSTOLIC BLOOD PRESSURE: 140 MMHG | DIASTOLIC BLOOD PRESSURE: 66 MMHG | HEART RATE: 95 BPM

## 2025-03-21 DIAGNOSIS — D50.0 IRON DEFICIENCY ANEMIA DUE TO CHRONIC BLOOD LOSS: ICD-10-CM

## 2025-03-21 DIAGNOSIS — D69.3 CHRONIC ITP (IDIOPATHIC THROMBOCYTOPENIA) (HCC): Primary | ICD-10-CM

## 2025-03-21 PROCEDURE — 99214 OFFICE O/P EST MOD 30 MIN: CPT

## 2025-03-21 PROCEDURE — G2211 COMPLEX E/M VISIT ADD ON: HCPCS

## 2025-03-21 NOTE — PROGRESS NOTES
Name: Symone Damico      : 1940      MRN: [id number]  Encounter Provider: ALONZO Schulte  Encounter Date: 3/21/2025   Encounter department: [facility] [place]  :  Assessment & Plan  Chronic ITP (idiopathic thrombocytopenia) (HCC)  1.Chronic immune thrombocytopenic purpura, refractory to steroids.  She is on Promacta 50 mg p.o. daily since 2021.     Platelet count went from 30,000 range 2021 to normal by 2021.     Period of noncompliance with Promacta.   Platelet count went from 441 22 to 70 23 and 94 23.  Promacta restarted in 2023. 25 mg p.o. daily, platelet count of 96,000, hemoglobin 12.2, WBC 5.8      2. History of Acute blood loss anemia.  Status post fall with subdural hemorrhage 2022.  Hemoglobin 5.1 2022.  She felt very fatigued prior to admission.  She and her son deny any evidence of GI bleed.  She declined GI workup.  She was taking oral iron for a few weeks post discharge but discontinued. At her 22 office visit, she was advised to resume oral iron.       2022 - Hemoglobin 10 - 11 g/dL range  - 23 iron saturation 29%; ferritin 87     3/20/23 hemoglobin 6.1, MCV 90, WBC 4.4, platelet count 128  Admitted 3/21-3/25/23  S/p 2 unit transfusion of PRBC on admission  Patient declined EGD, colonoscopy.  Venofer 300mg 3/22, , , , , , 25- Hgb-11.2, MCV-92, WBC 5, platelets-84    Continue Promacta 25 mg p.o. daily, approved till 2025, will do CBC every 3 months       Iron deficiency anemia due to chronic blood loss  Mild anemia on 25 Hgb 11.2, up from 10.9 on 24  Repeat iron panel in 3 months  Orders:    CBC and differential; Future    Iron Panel (Includes Ferritin, Iron Sat%, Iron, and TIBC); Future    Comprehensive metabolic panel; Future        Return in about 3 months (around 2025) for Dr. Swift.    History of Present  Illness   Chief Complaint   Patient presents with    Follow-up     Pertinent Medical History     03/21/25: Symone Damico is an 84-year-old  female who was seen initially 5/2020 regarding pancytopenia.    She has a history of dilated cardiomyopathy with low ejection fraction on Entresto, spironolactone, torsemide, metoprolol and budesonide.  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        She had outpatient labs 4/17/20 at [facility].  hemoglobin 6.9, MCV 70, RDW 19.7, WBC 1.9, 59% neutrophils, 30% lymphocytes, platelet 03816,   She was transfused with 2 units packed RBC ordered by her cardiologist, Dr. Damico and received Feraheme weekly x 2 doses end of June 2020.      CBC on 09/09/2019 showed hemoglobin 11.3, MCV 93, WBC of 2.7, normal differential platelets 94580  August 2018 iron saturation 8% ferritin 26  On 07/2018 hemoglobin 11, MCV 91, WBC 3.1, platelets 30380  10/23/2015:  Hemoglobin 12.9, MCV of 90, white blood cell count 2.95, platelets 115  On 11/2014 hemoglobin 13.3, MCV 89, WBC 3.4, platelets 515406     She had normal vitamin B12, folic acid, TSH, protein electrophoresis     Autoimmune workup by Rheumatology was negative except for anti smooth muscle antibodies of 93     Because of the leukopenia, thrombocytopenia bone marrow biopsy performed on January 2021 showed normal bone marrow cellularity for the age, no evidence of dysplastic features, normal plasma cells and lymphocytes, no evidence of vasculitis, necrosis, fibrosis adequate iron stains     Back on prednisone 10 mg p.o. daily because of elevation of ALT, AST, with persistent thrombocytopenia of 38,000.     Initiated on Promacta 50 mg at the end of August 2021, platelet count 012239 in November 2021, WBC 4, and decrease in the AST, ALT     3/20/22 admitted when outpatient CT identified subdural hemorrhage.  She would a mechanical fall 1 week prior  "and had intermittent headaches which prompted the CT scan.     Status post defibrillator     Promacta reduced to 25 mg p.o. daily     Period of noncompliance with Promacta.   Platelet count went from 441 12/5/22 to 70 1/23/23 and 94 2/22/23.    2/24/25- Hgb-11.2, MCV-92, WBC 5, platelets-84    Promacta restarted in March 2023. 25 mg p.o. daily     Interval History:     Pt has no complaints, denies blood in stool, denies bruising or recent bleeds. Will follow up in 3 months with labs  Review of Systems   Constitutional:  Negative for activity change, appetite change, chills, diaphoresis, fatigue and fever.   HENT:  Positive for hearing loss.    Respiratory:  Negative for apnea, cough and shortness of breath.    Cardiovascular: Negative.  Negative for chest pain, palpitations and leg swelling.   Gastrointestinal:  Negative for abdominal pain, anal bleeding and blood in stool.   Musculoskeletal: Negative.    Skin:  Negative for color change, pallor and rash.   Hematological:  Negative for adenopathy. Does not bruise/bleed easily.   Psychiatric/Behavioral: Negative.             Objective   /66 (BP Location: Left arm, Patient Position: Sitting, Cuff Size: Adult)   Pulse 95   Temp (!) 96.5 °F (35.8 °C) (Temporal)   Resp 16   Ht 5' 3\" (1.6 m)   Wt 47.6 kg (105 lb)   SpO2 99%   BMI 18.60 kg/m²     Physical Exam  Vitals reviewed.   Constitutional:       General: She is not in acute distress.     Appearance: Normal appearance. She is normal weight. She is not toxic-appearing.   HENT:      Mouth/Throat:      Mouth: Mucous membranes are moist.      Pharynx: Oropharynx is clear.   Eyes:      Conjunctiva/sclera: Conjunctivae normal.      Pupils: Pupils are equal, round, and reactive to light.   Cardiovascular:      Rate and Rhythm: Normal rate and regular rhythm.      Pulses: Normal pulses.      Heart sounds: Normal heart sounds. No murmur heard.  Pulmonary:      Effort: Pulmonary effort is normal. No respiratory " distress.      Breath sounds: No wheezing.   Abdominal:      General: Abdomen is flat.      Palpations: Abdomen is soft.   Musculoskeletal:      Cervical back: Normal range of motion.      Right lower leg: No edema.      Left lower leg: No edema.   Lymphadenopathy:      Cervical: No cervical adenopathy.   Skin:     General: Skin is warm.      Capillary Refill: Capillary refill takes less than 2 seconds.      Coloration: Skin is not jaundiced or pale.   Neurological:      General: No focal deficit present.      Mental Status: She is alert and oriented to person, place, and time.   Psychiatric:         Mood and Affect: Mood normal.         Labs: I have reviewed the following labs:  Results for orders placed or performed in visit on 02/24/25   CBC and differential   Result Value Ref Range    WBC 5.06 4.31 - 10.16 Thousand/uL    RBC 3.53 (L) 3.81 - 5.12 Million/uL    Hemoglobin 11.2 (L) 11.5 - 15.4 g/dL    Hematocrit 33.1 (L) 34.8 - 46.1 %    MCV 94 82 - 98 fL    MCH 31.7 26.8 - 34.3 pg    MCHC 33.8 31.4 - 37.4 g/dL    RDW 15.6 (H) 11.6 - 15.1 %    MPV 11.5 8.9 - 12.7 fL    Platelets 84 (L) 149 - 390 Thousands/uL    nRBC 0 /100 WBCs    Segmented % 84 (H) 43 - 75 %    Immature Grans % 1 0 - 2 %    Lymphocytes % 10 (L) 14 - 44 %    Monocytes % 5 4 - 12 %    Eosinophils Relative 0 0 - 6 %    Basophils Relative 0 0 - 1 %    Absolute Neutrophils 4.28 1.85 - 7.62 Thousands/µL    Absolute Immature Grans 0.03 0.00 - 0.20 Thousand/uL    Absolute Lymphocytes 0.50 (L) 0.60 - 4.47 Thousands/µL    Absolute Monocytes 0.23 0.17 - 1.22 Thousand/µL    Eosinophils Absolute 0.00 0.00 - 0.61 Thousand/µL    Basophils Absolute 0.02 0.00 - 0.10 Thousands/µL   Comprehensive metabolic panel   Result Value Ref Range    Sodium 140 135 - 147 mmol/L    Potassium 3.8 3.5 - 5.3 mmol/L    Chloride 101 96 - 108 mmol/L    CO2 32 21 - 32 mmol/L    ANION GAP 7 4 - 13 mmol/L    BUN 25 5 - 25 mg/dL    Creatinine 0.77 0.60 - 1.30 mg/dL    Glucose 92 65 - 140  mg/dL    Calcium 9.6 8.4 - 10.2 mg/dL    AST 31 13 - 39 U/L    ALT 22 7 - 52 U/L    Alkaline Phosphatase 38 34 - 104 U/L    Total Protein 6.5 6.4 - 8.4 g/dL    Albumin 3.8 3.5 - 5.0 g/dL    Total Bilirubin 0.70 0.20 - 1.00 mg/dL    eGFR 70 ml/min/1.73sq m   Protime-INR   Result Value Ref Range    Protime 15.1 (H) 12.3 - 15.0 seconds    INR 1.11 0.85 - 1.19   Result Value Ref Range     635 - 1,741 mg/dL   LEA Screen w/Reflex Cascade   Result Value Ref Range    NAFISA Screen 0.10 See Comment Ratio    Anti-dsDNA Ab 1.7 See Comment IU/mL   Antimitochondrial antibody   Result Value Ref Range    Mitochondrial Ab <20.0 0.0 - 20.0 Units   Anti-smooth muscle antibody, IgG   Result Value Ref Range    Smooth Muscle Ab 63 (H) 0 - 19 Units   TSH, 3rd generation with Free T4 reflex   Result Value Ref Range    TSH 3RD GENERATON 2.680 0.450 - 4.500 uIU/mL   Vitamin B12   Result Value Ref Range    Vitamin B-12 856 180 - 914 pg/mL   Result Value Ref Range    Folate 20.2 >5.9 ng/mL   Smear Review(Phlebs Do Not Order)   Result Value Ref Range    RBC Morphology Present     Platelet Estimate Decreased (A) Adequate    Anisocytosis Present            Administrative Statements   I have spent a total time of 20 minutes in caring for this patient on the day of the visit/encounter including Diagnostic results, Prognosis, Risks and benefits of tx options, Patient and family education, Risk factor reductions, Impressions, Counseling / Coordination of care, Documenting in the medical record, and Obtaining or reviewing history  .

## 2025-03-21 NOTE — ASSESSMENT & PLAN NOTE
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  She is on Promacta 50 mg p.o. daily since 8/2021.     Platelet count went from 30,000 range July 2021 to normal by 9/2021.     Period of noncompliance with Promacta.   Platelet count went from 441 12/5/22 to 70 1/23/23 and 94 2/22/23.  Promacta restarted in March 2023. 25 mg p.o. daily, platelet count of 96,000, hemoglobin 12.2, WBC 5.8      2. History of Acute blood loss anemia.  Status post fall with subdural hemorrhage March 2022.  Hemoglobin 5.1 7/1/2022.  She felt very fatigued prior to admission.  She and her son deny any evidence of GI bleed.  She declined GI workup.  She was taking oral iron for a few weeks post discharge but discontinued. At her 8/29/22 office visit, she was advised to resume oral iron.       8/2022 - Hemoglobin 10 - 11 g/dL range 8/22 - 2/20232 2/22/23 iron saturation 29%; ferritin 87     3/20/23 hemoglobin 6.1, MCV 90, WBC 4.4, platelet count 128  Admitted 3/21-3/25/23  S/p 2 unit transfusion of PRBC on admission  Patient declined EGD, colonoscopy.  Venofer 300mg 3/22, March 28, March 30, April 4, April 6, April 11, April 14, 2023 2/24/25- Hgb-11.2, MCV-92, WBC 5, platelets-84    Continue Promacta 25 mg p.o. daily, approved till 12/2025, will do CBC every 3 months

## 2025-03-24 ENCOUNTER — HOSPITAL ENCOUNTER (OUTPATIENT)
Dept: RADIOLOGY | Facility: HOSPITAL | Age: 85
Discharge: HOME | End: 2025-03-24
Payer: MEDICARE

## 2025-03-24 ENCOUNTER — HOSPITAL ENCOUNTER (OUTPATIENT)
Dept: ULTRASOUND IMAGING | Facility: HOSPITAL | Age: 85
Discharge: HOME | End: 2025-03-24
Payer: MEDICARE

## 2025-03-24 ENCOUNTER — TELEPHONE (OUTPATIENT)
Age: 85
End: 2025-03-24

## 2025-03-24 DIAGNOSIS — K74.60 DECOMPENSATED CIRRHOSIS (HCC): ICD-10-CM

## 2025-03-24 DIAGNOSIS — G30.1 MILD LATE ONSET ALZHEIMER'S DEMENTIA WITHOUT BEHAVIORAL DISTURBANCE, PSYCHOTIC DISTURBANCE, MOOD DISTURBANCE, OR ANXIETY (HCC): ICD-10-CM

## 2025-03-24 DIAGNOSIS — F02.A0 MILD LATE ONSET ALZHEIMER'S DEMENTIA WITHOUT BEHAVIORAL DISTURBANCE, PSYCHOTIC DISTURBANCE, MOOD DISTURBANCE, OR ANXIETY (HCC): ICD-10-CM

## 2025-03-24 DIAGNOSIS — K72.90 DECOMPENSATED CIRRHOSIS (HCC): ICD-10-CM

## 2025-03-24 PROCEDURE — 76700 US EXAM ABDOM COMPLETE: CPT

## 2025-03-25 ENCOUNTER — RESULTS FOLLOW-UP (OUTPATIENT)
Dept: CARDIOLOGY CLINIC | Facility: CLINIC | Age: 85
End: 2025-03-25

## 2025-03-25 ENCOUNTER — HOSPITAL ENCOUNTER (OUTPATIENT)
Dept: RADIOLOGY | Facility: HOSPITAL | Age: 85
Discharge: HOME | End: 2025-03-25
Payer: MEDICARE

## 2025-03-25 DIAGNOSIS — G30.1 MILD LATE ONSET ALZHEIMER'S DEMENTIA WITHOUT BEHAVIORAL DISTURBANCE, PSYCHOTIC DISTURBANCE, MOOD DISTURBANCE, OR ANXIETY (HCC): ICD-10-CM

## 2025-03-25 DIAGNOSIS — F02.A0 MILD LATE ONSET ALZHEIMER'S DEMENTIA WITHOUT BEHAVIORAL DISTURBANCE, PSYCHOTIC DISTURBANCE, MOOD DISTURBANCE, OR ANXIETY (HCC): ICD-10-CM

## 2025-03-25 PROCEDURE — 70551 MRI BRAIN STEM W/O DYE: CPT

## 2025-03-25 PROCEDURE — 76018 MR SAFETY IMPLANT ELEC PREPJ: CPT

## 2025-03-25 PROCEDURE — 76014 MR SFTY IMPLT&/FB ASMT STF 1: CPT

## 2025-03-25 NOTE — NURSING NOTE
Device interrogation for MRI.  Normal device function prior to MRI.  Leads and device meet all requirements per policy for MRI.  Device programmed DOO 95bpm per Cardiology for MRI.  Patient has no complaints.  Vital signs monitored throughout by SARAH Aponte RN.  Normal device function post MRI.  Device reprogrammed to prior settings per Cardiology.   Electrodesiccation Text: The wound bed was treated with electrodesiccation after the biopsy was performed.

## 2025-03-25 NOTE — NURSING NOTE
Pt in wheelchair and ambulates with cane.  [employer] device interrogation for MRI done by LIZZIE LACEY clinical specialist. Device set to MRI safe mode @ 95 bpm    MRI brain NeuroQuant without contrast complete. Pt tolerated well.   VSS throughout scan. Pt alert and oriented on room air. No complaints or visible signs of distress.  Device reprogrammed to prior settings per Cardiology by LIZZIE Bonilla RN.

## 2025-03-26 ENCOUNTER — OFFICE VISIT (OUTPATIENT)
Dept: PHYSICAL THERAPY | Age: 85
End: 2025-03-26
Payer: MEDICARE

## 2025-03-26 ENCOUNTER — NURSE TRIAGE (OUTPATIENT)
Age: 85
End: 2025-03-26

## 2025-03-26 DIAGNOSIS — M25.551 RIGHT HIP PAIN: Primary | ICD-10-CM

## 2025-03-26 DIAGNOSIS — R26.9 GAIT ABNORMALITY: ICD-10-CM

## 2025-03-26 PROCEDURE — 97110 THERAPEUTIC EXERCISES: CPT

## 2025-03-26 NOTE — PROGRESS NOTES
Daily Note     Today's date: 3/26/2025  Patient name: Symone Damico  : 1940  MRN: [id number]  Referring provider: Kassandra Zarate MD  Dx:   Encounter Diagnosis     ICD-10-CM    1. Right hip pain  M25.551       2. Gait abnormality  R26.9                      Subjective: Pt's aide reports that she was complaining of a slight headache and LUE pain. She had cardiology office on the phone who said pt could take Tylenol and could attempt PT as her pre-exercise (see below) was similar to what it was in the office. Pt self reports in office that she does not have a headache but does have some L sided chest pain.       Objective: See treatment diary below    Pre-exercise BP: 138/61; pulse 90  Post-exercise (after 10 minutes on Nu Step) BP: 128/59; pulse 105; SpO2%: 99%    Assessment: Pre and post-aerobic exercise vitals were taken. Blood pressure improved with exercise and pt did not complain of L-sided chest pain t/o session when inquired. At conclusion of session, pt was yet again asked about chest and LUE symptoms which she reported were not present. In regards to hip pain, she continues to experience this mostly with hip flexion especially closer to end-range. She is able to tolerate hip AROM and strengthening exercises well and without exacerbation of hip symptoms today. Tolerated treatment well. Patient demonstrated fatigue post treatment, exhibited good technique with therapeutic exercises, and would benefit from continued PT      Plan: Continue per plan of care.      Precautions: FALLS RISK, [place], R hip pain, LBP, L sided Bell's Palsy, Chronic systolic heart failure      Manuals 2/10 2/24 3/5 3/12 3/19 3/26                                                           Neuro Re-Ed                                                                                                        Ther Ex             Nu step for Le ROM/strength   10' L1 S#7 10' L1 10' L1 10' L1 10' L1       Standing lumbar ext 2x10 2x10  2x10   "2x10 2x10 2x10        Standing hip SLR's 2x10 2x10 ea 2x10  2x10 ea  2x10 ea 2x10 ea       HR/TR     x20 x20       Ham curl     x20 x20       Step ups    X15 4\" X15 ea 4\" X15 4\" ea       Mini squats  x20 X20  x20 x20        Sidestepping             Seated SKTC 2x10     2x10        Seated hip add iso  10\"x10 10\"x10  10\"x10 10\"x10 NT       Seated hip abd TB  2x10 blue 2x10 blue 2x10 blue 2x10 blue NT       Seated LAQ  2x10 2x10  2x10 2x10  NT       Sit-to-stand  x15 x15 x15 x15 2x10       Seated lumbar flex w/ pb  2x10  2x10  2x10  2x10  2x10                                                                                      Ther Activity                                       Gait Training                                       Modalities                                                    "

## 2025-03-26 NOTE — TELEPHONE ENCOUNTER
"Caregiver called to report that the patient is c/o HA, left arm discomfort and is pointing to the area of her PPM and c/o of discomfort there after her MRI yesterday. The patient has no other symptoms to report. /61, HR 90. She denies SOB, weight gain, lower ext edema at this time. Denies chest pain other than the discomfort she is feeling at the PPM site which is unclear.     Advised the caregiver Makenna would inform the provider of symptoms and call her back with recommendation. Advised if the discomfort becomes more severe, to go to the ER.     Makenna cell # [phone]    Answer Assessment - Initial Assessment Questions  1. REASON FOR CALL: \"What is your main concern right now?\"      Pt's caregiver called reporting that the patient is pointing to her arm and saying that her arm the area where her PPM is, is uncomfortable. Pt also has a headache   2. ONSET: \"When did the this start?\"      Today   3. SEVERITY: \"How bad is the it?\"      Mild   5. OTHER SYMPTOMS: \"Do you have any other new symptoms?\"      Denies SOB, weight gain and edema; /61 and HR is 90  6. TREATMENTS AND RESPONSE: \"What have you done so far to try to make this better? What medicines have you used?\"      Advised to give tylenol    Protocols used: No Protocol Available-Adult-[place]    "

## 2025-03-31 ENCOUNTER — HOSPITAL ENCOUNTER (OUTPATIENT)
Dept: GASTROENTEROLOGY | Facility: HOSPITAL | Age: 85
Setting detail: OUTPATIENT SURGERY
Discharge: HOME | End: 2025-03-31
Payer: MEDICARE

## 2025-03-31 ENCOUNTER — ANESTHESIA (OUTPATIENT)
Dept: GASTROENTEROLOGY | Facility: HOSPITAL | Age: 85
End: 2025-03-31
Payer: MEDICARE

## 2025-03-31 ENCOUNTER — ANESTHESIA EVENT (OUTPATIENT)
Dept: GASTROENTEROLOGY | Facility: HOSPITAL | Age: 85
End: 2025-03-31
Payer: MEDICARE

## 2025-03-31 VITALS
RESPIRATION RATE: 16 BRPM | SYSTOLIC BLOOD PRESSURE: 100 MMHG | TEMPERATURE: 96.9 F | HEART RATE: 69 BPM | DIASTOLIC BLOOD PRESSURE: 53 MMHG | OXYGEN SATURATION: 99 %

## 2025-03-31 DIAGNOSIS — K72.90 DECOMPENSATED CIRRHOSIS (HCC): ICD-10-CM

## 2025-03-31 DIAGNOSIS — K74.60 DECOMPENSATED CIRRHOSIS (HCC): ICD-10-CM

## 2025-03-31 PROCEDURE — 43235 EGD DIAGNOSTIC BRUSH WASH: CPT | Performed by: STUDENT IN AN ORGANIZED HEALTH CARE EDUCATION/TRAINING PROGRAM

## 2025-03-31 RX ORDER — PROPOFOL 10 MG/ML
INJECTION, EMULSION INTRAVENOUS AS NEEDED
Status: DISCONTINUED | OUTPATIENT
Start: 2025-03-31 | End: 2025-03-31

## 2025-03-31 RX ORDER — FENTANYL CITRATE 50 UG/ML
INJECTION, SOLUTION INTRAMUSCULAR; INTRAVENOUS AS NEEDED
Status: DISCONTINUED | OUTPATIENT
Start: 2025-03-31 | End: 2025-03-31

## 2025-03-31 RX ORDER — LIDOCAINE HYDROCHLORIDE 20 MG/ML
INJECTION, SOLUTION EPIDURAL; INFILTRATION; INTRACAUDAL; PERINEURAL AS NEEDED
Status: DISCONTINUED | OUTPATIENT
Start: 2025-03-31 | End: 2025-03-31

## 2025-03-31 RX ORDER — SODIUM CHLORIDE, SODIUM LACTATE, POTASSIUM CHLORIDE, CALCIUM CHLORIDE 600; 310; 30; 20 MG/100ML; MG/100ML; MG/100ML; MG/100ML
INJECTION, SOLUTION INTRAVENOUS CONTINUOUS PRN
Status: DISCONTINUED | OUTPATIENT
Start: 2025-03-31 | End: 2025-03-31

## 2025-03-31 RX ADMIN — SODIUM CHLORIDE, SODIUM LACTATE, POTASSIUM CHLORIDE, AND CALCIUM CHLORIDE: .6; .31; .03; .02 INJECTION, SOLUTION INTRAVENOUS at 14:40

## 2025-03-31 RX ADMIN — PROPOFOL 60 MG: 10 INJECTION, EMULSION INTRAVENOUS at 14:46

## 2025-03-31 RX ADMIN — FENTANYL CITRATE 50 MCG: 50 INJECTION INTRAMUSCULAR; INTRAVENOUS at 14:43

## 2025-03-31 RX ADMIN — LIDOCAINE HYDROCHLORIDE 80 MG: 20 INJECTION, SOLUTION EPIDURAL; INFILTRATION; INTRACAUDAL at 14:45

## 2025-03-31 NOTE — ANESTHESIA POSTPROCEDURE EVALUATION
Post-Op Assessment Note    CV Status:  Stable  Pain Score: 0    Pain management: adequate       Mental Status:  Awake and alert   Hydration Status:  Stable   PONV Controlled:  None   Airway Patency:  Patent and adequate     Post Op Vitals Reviewed: Yes    No anethesia notable event occurred.    Staff: CRNA, Anesthesiologist           Last Filed PACU Vitals:  Vitals Value Taken Time   Temp     Pulse 65    /56    Resp 16    SpO2 100

## 2025-03-31 NOTE — H&P
History and Physical - [facility]  Symone Damico 85 y.o. female MRN: [id number]      HPI: Symone Damico is a 85 y.o. year-old female who presents for variceal surveillance.    REVIEW OF SYSTEMS: Per the HPI, and otherwise unremarkable.    PAST MEDICAL/SURGICAL HISTORY:  Past Medical History:   Diagnosis Date    Acute bilateral low back pain without sciatica 02/10/2023    Acute blood loss anemia 03/21/2023    Bell's palsy     Cardiac disease     Depression     Ear problems     HL (hearing loss)     Hypertension     Hypotension 07/01/2022    Sacroiliitis (HCC) 08/10/2021    Subdural hematoma (HCC) 02/21/2024    Subdural hemorrhage (HCC) 03/30/2022    Thrombocytopenia ([facility]) 08/03/2018    Tinnitus     Traumatic subdural hemorrhage with loss of consciousness of unspecified duration, initial encounter ([facility]) 02/15/2023    Ventricular tachycardia, unspecified ([facility]) 02/21/2024        Past Surgical History:   Procedure Laterality Date    CARDIAC ELECTROPHYSIOLOGY PROCEDURE N/A 6/8/2023    Procedure: Cardiac biv icd implant, Left side, use ABX;  Surgeon: Merrick Calderon MD;  Location: [facility];  Service: Cardiology    CT BONE MARROW BIOPSY AND ASPIRATION  1/27/2021       FAMILY/SOCIAL HISTORY:  Family History   Problem Relation Age of Onset    Autoimmune disease Neg Hx        Social History     Tobacco Use    Smoking status: Never     Passive exposure: Never    Smokeless tobacco: Never   Vaping Use    Vaping status: Never Used   Substance Use Topics    Alcohol use: Not Currently    Drug use: Not Currently       Meds/Allergies       Current Outpatient Medications:     budesonide (ENTOCORT EC) 3 MG capsule    calcium carbonate (OS-PABLO) 600 MG tablet    cholecalciferol (VITAMIN D3) 1,000 units tablet    Entresto 49-51 MG TABS    fluticasone (FLONASE) 50 mcg/act nasal spray    metoprolol succinate (TOPROL-XL) 25 mg 24 hr tablet    omega-3-acid ethyl esters (LOVAZA) 1 g capsule    potassium chloride  (MICRO-K) 10 MEQ CR capsule    Promacta 25 MG tablet    torsemide (DEMADEX) 10 mg tablet    ascorbic acid (VITAMIN C) 500 mg tablet    cyanocobalamin (VITAMIN B-12) 100 mcg tablet    predniSONE 5 mg tablet    Current Facility-Administered Medications:     denosumab (PROLIA) subcutaneous injection 60 mg, 60 mg, Subcutaneous, Q6 Months, 60 mg at 02/28/25 1511    Allergies   Allergen Reactions    Ambien [Zolpidem] Other (See Comments)     Did not allow sleep per pt       Objective     /58   Pulse 82   Temp (!) 97 °F (36.1 °C) (Temporal)   Resp 18   SpO2 99%   PHYSICAL EXAM    GEN: NAD  CARDIO: RRR  PULM: CTA bilaterally  ABD: soft, non-tender, non-distended  EXT: no lower extremity edema  NEURO: AAOx3    ASSESSMENT/PLAN:  85 y.o. year old female here for EGD. she is stable and optimized for her procedure. Informed consent was obtained for the procedure.  Risks of infection, perforation and hemorrhage were discussed. The patient was agreeable to proceed with the procedure.

## 2025-03-31 NOTE — ANESTHESIA PREPROCEDURE EVALUATION
Procedure:  EGD    Relevant Problems   CARDIO   (+) Complete left bundle branch block (LBBB)   (+) Hypertensive heart disease with congestive heart failure (HCC)      GI/HEPATIC   (+) Autoimmune hepatitis treated with steroids (HCC)   (+) Other cirrhosis of liver (HCC)      HEMATOLOGY   (+) Chronic ITP (idiopathic thrombocytopenia) (HCC)   (+) Pancytopenia (HCC)      NEURO/PSYCH   (+) Depressive disorder   (+) Mild late onset Alzheimer's dementia without behavioral disturbance, psychotic disturbance, mood disturbance, or anxiety (HCC)        Physical Exam    Airway    Mallampati score: III  TM Distance: >3 FB  Neck ROM: full     Dental    lower dentures and upper dentures    Cardiovascular  Cardiovascular exam normal    Pulmonary  Pulmonary exam normal     Other Findings  post-pubertal.    Left Ventricle: Left ventricular cavity size is normal. Wall thickness is moderately increased. There is moderate concentric hypertrophy. The left ventricular ejection fraction is 40%. Systolic function is moderately reduced. Global longitudinal strain is reduced at -12%. There is global hypokinesis with regional variation. Diastolic function is mildly abnormal, consistent with grade I (abnormal) relaxation.    IVS: There is abnormal septal motion consistent with right ventricular pacing.    Right Ventricle: Right ventricular cavity size is normal. Systolic function is normal. Wall thickness is increased.    Left Atrium: The atrium is mildly dilated (35-41 mL/m2).    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  It is predominately within the left atrial cavity.    Mitral Valve: There is mild regurgitation.    Pericardium: There is a trivial pericardial effusion circumferential to the heart. The fluid exhibits no internal echoes.      Narrative  MDT BI-V ICD/ ACTIVE SYSTEM IS MRI CONDITIONAL  DEVICE INTERROGATED IN THE [place] OFFICE. BATTERY VOLTAGE ADEQUATE. (8.6 YRS) AP 6%  96%. ALL LEAD PARAMETERS WITHIN NORMAL  LIMITS. 7 NSVT EPISODES DETECTED MOST RECENT 9 BEATS @ 370ms, NSVT AND SVT BOTH NOTED. OPTI-VOL FLUID THRESHOLD CROSSED SLIGHTLY, PATIENT DENIES SYMPTOMS. DECREASE MADE TO AMPLITUDE TO PROMOTE DEVICE LONGEVITY WHILE MAINTAINING AN APPROPRIATE SAFETY MARGIN. LV PACE POLARTIY ALSO CHANGED. NORMAL DEVICE FUNCTION.---RAMOS    Anesthesia Plan  ASA Score- 3     Anesthesia Type- IV sedation with anesthesia with ASA Monitors.         Additional Monitors:     Airway Plan:            Plan Factors-Exercise tolerance (METS): <4 METS.    Chart reviewed.   Existing labs reviewed. Patient summary reviewed.    Patient is not a current smoker.              Induction- intravenous.    Postoperative Plan-         Informed Consent- Anesthetic plan and risks discussed with patient.        NPO Status:  Vitals Value Taken Time   Date of last liquid 03/31/25 03/31/25 1405   Time of last liquid 1000 03/31/25 1405   Date of last solid 03/30/25 03/31/25 1405   Time of last solid 1900 03/31/25 1405

## 2025-04-01 ENCOUNTER — RESULTS FOLLOW-UP (OUTPATIENT)
Dept: NON INVASIVE DIAGNOSTICS | Facility: HOSPITAL | Age: 85
End: 2025-04-01

## 2025-04-02 ENCOUNTER — OFFICE VISIT (OUTPATIENT)
Dept: PHYSICAL THERAPY | Age: 85
End: 2025-04-02
Payer: MEDICARE

## 2025-04-02 DIAGNOSIS — R26.9 GAIT ABNORMALITY: ICD-10-CM

## 2025-04-02 DIAGNOSIS — M25.551 RIGHT HIP PAIN: Primary | ICD-10-CM

## 2025-04-02 PROCEDURE — 97110 THERAPEUTIC EXERCISES: CPT

## 2025-04-02 NOTE — PROGRESS NOTES
Daily Note     Today's date: 2025  Patient name: Symone Damico  : 1940  MRN: [id number]  Referring provider: Kassandra Zarate MD  Dx:   Encounter Diagnosis     ICD-10-CM    1. Right hip pain  M25.551       2. Gait abnormality  R26.9                      Subjective: Pt reports that she is in pain today but pushing through it. She still notes symptoms in R thigh that comes and goes. At times it has been better. She feels okay after she leaves PT and has felt PT has been somewhat helpful.       Objective: See treatment diary below      Assessment: Pt has completed 7 OP PT visits with minimal benefit. She continues to demonstrate decreased hip ROM and strength 2* increased pain. Provided pt and pt's aide with information to contact [facility]'s orthopedic team and [facility] spine and pain management in order to further evaluate pt's hip and LBP. Also provided pt with updated HEP of exercises she can't continue with at home with presence of aide to maintain hip AROM and LE strength. At this time she will d/c from OP PT. She is agreeable with this POC. All questions answered. Tolerated treatment well. Patient demonstrated fatigue post treatment, exhibited good technique with therapeutic exercises, and would benefit from continued PT      Plan:  D/C to HEP. F/u with orthopedics or spine and pain management     Precautions: FALLS RISK, [place], R hip pain, LBP, L sided Bell's Palsy, Chronic systolic heart failure      Manuals 2/10 2/24 3/5 3/12 3/19 3/26 4                                                          Neuro Re-Ed                                                                                                        Ther Ex             Nu step for Le ROM/strength   10' L1 S#7 10' L1 10' L1 10' L1 10' L1 10' L1      Standing lumbar ext 2x10 2x10  2x10  2x10 2x10 2x10  2x10       Standing hip SLR's 2x10 2x10 ea 2x10  2x10 ea  2x10 ea 2x10 ea 2x10 ea      HR/TR     x20 x20 x20      Ham curl     x20 x20 x20     "  Step ups    X15 4\" X15 ea 4\" X15 4\" ea X15 ea 4\"      Mini squats  x20 X20  x20 x20  X20      Sidestepping             Seated SKTC 2x10     2x10        Seated hip add iso  10\"x10 10\"x10  10\"x10 10\"x10 NT 10\"x10      Seated hip abd TB  2x10 blue 2x10 blue 2x10 blue 2x10 blue NT 2x10 blue      Seated LAQ  2x10 2x10  2x10 2x10  NT 2x10      Sit-to-stand  x15 x15 x15 x15 2x10 2x10      Seated lumbar flex w/ pb  2x10  2x10  2x10  2x10  2x10  2x10                                                                                     Ther Activity                                       Gait Training                                       Modalities                                                      "

## 2025-04-04 ENCOUNTER — APPOINTMENT (OUTPATIENT)
Dept: LAB | Facility: CLINIC | Age: 85
End: 2025-04-04
Payer: MEDICARE

## 2025-04-04 DIAGNOSIS — K72.90 DECOMPENSATED CIRRHOSIS (HCC): ICD-10-CM

## 2025-04-04 DIAGNOSIS — K74.60 DECOMPENSATED CIRRHOSIS (HCC): ICD-10-CM

## 2025-04-04 DIAGNOSIS — D69.3 CHRONIC ITP (IDIOPATHIC THROMBOCYTOPENIA) (HCC): ICD-10-CM

## 2025-04-04 DIAGNOSIS — K75.4 AUTOIMMUNE HEPATITIS TREATED WITH STEROIDS (HCC): ICD-10-CM

## 2025-04-04 LAB
ALBUMIN SERPL BCG-MCNC: 3.7 G/DL (ref 3.5–5)
ALP SERPL-CCNC: 45 U/L (ref 34–104)
ALT SERPL W P-5'-P-CCNC: 28 U/L (ref 7–52)
ANION GAP SERPL CALCULATED.3IONS-SCNC: 6 MMOL/L (ref 4–13)
ANISOCYTOSIS BLD QL SMEAR: PRESENT
AST SERPL W P-5'-P-CCNC: 35 U/L (ref 13–39)
BASOPHILS # BLD AUTO: 0.02 THOUSANDS/ÂΜL (ref 0–0.1)
BASOPHILS NFR BLD AUTO: 1 % (ref 0–1)
BILIRUB SERPL-MCNC: 0.71 MG/DL (ref 0.2–1)
BUN SERPL-MCNC: 22 MG/DL (ref 5–25)
CALCIUM SERPL-MCNC: 9.2 MG/DL (ref 8.4–10.2)
CHLORIDE SERPL-SCNC: 102 MMOL/L (ref 96–108)
CO2 SERPL-SCNC: 33 MMOL/L (ref 21–32)
CREAT SERPL-MCNC: 0.78 MG/DL (ref 0.6–1.3)
EOSINOPHIL # BLD AUTO: 0.01 THOUSAND/ÂΜL (ref 0–0.61)
EOSINOPHIL NFR BLD AUTO: 0 % (ref 0–6)
ERYTHROCYTE [DISTWIDTH] IN BLOOD BY AUTOMATED COUNT: 15.6 % (ref 11.6–15.1)
GFR SERPL CREATININE-BSD FRML MDRD: 68 ML/MIN/1.73SQ M
GLUCOSE SERPL-MCNC: 93 MG/DL (ref 65–140)
HCT VFR BLD AUTO: 32.5 % (ref 34.8–46.1)
HGB BLD-MCNC: 10.9 G/DL (ref 11.5–15.4)
IGG SERPL-MCNC: 945 MG/DL (ref 635–1741)
IMM GRANULOCYTES # BLD AUTO: 0.02 THOUSAND/UL (ref 0–0.2)
IMM GRANULOCYTES NFR BLD AUTO: 1 % (ref 0–2)
INR PPP: 1.09 (ref 0.85–1.19)
LYMPHOCYTES # BLD AUTO: 0.48 THOUSANDS/ÂΜL (ref 0.6–4.47)
LYMPHOCYTES NFR BLD AUTO: 13 % (ref 14–44)
MCH RBC QN AUTO: 31.4 PG (ref 26.8–34.3)
MCHC RBC AUTO-ENTMCNC: 33.5 G/DL (ref 31.4–37.4)
MCV RBC AUTO: 94 FL (ref 82–98)
MONOCYTES # BLD AUTO: 0.25 THOUSAND/ÂΜL (ref 0.17–1.22)
MONOCYTES NFR BLD AUTO: 7 % (ref 4–12)
NEUTROPHILS # BLD AUTO: 2.93 THOUSANDS/ÂΜL (ref 1.85–7.62)
NEUTS SEG NFR BLD AUTO: 78 % (ref 43–75)
NRBC BLD AUTO-RTO: 0 /100 WBCS
PLATELET # BLD AUTO: 99 THOUSANDS/UL (ref 149–390)
PLATELET BLD QL SMEAR: ABNORMAL
PMV BLD AUTO: 11.4 FL (ref 8.9–12.7)
POTASSIUM SERPL-SCNC: 3.5 MMOL/L (ref 3.5–5.3)
PROT SERPL-MCNC: 6.5 G/DL (ref 6.4–8.4)
PROTHROMBIN TIME: 14.8 SECONDS (ref 12.3–15)
RBC # BLD AUTO: 3.47 MILLION/UL (ref 3.81–5.12)
RBC MORPH BLD: PRESENT
SODIUM SERPL-SCNC: 141 MMOL/L (ref 135–147)
WBC # BLD AUTO: 3.71 THOUSAND/UL (ref 4.31–10.16)

## 2025-04-04 PROCEDURE — 85610 PROTHROMBIN TIME: CPT

## 2025-04-04 PROCEDURE — 36415 COLL VENOUS BLD VENIPUNCTURE: CPT

## 2025-04-04 PROCEDURE — 80053 COMPREHEN METABOLIC PANEL: CPT

## 2025-04-04 PROCEDURE — 82784 ASSAY IGA/IGD/IGG/IGM EACH: CPT

## 2025-04-04 PROCEDURE — 85025 COMPLETE CBC W/AUTO DIFF WBC: CPT

## 2025-04-08 NOTE — PROGRESS NOTES
[facility]  [address], [address]  [place] [id number]  [phone]    Care Conference    NAME: Symone Damico AGE: 85 y.o. SEX: female  YOB: 1940  DATE OF ASSESSMENT: 02/21/25  DATE OF CONFERENCE: 04/11/25    Family Present: Caregiver, and her two sons.   Staff Present: ALONZO Whitney    Patient / Family Goals of Care: Review cognitive decline and associated symptoms, discuss treatment options and care planning.     Medical Concerns (Current/Historical): depressive disorder, moderate protein-calorie malnutrition    Geriatric Syndromes/Age Related Syndromes: Mild late onset Alzheimer's dementia without behavioral disturbance, ambulatory dysfunction    Neuropsychological  Staging: Mild late onset Alzheimer's dementia without behavioral disturbance  [place] Cognitive Assessment: 14/30, deficits in: Visuospatial, recall memory, attention   Geriatric Depression Screen: 0/15    Safe Environment:    Driving- patient no longer drives.  This is not a concern  Falls- no recent falls, recently completed PT services, will re-order if need arise in the near future.   Medication Management- caregiver helps her set up medications.  Ensure she is taking medications appropriately.  Monitor for changes in mood, sleep, pain control. Notify provider if any concerns  Optimize all acute and chronic conditions.  Continue to follow-up with PCP and specialist as directed for management  Ensure adequate hydration, good nutrition    Decision-making capacity: Limited   Caregiver Review: No needs at this time, currently has a caregiver and additional resources. Did discuss if needed SW is available to assist with further needs.   ACP Review: POA and Living Will in place.     Remain active physically, mentally and socially  Pharmaceutical and non-pharmaceutical interventions discussed  Engage in cognitively challenging exercises such as crosswords and puzzles  Maintain chronic  conditions under control  Repeat cognitive assessment in 6 months, repeat MOCA      Diagnostic Studies  Review of bloodwork: Labs WNL.   Review of MRI NeuroQuant: IMPRESSION: 1. No acute intracranial abnormality. 2. NeuroQuant analysis was performed: Low hippocampal volume and enlargement of the adjacent inferior lateral ventricles suggestive of local ex-vacuo dilatation.  Findings support medial temporal lobe focused neurodegenerative etiology.    Physical Finding Impacting Function   Fall Risk: Moderate    Activities of Daily Living: Independent   Instrumental Activities of Daily Living: assist    Encourage appropriate footwear at all times  Review fall risk prevention tips and adjust within the home environment as needed    Medications Reviewed   Medications reviewed and appropriate for chronic conditions.   Check with PCP before using over the counter medications  Avoid over the counter medications that can affect cognition (e.g., Benadryl, Tylenol PM)   Avoid NSAIDs due to risk of GI bleed and renal impairment    Other Findings   Overall health  BMI: 18.60 kg/m2  Maintain well-balanced diet  Continue following with primary care physician regularly  Depressive disorder  GDS: 0  Patient is not on any medications for mood stabilization at this time.  Still endorses more issues with her mood due to unresolved grief from the loss of her  about a year ago.  Discussed SSRI therapy.  Encouraged stress relief techniques, relaxation, and emotional support.   Will order behavioral health resources and grief counseling.  Moderate protein-calorie malnutrition  Weight has been an ongoing issue that has been followed by PCP.  Educated patient to remain hydrated and with adequate nutrition.  Monitor for increasing weight loss.   Ambulatory dysfunction  Reports ambulating with a cane, will use rollator for longer distances.  Denies any recent falls.  Educated on strict fall precautions.   Currently in PT for ambulatory  dysfunction.  Will order OT services within the home.  Encourage patient to continue to work with PT services at this time.  Will await set up with OT services.  Sleep  Patient reports she is sleeping well at times.  Denies using any over-the-counter sleep aid at this time.  Educated she can use melatonin if needed to help aid with sleep.  Encouraged good sleep hygiene, avoid daytime napping, avoiding OTC medications that may cause oversedation/increased confusion.       Social /safety recommendations & considerations  Consider an Adult Day Program for positive socialization, physical exercise, cognitive stimulation and family respite  Consider a home care aide to assist with daily care needs  Stay in touch with family and friends  Plan self-care activities for your mental well-being each week  Recommend review of fall risk prevention tips  Recommend use of fall precautions including fall alert device  Scam safety: do not answer suspicious mail, phone calls, email or text messages without having another person review for safety  Consider assistance for medication administration such as blister packaging or use of an automated pill dispenser  Recommend contacting your local Critical access hospital Agency on Aging for possible eligible programs such as OPTIONS, Caregiver Support Program, or WAIVER    Long term care recommendations  Maintain updated advance directives and provide a copy to your primary care provider  Consider caregiver support groups and educational resources through the Alzheimer's Association; access Alzheimer's Association 24/7 Helpline at 1-494.679.5093  [facility] does offer a monthly caregiver support group. If interested, please speak with a .  Utilize reorientation and redirection as needed (dependent on situation)  Review educational information provided  Recommended literature: 36 Hour Day, Untangling Alzheimer's, Learning to Speak Alzheimer's    Patient and family  verbalized understanding of above care plan and in agreement with plan. For care coordination purposes, this care plan will be shared with your primary care provider. With any questions, please contact our office at [phone].       I have spent a total time of 35 minutes in caring for this patient on the day of the visit/encounter including Diagnostic results, Prognosis, Risks and benefits of tx options, Instructions for management, Patient and family education, Importance of tx compliance, Risk factor reductions, Impressions, Counseling / Coordination of care, Documenting in the medical record, and Reviewing/placing orders in the medical record (including tests, medications, and/or procedures).

## 2025-04-11 ENCOUNTER — OFFICE VISIT (OUTPATIENT)
Age: 85
End: 2025-04-11
Payer: MEDICARE

## 2025-04-11 VITALS
HEART RATE: 80 BPM | TEMPERATURE: 97.6 F | SYSTOLIC BLOOD PRESSURE: 126 MMHG | DIASTOLIC BLOOD PRESSURE: 60 MMHG | WEIGHT: 108.4 LBS | BODY MASS INDEX: 19.2 KG/M2

## 2025-04-11 DIAGNOSIS — F02.A0 MILD LATE ONSET ALZHEIMER'S DEMENTIA WITHOUT BEHAVIORAL DISTURBANCE, PSYCHOTIC DISTURBANCE, MOOD DISTURBANCE, OR ANXIETY (HCC): Primary | ICD-10-CM

## 2025-04-11 DIAGNOSIS — G30.1 MILD LATE ONSET ALZHEIMER'S DEMENTIA WITHOUT BEHAVIORAL DISTURBANCE, PSYCHOTIC DISTURBANCE, MOOD DISTURBANCE, OR ANXIETY (HCC): Primary | ICD-10-CM

## 2025-04-11 PROCEDURE — 99483 ASSMT & CARE PLN PT COG IMP: CPT

## 2025-04-11 NOTE — PATIENT INSTRUCTIONS
[facility]  [address], [address]  [place] [id number]  [phone]    Care Conference    NAME: Symone Damico AGE: 85 y.o. SEX: female  YOB: 1940  DATE OF ASSESSMENT: 02/21/25  DATE OF CONFERENCE: 04/11/25    Family Present: Caregiver, and her two sons.   Staff Present: ALONZO Whitney    Patient / Family Goals of Care: Review cognitive decline and associated symptoms, discuss treatment options and care planning.     Medical Concerns (Current/Historical): depressive disorder, moderate protein-calorie malnutrition    Geriatric Syndromes/Age Related Syndromes: Mild late onset Alzheimer's dementia without behavioral disturbance, ambulatory dysfunction    Neuropsychological  Staging: Mild late onset Alzheimer's dementia without behavioral disturbance  [place] Cognitive Assessment: 14/30, deficits in: Visuospatial, recall memory, attention   Geriatric Depression Screen: 0/15    Safe Environment:    Driving- patient no longer drives.  This is not a concern  Falls- no recent falls, recently completed PT services, will re-order if need arise in the near future.   Medication Management- caregiver helps her set up medications.  Ensure she is taking medications appropriately.  Monitor for changes in mood, sleep, pain control. Notify provider if any concerns  Optimize all acute and chronic conditions.  Continue to follow-up with PCP and specialist as directed for management  Ensure adequate hydration, good nutrition    Decision-making capacity: Limited   Caregiver Review: No needs at this time, currently has a caregiver and additional resources. Did discuss if needed SW is available to assist with further needs.   ACP Review: POA and Living Will in place.     Remain active physically, mentally and socially  Pharmaceutical and non-pharmaceutical interventions discussed  Engage in cognitively challenging exercises such as crosswords and puzzles  Maintain chronic  conditions under control  Repeat cognitive assessment in 6 months, repeat MOCA      Diagnostic Studies  Review of bloodwork: Labs WNL.   Review of MRI NeuroQuant: IMPRESSION: 1. No acute intracranial abnormality. 2. NeuroQuant analysis was performed: Low hippocampal volume and enlargement of the adjacent inferior lateral ventricles suggestive of local ex-vacuo dilatation.  Findings support medial temporal lobe focused neurodegenerative etiology.    Physical Finding Impacting Function   Fall Risk: Moderate    Activities of Daily Living: Independent   Instrumental Activities of Daily Living: assist    Encourage appropriate footwear at all times  Review fall risk prevention tips and adjust within the home environment as needed    Medications Reviewed   Medications reviewed and appropriate for chronic conditions.   Check with PCP before using over the counter medications  Avoid over the counter medications that can affect cognition (e.g., Benadryl, Tylenol PM)   Avoid NSAIDs due to risk of GI bleed and renal impairment    Other Findings   Overall health  BMI: 18.60 kg/m2  Maintain well-balanced diet  Continue following with primary care physician regularly  Depressive disorder  GDS: 0  Patient is not on any medications for mood stabilization at this time.  Still endorses more issues with her mood due to unresolved grief from the loss of her  about a year ago.  Discussed SSRI therapy.  Encouraged stress relief techniques, relaxation, and emotional support.   Will order behavioral health resources and grief counseling.  Moderate protein-calorie malnutrition  Weight has been an ongoing issue that has been followed by PCP.  Educated patient to remain hydrated and with adequate nutrition.  Monitor for increasing weight loss.   Ambulatory dysfunction  Reports ambulating with a cane, will use rollator for longer distances.  Denies any recent falls.  Educated on strict fall precautions.   Currently in PT for ambulatory  dysfunction.  Will order OT services within the home.  Encourage patient to continue to work with PT services at this time.  Will await set up with OT services.  Sleep  Patient reports she is sleeping well at times.  Denies using any over-the-counter sleep aid at this time.  Educated she can use melatonin if needed to help aid with sleep.  Encouraged good sleep hygiene, avoid daytime napping, avoiding OTC medications that may cause oversedation/increased confusion.       Social /safety recommendations & considerations  Consider an Adult Day Program for positive socialization, physical exercise, cognitive stimulation and family respite  Consider a home care aide to assist with daily care needs  Stay in touch with family and friends  Plan self-care activities for your mental well-being each week  Recommend review of fall risk prevention tips  Recommend use of fall precautions including fall alert device  Scam safety: do not answer suspicious mail, phone calls, email or text messages without having another person review for safety  Consider assistance for medication administration such as blister packaging or use of an automated pill dispenser  Recommend contacting your local [facility] on Aging for possible eligible programs such as OPTIONS, Caregiver Support Program, or WAIVER    Long term care recommendations  Maintain updated advance directives and provide a copy to your primary care provider  Consider caregiver support groups and educational resources through the Alzheimer's Association; access Alzheimer's Association 24/7 Helpline at 1-625.467.1783  [facility] does offer a monthly caregiver support group. If interested, please speak with a .  Utilize reorientation and redirection as needed (dependent on situation)  Review educational information provided  Recommended literature: 36 Hour Day, Untangling Alzheimer's, Learning to Speak Alzheimer's    Patient and family  verbalized understanding of above care plan and in agreement with plan. For care coordination purposes, this care plan will be shared with your primary care provider. With any questions, please contact our office at [phone].

## 2025-04-11 NOTE — PROGRESS NOTES
"[facility]  [address], [address]  [place] [address]  [phone]    Care Conference: Resources Provided    LSW provided the following resources, along with a copy of the care plan to patient's son:  ERICK Damico  [address] [place] [address]     General Information  - 10 Ways to Love Your Brain  - Memory loss ladder  - Packet with Alzheimer's Association information including: definition of dementia, communication tips for different stages, common behaviors and response strategies  - NIH LAVINIA \"Now What? Next Steps After an Alzheimer's Diagnosis\"    Caregiver Support  - Flyer for [facility] Virtual Caregiver Support Group (meeting 2x per month by Zoom)  - Alzheimer's Association Rx Pad (guide to website and 24/7 helpline, 1-648.222.2272)    Safety  - CDC fall prevention / home safety checklist    Community Resources  - [facility] Aging in Place Resource Guide (contains information about higher levels of care, homecare, etc.)  - [facility]: Dementia Resource Guide & Brochure      Resources provided at initial assessment:   -MOMs meals brochure  -Adult day center list  -Senior center list  -Homecare list  -Waiver/OPTIONS information  -lifeline fall alert  -apple watch information  -LVAIP booklet  "

## 2025-04-14 DIAGNOSIS — I43 CARDIAC AMYLOIDOSIS (HCC): Primary | ICD-10-CM

## 2025-04-14 DIAGNOSIS — E85.4 CARDIAC AMYLOIDOSIS (HCC): Primary | ICD-10-CM

## 2025-04-15 ENCOUNTER — TELEPHONE (OUTPATIENT)
Age: 85
End: 2025-04-15

## 2025-04-15 NOTE — TELEPHONE ENCOUNTER
PA for attruby 3546 mg SUBMITTED to [facility]    via    [x]CMM-KEY: [id number]  []Surescripts-Case ID #   []Availity-Auth ID # NDC #   []Faxed to plan   []Other website   []Phone call Case ID #     []PA sent as URGENT    All office notes, labs and other pertaining documents and studies sent. Clinical questions answered. Awaiting determination from insurance company.     Turnaround time for your insurance to make a decision on your Prior Authorization can take 7-21 business days.

## 2025-04-15 NOTE — TELEPHONE ENCOUNTER
[facility] specialty pharmacy called regarding Acoramidis. It requires prior auth, and they started it. They called to provide the key code:  [id number]

## 2025-04-15 NOTE — TELEPHONE ENCOUNTER
PA for attruby 356 mg  CANCELLED due to     [x] Prior authorzation already in progress  []Medication already on Formulary  []Brand Name Preferred  []Patient no longer covered by insurance  []Therapy Changed/Medication Discontinued    Patient advised by     []My Chart Message  []Phone call    Scanned into [place]  no

## 2025-04-16 ENCOUNTER — TELEPHONE (OUTPATIENT)
Dept: CARDIOLOGY CLINIC | Facility: CLINIC | Age: 85
End: 2025-04-16

## 2025-04-16 NOTE — TELEPHONE ENCOUNTER
PA needed for Attruby (acoramidis), request from [facility] in [place].    KEY [id number]    356mg 2 tabs bid  #120 for 30 days

## 2025-04-16 NOTE — TELEPHONE ENCOUNTER
Please submit for PA, likely will be denied.  Please notify me when denied and I will do an appeal letter.  If appeal denied, patient will be eligible for patient assistance.

## 2025-04-18 ENCOUNTER — TELEPHONE (OUTPATIENT)
Dept: CARDIOLOGY CLINIC | Facility: CLINIC | Age: 85
End: 2025-04-18

## 2025-04-18 NOTE — TELEPHONE ENCOUNTER
PA for Attruby 356 mg SUBMITTED to [facility]     via    [x]CMM-KEY: [id number]  []Surescripts-Case ID #   []Availity-Auth ID # NDC #   []Faxed to plan   []Other website   []Phone call Case ID #     []PA sent as URGENT    All office notes, labs and other pertaining documents and studies sent. Clinical questions answered. Awaiting determination from insurance company.     Turnaround time for your insurance to make a decision on your Prior Authorization can take 7-21 business days.

## 2025-04-18 NOTE — TELEPHONE ENCOUNTER
Received fax from [facility] that they have been unable to connect with patient as they need to speak with patient in order to move forward with their process.     They need to speak actually with patient's son, JT.  I will send a ALTILIA message to patient's son to reach out to them.

## 2025-04-21 NOTE — TELEPHONE ENCOUNTER
Caller: Vanessa ( [facility])      Doctor: Dr. Damico    Reason for call: Vanessa called to advise that attruby 356 mg medication was denied & if the denial letter was received.     Call back#: Vanessa [phone]

## 2025-04-21 NOTE — TELEPHONE ENCOUNTER
PA for Attruby 356 mg  DENIED    Reason:(Screenshot if applicable)        Message sent to office clinical pool Yes    Denial letter scanned into [place] Yes    Appeal started No (Provider will need to decide if appeal is warranted and send clinical documentation to Prior Authorization Team for initiation.)    **Please follow up with your patient regarding denial and next steps**

## 2025-04-28 DIAGNOSIS — R09.81 NASAL CONGESTION: ICD-10-CM

## 2025-04-28 RX ORDER — FLUTICASONE PROPIONATE 50 MCG
SPRAY, SUSPENSION (ML) NASAL
Qty: 48 ML | Refills: 1 | Status: SHIPPED | OUTPATIENT
Start: 2025-04-28

## 2025-05-02 ENCOUNTER — APPOINTMENT (OUTPATIENT)
Dept: LAB | Facility: CLINIC | Age: 85
End: 2025-05-02
Attending: INTERNAL MEDICINE
Payer: MEDICARE

## 2025-05-02 DIAGNOSIS — K74.60 DECOMPENSATED CIRRHOSIS (HCC): ICD-10-CM

## 2025-05-02 DIAGNOSIS — K72.90 DECOMPENSATED CIRRHOSIS (HCC): ICD-10-CM

## 2025-05-02 DIAGNOSIS — D69.3 CHRONIC ITP (IDIOPATHIC THROMBOCYTOPENIA) (HCC): ICD-10-CM

## 2025-05-02 DIAGNOSIS — K75.4 AUTOIMMUNE HEPATITIS TREATED WITH STEROIDS (HCC): ICD-10-CM

## 2025-05-02 LAB
ALBUMIN SERPL BCG-MCNC: 3.7 G/DL (ref 3.5–5)
ALP SERPL-CCNC: 38 U/L (ref 34–104)
ALT SERPL W P-5'-P-CCNC: 25 U/L (ref 7–52)
ANION GAP SERPL CALCULATED.3IONS-SCNC: 5 MMOL/L (ref 4–13)
AST SERPL W P-5'-P-CCNC: 28 U/L (ref 13–39)
BASOPHILS # BLD AUTO: 0.02 THOUSANDS/ÂΜL (ref 0–0.1)
BASOPHILS NFR BLD AUTO: 1 % (ref 0–1)
BILIRUB SERPL-MCNC: 0.82 MG/DL (ref 0.2–1)
BUN SERPL-MCNC: 21 MG/DL (ref 5–25)
CALCIUM SERPL-MCNC: 9.8 MG/DL (ref 8.4–10.2)
CHLORIDE SERPL-SCNC: 103 MMOL/L (ref 96–108)
CO2 SERPL-SCNC: 33 MMOL/L (ref 21–32)
CREAT SERPL-MCNC: 0.72 MG/DL (ref 0.6–1.3)
EOSINOPHIL # BLD AUTO: 0.01 THOUSAND/ÂΜL (ref 0–0.61)
EOSINOPHIL NFR BLD AUTO: 0 % (ref 0–6)
ERYTHROCYTE [DISTWIDTH] IN BLOOD BY AUTOMATED COUNT: 15 % (ref 11.6–15.1)
GFR SERPL CREATININE-BSD FRML MDRD: 75 ML/MIN/1.73SQ M
GLUCOSE SERPL-MCNC: 84 MG/DL (ref 65–140)
HCT VFR BLD AUTO: 32.1 % (ref 34.8–46.1)
HGB BLD-MCNC: 10.9 G/DL (ref 11.5–15.4)
IGG SERPL-MCNC: 995 MG/DL (ref 635–1741)
IMM GRANULOCYTES # BLD AUTO: 0.02 THOUSAND/UL (ref 0–0.2)
IMM GRANULOCYTES NFR BLD AUTO: 1 % (ref 0–2)
INR PPP: 1.15 (ref 0.85–1.19)
LYMPHOCYTES # BLD AUTO: 0.65 THOUSANDS/ÂΜL (ref 0.6–4.47)
LYMPHOCYTES NFR BLD AUTO: 18 % (ref 14–44)
MCH RBC QN AUTO: 32.3 PG (ref 26.8–34.3)
MCHC RBC AUTO-ENTMCNC: 34 G/DL (ref 31.4–37.4)
MCV RBC AUTO: 95 FL (ref 82–98)
MONOCYTES # BLD AUTO: 0.36 THOUSAND/ÂΜL (ref 0.17–1.22)
MONOCYTES NFR BLD AUTO: 10 % (ref 4–12)
NEUTROPHILS # BLD AUTO: 2.61 THOUSANDS/ÂΜL (ref 1.85–7.62)
NEUTS SEG NFR BLD AUTO: 70 % (ref 43–75)
NRBC BLD AUTO-RTO: 0 /100 WBCS
PLATELET # BLD AUTO: 91 THOUSANDS/UL (ref 149–390)
PMV BLD AUTO: 11.2 FL (ref 8.9–12.7)
POTASSIUM SERPL-SCNC: 4 MMOL/L (ref 3.5–5.3)
PROT SERPL-MCNC: 6.4 G/DL (ref 6.4–8.4)
PROTHROMBIN TIME: 15.5 SECONDS (ref 12.3–15)
RBC # BLD AUTO: 3.37 MILLION/UL (ref 3.81–5.12)
SODIUM SERPL-SCNC: 141 MMOL/L (ref 135–147)
WBC # BLD AUTO: 3.67 THOUSAND/UL (ref 4.31–10.16)

## 2025-05-02 PROCEDURE — 80053 COMPREHEN METABOLIC PANEL: CPT

## 2025-05-02 PROCEDURE — 85610 PROTHROMBIN TIME: CPT

## 2025-05-02 PROCEDURE — 85025 COMPLETE CBC W/AUTO DIFF WBC: CPT

## 2025-05-02 PROCEDURE — 36415 COLL VENOUS BLD VENIPUNCTURE: CPT

## 2025-05-02 PROCEDURE — 82784 ASSAY IGA/IGD/IGG/IGM EACH: CPT

## 2025-05-03 ENCOUNTER — RESULTS FOLLOW-UP (OUTPATIENT)
Dept: GASTROENTEROLOGY | Facility: CLINIC | Age: 85
End: 2025-05-03

## 2025-05-16 ENCOUNTER — APPOINTMENT (OUTPATIENT)
Dept: LAB | Facility: CLINIC | Age: 85
End: 2025-05-16
Attending: INTERNAL MEDICINE
Payer: MEDICARE

## 2025-05-16 DIAGNOSIS — D69.3 CHRONIC ITP (IDIOPATHIC THROMBOCYTOPENIA) (HCC): ICD-10-CM

## 2025-05-16 DIAGNOSIS — K74.60 DECOMPENSATED CIRRHOSIS (HCC): ICD-10-CM

## 2025-05-16 DIAGNOSIS — K72.90 DECOMPENSATED CIRRHOSIS (HCC): ICD-10-CM

## 2025-05-16 DIAGNOSIS — K75.4 AUTOIMMUNE HEPATITIS TREATED WITH STEROIDS (HCC): ICD-10-CM

## 2025-05-16 LAB
ALBUMIN SERPL BCG-MCNC: 3.8 G/DL (ref 3.5–5)
ALP SERPL-CCNC: 36 U/L (ref 34–104)
ALT SERPL W P-5'-P-CCNC: 25 U/L (ref 7–52)
ANION GAP SERPL CALCULATED.3IONS-SCNC: 3 MMOL/L (ref 4–13)
ANISOCYTOSIS BLD QL SMEAR: PRESENT
AST SERPL W P-5'-P-CCNC: 29 U/L (ref 13–39)
BASOPHILS # BLD AUTO: 0.01 THOUSANDS/ÂΜL (ref 0–0.1)
BASOPHILS NFR BLD AUTO: 0 % (ref 0–1)
BILIRUB SERPL-MCNC: 0.67 MG/DL (ref 0.2–1)
BUN SERPL-MCNC: 20 MG/DL (ref 5–25)
CALCIUM SERPL-MCNC: 9.4 MG/DL (ref 8.4–10.2)
CHLORIDE SERPL-SCNC: 105 MMOL/L (ref 96–108)
CO2 SERPL-SCNC: 33 MMOL/L (ref 21–32)
CREAT SERPL-MCNC: 0.67 MG/DL (ref 0.6–1.3)
EOSINOPHIL # BLD AUTO: 0 THOUSAND/ÂΜL (ref 0–0.61)
EOSINOPHIL NFR BLD AUTO: 0 % (ref 0–6)
ERYTHROCYTE [DISTWIDTH] IN BLOOD BY AUTOMATED COUNT: 15.1 % (ref 11.6–15.1)
GFR SERPL CREATININE-BSD FRML MDRD: 79 ML/MIN/1.73SQ M
GLUCOSE SERPL-MCNC: 72 MG/DL (ref 65–140)
HCT VFR BLD AUTO: 33.3 % (ref 34.8–46.1)
HGB BLD-MCNC: 11 G/DL (ref 11.5–15.4)
IGG SERPL-MCNC: 1065 MG/DL (ref 635–1741)
IMM GRANULOCYTES # BLD AUTO: 0.01 THOUSAND/UL (ref 0–0.2)
IMM GRANULOCYTES NFR BLD AUTO: 0 % (ref 0–2)
INR PPP: 1.19 (ref 0.85–1.19)
LG PLATELETS BLD QL SMEAR: PRESENT
LYMPHOCYTES # BLD AUTO: 0.64 THOUSANDS/ÂΜL (ref 0.6–4.47)
LYMPHOCYTES NFR BLD AUTO: 19 % (ref 14–44)
MCH RBC QN AUTO: 31.4 PG (ref 26.8–34.3)
MCHC RBC AUTO-ENTMCNC: 33 G/DL (ref 31.4–37.4)
MCV RBC AUTO: 95 FL (ref 82–98)
MONOCYTES # BLD AUTO: 0.36 THOUSAND/ÂΜL (ref 0.17–1.22)
MONOCYTES NFR BLD AUTO: 11 % (ref 4–12)
NEUTROPHILS # BLD AUTO: 2.31 THOUSANDS/ÂΜL (ref 1.85–7.62)
NEUTS SEG NFR BLD AUTO: 70 % (ref 43–75)
NRBC BLD AUTO-RTO: 0 /100 WBCS
OVALOCYTES BLD QL SMEAR: PRESENT
PLATELET # BLD AUTO: 56 THOUSANDS/UL (ref 149–390)
PLATELET BLD QL SMEAR: ABNORMAL
PMV BLD AUTO: 11.5 FL (ref 8.9–12.7)
POIKILOCYTOSIS BLD QL SMEAR: PRESENT
POTASSIUM SERPL-SCNC: 3.4 MMOL/L (ref 3.5–5.3)
PROT SERPL-MCNC: 6.5 G/DL (ref 6.4–8.4)
PROTHROMBIN TIME: 15.8 SECONDS (ref 12.3–15)
RBC # BLD AUTO: 3.5 MILLION/UL (ref 3.81–5.12)
RBC MORPH BLD: PRESENT
SODIUM SERPL-SCNC: 141 MMOL/L (ref 135–147)
WBC # BLD AUTO: 3.33 THOUSAND/UL (ref 4.31–10.16)

## 2025-05-16 PROCEDURE — 80053 COMPREHEN METABOLIC PANEL: CPT

## 2025-05-16 PROCEDURE — 85610 PROTHROMBIN TIME: CPT

## 2025-05-16 PROCEDURE — 36415 COLL VENOUS BLD VENIPUNCTURE: CPT

## 2025-05-16 PROCEDURE — 82784 ASSAY IGA/IGD/IGG/IGM EACH: CPT

## 2025-05-16 PROCEDURE — 85025 COMPLETE CBC W/AUTO DIFF WBC: CPT

## 2025-05-18 ENCOUNTER — RESULTS FOLLOW-UP (OUTPATIENT)
Dept: GASTROENTEROLOGY | Facility: CLINIC | Age: 85
End: 2025-05-18

## 2025-05-20 ENCOUNTER — NURSE TRIAGE (OUTPATIENT)
Age: 85
End: 2025-05-20

## 2025-05-20 NOTE — TELEPHONE ENCOUNTER
"Spoke with [employer], following up on the appeals process. [facility] did not receive the information that was faxed last week.    Please resend to [phone], Attn: Appeals Dept. States there should also be an appeals form?    If Galdino can be of assistance, Vanessa can be contacted at [phone], [phone]        Reason for Disposition   Follow-up information-only call to recent contact, no triage required     Galdino provided a different fax number for [facility] [phone], if the forms/letters can be sent here to the attention of the Appeals Dept.    Answer Assessment - Initial Assessment Questions  1. REASON FOR CALL: \"What is the main reason for your call?\" or \"How can I best help you?\"      [employer] calling to follow up on appeal for Attruby, see which fax number forms/letters were sent to.    Protocols used: Information Only Call - No Triage-Adult-[place]    "

## 2025-05-21 NOTE — TELEPHONE ENCOUNTER
Refaxed Appeal information/forms, letter and records to [facility] at correct fax number. [phone].

## 2025-05-27 ENCOUNTER — TELEPHONE (OUTPATIENT)
Dept: CARDIOLOGY CLINIC | Facility: CLINIC | Age: 85
End: 2025-05-27

## 2025-05-27 NOTE — TELEPHONE ENCOUNTER
Call from Attruby - patient being referred to Quick Start Program as still waiting on insurance appeal.

## 2025-05-27 NOTE — TELEPHONE ENCOUNTER
I just checked with the pharmacy, and this case is still pending an insurance decision.  Insurance stated to Galdino today that they will have a decision by 6/4. I was just told that the QuickStart request has to wait two more days to officially submit.  The pharmacy and I will follow up on Thursday and keep you posted.  THANK YOU!      Symone Damico [id number]         Kind Regards,     Abelardo Swan    [place]

## 2025-06-04 ENCOUNTER — APPOINTMENT (OUTPATIENT)
Dept: LAB | Facility: CLINIC | Age: 85
End: 2025-06-04
Attending: INTERNAL MEDICINE
Payer: MEDICARE

## 2025-06-04 DIAGNOSIS — K72.90 DECOMPENSATED CIRRHOSIS (HCC): ICD-10-CM

## 2025-06-04 DIAGNOSIS — K75.4 AUTOIMMUNE HEPATITIS TREATED WITH STEROIDS (HCC): ICD-10-CM

## 2025-06-04 DIAGNOSIS — D50.0 IRON DEFICIENCY ANEMIA DUE TO CHRONIC BLOOD LOSS: ICD-10-CM

## 2025-06-04 DIAGNOSIS — K74.60 DECOMPENSATED CIRRHOSIS (HCC): ICD-10-CM

## 2025-06-04 DIAGNOSIS — D69.3 CHRONIC ITP (IDIOPATHIC THROMBOCYTOPENIA) (HCC): ICD-10-CM

## 2025-06-04 LAB
ALBUMIN SERPL BCG-MCNC: 3.7 G/DL (ref 3.5–5)
ALP SERPL-CCNC: 43 U/L (ref 34–104)
ALT SERPL W P-5'-P-CCNC: 52 U/L (ref 7–52)
ANION GAP SERPL CALCULATED.3IONS-SCNC: 4 MMOL/L (ref 4–13)
AST SERPL W P-5'-P-CCNC: 58 U/L (ref 13–39)
BASOPHILS # BLD AUTO: 0.02 THOUSANDS/ÂΜL (ref 0–0.1)
BASOPHILS NFR BLD AUTO: 1 % (ref 0–1)
BILIRUB SERPL-MCNC: 0.82 MG/DL (ref 0.2–1)
BUN SERPL-MCNC: 26 MG/DL (ref 5–25)
CALCIUM SERPL-MCNC: 9.4 MG/DL (ref 8.4–10.2)
CHLORIDE SERPL-SCNC: 101 MMOL/L (ref 96–108)
CO2 SERPL-SCNC: 36 MMOL/L (ref 21–32)
CREAT SERPL-MCNC: 0.93 MG/DL (ref 0.6–1.3)
EOSINOPHIL # BLD AUTO: 0.01 THOUSAND/ÂΜL (ref 0–0.61)
EOSINOPHIL NFR BLD AUTO: 0 % (ref 0–6)
ERYTHROCYTE [DISTWIDTH] IN BLOOD BY AUTOMATED COUNT: 15.4 % (ref 11.6–15.1)
FERRITIN SERPL-MCNC: 722 NG/ML (ref 30–307)
GFR SERPL CREATININE-BSD FRML MDRD: 55 ML/MIN/1.73SQ M
GLUCOSE P FAST SERPL-MCNC: 84 MG/DL (ref 65–99)
HCT VFR BLD AUTO: 33.9 % (ref 34.8–46.1)
HGB BLD-MCNC: 11.5 G/DL (ref 11.5–15.4)
IGG SERPL-MCNC: 1067 MG/DL (ref 635–1741)
IMM GRANULOCYTES # BLD AUTO: 0.03 THOUSAND/UL (ref 0–0.2)
IMM GRANULOCYTES NFR BLD AUTO: 1 % (ref 0–2)
INR PPP: 1.05 (ref 0.85–1.19)
IRON SATN MFR SERPL: 29 % (ref 15–50)
IRON SERPL-MCNC: 95 UG/DL (ref 50–212)
LYMPHOCYTES # BLD AUTO: 0.79 THOUSANDS/ÂΜL (ref 0.6–4.47)
LYMPHOCYTES NFR BLD AUTO: 29 % (ref 14–44)
MCH RBC QN AUTO: 31.7 PG (ref 26.8–34.3)
MCHC RBC AUTO-ENTMCNC: 33.9 G/DL (ref 31.4–37.4)
MCV RBC AUTO: 93 FL (ref 82–98)
MONOCYTES # BLD AUTO: 0.28 THOUSAND/ÂΜL (ref 0.17–1.22)
MONOCYTES NFR BLD AUTO: 10 % (ref 4–12)
NEUTROPHILS # BLD AUTO: 1.6 THOUSANDS/ÂΜL (ref 1.85–7.62)
NEUTS SEG NFR BLD AUTO: 59 % (ref 43–75)
NRBC BLD AUTO-RTO: 0 /100 WBCS
PLATELET # BLD AUTO: 83 THOUSANDS/UL (ref 149–390)
PMV BLD AUTO: 11.9 FL (ref 8.9–12.7)
POTASSIUM SERPL-SCNC: 3.2 MMOL/L (ref 3.5–5.3)
PROT SERPL-MCNC: 6.5 G/DL (ref 6.4–8.4)
PROTHROMBIN TIME: 14.4 SECONDS (ref 12.3–15)
RBC # BLD AUTO: 3.63 MILLION/UL (ref 3.81–5.12)
SODIUM SERPL-SCNC: 141 MMOL/L (ref 135–147)
TIBC SERPL-MCNC: 327.6 UG/DL (ref 250–450)
TRANSFERRIN SERPL-MCNC: 234 MG/DL (ref 203–362)
UIBC SERPL-MCNC: 233 UG/DL (ref 155–355)
WBC # BLD AUTO: 2.73 THOUSAND/UL (ref 4.31–10.16)

## 2025-06-04 PROCEDURE — 83550 IRON BINDING TEST: CPT

## 2025-06-04 PROCEDURE — 80053 COMPREHEN METABOLIC PANEL: CPT

## 2025-06-04 PROCEDURE — 83540 ASSAY OF IRON: CPT

## 2025-06-04 PROCEDURE — 85025 COMPLETE CBC W/AUTO DIFF WBC: CPT

## 2025-06-04 PROCEDURE — 36415 COLL VENOUS BLD VENIPUNCTURE: CPT

## 2025-06-04 PROCEDURE — 82728 ASSAY OF FERRITIN: CPT

## 2025-06-04 PROCEDURE — 85610 PROTHROMBIN TIME: CPT

## 2025-06-04 PROCEDURE — 82784 ASSAY IGA/IGD/IGG/IGM EACH: CPT

## 2025-06-05 ENCOUNTER — OFFICE VISIT (OUTPATIENT)
Age: 85
End: 2025-06-05
Payer: MEDICARE

## 2025-06-05 ENCOUNTER — PATIENT MESSAGE (OUTPATIENT)
Dept: FAMILY MEDICINE CLINIC | Facility: CLINIC | Age: 85
End: 2025-06-05

## 2025-06-05 VITALS
BODY MASS INDEX: 18.04 KG/M2 | WEIGHT: 101.8 LBS | SYSTOLIC BLOOD PRESSURE: 110 MMHG | HEART RATE: 74 BPM | HEIGHT: 63 IN | DIASTOLIC BLOOD PRESSURE: 60 MMHG

## 2025-06-05 DIAGNOSIS — R09.81 NASAL CONGESTION: ICD-10-CM

## 2025-06-05 DIAGNOSIS — K74.69 OTHER CIRRHOSIS OF LIVER (HCC): ICD-10-CM

## 2025-06-05 DIAGNOSIS — K76.6 PORTAL HYPERTENSION (HCC): ICD-10-CM

## 2025-06-05 DIAGNOSIS — E56.9 VITAMIN DEFICIENCY: ICD-10-CM

## 2025-06-05 DIAGNOSIS — D69.3 CHRONIC ITP (IDIOPATHIC THROMBOCYTOPENIA) (HCC): ICD-10-CM

## 2025-06-05 DIAGNOSIS — I85.00 ESOPHAGEAL VARICES WITHOUT BLEEDING, UNSPECIFIED ESOPHAGEAL VARICES TYPE (HCC): ICD-10-CM

## 2025-06-05 DIAGNOSIS — D69.6 THROMBOCYTOPENIA (HCC): ICD-10-CM

## 2025-06-05 DIAGNOSIS — K75.4 AUTOIMMUNE HEPATITIS TREATED WITH STEROIDS (HCC): Primary | ICD-10-CM

## 2025-06-05 DIAGNOSIS — K73.9 CHRONIC HEPATITIS (HCC): ICD-10-CM

## 2025-06-05 DIAGNOSIS — K75.4 AUTOIMMUNE HEPATITIS TREATED WITH STEROIDS (HCC): ICD-10-CM

## 2025-06-05 DIAGNOSIS — I50.22 CHRONIC SYSTOLIC CHF (CONGESTIVE HEART FAILURE), NYHA CLASS 3 (HCC): ICD-10-CM

## 2025-06-05 PROCEDURE — 99214 OFFICE O/P EST MOD 30 MIN: CPT | Performed by: STUDENT IN AN ORGANIZED HEALTH CARE EDUCATION/TRAINING PROGRAM

## 2025-06-05 PROCEDURE — G2211 COMPLEX E/M VISIT ADD ON: HCPCS | Performed by: STUDENT IN AN ORGANIZED HEALTH CARE EDUCATION/TRAINING PROGRAM

## 2025-06-05 RX ORDER — METOPROLOL SUCCINATE 25 MG/1
25 TABLET, EXTENDED RELEASE ORAL DAILY
Qty: 90 TABLET | Refills: 1 | Status: SHIPPED | OUTPATIENT
Start: 2025-06-05 | End: 2025-06-09 | Stop reason: HOSPADM

## 2025-06-05 RX ORDER — ASCORBIC ACID 500 MG
500 TABLET ORAL DAILY
Qty: 90 TABLET | Refills: 0 | Status: SHIPPED | OUTPATIENT
Start: 2025-06-05 | End: 2025-09-03

## 2025-06-05 RX ORDER — PREDNISONE 5 MG/1
5 TABLET ORAL DAILY
Qty: 90 TABLET | Refills: 1 | Status: SHIPPED | OUTPATIENT
Start: 2025-06-05

## 2025-06-05 RX ORDER — UBIDECARENONE 75 MG
250 CAPSULE ORAL DAILY
Qty: 225 TABLET | Refills: 0 | Status: SHIPPED | OUTPATIENT
Start: 2025-06-05 | End: 2025-09-03

## 2025-06-05 RX ORDER — OMEGA-3-ACID ETHYL ESTERS 1 G/1
2 CAPSULE, LIQUID FILLED ORAL DAILY
Qty: 180 CAPSULE | Refills: 0 | Status: SHIPPED | OUTPATIENT
Start: 2025-06-05

## 2025-06-05 RX ORDER — FLUTICASONE PROPIONATE 50 MCG
2 SPRAY, SUSPENSION (ML) NASAL DAILY
Qty: 48 ML | Refills: 1 | Status: SHIPPED | OUTPATIENT
Start: 2025-06-05

## 2025-06-05 NOTE — PROGRESS NOTES
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  Previously seen in 2025 due to history of AIH and cirrhosis.  Prior to establishing care at [facility] patient was seen at outside GI facility where her AIH was being monitored and treated with budesonide and 5 mg of prednisone.  During last visit it was discussed to taper down her budesonide.  Which she has done and currently is only on 5 mg of prednisone.  Most recent lab work does show an increase in AST and ALT.  We discussed different options of maybe starting MMF at a low dose however she does have a history of chronic ITP and it will need to be discussed with her hematologist.  Patient currently on metoprolol beta-blockade recommend switching to carvedilol will also discussed with her cardiologist to see if they are okay with change.  Patient otherwise doing well recommend repeating blood work next week and will discuss with hematology and cardiologist regarding medication changes.  If patient cannot be started on MMF we will consider increasing prednisone dose to 15 mg daily.     MELD 3.0: 8 at 2025  8:29 AM  MELD-Na: 7 at 2025  8:29 AM  Calculated from:  Serum Creatinine: 0.93 mg/dL (Using min of 1 mg/dL) at 2025  8:29 AM  Serum Sodium: 141 mmol/L (Using max of 137 mmol/L) at 2025  8:29 AM  Total Bilirubin: 0.82 mg/dL (Using min of 1 mg/dL) at 2025  8:29 AM  Serum Albumin: 3.7 g/dL (Using max of 3.5 g/dL) at 2025  8:29 AM  INR(ratio): 1.05  at 6/4/2025  8:29 AM  Age at listing (hypothetical): 85 years  Sex: Female at 6/4/2025  8:29 AM      -Continue torsemide  -Continue on prednisone 5 mg daily, repeat blood work next week we will discuss to either increase prednisone or starting MMF at a low dose to help with AIH  -Consider switching to carvedilol from metoprolol, will need to be discussed with a cardiologist    # Autoimmune Hepatitis  Reported history of AIH although no prior liver biopsy available to review; now c/b cirrhosis  Prior serologies have revealed (+) LEA, 68 ASMA, and 2550 IgG (9/12/2020)   Current regimen includes: Prednisone 5 mg (started for ITP)  Given cirrhosis, Budesonide was discontinued  Repeat LFT shows mild bump in AST and ALT, will repeat in 1 week and determine if patient would need Imuran or increase in prednisone dosing        # Varices:   Small Varices seen on 03/2025 EGD  Current Regimen: On Metoprolol, will ask cardiologist to switch to carvedilol    # Ascites:  Small volume ascities seen on abdomen US 03/2025  On examination today: Euvolemic   Current Regimen: Torsemide 10 mg daily    # Hepatic Encephalopathy:   No prior history of HE  On examination today: Euvolemic and AAOx3  Current Regimen: None - No indication for Lactulose/Rifaximin      # HCC Surveillance and Transplant Evaluation:  Most recent HCC Screening: US 03/2025 with no suspicious lesions;   Previously Referred: No prior referral   Barriers to Transplant: Advanced age, comorbid conditions, low MELD     # Screening and Health Maintenance:   Colon cancer screening: Past recommended screening age   Encouraged to continue high protein and 2g Na restricted diet; avoid eating raw shellfish  Limit use of Tylenol to no more than 2 grams in 24 hour period and avoid use of all NSAIDs and narcotics  Encouraged to participate in daily exercise to avoid muscle wasting  Avoid use of alcohol and strongly encourage tobacco cessation  Pending HAV and HBC immunity recommend  "vaccination along with yearly flu and pneumonia vaccine through PCP    Orders:    Comprehensive metabolic panel; Future    CBC and differential; Future        History of Present Illness   Symone Damico is a 85 y.o. female who presents for a follow-up visit.  Past medical history is notable for decompensated cirrhosis complicated with varices and ascites, history of chronic ITP, history of autoimmune hepatitis on prednisone, CHF, amyloidosis.  Patient states she is doing okay and her son is also present for the visit.  She denies any lower extremity edema or any abdominal distention she currently is on torsemide and is feeling okay.  Patient's son does endorse that patient does feel more fatigued and tends to fall asleep throughout the day.  Her diet also is good, she states she is able to eat most of the things for her meals.  She denies any episodes of confusion however her son does endorse due to her Alzheimer's history at times she does forget things.  HPI    Review of Systems A complete review of systems is negative other than that noted above in the HPI.      Current Medications[1]  Objective   Ht 5' 3\" (1.6 m)   Wt 46.2 kg (101 lb 12.8 oz)   BMI 18.03 kg/m²     Physical Exam  Pulmonary:      Effort: Pulmonary effort is normal.   Abdominal:      General: Abdomen is flat.      Palpations: Abdomen is soft.     Skin:     General: Skin is warm.     Neurological:      Mental Status: She is alert.     Psychiatric:         Mood and Affect: Mood normal.            Lab Results: I personally reviewed relevant lab results.       Results for orders placed during the hospital encounter of 03/31/25    EGD    Impression  Regular Z-line 38 cm from the incisors  Multiple small varices in the esophagus which flattened with insufflation. No high risk stigmata seen.  Mild portal hypertensive gastropathy. No gastric varices seen.  The duodenum appeared normal.      RECOMMENDATION:  Recommend NSBB for primary prophylaxis if the " patient can tolerate  If on NSBB, no further screening endoscopies necessary  Follow up in hepatology clinic            Maria Victoria Patel MD               [1]   Current Outpatient Medications   Medication Sig Dispense Refill    Acoramidis, 712MG Twice Daily, 356 MG TBPK Take 712 mg by mouth 2 (two) times a day 120 each 11    ascorbic acid (VITAMIN C) 500 mg tablet Take 1 tablet (500 mg total) by mouth daily 90 tablet 0    calcium carbonate (OS-PABLO) 600 MG tablet Take 600 mg by mouth      cholecalciferol (VITAMIN D3) 1,000 units tablet TAKE 1 TABLET BY MOUTH EVERY DAY 90 tablet 0    cyanocobalamin (VITAMIN B-12) 100 mcg tablet Take 2.5 tablets (250 mcg total) by mouth daily 225 tablet 0    Entresto 49-51 MG TABS TAKE 1 TABLET BY MOUTH TWICE A  tablet 1    fluticasone (FLONASE) 50 mcg/act nasal spray 2 sprays into each nostril daily 48 mL 1    metoprolol succinate (TOPROL-XL) 25 mg 24 hr tablet Take 1 tablet (25 mg total) by mouth daily 90 tablet 1    omega-3-acid ethyl esters (LOVAZA) 1 g capsule Take 2 capsules (2 g total) by mouth daily 180 capsule 0    potassium chloride (MICRO-K) 10 MEQ CR capsule Take 1 capsule (10 mEq total) by mouth daily 90 capsule 3    predniSONE 5 mg tablet Take 1 tablet (5 mg total) by mouth daily 90 tablet 1    Promacta 25 MG tablet TAKE 1 TABLET BY MOUTH 1 TIME A DAY. TAKE ON AN EMPTY STOMACH 1 HOUR BEFORE OR 2 HOURS AFTER A MEAL. 90 tablet 2    torsemide (DEMADEX) 10 mg tablet Take 1 tablet (10 mg total) by mouth daily 90 tablet 3     Current Facility-Administered Medications   Medication Dose Route Frequency Provider Last Rate Last Admin    denosumab (PROLIA) subcutaneous injection 60 mg  60 mg Subcutaneous Q6 Months Kassandra Zarate MD   60 mg at 02/28/25 9725

## 2025-06-05 NOTE — TELEPHONE ENCOUNTER
Our appeal was denied for Attruby.  Patient has to fail Vyndamax first as this is formulary alternative.  [facility] stated insufficient clinical references showing superiority of Attruby to Vyndamax, even though we sent PI plus letter stating the differences.      Do you want to order Vyndamax for patient at this time?

## 2025-06-05 NOTE — ASSESSMENT & PLAN NOTE
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  Previously seen in February 2025 due to history of AIH and cirrhosis.  Prior to establishing care at [facility] patient was seen at outside GI facility where her AIH was being monitored and treated with budesonide and 5 mg of prednisone.  During last visit it was discussed to taper down her budesonide.  Which she has done and currently is only on 5 mg of prednisone.  Most recent lab work does show an increase in AST and ALT.  We discussed different options of maybe starting MMF at a low dose however she does have a history of chronic ITP and it will need to be discussed with her hematologist.  Patient currently on metoprolol beta-blockade recommend switching to carvedilol will also discussed with her cardiologist to see if they are okay with change.  Patient otherwise doing well recommend repeating blood work next week and will discuss with hematology and cardiologist regarding medication changes.  If patient cannot be started on MMF we will consider increasing prednisone dose to 15 mg daily.     MELD 3.0: 8 at 6/4/2025  8:29 AM  MELD-Na: 7 at 6/4/2025  8:29 AM  Calculated from:  Serum Creatinine: 0.93 mg/dL (Using min of 1 mg/dL) at 6/4/2025  8:29 AM  Serum Sodium: 141 mmol/L (Using max of 137 mmol/L) at 6/4/2025  8:29 AM  Total Bilirubin: 0.82 mg/dL (Using min of 1 mg/dL) at 6/4/2025  8:29 AM  Serum Albumin: 3.7 g/dL (Using max of 3.5 g/dL) at 6/4/2025  8:29 AM  INR(ratio): 1.05 at 6/4/2025  8:29 AM  Age at listing (hypothetical): 85 years  Sex: Female at 6/4/2025  8:29 AM      -Continue torsemide  -Continue on prednisone 5 mg daily, repeat blood work next week we will discuss to either increase prednisone or starting MMF at a low dose to help with AIH  -Consider switching to  carvedilol from metoprolol, will need to be discussed with a cardiologist    # Autoimmune Hepatitis  Reported history of AIH although no prior liver biopsy available to review; now c/b cirrhosis  Prior serologies have revealed (+) LEA, 68 ASMA, and 2550 IgG (9/12/2020)   Current regimen includes: Prednisone 5 mg (started for ITP)  Given cirrhosis, Budesonide was discontinued  Repeat LFT shows mild bump in AST and ALT, will repeat in 1 week and determine if patient would need Imuran or increase in prednisone dosing        # Varices:   Small Varices seen on 03/2025 EGD  Current Regimen: On Metoprolol, will ask cardiologist to switch to carvedilol    # Ascites:  Small volume ascities seen on abdomen US 03/2025  On examination today: Euvolemic   Current Regimen: Torsemide 10 mg daily    # Hepatic Encephalopathy:   No prior history of HE  On examination today: Euvolemic and AAOx3  Current Regimen: None - No indication for Lactulose/Rifaximin      # HCC Surveillance and Transplant Evaluation:  Most recent HCC Screening: US 03/2025 with no suspicious lesions;   Previously Referred: No prior referral   Barriers to Transplant: Advanced age, comorbid conditions, low MELD     # Screening and Health Maintenance:   Colon cancer screening: Past recommended screening age   Encouraged to continue high protein and 2g Na restricted diet; avoid eating raw shellfish  Limit use of Tylenol to no more than 2 grams in 24 hour period and avoid use of all NSAIDs and narcotics  Encouraged to participate in daily exercise to avoid muscle wasting  Avoid use of alcohol and strongly encourage tobacco cessation  Pending HAV and HBC immunity recommend vaccination along with yearly flu and pneumonia vaccine through PCP    Orders:    Comprehensive metabolic panel; Future    CBC and differential; Future

## 2025-06-05 NOTE — TELEPHONE ENCOUNTER
I sent a message to patient and her son through [employer] as that is usually our best means of communication.  I will send the Rx to you for Vyndamax once I hear back from patient's son that they are agreeable.  I noticed in chart that patient is going to be traveling out to [place] to see her soon for a long period of time so I need to discuss the situation with family.

## 2025-06-06 NOTE — TELEPHONE ENCOUNTER
Just an FYI, spoke with rep, she may qualify to get Attruby at no cost due to prior auth and appeal denial.  Going to try for that since patient was sent medication already (free trial).  I will keep you posted.

## 2025-06-09 ENCOUNTER — TELEPHONE (OUTPATIENT)
Age: 85
End: 2025-06-09

## 2025-06-09 DIAGNOSIS — D69.3 CHRONIC ITP (IDIOPATHIC THROMBOCYTOPENIA) (HCC): Primary | ICD-10-CM

## 2025-06-09 DIAGNOSIS — I85.00 ESOPHAGEAL VARICES WITHOUT BLEEDING, UNSPECIFIED ESOPHAGEAL VARICES TYPE (HCC): ICD-10-CM

## 2025-06-09 DIAGNOSIS — K75.4 AUTOIMMUNE HEPATITIS TREATED WITH STEROIDS (HCC): ICD-10-CM

## 2025-06-09 RX ORDER — CARVEDILOL 3.12 MG/1
3.12 TABLET ORAL 2 TIMES DAILY WITH MEALS
Qty: 180 TABLET | Refills: 1 | Status: SHIPPED | OUTPATIENT
Start: 2025-06-09

## 2025-06-09 RX ORDER — MYCOPHENOLATE MOFETIL 250 MG/1
250 CAPSULE ORAL EVERY 12 HOURS SCHEDULED
Qty: 180 CAPSULE | Refills: 1 | Status: SHIPPED | OUTPATIENT
Start: 2025-06-09

## 2025-06-10 ENCOUNTER — RESULTS FOLLOW-UP (OUTPATIENT)
Age: 85
End: 2025-06-10

## 2025-06-10 ENCOUNTER — APPOINTMENT (OUTPATIENT)
Dept: LAB | Facility: CLINIC | Age: 85
End: 2025-06-10
Attending: STUDENT IN AN ORGANIZED HEALTH CARE EDUCATION/TRAINING PROGRAM
Payer: MEDICARE

## 2025-06-10 DIAGNOSIS — K75.4 AUTOIMMUNE HEPATITIS TREATED WITH STEROIDS (HCC): ICD-10-CM

## 2025-06-10 LAB
ALBUMIN SERPL BCG-MCNC: 3.6 G/DL (ref 3.5–5)
ALP SERPL-CCNC: 47 U/L (ref 34–104)
ALT SERPL W P-5'-P-CCNC: 65 U/L (ref 7–52)
ANION GAP SERPL CALCULATED.3IONS-SCNC: 5 MMOL/L (ref 4–13)
AST SERPL W P-5'-P-CCNC: 66 U/L (ref 13–39)
BASOPHILS # BLD AUTO: 0.02 THOUSANDS/ÂΜL (ref 0–0.1)
BASOPHILS NFR BLD AUTO: 1 % (ref 0–1)
BILIRUB SERPL-MCNC: 0.72 MG/DL (ref 0.2–1)
BUN SERPL-MCNC: 22 MG/DL (ref 5–25)
CALCIUM SERPL-MCNC: 9 MG/DL (ref 8.4–10.2)
CHLORIDE SERPL-SCNC: 104 MMOL/L (ref 96–108)
CO2 SERPL-SCNC: 32 MMOL/L (ref 21–32)
CREAT SERPL-MCNC: 0.77 MG/DL (ref 0.6–1.3)
EOSINOPHIL # BLD AUTO: 0.01 THOUSAND/ÂΜL (ref 0–0.61)
EOSINOPHIL NFR BLD AUTO: 0 % (ref 0–6)
ERYTHROCYTE [DISTWIDTH] IN BLOOD BY AUTOMATED COUNT: 15.2 % (ref 11.6–15.1)
GFR SERPL CREATININE-BSD FRML MDRD: 69 ML/MIN/1.73SQ M
GLUCOSE P FAST SERPL-MCNC: 85 MG/DL (ref 65–99)
HCT VFR BLD AUTO: 32.1 % (ref 34.8–46.1)
HGB BLD-MCNC: 10.7 G/DL (ref 11.5–15.4)
IMM GRANULOCYTES # BLD AUTO: 0.03 THOUSAND/UL (ref 0–0.2)
IMM GRANULOCYTES NFR BLD AUTO: 1 % (ref 0–2)
LYMPHOCYTES # BLD AUTO: 0.79 THOUSANDS/ÂΜL (ref 0.6–4.47)
LYMPHOCYTES NFR BLD AUTO: 24 % (ref 14–44)
MCH RBC QN AUTO: 31.6 PG (ref 26.8–34.3)
MCHC RBC AUTO-ENTMCNC: 33.3 G/DL (ref 31.4–37.4)
MCV RBC AUTO: 95 FL (ref 82–98)
MONOCYTES # BLD AUTO: 0.37 THOUSAND/ÂΜL (ref 0.17–1.22)
MONOCYTES NFR BLD AUTO: 11 % (ref 4–12)
NEUTROPHILS # BLD AUTO: 2.06 THOUSANDS/ÂΜL (ref 1.85–7.62)
NEUTS SEG NFR BLD AUTO: 63 % (ref 43–75)
NRBC BLD AUTO-RTO: 0 /100 WBCS
PLATELET # BLD AUTO: 74 THOUSANDS/UL (ref 149–390)
PMV BLD AUTO: 11.9 FL (ref 8.9–12.7)
POTASSIUM SERPL-SCNC: 3.4 MMOL/L (ref 3.5–5.3)
PROT SERPL-MCNC: 6.5 G/DL (ref 6.4–8.4)
RBC # BLD AUTO: 3.39 MILLION/UL (ref 3.81–5.12)
SODIUM SERPL-SCNC: 141 MMOL/L (ref 135–147)
WBC # BLD AUTO: 3.28 THOUSAND/UL (ref 4.31–10.16)

## 2025-06-10 PROCEDURE — 36415 COLL VENOUS BLD VENIPUNCTURE: CPT

## 2025-06-10 PROCEDURE — 85025 COMPLETE CBC W/AUTO DIFF WBC: CPT

## 2025-06-10 PROCEDURE — 80053 COMPREHEN METABOLIC PANEL: CPT

## 2025-06-13 ENCOUNTER — TELEPHONE (OUTPATIENT)
Age: 85
End: 2025-06-13

## 2025-06-13 NOTE — TELEPHONE ENCOUNTER
L/M for Vanessa Ahmadi that I understand patient is being reviewed for patient assistance, to all office Monday if she needs anything further information.

## 2025-06-13 NOTE — TELEPHONE ENCOUNTER
Caller: ERICK, son    Doctor: Dr. Damico    Reason for call: Son noted that Pt is in [place] with him and he believes the 6/18/25 remote device check will need to be rescheduled and would like to discuss since he has more questions concerning it.    Call back#: [phone]

## 2025-06-13 NOTE — TELEPHONE ENCOUNTER
Received a call from Vanessa at the [facility],  pt is being reviewed for the pt assistant program.     C/b [phone] ext [phone]

## 2025-06-17 NOTE — TELEPHONE ENCOUNTER
6/17/25 Left message for SonERICK to call office for questions and concerns. Direct number for device clinic also left for return call.

## 2025-06-27 ENCOUNTER — REMOTE DEVICE CLINIC VISIT (OUTPATIENT)
Dept: CARDIOLOGY CLINIC | Facility: CLINIC | Age: 85
End: 2025-06-27
Payer: MEDICARE

## 2025-06-27 DIAGNOSIS — Z95.810 PRESENCE OF IMPLANTABLE CARDIOVERTER-DEFIBRILLATOR (ICD): ICD-10-CM

## 2025-06-27 DIAGNOSIS — I50.22 CHRONIC SYSTOLIC HEART FAILURE (HCC): Primary | ICD-10-CM

## 2025-06-27 DIAGNOSIS — I44.7 COMPLETE LEFT BUNDLE BRANCH BLOCK (LBBB): ICD-10-CM

## 2025-06-27 PROCEDURE — 93295 DEV INTERROG REMOTE 1/2/MLT: CPT | Performed by: STUDENT IN AN ORGANIZED HEALTH CARE EDUCATION/TRAINING PROGRAM

## 2025-06-27 PROCEDURE — 93296 REM INTERROG EVL PM/IDS: CPT | Performed by: STUDENT IN AN ORGANIZED HEALTH CARE EDUCATION/TRAINING PROGRAM

## 2025-06-27 NOTE — PROGRESS NOTES
MDT BI-V ICD/ ACTIVE SYSTEM IS MRI CONDITIONAL   CARELINK TRANSMISSION:  BATTERY VOLTAGE ADEQUATE (7 YRS).   AP 1.7%   98.4%   ALL AVAILABLE LEAD PARAMETERS WITHIN NORMAL LIMITS.  9 VT NS EPISODES, AVAIL EGM'S SHOW NSVT 11 BEATS @ 190 BPM, 8 @ 167, 10 @ 176, 8 @ 190, EGM # 49 SHOWS  BPM. 7 V SENSE EPISODES, MARKERS FOR NSVT, PAS,  ATRIAL UNDERSENSING. PT TAKES NO A/C, ON CARVEDILOL.  EF 40% (ECHO 12/29/2023). TIME IN AT/AF <0.1 HR/DAY.  PVC COUNT 1/HR (SINGLES).  OPTI-VOL WITHIN NORMAL LIMITS, BUT TRENDING UP.  NORMAL DEVICE FUNCTION.  PAS

## 2025-07-21 DIAGNOSIS — I50.22 CHRONIC SYSTOLIC CHF (CONGESTIVE HEART FAILURE), NYHA CLASS 3 (HCC): ICD-10-CM

## 2025-07-23 RX ORDER — POTASSIUM CHLORIDE 750 MG/1
10 CAPSULE, EXTENDED RELEASE ORAL DAILY
Qty: 90 CAPSULE | Refills: 1 | Status: SHIPPED | OUTPATIENT
Start: 2025-07-23

## (undated) DEVICE — GUIDE SHEATH 7FR 90 D ATTAIN SELECT II SUREVALVE

## (undated) DEVICE — INTRO SHEATH PEEL AWAY 7FR

## (undated) DEVICE — RUNTHROUGH NS EXTRA FLOPPY PTCA GUIDEWIRE: Brand: RUNTHROUGH

## (undated) DEVICE — Device: Brand: WEBSTER

## (undated) DEVICE — CATH GUIDING FIXED SHAPE 43CM

## (undated) DEVICE — MICROPUNCTURE SET 4FR 10CM .018 NITINOL TRANSITIONLESS TIP

## (undated) DEVICE — AVITENE MICROFIBRILLAR COLLAGEN HEMOSTAT NON-WOVEN WEB: Brand: AVITENE NON-WOVEN WEB

## (undated) DEVICE — GUIDE SHEATH 9FR ATTAIN COMMAND 9FR EH CURVE

## (undated) DEVICE — INTRO SHEATH PEEL AWAY 9 FR